# Patient Record
Sex: FEMALE | Race: BLACK OR AFRICAN AMERICAN | NOT HISPANIC OR LATINO | Employment: OTHER | URBAN - METROPOLITAN AREA
[De-identification: names, ages, dates, MRNs, and addresses within clinical notes are randomized per-mention and may not be internally consistent; named-entity substitution may affect disease eponyms.]

---

## 2017-01-30 RX ORDER — ALPRAZOLAM 0.5 MG/1
TABLET ORAL
Qty: 15 TABLET | Refills: 0 | Status: SHIPPED | OUTPATIENT
Start: 2017-01-30 | End: 2017-02-09 | Stop reason: SDUPTHER

## 2017-02-09 ENCOUNTER — OFFICE VISIT (OUTPATIENT)
Dept: FAMILY MEDICINE CLINIC | Facility: CLINIC | Age: 67
End: 2017-02-09

## 2017-02-09 VITALS
DIASTOLIC BLOOD PRESSURE: 70 MMHG | HEIGHT: 64 IN | RESPIRATION RATE: 18 BRPM | BODY MASS INDEX: 26.98 KG/M2 | HEART RATE: 58 BPM | OXYGEN SATURATION: 96 % | SYSTOLIC BLOOD PRESSURE: 120 MMHG | WEIGHT: 158 LBS

## 2017-02-09 DIAGNOSIS — R53.83 FATIGUE, UNSPECIFIED TYPE: ICD-10-CM

## 2017-02-09 DIAGNOSIS — I10 ESSENTIAL HYPERTENSION: ICD-10-CM

## 2017-02-09 DIAGNOSIS — E11.9 TYPE 2 DIABETES MELLITUS WITHOUT COMPLICATION, WITHOUT LONG-TERM CURRENT USE OF INSULIN (HCC): Primary | ICD-10-CM

## 2017-02-09 DIAGNOSIS — E78.5 HYPERLIPIDEMIA, UNSPECIFIED HYPERLIPIDEMIA TYPE: ICD-10-CM

## 2017-02-09 DIAGNOSIS — E55.9 VITAMIN D DEFICIENCY: ICD-10-CM

## 2017-02-09 DIAGNOSIS — F41.1 GENERALIZED ANXIETY DISORDER: ICD-10-CM

## 2017-02-09 DIAGNOSIS — Z79.899 DRUG THERAPY: ICD-10-CM

## 2017-02-09 PROBLEM — F32.A DEPRESSION: Status: ACTIVE | Noted: 2017-02-09

## 2017-02-09 PROBLEM — K21.9 GASTROESOPHAGEAL REFLUX DISEASE: Status: ACTIVE | Noted: 2017-02-09

## 2017-02-09 PROBLEM — N63.0 MASS OF BREAST: Status: ACTIVE | Noted: 2017-02-09

## 2017-02-09 PROBLEM — K62.5 RECTAL HEMORRHAGE: Status: ACTIVE | Noted: 2017-02-09

## 2017-02-09 PROBLEM — M17.9 OSTEOARTHRITIS OF KNEE: Status: ACTIVE | Noted: 2017-02-09

## 2017-02-09 PROBLEM — C50.919 MALIGNANT NEOPLASM OF BREAST: Status: ACTIVE | Noted: 2017-02-09

## 2017-02-09 PROBLEM — L73.9 FOLLICULITIS: Status: ACTIVE | Noted: 2017-02-09

## 2017-02-09 PROBLEM — Z96.651 STATUS POST TOTAL RIGHT KNEE REPLACEMENT: Status: ACTIVE | Noted: 2017-02-09

## 2017-02-09 PROCEDURE — 99214 OFFICE O/P EST MOD 30 MIN: CPT | Performed by: FAMILY MEDICINE

## 2017-02-09 RX ORDER — ESOMEPRAZOLE MAGNESIUM 40 MG/1
CAPSULE, DELAYED RELEASE ORAL DAILY
COMMUNITY
Start: 2015-05-15 | End: 2018-02-09

## 2017-02-09 RX ORDER — QUINAPRIL 40 MG/1
TABLET ORAL DAILY
COMMUNITY
Start: 2013-05-10 | End: 2017-04-25 | Stop reason: SDUPTHER

## 2017-02-09 RX ORDER — GLIPIZIDE 10 MG/1
10 TABLET, FILM COATED, EXTENDED RELEASE ORAL
COMMUNITY
End: 2017-02-11

## 2017-02-09 RX ORDER — PIROXICAM 10 MG/1
10 CAPSULE ORAL
COMMUNITY
End: 2017-02-11

## 2017-02-09 RX ORDER — CETIRIZINE HYDROCHLORIDE 10 MG/1
10 TABLET ORAL
COMMUNITY
Start: 2015-05-25 | End: 2018-02-09

## 2017-02-09 RX ORDER — ALPRAZOLAM 0.5 MG/1
TABLET ORAL
Qty: 30 TABLET | Refills: 2 | Status: SHIPPED | OUTPATIENT
Start: 2017-02-09 | End: 2018-02-09

## 2017-02-09 RX ORDER — MELOXICAM 7.5 MG/1
7.5 TABLET ORAL EVERY 24 HOURS
COMMUNITY
Start: 2016-09-19 | End: 2018-02-09

## 2017-02-09 RX ORDER — FLUTICASONE PROPIONATE 50 MCG
2 SPRAY, SUSPENSION (ML) NASAL
COMMUNITY
Start: 2015-05-25 | End: 2018-02-09

## 2017-02-09 RX ORDER — AMLODIPINE BESYLATE 10 MG/1
TABLET ORAL
COMMUNITY
Start: 2013-06-10 | End: 2018-02-09

## 2017-02-09 RX ORDER — ALPRAZOLAM 0.5 MG/1
0.5 TABLET ORAL NIGHTLY PRN
COMMUNITY
Start: 2013-07-16 | End: 2018-02-09 | Stop reason: SDUPTHER

## 2017-02-09 RX ORDER — GLIPIZIDE 10 MG/1
TABLET, FILM COATED, EXTENDED RELEASE ORAL
COMMUNITY
Start: 2014-03-13 | End: 2017-04-25 | Stop reason: SDUPTHER

## 2017-02-09 NOTE — PROGRESS NOTES
"Subjective   Lucia Ellis is a 66 y.o. female.     History of Present Illness  Here to discuss DM. Sugar low this am and ate eggs.  FBS often 118. At work has low sugar reactions , but not much bc has peanutbutter crackers prn. Maybe only 2 in the last 2 months. There are no recorded A1Cs in chart!!!      Was told to come in for xanax. 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    The following portions of the patient's history were reviewed and updated as appropriate: allergies, current medications, past social history and problem list.    Review of Systems   Constitutional: Negative for activity change, appetite change and unexpected weight change.   HENT: Negative for nosebleeds and trouble swallowing.    Eyes: Negative for pain and visual disturbance.   Respiratory: Negative for chest tightness, shortness of breath and wheezing.    Cardiovascular: Negative for chest pain and palpitations.   Gastrointestinal: Negative for abdominal pain and blood in stool.   Endocrine: Negative.    Genitourinary: Negative for difficulty urinating and hematuria.   Musculoskeletal: Negative for joint swelling.   Skin: Negative for color change and rash.   Allergic/Immunologic: Negative.    Neurological: Negative for syncope and speech difficulty.   Hematological: Negative for adenopathy.   Psychiatric/Behavioral: Negative for agitation and confusion.   All other systems reviewed and are negative.      Objective   Visit Vitals   • /70   • Pulse 58   • Resp 18   • Ht 64\" (162.6 cm)   • Wt 158 lb (71.7 kg)   • SpO2 96%   • BMI 27.12 kg/m2     Physical Exam   Constitutional: She is oriented to person, place, and time. Vital signs are normal. She appears well-developed and well-nourished. No distress.   HENT:   Head: Normocephalic.   Neck: Carotid bruit is not present. No thyromegaly present.   No bruits   Cardiovascular: Normal rate, regular rhythm and normal heart " sounds.    Pulmonary/Chest: Effort normal and breath sounds normal.    Lucia had a diabetic foot exam performed today.   During the foot exam she had a monofilament test performed.    Neurological Sensory Findings -  Unaltered sharp/dull right ankle/foot discrimination and unaltered sharp/dull left ankle/foot discrimination.  Neurological: She is alert and oriented to person, place, and time. Gait normal.   Psychiatric: She has a normal mood and affect. Her behavior is normal. Judgment and thought content normal.   Vitals reviewed.      Assessment/Plan   Problem List Items Addressed This Visit        Cardiovascular and Mediastinum    Hyperlipidemia    Relevant Orders    Lipid Panel With / Chol / HDL Ratio    Hypertension    Relevant Medications    amLODIPine (NORVASC) 10 MG tablet    quinapril (ACCUPRIL) 40 MG tablet       Digestive    Vitamin D deficiency    Relevant Orders    Vitamin D 25 Hydroxy       Endocrine    Diabetes mellitus - Primary    Relevant Medications    glipiZIDE (GLIPIZIDE XL) 10 MG 24 hr tablet    metFORMIN (GLUCOPHAGE) 1000 MG tablet    glipiZIDE (GLUCOTROL) 10 MG 24 hr tablet    Other Relevant Orders    Hemoglobin A1c    Microalbumin / Creatinine Urine Ratio       Other    Generalized anxiety disorder    Relevant Medications    ALPRAZolam (XANAX) 0.5 MG tablet    ALPRAZolam (XANAX) 0.5 MG tablet    Drug therapy    Relevant Orders    CBC & Differential    Comprehensive Metabolic Panel      Other Visit Diagnoses     Fatigue, unspecified type        Relevant Orders    TSH           RTO 6 mo for CPE and A1C  Encouraged to skip xanax occ

## 2017-02-10 LAB
25(OH)D3+25(OH)D2 SERPL-MCNC: 23.7 NG/ML (ref 30–100)
ALBUMIN SERPL-MCNC: 5.1 G/DL (ref 3.5–5.2)
ALBUMIN/CREAT UR: <11.1 MG/G CREAT (ref 0–30)
ALBUMIN/GLOB SERPL: 1.9 G/DL
ALP SERPL-CCNC: 148 U/L (ref 39–117)
ALT SERPL-CCNC: 17 U/L (ref 1–33)
AST SERPL-CCNC: 20 U/L (ref 1–32)
BASOPHILS # BLD AUTO: 0.04 10*3/MM3 (ref 0–0.2)
BASOPHILS NFR BLD AUTO: 0.7 % (ref 0–1.5)
BILIRUB SERPL-MCNC: 0.4 MG/DL (ref 0.1–1.2)
BUN SERPL-MCNC: 11 MG/DL (ref 8–23)
BUN/CREAT SERPL: 19.6 (ref 7–25)
CALCIUM SERPL-MCNC: 10.2 MG/DL (ref 8.6–10.5)
CHLORIDE SERPL-SCNC: 101 MMOL/L (ref 98–107)
CHOLEST SERPL-MCNC: 218 MG/DL (ref 0–200)
CHOLEST/HDLC SERPL: 3.76 {RATIO}
CO2 SERPL-SCNC: 27 MMOL/L (ref 22–29)
CREAT SERPL-MCNC: 0.56 MG/DL (ref 0.57–1)
CREAT UR-MCNC: 27.1 MG/DL
EOSINOPHIL # BLD AUTO: 0.12 10*3/MM3 (ref 0–0.7)
EOSINOPHIL NFR BLD AUTO: 2.2 % (ref 0.3–6.2)
ERYTHROCYTE [DISTWIDTH] IN BLOOD BY AUTOMATED COUNT: 13.5 % (ref 11.7–13)
GLOBULIN SER CALC-MCNC: 2.7 GM/DL
GLUCOSE SERPL-MCNC: 130 MG/DL (ref 65–99)
HBA1C MFR BLD: 7.8 % (ref 4.8–5.6)
HCT VFR BLD AUTO: 42.1 % (ref 35.6–45.5)
HDLC SERPL-MCNC: 58 MG/DL (ref 40–60)
HGB BLD-MCNC: 13.6 G/DL (ref 11.9–15.5)
IMM GRANULOCYTES # BLD: 0.02 10*3/MM3 (ref 0–0.03)
IMM GRANULOCYTES NFR BLD: 0.4 % (ref 0–0.5)
LDLC SERPL CALC-MCNC: 133 MG/DL (ref 0–100)
LYMPHOCYTES # BLD AUTO: 2.48 10*3/MM3 (ref 0.9–4.8)
LYMPHOCYTES NFR BLD AUTO: 45 % (ref 19.6–45.3)
MCH RBC QN AUTO: 28.9 PG (ref 26.9–32)
MCHC RBC AUTO-ENTMCNC: 32.3 G/DL (ref 32.4–36.3)
MCV RBC AUTO: 89.6 FL (ref 80.5–98.2)
MICROALBUMIN UR-MCNC: <3 UG/ML
MONOCYTES # BLD AUTO: 0.41 10*3/MM3 (ref 0.2–1.2)
MONOCYTES NFR BLD AUTO: 7.4 % (ref 5–12)
NEUTROPHILS # BLD AUTO: 2.44 10*3/MM3 (ref 1.9–8.1)
NEUTROPHILS NFR BLD AUTO: 44.3 % (ref 42.7–76)
PLATELET # BLD AUTO: 254 10*3/MM3 (ref 140–500)
POTASSIUM SERPL-SCNC: 3.8 MMOL/L (ref 3.5–5.2)
PROT SERPL-MCNC: 7.8 G/DL (ref 6–8.5)
RBC # BLD AUTO: 4.7 10*6/MM3 (ref 3.9–5.2)
SODIUM SERPL-SCNC: 143 MMOL/L (ref 136–145)
TRIGL SERPL-MCNC: 136 MG/DL (ref 0–150)
TSH SERPL DL<=0.005 MIU/L-ACNC: 0.89 MIU/ML (ref 0.27–4.2)
VLDLC SERPL CALC-MCNC: 27.2 MG/DL (ref 5–40)
WBC # BLD AUTO: 5.51 10*3/MM3 (ref 4.5–10.7)

## 2017-02-11 DIAGNOSIS — E11.65 TYPE 2 DIABETES MELLITUS WITH HYPERGLYCEMIA, WITHOUT LONG-TERM CURRENT USE OF INSULIN (HCC): ICD-10-CM

## 2017-02-11 DIAGNOSIS — E78.5 HYPERLIPIDEMIA, UNSPECIFIED HYPERLIPIDEMIA TYPE: Primary | ICD-10-CM

## 2017-02-11 RX ORDER — ATORVASTATIN CALCIUM 10 MG/1
10 TABLET, FILM COATED ORAL DAILY
Qty: 90 TABLET | Refills: 3 | Status: SHIPPED | OUTPATIENT
Start: 2017-02-11 | End: 2018-02-09

## 2017-03-09 RX ORDER — PIROXICAM 10 MG/1
CAPSULE ORAL
Qty: 60 CAPSULE | Refills: 8 | Status: SHIPPED | OUTPATIENT
Start: 2017-03-09 | End: 2018-02-09

## 2017-03-24 RX ORDER — QUINAPRIL 40 MG/1
TABLET ORAL
Qty: 30 TABLET | Refills: 8 | Status: SHIPPED | OUTPATIENT
Start: 2017-03-24 | End: 2018-02-09 | Stop reason: SDUPTHER

## 2017-04-10 RX ORDER — AMLODIPINE BESYLATE 10 MG/1
TABLET ORAL
Qty: 90 TABLET | Refills: 3 | Status: SHIPPED | OUTPATIENT
Start: 2017-04-10 | End: 2018-02-09 | Stop reason: SDUPTHER

## 2017-04-24 RX ORDER — GLIPIZIDE 10 MG/1
TABLET, FILM COATED, EXTENDED RELEASE ORAL
Qty: 30 TABLET | Refills: 6 | Status: SHIPPED | OUTPATIENT
Start: 2017-04-24 | End: 2018-02-09

## 2017-04-25 RX ORDER — QUINAPRIL 40 MG/1
40 TABLET ORAL DAILY
Qty: 90 TABLET | Refills: 1 | Status: SHIPPED | OUTPATIENT
Start: 2017-04-25 | End: 2017-11-19 | Stop reason: SDUPTHER

## 2017-04-25 RX ORDER — GLIPIZIDE 10 MG/1
10 TABLET, FILM COATED, EXTENDED RELEASE ORAL DAILY
Qty: 90 TABLET | Refills: 3 | Status: SHIPPED | OUTPATIENT
Start: 2017-04-25 | End: 2018-03-12

## 2017-05-22 RX ORDER — ALPRAZOLAM 0.5 MG/1
TABLET ORAL
Qty: 30 TABLET | Refills: 1 | Status: SHIPPED | OUTPATIENT
Start: 2017-05-22 | End: 2018-02-09

## 2017-06-13 ENCOUNTER — TELEPHONE (OUTPATIENT)
Dept: FAMILY MEDICINE CLINIC | Facility: CLINIC | Age: 67
End: 2017-06-13

## 2017-06-13 RX ORDER — MELOXICAM 7.5 MG/1
7.5 TABLET ORAL DAILY
Qty: 30 TABLET | Refills: 6 | Status: SHIPPED | OUTPATIENT
Start: 2017-06-13 | End: 2018-02-09

## 2017-11-20 RX ORDER — QUINAPRIL 40 MG/1
TABLET ORAL
Qty: 90 TABLET | Refills: 1 | Status: SHIPPED | OUTPATIENT
Start: 2017-11-20 | End: 2018-02-09

## 2018-02-09 ENCOUNTER — APPOINTMENT (OUTPATIENT)
Dept: LAB | Facility: HOSPITAL | Age: 68
End: 2018-02-09

## 2018-02-09 ENCOUNTER — OFFICE VISIT (OUTPATIENT)
Dept: INTERNAL MEDICINE | Facility: CLINIC | Age: 68
End: 2018-02-09

## 2018-02-09 VITALS
WEIGHT: 154.4 LBS | BODY MASS INDEX: 26.36 KG/M2 | OXYGEN SATURATION: 97 % | HEIGHT: 64 IN | HEART RATE: 76 BPM | DIASTOLIC BLOOD PRESSURE: 88 MMHG | SYSTOLIC BLOOD PRESSURE: 138 MMHG

## 2018-02-09 DIAGNOSIS — E11.65 TYPE 2 DIABETES MELLITUS WITH HYPERGLYCEMIA, WITHOUT LONG-TERM CURRENT USE OF INSULIN (HCC): ICD-10-CM

## 2018-02-09 DIAGNOSIS — E11.9 TYPE 2 DIABETES MELLITUS WITHOUT COMPLICATION, WITHOUT LONG-TERM CURRENT USE OF INSULIN (HCC): Primary | ICD-10-CM

## 2018-02-09 DIAGNOSIS — F41.1 GENERALIZED ANXIETY DISORDER: ICD-10-CM

## 2018-02-09 DIAGNOSIS — I10 ESSENTIAL HYPERTENSION: ICD-10-CM

## 2018-02-09 LAB
ALBUMIN SERPL-MCNC: 4.8 G/DL (ref 3.5–5.2)
ALBUMIN/GLOB SERPL: 1.7 G/DL
ALP SERPL-CCNC: 132 U/L (ref 39–117)
ALT SERPL W P-5'-P-CCNC: 16 U/L (ref 1–33)
ANION GAP SERPL CALCULATED.3IONS-SCNC: 12 MMOL/L
AST SERPL-CCNC: 16 U/L (ref 1–32)
BILIRUB SERPL-MCNC: 0.2 MG/DL (ref 0.1–1.2)
BUN BLD-MCNC: 13 MG/DL (ref 8–23)
BUN/CREAT SERPL: 22.8 (ref 7–25)
CALCIUM SPEC-SCNC: 9.6 MG/DL (ref 8.6–10.5)
CHLORIDE SERPL-SCNC: 100 MMOL/L (ref 98–107)
CO2 SERPL-SCNC: 29 MMOL/L (ref 22–29)
CREAT BLD-MCNC: 0.57 MG/DL (ref 0.57–1)
GFR SERPL CREATININE-BSD FRML MDRD: 127 ML/MIN/1.73
GLOBULIN UR ELPH-MCNC: 2.9 GM/DL
GLUCOSE BLD-MCNC: 100 MG/DL (ref 65–99)
HBA1C MFR BLD: 8.8 % (ref 4.8–5.6)
POTASSIUM BLD-SCNC: 3.3 MMOL/L (ref 3.5–5.2)
PROT SERPL-MCNC: 7.7 G/DL (ref 6–8.5)
SODIUM BLD-SCNC: 141 MMOL/L (ref 136–145)

## 2018-02-09 PROCEDURE — 99214 OFFICE O/P EST MOD 30 MIN: CPT | Performed by: FAMILY MEDICINE

## 2018-02-09 PROCEDURE — 36415 COLL VENOUS BLD VENIPUNCTURE: CPT | Performed by: FAMILY MEDICINE

## 2018-02-09 PROCEDURE — 83036 HEMOGLOBIN GLYCOSYLATED A1C: CPT | Performed by: FAMILY MEDICINE

## 2018-02-09 PROCEDURE — 80053 COMPREHEN METABOLIC PANEL: CPT | Performed by: FAMILY MEDICINE

## 2018-02-09 RX ORDER — QUINAPRIL 40 MG/1
40 TABLET ORAL DAILY
Qty: 30 TABLET | Refills: 8 | Status: SHIPPED | OUTPATIENT
Start: 2018-02-09 | End: 2019-01-15 | Stop reason: SDUPTHER

## 2018-02-09 RX ORDER — AMLODIPINE BESYLATE 10 MG/1
10 TABLET ORAL DAILY
Qty: 90 TABLET | Refills: 3 | Status: SHIPPED | OUTPATIENT
Start: 2018-02-09 | End: 2019-04-07 | Stop reason: SDUPTHER

## 2018-02-09 RX ORDER — ASPIRIN 81 MG/1
81 TABLET ORAL DAILY
COMMUNITY
End: 2022-12-29

## 2018-02-09 RX ORDER — ALPRAZOLAM 0.5 MG/1
0.5 TABLET ORAL NIGHTLY PRN
Qty: 30 TABLET | Refills: 0 | Status: SHIPPED | OUTPATIENT
Start: 2018-02-09 | End: 2018-03-13 | Stop reason: SDUPTHER

## 2018-02-12 NOTE — PROGRESS NOTES
Subjective   Lucia Ellis is a 67 y.o. female.     Chief Complaint   Patient presents with   • New Patient Visit     transfer Dr. olsen   • Diabetes   • Hypertension         History of Present Illness     Patient presents today with a history of essential hypertension.  Today's blood pressures 138/88.  Patient's currently taken Accupril 40 mg daily and Norvasc 10 mg daily.  At this time patient denies any side effects of the medication.  Patient denies any headaches or blurry vision.    Patient also notes to have generalized anxiety disorder.  Patient notes that she takes Xanax 0.5 mg daily for this.  She notes that without the Xanax she is a hard time with her anxiety.  She notes that she is anxious most of the time.  She notes that she particularly takes medicine at nighttime.  She denies any side effects of the medication.    Patient notes to have type 2 diabetes.  Patient's currently taking glipizide XL 10 mg daily.  She's also taking metformin 1000 mg daily.  Patient does not recall the most recent hemoglobin A1c.  She does note that her fasting blood glucose levels have been elevated when she checks this.    The following portions of the patient's history were reviewed and updated as appropriate: allergies, current medications, past family history, past medical history, past social history, past surgical history and problem list.    Review of Systems   Constitutional: Negative for chills and fever.   HENT: Negative for congestion, rhinorrhea, sinus pain and sore throat.    Eyes: Negative for photophobia and visual disturbance.   Respiratory: Negative for cough, chest tightness and shortness of breath.    Cardiovascular: Negative for chest pain and palpitations.   Gastrointestinal: Negative for diarrhea, nausea and vomiting.   Genitourinary: Negative for dysuria, frequency and urgency.   Skin: Negative for rash and wound.   Neurological: Negative for dizziness and syncope.   Psychiatric/Behavioral:  Negative for behavioral problems and confusion.       Objective   Physical Exam   Constitutional: She is oriented to person, place, and time. She appears well-developed and well-nourished.   HENT:   Head: Normocephalic and atraumatic.   Right Ear: External ear normal.   Left Ear: External ear normal.   Mouth/Throat: Oropharynx is clear and moist.   Eyes: EOM are normal.   Neck: Normal range of motion. Neck supple.   Cardiovascular: Normal rate, regular rhythm and normal heart sounds.    Pulmonary/Chest: Effort normal and breath sounds normal. No respiratory distress.   Musculoskeletal: Normal range of motion.   Lymphadenopathy:     She has no cervical adenopathy.   Neurological: She is alert and oriented to person, place, and time.   Skin: Skin is warm.   Psychiatric: She has a normal mood and affect. Her behavior is normal.   Nursing note and vitals reviewed.      Assessment/Plan   Lucia was seen today for new patient visit, diabetes and hypertension.    Diagnoses and all orders for this visit:    Type 2 diabetes mellitus without complication, without long-term current use of insulin  -     quinapril (ACCUPRIL) 40 MG tablet; Take 1 tablet by mouth Daily.  -     Comprehensive Metabolic Panel  -     Hemoglobin A1c  -     SITagliptin-MetFORMIN HCl ER (JANUMET XR) 100-1000 MG tablet; Take 1 tablet by mouth Daily.    Essential hypertension  -     amLODIPine (NORVASC) 10 MG tablet; Take 1 tablet by mouth Daily.  -     quinapril (ACCUPRIL) 40 MG tablet; Take 1 tablet by mouth Daily.  -     Comprehensive Metabolic Panel    Type 2 diabetes mellitus with hyperglycemia, without long-term current use of insulin  -     Comprehensive Metabolic Panel  -     Hemoglobin A1c  -     SITagliptin-MetFORMIN HCl ER (JANUMET XR) 100-1000 MG tablet; Take 1 tablet by mouth Daily.        -     Patient is to continue her glipizide XL 10 mg daily.  She is to also take metformin 1000 mg at night.  Patient is to start Janumet -1000  milligrams as well.    Generalized anxiety disorder  -     ALPRAZolam (XANAX) 0.5 MG tablet; Take 1 tablet by mouth At Night As Needed for Anxiety.  -     Discussed with patient that going forward we'll attempt to wean patient off Xanax, perhaps try a different patient.    Other orders  -     mupirocin (BACTROBAN) 2 % ointment; Apply  topically Daily.          No Follow-up on file.    Dictated utilizing Dragon Voice Recognition Software

## 2018-02-13 ENCOUNTER — TELEPHONE (OUTPATIENT)
Dept: INTERNAL MEDICINE | Facility: CLINIC | Age: 68
End: 2018-02-13

## 2018-02-13 DIAGNOSIS — E87.6 LOW BLOOD POTASSIUM: Primary | ICD-10-CM

## 2018-02-13 RX ORDER — POTASSIUM CHLORIDE 1500 MG/1
40 TABLET, FILM COATED, EXTENDED RELEASE ORAL ONCE
Qty: 2 TABLET | Refills: 0 | Status: SHIPPED | OUTPATIENT
Start: 2018-02-13 | End: 2018-02-13

## 2018-02-13 NOTE — PROGRESS NOTES
Please inform the patient of the following abnormal results.  hba1c is now 8.8 which is higher than her prior 7.8 a yr ago. She is to take her metformin 1000mg at night. Glipizide xl 10mg in the morning. And take janumet -1000 at night. She will need follow up in 3 mos.   Potassium is low, patient is to take 40 meq of potassium x 1. Check BMP on 2/15.  Alk phos continues to stay elevated, maybe new baseline.

## 2018-02-13 NOTE — TELEPHONE ENCOUNTER
LVM- patient notified. Patient advised to contact office if they have any questions. Prescription for potassium sent into pharmacy. Lab order put into [facility]. Patient advised to contact office to schedule 3 month follow up appointment and lab appointment for 2/15.

## 2018-02-13 NOTE — TELEPHONE ENCOUNTER
----- Message from Everardo Mazariegos MD sent at 2/13/2018  9:27 AM EST -----  Please inform the patient of the following abnormal results.  hba1c is now 8.8 which is higher than her prior 7.8 a yr ago. She is to take her metformin 1000mg at night. Glipizide xl 10mg in the morning. And take janumet -1000 at night. She will need follow up in 3 mos.   Potassium is low, patient is to take 40 meq of potassium x 1. Check BMP on 2/15.  Alk phos continues to stay elevated, maybe new baseline.

## 2018-02-15 ENCOUNTER — RESULTS ENCOUNTER (OUTPATIENT)
Dept: INTERNAL MEDICINE | Facility: CLINIC | Age: 68
End: 2018-02-15

## 2018-02-15 DIAGNOSIS — E87.6 LOW BLOOD POTASSIUM: ICD-10-CM

## 2018-03-12 ENCOUNTER — OFFICE VISIT (OUTPATIENT)
Dept: INTERNAL MEDICINE | Facility: CLINIC | Age: 68
End: 2018-03-12

## 2018-03-12 VITALS
BODY MASS INDEX: 26.5 KG/M2 | DIASTOLIC BLOOD PRESSURE: 62 MMHG | HEIGHT: 64 IN | WEIGHT: 155.2 LBS | OXYGEN SATURATION: 97 % | HEART RATE: 86 BPM | SYSTOLIC BLOOD PRESSURE: 142 MMHG

## 2018-03-12 DIAGNOSIS — Z00.00 WELL WOMAN EXAM (NO GYNECOLOGICAL EXAM): Primary | ICD-10-CM

## 2018-03-12 DIAGNOSIS — Z13.29 SCREENING FOR THYROID DISORDER: ICD-10-CM

## 2018-03-12 DIAGNOSIS — Z11.59 ENCOUNTER FOR HEPATITIS C SCREENING TEST FOR LOW RISK PATIENT: ICD-10-CM

## 2018-03-12 DIAGNOSIS — I10 ESSENTIAL HYPERTENSION: ICD-10-CM

## 2018-03-12 DIAGNOSIS — E11.9 TYPE 2 DIABETES MELLITUS WITHOUT COMPLICATION, WITHOUT LONG-TERM CURRENT USE OF INSULIN (HCC): ICD-10-CM

## 2018-03-12 DIAGNOSIS — E55.9 VITAMIN D DEFICIENCY: ICD-10-CM

## 2018-03-12 DIAGNOSIS — Z00.00 WELL WOMAN EXAM (NO GYNECOLOGICAL EXAM): ICD-10-CM

## 2018-03-12 DIAGNOSIS — Z12.39 SCREENING FOR BREAST CANCER: ICD-10-CM

## 2018-03-12 DIAGNOSIS — Z12.11 ENCOUNTER FOR SCREENING COLONOSCOPY: ICD-10-CM

## 2018-03-12 DIAGNOSIS — E78.5 HYPERLIPIDEMIA, UNSPECIFIED HYPERLIPIDEMIA TYPE: ICD-10-CM

## 2018-03-12 PROCEDURE — 99397 PER PM REEVAL EST PAT 65+ YR: CPT | Performed by: FAMILY MEDICINE

## 2018-03-12 PROCEDURE — 99214 OFFICE O/P EST MOD 30 MIN: CPT | Performed by: FAMILY MEDICINE

## 2018-03-13 DIAGNOSIS — F41.1 GENERALIZED ANXIETY DISORDER: ICD-10-CM

## 2018-03-14 LAB
25(OH)D3+25(OH)D2 SERPL-MCNC: 14.5 NG/ML (ref 30–100)
ALBUMIN SERPL-MCNC: 4.4 G/DL (ref 3.5–5.2)
ALBUMIN/GLOB SERPL: 1.7 G/DL
ALP SERPL-CCNC: 105 U/L (ref 39–117)
ALT SERPL-CCNC: 16 U/L (ref 1–33)
AST SERPL-CCNC: 14 U/L (ref 1–32)
BASOPHILS # BLD AUTO: 0.08 10*3/MM3 (ref 0–0.2)
BASOPHILS NFR BLD AUTO: 1.7 % (ref 0–1.5)
BILIRUB SERPL-MCNC: 0.3 MG/DL (ref 0.1–1.2)
BUN SERPL-MCNC: 16 MG/DL (ref 8–23)
BUN/CREAT SERPL: 22.9 (ref 7–25)
CALCIUM SERPL-MCNC: 9.6 MG/DL (ref 8.6–10.5)
CHLORIDE SERPL-SCNC: 102 MMOL/L (ref 98–107)
CHOLEST SERPL-MCNC: 222 MG/DL (ref 0–200)
CO2 SERPL-SCNC: 27.4 MMOL/L (ref 22–29)
CREAT SERPL-MCNC: 0.7 MG/DL (ref 0.57–1)
EOSINOPHIL # BLD AUTO: 0.08 10*3/MM3 (ref 0–0.7)
EOSINOPHIL NFR BLD AUTO: 1.7 % (ref 0.3–6.2)
ERYTHROCYTE [DISTWIDTH] IN BLOOD BY AUTOMATED COUNT: 13.7 % (ref 11.7–13)
FT4I SERPL CALC-MCNC: 1.3 (ref 1.2–4.9)
GFR SERPLBLD CREATININE-BSD FMLA CKD-EPI: 100 ML/MIN/1.73
GFR SERPLBLD CREATININE-BSD FMLA CKD-EPI: 83 ML/MIN/1.73
GLOBULIN SER CALC-MCNC: 2.6 GM/DL
GLUCOSE SERPL-MCNC: 144 MG/DL (ref 65–99)
HBA1C MFR BLD: 8.1 % (ref 4.8–5.6)
HCT VFR BLD AUTO: 39.7 % (ref 35.6–45.5)
HCV AB S/CO SERPL IA: <0.1 S/CO RATIO (ref 0–0.9)
HDLC SERPL-MCNC: 57 MG/DL (ref 40–60)
HGB BLD-MCNC: 12.6 G/DL (ref 11.9–15.5)
IMM GRANULOCYTES # BLD: 0 10*3/MM3 (ref 0–0.03)
IMM GRANULOCYTES NFR BLD: 0 % (ref 0–0.5)
LDLC SERPL CALC-MCNC: 154 MG/DL (ref 0–100)
LDLC/HDLC SERPL: 2.7 {RATIO}
LYMPHOCYTES # BLD AUTO: 2.03 10*3/MM3 (ref 0.9–4.8)
LYMPHOCYTES NFR BLD AUTO: 42.5 % (ref 19.6–45.3)
MCH RBC QN AUTO: 28.3 PG (ref 26.9–32)
MCHC RBC AUTO-ENTMCNC: 31.7 G/DL (ref 32.4–36.3)
MCV RBC AUTO: 89.2 FL (ref 80.5–98.2)
MONOCYTES # BLD AUTO: 0.35 10*3/MM3 (ref 0.2–1.2)
MONOCYTES NFR BLD AUTO: 7.3 % (ref 5–12)
NEUTROPHILS # BLD AUTO: 2.24 10*3/MM3 (ref 1.9–8.1)
NEUTROPHILS NFR BLD AUTO: 46.8 % (ref 42.7–76)
PLATELET # BLD AUTO: 220 10*3/MM3 (ref 140–500)
POTASSIUM SERPL-SCNC: 4.1 MMOL/L (ref 3.5–5.2)
PROT SERPL-MCNC: 7 G/DL (ref 6–8.5)
RBC # BLD AUTO: 4.45 10*6/MM3 (ref 3.9–5.2)
SODIUM SERPL-SCNC: 142 MMOL/L (ref 136–145)
T3RU NFR SERPL: 28 % (ref 24–39)
T4 SERPL-MCNC: 4.8 UG/DL (ref 4.5–12)
TRIGL SERPL-MCNC: 55 MG/DL (ref 0–150)
TSH SERPL DL<=0.005 MIU/L-ACNC: 1.55 UIU/ML (ref 0.45–4.5)
VLDLC SERPL CALC-MCNC: 11 MG/DL (ref 5–40)
WBC # BLD AUTO: 4.78 10*3/MM3 (ref 4.5–10.7)

## 2018-03-14 RX ORDER — ALPRAZOLAM 0.5 MG/1
0.5 TABLET ORAL NIGHTLY PRN
Qty: 90 TABLET | Refills: 1 | OUTPATIENT
Start: 2018-03-14 | End: 2018-09-06 | Stop reason: SDUPTHER

## 2018-03-14 NOTE — PROGRESS NOTES
Please inform the patient of the following abnormal results.  Thyroid panels within normal limits.  Patient does not have hepatitis C.  CBC is within normal limits.  Hemoglobin A1c has improved but still elevated.  Continue current medication therapy.  Lipid panels elevated with LDL cholesterol and total cholesterol. It  Is stable from 1 year ago.  She will need to be started on 40 mg of atorvastatin daily.  Has vitamin D deficiency.  Patient will need to take 5000 IUs of vitamin D daily.

## 2018-03-14 NOTE — PROGRESS NOTES
Subjective   Lucia Ellis is a 68 y.o. female and is here for a comprehensive physical exam. The patient reports no problems.    Pt is in need for mammogram.  Patient has not had her annual pelvic exam as well.    Do you take any herbs or supplements that were not prescribed by a doctor? no      Social History:   Social History     Social History   • Marital status:      Spouse name: N/A   • Number of children: N/A   • Years of education: N/A     Occupational History   • Not on file.     Social History Main Topics   • Smoking status: Never Smoker   • Smokeless tobacco: Never Used   • Alcohol use Yes      Comment: occ   • Drug use: No   • Sexual activity: No     Other Topics Concern   • Not on file     Social History Narrative   • No narrative on file       Family History:   Family History   Problem Relation Age of Onset   • Heart attack Mother    • Heart disease Mother    • Hypertension Mother    • Hypertension Father    • Diabetes Father    • Hypertension Sister    • Diabetes Sister    • Thyroid cancer Sister    • Breast cancer Sister    • Lung cancer Sister    • Diabetes Brother    • Drug abuse Paternal Grandmother        Past Medical History:   Past Medical History:   Diagnosis Date   • Breast cancer    • Diabetes mellitus    • Hypertension            Review of Systems    Review of Systems   Constitutional: Negative for chills and fever.   HENT: Negative for congestion, rhinorrhea, sinus pain and sore throat.    Eyes: Negative for photophobia and visual disturbance.   Respiratory: Negative for cough, chest tightness and shortness of breath.    Cardiovascular: Negative for chest pain and palpitations.   Gastrointestinal: Negative for diarrhea, nausea and vomiting.   Genitourinary: Negative for dysuria, frequency and urgency.   Skin: Negative for rash and wound.   Neurological: Negative for dizziness and syncope.   Psychiatric/Behavioral: Negative for behavioral problems and confusion.       Objective    Physical Exam   Constitutional: She is oriented to person, place, and time. She appears well-developed and well-nourished.   HENT:   Head: Normocephalic and atraumatic.   Right Ear: External ear normal.   Left Ear: External ear normal.   Mouth/Throat: Oropharynx is clear and moist.   Eyes: EOM are normal.   Neck: Normal range of motion. Neck supple.   Cardiovascular: Normal rate, regular rhythm and normal heart sounds.    Pulmonary/Chest: Effort normal and breath sounds normal. No respiratory distress.   Musculoskeletal: Normal range of motion.   Lymphadenopathy:     She has no cervical adenopathy.   Neurological: She is alert and oriented to person, place, and time.   Skin: Skin is warm.   Psychiatric: She has a normal mood and affect. Her behavior is normal.   Nursing note and vitals reviewed.      Medications:   Current Outpatient Prescriptions:   •  amLODIPine (NORVASC) 10 MG tablet, Take 1 tablet by mouth Daily., Disp: 90 tablet, Rfl: 3  •  aspirin 81 MG EC tablet, Take 81 mg by mouth Daily., Disp: , Rfl:   •  mupirocin (BACTROBAN) 2 % ointment, Apply  topically Daily., Disp: 15 g, Rfl: 3  •  quinapril (ACCUPRIL) 40 MG tablet, Take 1 tablet by mouth Daily., Disp: 30 tablet, Rfl: 8  •  SITagliptin-MetFORMIN HCl ER (JANUMET XR) 100-1000 MG tablet, Take 1 tablet by mouth Daily., Disp: 30 tablet, Rfl: 3  •  ALPRAZolam (XANAX) 0.5 MG tablet, Take 1 tablet by mouth At Night As Needed for Anxiety., Disp: 90 tablet, Rfl: 1       Assessment/Plan   Healthy female exam.      1. Healthcare Maintenance:  2. Patient Counseling:  --Nutrition: Stressed importance of moderation in sodium/caffeine intake, saturated fat and cholesterol, caloric balance, sufficient intake of fresh fruits, vegetables, fiber, calcium and vit D  --Exercise: Recommended patient to do daily walking for 30 minutes.  --Immunizations reviewed.      Diagnoses and all orders for this visit:    Well woman exam (no gynecological exam)  -     Comprehensive  Metabolic Panel; Future  -     CBC & Differential; Future    Screening for thyroid disorder  -     Thyroid Panel With TSH; Future    Screening for breast cancer  -     Mammo screening bilateral w CAD; Future    Encounter for screening colonoscopy  -     Ambulatory Referral For Screening Colonoscopy; Future    Encounter for hepatitis C screening test for low risk patient  -     Hepatitis C Antibody; Future        No Follow-up on file.             Dictated utilizing Dragon Voice Recognition Software

## 2018-03-14 NOTE — PROGRESS NOTES
Subjective   Lucia Ellis is a 68 y.o. female.     Chief Complaint   Patient presents with   • Annual Exam         History of Present Illness     Patient presents today with essential hypertension.  Patient's blood pressure is currently managed with amlodipine 10 mg daily and quinapril 40 mg daily.  Blood pressure today's office visit is 142/62.  Patient denies any side effects of the medication.    Patient also notes to have diabetes type 2.  She currently takes Janumet X are 100-1000 milligrams daily.  She denies any side effects of medication.    She also notes to have hyperlipidemia.  Patient notes that she currently takes no medication for her cholesterol.  She is to manage with her diet and exercise.    She was found to have vitamin D deficiency 1 year ago.  Patient's currently not taking any over-the-counter medication for this.    The following portions of the patient's history were reviewed and updated as appropriate: allergies, current medications, past family history, past medical history, past social history, past surgical history and problem list.    Review of Systems   Constitutional: Negative for chills and fever.   HENT: Negative for congestion, rhinorrhea, sinus pain and sore throat.    Eyes: Negative for photophobia and visual disturbance.   Respiratory: Negative for cough, chest tightness and shortness of breath.    Cardiovascular: Negative for chest pain and palpitations.   Gastrointestinal: Negative for diarrhea, nausea and vomiting.   Genitourinary: Negative for dysuria, frequency and urgency.   Skin: Negative for rash and wound.   Neurological: Negative for dizziness and syncope.   Psychiatric/Behavioral: Negative for behavioral problems and confusion.       Objective   Physical Exam   Constitutional: She is oriented to person, place, and time. She appears well-developed and well-nourished.   HENT:   Head: Normocephalic and atraumatic.   Right Ear: External ear normal.   Left Ear: External  ear normal.   Mouth/Throat: Oropharynx is clear and moist.   Eyes: EOM are normal.   Neck: Normal range of motion. Neck supple.   Cardiovascular: Normal rate, regular rhythm and normal heart sounds.    Pulmonary/Chest: Effort normal and breath sounds normal. No respiratory distress.   Musculoskeletal: Normal range of motion.   Lymphadenopathy:     She has no cervical adenopathy.   Neurological: She is alert and oriented to person, place, and time.   Skin: Skin is warm.   Psychiatric: She has a normal mood and affect. Her behavior is normal.   Nursing note and vitals reviewed.      Assessment/Plan   Lucia was seen today for annual exam.    Diagnoses and all orders for this visit:      Essential hypertension  -     Comprehensive Metabolic Panel; Future  -     Continue current hypertension medication.    Hyperlipidemia, unspecified hyperlipidemia type  -     Lipid Panel With LDL / HDL Ratio; Future  -     If elevated will need to start patient on statin medication.    Vitamin D deficiency  -     Vitamin D 25 Hydroxy; Future  -     Will most likely be low, and patient will need to have replacement therapy.    Type 2 diabetes mellitus without complication, without long-term current use of insulin  -     Hemoglobin A1c; Future  -     Ambulatory referral to Ophthalmology          No Follow-up on file.    Dictated utilizing Dragon Voice Recognition Software

## 2018-03-15 ENCOUNTER — TELEPHONE (OUTPATIENT)
Dept: INTERNAL MEDICINE | Facility: CLINIC | Age: 68
End: 2018-03-15

## 2018-03-15 NOTE — TELEPHONE ENCOUNTER
----- Message from Everardo Mazariegos MD sent at 3/14/2018  5:02 PM EDT -----  Please inform the patient of the following abnormal results.  Thyroid panels within normal limits.  Patient does not have hepatitis C.  CBC is within normal limits.  Hemoglobin A1c has improved but still elevated.  Continue current medication therapy.  Lipid panels elevated with LDL cholesterol and total cholesterol. It  Is stable from 1 year ago.  She will need to be started on 40 mg of atorvastatin daily.  Has vitamin D deficiency.  Patient will need to take 5000 IUs of vitamin D daily.

## 2018-03-15 NOTE — TELEPHONE ENCOUNTER
Patient notified and voiced understanding. Patient advised to contact office if they have any questions. Patient stated that she has previously taken Lipitor and it upset her stomach. Patient was wondering if there was anything else she could take instead. Prescription for Vitamin D called into pharmacy.

## 2018-03-16 RX ORDER — SIMVASTATIN 40 MG
40 TABLET ORAL NIGHTLY
Qty: 90 TABLET | Refills: 1 | Status: SHIPPED | OUTPATIENT
Start: 2018-03-16 | End: 2018-09-21

## 2018-03-16 NOTE — TELEPHONE ENCOUNTER
Per Dr. Yair ozuna to change to Simvastatin 40 mg daily. Patient notified. Prescription sent into pharmacy.

## 2018-09-02 DIAGNOSIS — F41.1 GENERALIZED ANXIETY DISORDER: ICD-10-CM

## 2018-09-04 DIAGNOSIS — F41.1 GENERALIZED ANXIETY DISORDER: ICD-10-CM

## 2018-09-04 RX ORDER — ALPRAZOLAM 0.5 MG/1
TABLET ORAL
Qty: 90 TABLET | Refills: 0 | OUTPATIENT
Start: 2018-09-04

## 2018-09-04 NOTE — TELEPHONE ENCOUNTER
Patient's last office visit was on 3/12/18. Patient's last refill was on 3/14/18. Srinath and CARON are up to date. Please advise.

## 2018-09-05 RX ORDER — ALPRAZOLAM 0.5 MG/1
TABLET ORAL
Qty: 90 TABLET | Refills: 0 | OUTPATIENT
Start: 2018-09-05

## 2018-09-06 DIAGNOSIS — F41.1 GENERALIZED ANXIETY DISORDER: ICD-10-CM

## 2018-09-06 RX ORDER — ALPRAZOLAM 0.5 MG/1
0.5 TABLET ORAL NIGHTLY PRN
Qty: 21 TABLET | Refills: 0 | OUTPATIENT
Start: 2018-09-06 | End: 2018-09-21 | Stop reason: SDUPTHER

## 2018-09-06 NOTE — TELEPHONE ENCOUNTER
Patient notified. Prescription left on voicemail at [facility] ([phone]).

## 2018-09-06 NOTE — TELEPHONE ENCOUNTER
Patient called requesting a refill on her Xanax prescription. Patient's last office visit was on 3/12/18. Patient's last refill was on 3/14/18. Patient stated that she will be out of town next week due to the fact that she had a death in the family and will not be able to come into the office. Patient wanted to know if she could get a refill and she will schedule an appointment upon her return. Please advise.

## 2018-09-21 ENCOUNTER — OFFICE VISIT (OUTPATIENT)
Dept: INTERNAL MEDICINE | Facility: CLINIC | Age: 68
End: 2018-09-21

## 2018-09-21 ENCOUNTER — APPOINTMENT (OUTPATIENT)
Dept: LAB | Facility: HOSPITAL | Age: 68
End: 2018-09-21

## 2018-09-21 VITALS
OXYGEN SATURATION: 98 % | WEIGHT: 147 LBS | SYSTOLIC BLOOD PRESSURE: 140 MMHG | DIASTOLIC BLOOD PRESSURE: 78 MMHG | HEIGHT: 64 IN | BODY MASS INDEX: 25.1 KG/M2 | HEART RATE: 80 BPM

## 2018-09-21 DIAGNOSIS — E11.65 TYPE 2 DIABETES MELLITUS WITH HYPERGLYCEMIA, WITHOUT LONG-TERM CURRENT USE OF INSULIN (HCC): ICD-10-CM

## 2018-09-21 DIAGNOSIS — F41.1 GENERALIZED ANXIETY DISORDER: Primary | ICD-10-CM

## 2018-09-21 DIAGNOSIS — E11.9 TYPE 2 DIABETES MELLITUS WITHOUT COMPLICATION, WITHOUT LONG-TERM CURRENT USE OF INSULIN (HCC): ICD-10-CM

## 2018-09-21 PROBLEM — K81.0 ACUTE CHOLECYSTITIS: Status: ACTIVE | Noted: 2018-05-10

## 2018-09-21 LAB
ALBUMIN SERPL-MCNC: 4.5 G/DL (ref 3.5–5.2)
ALBUMIN/GLOB SERPL: 1.5 G/DL
ALP SERPL-CCNC: 84 U/L (ref 39–117)
ALT SERPL W P-5'-P-CCNC: 11 U/L (ref 1–33)
ANION GAP SERPL CALCULATED.3IONS-SCNC: 10.7 MMOL/L
AST SERPL-CCNC: 12 U/L (ref 1–32)
BILIRUB SERPL-MCNC: 0.3 MG/DL (ref 0.1–1.2)
BUN BLD-MCNC: 12 MG/DL (ref 8–23)
BUN/CREAT SERPL: 16.9 (ref 7–25)
CALCIUM SPEC-SCNC: 9.7 MG/DL (ref 8.6–10.5)
CHLORIDE SERPL-SCNC: 105 MMOL/L (ref 98–107)
CO2 SERPL-SCNC: 27.3 MMOL/L (ref 22–29)
CREAT BLD-MCNC: 0.71 MG/DL (ref 0.57–1)
GFR SERPL CREATININE-BSD FRML MDRD: 99 ML/MIN/1.73
GLOBULIN UR ELPH-MCNC: 3 GM/DL
GLUCOSE BLD-MCNC: 102 MG/DL (ref 65–99)
HBA1C MFR BLD: 6.97 % (ref 4.8–5.6)
POTASSIUM BLD-SCNC: 3.9 MMOL/L (ref 3.5–5.2)
PROT SERPL-MCNC: 7.5 G/DL (ref 6–8.5)
SODIUM BLD-SCNC: 143 MMOL/L (ref 136–145)

## 2018-09-21 PROCEDURE — 36415 COLL VENOUS BLD VENIPUNCTURE: CPT | Performed by: FAMILY MEDICINE

## 2018-09-21 PROCEDURE — 99213 OFFICE O/P EST LOW 20 MIN: CPT | Performed by: FAMILY MEDICINE

## 2018-09-21 PROCEDURE — 83036 HEMOGLOBIN GLYCOSYLATED A1C: CPT | Performed by: FAMILY MEDICINE

## 2018-09-21 PROCEDURE — 80053 COMPREHEN METABOLIC PANEL: CPT | Performed by: FAMILY MEDICINE

## 2018-09-21 RX ORDER — ALPRAZOLAM 0.5 MG/1
0.5 TABLET ORAL NIGHTLY PRN
Qty: 90 TABLET | Refills: 0 | Status: SHIPPED | OUTPATIENT
Start: 2018-09-21 | End: 2018-12-15 | Stop reason: SDUPTHER

## 2018-09-23 NOTE — PROGRESS NOTES
Subjective   Lucia Ellis is a 68 y.o. female.     Chief Complaint   Patient presents with   • Anxiety   • Diabetes         History of Present Illness     Patient notes that she is taking janumet xr 100-1000mg daily. She denies any side effects of the medication. Patient notes that she was not using the copay card for the medication, and noted the medication was expensivie.    Patient notes that she uses xanax occasionally to help for the anxiety she has. She notes that she does well with the mediation. She denies any side effects of the medication     The following portions of the patient's history were reviewed and updated as appropriate: allergies, current medications, past family history, past medical history, past social history, past surgical history and problem list.    Review of Systems   Constitutional: Negative.    HENT: Negative.    Respiratory: Negative.    Cardiovascular: Negative.    Gastrointestinal: Negative.    Musculoskeletal: Negative.    Psychiatric/Behavioral: Negative.        Objective   Physical Exam   Constitutional: She is oriented to person, place, and time. She appears well-developed and well-nourished.   HENT:   Head: Normocephalic and atraumatic.   Eyes: EOM are normal.   Neck: Normal range of motion. Neck supple.   Cardiovascular: Normal rate, regular rhythm and normal heart sounds.    Pulmonary/Chest: Effort normal and breath sounds normal. No respiratory distress.   Neurological: She is alert and oriented to person, place, and time.   Psychiatric: She has a normal mood and affect. Her behavior is normal.   Nursing note and vitals reviewed.      Assessment/Plan   Lucia was seen today for anxiety and diabetes.    Diagnoses and all orders for this visit:    Generalized anxiety disorder  -     ALPRAZolam (XANAX) 0.5 MG tablet; Take 1 tablet by mouth At Night As Needed for Anxiety.  -     Will refill xanax as patient uses it on PRN basis.     Type 2 diabetes mellitus with hyperglycemia,  without long-term current use of insulin (CMS/[facility])  -     SITagliptin-MetFORMIN HCl ER (JANUMET XR) 100-1000 MG tablet; Take 1 tablet by mouth Daily.  -     Comprehensive Metabolic Panel  -     Hemoglobin A1c  -     Continue janumet xr 100-1000mg daily.           No Follow-up on file.    Dictated utilizing Dragon Voice Recognition Software

## 2018-09-23 NOTE — PROGRESS NOTES
Please inform the patient of the following abnormal results.  Continue current medication as hba1c is below 7.

## 2018-09-24 ENCOUNTER — TELEPHONE (OUTPATIENT)
Dept: INTERNAL MEDICINE | Facility: CLINIC | Age: 68
End: 2018-09-24

## 2018-09-24 NOTE — TELEPHONE ENCOUNTER
----- Message from Everardo Mazariegos MD sent at 9/23/2018 10:02 AM EDT -----  Please inform the patient of the following abnormal results.  Continue current medication as hba1c is below 7.

## 2018-10-31 ENCOUNTER — TELEPHONE (OUTPATIENT)
Dept: INTERNAL MEDICINE | Facility: CLINIC | Age: 68
End: 2018-10-31

## 2018-10-31 DIAGNOSIS — N63.0 LUMP OR MASS IN BREAST: Primary | ICD-10-CM

## 2018-11-01 NOTE — TELEPHONE ENCOUNTER
Patient notified and voiced understanding. Patient advised to contact office if they have any questions. Mammo ordered.

## 2018-11-06 ENCOUNTER — HOSPITAL ENCOUNTER (OUTPATIENT)
Dept: ULTRASOUND IMAGING | Facility: HOSPITAL | Age: 68
Discharge: HOME | End: 2018-11-06

## 2018-11-06 ENCOUNTER — HOSPITAL ENCOUNTER (OUTPATIENT)
Dept: MAMMOGRAPHY | Facility: HOSPITAL | Age: 68
Discharge: HOME | End: 2018-11-06
Admitting: FAMILY MEDICINE

## 2018-11-06 DIAGNOSIS — N63.0 LUMP OR MASS IN BREAST: ICD-10-CM

## 2018-11-06 PROCEDURE — 76642 ULTRASOUND BREAST LIMITED: CPT

## 2018-11-06 PROCEDURE — 77066 DX MAMMO INCL CAD BI: CPT

## 2018-11-09 ENCOUNTER — TELEPHONE (OUTPATIENT)
Dept: INTERNAL MEDICINE | Facility: CLINIC | Age: 68
End: 2018-11-09

## 2018-11-09 NOTE — TELEPHONE ENCOUNTER
----- Message from Everardo Mazariegos MD sent at 11/7/2018 11:27 AM EST -----  There are no findings suspicious for malignancy in either breast.

## 2018-11-09 NOTE — TELEPHONE ENCOUNTER
----- Message from Everardo Mazariegos MD sent at 11/7/2018 11:24 AM EST -----  Negative mammogram.  Continue routine follow-up.

## 2018-12-15 DIAGNOSIS — F41.1 GENERALIZED ANXIETY DISORDER: ICD-10-CM

## 2018-12-17 RX ORDER — ALPRAZOLAM 0.5 MG/1
TABLET ORAL
Qty: 90 TABLET | Refills: 0 | Status: SHIPPED | OUTPATIENT
Start: 2018-12-17 | End: 2018-12-26 | Stop reason: SDUPTHER

## 2018-12-17 NOTE — TELEPHONE ENCOUNTER
Patient's last office visit was on 9/21/18. Patient's last refill was on 9/21/18. Srinath and CARON are up to date. Please advise.

## 2018-12-26 DIAGNOSIS — F41.1 GENERALIZED ANXIETY DISORDER: ICD-10-CM

## 2018-12-26 RX ORDER — ALPRAZOLAM 0.5 MG/1
TABLET ORAL
Qty: 90 TABLET | Refills: 0 | Status: SHIPPED | OUTPATIENT
Start: 2018-12-26 | End: 2019-03-09 | Stop reason: SDUPTHER

## 2019-01-15 DIAGNOSIS — I10 ESSENTIAL HYPERTENSION: ICD-10-CM

## 2019-01-15 DIAGNOSIS — E11.9 TYPE 2 DIABETES MELLITUS WITHOUT COMPLICATION, WITHOUT LONG-TERM CURRENT USE OF INSULIN (HCC): ICD-10-CM

## 2019-01-15 RX ORDER — QUINAPRIL 40 MG/1
40 TABLET ORAL DAILY
Qty: 90 TABLET | Refills: 1 | Status: SHIPPED | OUTPATIENT
Start: 2019-01-15 | End: 2019-07-30 | Stop reason: SDUPTHER

## 2019-02-26 ENCOUNTER — OFFICE VISIT (OUTPATIENT)
Dept: INTERNAL MEDICINE | Facility: CLINIC | Age: 69
End: 2019-02-26

## 2019-02-26 VITALS
HEART RATE: 83 BPM | SYSTOLIC BLOOD PRESSURE: 150 MMHG | HEIGHT: 64 IN | BODY MASS INDEX: 25.11 KG/M2 | WEIGHT: 147.1 LBS | OXYGEN SATURATION: 98 % | DIASTOLIC BLOOD PRESSURE: 80 MMHG

## 2019-02-26 DIAGNOSIS — F32.A DEPRESSION, UNSPECIFIED DEPRESSION TYPE: Primary | ICD-10-CM

## 2019-02-26 PROCEDURE — 99213 OFFICE O/P EST LOW 20 MIN: CPT | Performed by: FAMILY MEDICINE

## 2019-02-27 NOTE — PROGRESS NOTES
Subjective   Lucia Ellis is a 69 y.o. female.     Chief Complaint   Patient presents with   • Memory Issues   • Fatigue         History of Present Illness     Patient notes that she has some memory issues.  She states that she is forgetting several things.  Patient states that the last 6 months, she had 2 deaths in the family.  Since then she notes that her mind has been preoccupied.  She believes that the mind is preoccupied causing her to have a memory loss.    The following portions of the patient's history were reviewed and updated as appropriate: allergies, current medications, past family history, past medical history, past social history, past surgical history and problem list.    Review of Systems   Constitutional: Negative.    HENT: Negative.    Respiratory: Negative.    Cardiovascular: Negative.    Musculoskeletal: Negative.    Psychiatric/Behavioral: Positive for decreased concentration and dysphoric mood.       Objective   Physical Exam   Constitutional: She is oriented to person, place, and time. She appears well-developed and well-nourished.   HENT:   Head: Normocephalic and atraumatic.   Right Ear: External ear normal.   Left Ear: External ear normal.   Eyes: EOM are normal.   Neck: Normal range of motion. Neck supple.   Cardiovascular: Normal rate, regular rhythm and normal heart sounds.   Pulmonary/Chest: Effort normal and breath sounds normal. No respiratory distress.   Neurological: She is alert and oriented to person, place, and time.   Psychiatric: She has a normal mood and affect. Her behavior is normal.   Nursing note and vitals reviewed.      Assessment/Plan   Lucia was seen today for memory issues and fatigue.    Diagnoses and all orders for this visit:    Depression, unspecified depression type  -     Vortioxetine HBr (TRINTELLIX) 10 MG tablet; Take 10 mg by mouth Daily.  -     Discussed the patient that her signs and symptoms could be related to depression.  I discussed with her that we  will start her on some medication which may help her.  If patient's insurance does not cover the medication, can consider starting patient on Zoloft.          No Follow-up on file.    Dictated utilizing Dragon Voice Recognition Software

## 2019-02-28 ENCOUNTER — TELEPHONE (OUTPATIENT)
Dept: INTERNAL MEDICINE | Facility: CLINIC | Age: 69
End: 2019-02-28

## 2019-02-28 NOTE — TELEPHONE ENCOUNTER
Keesha faxed our office stating that Trintellix is not covered unless patient has tried at least one generic SSRI. Per Dr. Mazariegos patient has not tried another SSRI. Per Dr. Mazariegos patient can try samples and then switch to Zoloft 25 mg daily afterwards if she feels like Trintellix is not working. LVM to notify patient. Awaiting response.

## 2019-02-28 NOTE — TELEPHONE ENCOUNTER
Patient stated that she would like to stay on samples until her follow up in April. Dr. Mazariegos notified.

## 2019-03-09 DIAGNOSIS — E11.9 TYPE 2 DIABETES MELLITUS WITHOUT COMPLICATION, WITHOUT LONG-TERM CURRENT USE OF INSULIN (HCC): ICD-10-CM

## 2019-03-09 DIAGNOSIS — F41.1 GENERALIZED ANXIETY DISORDER: ICD-10-CM

## 2019-03-09 DIAGNOSIS — E11.65 TYPE 2 DIABETES MELLITUS WITH HYPERGLYCEMIA, WITHOUT LONG-TERM CURRENT USE OF INSULIN (HCC): ICD-10-CM

## 2019-03-11 RX ORDER — SITAGLIPTIN AND METFORMIN HYDROCHLORIDE 1000; 100 MG/1; MG/1
TABLET, FILM COATED, EXTENDED RELEASE ORAL
Qty: 30 TABLET | Refills: 2 | Status: SHIPPED | OUTPATIENT
Start: 2019-03-11 | End: 2019-11-12 | Stop reason: SDUPTHER

## 2019-03-11 NOTE — TELEPHONE ENCOUNTER
Patient's last office visit was on 2/26/19. Patient's next office visit is scheduled for 4/2/19. Patient's last refill was on 12/26/18. Srinath is up to date and updated CSC has been initiated for patient to sign at next office visit. Please advise.

## 2019-03-12 RX ORDER — ALPRAZOLAM 0.5 MG/1
TABLET ORAL
Qty: 90 TABLET | Refills: 0 | OUTPATIENT
Start: 2019-03-12 | End: 2019-08-21 | Stop reason: SDUPTHER

## 2019-04-07 DIAGNOSIS — I10 ESSENTIAL HYPERTENSION: ICD-10-CM

## 2019-04-08 RX ORDER — AMLODIPINE BESYLATE 10 MG/1
TABLET ORAL
Qty: 90 TABLET | Refills: 1 | Status: SHIPPED | OUTPATIENT
Start: 2019-04-08 | End: 2019-10-12 | Stop reason: SDUPTHER

## 2019-05-18 DIAGNOSIS — F41.1 GENERALIZED ANXIETY DISORDER: ICD-10-CM

## 2019-05-20 RX ORDER — ALPRAZOLAM 0.5 MG/1
TABLET ORAL
Qty: 90 TABLET | Refills: 0 | OUTPATIENT
Start: 2019-05-20

## 2019-06-24 ENCOUNTER — OFFICE VISIT (OUTPATIENT)
Dept: INTERNAL MEDICINE | Facility: CLINIC | Age: 69
End: 2019-06-24

## 2019-06-24 VITALS
HEART RATE: 78 BPM | SYSTOLIC BLOOD PRESSURE: 126 MMHG | DIASTOLIC BLOOD PRESSURE: 74 MMHG | WEIGHT: 141.8 LBS | OXYGEN SATURATION: 99 % | BODY MASS INDEX: 24.21 KG/M2 | HEIGHT: 64 IN

## 2019-06-24 DIAGNOSIS — F41.1 GENERALIZED ANXIETY DISORDER: ICD-10-CM

## 2019-06-24 DIAGNOSIS — R41.3 MEMORY DIFFICULTY: ICD-10-CM

## 2019-06-24 DIAGNOSIS — F32.A DEPRESSION, UNSPECIFIED DEPRESSION TYPE: Primary | ICD-10-CM

## 2019-06-24 PROCEDURE — 99214 OFFICE O/P EST MOD 30 MIN: CPT | Performed by: FAMILY MEDICINE

## 2019-06-24 NOTE — PROGRESS NOTES
Subjective   Lucia Ellis is a 69 y.o. female.     Chief Complaint   Patient presents with   • Memory Issues   • Depression     depression screening score=12          History of Present Illness     Patient presents to today's office visit with a past medical history of depression.  Patient notes that she has just now started taking Trintellix 10 mg.  She has been on for 2 weeks.  As prescribed at her last office visit, however patient did not want take the medicine.  Patient states that she knows a slight difference in 2 weeks.  However she would like to continue taking the medicine.  Patient is concerned that she is having some memory difficulty.  Patient states that he has affected her at her workplace and her family life.  Patient believes that she may be having underlying dementia.  However patient notes that she is having trouble concentrating, and notes that she does feel depressed.  At today's office visit she is tearful, she is thinking about the multiple people that have  in her family here recently.    The following portions of the patient's history were reviewed and updated as appropriate: allergies, current medications, past family history, past medical history, past social history, past surgical history and problem list.    Review of Systems   Constitutional: Negative for chills and fever.   HENT: Negative for congestion, rhinorrhea, sinus pain and sore throat.    Eyes: Negative for photophobia and visual disturbance.   Respiratory: Negative for cough, chest tightness and shortness of breath.    Cardiovascular: Negative for chest pain and palpitations.   Gastrointestinal: Negative for diarrhea, nausea and vomiting.   Genitourinary: Negative for dysuria, frequency and urgency.   Skin: Negative for rash and wound.   Neurological: Negative for dizziness and syncope.   Psychiatric/Behavioral: Positive for decreased concentration. Negative for behavioral problems and confusion. The patient is  nervous/anxious.        Objective   Physical Exam   Constitutional: She is oriented to person, place, and time. She appears well-developed and well-nourished.   HENT:   Head: Normocephalic and atraumatic.   Right Ear: External ear normal.   Left Ear: External ear normal.   Eyes: EOM are normal.   Neck: Normal range of motion. Neck supple.   Cardiovascular: Normal rate, regular rhythm and normal heart sounds.   Pulmonary/Chest: Effort normal and breath sounds normal. No respiratory distress.   Musculoskeletal: Normal range of motion.   Lymphadenopathy:     She has no cervical adenopathy.   Neurological: She is alert and oriented to person, place, and time.   Skin: Skin is warm.   Psychiatric: She has a normal mood and affect. Her behavior is normal.   Nursing note and vitals reviewed.      Assessment/Plan   Lucia was seen today for memory issues and depression.    Diagnoses and all orders for this visit:    Depression, unspecified depression type  - continue trintellix.     Generalized anxiety disorder  - Continue trintellix.     Memory difficulty  - At this time most likely due to depression and grief. Will reevaluate in 1 mos.           No Follow-up on file.    Dictated utilizing Dragon Voice Recognition Software

## 2019-07-30 DIAGNOSIS — E11.9 TYPE 2 DIABETES MELLITUS WITHOUT COMPLICATION, WITHOUT LONG-TERM CURRENT USE OF INSULIN (HCC): ICD-10-CM

## 2019-07-30 DIAGNOSIS — I10 ESSENTIAL HYPERTENSION: ICD-10-CM

## 2019-07-30 RX ORDER — QUINAPRIL 40 MG/1
TABLET ORAL
Qty: 90 TABLET | Refills: 0 | Status: SHIPPED | OUTPATIENT
Start: 2019-07-30 | End: 2019-11-04 | Stop reason: SDUPTHER

## 2019-08-21 DIAGNOSIS — F41.1 GENERALIZED ANXIETY DISORDER: ICD-10-CM

## 2019-08-21 RX ORDER — ALPRAZOLAM 0.5 MG/1
TABLET ORAL
Qty: 90 TABLET | Refills: 0 | Status: SHIPPED | OUTPATIENT
Start: 2019-08-21 | End: 2019-11-21 | Stop reason: SDUPTHER

## 2019-10-02 ENCOUNTER — TELEPHONE (OUTPATIENT)
Dept: INTERNAL MEDICINE | Facility: CLINIC | Age: 69
End: 2019-10-02

## 2019-10-02 NOTE — TELEPHONE ENCOUNTER
Patient called stating that she has a part-time job with [employer] and she needs a letter stating that she is unable to have a TB test due to having a severe allergic reaction to this in the past.  She would like to have this faxed to [employer] (fax: [phone]).  Please call patient when done.

## 2019-10-12 DIAGNOSIS — I10 ESSENTIAL HYPERTENSION: ICD-10-CM

## 2019-10-14 RX ORDER — AMLODIPINE BESYLATE 10 MG/1
TABLET ORAL
Qty: 90 TABLET | Refills: 0 | Status: SHIPPED | OUTPATIENT
Start: 2019-10-14 | End: 2019-12-24

## 2019-11-04 DIAGNOSIS — E11.9 TYPE 2 DIABETES MELLITUS WITHOUT COMPLICATION, WITHOUT LONG-TERM CURRENT USE OF INSULIN (HCC): ICD-10-CM

## 2019-11-04 DIAGNOSIS — I10 ESSENTIAL HYPERTENSION: ICD-10-CM

## 2019-11-05 RX ORDER — QUINAPRIL 40 MG/1
TABLET ORAL
Qty: 90 TABLET | Refills: 0 | Status: SHIPPED | OUTPATIENT
Start: 2019-11-05 | End: 2020-02-03

## 2019-11-12 DIAGNOSIS — E11.9 TYPE 2 DIABETES MELLITUS WITHOUT COMPLICATION, WITHOUT LONG-TERM CURRENT USE OF INSULIN (HCC): ICD-10-CM

## 2019-11-12 DIAGNOSIS — E11.65 TYPE 2 DIABETES MELLITUS WITH HYPERGLYCEMIA, WITHOUT LONG-TERM CURRENT USE OF INSULIN (HCC): ICD-10-CM

## 2019-11-12 RX ORDER — SITAGLIPTIN AND METFORMIN HYDROCHLORIDE 1000; 100 MG/1; MG/1
TABLET, FILM COATED, EXTENDED RELEASE ORAL
Qty: 30 TABLET | Refills: 1 | Status: SHIPPED | OUTPATIENT
Start: 2019-11-12 | End: 2020-03-17

## 2019-11-21 DIAGNOSIS — F41.1 GENERALIZED ANXIETY DISORDER: ICD-10-CM

## 2019-11-22 RX ORDER — ALPRAZOLAM 0.5 MG/1
TABLET ORAL
Qty: 90 TABLET | Refills: 0 | Status: SHIPPED | OUTPATIENT
Start: 2019-11-22 | End: 2019-11-27 | Stop reason: SDUPTHER

## 2019-11-27 DIAGNOSIS — F41.1 GENERALIZED ANXIETY DISORDER: ICD-10-CM

## 2019-11-27 RX ORDER — ALPRAZOLAM 0.5 MG/1
TABLET ORAL
Qty: 90 TABLET | Refills: 0 | Status: SHIPPED | OUTPATIENT
Start: 2019-11-27 | End: 2019-12-20 | Stop reason: SDUPTHER

## 2019-12-20 DIAGNOSIS — F41.1 GENERALIZED ANXIETY DISORDER: ICD-10-CM

## 2019-12-20 DIAGNOSIS — I10 ESSENTIAL HYPERTENSION: ICD-10-CM

## 2019-12-21 DIAGNOSIS — F41.1 GENERALIZED ANXIETY DISORDER: ICD-10-CM

## 2019-12-22 RX ORDER — ALPRAZOLAM 0.5 MG/1
0.5 TABLET ORAL NIGHTLY PRN
Qty: 90 TABLET | Refills: 0 | Status: SHIPPED | OUTPATIENT
Start: 2019-12-22 | End: 2020-06-16 | Stop reason: SDUPTHER

## 2019-12-24 RX ORDER — ALPRAZOLAM 0.5 MG/1
TABLET ORAL
Qty: 90 TABLET | Refills: 0 | OUTPATIENT
Start: 2019-12-24

## 2019-12-24 RX ORDER — AMLODIPINE BESYLATE 10 MG/1
TABLET ORAL
Qty: 90 TABLET | Refills: 0 | Status: SHIPPED | OUTPATIENT
Start: 2019-12-24 | End: 2020-04-23 | Stop reason: SDUPTHER

## 2020-02-01 DIAGNOSIS — I10 ESSENTIAL HYPERTENSION: ICD-10-CM

## 2020-02-01 DIAGNOSIS — E11.9 TYPE 2 DIABETES MELLITUS WITHOUT COMPLICATION, WITHOUT LONG-TERM CURRENT USE OF INSULIN (HCC): ICD-10-CM

## 2020-02-03 RX ORDER — QUINAPRIL 40 MG/1
TABLET ORAL
Qty: 90 TABLET | Refills: 0 | Status: SHIPPED | OUTPATIENT
Start: 2020-02-03 | End: 2020-02-05

## 2020-02-05 DIAGNOSIS — I10 ESSENTIAL HYPERTENSION: ICD-10-CM

## 2020-02-05 DIAGNOSIS — E11.9 TYPE 2 DIABETES MELLITUS WITHOUT COMPLICATION, WITHOUT LONG-TERM CURRENT USE OF INSULIN (HCC): ICD-10-CM

## 2020-02-05 RX ORDER — QUINAPRIL 40 MG/1
TABLET ORAL
Qty: 90 TABLET | Refills: 0 | Status: SHIPPED | OUTPATIENT
Start: 2020-02-05 | End: 2020-06-16 | Stop reason: SDUPTHER

## 2020-03-17 DIAGNOSIS — E11.9 TYPE 2 DIABETES MELLITUS WITHOUT COMPLICATION, WITHOUT LONG-TERM CURRENT USE OF INSULIN (HCC): ICD-10-CM

## 2020-03-17 DIAGNOSIS — E11.65 TYPE 2 DIABETES MELLITUS WITH HYPERGLYCEMIA, WITHOUT LONG-TERM CURRENT USE OF INSULIN (HCC): ICD-10-CM

## 2020-03-17 RX ORDER — SITAGLIPTIN AND METFORMIN HYDROCHLORIDE 1000; 100 MG/1; MG/1
TABLET, FILM COATED, EXTENDED RELEASE ORAL
Qty: 30 TABLET | Refills: 0 | Status: SHIPPED | OUTPATIENT
Start: 2020-03-17 | End: 2020-06-03

## 2020-03-23 ENCOUNTER — TELEPHONE (OUTPATIENT)
Dept: INTERNAL MEDICINE | Facility: CLINIC | Age: 70
End: 2020-03-23

## 2020-03-23 NOTE — TELEPHONE ENCOUNTER
Patient called  And LVM requesting samples of Trintellix 10 mg. She is not able to afford this rx and we do not currently have 10mg samples. Is there an alternative? Please advise.

## 2020-03-24 NOTE — TELEPHONE ENCOUNTER
I have provided samples of Trintellix 10 mg daily.  They are currently sitting up at the .  Patient advised to pick them up.

## 2020-04-18 DIAGNOSIS — I10 ESSENTIAL HYPERTENSION: ICD-10-CM

## 2020-04-20 RX ORDER — AMLODIPINE BESYLATE 10 MG/1
TABLET ORAL
Qty: 90 TABLET | Refills: 0 | OUTPATIENT
Start: 2020-04-20

## 2020-04-23 DIAGNOSIS — I10 ESSENTIAL HYPERTENSION: ICD-10-CM

## 2020-04-23 RX ORDER — AMLODIPINE BESYLATE 10 MG/1
10 TABLET ORAL DAILY
Qty: 90 TABLET | Refills: 0 | Status: SHIPPED | OUTPATIENT
Start: 2020-04-23 | End: 2020-06-16 | Stop reason: SDUPTHER

## 2020-05-28 DIAGNOSIS — E11.65 TYPE 2 DIABETES MELLITUS WITH HYPERGLYCEMIA, WITHOUT LONG-TERM CURRENT USE OF INSULIN (HCC): ICD-10-CM

## 2020-05-28 DIAGNOSIS — E11.9 TYPE 2 DIABETES MELLITUS WITHOUT COMPLICATION, WITHOUT LONG-TERM CURRENT USE OF INSULIN (HCC): ICD-10-CM

## 2020-05-28 RX ORDER — SITAGLIPTIN AND METFORMIN HYDROCHLORIDE 1000; 100 MG/1; MG/1
TABLET, FILM COATED, EXTENDED RELEASE ORAL
Qty: 30 TABLET | Refills: 0 | OUTPATIENT
Start: 2020-05-28

## 2020-06-03 DIAGNOSIS — E11.65 TYPE 2 DIABETES MELLITUS WITH HYPERGLYCEMIA, WITHOUT LONG-TERM CURRENT USE OF INSULIN (HCC): ICD-10-CM

## 2020-06-03 DIAGNOSIS — E11.9 TYPE 2 DIABETES MELLITUS WITHOUT COMPLICATION, WITHOUT LONG-TERM CURRENT USE OF INSULIN (HCC): ICD-10-CM

## 2020-06-03 RX ORDER — SITAGLIPTIN AND METFORMIN HYDROCHLORIDE 1000; 100 MG/1; MG/1
TABLET, FILM COATED, EXTENDED RELEASE ORAL
Qty: 30 TABLET | Refills: 0 | Status: SHIPPED | OUTPATIENT
Start: 2020-06-03 | End: 2020-06-16 | Stop reason: SDUPTHER

## 2020-06-16 ENCOUNTER — OFFICE VISIT (OUTPATIENT)
Dept: INTERNAL MEDICINE | Facility: CLINIC | Age: 70
End: 2020-06-16

## 2020-06-16 ENCOUNTER — LAB (OUTPATIENT)
Dept: LAB | Facility: HOSPITAL | Age: 70
End: 2020-06-16

## 2020-06-16 VITALS
BODY MASS INDEX: 23.37 KG/M2 | RESPIRATION RATE: 16 BRPM | HEIGHT: 64 IN | DIASTOLIC BLOOD PRESSURE: 90 MMHG | SYSTOLIC BLOOD PRESSURE: 140 MMHG | WEIGHT: 136.9 LBS | OXYGEN SATURATION: 99 % | TEMPERATURE: 97.1 F | HEART RATE: 76 BPM

## 2020-06-16 DIAGNOSIS — F32.A DEPRESSION, UNSPECIFIED DEPRESSION TYPE: ICD-10-CM

## 2020-06-16 DIAGNOSIS — Z12.39 SCREENING FOR BREAST CANCER: ICD-10-CM

## 2020-06-16 DIAGNOSIS — F41.1 GENERALIZED ANXIETY DISORDER: ICD-10-CM

## 2020-06-16 DIAGNOSIS — E11.65 TYPE 2 DIABETES MELLITUS WITH HYPERGLYCEMIA, WITHOUT LONG-TERM CURRENT USE OF INSULIN (HCC): ICD-10-CM

## 2020-06-16 DIAGNOSIS — Z00.00 WELL WOMAN EXAM (NO GYNECOLOGICAL EXAM): ICD-10-CM

## 2020-06-16 DIAGNOSIS — Z13.220 SCREENING FOR LIPID DISORDERS: ICD-10-CM

## 2020-06-16 DIAGNOSIS — E11.9 TYPE 2 DIABETES MELLITUS WITHOUT COMPLICATION, WITHOUT LONG-TERM CURRENT USE OF INSULIN (HCC): ICD-10-CM

## 2020-06-16 DIAGNOSIS — R79.9 ABNORMAL FINDING OF BLOOD CHEMISTRY, UNSPECIFIED: ICD-10-CM

## 2020-06-16 DIAGNOSIS — Z12.31 ENCOUNTER FOR SCREENING MAMMOGRAM FOR MALIGNANT NEOPLASM OF BREAST: ICD-10-CM

## 2020-06-16 DIAGNOSIS — Z13.1 SCREENING FOR DIABETES MELLITUS: ICD-10-CM

## 2020-06-16 DIAGNOSIS — E55.9 VITAMIN D DEFICIENCY: ICD-10-CM

## 2020-06-16 DIAGNOSIS — Z13.29 SCREENING FOR THYROID DISORDER: ICD-10-CM

## 2020-06-16 DIAGNOSIS — I10 ESSENTIAL HYPERTENSION: ICD-10-CM

## 2020-06-16 DIAGNOSIS — Z12.11 ENCOUNTER FOR SCREENING COLONOSCOPY: ICD-10-CM

## 2020-06-16 DIAGNOSIS — Z00.00 MEDICARE ANNUAL WELLNESS VISIT, INITIAL: Primary | ICD-10-CM

## 2020-06-16 LAB
25(OH)D3 SERPL-MCNC: 24 NG/ML (ref 30–100)
ALBUMIN SERPL-MCNC: 4.8 G/DL (ref 3.5–5.2)
ALBUMIN UR-MCNC: 1.8 MG/DL
ALBUMIN/GLOB SERPL: 1.8 G/DL
ALP SERPL-CCNC: 68 U/L (ref 39–117)
ALT SERPL W P-5'-P-CCNC: 7 U/L (ref 1–33)
ANION GAP SERPL CALCULATED.3IONS-SCNC: 10.7 MMOL/L (ref 5–15)
AST SERPL-CCNC: 8 U/L (ref 1–32)
BASOPHILS # BLD AUTO: 0.07 10*3/MM3 (ref 0–0.2)
BASOPHILS NFR BLD AUTO: 1.4 % (ref 0–1.5)
BILIRUB SERPL-MCNC: 0.4 MG/DL (ref 0.2–1.2)
BUN BLD-MCNC: 15 MG/DL (ref 8–23)
BUN/CREAT SERPL: 20.3 (ref 7–25)
CALCIUM SPEC-SCNC: 9.7 MG/DL (ref 8.6–10.5)
CHLORIDE SERPL-SCNC: 100 MMOL/L (ref 98–107)
CHOLEST SERPL-MCNC: 213 MG/DL (ref 0–200)
CO2 SERPL-SCNC: 27.3 MMOL/L (ref 22–29)
CREAT BLD-MCNC: 0.74 MG/DL (ref 0.57–1)
CREAT UR-MCNC: 83 MG/DL
DEPRECATED RDW RBC AUTO: 41.1 FL (ref 37–54)
EOSINOPHIL # BLD AUTO: 0.05 10*3/MM3 (ref 0–0.4)
EOSINOPHIL NFR BLD AUTO: 1 % (ref 0.3–6.2)
ERYTHROCYTE [DISTWIDTH] IN BLOOD BY AUTOMATED COUNT: 13.1 % (ref 12.3–15.4)
GFR SERPL CREATININE-BSD FRML MDRD: 94 ML/MIN/1.73
GLOBULIN UR ELPH-MCNC: 2.6 GM/DL
GLUCOSE BLD-MCNC: 101 MG/DL (ref 65–99)
HBA1C MFR BLD: 6.7 % (ref 4.8–5.6)
HCT VFR BLD AUTO: 39.1 % (ref 34–46.6)
HDLC SERPL-MCNC: 66 MG/DL (ref 40–60)
HGB BLD-MCNC: 13.3 G/DL (ref 12–15.9)
IMM GRANULOCYTES # BLD AUTO: 0.01 10*3/MM3 (ref 0–0.05)
IMM GRANULOCYTES NFR BLD AUTO: 0.2 % (ref 0–0.5)
LDLC SERPL CALC-MCNC: 133 MG/DL (ref 0–100)
LDLC/HDLC SERPL: 2.01 {RATIO}
LYMPHOCYTES # BLD AUTO: 2.26 10*3/MM3 (ref 0.7–3.1)
LYMPHOCYTES NFR BLD AUTO: 45.2 % (ref 19.6–45.3)
MCH RBC QN AUTO: 29.4 PG (ref 26.6–33)
MCHC RBC AUTO-ENTMCNC: 34 G/DL (ref 31.5–35.7)
MCV RBC AUTO: 86.5 FL (ref 79–97)
MICROALBUMIN/CREAT UR: 21.7 MG/G
MONOCYTES # BLD AUTO: 0.45 10*3/MM3 (ref 0.1–0.9)
MONOCYTES NFR BLD AUTO: 9 % (ref 5–12)
NEUTROPHILS # BLD AUTO: 2.16 10*3/MM3 (ref 1.7–7)
NEUTROPHILS NFR BLD AUTO: 43.2 % (ref 42.7–76)
NRBC BLD AUTO-RTO: 0 /100 WBC (ref 0–0.2)
PLATELET # BLD AUTO: 204 10*3/MM3 (ref 140–450)
PMV BLD AUTO: 11.2 FL (ref 6–12)
POTASSIUM BLD-SCNC: 4 MMOL/L (ref 3.5–5.2)
PROT SERPL-MCNC: 7.4 G/DL (ref 6–8.5)
RBC # BLD AUTO: 4.52 10*6/MM3 (ref 3.77–5.28)
SODIUM BLD-SCNC: 138 MMOL/L (ref 136–145)
T-UPTAKE NFR SERPL: 1.05 TBI (ref 0.8–1.3)
T4 SERPL-MCNC: 4.49 MCG/DL (ref 4.5–11.7)
TRIGL SERPL-MCNC: 72 MG/DL (ref 0–150)
TSH SERPL DL<=0.05 MIU/L-ACNC: 1.28 UIU/ML (ref 0.27–4.2)
VLDLC SERPL-MCNC: 14.4 MG/DL (ref 5–40)
WBC NRBC COR # BLD: 5 10*3/MM3 (ref 3.4–10.8)

## 2020-06-16 PROCEDURE — 84436 ASSAY OF TOTAL THYROXINE: CPT | Performed by: FAMILY MEDICINE

## 2020-06-16 PROCEDURE — 84479 ASSAY OF THYROID (T3 OR T4): CPT | Performed by: FAMILY MEDICINE

## 2020-06-16 PROCEDURE — 84443 ASSAY THYROID STIM HORMONE: CPT | Performed by: FAMILY MEDICINE

## 2020-06-16 PROCEDURE — 85025 COMPLETE CBC W/AUTO DIFF WBC: CPT | Performed by: FAMILY MEDICINE

## 2020-06-16 PROCEDURE — 80061 LIPID PANEL: CPT | Performed by: FAMILY MEDICINE

## 2020-06-16 PROCEDURE — G0438 PPPS, INITIAL VISIT: HCPCS | Performed by: FAMILY MEDICINE

## 2020-06-16 PROCEDURE — 80053 COMPREHEN METABOLIC PANEL: CPT | Performed by: FAMILY MEDICINE

## 2020-06-16 PROCEDURE — 36415 COLL VENOUS BLD VENIPUNCTURE: CPT | Performed by: FAMILY MEDICINE

## 2020-06-16 PROCEDURE — 99214 OFFICE O/P EST MOD 30 MIN: CPT | Performed by: FAMILY MEDICINE

## 2020-06-16 PROCEDURE — 82306 VITAMIN D 25 HYDROXY: CPT | Performed by: FAMILY MEDICINE

## 2020-06-16 PROCEDURE — 82570 ASSAY OF URINE CREATININE: CPT | Performed by: FAMILY MEDICINE

## 2020-06-16 PROCEDURE — 83036 HEMOGLOBIN GLYCOSYLATED A1C: CPT | Performed by: FAMILY MEDICINE

## 2020-06-16 PROCEDURE — 82043 UR ALBUMIN QUANTITATIVE: CPT | Performed by: FAMILY MEDICINE

## 2020-06-16 RX ORDER — ALPRAZOLAM 0.5 MG/1
0.5 TABLET ORAL NIGHTLY PRN
Qty: 90 TABLET | Refills: 0 | Status: SHIPPED | OUTPATIENT
Start: 2020-06-16 | End: 2021-03-26 | Stop reason: SDUPTHER

## 2020-06-16 RX ORDER — QUINAPRIL 40 MG/1
40 TABLET ORAL DAILY
Qty: 90 TABLET | Refills: 3 | Status: SHIPPED | OUTPATIENT
Start: 2020-06-16 | End: 2021-09-02 | Stop reason: SDUPTHER

## 2020-06-16 RX ORDER — AMLODIPINE BESYLATE 10 MG/1
10 TABLET ORAL DAILY
Qty: 90 TABLET | Refills: 3 | Status: SHIPPED | OUTPATIENT
Start: 2020-06-16 | End: 2021-08-26 | Stop reason: SDUPTHER

## 2020-06-16 NOTE — PROGRESS NOTES
The ABCs of the Annual Wellness Visit  Initial Medicare Wellness Visit    Chief Complaint   Patient presents with   • Medicare Wellness-subsequent       Subjective   History of Present Illness:  Lucia Ellis is a 70 y.o. female who presents for an Initial Medicare Wellness Visit.    HEALTH RISK ASSESSMENT    Recent Hospitalizations:  No hospitalization(s) within the last year.    Current Medical Providers:  Patient Care Team:  Everardo Mazariegos MD as PCP - General (Family Medicine)    Smoking Status:  Social History     Tobacco Use   Smoking Status Never Smoker   Smokeless Tobacco Never Used       Alcohol Consumption:  Social History     Substance and Sexual Activity   Alcohol Use Yes    Comment: occ       Depression Screen:   PHQ-2/PHQ-9 Depression Screening 6/16/2020   Little interest or pleasure in doing things 0   Feeling down, depressed, or hopeless 0   Trouble falling or staying asleep, or sleeping too much 0   Feeling tired or having little energy 0   Poor appetite or overeating 0   Feeling bad about yourself - or that you are a failure or have let yourself or your family down 0   Trouble concentrating on things, such as reading the newspaper or watching television 0   Moving or speaking so slowly that other people could have noticed. Or the opposite - being so fidgety or restless that you have been moving around a lot more than usual 0   Thoughts that you would be better off dead, or of hurting yourself in some way 0   Total Score 0   If you checked off any problems, how difficult have these problems made it for you to do your work, take care of things at home, or get along with other people? Not difficult at all       Fall Risk Screen:  STEADI Fall Risk Assessment was completed, and patient is at LOW risk for falls.Assessment completed on:6/16/2020    Health Habits and Functional and Cognitive Screening:  Functional & Cognitive Status 6/16/2020   Do you have difficulty preparing food and eating? No   Do  you have difficulty bathing yourself, getting dressed or grooming yourself? No   Do you have difficulty using the toilet? No   Do you have difficulty moving around from place to place? No   Do you have trouble with steps or getting out of a bed or a chair? No   Current Diet Well Balanced Diet   Dental Exam Up to date   Eye Exam Up to date   Exercise (times per week) 0 times per week   Do you need help using the phone?  No   Are you deaf or do you have serious difficulty hearing?  No   Do you need help with transportation? No   Do you need help shopping? No   Do you need help preparing meals?  No   Do you need help with housework?  No   Do you need help with laundry? No   Do you need help taking your medications? No   Do you need help managing money? No   Do you ever drive or ride in a car without wearing a seat belt? No   Have you felt unusual stress, anger or loneliness in the last month? No   Who do you live with? Child   If you need help, do you have trouble finding someone available to you? No   Have you been bothered in the last four weeks by sexual problems? No   Do you have difficulty concentrating, remembering or making decisions? No         Does the patient have evidence of cognitive impairment? No    Asprin use counseling:Taking ASA appropriately as indicated    Age-appropriate Screening Schedule:  Refer to the list below for future screening recommendations based on patient's age, sex and/or medical conditions. Orders for these recommended tests are listed in the plan section. The patient has been provided with a written plan.    Health Maintenance   Topic Date Due   • ZOSTER VACCINE (1 of 2) 02/23/1998   • DIABETIC EYE EXAM  02/25/2016   • COLONOSCOPY  02/25/2016   • DIABETIC FOOT EXAM  02/09/2018   • URINE MICROALBUMIN  02/09/2018   • LIPID PANEL  03/13/2019   • HEMOGLOBIN A1C  03/21/2019   • INFLUENZA VACCINE  08/01/2020   • MAMMOGRAM  11/06/2020   • TDAP/TD VACCINES (2 - Td) 10/01/2027          The  following portions of the patient's history were reviewed and updated as appropriate: allergies, current medications, past family history, past medical history, past social history, past surgical history and problem list.    Outpatient Medications Prior to Visit   Medication Sig Dispense Refill   • aspirin 81 MG EC tablet Take 81 mg by mouth Daily.     • mupirocin (BACTROBAN) 2 % ointment APPLY TO AFFECTED AREA(S) TOPICALLY DAILY 15 g 2   • ALPRAZolam (XANAX) 0.5 MG tablet Take 1 tablet by mouth At Night As Needed for Sleep. 90 tablet 0   • amLODIPine (NORVASC) 10 MG tablet Take 1 tablet by mouth Daily. 90 tablet 0   • JANUMET -1000 MG tablet TAKE ONE TABLET BY MOUTH DAILY 30 tablet 0   • quinapril (ACCUPRIL) 40 MG tablet TAKE ONE TABLET BY MOUTH DAILY 90 tablet 0   • Vortioxetine HBr (TRINTELLIX) 10 MG tablet Take 10 mg by mouth Daily. 30 tablet 6     No facility-administered medications prior to visit.        Patient Active Problem List   Diagnosis   • Gastroesophageal reflux disease   • Malignant neoplasm of breast (CMS/HCC)   • Depression   • Type 2 diabetes mellitus (CMS/HCC)   • Folliculitis   • Generalized anxiety disorder   • Hyperlipidemia   • Essential hypertension   • Status post total right knee replacement   • Rectal hemorrhage   • Vitamin D deficiency   • Drug therapy   • Acute cholecystitis   • Memory difficulty       Advanced Care Planning:  ACP discussion was held with the patient during this visit. Patient does not have an advance directive, declines further assistance.    Review of Systems    Compared to one year ago, the patient feels her physical health is better.  Compared to one year ago, the patient feels her mental health is better.    Reviewed chart for potential of high risk medication in the elderly: yes  Reviewed chart for potential of harmful drug interactions in the elderly:yes    Objective         Vitals:    06/16/20 1132   BP: 140/90   Pulse: 76   Resp: 16   Temp: 97.1 °F (36.2  "°C)   TempSrc: Infrared   SpO2: 99%   Weight: 62.1 kg (136 lb 14.4 oz)   Height: 162.6 cm (64.02\")       Body mass index is 23.49 kg/m².  Discussed the patient's BMI with her. The BMI is in the acceptable range.    Physical Exam          Assessment/Plan   Medicare Risks and Personalized Health Plan  CMS Preventative Services Quick Reference  Cardiovascular risk    The above risks/problems have been discussed with the patient.  Pertinent information has been shared with the patient in the After Visit Summary.  Follow up plans and orders are seen below in the Assessment/Plan Section.    Diagnoses and all orders for this visit:    1. Medicare annual wellness visit, initial (Primary)  -     CBC & Differential  -     Comprehensive Metabolic Panel    2. Well woman exam (no gynecological exam)  -     CBC & Differential  -     Comprehensive Metabolic Panel    3. Screening for diabetes mellitus  -     Hemoglobin A1c    4. Screening for thyroid disorder  -     Thyroid Panel With TSH    5. Screening for breast cancer  -     Mammo Screening Bilateral With CAD    6. Screening for lipid disorders  -     Lipid Panel With LDL / HDL Ratio    7. Encounter for screening mammogram for malignant neoplasm of breast   -     Mammo Screening Bilateral With CAD    8. Encounter for screening colonoscopy  -     Ambulatory Referral For Screening Colonoscopy    9. Abnormal finding of blood chemistry, unspecified   -     Hemoglobin A1c    10. Type 2 diabetes mellitus without complication, without long-term current use of insulin (CMS/[facility])  -     Hemoglobin A1c  -     Microalbumin / Creatinine Urine Ratio - Urine, Clean Catch  -     SITagliptin-metFORMIN HCl ER (Janumet XR) 100-1000 MG tablet; Take 1 tablet by mouth Daily.  Dispense: 30 tablet; Refill: 11  -     quinapril (ACCUPRIL) 40 MG tablet; Take 1 tablet by mouth Daily.  Dispense: 90 tablet; Refill: 3    11. Generalized anxiety disorder  -     ALPRAZolam (XANAX) 0.5 MG tablet; Take 1 tablet " by mouth At Night As Needed for Sleep.  Dispense: 90 tablet; Refill: 0    12. Vitamin D deficiency  -     Vitamin D 25 Hydroxy    13. Depression, unspecified depression type  -     Vortioxetine HBr (Trintellix) 10 MG tablet; Take 10 mg by mouth Daily.  Dispense: 30 tablet; Refill: 6    14. Essential hypertension  -     CBC & Differential  -     Comprehensive Metabolic Panel  -     quinapril (ACCUPRIL) 40 MG tablet; Take 1 tablet by mouth Daily.  Dispense: 90 tablet; Refill: 3  -     amLODIPine (NORVASC) 10 MG tablet; Take 1 tablet by mouth Daily.  Dispense: 90 tablet; Refill: 3    15. Type 2 diabetes mellitus with hyperglycemia, without long-term current use of insulin (CMS/[facility])  -     SITagliptin-metFORMIN HCl ER (Janumet XR) 100-1000 MG tablet; Take 1 tablet by mouth Daily.  Dispense: 30 tablet; Refill: 11      Follow Up:  No follow-ups on file.     An After Visit Summary and PPPS were given to the patient.

## 2020-06-16 NOTE — PROGRESS NOTES
Subjective   Lucia Ellis is a 70 y.o. female and is here for a comprehensive physical exam. The patient reports no problems.    Pt is needs mammogram.    Patient presents at today's office visit with a past medical history for type 2 diabetes.  She is currently taking Janumet extended release 100-1000 mg daily.  She denies any side effects of the medication.  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  Patient has not checked her sugars lately.  She has noted that she has lost some weight, at today's office visit she weighs 136, and 1 year ago she weighed 141 pounds.  Her family believes that she may have lost more weight than what is documented.  Patient states that she has down couple pant sizes.    Patient also has a past medical history for essential hypertension.  Her blood pressure at today's office visit 140/90.  She currently takes amlodipine 10 mg daily and quinapril 40 mg daily.  She denies any side effects of the medication.    Patient also has a past medical history for having depression as well as anxiety.  For her depression she is currently taking Trintellix 10 mg daily, and notes that the medicine does seem to be helping her.  She denies any side effects of the medication.  As for backup she has Xanax 0.5 mg that she can take as needed.  However patient states that she has not needed to use the medication in quite some time unless there is something serious that that she needed for.  Overall she does state that she is feeling well.    Patient does have vitamin D deficiency, however she does not take it as often as she would like.  She takes it intermittently.      Social History:   Social History     Socioeconomic History   • Marital status:      Spouse name: Not on file   • Number of children: Not on file   • Years of education: Not on file   • Highest education level: Not on file   Tobacco  Use   • Smoking status: Never Smoker   • Smokeless tobacco: Never Used   Substance and Sexual Activity   • Alcohol use: Yes     Comment: occ   • Drug use: No   • Sexual activity: Never       Family History:   Family History   Problem Relation Age of Onset   • Heart attack Mother    • Heart disease Mother    • Hypertension Mother    • Hypertension Father    • Diabetes Father    • Hypertension Sister    • Diabetes Sister    • Thyroid cancer Sister    • Breast cancer Sister    • Lung cancer Sister    • Diabetes Brother    • Drug abuse Paternal Grandmother        Past Medical History:   Past Medical History:   Diagnosis Date   • Breast cancer (CMS/HCC)    • Diabetes mellitus (CMS/HCC)    • Hypertension        The following portions of the patient's history were reviewed and updated as appropriate: allergies, current medications, past family history, past medical history, past social history, past surgical history and problem list.    Review of Systems    Review of Systems   Constitutional: Positive for unexpected weight change. Negative for chills and fever.   HENT: Negative for congestion, rhinorrhea, sinus pain and sore throat.    Eyes: Negative for photophobia and visual disturbance.   Respiratory: Negative for cough, chest tightness and shortness of breath.    Cardiovascular: Negative for chest pain and palpitations.   Gastrointestinal: Negative for diarrhea, nausea and vomiting.   Genitourinary: Negative for dysuria, frequency and urgency.   Skin: Negative for rash and wound.   Neurological: Negative for dizziness and syncope.   Psychiatric/Behavioral: Negative for behavioral problems and confusion.       Objective   Physical Exam   Constitutional: She is oriented to person, place, and time. She appears well-developed and well-nourished.   HENT:   Head: Normocephalic and atraumatic.   Right Ear: External ear normal.   Left Ear: External ear normal.   Eyes: EOM are normal.   Neck: Normal range of motion. Neck  supple.   Cardiovascular: Normal rate, regular rhythm and normal heart sounds.   Pulmonary/Chest: Effort normal and breath sounds normal. No respiratory distress.   Abdominal: Soft. There is no tenderness. There is no guarding.   Musculoskeletal: Normal range of motion.   Lymphadenopathy:     She has no cervical adenopathy.   Neurological: She is alert and oriented to person, place, and time.   Skin: Skin is warm.   Psychiatric: She has a normal mood and affect. Her behavior is normal.   Nursing note and vitals reviewed.      Vitals:    06/16/20 1132   BP: 140/90   Pulse: 76   Resp: 16   Temp: 97.1 °F (36.2 °C)   SpO2: 99%     Body mass index is 23.49 kg/m².      Medications:   Current Outpatient Medications:   •  ALPRAZolam (XANAX) 0.5 MG tablet, Take 1 tablet by mouth At Night As Needed for Sleep., Disp: 90 tablet, Rfl: 0  •  amLODIPine (NORVASC) 10 MG tablet, Take 1 tablet by mouth Daily., Disp: 90 tablet, Rfl: 3  •  aspirin 81 MG EC tablet, Take 81 mg by mouth Daily., Disp: , Rfl:   •  mupirocin (BACTROBAN) 2 % ointment, APPLY TO AFFECTED AREA(S) TOPICALLY DAILY, Disp: 15 g, Rfl: 2  •  quinapril (ACCUPRIL) 40 MG tablet, Take 1 tablet by mouth Daily., Disp: 90 tablet, Rfl: 3  •  SITagliptin-metFORMIN HCl ER (Janumet XR) 100-1000 MG tablet, Take 1 tablet by mouth Daily., Disp: 30 tablet, Rfl: 11  •  Vortioxetine HBr (Trintellix) 10 MG tablet, Take 10 mg by mouth Daily., Disp: 30 tablet, Rfl: 6       Assessment/Plan   Healthy female exam.      1. Healthcare Maintenance:  2. Patient Counseling:  --Nutrition: Stressed importance of moderation in sodium/caffeine intake, saturated fat and cholesterol, caloric balance, sufficient intake of fresh fruits, vegetables, fiber, calcium and vit D  --Exercise: Recommended 30 minutes of exercise daily.  --Immunizations reviewed.  --Discussed benefits of screening colonoscopy.    Diagnoses and all orders for this visit:    Medicare annual wellness visit, initial  -     CBC &  Differential  -     Comprehensive Metabolic Panel  -     CBC Auto Differential    Well woman exam (no gynecological exam)  -     CBC & Differential  -     Comprehensive Metabolic Panel  -     CBC Auto Differential    Screening for diabetes mellitus  -     Hemoglobin A1c    Screening for thyroid disorder  -     Thyroid Panel With TSH    Screening for breast cancer  -     Mammo Screening Bilateral With CAD    Screening for lipid disorders  -     Lipid Panel With LDL / HDL Ratio    Encounter for screening mammogram for malignant neoplasm of breast   -     Mammo Screening Bilateral With CAD    Encounter for screening colonoscopy  -     Ambulatory Referral For Screening Colonoscopy    Abnormal finding of blood chemistry, unspecified   -     Hemoglobin A1c    Type 2 diabetes mellitus without complication, without long-term current use of insulin (CMS/[facility])  -     Hemoglobin A1c  -     Microalbumin / Creatinine Urine Ratio - Urine, Clean Catch  -     SITagliptin-metFORMIN HCl ER (Janumet XR) 100-1000 MG tablet; Take 1 tablet by mouth Daily.  -     quinapril (ACCUPRIL) 40 MG tablet; Take 1 tablet by mouth Daily.    Generalized anxiety disorder  -     ALPRAZolam (XANAX) 0.5 MG tablet; Take 1 tablet by mouth At Night As Needed for Sleep.  -     We will continue Xanax 0.5 mg as needed.    Vitamin D deficiency  -     Vitamin D 25 Hydroxy    Depression, unspecified depression type  -     Vortioxetine HBr (Trintellix) 10 MG tablet; Take 10 mg by mouth Daily.  -     We will continue patient on the Trintellix 10 mg daily.    Essential hypertension  -     CBC & Differential  -     Comprehensive Metabolic Panel  -     quinapril (ACCUPRIL) 40 MG tablet; Take 1 tablet by mouth Daily.  -     amLODIPine (NORVASC) 10 MG tablet; Take 1 tablet by mouth Daily.  -     CBC Auto Differential  -     Continue current Alocril 40 mg daily and amlodipine 10 mg daily.    Type 2 diabetes mellitus with hyperglycemia, without long-term current use of  insulin (CMS/HCC)  -     SITagliptin-metFORMIN HCl ER (Janumet XR) 100-1000 MG tablet; Take 1 tablet by mouth Daily.  -     Continue Janumet extended release 100-1000 mg daily.        No follow-ups on file.             Dictated utilizing Dragon Voice Recognition Software

## 2020-06-18 DIAGNOSIS — E55.9 VITAMIN D DEFICIENCY: ICD-10-CM

## 2020-06-18 DIAGNOSIS — E78.5 DYSLIPIDEMIA: Primary | ICD-10-CM

## 2020-06-18 RX ORDER — PRAVASTATIN SODIUM 10 MG
10 TABLET ORAL NIGHTLY
Qty: 90 TABLET | Refills: 3 | Status: SHIPPED | OUTPATIENT
Start: 2020-06-18 | End: 2021-10-11 | Stop reason: SDUPTHER

## 2020-06-19 NOTE — PROGRESS NOTES
Please inform the patient of the following abnormal results.  Needs to take vitamin d 5000 IU daily for 2 mos, and then go down to 2000 IU daily. Will recheck at next visit.   HBa1c has improved and is lowest in two years of 6.7.  Cholesterol panel has improved, but would benefit from statin. Will start patient on pravastatin 10mg daily.   Reevaluate patient in 3 mos, will need to do a cmp and lipid panel.

## 2020-06-24 ENCOUNTER — TELEPHONE (OUTPATIENT)
Dept: INTERNAL MEDICINE | Facility: CLINIC | Age: 70
End: 2020-06-24

## 2020-06-24 NOTE — TELEPHONE ENCOUNTER
PATIENT CALLED AND STATED THAT THE SAMPLES SHE GOT FROM THE OFFICE WERE THE TRULICITY INJECTIONS BUT SHE SAID THAT THAT'S THE INCORRECT MEDICATION. IT'S NEEDING TO BE THE TRINTELLIX 10 MG SAMPLE, INSTEAD. SHE WOULD LIKE A PHONE CALL ASAP REGARDING BRINGING BACK THE SAMPLE INJECTIONS. PLEASE ADVISE.    [address] AND [address] CONFIRMED    PATIENT CALLBACK # [phone]

## 2020-07-14 ENCOUNTER — TELEPHONE (OUTPATIENT)
Dept: INTERNAL MEDICINE | Facility: CLINIC | Age: 70
End: 2020-07-14

## 2020-07-14 NOTE — TELEPHONE ENCOUNTER
Patient calling and wants to know if there are any samples of Trintellix 10 mg she can .    Best call back [phone]

## 2020-07-14 NOTE — TELEPHONE ENCOUNTER
Called and spoke with pt, informed her we do not have any samples. She wants me to mail her lab results. Mailing.

## 2020-10-09 ENCOUNTER — TELEPHONE (OUTPATIENT)
Dept: INTERNAL MEDICINE | Facility: CLINIC | Age: 70
End: 2020-10-09

## 2020-10-09 NOTE — LETTER
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  She must undergo a chest x-ray for evaluation of possible TB infection.       Sincerely,        Everardo Mazariegos MD

## 2020-10-09 NOTE — TELEPHONE ENCOUNTER
PATIENT IS REQUESTING A LETTER FROM DR. HERNANDEZ STATING THAT PATIENT IS ALLERGIC TO THE CHEMICALS USED IN TB SKIN TEST.      DR. HERNANDEZ HAD GIVEN THE PATIENT A LETTER  A YEAR AGO STATING THAT PATIENT IS ALLERGIC TO THE TB SYRUM.    PLEASE ADVISE    PATIENT CALL BACK: [phone]

## 2020-10-13 ENCOUNTER — APPOINTMENT (OUTPATIENT)
Dept: MAMMOGRAPHY | Facility: HOSPITAL | Age: 70
End: 2020-10-13

## 2020-11-24 ENCOUNTER — TELEPHONE (OUTPATIENT)
Dept: INTERNAL MEDICINE | Facility: CLINIC | Age: 70
End: 2020-11-24

## 2020-11-24 NOTE — TELEPHONE ENCOUNTER
Patient called and wants to know if she can get samples of Janumet and Trintellix Best call back [phone].

## 2020-12-24 ENCOUNTER — TELEPHONE (OUTPATIENT)
Dept: INTERNAL MEDICINE | Facility: CLINIC | Age: 70
End: 2020-12-24

## 2020-12-24 NOTE — TELEPHONE ENCOUNTER
Caller: Lucia Ellis    Relationship: Self    Best call back number:[phone]     Medication needed: Vortioxetine HBr (Trintellix) 10 MG tablet      When do you need the refill by: 12/24/20    What details did the patient provide when requesting the medication: Patient is requesting samples of the above medication.    Does the patient have less than a 3 day supply:  [x] Yes  [] No                 Operative Report      Patient: Jluis Seo Date: 2020   : 1947 Attending: Karel Blanco MD   Admit Date: 2020 Room/Bed: OR/OR   73 year old male      Surgeon: Karel Blanco M.D.     Assistant: JULIANE Rogel    Anesthesia Staff:  CRNA: Shania Upton CRNA; Rayo Curry CRNA  Anesthesiologist: Art Bruce MD     Anesthesia Type: General    Operative Procedure:  C3/4 and C4/5 anterior cervical diskectomies with instrumentation and fusion, use of operating microscope, [employer] hardware.    Pre Op Dx:  Cervical myelopathy    Post Op Dx:  Same    Clinical:  73-year-old male who presented with several month history of bilateral upper extremity paresthesias with difficulty with fine motor tasks. MRI demonstrated severe stenosis at C3/4 and C4/5.  Due to the patient's progressive weakness, operative intervention with decompression and fusion was recommended.      Assistant:  JULIANE Rogel  A surgical assistant was utilized in this case for tasks including, but not limited to, positioning the patient, placing and holding surgical retractors, assisting in dissection including identification of anatomy and use of microdissection instruments for protection of neural elements, assisting with implantation and/or removal of surgical devices, harvesting of tissue grafts, placement of drains and wound closure under my supervision.  The assistant's role was critical in the completion of the surgical procedure in the safest, most efficient manner and these parts of the procedure could not be appropriately provided by the surgical technologist passing instruments.    Risks: The risks and benefits associated with the proposed procedure including but not restricted to bleeding, infection, need for further surgery, death, stroke, loss of speech/vision, seizure, coma, and permanent impairment.  The patient verbalized a complete understanding to all the above and had an opportunity for  all questions to be addressed and they wished to proceed with surgery.       Description of Procedure: After informed consent was obtained, the patient was brought to the operating room and general anesthesia was induced. The images were reviewed and a timeout procedure was performed.  Preoperative antibiotics were given. The patient was positioned in the supine position on the waiting flattop table.  Care was taken to pad all pressure points.  A towel roll was placed behind the patient's shoulders to allow for gentle extension of the cervical spine. The patient's anterior neck was then marked prepped and draped in sterile fashion. A screening fluoroscopic image was then obtained for incision planning with a hemostat overlying the C3/ interspace.      Incision: Skin was injected with 1% lidocaine 100,000 epinephrine. The incision was carried down through the skin and subcutaneous planes. Self-retaining retractors were then set in place. Blunt finger dissection superficial to the platysma muscle was carried out to assist in exposure.  The platysmal fibers were split longitudinally just medial to the sternocleidomastoid muscle. A plane of dissection medial to the carotid sheath and lateral to the trachea and esophagus was carefully performed using hand-held retractors, Metzenbaum scissors, and blunt finger dissection. The supraspinous ligament was then identified and carefully opened using Metzenbaum scissors. The anterior cervical spine was then exposed using Kitners. A needle was placed in the C3/ disc space and a fluoroscopic image was obtained which verified this to be the C3/4 disc space.  Under direct visualization the needle was removed from the disc space and this was marked with Bovie electrocautery. Following this the longus coli muscles bilaterally were carefully reflected away from the C3, C4, and C5 vertebral bodies. Self-retaining retractor system was then set underneath the longus colli muscles  bilaterally. Hanover pins were then placed within the vertebral bodies of C3 and C4.  At this point the operating microscope was draped and introduced to the field.  It was utilized for the remainder of the case.    Discectomy:  After the Hanover pin distractor was placed overlying the C3/4 disc space, an 11 blade was used to open the disc space. Using straight and angled curettes as well as a pituitary rongeur, a discectomy was performed at the C3/4 level. The endplates were shaved down using a high-speed drill and the #2 and 3 Kerrison punches superficially. Using the angled curet the endplates of C3 and C4 were identified deep. Using a #2 Kerrison and a nerve hook the posterior longitudinal ligament was identified and carefully resected. A disc fragment was noted in the right foramen and this was removed with the nerve hook.  Following this a 8 mm rasp and an angled curet and debrided the endplates of any remaining disc fragment. Following this a 8 mm allograft structural bone plug was tapped into place.    The Hanover pin distractor was then placed overlying the C4/5 disc space and the disc space was opened with an 11 blade. Using straight and angled curettes as well as a pituitary rongeur, a thorough discectomy was performed at the C4/5 level. Again the endplates were shaved down using a high-speed drill and #2 and #3 Kerrison punches superficially. Using the angled curet the endplates of C4 and C5 were identified deep within the disc space. A nerve hook and a #2 Kerrison facilitated identification of the posterior longitudinal ligament. This was carefully resected  along with any remaining disc fragments. The 7 mm rasp and an angled curet were then utilized to debride the endplates of any remaining disc fragments.  After this a 7 mm structural allograft bone plug was tapped into place.    Instrumentation: The Hanover pins were then removed and bleeding was controlled with bone wax. A 43 mm anterior cervical plate  was then selected and set into place. This was then secured with 6 individual 17 mm screws.  A final AP and lateral fluoroscopic image was obtained which demonstrated good position of the plate and screws at the C3 through C5 levels.  The retractor system was removed and final tightening of the plate was performed.    Closure: Bleeding was controlled with bipolar electrocautery and bone wax were appropriate.  The wound was closed in multilayer fashion with 3-0 Vicryl sutures for the platysma muscle. 3-0 Vicryl sutures was used in an interrupted subcuticular fashion followed by Dermabond for the skin.  A dressing was then applied.  The patient was then transitioned to the awaiting hospital bed where he was reversed from general anesthesia and extubated without complications. A large cervical collar was placed.  All sponge, instrument and needle counts were correct.  I, Dr. Karel Blanco MD along with my assistant JULIANE Rogel performed all the above portions of this procedure.    Estimated Blood Loss: 10 cc    Complications: None    Specimens Removed: None    Karel Blanco M.D.

## 2020-12-28 RX ORDER — ESCITALOPRAM OXALATE 10 MG/1
10 TABLET ORAL DAILY
Qty: 90 TABLET | Refills: 2 | Status: SHIPPED | OUTPATIENT
Start: 2020-12-28 | End: 2021-03-26 | Stop reason: SDUPTHER

## 2021-02-03 ENCOUNTER — TELEPHONE (OUTPATIENT)
Dept: INTERNAL MEDICINE | Facility: CLINIC | Age: 71
End: 2021-02-03

## 2021-02-03 NOTE — TELEPHONE ENCOUNTER
PATIENT SAID THAT DR. HERNANDEZ RECENTLY STARTED HER ON escitalopram (Lexapro) 10 MG tablet AND SHE IS HAVING A LOT OF ISSUES WITH HER MEMORY. SHE WANTS TO KNOW IF IT COULD BE RELATED TO THE MEDICATION. PLEASE ADVISE.     PATIENT CALL BACK: [phone]

## 2021-02-04 ENCOUNTER — TELEPHONE (OUTPATIENT)
Dept: INTERNAL MEDICINE | Facility: CLINIC | Age: 71
End: 2021-02-04

## 2021-02-04 NOTE — TELEPHONE ENCOUNTER
I dont think it has anything to do with the memory. If she wants to stop the medication for several days and see if that helps? Otherwise she can continue the lexapro.

## 2021-02-04 NOTE — TELEPHONE ENCOUNTER
I talked with dr Edmonds and he said he would see her.  I looked at the msg saying Dr. Mazariegos just started her on lexapro and a few days later is when memory problem started.  She came in and I told her she could stop the medicine and see if that helps.  I informed pt to call back on tues if it was not better. She had already stopped the medication this morning

## 2021-02-19 ENCOUNTER — TELEPHONE (OUTPATIENT)
Dept: INTERNAL MEDICINE | Facility: CLINIC | Age: 71
End: 2021-02-19

## 2021-02-19 NOTE — TELEPHONE ENCOUNTER
Caller: Lucia Ellis    Relationship to patient: Self    Best call back number: [phone]     Patient is needing: Patient called in stating she received a message on her phone from Dr. Mazariegos to call the office back but she is unsure of when the message was left. Also wants to know what the medication quinapril (ACCUPRIL) 40 MG tablet is for. Please call and advise.

## 2021-03-02 ENCOUNTER — OFFICE VISIT (OUTPATIENT)
Dept: INTERNAL MEDICINE | Facility: CLINIC | Age: 71
End: 2021-03-02

## 2021-03-02 VITALS
BODY MASS INDEX: 23.22 KG/M2 | HEIGHT: 64 IN | OXYGEN SATURATION: 99 % | RESPIRATION RATE: 16 BRPM | DIASTOLIC BLOOD PRESSURE: 64 MMHG | HEART RATE: 100 BPM | WEIGHT: 136 LBS | SYSTOLIC BLOOD PRESSURE: 138 MMHG | TEMPERATURE: 97.1 F

## 2021-03-02 DIAGNOSIS — R41.3 MEMORY DIFFICULTY: Primary | ICD-10-CM

## 2021-03-02 DIAGNOSIS — F03.90 DEMENTIA WITHOUT BEHAVIORAL DISTURBANCE, UNSPECIFIED DEMENTIA TYPE: ICD-10-CM

## 2021-03-02 PROCEDURE — 99214 OFFICE O/P EST MOD 30 MIN: CPT | Performed by: FAMILY MEDICINE

## 2021-03-02 RX ORDER — DONEPEZIL HYDROCHLORIDE 5 MG/1
5 TABLET, FILM COATED ORAL NIGHTLY
Qty: 30 TABLET | Refills: 3 | Status: SHIPPED | OUTPATIENT
Start: 2021-03-02 | End: 2021-07-02

## 2021-03-02 NOTE — PROGRESS NOTES
"Chief Complaint  Memory Loss    Subjective          Lucia Ellis presents to [facility]  History of Present Illness    Patient presented today's office visit for concern about memory difficulty as well as dementia.  Patient does not have a history of having dementia.  However today's office visit is clear that she does have some memory difficulty.  We have FaceTime to her son who can provide a better history at today's office visit as patient is not able to give full or good history.  It appears for quite some time her mental/memory issues seems to have been declining.  From numerous things in the house to simple conversations, to simple interactions patient is not able to remember the things that seem to be going on.  Her son has noticed this on several occasions.  And seems to be declining more rapidly over the past several months.         Objective   Vital Signs:   /64   Pulse 100   Temp 97.1 °F (36.2 °C) (Infrared)   Resp 16   Ht 162.6 cm (64.02\")   Wt 61.7 kg (136 lb)   SpO2 99%   BMI 23.33 kg/m²     Physical Exam  Vitals signs and nursing note reviewed.   Constitutional:       Appearance: She is well-developed.   HENT:      Head: Normocephalic and atraumatic.   Neck:      Musculoskeletal: Normal range of motion and neck supple.   Neurological:      Mental Status: She is alert and oriented to person, place, and time.   Psychiatric:         Behavior: Behavior normal.        Result Review :                 Assessment and Plan    Diagnoses and all orders for this visit:    1. Memory difficulty (Primary)  -     donepezil (Aricept) 5 MG tablet; Take 1 tablet by mouth Every Night.  Dispense: 30 tablet; Refill: 3  -     Ambulatory Referral to Neurology    2. Dementia without behavioral disturbance, unspecified dementia type (CMS/HCC)  -     donepezil (Aricept) 5 MG tablet; Take 1 tablet by mouth Every Night.  Dispense: 30 tablet; Refill: 3  -     Ambulatory Referral to " Neurology     I have discussed with patient at today's office visit that I would like to her to see neurology.  Would like to start her on Aricept and see how she is doing.  Patient would benefit from having formal testing done by neurology.  It is evident in today's history, the patient is not being able to give good history as she cannot remember.      I spent 30 minutes caring for Lucia on this date of service. This time includes time spent by me in the following activities:preparing for the visit, obtaining and/or reviewing a separately obtained history, counseling and educating the patient/family/caregiver, ordering medications, tests, or procedures and care coordination  Follow Up   No follow-ups on file.  Patient was given instructions and counseling regarding her condition or for health maintenance advice. Please see specific information pulled into the AVS if appropriate.

## 2021-03-25 DIAGNOSIS — F32.A DEPRESSION, UNSPECIFIED DEPRESSION TYPE: ICD-10-CM

## 2021-03-26 ENCOUNTER — TELEPHONE (OUTPATIENT)
Dept: INTERNAL MEDICINE | Facility: CLINIC | Age: 71
End: 2021-03-26

## 2021-03-26 DIAGNOSIS — F41.1 GENERALIZED ANXIETY DISORDER: ICD-10-CM

## 2021-03-26 RX ORDER — ALPRAZOLAM 0.5 MG/1
0.5 TABLET ORAL NIGHTLY PRN
Qty: 90 TABLET | Refills: 0 | Status: SHIPPED | OUTPATIENT
Start: 2021-03-26 | End: 2021-07-13

## 2021-03-26 RX ORDER — VORTIOXETINE 10 MG/1
TABLET, FILM COATED ORAL
Qty: 30 TABLET | Refills: 5 | OUTPATIENT
Start: 2021-03-26

## 2021-03-26 RX ORDER — ESCITALOPRAM OXALATE 10 MG/1
10 TABLET ORAL DAILY
Qty: 90 TABLET | Refills: 2 | Status: SHIPPED | OUTPATIENT
Start: 2021-03-26 | End: 2021-10-11 | Stop reason: SDUPTHER

## 2021-03-26 NOTE — TELEPHONE ENCOUNTER
Patient want to know if there is something else she can take besides trintellix 10mg she cannot afford that.

## 2021-03-26 NOTE — TELEPHONE ENCOUNTER
This is for documentation only. I spoke to patient while at the pharmacy and she said she didn't know what meds she was on and was confused. She had a old RX for one medication but wasn't taking that one. She is on Lexapro and needed that refill but never knew that she needed a refill of that med. Patient has history of memory loss.

## 2021-04-06 ENCOUNTER — OFFICE VISIT (OUTPATIENT)
Dept: INTERNAL MEDICINE | Facility: CLINIC | Age: 71
End: 2021-04-06

## 2021-04-06 VITALS
RESPIRATION RATE: 16 BRPM | OXYGEN SATURATION: 98 % | TEMPERATURE: 97.5 F | DIASTOLIC BLOOD PRESSURE: 62 MMHG | SYSTOLIC BLOOD PRESSURE: 124 MMHG | BODY MASS INDEX: 23.22 KG/M2 | HEART RATE: 77 BPM | HEIGHT: 64 IN | WEIGHT: 136 LBS

## 2021-04-06 DIAGNOSIS — F32.A DEPRESSION, UNSPECIFIED DEPRESSION TYPE: ICD-10-CM

## 2021-04-06 DIAGNOSIS — R41.3 MEMORY DIFFICULTY: Primary | ICD-10-CM

## 2021-04-06 DIAGNOSIS — Z00.00 HEALTHCARE MAINTENANCE: ICD-10-CM

## 2021-04-06 DIAGNOSIS — M89.9 DISORDER OF BONE, UNSPECIFIED: ICD-10-CM

## 2021-04-06 DIAGNOSIS — F41.1 GENERALIZED ANXIETY DISORDER: ICD-10-CM

## 2021-04-06 PROCEDURE — 99214 OFFICE O/P EST MOD 30 MIN: CPT | Performed by: FAMILY MEDICINE

## 2021-04-06 NOTE — PROGRESS NOTES
"Chief Complaint  memory difficulty    Subjective          Lucia Ellis presents to [facility]  History of Present Illness    Patient presents at today's office visit with a past medical history of having memory difficulty.  Patient states that she has not got a chance to see the neurologist yesterday.  She is currently taking donezepil to help with her memory.  Patient states that she feels as if it is making a difference.  She also notes a family members have said the same thing as well.  She states that she does still continue to forget things, however it has gotten better in the last month.  Versus what it was before.  She also has a history of having depression and anxiety.  She currently takes Lexapro 10 mg daily.  She also takes Xanax 0.5 mg nightly.    Objective   Vital Signs:   /62   Pulse 77   Temp 97.5 °F (36.4 °C) (Infrared)   Resp 16   Ht 162.6 cm (64.02\")   Wt 61.7 kg (136 lb)   SpO2 98%   BMI 23.33 kg/m²     Physical Exam  Vitals and nursing note reviewed.   Constitutional:       Appearance: She is well-developed.   HENT:      Head: Normocephalic and atraumatic.   Musculoskeletal:      Cervical back: Normal range of motion and neck supple.   Neurological:      Mental Status: She is alert and oriented to person, place, and time.   Psychiatric:         Behavior: Behavior normal.        Result Review :                 Assessment and Plan    Diagnoses and all orders for this visit:    1. Memory difficulty (Primary)  -     Ambulatory Referral to Neurology  -     We will continue patient on Aricept 5 mg daily.  Patient should be referred to neurology.    2. Depression, unspecified depression type        -     Continue Lexapro 10 mg daily.    3. Generalized anxiety disorder        -     We will continue patient on Lexapro 10 mg daily along with Xanax.    4. Healthcare maintenance  -     CBC & Differential  -     Comprehensive Metabolic Panel  -     Hemoglobin " A1c  -     Thyroid Panel With TSH  -     Lipid Panel With LDL / HDL Ratio  -     Vitamin D 25 Hydroxy    5. Disorder of bone, unspecified   -     Vitamin D 25 Hydroxy        Follow Up   No follow-ups on file.  Patient was given instructions and counseling regarding her condition or for health maintenance advice. Please see specific information pulled into the AVS if appropriate.

## 2021-04-08 ENCOUNTER — OFFICE VISIT (OUTPATIENT)
Dept: NEUROLOGY | Facility: CLINIC | Age: 71
End: 2021-04-08

## 2021-04-08 VITALS
WEIGHT: 134 LBS | HEART RATE: 81 BPM | HEIGHT: 63 IN | SYSTOLIC BLOOD PRESSURE: 116 MMHG | BODY MASS INDEX: 23.74 KG/M2 | OXYGEN SATURATION: 96 % | DIASTOLIC BLOOD PRESSURE: 58 MMHG

## 2021-04-08 DIAGNOSIS — R41.3 MEMORY DIFFICULTY: Primary | ICD-10-CM

## 2021-04-08 PROCEDURE — 99204 OFFICE O/P NEW MOD 45 MIN: CPT | Performed by: PSYCHIATRY & NEUROLOGY

## 2021-04-08 NOTE — PATIENT INSTRUCTIONS
**Eat a high fiber diet    **Exercise regularly (physically and mentally)    **Floss daily    **Consider eating yogurt regularly    **Consider drinking green tea    **Limit soda and alcohol    **Ensure safety in the home    **Check out th Alzheimer's Association (www.alz.org).    **If you are interested in participating in a clinical trial, check out the following centers:   [facility]:  [email or web address].[email or web address]/      [facility]:  [email or web address]/[email or web address]/[email or web address]     [facility]:  [email or web address]/[email or web address]    **If you live in [facility], consider [facility]. It is a free agency that helps find placement for seniors. They do an assessment and find out the need and know which facilities have openings and would be the best fit. They help figure out the financial aspects as well.  Shivani Reis is the nurse navigator and her number is [phone].

## 2021-04-08 NOTE — ASSESSMENT & PLAN NOTE
SLUMS of 29/30 today is normal however her symptoms and clinical picture she is describing is consistent with mild cognitive impairment vs possible early stage Alzheimer's dementia.  I informed patient with regards to this diagnosis.  I will continue the donepezil at this dose for now.  I will refer her for formal neuropsychological testing as her SLUMS today does not seem to correlate with her history of memory and cognitive impairment.  I have referred patient to community mobility assessment for driving evaluation.  I discussed safety issues in dementia including not driving until this evaluation is performed.  Discussed diagnosis extensively with patient and advised patient to exercise and socialize which are the only two things that have been shown to help potentially slow down progression of disease.  POA and Living will were discussed which patient and she will have this looked into with her sister and son.  I will order a Brain MRI with and without contrast as she has history of breast cancer to be sure this has not metastasized to her brain causing memory issues.   I reviewed her labs today and will also order Vit B12 and thiamine and folate levels as well.  Provided patient education information on dementia websites and possible support groups.  Will follow up in 3 months to reevaluate and sooner if needed.

## 2021-04-08 NOTE — PROGRESS NOTES
Chief Complaint  Memory Loss (began 1mo, recent decline,on aricept 5mg, misplacing items, getting lost to places she goes often,  no imaging)    Subjective          Lucia Ellis presents to [facility] for   HISTORY OF PRESENT ILLNESS:    Lucia Ellis is a 71 year old right handed woman who presents to neurology clinic for initial evaluation and treatment of memory loss.  She reports some trouble with her memory starting about 3 months ago or longer.  She tells me what worries her the most is that she is getting lost when she is driving in familiar places like her neighborhood.  She is also forgetful and misplacing objects.  She is forgetting conversations with her family and tells me she took money out of the bank and did not remember what she did with it and then realized she gave the money to her for new motor.  She has had lumpectomy done for breast cancer with radiation treatment several years ago.  She has not had any brain imaging done.  She has been started on donepezil 2-3 weeks ago.  She denies any family history of dementia but reports family history of cancer.  She has had lab work including normal CBC, CMP and TSH.  Her Vit D levels were low and she reports taking supplements.  She is currently driving and I recommended her not to be driving.  She denies any exposures to toxins.  Her son cooks food for her.  She denies any trouble with ADLs but tells me she takes her time and is slowing down to make sure she does things appropriately.  She sometimes thinks too long about what she needs to be wearing.  She tells me she does not remember going to the doctor with her son and she is forgetting regular conversations.  She is living with her son who does the cooking.  They have smoke detectors in their house.  She has not decided the POA status and living will.  She denies any significant depression.  She denies exercising and socializing much.      Past Medical History:   Diagnosis  Date   • Breast cancer (CMS/[facility])    • Diabetes mellitus (CMS/[facility])    • Hypertension    • Memory loss         Family History   Problem Relation Age of Onset   • Heart attack Mother    • Heart disease Mother    • Hypertension Mother    • Hypertension Father    • Diabetes Father    • Hypertension Sister    • Diabetes Sister    • Thyroid cancer Sister    • Breast cancer Sister    • Lung cancer Sister    • Diabetes Brother    • Drug abuse Paternal Grandmother         Social History     Socioeconomic History   • Marital status:      Spouse name: Not on file   • Number of children: Not on file   • Years of education: Not on file   • Highest education level: Not on file   Tobacco Use   • Smoking status: Never Smoker   • Smokeless tobacco: Never Used   Vaping Use   • Vaping Use: Never used   Substance and Sexual Activity   • Alcohol use: Yes     Comment: occ   • Drug use: No   • Sexual activity: Never        I have personally reviewed the ROS as stated below.     Review of Systems   Constitutional: Negative for activity change, appetite change and fatigue.   HENT: Negative for hearing loss, trouble swallowing and voice change.    Eyes: Negative for blurred vision, double vision and pain.   Respiratory: Positive for cough. Negative for choking, shortness of breath and wheezing.    Cardiovascular: Negative for chest pain, palpitations and leg swelling.   Gastrointestinal: Negative for abdominal pain, nausea and vomiting.   Endocrine: Negative for cold intolerance and heat intolerance.   Genitourinary: Positive for urgency. Negative for decreased urine volume and urinary incontinence.   Musculoskeletal: Negative for back pain, gait problem and neck pain.   Skin: Negative for dry skin, rash and bruise.   Allergic/Immunologic: Positive for environmental allergies. Negative for food allergies.   Neurological: Positive for light-headedness and memory problem. Negative for dizziness, tremors, seizures, syncope, facial  "asymmetry, speech difficulty, weakness, numbness, headache and confusion.   Hematological: Does not bruise/bleed easily.   Psychiatric/Behavioral: Positive for behavioral problems (double checking doors locked etc) and positive for hyperactivity. Negative for agitation, decreased concentration, dysphoric mood, hallucinations, self-injury, sleep disturbance, suicidal ideas, depressed mood and stress. The patient is nervous/anxious.         Objective   Vital Signs:   /58 (BP Location: Left arm, Patient Position: Sitting)   Pulse 81   Ht 160 cm (63\")   Wt 60.8 kg (134 lb)   SpO2 96%   BMI 23.74 kg/m²       PHYSICAL EXAM:    General   Mental Status - Alert. General Appearance - Well developed, Well groomed, Oriented and Cooperative. Orientation - Oriented X3.       Head and Neck  Head - normocephalic, atraumatic with no lesions or palpable masses.  Neck    Global Assessment - supple.       Eye   Sclera/Conjunctiva - Bilateral - Normal.    ENMT  Mouth and Throat   Oral Cavity/Oropharynx: Oropharynx - the soft palate,uvula and tongue are normal in appearance.    Chest and Lung Exam   Chest - lung clear to auscultation bilaterally.    Cardiovascular   Cardiovascular examination reveals  - normal heart sounds, regular rate and rhythm.    Neurologic   Mental Status: Speech - Normal. Cognitive function - SLUMS 29/30, appropriate fund of knowledge. No impairment of attention, Impairment of concentration, impairment of long term memory or impairment of short term memory.  Cranial Nerves:   II Optic: Visual acuity - Left - Normal. Right - Normal. Visual fields - Normal (to confrontation).  III Oculomotor: Pupillary constriction - Left - Normal. Right - Normal.  VII Facial: - Normal Bilaterally.  VIII Acoustic - Bilateral - Hearing normal and (Hearing tested by finger rub).   IX Glossopharyngeal / X Vagus - Normal.  XI Accessory: Trapezius - Bilateral - Normal. Sternocleidomastoid - Bilateral - Normal.  XII Hypoglossal " - Bilateral - Normal.  Eye Movements: - Normal Bilaterally.  Sensory:   Light Touch: Intact - Globally.  Motor:   Bulk and Contour: - Normal.  Tone: - Normal.  Tremor: Not present.  Strength: 5/5 normal muscle strength - All Muscles.   General Assessment of Reflexes: - deep tendon reflexes are normal. Coordination - No Impairment of finger-to-nose or Impairment of rapid alternating movements. Gait - Normal.       Result Review :                 Assessment and Plan    Problem List Items Addressed This Visit        Neuro    Memory difficulty - Primary    Current Assessment & Plan     SLUMS of 29/30 today is normal however her symptoms and clinical picture she is describing is consistent with mild cognitive impairment vs possible early stage Alzheimer's dementia.  I informed patient with regards to this diagnosis.  I will continue the donepezil at this dose for now.  I will refer her for formal neuropsychological testing as her SLUMS today does not seem to correlate with her history of memory and cognitive impairment.  I have referred patient to community mobility assessment for driving evaluation.  I discussed safety issues in dementia including not driving until this evaluation is performed.  Discussed diagnosis extensively with patient and advised patient to exercise and socialize which are the only two things that have been shown to help potentially slow down progression of disease.  POA and Living will were discussed which patient and she will have this looked into with her sister and son.  I will order a Brain MRI with and without contrast as she has history of breast cancer to be sure this has not metastasized to her brain causing memory issues.   I reviewed her labs today and will also order Vit B12 and thiamine and folate levels as well.  Provided patient education information on dementia websites and possible support groups.  Will follow up in 3 months to reevaluate and sooner if needed.           Relevant  Orders    MRI Brain With & Without Contrast    Ambulatory Referral to Neuropsychology    Ambulatory Referral to Occupational Therapy (Completed)    Vitamin B12    Folate    Vitamin B1, Whole Blood          I spent time caring for Lucia on this date of service. This time includes time spent by me in the following activities:preparing for the visit, reviewing tests, obtaining and/or reviewing a separately obtained history, counseling and educating the patient/family/caregiver, ordering medications, tests, or procedures, documenting information in the medical record and care coordination    Follow Up   Return in about 3 months (around 7/8/2021).  Patient was given instructions and counseling regarding her condition or for health maintenance advice. Please see specific information pulled into the AVS if appropriate.

## 2021-04-12 ENCOUNTER — LAB (OUTPATIENT)
Dept: LAB | Facility: HOSPITAL | Age: 71
End: 2021-04-12

## 2021-04-12 DIAGNOSIS — E78.5 DYSLIPIDEMIA: ICD-10-CM

## 2021-04-12 DIAGNOSIS — E55.9 VITAMIN D DEFICIENCY: ICD-10-CM

## 2021-04-12 LAB
25(OH)D3 SERPL-MCNC: 58.7 NG/ML (ref 30–100)
ALBUMIN SERPL-MCNC: 4.6 G/DL (ref 3.5–5.2)
ALBUMIN/GLOB SERPL: 1.8 G/DL
ALP SERPL-CCNC: 75 U/L (ref 39–117)
ALT SERPL W P-5'-P-CCNC: 16 U/L (ref 1–33)
ANION GAP SERPL CALCULATED.3IONS-SCNC: 11 MMOL/L (ref 5–15)
AST SERPL-CCNC: 14 U/L (ref 1–32)
BASOPHILS # BLD AUTO: 0.07 10*3/MM3 (ref 0–0.2)
BASOPHILS NFR BLD AUTO: 1.5 % (ref 0–1.5)
BILIRUB SERPL-MCNC: 0.2 MG/DL (ref 0–1.2)
BUN SERPL-MCNC: 11 MG/DL (ref 8–23)
BUN/CREAT SERPL: 14.7 (ref 7–25)
CALCIUM SPEC-SCNC: 9.4 MG/DL (ref 8.6–10.5)
CHLORIDE SERPL-SCNC: 102 MMOL/L (ref 98–107)
CHOLEST SERPL-MCNC: 187 MG/DL (ref 0–200)
CO2 SERPL-SCNC: 29 MMOL/L (ref 22–29)
CREAT SERPL-MCNC: 0.75 MG/DL (ref 0.57–1)
DEPRECATED RDW RBC AUTO: 39.6 FL (ref 37–54)
EOSINOPHIL # BLD AUTO: 0.05 10*3/MM3 (ref 0–0.4)
EOSINOPHIL NFR BLD AUTO: 1 % (ref 0.3–6.2)
ERYTHROCYTE [DISTWIDTH] IN BLOOD BY AUTOMATED COUNT: 12.6 % (ref 12.3–15.4)
FOLATE SERPL-MCNC: 8.84 NG/ML (ref 4.78–24.2)
GFR SERPL CREATININE-BSD FRML MDRD: 92 ML/MIN/1.73
GLOBULIN UR ELPH-MCNC: 2.6 GM/DL
GLUCOSE SERPL-MCNC: 119 MG/DL (ref 65–99)
HBA1C MFR BLD: 6.7 % (ref 4.8–5.6)
HCT VFR BLD AUTO: 40.9 % (ref 34–46.6)
HDLC SERPL-MCNC: 73 MG/DL (ref 40–60)
HGB BLD-MCNC: 13.1 G/DL (ref 12–15.9)
IMM GRANULOCYTES # BLD AUTO: 0.01 10*3/MM3 (ref 0–0.05)
IMM GRANULOCYTES NFR BLD AUTO: 0.2 % (ref 0–0.5)
LDLC SERPL CALC-MCNC: 101 MG/DL (ref 0–100)
LDLC/HDLC SERPL: 1.36 {RATIO}
LYMPHOCYTES # BLD AUTO: 2.05 10*3/MM3 (ref 0.7–3.1)
LYMPHOCYTES NFR BLD AUTO: 42.5 % (ref 19.6–45.3)
MCH RBC QN AUTO: 27.7 PG (ref 26.6–33)
MCHC RBC AUTO-ENTMCNC: 32 G/DL (ref 31.5–35.7)
MCV RBC AUTO: 86.5 FL (ref 79–97)
MONOCYTES # BLD AUTO: 0.45 10*3/MM3 (ref 0.1–0.9)
MONOCYTES NFR BLD AUTO: 9.3 % (ref 5–12)
NEUTROPHILS NFR BLD AUTO: 2.19 10*3/MM3 (ref 1.7–7)
NEUTROPHILS NFR BLD AUTO: 45.5 % (ref 42.7–76)
NRBC BLD AUTO-RTO: 0 /100 WBC (ref 0–0.2)
PLATELET # BLD AUTO: 221 10*3/MM3 (ref 140–450)
PMV BLD AUTO: 10.6 FL (ref 6–12)
POTASSIUM SERPL-SCNC: 3.7 MMOL/L (ref 3.5–5.2)
PROT SERPL-MCNC: 7.2 G/DL (ref 6–8.5)
RBC # BLD AUTO: 4.73 10*6/MM3 (ref 3.77–5.28)
SODIUM SERPL-SCNC: 142 MMOL/L (ref 136–145)
T-UPTAKE NFR SERPL: 1.05 TBI (ref 0.8–1.3)
T4 SERPL-MCNC: 4.98 MCG/DL (ref 4.5–11.7)
TRIGL SERPL-MCNC: 72 MG/DL (ref 0–150)
TSH SERPL DL<=0.05 MIU/L-ACNC: 1.97 UIU/ML (ref 0.27–4.2)
VIT B12 BLD-MCNC: 1101 PG/ML (ref 211–946)
VLDLC SERPL-MCNC: 13 MG/DL (ref 5–40)
WBC # BLD AUTO: 4.82 10*3/MM3 (ref 3.4–10.8)

## 2021-04-12 PROCEDURE — 82607 VITAMIN B-12: CPT | Performed by: PSYCHIATRY & NEUROLOGY

## 2021-04-12 PROCEDURE — 84479 ASSAY OF THYROID (T3 OR T4): CPT | Performed by: FAMILY MEDICINE

## 2021-04-12 PROCEDURE — 84443 ASSAY THYROID STIM HORMONE: CPT | Performed by: FAMILY MEDICINE

## 2021-04-12 PROCEDURE — 85025 COMPLETE CBC W/AUTO DIFF WBC: CPT | Performed by: FAMILY MEDICINE

## 2021-04-12 PROCEDURE — 80061 LIPID PANEL: CPT

## 2021-04-12 PROCEDURE — 82306 VITAMIN D 25 HYDROXY: CPT

## 2021-04-12 PROCEDURE — 36415 COLL VENOUS BLD VENIPUNCTURE: CPT | Performed by: FAMILY MEDICINE

## 2021-04-12 PROCEDURE — 84436 ASSAY OF TOTAL THYROXINE: CPT | Performed by: FAMILY MEDICINE

## 2021-04-12 PROCEDURE — 83036 HEMOGLOBIN GLYCOSYLATED A1C: CPT | Performed by: FAMILY MEDICINE

## 2021-04-12 PROCEDURE — 84425 ASSAY OF VITAMIN B-1: CPT | Performed by: PSYCHIATRY & NEUROLOGY

## 2021-04-12 PROCEDURE — 80053 COMPREHEN METABOLIC PANEL: CPT

## 2021-04-12 PROCEDURE — 82746 ASSAY OF FOLIC ACID SERUM: CPT | Performed by: PSYCHIATRY & NEUROLOGY

## 2021-04-15 NOTE — PROGRESS NOTES
Please inform the patient of the following abnormal results.   Hba1c is stable. Other labs are good.

## 2021-04-16 LAB — VIT B1 BLD-SCNC: 106.4 NMOL/L (ref 66.5–200)

## 2021-05-07 ENCOUNTER — HOSPITAL ENCOUNTER (OUTPATIENT)
Dept: MRI IMAGING | Facility: HOSPITAL | Age: 71
Discharge: HOME | End: 2021-05-07
Admitting: PSYCHIATRY & NEUROLOGY

## 2021-05-07 DIAGNOSIS — R41.3 MEMORY DIFFICULTY: ICD-10-CM

## 2021-05-07 PROCEDURE — 0 GADOBENATE DIMEGLUMINE 529 MG/ML SOLUTION: Performed by: PSYCHIATRY & NEUROLOGY

## 2021-05-07 PROCEDURE — A9577 INJ MULTIHANCE: HCPCS | Performed by: PSYCHIATRY & NEUROLOGY

## 2021-05-07 PROCEDURE — 70553 MRI BRAIN STEM W/O & W/DYE: CPT

## 2021-05-07 PROCEDURE — 82565 ASSAY OF CREATININE: CPT

## 2021-05-07 RX ADMIN — GADOBENATE DIMEGLUMINE 12 ML: 529 INJECTION, SOLUTION INTRAVENOUS at 17:06

## 2021-05-08 LAB — CREAT BLDA-MCNC: 0.6 MG/DL (ref 0.6–1.3)

## 2021-05-10 ENCOUNTER — TELEPHONE (OUTPATIENT)
Dept: NEUROLOGY | Facility: CLINIC | Age: 71
End: 2021-05-10

## 2021-05-10 NOTE — TELEPHONE ENCOUNTER
Called the patient to give her the results of her Brain MRI from Dr. Montes De Oca, she is aware.     ----- Message from Gia Montes De Oca MD sent at 5/10/2021  8:41 AM EDT -----  Looks good.

## 2021-06-01 ENCOUNTER — TELEPHONE (OUTPATIENT)
Dept: INTERNAL MEDICINE | Facility: CLINIC | Age: 71
End: 2021-06-01

## 2021-06-01 NOTE — TELEPHONE ENCOUNTER
Im not sure what the pt is asking for.  I checked her notes from her recent neurology visit but she states it was a request for demographics and ins cards.  I asked her to have her son bring this in so I can look at it and make sure she has what she needs

## 2021-06-01 NOTE — TELEPHONE ENCOUNTER
Caller: Lucia Ellis    Relationship: Self    Best call back number: [phone]    What form or medical record are you requesting: PATIENT ASKED IF DR HERNANDEZ  RECEIVED PAPERWORK IN REGARDS TO THE PATIENT NOT BEING ABLE TO DRIVE OR USE THE OVEN.    Who is requesting this form or medical record from you:PATIENT    How would you like to receive the form or medical records (pick-up, mail, fax): MAIL BACK TO THE REQUESTING PERSON.  If fax, what is the fax number:  If mail, what is the address:   If pick-up, provide patient with address and location details    Timeframe paperwork needed: PATIENT STATED SHE COULDN'T FIND THE PAPERWORK THAT SHE RECEIVED.    Additional notes: PATIENT ASKED IF SHE NEEDED TO COME IN FOR AN APPOINTMENT.

## 2021-06-21 ENCOUNTER — OFFICE VISIT (OUTPATIENT)
Dept: INTERNAL MEDICINE | Facility: CLINIC | Age: 71
End: 2021-06-21

## 2021-06-21 ENCOUNTER — LAB (OUTPATIENT)
Dept: LAB | Facility: HOSPITAL | Age: 71
End: 2021-06-21

## 2021-06-21 ENCOUNTER — TELEPHONE (OUTPATIENT)
Dept: GASTROENTEROLOGY | Facility: CLINIC | Age: 71
End: 2021-06-21

## 2021-06-21 VITALS
DIASTOLIC BLOOD PRESSURE: 62 MMHG | HEART RATE: 84 BPM | SYSTOLIC BLOOD PRESSURE: 122 MMHG | RESPIRATION RATE: 16 BRPM | WEIGHT: 135.1 LBS | OXYGEN SATURATION: 98 % | BODY MASS INDEX: 23.94 KG/M2

## 2021-06-21 DIAGNOSIS — Z13.1 SCREENING FOR DIABETES MELLITUS: ICD-10-CM

## 2021-06-21 DIAGNOSIS — Z12.31 ENCOUNTER FOR SCREENING MAMMOGRAM FOR MALIGNANT NEOPLASM OF BREAST: ICD-10-CM

## 2021-06-21 DIAGNOSIS — E11.9 TYPE 2 DIABETES MELLITUS WITHOUT COMPLICATION, WITHOUT LONG-TERM CURRENT USE OF INSULIN (HCC): ICD-10-CM

## 2021-06-21 DIAGNOSIS — Z13.29 SCREENING FOR THYROID DISORDER: ICD-10-CM

## 2021-06-21 DIAGNOSIS — R41.3 MEMORY DIFFICULTY: ICD-10-CM

## 2021-06-21 DIAGNOSIS — Z00.00 WELL WOMAN EXAM (NO GYNECOLOGICAL EXAM): Primary | ICD-10-CM

## 2021-06-21 DIAGNOSIS — Z12.11 SCREEN FOR COLON CANCER: ICD-10-CM

## 2021-06-21 DIAGNOSIS — Z00.00 HEALTHCARE MAINTENANCE: ICD-10-CM

## 2021-06-21 DIAGNOSIS — Z13.220 SCREENING FOR LIPID DISORDERS: ICD-10-CM

## 2021-06-21 DIAGNOSIS — F03.90 DEMENTIA WITHOUT BEHAVIORAL DISTURBANCE, UNSPECIFIED DEMENTIA TYPE: ICD-10-CM

## 2021-06-21 LAB
ALBUMIN SERPL-MCNC: 4.3 G/DL (ref 3.5–5.2)
ALBUMIN/GLOB SERPL: 1.9 G/DL
ALP SERPL-CCNC: 75 U/L (ref 39–117)
ALT SERPL W P-5'-P-CCNC: 15 U/L (ref 1–33)
ANION GAP SERPL CALCULATED.3IONS-SCNC: 7.3 MMOL/L (ref 5–15)
AST SERPL-CCNC: 22 U/L (ref 1–32)
BASOPHILS # BLD AUTO: 0.05 10*3/MM3 (ref 0–0.2)
BASOPHILS NFR BLD AUTO: 1.1 % (ref 0–1.5)
BILIRUB SERPL-MCNC: 0.3 MG/DL (ref 0–1.2)
BUN SERPL-MCNC: 12 MG/DL (ref 8–23)
BUN/CREAT SERPL: 12.2 (ref 7–25)
CALCIUM SPEC-SCNC: 9.4 MG/DL (ref 8.6–10.5)
CHLORIDE SERPL-SCNC: 102 MMOL/L (ref 98–107)
CHOLEST SERPL-MCNC: 181 MG/DL (ref 0–200)
CO2 SERPL-SCNC: 29.7 MMOL/L (ref 22–29)
CREAT SERPL-MCNC: 0.98 MG/DL (ref 0.57–1)
DEPRECATED RDW RBC AUTO: 40.1 FL (ref 37–54)
EOSINOPHIL # BLD AUTO: 0.04 10*3/MM3 (ref 0–0.4)
EOSINOPHIL NFR BLD AUTO: 0.9 % (ref 0.3–6.2)
ERYTHROCYTE [DISTWIDTH] IN BLOOD BY AUTOMATED COUNT: 12.8 % (ref 12.3–15.4)
GFR SERPL CREATININE-BSD FRML MDRD: 67 ML/MIN/1.73
GLOBULIN UR ELPH-MCNC: 2.3 GM/DL
GLUCOSE SERPL-MCNC: 103 MG/DL (ref 65–99)
HBA1C MFR BLD: 6.2 % (ref 4.8–5.6)
HCT VFR BLD AUTO: 36.1 % (ref 34–46.6)
HDLC SERPL-MCNC: 63 MG/DL (ref 40–60)
HGB BLD-MCNC: 11.9 G/DL (ref 12–15.9)
IMM GRANULOCYTES # BLD AUTO: 0.01 10*3/MM3 (ref 0–0.05)
IMM GRANULOCYTES NFR BLD AUTO: 0.2 % (ref 0–0.5)
LDLC SERPL CALC-MCNC: 105 MG/DL (ref 0–100)
LDLC/HDLC SERPL: 1.66 {RATIO}
LYMPHOCYTES # BLD AUTO: 1.68 10*3/MM3 (ref 0.7–3.1)
LYMPHOCYTES NFR BLD AUTO: 37.7 % (ref 19.6–45.3)
MCH RBC QN AUTO: 28.5 PG (ref 26.6–33)
MCHC RBC AUTO-ENTMCNC: 33 G/DL (ref 31.5–35.7)
MCV RBC AUTO: 86.4 FL (ref 79–97)
MONOCYTES # BLD AUTO: 0.43 10*3/MM3 (ref 0.1–0.9)
MONOCYTES NFR BLD AUTO: 9.6 % (ref 5–12)
NEUTROPHILS NFR BLD AUTO: 2.25 10*3/MM3 (ref 1.7–7)
NEUTROPHILS NFR BLD AUTO: 50.5 % (ref 42.7–76)
NRBC BLD AUTO-RTO: 0 /100 WBC (ref 0–0.2)
PLATELET # BLD AUTO: 204 10*3/MM3 (ref 140–450)
PMV BLD AUTO: 11.6 FL (ref 6–12)
POTASSIUM SERPL-SCNC: 3.4 MMOL/L (ref 3.5–5.2)
PROT SERPL-MCNC: 6.6 G/DL (ref 6–8.5)
RBC # BLD AUTO: 4.18 10*6/MM3 (ref 3.77–5.28)
SODIUM SERPL-SCNC: 139 MMOL/L (ref 136–145)
T-UPTAKE NFR SERPL: 0.98 TBI (ref 0.8–1.3)
T4 SERPL-MCNC: 4.53 MCG/DL (ref 4.5–11.7)
TRIGL SERPL-MCNC: 68 MG/DL (ref 0–150)
TSH SERPL DL<=0.05 MIU/L-ACNC: 0.93 UIU/ML (ref 0.27–4.2)
VLDLC SERPL-MCNC: 13 MG/DL (ref 5–40)
WBC # BLD AUTO: 4.46 10*3/MM3 (ref 3.4–10.8)

## 2021-06-21 PROCEDURE — 96160 PT-FOCUSED HLTH RISK ASSMT: CPT | Performed by: FAMILY MEDICINE

## 2021-06-21 PROCEDURE — 83036 HEMOGLOBIN GLYCOSYLATED A1C: CPT | Performed by: FAMILY MEDICINE

## 2021-06-21 PROCEDURE — 84436 ASSAY OF TOTAL THYROXINE: CPT | Performed by: FAMILY MEDICINE

## 2021-06-21 PROCEDURE — 84479 ASSAY OF THYROID (T3 OR T4): CPT | Performed by: FAMILY MEDICINE

## 2021-06-21 PROCEDURE — G0439 PPPS, SUBSEQ VISIT: HCPCS | Performed by: FAMILY MEDICINE

## 2021-06-21 PROCEDURE — 84443 ASSAY THYROID STIM HORMONE: CPT | Performed by: FAMILY MEDICINE

## 2021-06-21 PROCEDURE — 80053 COMPREHEN METABOLIC PANEL: CPT | Performed by: FAMILY MEDICINE

## 2021-06-21 PROCEDURE — 36415 COLL VENOUS BLD VENIPUNCTURE: CPT | Performed by: FAMILY MEDICINE

## 2021-06-21 PROCEDURE — 99214 OFFICE O/P EST MOD 30 MIN: CPT | Performed by: FAMILY MEDICINE

## 2021-06-21 PROCEDURE — 1170F FXNL STATUS ASSESSED: CPT | Performed by: FAMILY MEDICINE

## 2021-06-21 PROCEDURE — 99397 PER PM REEVAL EST PAT 65+ YR: CPT | Performed by: FAMILY MEDICINE

## 2021-06-21 PROCEDURE — 85025 COMPLETE CBC W/AUTO DIFF WBC: CPT | Performed by: FAMILY MEDICINE

## 2021-06-21 PROCEDURE — 1160F RVW MEDS BY RX/DR IN RCRD: CPT | Performed by: FAMILY MEDICINE

## 2021-06-21 PROCEDURE — 80061 LIPID PANEL: CPT | Performed by: FAMILY MEDICINE

## 2021-06-21 NOTE — PROGRESS NOTES
The ABCs of the Annual Wellness Visit  Subsequent Medicare Wellness Visit    Chief Complaint   Patient presents with   • Medicare Wellness-subsequent       Subjective   History of Present Illness:  Lucia Ellis is a 71 y.o. female who presents for a Subsequent Medicare Wellness Visit.    HEALTH RISK ASSESSMENT    Recent Hospitalizations:  No hospitalization(s) within the last year.    Current Medical Providers:  Patient Care Team:  Everardo Mazariegos MD as PCP - General (Family Medicine)    Smoking Status:  Social History     Tobacco Use   Smoking Status Never Smoker   Smokeless Tobacco Never Used       Alcohol Consumption:  Social History     Substance and Sexual Activity   Alcohol Use Yes    Comment: occ       Depression Screen:   PHQ-2/PHQ-9 Depression Screening 6/21/2021   Little interest or pleasure in doing things 0   Feeling down, depressed, or hopeless 0   Trouble falling or staying asleep, or sleeping too much -   Feeling tired or having little energy -   Poor appetite or overeating -   Feeling bad about yourself - or that you are a failure or have let yourself or your family down -   Trouble concentrating on things, such as reading the newspaper or watching television -   Moving or speaking so slowly that other people could have noticed. Or the opposite - being so fidgety or restless that you have been moving around a lot more than usual -   Thoughts that you would be better off dead, or of hurting yourself in some way -   Total Score 0   If you checked off any problems, how difficult have these problems made it for you to do your work, take care of things at home, or get along with other people? -       Fall Risk Screen:  MOSES Fall Risk Assessment was completed, and patient is at HIGH risk for falls. Assessment completed on:6/21/2021    Health Habits and Functional and Cognitive Screening:  Functional & Cognitive Status 6/21/2021   Do you have difficulty preparing food and eating? Yes   Do you have  difficulty bathing yourself, getting dressed or grooming yourself? No   Do you have difficulty using the toilet? No   Do you have difficulty moving around from place to place? No   Do you have trouble with steps or getting out of a bed or a chair? No   Current Diet Limited Junk Food   Dental Exam Up to date   Eye Exam Up to date   Exercise (times per week) 3 times per week   Current Exercises Include Walking   Do you need help using the phone?  No   Are you deaf or do you have serious difficulty hearing?  No   Do you need help with transportation? No   Do you need help shopping? No   Do you need help preparing meals?  No   Do you need help with housework?  No   Do you need help with laundry? No   Do you need help taking your medications? No   Do you need help managing money? Yes   Do you ever drive or ride in a car without wearing a seat belt? No   Have you felt unusual stress, anger or loneliness in the last month? No   Who do you live with? Child   If you need help, do you have trouble finding someone available to you? No   Have you been bothered in the last four weeks by sexual problems? No   Do you have difficulty concentrating, remembering or making decisions? Yes         Does the patient have evidence of cognitive impairment? Yes    Asprin use counseling:Taking ASA appropriately as indicated    Age-appropriate Screening Schedule:  Refer to the list below for future screening recommendations based on patient's age, sex and/or medical conditions. Orders for these recommended tests are listed in the plan section. The patient has been provided with a written plan.    Health Maintenance   Topic Date Due   • DXA SCAN  Never done   • ZOSTER VACCINE (1 of 2) Never done   • DIABETIC EYE EXAM  Never done   • DIABETIC FOOT EXAM  02/09/2018   • MAMMOGRAM  11/06/2020   • URINE MICROALBUMIN  06/16/2021   • INFLUENZA VACCINE  08/01/2021   • HEMOGLOBIN A1C  10/12/2021   • LIPID PANEL  04/12/2022   • TDAP/TD VACCINES (2 - Td  or Tdap) 10/01/2027          The following portions of the patient's history were reviewed and updated as appropriate: allergies, current medications, past family history, past medical history, past social history, past surgical history and problem list.    Outpatient Medications Prior to Visit   Medication Sig Dispense Refill   • ALPRAZolam (XANAX) 0.5 MG tablet Take 1 tablet by mouth At Night As Needed for Sleep. 90 tablet 0   • amLODIPine (NORVASC) 10 MG tablet Take 1 tablet by mouth Daily. 90 tablet 3   • aspirin 81 MG EC tablet Take 81 mg by mouth Daily.     • donepezil (Aricept) 5 MG tablet Take 1 tablet by mouth Every Night. 30 tablet 3   • escitalopram (Lexapro) 10 MG tablet Take 1 tablet by mouth Daily. 90 tablet 2   • mupirocin (BACTROBAN) 2 % ointment APPLY TO AFFECTED AREA(S) TOPICALLY DAILY 15 g 2   • pravastatin (PRAVACHOL) 10 MG tablet Take 1 tablet by mouth Every Night. 90 tablet 3   • quinapril (ACCUPRIL) 40 MG tablet Take 1 tablet by mouth Daily. 90 tablet 3   • SITagliptin-metFORMIN HCl ER (Janumet XR) 100-1000 MG tablet Take 1 tablet by mouth Daily. 30 tablet 11   • vitamin D3 125 MCG (5000 UT) capsule capsule TAKE ONE CAPSULE BY MOUTH DAILY 90 capsule 2     No facility-administered medications prior to visit.       Patient Active Problem List   Diagnosis   • Gastroesophageal reflux disease   • Malignant neoplasm of breast (CMS/HCC)   • Depression   • Type 2 diabetes mellitus (CMS/HCC)   • Folliculitis   • Generalized anxiety disorder   • Hyperlipidemia   • Essential hypertension   • Status post total right knee replacement   • Rectal hemorrhage   • Vitamin D deficiency   • Drug therapy   • Acute cholecystitis   • Memory difficulty       Advanced Care Planning:  ACP discussion was held with the patient during this visit. Patient does not have an advance directive, information provided.    Review of Systems    Compared to one year ago, the patient feels her physical health is better.  Compared to  one year ago, the patient feels her mental health is worse.    Reviewed chart for potential of high risk medication in the elderly: yes  Reviewed chart for potential of harmful drug interactions in the elderly:yes    Objective         Vitals:    06/21/21 1052   BP: 122/62   Pulse: 84   Resp: 16   SpO2: 98%   Weight: 61.3 kg (135 lb 1.6 oz)       Body mass index is 23.94 kg/m².  Discussed the patient's BMI with her. The BMI is in the acceptable range.    Physical Exam    Lab Results   Component Value Date    TRIG 72 04/12/2021    HDL 73 (H) 04/12/2021     (H) 04/12/2021    VLDL 13 04/12/2021    HGBA1C 6.70 (H) 04/12/2021        Assessment/Plan   Medicare Risks and Personalized Health Plan  CMS Preventative Services Quick Reference  Advance Directive Discussion  Breast Cancer/Mammogram Screening  Cardiovascular risk  Colon Cancer Screening  Dementia/Memory     The above risks/problems have been discussed with the patient.  Pertinent information has been shared with the patient in the After Visit Summary.  Follow up plans and orders are seen below in the Assessment/Plan Section.    Diagnoses and all orders for this visit:    1. Well woman exam (no gynecological exam) (Primary)    2. Screening for diabetes mellitus    3. Screening for thyroid disorder    4. Encounter for screening mammogram for malignant neoplasm of breast  -     Mammo Screening Bilateral With CAD    5. Screening for lipid disorders    6. Healthcare maintenance  -     CBC & Differential  -     Comprehensive Metabolic Panel  -     Hemoglobin A1c  -     Thyroid Panel With TSH  -     Lipid Panel With LDL / HDL Ratio    7. Screen for colon cancer  -     Ambulatory Referral For Screening Colonoscopy      Follow Up:  No follow-ups on file.     An After Visit Summary and PPPS were given to the patient.

## 2021-06-21 NOTE — PROGRESS NOTES
Chief Complaint  Medicare Wellness-subsequent    Subjective          Lucia Ellis presents to [facility]  History of Present Illness    Patient presents at today's office visit with her sister.  It appears that patient continues to have declining memory.  Patient's sister also notes that she is doing fairly stable, however she still uncertain exactly what the patient is currently taking.  But has noticed that the patient memory is declining.  She was started on Aricept 5 mg daily.  She was referred to neurology.  Neurology also agreed with her to continue on Aricept 5 mg daily, however she was to get neuropsychological testing done, which she has not.  Is not clear whether patient has not been able to follow-up or misunderstood what the next step in testing, so therefore has not got this testing done.  Encouraged the patient at today's office visit that she would benefit from this further evaluation.  She is to also follow-up with neurology.    Patient also has a history of having type 2 diabetes.  Patient is currently taking Janumet extended release 100-1000 mg daily.  She denies any side effects of the medication.    Objective   Vital Signs:   /62   Pulse 84   Resp 16   Wt 61.3 kg (135 lb 1.6 oz)   SpO2 98%   BMI 23.94 kg/m²     Physical Exam  Vitals and nursing note reviewed.   Constitutional:       Appearance: She is well-developed.   HENT:      Head: Normocephalic and atraumatic.   Musculoskeletal:      Cervical back: Normal range of motion and neck supple.   Neurological:      Mental Status: She is alert and oriented to person, place, and time.   Psychiatric:         Behavior: Behavior normal.        Result Review :                 Assessment and Plan    Diagnoses and all orders for this visit:    Memory difficulty    Type 2 diabetes mellitus without complication, without long-term current use of insulin (CMS/[facility])    Dementia without behavioral disturbance,  unspecified dementia type (CMS/[facility])    Healthcare maintenance  -     CBC & Differential  -     Comprehensive Metabolic Panel  -     Hemoglobin A1c  -     Thyroid Panel With TSH  -     Lipid Panel With LDL / HDL Ratio        At today's office visit I discussed with patient that I would like her to follow-up with neurology, and get the neuropsychiatric testing which the patient may need.  Further evaluation of dementia.  It is suspected that she may have early Alzheimer's disease or early forms of dementia.  She should still continue taking the Aricept.  For her type 2 diabetes, continue taking Janumet extended release 100-1000 mg daily.        Follow Up   No follow-ups on file.  Patient was given instructions and counseling regarding her condition or for health maintenance advice. Please see specific information pulled into the AVS if appropriate.

## 2021-06-21 NOTE — PROGRESS NOTES
Subjective   Lucia Ellis is a 71 y.o. female and is here for a comprehensive physical exam. The patient reports no problems.    Pt is due for annual gyn exam and mammo           Social History:   Social History     Socioeconomic History   • Marital status:      Spouse name: Not on file   • Number of children: Not on file   • Years of education: Not on file   • Highest education level: Not on file   Tobacco Use   • Smoking status: Never Smoker   • Smokeless tobacco: Never Used   Vaping Use   • Vaping Use: Never used   Substance and Sexual Activity   • Alcohol use: Yes     Comment: occ   • Drug use: No   • Sexual activity: Never       Family History:   Family History   Problem Relation Age of Onset   • Heart attack Mother    • Heart disease Mother    • Hypertension Mother    • Hypertension Father    • Diabetes Father    • Hypertension Sister    • Diabetes Sister    • Thyroid cancer Sister    • Breast cancer Sister    • Lung cancer Sister    • Diabetes Brother    • Drug abuse Paternal Grandmother        Past Medical History:   Past Medical History:   Diagnosis Date   • Breast cancer (CMS/[facility])    • Diabetes mellitus (CMS/[facility])    • Hypertension    • Memory loss        The following portions of the patient's history were reviewed and updated as appropriate: allergies, current medications, past family history, past medical history, past social history, past surgical history and problem list.    Review of Systems    Review of Systems   Constitutional: Negative for chills and fever.   HENT: Negative for congestion, rhinorrhea, sinus pain and sore throat.    Eyes: Negative for photophobia and visual disturbance.   Respiratory: Negative for cough, chest tightness and shortness of breath.    Cardiovascular: Negative for chest pain and palpitations.   Gastrointestinal: Negative for diarrhea, nausea and vomiting.   Genitourinary: Negative for dysuria, frequency and urgency.   Skin: Negative for rash and wound.    Neurological: Negative for dizziness and syncope.   Psychiatric/Behavioral: Negative for behavioral problems and confusion.       Objective   Physical Exam  Vitals and nursing note reviewed.   Constitutional:       Appearance: She is well-developed.   HENT:      Head: Normocephalic and atraumatic.      Right Ear: External ear normal.      Left Ear: External ear normal.   Cardiovascular:      Rate and Rhythm: Normal rate and regular rhythm.      Heart sounds: Normal heart sounds.   Pulmonary:      Effort: Pulmonary effort is normal. No respiratory distress.      Breath sounds: Normal breath sounds.   Abdominal:      Palpations: Abdomen is soft.      Tenderness: There is no abdominal tenderness. There is no guarding.   Musculoskeletal:         General: Normal range of motion.      Cervical back: Normal range of motion and neck supple.   Lymphadenopathy:      Cervical: No cervical adenopathy.   Skin:     General: Skin is warm.   Neurological:      Mental Status: She is alert and oriented to person, place, and time.   Psychiatric:         Behavior: Behavior normal.         Vitals:    06/21/21 1052   BP: 122/62   Pulse: 84   Resp: 16   SpO2: 98%     Body mass index is 23.94 kg/m².      Medications:   Current Outpatient Medications:   •  ALPRAZolam (XANAX) 0.5 MG tablet, Take 1 tablet by mouth At Night As Needed for Sleep., Disp: 90 tablet, Rfl: 0  •  amLODIPine (NORVASC) 10 MG tablet, Take 1 tablet by mouth Daily., Disp: 90 tablet, Rfl: 3  •  aspirin 81 MG EC tablet, Take 81 mg by mouth Daily., Disp: , Rfl:   •  donepezil (Aricept) 5 MG tablet, Take 1 tablet by mouth Every Night., Disp: 30 tablet, Rfl: 3  •  escitalopram (Lexapro) 10 MG tablet, Take 1 tablet by mouth Daily., Disp: 90 tablet, Rfl: 2  •  mupirocin (BACTROBAN) 2 % ointment, APPLY TO AFFECTED AREA(S) TOPICALLY DAILY, Disp: 15 g, Rfl: 2  •  pravastatin (PRAVACHOL) 10 MG tablet, Take 1 tablet by mouth Every Night., Disp: 90 tablet, Rfl: 3  •  quinapril  (ACCUPRIL) 40 MG tablet, Take 1 tablet by mouth Daily., Disp: 90 tablet, Rfl: 3  •  SITagliptin-metFORMIN HCl ER (Janumet XR) 100-1000 MG tablet, Take 1 tablet by mouth Daily., Disp: 30 tablet, Rfl: 11  •  vitamin D3 125 MCG (5000 UT) capsule capsule, TAKE ONE CAPSULE BY MOUTH DAILY, Disp: 90 capsule, Rfl: 2       Assessment/Plan   Healthy female exam.      1. Healthcare Maintenance:  2. Patient Counseling:  --Nutrition: Stressed importance of moderation in sodium/caffeine intake, saturated fat and cholesterol, caloric balance, sufficient intake of fresh fruits, vegetables, fiber, calcium and vit D  --Exercise: Recommended 30 minutes of exercise daily.  --Immunizations reviewed.  --Discussed benefits of screening colonoscopy.    Diagnoses and all orders for this visit:    Well woman exam (no gynecological exam)    Screening for diabetes mellitus    Screening for thyroid disorder    Encounter for screening mammogram for malignant neoplasm of breast  -     Mammo Screening Bilateral With CAD    Screening for lipid disorders    Healthcare maintenance  -     CBC & Differential  -     Comprehensive Metabolic Panel  -     Hemoglobin A1c  -     Thyroid Panel With TSH  -     Lipid Panel With LDL / HDL Ratio    Screen for colon cancer  -     Ambulatory Referral For Screening Colonoscopy          No follow-ups on file.             Dictated utilizing Dragon Voice Recognition Software

## 2021-06-24 RX ORDER — POTASSIUM CHLORIDE 20 MEQ/1
20 TABLET, EXTENDED RELEASE ORAL DAILY
Qty: 4 TABLET | Refills: 0 | Status: SHIPPED | OUTPATIENT
Start: 2021-06-24 | End: 2021-06-28

## 2021-06-24 NOTE — PROGRESS NOTES
Please inform the patient of the following abnormal results. Potassium slightly low. Will do 20 meq kcl for the next 4 days. Other labs are stable.

## 2021-06-26 ENCOUNTER — TRANSCRIBE ORDERS (OUTPATIENT)
Dept: ADMINISTRATIVE | Facility: HOSPITAL | Age: 71
End: 2021-06-26

## 2021-06-26 DIAGNOSIS — Z12.31 SCREENING MAMMOGRAM, ENCOUNTER FOR: Primary | ICD-10-CM

## 2021-07-01 ENCOUNTER — TELEPHONE (OUTPATIENT)
Dept: INTERNAL MEDICINE | Facility: CLINIC | Age: 71
End: 2021-07-01

## 2021-07-01 DIAGNOSIS — R41.3 MEMORY DIFFICULTY: ICD-10-CM

## 2021-07-01 DIAGNOSIS — F03.90 DEMENTIA WITHOUT BEHAVIORAL DISTURBANCE, UNSPECIFIED DEMENTIA TYPE: ICD-10-CM

## 2021-07-01 NOTE — TELEPHONE ENCOUNTER
Caller: Lucia Ellis    Relationship to patient: Self    Best call back number: [phone]    Patient is needing: PATIENT IS CALLING TO STATE THAT DR. MARYLU ROCA IS REQUESTING THE RESULTS OF THE MRI OF THE BRAIN DONE ON 05/0721.    FAX #[phone]      PLEASE ADVISE.

## 2021-07-02 RX ORDER — DONEPEZIL HYDROCHLORIDE 5 MG/1
TABLET, FILM COATED ORAL
Qty: 30 TABLET | Refills: 2 | Status: SHIPPED | OUTPATIENT
Start: 2021-07-02 | End: 2021-08-12 | Stop reason: SDUPTHER

## 2021-07-09 DIAGNOSIS — F41.1 GENERALIZED ANXIETY DISORDER: ICD-10-CM

## 2021-07-12 DIAGNOSIS — E11.9 TYPE 2 DIABETES MELLITUS WITHOUT COMPLICATION, WITHOUT LONG-TERM CURRENT USE OF INSULIN (HCC): ICD-10-CM

## 2021-07-12 DIAGNOSIS — E11.65 TYPE 2 DIABETES MELLITUS WITH HYPERGLYCEMIA, WITHOUT LONG-TERM CURRENT USE OF INSULIN (HCC): ICD-10-CM

## 2021-07-13 RX ORDER — ALPRAZOLAM 0.5 MG/1
TABLET ORAL
Qty: 90 TABLET | Refills: 0 | Status: SHIPPED | OUTPATIENT
Start: 2021-07-13 | End: 2021-11-03 | Stop reason: SDUPTHER

## 2021-07-13 RX ORDER — SITAGLIPTIN AND METFORMIN HYDROCHLORIDE 1000; 100 MG/1; MG/1
TABLET, FILM COATED, EXTENDED RELEASE ORAL
Qty: 30 TABLET | Refills: 10 | Status: SHIPPED | OUTPATIENT
Start: 2021-07-13 | End: 2021-10-08

## 2021-08-12 ENCOUNTER — OFFICE VISIT (OUTPATIENT)
Dept: NEUROLOGY | Facility: CLINIC | Age: 71
End: 2021-08-12

## 2021-08-12 VITALS
BODY MASS INDEX: 23.04 KG/M2 | HEIGHT: 63 IN | DIASTOLIC BLOOD PRESSURE: 56 MMHG | WEIGHT: 130 LBS | OXYGEN SATURATION: 96 % | SYSTOLIC BLOOD PRESSURE: 132 MMHG | HEART RATE: 70 BPM

## 2021-08-12 DIAGNOSIS — F03.90 DEMENTIA WITHOUT BEHAVIORAL DISTURBANCE, UNSPECIFIED DEMENTIA TYPE: ICD-10-CM

## 2021-08-12 DIAGNOSIS — R41.3 MEMORY DIFFICULTY: ICD-10-CM

## 2021-08-12 PROCEDURE — 99215 OFFICE O/P EST HI 40 MIN: CPT | Performed by: PSYCHIATRY & NEUROLOGY

## 2021-08-12 RX ORDER — DONEPEZIL HYDROCHLORIDE 10 MG/1
5 TABLET, FILM COATED ORAL NIGHTLY
Qty: 30 TABLET | Refills: 5 | Status: SHIPPED | OUTPATIENT
Start: 2021-08-12 | End: 2021-10-11 | Stop reason: SDUPTHER

## 2021-08-12 NOTE — PATIENT INSTRUCTIONS
**Eat a high fiber diet    **Exercise regularly (physically and mentally)    **Floss daily    **Consider eating yogurt regularly    **Consider drinking green tea    **Limit soda and alcohol    **Ensure safety in the home    **Check out th Alzheimer's Association (www.alz.org).    **If you are interested in participating in a clinical trial, check out the following centers:   [facility]:  [email or web address]/      [facility]:  [email or web address]     [facility]:  [email or web address]    **If you live in [facility], consider [facility]. It is a free agency that helps find placement for seniors. They do an assessment and find out the need and know which facilities have openings and would be the best fit. They help figure out the financial aspects as well.  Shivani Reis is the nurse navigator and her number is [phone].

## 2021-08-12 NOTE — PROGRESS NOTES
Chief Complaint  Memory Loss (LV: 4/8--f/u on MRI scan)    Subjective          Lucia Ellis presents to [facility] for   HISTORY OF PRESENT ILLNESS:    Lucia Ellis is a 71 year old right handed woman who returns to neurology clinic for follow up evaluation and treatment of memory loss.  She presents today with her son, Vern, on visit today who also provides information.  He reports some trouble with her memory starting about 4-5 years and more obvious more recently.  She tells me what worries her the most is that she is getting lost when she is driving in familiar places like her neighborhood.  She is also forgetful and misplacing objects.  She is forgetting conversations with her family and tells me she took money out of the bank and did not remember what she did with it and then realized she gave the money to her for new motor.  Her son has noticed that she is paranoid and misplaces and hides things even from herself.  She has had lumpectomy done for breast cancer with radiation treatment several years ago.  She had a brain MRI scan done since her last visit which I reviewed the images with them on her visit today which did not demonstrate any acute intracranial findings.  She has been started on donepezil in the past.  She denies any family history of dementia but reports family history of cancer.  She has had lab work including normal CBC, CMP and TSH.  Her Vit D levels were low and she reports taking supplements.  She also had normal Vit B12 level.  She is currently driving and I recommended her not to be driving.  She denies any exposures to toxins.  Her son cooks food for her.  She denies any trouble with ADLs but tells me she takes her time and is slowing down to make sure she does things appropriately.  She sometimes thinks too long about what she needs to be wearing.  She tells me she does not remember going to the doctor with her son and she is forgetting regular  conversations.  She is living with her son who does the cooking.  They have smoke detectors in their house.  She has not decided the POA status and living will.  She denies any significant depression.  She denies exercising and socializing much.  She is living with her son at this time.      Past Medical History:   Diagnosis Date   • Breast cancer (CMS/HCC)    • Diabetes mellitus (CMS/HCC)    • Hypertension    • Memory loss         Family History   Problem Relation Age of Onset   • Heart attack Mother    • Heart disease Mother    • Hypertension Mother    • Hypertension Father    • Diabetes Father    • Hypertension Sister    • Diabetes Sister    • Thyroid cancer Sister    • Breast cancer Sister    • Lung cancer Sister    • Diabetes Brother    • Drug abuse Paternal Grandmother         Social History     Socioeconomic History   • Marital status:      Spouse name: Not on file   • Number of children: Not on file   • Years of education: Not on file   • Highest education level: Not on file   Tobacco Use   • Smoking status: Never Smoker   • Smokeless tobacco: Never Used   Vaping Use   • Vaping Use: Never used   Substance and Sexual Activity   • Alcohol use: Yes     Comment: occ   • Drug use: No   • Sexual activity: Never        I have personally reviewed the ROS as stated below.     Review of Systems   Musculoskeletal: Positive for gait problem (fell yesterday). Negative for back pain and bursitis.   Allergic/Immunologic: Negative for environmental allergies and food allergies.   Neurological: Positive for weakness, light-headedness and memory problem. Negative for dizziness, tremors, seizures, syncope, facial asymmetry, speech difficulty, numbness, headache and confusion.   Psychiatric/Behavioral: Positive for sleep disturbance. Negative for agitation, behavioral problems, decreased concentration, dysphoric mood, hallucinations, self-injury, suicidal ideas, negative for hyperactivity, depressed mood and stress. The  "patient is nervous/anxious.         Objective   Vital Signs:   /56   Pulse 70   Ht 160 cm (62.99\")   Wt 59 kg (130 lb)   SpO2 96%   BMI 23.03 kg/m²       PHYSICAL EXAM:    General   Mental Status - Alert. General Appearance - Well developed, Well groomed, Oriented and Cooperative. Orientation - Oriented X3.                            Head and Neck  Head - normocephalic, atraumatic with no lesions or palpable masses.  Neck                 Global Assessment - supple.                                         Eye   Sclera/Conjunctiva - Bilateral - Normal.     ENMT  Mouth and Throat   Oral Cavity/Oropharynx: Oropharynx - the soft palate,uvula and tongue are normal in appearance.     Chest and Lung Exam   Chest - lung clear to auscultation bilaterally.     Cardiovascular   Cardiovascular examination reveals  - normal heart sounds, regular rate and rhythm.     Neurologic   Mental Status: Speech - Normal. Cognitive function - SLUMS 29/30 previously, appropriate fund of knowledge. No impairment of attention, Impairment of concentration, impairment of long term memory or impairment of short term memory.  Cranial Nerves:   II Optic: Visual acuity - Left - Normal. Right - Normal. Visual fields - Normal (to confrontation).  III Oculomotor: Pupillary constriction - Left - Normal. Right - Normal.  VII Facial: - Normal Bilaterally.  VIII Acoustic - Bilateral - Hearing normal and (Hearing tested by finger rub).   IX Glossopharyngeal / X Vagus - Normal.  XI Accessory: Trapezius - Bilateral - Normal. Sternocleidomastoid - Bilateral - Normal.  XII Hypoglossal - Bilateral - Normal.  Eye Movements: - Normal Bilaterally.  Sensory:   Light Touch: Intact - Globally.  Motor:   Bulk and Contour: - Normal.  Tone: - Normal.  Tremor: Not present.  Strength: 5/5 normal muscle strength - All Muscles.                                                        General Assessment of Reflexes: - deep tendon reflexes are normal. Coordination - No " Impairment of finger-to-nose or Impairment of rapid alternating movements. Gait - Normal.       Result Review :                 Assessment and Plan    Problem List Items Addressed This Visit        Neuro    Memory difficulty    Current Assessment & Plan     SLUMS of 29/30 is normal however her symptoms and clinical picture she is describing is consistent with mild cognitive impairment vs possible early stage Alzheimer's dementia.  I informed patient with regards to this diagnosis.  I will increase the donepezil dose for now.  I will refer her for formal neuropsychological testing as her SLUMS today does not seem to correlate with her history of memory and cognitive impairment.  I have referred patient to community mobility assessment for driving evaluation.  I discussed safety issues in dementia including not driving until this evaluation is performed.  I spoke with her son as well today about making sure she has both evaluations done which she has not done.  Discussed diagnosis extensively with patient and advised patient to exercise and socialize which are the only two things that have been shown to help potentially slow down progression of disease.  POA and Living will were discussed which patient and she will have this looked into with her sister and son.  We reviewed her brain MRI images independently with patient and son her her visit today.   I reviewed her labs today.  Provided patient education information on dementia websites and possible support groups and spoke with her son at length as well who informed me today to have her appointments call his phone for further assistance with setting up future appointments.           Relevant Medications    donepezil (ARICEPT) 10 MG tablet      Other Visit Diagnoses     Dementia without behavioral disturbance, unspecified dementia type (CMS/HCC)        Relevant Medications    donepezil (ARICEPT) 10 MG tablet          I spent 40 minutes caring for Lucia on this date of  service. This time includes time spent by me in the following activities:preparing for the visit, reviewing tests, obtaining and/or reviewing a separately obtained history, performing a medically appropriate examination and/or evaluation , counseling and educating the patient/family/caregiver, ordering medications, tests, or procedures, documenting information in the medical record, independently interpreting results and communicating that information with the patient/family/caregiver and care coordination    Follow Up   No follow-ups on file.  Patient was given instructions and counseling regarding her condition or for health maintenance advice. Please see specific information pulled into the AVS if appropriate.

## 2021-08-12 NOTE — ASSESSMENT & PLAN NOTE
SLUMS of 29/30 is normal however her symptoms and clinical picture she is describing is consistent with mild cognitive impairment vs possible early stage Alzheimer's dementia.  I informed patient with regards to this diagnosis.  I will increase the donepezil dose for now.  I will refer her for formal neuropsychological testing as her SLUMS today does not seem to correlate with her history of memory and cognitive impairment.  I have referred patient to community mobility assessment for driving evaluation.  I discussed safety issues in dementia including not driving until this evaluation is performed.  I spoke with her son as well today about making sure she has both evaluations done which she has not done.  Discussed diagnosis extensively with patient and advised patient to exercise and socialize which are the only two things that have been shown to help potentially slow down progression of disease.  POA and Living will were discussed which patient and she will have this looked into with her sister and son.  We reviewed her brain MRI images independently with patient and son her her visit today.   I reviewed her labs today.  Provided patient education information on dementia websites and possible support groups and spoke with her son at length as well who informed me today to have her appointments call his phone for further assistance with setting up future appointments.

## 2021-08-26 DIAGNOSIS — I10 ESSENTIAL HYPERTENSION: ICD-10-CM

## 2021-08-26 RX ORDER — AMLODIPINE BESYLATE 10 MG/1
10 TABLET ORAL DAILY
Qty: 90 TABLET | Refills: 1 | Status: SHIPPED | OUTPATIENT
Start: 2021-08-26 | End: 2021-10-11 | Stop reason: SDUPTHER

## 2021-09-02 DIAGNOSIS — I10 ESSENTIAL HYPERTENSION: ICD-10-CM

## 2021-09-02 DIAGNOSIS — E11.9 TYPE 2 DIABETES MELLITUS WITHOUT COMPLICATION, WITHOUT LONG-TERM CURRENT USE OF INSULIN (HCC): ICD-10-CM

## 2021-09-02 RX ORDER — QUINAPRIL 40 MG/1
40 TABLET ORAL DAILY
Qty: 90 TABLET | Refills: 1 | Status: SHIPPED | OUTPATIENT
Start: 2021-09-02 | End: 2021-10-11 | Stop reason: SDUPTHER

## 2021-09-02 NOTE — TELEPHONE ENCOUNTER
Caller: Lucia Ellis BILLY    Relationship: Self    Best call back number: [phone]     Medication needed:   Requested Prescriptions     Pending Prescriptions Disp Refills   • quinapril (ACCUPRIL) 40 MG tablet 90 tablet 3     Sig: Take 1 tablet by mouth Daily.       When do you need the refill by: ASAP    Does the patient have less than a 3 day supply:  [x] Yes  [] No    What is the patient's preferred pharmacy: [address] - [phone] [facility] [phone] FX

## 2021-09-20 ENCOUNTER — TELEPHONE (OUTPATIENT)
Dept: INTERNAL MEDICINE | Facility: CLINIC | Age: 71
End: 2021-09-20

## 2021-09-20 NOTE — TELEPHONE ENCOUNTER
Called pt to make her aware that we had one box for her to sample and that I would initiate a PA to try and get medication approved.

## 2021-09-20 NOTE — TELEPHONE ENCOUNTER
PATIENT CALLED STATING THAT INS. THERESAT COVER HER JANUMET, AND WANTED TO KNOW IF YOU COULD CALL IN SOMETHING ELSE OR GIVE HER SAMPLES       PHARMACY:   [facility] [address] - [phone]  - [phone]  [phone]      SHE IS OUT OF MEDICATION     PLEASE ADVISE   Lucia Ellis (Self) [phone] (M)

## 2021-09-23 ENCOUNTER — TELEPHONE (OUTPATIENT)
Dept: INTERNAL MEDICINE | Facility: CLINIC | Age: 71
End: 2021-09-23

## 2021-09-23 NOTE — TELEPHONE ENCOUNTER
Pharmacy Name: [facility] [address] [place] [address] - [phone]  - [phone]      Pharmacy representative name: A    Pharmacy representative phone number: [phone]    What medication are you calling in regards to: A STATIN MEDICATION    What question does the pharmacy have: WHY IS THE PATIENT NOT TAKING A STATIN MEDICATION?     Additional notes: THE PATIENT IS A DIABETIC AND NELSON WOULD LIKE TO KNOW WHY THE PATIENT IS NOT TAKING  A STATIN. NELSON WOULD LIKE FOR THIS TO BE DISCUSSED AT THE PATIENT'S NEXT APPOINTMENT.

## 2021-09-29 ENCOUNTER — LAB (OUTPATIENT)
Dept: LAB | Facility: HOSPITAL | Age: 71
End: 2021-09-29

## 2021-09-29 ENCOUNTER — OFFICE VISIT (OUTPATIENT)
Dept: INTERNAL MEDICINE | Facility: CLINIC | Age: 71
End: 2021-09-29

## 2021-09-29 VITALS
SYSTOLIC BLOOD PRESSURE: 138 MMHG | BODY MASS INDEX: 23.25 KG/M2 | DIASTOLIC BLOOD PRESSURE: 64 MMHG | WEIGHT: 131.2 LBS | HEIGHT: 63 IN | HEART RATE: 70 BPM | OXYGEN SATURATION: 98 %

## 2021-09-29 DIAGNOSIS — E11.9 TYPE 2 DIABETES MELLITUS WITHOUT COMPLICATION, WITHOUT LONG-TERM CURRENT USE OF INSULIN (HCC): ICD-10-CM

## 2021-09-29 DIAGNOSIS — Z12.31 ENCOUNTER FOR SCREENING MAMMOGRAM FOR MALIGNANT NEOPLASM OF BREAST: ICD-10-CM

## 2021-09-29 DIAGNOSIS — R61 NIGHT SWEATS: Primary | ICD-10-CM

## 2021-09-29 DIAGNOSIS — Z12.11 SCREEN FOR COLON CANCER: ICD-10-CM

## 2021-09-29 PROCEDURE — 83036 HEMOGLOBIN GLYCOSYLATED A1C: CPT | Performed by: FAMILY MEDICINE

## 2021-09-29 PROCEDURE — 85652 RBC SED RATE AUTOMATED: CPT | Performed by: FAMILY MEDICINE

## 2021-09-29 PROCEDURE — 84479 ASSAY OF THYROID (T3 OR T4): CPT | Performed by: FAMILY MEDICINE

## 2021-09-29 PROCEDURE — 86140 C-REACTIVE PROTEIN: CPT | Performed by: FAMILY MEDICINE

## 2021-09-29 PROCEDURE — 36415 COLL VENOUS BLD VENIPUNCTURE: CPT | Performed by: FAMILY MEDICINE

## 2021-09-29 PROCEDURE — 84436 ASSAY OF TOTAL THYROXINE: CPT | Performed by: FAMILY MEDICINE

## 2021-09-29 PROCEDURE — 99214 OFFICE O/P EST MOD 30 MIN: CPT | Performed by: FAMILY MEDICINE

## 2021-09-29 PROCEDURE — 84443 ASSAY THYROID STIM HORMONE: CPT | Performed by: FAMILY MEDICINE

## 2021-09-29 PROCEDURE — 80053 COMPREHEN METABOLIC PANEL: CPT | Performed by: FAMILY MEDICINE

## 2021-09-29 PROCEDURE — 85025 COMPLETE CBC W/AUTO DIFF WBC: CPT | Performed by: FAMILY MEDICINE

## 2021-09-29 NOTE — PROGRESS NOTES
"Chief Complaint  Sleeping Problem (unable to sleep through Night, Sweating throught out night  has to change gown a couple of times each night )    Subjective          Lucia Ellis presents to [facility]  History of Present Illness    Patient notes that she has been having some episodes of night sweats.  It has been going on for several days now.  Patient states that she sweats through several different gowns at night.  She is concerned about this.  She believes this could be related to her DM2.  On further questioning, it appears that there is multiple different screening tests she has not done as she was not wanting to do this, which include a mammogram and even a colonoscopy.  For her DM2 she currently takes Janumet extended release 100-1000 mg daily.    Objective   Vital Signs:   /64   Pulse 70   Ht 160 cm (63\")   Wt 59.5 kg (131 lb 3.2 oz)   SpO2 98%   BMI 23.24 kg/m²     Physical Exam  Vitals and nursing note reviewed.   Constitutional:       Appearance: She is well-developed.   HENT:      Head: Normocephalic and atraumatic.   Musculoskeletal:      Cervical back: Normal range of motion and neck supple.   Neurological:      Mental Status: She is alert and oriented to person, place, and time.   Psychiatric:         Behavior: Behavior normal.        Result Review :                 Assessment and Plan    Diagnoses and all orders for this visit:    1. Night sweats (Primary)  -     Comprehensive Metabolic Panel  -     CBC & Differential  -     Hemoglobin A1c  -     Thyroid Panel With TSH    2. Type 2 diabetes mellitus without complication, without long-term current use of insulin (CMS/[facility])  -     Hemoglobin A1c    3. Encounter for screening mammogram for malignant neoplasm of breast  -     Mammo Screening Bilateral With CAD    4. Screen for colon cancer  -     Ambulatory Referral For Screening Colonoscopy      It is unclear the cause of patient's night sweats.  I do not " believe it is from any other medications.  She has been stable on her DM2 medication of Janumet.  Which I have recommended her to continue taking.  I did discuss with her that I will order several labs at today's visit.  I also stressed the importance of doing some screening tests particularly for cancer such as a mammogram and colonoscopy.  I did discuss that cancer can cause night sweats like symptoms, however it is important to be up-to-date with screening activities.      Follow Up   No follow-ups on file.  Patient was given instructions and counseling regarding her condition or for health maintenance advice. Please see specific information pulled into the AVS if appropriate.

## 2021-09-30 LAB
ALBUMIN SERPL-MCNC: 4.5 G/DL (ref 3.5–5.2)
ALBUMIN/GLOB SERPL: 2.3 G/DL
ALP SERPL-CCNC: 67 U/L (ref 39–117)
ALT SERPL W P-5'-P-CCNC: 14 U/L (ref 1–33)
ANION GAP SERPL CALCULATED.3IONS-SCNC: 11.2 MMOL/L (ref 5–15)
AST SERPL-CCNC: 19 U/L (ref 1–32)
BASOPHILS # BLD AUTO: 0.07 10*3/MM3 (ref 0–0.2)
BASOPHILS NFR BLD AUTO: 1.4 % (ref 0–1.5)
BILIRUB SERPL-MCNC: 0.5 MG/DL (ref 0–1.2)
BUN SERPL-MCNC: 11 MG/DL (ref 8–23)
BUN/CREAT SERPL: 15.9 (ref 7–25)
CALCIUM SPEC-SCNC: 9.9 MG/DL (ref 8.6–10.5)
CHLORIDE SERPL-SCNC: 102 MMOL/L (ref 98–107)
CO2 SERPL-SCNC: 26.8 MMOL/L (ref 22–29)
CREAT SERPL-MCNC: 0.69 MG/DL (ref 0.57–1)
CRP SERPL-MCNC: <0.3 MG/DL (ref 0–0.5)
DEPRECATED RDW RBC AUTO: 43.6 FL (ref 37–54)
EOSINOPHIL # BLD AUTO: 0.03 10*3/MM3 (ref 0–0.4)
EOSINOPHIL NFR BLD AUTO: 0.6 % (ref 0.3–6.2)
ERYTHROCYTE [DISTWIDTH] IN BLOOD BY AUTOMATED COUNT: 13.4 % (ref 12.3–15.4)
ERYTHROCYTE [SEDIMENTATION RATE] IN BLOOD: 4 MM/HR (ref 0–30)
GFR SERPL CREATININE-BSD FRML MDRD: 101 ML/MIN/1.73
GLOBULIN UR ELPH-MCNC: 2 GM/DL
GLUCOSE SERPL-MCNC: 95 MG/DL (ref 65–99)
HBA1C MFR BLD: 6.34 % (ref 4.8–5.6)
HCT VFR BLD AUTO: 39.3 % (ref 34–46.6)
HGB BLD-MCNC: 12.8 G/DL (ref 12–15.9)
IMM GRANULOCYTES # BLD AUTO: 0.01 10*3/MM3 (ref 0–0.05)
IMM GRANULOCYTES NFR BLD AUTO: 0.2 % (ref 0–0.5)
LYMPHOCYTES # BLD AUTO: 2.18 10*3/MM3 (ref 0.7–3.1)
LYMPHOCYTES NFR BLD AUTO: 44.8 % (ref 19.6–45.3)
MCH RBC QN AUTO: 28.7 PG (ref 26.6–33)
MCHC RBC AUTO-ENTMCNC: 32.6 G/DL (ref 31.5–35.7)
MCV RBC AUTO: 88.1 FL (ref 79–97)
MONOCYTES # BLD AUTO: 0.36 10*3/MM3 (ref 0.1–0.9)
MONOCYTES NFR BLD AUTO: 7.4 % (ref 5–12)
NEUTROPHILS NFR BLD AUTO: 2.22 10*3/MM3 (ref 1.7–7)
NEUTROPHILS NFR BLD AUTO: 45.6 % (ref 42.7–76)
NRBC BLD AUTO-RTO: 0 /100 WBC (ref 0–0.2)
PLATELET # BLD AUTO: 227 10*3/MM3 (ref 140–450)
PMV BLD AUTO: 11.7 FL (ref 6–12)
POTASSIUM SERPL-SCNC: 3.7 MMOL/L (ref 3.5–5.2)
PROT SERPL-MCNC: 6.5 G/DL (ref 6–8.5)
RBC # BLD AUTO: 4.46 10*6/MM3 (ref 3.77–5.28)
SODIUM SERPL-SCNC: 140 MMOL/L (ref 136–145)
T-UPTAKE NFR SERPL: 0.99 TBI (ref 0.8–1.3)
T4 SERPL-MCNC: 4.28 MCG/DL (ref 4.5–11.7)
TSH SERPL DL<=0.05 MIU/L-ACNC: 1.19 UIU/ML (ref 0.27–4.2)
WBC # BLD AUTO: 4.87 10*3/MM3 (ref 3.4–10.8)

## 2021-10-04 ENCOUNTER — TELEPHONE (OUTPATIENT)
Dept: INTERNAL MEDICINE | Facility: CLINIC | Age: 71
End: 2021-10-04

## 2021-10-04 NOTE — TELEPHONE ENCOUNTER
Do we have any samples for this?  I believe I signed for some last week, I am not sure if is here yet.

## 2021-10-04 NOTE — TELEPHONE ENCOUNTER
DELETE AFTER REVIEWING: Telephone encounter to be sent to the  pool     Caller: Lucia Ellis    Relationship: Self    Best call back number: [phone]     What is the best time to reach you: BEFORE 5 PM    Who are you requesting to speak with (clinical staff, provider,  specific staff member):CLINICAL    What was the call regarding: THE Janumet -1000 MG tablet IS TOO EXPENSIVE.    * DO YOU HAVE ANY SAMPLES?  * CAN YOU PRESCRIBE SOME OTHER MEDICATION THAT WILL COST LESS?    Do you require a callback: YES

## 2021-10-06 NOTE — TELEPHONE ENCOUNTER
There is some samples of Janumet extended release  mg daily, lets provide her with the samples as it should work almost the same.  She can take 1 a day the way she is currently taking.

## 2021-10-08 RX ORDER — DAPAGLIFLOZIN AND METFORMIN HYDROCHLORIDE 10; 1000 MG/1; MG/1
1 TABLET, FILM COATED, EXTENDED RELEASE ORAL DAILY
Qty: 30 TABLET | Refills: 6 | Status: SHIPPED | OUTPATIENT
Start: 2021-10-08 | End: 2022-01-05 | Stop reason: SDUPTHER

## 2021-10-11 DIAGNOSIS — R41.3 MEMORY DIFFICULTY: ICD-10-CM

## 2021-10-11 DIAGNOSIS — I10 ESSENTIAL HYPERTENSION: ICD-10-CM

## 2021-10-11 DIAGNOSIS — E78.5 DYSLIPIDEMIA: ICD-10-CM

## 2021-10-11 DIAGNOSIS — E11.9 TYPE 2 DIABETES MELLITUS WITHOUT COMPLICATION, WITHOUT LONG-TERM CURRENT USE OF INSULIN (HCC): ICD-10-CM

## 2021-10-11 DIAGNOSIS — F03.90 DEMENTIA WITHOUT BEHAVIORAL DISTURBANCE, UNSPECIFIED DEMENTIA TYPE: ICD-10-CM

## 2021-10-11 RX ORDER — ESCITALOPRAM OXALATE 10 MG/1
10 TABLET ORAL DAILY
Qty: 90 TABLET | Refills: 1 | Status: SHIPPED | OUTPATIENT
Start: 2021-10-11 | End: 2022-05-02 | Stop reason: SDUPTHER

## 2021-10-11 RX ORDER — PRAVASTATIN SODIUM 10 MG
10 TABLET ORAL NIGHTLY
Qty: 90 TABLET | Refills: 1 | Status: SHIPPED | OUTPATIENT
Start: 2021-10-11 | End: 2022-06-09 | Stop reason: SDUPTHER

## 2021-10-11 RX ORDER — AMLODIPINE BESYLATE 10 MG/1
10 TABLET ORAL DAILY
Qty: 90 TABLET | Refills: 1 | Status: SHIPPED | OUTPATIENT
Start: 2021-10-11 | End: 2022-05-19 | Stop reason: SDUPTHER

## 2021-10-11 RX ORDER — QUINAPRIL 40 MG/1
40 TABLET ORAL DAILY
Qty: 90 TABLET | Refills: 1 | Status: SHIPPED | OUTPATIENT
Start: 2021-10-11 | End: 2022-06-09 | Stop reason: SDUPTHER

## 2021-10-11 RX ORDER — DONEPEZIL HYDROCHLORIDE 10 MG/1
5 TABLET, FILM COATED ORAL NIGHTLY
Qty: 30 TABLET | Refills: 5 | Status: SHIPPED | OUTPATIENT
Start: 2021-10-11 | End: 2022-10-27

## 2021-10-18 ENCOUNTER — TELEPHONE (OUTPATIENT)
Dept: INTERNAL MEDICINE | Facility: CLINIC | Age: 71
End: 2021-10-18

## 2021-10-18 NOTE — TELEPHONE ENCOUNTER
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

## 2021-10-19 ENCOUNTER — HOSPITAL ENCOUNTER (OUTPATIENT)
Dept: GENERAL RADIOLOGY | Facility: HOSPITAL | Age: 71
Discharge: HOME | End: 2021-10-19
Admitting: FAMILY MEDICINE

## 2021-10-19 ENCOUNTER — OFFICE VISIT (OUTPATIENT)
Dept: INTERNAL MEDICINE | Facility: CLINIC | Age: 71
End: 2021-10-19

## 2021-10-19 ENCOUNTER — TELEPHONE (OUTPATIENT)
Dept: INTERNAL MEDICINE | Facility: CLINIC | Age: 71
End: 2021-10-19

## 2021-10-19 VITALS
BODY MASS INDEX: 42.08 KG/M2 | SYSTOLIC BLOOD PRESSURE: 130 MMHG | WEIGHT: 237.5 LBS | HEART RATE: 88 BPM | RESPIRATION RATE: 16 BRPM | DIASTOLIC BLOOD PRESSURE: 62 MMHG | OXYGEN SATURATION: 98 % | HEIGHT: 63 IN

## 2021-10-19 DIAGNOSIS — J30.9 ALLERGIC RHINITIS, UNSPECIFIED SEASONALITY, UNSPECIFIED TRIGGER: ICD-10-CM

## 2021-10-19 DIAGNOSIS — J06.9 ACUTE URI: Primary | ICD-10-CM

## 2021-10-19 DIAGNOSIS — Z11.1 SCREENING-PULMONARY TB: Primary | ICD-10-CM

## 2021-10-19 PROCEDURE — 71046 X-RAY EXAM CHEST 2 VIEWS: CPT

## 2021-10-19 PROCEDURE — 99214 OFFICE O/P EST MOD 30 MIN: CPT | Performed by: FAMILY MEDICINE

## 2021-10-19 RX ORDER — BENZONATATE 100 MG/1
100 CAPSULE ORAL 3 TIMES DAILY PRN
Qty: 42 CAPSULE | Refills: 0 | Status: ON HOLD | OUTPATIENT
Start: 2021-10-19 | End: 2022-12-30

## 2021-10-19 RX ORDER — FLUTICASONE PROPIONATE 50 MCG
2 SPRAY, SUSPENSION (ML) NASAL DAILY
Qty: 15.8 ML | Refills: 6 | Status: SHIPPED | OUTPATIENT
Start: 2021-10-19 | End: 2022-12-29

## 2021-10-19 RX ORDER — CETIRIZINE HYDROCHLORIDE 10 MG/1
10 TABLET ORAL DAILY
Qty: 30 TABLET | Refills: 6 | Status: SHIPPED | OUTPATIENT
Start: 2021-10-19 | End: 2022-08-16 | Stop reason: SDUPTHER

## 2021-10-19 RX ORDER — AMOXICILLIN 500 MG/1
500 CAPSULE ORAL 2 TIMES DAILY
Qty: 14 CAPSULE | Refills: 0 | Status: SHIPPED | OUTPATIENT
Start: 2021-10-19 | End: 2021-10-26

## 2021-10-19 NOTE — TELEPHONE ENCOUNTER
Hub can relay: Called patient to notify her that Dr. Mazariegos placed order for chest x-ray. She can have this done as a walk-in at the [facility].     No response. LVM.

## 2021-10-19 NOTE — PROGRESS NOTES
"Chief Complaint  Cough (to the point of tears)    Subjective          Lucia Ellis presents to [facility]  History of Present Illness    Patient does not she has some congestion and a cough as well as some runny eyes been going on for almost 10 days.  She also has some discharge as well.  She cannot take any medication for this.  She denies any fever.  She denies any exposure to Covid.    Objective   Vital Signs:   /62   Pulse 88   Resp 16   Ht 160 cm (63\")   Wt 108 kg (237 lb 8 oz)   SpO2 98%   BMI 42.07 kg/m²     Physical Exam  Vitals and nursing note reviewed.   Constitutional:       Appearance: She is well-developed.   HENT:      Head: Normocephalic and atraumatic.      Mouth/Throat:      Mouth: Mucous membranes are moist.   Musculoskeletal:      Cervical back: Normal range of motion and neck supple.   Lymphadenopathy:      Cervical: Cervical adenopathy present.   Neurological:      Mental Status: She is alert and oriented to person, place, and time.   Psychiatric:         Behavior: Behavior normal.        Result Review :                 Assessment and Plan    Diagnoses and all orders for this visit:    1. Acute URI (Primary)  -     benzonatate (Tessalon Perles) 100 MG capsule; Take 1 capsule by mouth 3 (Three) Times a Day As Needed for Cough.  Dispense: 42 capsule; Refill: 0  -     amoxicillin (AMOXIL) 500 MG capsule; Take 1 capsule by mouth 2 (Two) Times a Day for 7 days.  Dispense: 14 capsule; Refill: 0    2. Allergic rhinitis, unspecified seasonality, unspecified trigger  -     fluticasone (Flonase) 50 MCG/ACT nasal spray; 2 sprays into the nostril(s) as directed by provider Daily.  Dispense: 15.8 mL; Refill: 6  -     cetirizine (zyrTEC) 10 MG tablet; Take 1 tablet by mouth Daily.  Dispense: 30 tablet; Refill: 6    Most likely has allergic rhinitis, take Flonase and Zyrtec.  She may also have an underlying URI as well, but if she does not get better in several days " she can start amoxicillin and take Tessalon Perles for her cough.      Follow Up   No follow-ups on file.  Patient was given instructions and counseling regarding her condition or for health maintenance advice. Please see specific information pulled into the AVS if appropriate.

## 2021-10-20 ENCOUNTER — TELEPHONE (OUTPATIENT)
Dept: INTERNAL MEDICINE | Facility: CLINIC | Age: 71
End: 2021-10-20

## 2021-10-20 NOTE — TELEPHONE ENCOUNTER
Caller: Lucia Ellis    Relationship: Self    Best call back number: [phone] (H) [phone] (C) OK TO LEAVE MESSAGE IF NO ANSWER.    Caller requesting test results: CHEST X-RAY    What test was performed: CHEST X- RAY    When was the test performed: 10/19/21    Where was the test performed:     Additional notes: PATIENT CALLED TO REQUEST A COPY OF HER CHEST X-RAY RESULTS BE FAXED OVER TO HER EMPLOYER FAX #: [phone] [employer]. PATIENT WOULD ALSO LIKE FOR SOMEONE TO CONTACT HER ABOUT THE RESULTS.      THANKS

## 2021-10-20 NOTE — TELEPHONE ENCOUNTER
Advised patient that she can come in to get a copy of the xray report after the Physician reviews it. I cannot fax a copy to her employer, that is a HIPPA violation.

## 2021-11-03 ENCOUNTER — OFFICE VISIT (OUTPATIENT)
Dept: INTERNAL MEDICINE | Facility: CLINIC | Age: 71
End: 2021-11-03

## 2021-11-03 VITALS
HEART RATE: 92 BPM | RESPIRATION RATE: 16 BRPM | HEIGHT: 63 IN | SYSTOLIC BLOOD PRESSURE: 148 MMHG | OXYGEN SATURATION: 97 % | WEIGHT: 130.5 LBS | TEMPERATURE: 97.3 F | BODY MASS INDEX: 23.12 KG/M2 | DIASTOLIC BLOOD PRESSURE: 68 MMHG

## 2021-11-03 DIAGNOSIS — F41.1 GENERALIZED ANXIETY DISORDER: Primary | ICD-10-CM

## 2021-11-03 DIAGNOSIS — E11.65 TYPE 2 DIABETES MELLITUS WITH HYPERGLYCEMIA, WITHOUT LONG-TERM CURRENT USE OF INSULIN (HCC): ICD-10-CM

## 2021-11-03 DIAGNOSIS — E78.5 HYPERLIPIDEMIA, UNSPECIFIED HYPERLIPIDEMIA TYPE: ICD-10-CM

## 2021-11-03 DIAGNOSIS — I10 ESSENTIAL HYPERTENSION: ICD-10-CM

## 2021-11-03 PROCEDURE — 99214 OFFICE O/P EST MOD 30 MIN: CPT | Performed by: FAMILY MEDICINE

## 2021-11-03 RX ORDER — ALPRAZOLAM 0.5 MG/1
0.5 TABLET ORAL NIGHTLY PRN
Qty: 90 TABLET | Refills: 0 | Status: SHIPPED | OUTPATIENT
Start: 2021-11-03 | End: 2022-03-16 | Stop reason: SDUPTHER

## 2021-11-03 RX ORDER — SITAGLIPTIN AND METFORMIN HYDROCHLORIDE 1000; 100 MG/1; MG/1
TABLET, FILM COATED, EXTENDED RELEASE ORAL
COMMUNITY
Start: 2021-10-31 | End: 2021-11-03

## 2021-11-03 NOTE — PROGRESS NOTES
"Chief Complaint  Hypertension    Subjective          Lucia Ellis presents to [facility]  History of Present Illness    Patient has a history having type 2 diabetes.  She is currently taking Xigduo XR  mg daily.  She denies any side effects of the medication.    Patient also has history of generalized anxiety disorder.  She currently Xanax 0.5 mg nightly which also helps her with sleep.  She denies any side effects of the medicine.  She is also on Lexapro 10 mg which she is taking well.    Patient has a history having hyperlipidemia.  Patient is currently on pravastatin 10 mg daily.  Patient denies any side effects of the medicine.    Patient has a history of having essential hypertension, with her taking benazepril 40 mg daily along with amlodipine 10 mg daily.  Blood pressure is currently elevated today's office visit only because she is stressed out making the doctor's appointment at today's visit.  Blood pressure today is 148/68.  But typically patient states that her blood pressure is normal at home.     Objective   Vital Signs:   /68   Pulse 92   Temp 97.3 °F (36.3 °C) (Infrared)   Resp 16   Ht 160 cm (63\")   Wt 59.2 kg (130 lb 8 oz)   SpO2 97%   BMI 23.12 kg/m²     Physical Exam  Vitals and nursing note reviewed.   Constitutional:       Appearance: She is well-developed.   HENT:      Head: Normocephalic and atraumatic.   Musculoskeletal:      Cervical back: Normal range of motion and neck supple.   Neurological:      Mental Status: She is alert and oriented to person, place, and time.   Psychiatric:         Behavior: Behavior normal.        Result Review :                 Assessment and Plan    Diagnoses and all orders for this visit:    1. Generalized anxiety disorder (Primary)  -     ALPRAZolam (XANAX) 0.5 MG tablet; Take 1 tablet by mouth At Night As Needed for Sleep.  Dispense: 90 tablet; Refill: 0  -     Continue xanax and lexapro.     2. Type 2 " diabetes mellitus with hyperglycemia, without long-term current use of insulin (HCC)  -     Hemoglobin A1c  -     Patient doing well on xigduo. Will continue.     3. Essential hypertension  -     CBC & Differential  -     Comprehensive Metabolic Panel  -     Will continue current bp meds.     4. Hyperlipidemia, unspecified hyperlipidemia type  -     Lipid Panel With LDL / HDL Ratio  -     Continue pravastatin.        Follow Up   No follow-ups on file.  Patient was given instructions and counseling regarding her condition or for health maintenance advice. Please see specific information pulled into the AVS if appropriate.

## 2021-12-23 ENCOUNTER — TELEPHONE (OUTPATIENT)
Dept: INTERNAL MEDICINE | Facility: CLINIC | Age: 71
End: 2021-12-23

## 2021-12-23 NOTE — TELEPHONE ENCOUNTER
Caller: Lucia Ellis    Relationship: Self    Best call back number: [phone]    What medication are you requesting: ANTIBIOTIC    What are your current symptoms: CAN'T HOLD HER URINE AND LOWER BACK PAIN     How long have you been experiencing symptoms: 1 WEEK    Have you had these symptoms before:    [x] Yes  [] No    Have you been treated for these symptoms before:   [x] Yes  [] No    If a prescription is needed, what is your preferred pharmacy and phone number: [facility] [address] - [phone]  - [phone] FX

## 2021-12-23 NOTE — TELEPHONE ENCOUNTER
PATIENT CALLED BACK TO REQUEST PAIN MEDICATION BECAUSE SHE IS HAVING LOWER BACK PAIN.    PLEASE ADVISE    [phone]    [facility] [address] AT [facility]. - [phone]  - [phone]   [phone]

## 2021-12-23 NOTE — TELEPHONE ENCOUNTER
Patient was referred to Urgent Care due to Dr. Mazariegos not being in the office today.  She expressed understanding and will go to the Urgent Care as soon as possible.

## 2022-01-05 RX ORDER — DAPAGLIFLOZIN AND METFORMIN HYDROCHLORIDE 10; 1000 MG/1; MG/1
1 TABLET, FILM COATED, EXTENDED RELEASE ORAL DAILY
Qty: 30 TABLET | Refills: 6 | Status: SHIPPED | OUTPATIENT
Start: 2022-01-05 | End: 2022-01-12

## 2022-01-05 NOTE — TELEPHONE ENCOUNTER
Caller: Lucia Ellis    Relationship: Self    Best call back number: [phone]    Requested Prescriptions:   Requested Prescriptions     Pending Prescriptions Disp Refills   • dapagliflozin-metformin HCl ER (Xigduo XR)  MG tablet 30 tablet 6     Sig: Take 1 tablet by mouth Daily.        Pharmacy where request should be sent: [address] - [phone]  - [phone] FX     Additional details provided by patient: TRIAL MEDICATION WENT WELL. PATIENT HAS TWO PILLS LEFT AND WOULD LIKE SENT TO PHARMACY.     Does the patient have less than a 3 day supply:  [x] Yes  [] No    Arlette Wang Rep   01/05/22 09:33 EST

## 2022-01-10 ENCOUNTER — TELEPHONE (OUTPATIENT)
Dept: INTERNAL MEDICINE | Facility: CLINIC | Age: 72
End: 2022-01-10

## 2022-01-10 NOTE — TELEPHONE ENCOUNTER
Caller: Lucia Ellis    Relationship: Self    Best call back number: [phone]     What medication are you requesting: SAMPLES OF   • dapagliflozin-metformin HCl ER (Xigduo XR)  MG tablet 30 tablet 6       Sig: Take 1 tablet by mouth Daily.           Additional notes:PATIENT STATES MEDICATION IS TOO EXPENSIVE AT PHARMACY. ASKING IF DR HERNANDEZ HAS ANY MORE SAMPLES THAT SHE CAN      PLEASE ADVISE

## 2022-01-11 ENCOUNTER — TELEPHONE (OUTPATIENT)
Dept: INTERNAL MEDICINE | Facility: CLINIC | Age: 72
End: 2022-01-11

## 2022-01-11 NOTE — TELEPHONE ENCOUNTER
I informed pt that we do not have samples of Xigduo 10/1000 mg     She wants to what she can take in it's place.  Xigduo is more than $100 and she can not afford it    SJB

## 2022-01-11 NOTE — TELEPHONE ENCOUNTER
PATIENT CALLING IN REGARDS TO REQUEST AN  ALTERNATIVE TO METFORMIN, KENNY.     PATIENT REQUESTING TO BE BACK ON METFORMIN.SHE IS OUT OF MEDICATION.        PLEASE ADVISE THANK YOU!

## 2022-01-12 RX ORDER — METFORMIN HYDROCHLORIDE EXTENDED-RELEASE TABLETS 1000 MG/1
1000 TABLET, FILM COATED, EXTENDED RELEASE ORAL
Qty: 90 TABLET | Refills: 2 | Status: SHIPPED | OUTPATIENT
Start: 2022-01-12 | End: 2022-01-12 | Stop reason: SDUPTHER

## 2022-01-12 RX ORDER — METFORMIN HYDROCHLORIDE EXTENDED-RELEASE TABLETS 1000 MG/1
1000 TABLET, FILM COATED, EXTENDED RELEASE ORAL
Qty: 90 TABLET | Refills: 2 | Status: SHIPPED | OUTPATIENT
Start: 2022-01-12 | End: 2022-12-29

## 2022-01-12 NOTE — TELEPHONE ENCOUNTER
I CALLED PT AND INFORMED HER THAT DR. HERNANDEZ STARTED HER BACK ON METFORMIN 1000MG    I sent prescription to [facility]     B

## 2022-01-12 NOTE — TELEPHONE ENCOUNTER
I have put her back on regular metformin.  Please resend the prescription of metformin 1000 mg daily.

## 2022-03-16 DIAGNOSIS — F41.1 GENERALIZED ANXIETY DISORDER: ICD-10-CM

## 2022-03-24 RX ORDER — ALPRAZOLAM 0.5 MG/1
0.5 TABLET ORAL NIGHTLY PRN
Qty: 90 TABLET | Refills: 0 | Status: SHIPPED | OUTPATIENT
Start: 2022-03-24 | End: 2022-08-15 | Stop reason: SDUPTHER

## 2022-05-02 RX ORDER — ESCITALOPRAM OXALATE 10 MG/1
10 TABLET ORAL DAILY
Qty: 90 TABLET | Refills: 1 | Status: SHIPPED | OUTPATIENT
Start: 2022-05-02 | End: 2022-08-30 | Stop reason: SDUPTHER

## 2022-05-19 DIAGNOSIS — I10 ESSENTIAL HYPERTENSION: ICD-10-CM

## 2022-05-19 RX ORDER — AMLODIPINE BESYLATE 10 MG/1
10 TABLET ORAL DAILY
Qty: 90 TABLET | Refills: 1 | Status: SHIPPED | OUTPATIENT
Start: 2022-05-19 | End: 2022-12-14 | Stop reason: SDUPTHER

## 2022-06-09 DIAGNOSIS — E11.9 TYPE 2 DIABETES MELLITUS WITHOUT COMPLICATION, WITHOUT LONG-TERM CURRENT USE OF INSULIN: ICD-10-CM

## 2022-06-09 DIAGNOSIS — I10 ESSENTIAL HYPERTENSION: ICD-10-CM

## 2022-06-09 DIAGNOSIS — E78.5 DYSLIPIDEMIA: ICD-10-CM

## 2022-06-09 RX ORDER — PRAVASTATIN SODIUM 10 MG
10 TABLET ORAL NIGHTLY
Qty: 90 TABLET | Refills: 0 | Status: SHIPPED | OUTPATIENT
Start: 2022-06-09 | End: 2022-07-12 | Stop reason: SDUPTHER

## 2022-06-09 RX ORDER — QUINAPRIL 40 MG/1
40 TABLET ORAL DAILY
Qty: 90 TABLET | Refills: 1 | Status: SHIPPED | OUTPATIENT
Start: 2022-06-09 | End: 2022-12-14 | Stop reason: SDUPTHER

## 2022-07-11 ENCOUNTER — TELEPHONE (OUTPATIENT)
Dept: INTERNAL MEDICINE | Facility: CLINIC | Age: 72
End: 2022-07-11

## 2022-07-11 DIAGNOSIS — E78.5 DYSLIPIDEMIA: ICD-10-CM

## 2022-07-11 RX ORDER — PRAVASTATIN SODIUM 10 MG
10 TABLET ORAL NIGHTLY
Qty: 90 TABLET | Refills: 0 | Status: CANCELLED | OUTPATIENT
Start: 2022-07-11

## 2022-07-12 DIAGNOSIS — E78.5 DYSLIPIDEMIA: ICD-10-CM

## 2022-07-12 RX ORDER — PRAVASTATIN SODIUM 10 MG
10 TABLET ORAL NIGHTLY
Qty: 90 TABLET | Refills: 1 | Status: SHIPPED | OUTPATIENT
Start: 2022-07-12

## 2022-07-26 RX ORDER — SITAGLIPTIN AND METFORMIN HYDROCHLORIDE 1000; 100 MG/1; MG/1
1 TABLET, FILM COATED, EXTENDED RELEASE ORAL DAILY
Qty: 30 TABLET | Refills: 0 | Status: SHIPPED | OUTPATIENT
Start: 2022-07-26 | End: 2022-09-08 | Stop reason: SDUPTHER

## 2022-07-26 RX ORDER — SITAGLIPTIN AND METFORMIN HYDROCHLORIDE 1000; 100 MG/1; MG/1
1 TABLET, FILM COATED, EXTENDED RELEASE ORAL DAILY
COMMUNITY
Start: 2022-06-16 | End: 2022-07-26 | Stop reason: SDUPTHER

## 2022-08-15 DIAGNOSIS — F41.1 GENERALIZED ANXIETY DISORDER: ICD-10-CM

## 2022-08-16 ENCOUNTER — TELEPHONE (OUTPATIENT)
Dept: INTERNAL MEDICINE | Facility: CLINIC | Age: 72
End: 2022-08-16

## 2022-08-16 DIAGNOSIS — J30.9 ALLERGIC RHINITIS, UNSPECIFIED SEASONALITY, UNSPECIFIED TRIGGER: ICD-10-CM

## 2022-08-16 RX ORDER — CETIRIZINE HYDROCHLORIDE 10 MG/1
10 TABLET ORAL DAILY
Qty: 30 TABLET | Refills: 6 | Status: SHIPPED | OUTPATIENT
Start: 2022-08-16

## 2022-08-16 RX ORDER — ALPRAZOLAM 0.5 MG/1
0.5 TABLET ORAL NIGHTLY PRN
Qty: 90 TABLET | Refills: 0 | Status: SHIPPED | OUTPATIENT
Start: 2022-08-16 | End: 2022-12-29

## 2022-08-16 NOTE — TELEPHONE ENCOUNTER
Caller: Lucia Ellis    Relationship: Self    Best call back number: [phone]     Requested Prescriptions:   Requested Prescriptions     Pending Prescriptions Disp Refills   • cetirizine (zyrTEC) 10 MG tablet 30 tablet 6     Sig: Take 1 tablet by mouth Daily.        Pharmacy where request should be sent: [facility] [place] [address] RD AT [facility]. - [phone]  - [phone] FX     Additional details provided by patient: PATIENT STATES THAT SHE ALSO NEEDS A REFILL ON MELOXICAM 15 MG THAT WAS PRESCRIBED TO HER BY ANTONI MCCANN.   PATIENT IS CONVINCED THAT ANTONI MCCANN FILLED IN FOR DR. HERNANDEZ WHEN HE WAS OUT AND PRESCRIBED THIS MEDICATION TO HER.   PATIENT STATES THAT SHE HAS NEW DIAGNOSIS OF ALZHEIMERS, AND SHE CANNOT REMEMBER THINGS THAT GO ON.        PATIENT IS OUT OF BOTH MEDICATIONS.        Does the patient have less than a 3 day supply:  [x] Yes  [] No    Arlette PATEL Rep   08/16/22 10:19 EDT

## 2022-08-16 NOTE — TELEPHONE ENCOUNTER
Patient:   OZ HERNANDEZ            MRN: [id number]            FIN: [id number]              Age:   80 years     Sex:  MALE     :  37   Associated Diagnoses:   None   Author:   EDER GORE     Subjective     History of Present Illness   NOTES Patient seen and examined.  Feeling better with the back pain today.  Since yesterday has been having issues with urinary retention requiring straight cath x3.  Today he has made some wet diapers.  Constipated x5 days.  Otherwise no new issues..       Review of Systems   Constitutional:  No fever, No chills.    Cardiovascular:  No chest pain.    Respiratory:  No shortness of breath.    Gastrointestinal:  No nausea, No vomiting.    Genitourinary:  Negative.    Musculoskeletal   Integumentary:  Negative.    Neurologic:  Negative.    Psychiatric:  Negative.      Physical Examination   VS/Measurements     Vitals between:   07-AUG-2019 10:55:37   TO   08-AUG-2019 10:55:37                   LAST RESULT MINIMUM MAXIMUM  Temperature 36.4 36 36.5  Heart Rate 80 80 84  Respiratory Rate 16 16 16  NISBP           129 108 129  NIDBP           75 61 75  SpO2                    96 96 96    Intake and Output   I&O 24 hr   I & O between:  07-AUG-2019 10:55 TO 08-AUG-2019 10:55  Med Dosing Weight:  75.1  kg   06-AUG-2019  24 Hour Intake:   1985.64  ( 26.44 mL/kg )  24 Hour Output:   2905.00           24 Hour Urine/Stool Output:   0.0  24 Hour Balance:   -919.36           24 Hour Urine Output:   2905.00  ( 1.61 mL/kg/hr )                   Urine Count:  4.00       General:  Alert and oriented.    Eye:  Normal conjunctiva.    HENT:  Normocephalic.    Neck:  Supple.    Respiratory:  Lungs are clear to auscultation, Respirations are non-labored, Breath sounds are equal.   Cardiovascular:  Normal rate, Regular rhythm, No edema.    Gastrointestinal:  Soft, Non-tender, Non-distended.    Musculoskeletal:  Normal range of motion, Normal strength, No swelling, Patient has  The Zyrtec has been refilled.   persistent coarse upper extremity tremors.   Integumentary:  Warm.    Neurologic:  Alert, Oriented, No focal deficits.    Cognition and Speech:  Oriented.    Psychiatric:  Cooperative.      Review / Management   Laboratory results:     No Qualifying Labs are resulted on this patient in the last 24 hours  , Culture growing gram negative bacilli and staph epi.    Radiology results       Lines and Tubes:    LINES  Peripheral Intravenous Forearm Left   Gauge: 22   Charted: 08/08/19 08:00  Inserted: 08/06/19   Days Since Insertion: 2 days  Indication of Use: Saline Lock  Peripheral Intravenous Forearm Right   Gauge: 18   Charted: 08/08/19 08:00  Inserted: 08/06/19   Days Since Insertion: 2 days  Indication of Use: Saline Lock   .      Impression and Plan   Dx and Plan:  Orders     Medications (36) Active  Scheduled: (25)  *Do NOT give Ketorolac (Toradol)  1 each, N/A, Daily  *Do NOT give Nicotine  1 each, N/A, Daily  *Do NOT give NSAIDs  1 each, N/A, Daily  *Do NOT give NSAIDs  1 each, N/A, Daily  Ascorbic acid 500 mg tab  1,000 mg 2 tab, Oral, Daily  Bumetanide 1 mg tab  1 mg 1 tab, Oral, Daily  cefTAZidime  2,000 mg, IVPB, Q8H  Chlorhexidine gluconate 0.12% ORAL RINSE 15 mL repack  15 mL, Oral Mucosa, Q12H  Cholecalciferol 1,000 unit (25 mcg) tab  2,000 unit 2 tab, Oral, Daily  Cyanocobalamin 500 mcg tab  1,000 mcg 2 tab, Oral, Daily  Enoxaparin 40 mg/0.4 mL syringe  40 mg 0.4 mL, Subcutaneous, Daily  Finasteride 5 mg tab  5 mg 1 tab, Oral, Q Evening  Folic acid 1 mg tab  1 mg 1 tab, Oral, Daily  Gabapentin 100 mg cap  200 mg 2 cap, Oral, Q Bedtime  Lidocaine 4% (40 mg/gm) patch  1 patch, TransDermal, Daily  lidocaine topical patch removal`  Remove Patch, TransDermal, Q Bedtime  Polyethylene glycol 3350 oral recon powder 17 gm packet UD  17 gm 1 packet, Oral, Daily  Potassium CHLORIDE 10 mEq ER tab  10 mEq 1 tab, Oral, Daily  Primidone 50 mg tab  100 mg 2 tab, Oral, BID  Senna-docusate sodium 8.6-50 mg tab  2 tab,  Oral, BID  Simvastatin 5 mg tab  5 mg 1 tab, Oral, Q Bedtime  Tamsulosin 0.4 mg cap  0.4 mg 1 cap, Oral, Q Evening  Thiamine 100 mg tab  100 mg 1 tab, Oral, Q Evening  vancomycin  1,500 mg, IVPB, Q24H  Zinc sulfate 220 mg cap [zinc 50 mg]  220 mg 1 cap, Oral, Daily  Continuous: (0)  PRN: (11)  Benzocaine-menthol lozenge 8's  1 lozenge, Oral Mucosa, Q4H  Bisacodyl 10 mg suppos  10 mg 1 suppository, Rectal, Daily  Calcium carbonate 500 mg chew tab [Ca 200 mg]  1,000 mg 2 tab, Chewed, Q8H  DiazePAM 5 mg tab  5 mg 1 tab, Oral, Q8H  Hydrocodone-acetaminophen 5-325 mg tab  2 tab, Oral, Q6H  Hydrocodone-acetaminophen 5-325 mg tab  1 tab, Oral, Q6H  Milk of magnesia 8% 30 mL oral susp UD  30 mL, Oral, Daily  Milk of magnesia 8% 30 mL oral susp UD  30 mL, Oral, Daily  Morphine PF 2 mg/1 mL inj SDV  2 mg 1 mL, IV Push, Q2H  Ondansetron 4 mg disintegrating tab  4 mg 1 tab, Oral, Q6H  Ondansetron 4 mg/2 mL inj SDV  4 mg 2 mL, Slow IV Push, Q6H   .     .    Dx and Plan:  Diagnosis     80 years old male admitted for wound dehiscence status post L4-L5 laminectomy and epidural hemorrhage decompression in July after a fall.  Postoperative wound dehiscence status post L4-5 laminectomy in July  Status post wound exploration closure and culture  Currently on ceftazidime and Vanco per ID  Await wound cultures- GMN and GPC now  Physical therapy and pain control  Pain better controlled  Final duration of abx per ID  Chronic atrial fibrillation  Because of epidural hemorrhage warfarin has been on hold  NSY cleared to have warfarin . started  and on lovenox 40 qd  CAD/CHF  Appears compensated  Patient is currently resumed on simvastatin Bumex potassium.  He is not on any other blood pressure medications blood pressure stable.  was also on midodrine before  Benign essential tremors  Continue primidone and gabapentin  Dvtt Prophylaxis-Lovenox.  Urinary retention-continue finasteride Flomax and monitoring.  If needed straight cath  Bowel  regimen-on Colace,miralax , milk of mag.  Code status discussed with patient's daughter Marce.  Healthcare power of .  Patient is no CPR no shocks no intubation okay for vasopressors and antiarrhythmics  Patient is greater than 2 midnight stay to about issues to be addressed.  Discharge planning-likely will need to rehabilitation because patient is antibiotics and also therapy.  Patient did not do well in rehab in the recent admission but because of antibiotics he may have to return to rehab.  .     .    Education and Follow-up:  Discharge Planning.

## 2022-08-30 RX ORDER — ESCITALOPRAM OXALATE 10 MG/1
10 TABLET ORAL DAILY
Qty: 90 TABLET | Refills: 1 | Status: SHIPPED | OUTPATIENT
Start: 2022-08-30 | End: 2023-01-30 | Stop reason: SDUPTHER

## 2022-09-08 RX ORDER — SITAGLIPTIN AND METFORMIN HYDROCHLORIDE 1000; 100 MG/1; MG/1
1 TABLET, FILM COATED, EXTENDED RELEASE ORAL DAILY
Qty: 30 TABLET | Refills: 0 | Status: SHIPPED | OUTPATIENT
Start: 2022-09-08 | End: 2022-10-18 | Stop reason: SDUPTHER

## 2022-10-18 RX ORDER — SITAGLIPTIN AND METFORMIN HYDROCHLORIDE 1000; 100 MG/1; MG/1
1 TABLET, FILM COATED, EXTENDED RELEASE ORAL DAILY
Qty: 30 TABLET | Refills: 0 | Status: SHIPPED | OUTPATIENT
Start: 2022-10-18 | End: 2022-11-15 | Stop reason: SDUPTHER

## 2022-10-27 DIAGNOSIS — R41.3 MEMORY DIFFICULTY: ICD-10-CM

## 2022-10-27 DIAGNOSIS — F03.90 DEMENTIA WITHOUT BEHAVIORAL DISTURBANCE: ICD-10-CM

## 2022-10-27 RX ORDER — DONEPEZIL HYDROCHLORIDE 10 MG/1
TABLET, FILM COATED ORAL
Qty: 30 TABLET | Refills: 5 | Status: SHIPPED | OUTPATIENT
Start: 2022-10-27 | End: 2023-03-02 | Stop reason: SDUPTHER

## 2022-11-15 RX ORDER — SITAGLIPTIN AND METFORMIN HYDROCHLORIDE 1000; 100 MG/1; MG/1
1 TABLET, FILM COATED, EXTENDED RELEASE ORAL DAILY
Qty: 30 TABLET | Refills: 0 | Status: SHIPPED | OUTPATIENT
Start: 2022-11-15 | End: 2022-12-29

## 2022-11-28 ENCOUNTER — TELEPHONE (OUTPATIENT)
Dept: INTERNAL MEDICINE | Facility: CLINIC | Age: 72
End: 2022-11-28

## 2022-11-28 NOTE — TELEPHONE ENCOUNTER
Caller: Lucia Ellis    Relationship to patient: Self    Best call back number [phone]    Chief complaint:FORGETFULLNESS    Type of visit: OFFICE VISIT    Requested date: ANY DAY AFTER 3PM    LATEST FOR HUB TO SCHEDULE  PM

## 2022-12-02 ENCOUNTER — OFFICE VISIT (OUTPATIENT)
Dept: INTERNAL MEDICINE | Facility: CLINIC | Age: 72
End: 2022-12-02

## 2022-12-02 VITALS
BODY MASS INDEX: 23.39 KG/M2 | HEIGHT: 63 IN | OXYGEN SATURATION: 97 % | DIASTOLIC BLOOD PRESSURE: 80 MMHG | SYSTOLIC BLOOD PRESSURE: 124 MMHG | WEIGHT: 132 LBS | HEART RATE: 81 BPM

## 2022-12-02 DIAGNOSIS — Z12.11 ENCOUNTER FOR SCREENING COLONOSCOPY: ICD-10-CM

## 2022-12-02 DIAGNOSIS — R41.3 MEMORY DIFFICULTY: Primary | ICD-10-CM

## 2022-12-02 PROCEDURE — 99215 OFFICE O/P EST HI 40 MIN: CPT | Performed by: FAMILY MEDICINE

## 2022-12-02 PROCEDURE — 90662 IIV NO PRSV INCREASED AG IM: CPT | Performed by: FAMILY MEDICINE

## 2022-12-02 PROCEDURE — G0008 ADMIN INFLUENZA VIRUS VAC: HCPCS | Performed by: FAMILY MEDICINE

## 2022-12-02 RX ORDER — MELOXICAM 15 MG/1
15 TABLET ORAL DAILY
Status: ON HOLD | COMMUNITY
Start: 2022-07-22 | End: 2022-12-30

## 2022-12-02 RX ORDER — MEMANTINE HYDROCHLORIDE 5 MG/1
5 TABLET ORAL DAILY
Qty: 90 TABLET | Refills: 2 | Status: SHIPPED | OUTPATIENT
Start: 2022-12-02 | End: 2023-03-02 | Stop reason: SDUPTHER

## 2022-12-02 NOTE — PROGRESS NOTES
"Chief Complaint  Memory Loss    Subjective        Lucia Ellis presents to [facility]  History of Present Illness    Patient presents at today's office visit she is present at today's visit with her sister.  She has come in due to concerns about some memory deficit that she has now accepted that she is starting to have.  She has been doing fine up to now up to this calendar year and should say.  However over the last several months mainly this whole year, she has noted that her memory has started to decline.  She only 72 years of age.  It has been noticeable to many of her family members, however she is come to terms that this is something that is actually taking place because she has now seen this happen more regularly.  The patient understands that this is now starting to become a problem.  She is still managing her finances, however she does not recall whether things are being paid appropriately.  She does not use much of the cooking appliances in her house except the microwave, so she has not left the stove on.  She is also stopped driving here in the last year as well due to concern of getting lost perhaps.  He has had some episodes where she has got lost a couple times, and so she has not driven because of these.    Objective   Vital Signs:  /80 (BP Location: Left arm, Patient Position: Sitting, Cuff Size: Adult)   Pulse 81   Ht 160 cm (63\")   Wt 59.9 kg (132 lb)   SpO2 97%   BMI 23.38 kg/m²   Estimated body mass index is 23.38 kg/m² as calculated from the following:    Height as of this encounter: 160 cm (63\").    Weight as of this encounter: 59.9 kg (132 lb).    BMI is within normal parameters. No other follow-up for BMI required.      Physical Exam  Vitals and nursing note reviewed.   Constitutional:       Appearance: She is well-developed.   HENT:      Head: Normocephalic and atraumatic.   Musculoskeletal:      Cervical back: Normal range of motion and neck " supple.   Neurological:      Mental Status: She is alert and oriented to person, place, and time.   Psychiatric:         Behavior: Behavior normal.        Result Review :                Assessment and Plan   Diagnoses and all orders for this visit:    1. Memory difficulty (Primary)  -     Ambulatory Referral to Neurology  -     CT Head Without Contrast; Future  -     memantine (Namenda) 5 MG tablet; Take 1 tablet by mouth Daily.  Dispense: 90 tablet; Refill: 2  -     CBC & Differential  -     Comprehensive Metabolic Panel  -     Thyroid Panel With TSH  -     Vitamin B12    2. Encounter for screening colonoscopy  -     Ambulatory Referral For Screening Colonoscopy    Other orders  -     Fluzone High-Dose 65+yrs (2422-3374)    I think at today's office visit it would be best that she sees a neurologist, and perhaps get a cognitive evaluation done.  She may benefit from an MRI, however will most likely order a CT scan of the head without contrast first to make sure nothing is acutely going on which I do not think it is.  She may benefit from medicines like Namenda to help her.  They may help decline the progress of this underlying symptoms of dementia which she seems to be having.       I spent 40 minutes caring for Lucia on this date of service. This time includes time spent by me in the following activities:preparing for the visit, obtaining and/or reviewing a separately obtained history, performing a medically appropriate examination and/or evaluation , counseling and educating the patient/family/caregiver, ordering medications, tests, or procedures and documenting information in the medical record  Follow Up {Instructions Charge Capture  Follow-up Communications :23}  No follow-ups on file.  Patient was given instructions and counseling regarding her condition or for health maintenance advice. Please see specific information pulled into the AVS if appropriate.

## 2022-12-03 LAB
ALBUMIN SERPL-MCNC: 4.8 G/DL (ref 3.5–5.2)
ALBUMIN/GLOB SERPL: 2.2 G/DL
ALP SERPL-CCNC: 111 U/L (ref 39–117)
ALT SERPL-CCNC: 10 U/L (ref 1–33)
AST SERPL-CCNC: 14 U/L (ref 1–32)
BASOPHILS # BLD AUTO: 0.08 10*3/MM3 (ref 0–0.2)
BASOPHILS NFR BLD AUTO: 1.4 % (ref 0–1.5)
BILIRUB SERPL-MCNC: 0.6 MG/DL (ref 0–1.2)
BUN SERPL-MCNC: 10 MG/DL (ref 8–23)
BUN/CREAT SERPL: 13.5 (ref 7–25)
CALCIUM SERPL-MCNC: 9.7 MG/DL (ref 8.6–10.5)
CHLORIDE SERPL-SCNC: 97 MMOL/L (ref 98–107)
CO2 SERPL-SCNC: 29.9 MMOL/L (ref 22–29)
CREAT SERPL-MCNC: 0.74 MG/DL (ref 0.57–1)
EGFRCR SERPLBLD CKD-EPI 2021: 85 ML/MIN/1.73
EOSINOPHIL # BLD AUTO: 0.04 10*3/MM3 (ref 0–0.4)
EOSINOPHIL NFR BLD AUTO: 0.7 % (ref 0.3–6.2)
ERYTHROCYTE [DISTWIDTH] IN BLOOD BY AUTOMATED COUNT: 12 % (ref 12.3–15.4)
FT4I SERPL CALC-MCNC: 1.4 (ref 1.2–4.9)
GLOBULIN SER CALC-MCNC: 2.2 GM/DL
GLUCOSE SERPL-MCNC: 315 MG/DL (ref 65–99)
HCT VFR BLD AUTO: 40.4 % (ref 34–46.6)
HGB BLD-MCNC: 13.3 G/DL (ref 12–15.9)
IMM GRANULOCYTES # BLD AUTO: 0.01 10*3/MM3 (ref 0–0.05)
IMM GRANULOCYTES NFR BLD AUTO: 0.2 % (ref 0–0.5)
LYMPHOCYTES # BLD AUTO: 1.44 10*3/MM3 (ref 0.7–3.1)
LYMPHOCYTES NFR BLD AUTO: 25.3 % (ref 19.6–45.3)
MCH RBC QN AUTO: 28.5 PG (ref 26.6–33)
MCHC RBC AUTO-ENTMCNC: 32.9 G/DL (ref 31.5–35.7)
MCV RBC AUTO: 86.5 FL (ref 79–97)
MONOCYTES # BLD AUTO: 0.41 10*3/MM3 (ref 0.1–0.9)
MONOCYTES NFR BLD AUTO: 7.2 % (ref 5–12)
NEUTROPHILS # BLD AUTO: 3.72 10*3/MM3 (ref 1.7–7)
NEUTROPHILS NFR BLD AUTO: 65.2 % (ref 42.7–76)
NRBC BLD AUTO-RTO: 0 /100 WBC (ref 0–0.2)
PLATELET # BLD AUTO: 186 10*3/MM3 (ref 140–450)
POTASSIUM SERPL-SCNC: 3.5 MMOL/L (ref 3.5–5.2)
PROT SERPL-MCNC: 7 G/DL (ref 6–8.5)
RBC # BLD AUTO: 4.67 10*6/MM3 (ref 3.77–5.28)
SODIUM SERPL-SCNC: 137 MMOL/L (ref 136–145)
T3RU NFR SERPL: 29 % (ref 24–39)
T4 SERPL-MCNC: 4.8 UG/DL (ref 4.5–12)
TSH SERPL DL<=0.005 MIU/L-ACNC: 1.11 UIU/ML (ref 0.45–4.5)
VIT B12 SERPL-MCNC: 1145 PG/ML (ref 211–946)
WBC # BLD AUTO: 5.7 10*3/MM3 (ref 3.4–10.8)

## 2022-12-10 DIAGNOSIS — E11.9 TYPE 2 DIABETES MELLITUS WITHOUT COMPLICATION, WITHOUT LONG-TERM CURRENT USE OF INSULIN: Primary | ICD-10-CM

## 2022-12-10 NOTE — PROGRESS NOTES
Please inform the patient of the following abnormal results. Glucose is very high, is she taking her janumet? Also I would like a hba1c checked for patient. Orders are placed. She also needs to start jardiance 10mg. This has been sent in.

## 2022-12-13 ENCOUNTER — TELEPHONE (OUTPATIENT)
Dept: INTERNAL MEDICINE | Facility: CLINIC | Age: 72
End: 2022-12-13

## 2022-12-13 NOTE — TELEPHONE ENCOUNTER
Caller: RALEIGH    Relationship to patient:     Best call back number: [phone]    Patient is needing: RALEIGH WITH [facility] [employer] CALLED TO REQUEST PATIENTS LATEST OFFICE NOTES.    PLEASE CALL.    FAX NUMBER- [phone]

## 2022-12-14 NOTE — TELEPHONE ENCOUNTER
THERON WITH [employer] IS CALLING BACK IN REGARDS TO THIS. DID REMIND HIM THAT WE NEED TO GIVE 48 HRS FOR THIS. CALLBACK IS: [phone]

## 2022-12-28 ENCOUNTER — HOSPITAL ENCOUNTER (OUTPATIENT)
Facility: HOSPITAL | Age: 72
Setting detail: OBSERVATION
Discharge: HOME | End: 2022-12-31
Attending: EMERGENCY MEDICINE | Admitting: HOSPITALIST
Payer: MEDICARE

## 2022-12-28 DIAGNOSIS — I10 ESSENTIAL HYPERTENSION: ICD-10-CM

## 2022-12-28 DIAGNOSIS — E11.649 UNCONTROLLED TYPE 2 DIABETES MELLITUS WITH HYPOGLYCEMIA WITHOUT COMA: Primary | ICD-10-CM

## 2022-12-28 DIAGNOSIS — E87.6 HYPOKALEMIA: ICD-10-CM

## 2022-12-28 DIAGNOSIS — E11.9 TYPE 2 DIABETES MELLITUS WITHOUT COMPLICATION, WITHOUT LONG-TERM CURRENT USE OF INSULIN: ICD-10-CM

## 2022-12-28 DIAGNOSIS — G25.3: ICD-10-CM

## 2022-12-28 DIAGNOSIS — I10 UNCONTROLLED HYPERTENSION: ICD-10-CM

## 2022-12-28 PROCEDURE — 99285 EMERGENCY DEPT VISIT HI MDM: CPT

## 2022-12-29 ENCOUNTER — APPOINTMENT (OUTPATIENT)
Dept: CT IMAGING | Facility: HOSPITAL | Age: 72
End: 2022-12-29
Payer: MEDICARE

## 2022-12-29 ENCOUNTER — APPOINTMENT (OUTPATIENT)
Dept: NEUROLOGY | Facility: HOSPITAL | Age: 72
End: 2022-12-29
Payer: MEDICARE

## 2022-12-29 PROBLEM — R56.9 SEIZURE: Status: ACTIVE | Noted: 2022-12-29

## 2022-12-29 PROBLEM — F02.B0 DEMENTIA IN OTHER DISEASES CLASSIFIED ELSEWHERE, MODERATE, WITHOUT BEHAVIORAL DISTURBANCE, PSYCHOTIC DISTURBANCE, MOOD DISTURBANCE, AND ANXIETY (HCC): Chronic | Status: ACTIVE | Noted: 2022-12-29

## 2022-12-29 PROBLEM — E11.649 UNCONTROLLED TYPE 2 DIABETES MELLITUS WITH HYPOGLYCEMIA WITHOUT COMA: Status: ACTIVE | Noted: 2022-12-29

## 2022-12-29 PROBLEM — E87.6 HYPOKALEMIA: Status: ACTIVE | Noted: 2022-12-29

## 2022-12-29 PROBLEM — E11.65 TYPE 2 DIABETES MELLITUS WITH HYPERGLYCEMIA (HCC): Status: ACTIVE | Noted: 2022-12-29

## 2022-12-29 PROBLEM — G25.3 MYOCLONUS: Status: ACTIVE | Noted: 2022-12-29

## 2022-12-29 LAB
ALBUMIN SERPL-MCNC: 4.7 G/DL (ref 3.5–5.2)
ALBUMIN/GLOB SERPL: 1.6 G/DL
ALP SERPL-CCNC: 131 U/L (ref 39–117)
ALT SERPL W P-5'-P-CCNC: 15 U/L (ref 1–33)
ANION GAP SERPL CALCULATED.3IONS-SCNC: 12.9 MMOL/L (ref 5–15)
ANION GAP SERPL CALCULATED.3IONS-SCNC: 13 MMOL/L (ref 5–15)
AST SERPL-CCNC: 16 U/L (ref 1–32)
BASOPHILS # BLD AUTO: 0.04 10*3/MM3 (ref 0–0.2)
BASOPHILS # BLD AUTO: 0.06 10*3/MM3 (ref 0–0.2)
BASOPHILS NFR BLD AUTO: 0.6 % (ref 0–1.5)
BASOPHILS NFR BLD AUTO: 0.8 % (ref 0–1.5)
BILIRUB SERPL-MCNC: 0.4 MG/DL (ref 0–1.2)
BUN SERPL-MCNC: 10 MG/DL (ref 8–23)
BUN SERPL-MCNC: 11 MG/DL (ref 8–23)
BUN/CREAT SERPL: 13.9 (ref 7–25)
BUN/CREAT SERPL: 15.9 (ref 7–25)
CALCIUM SPEC-SCNC: 10.1 MG/DL (ref 8.6–10.5)
CALCIUM SPEC-SCNC: 9.4 MG/DL (ref 8.6–10.5)
CHLORIDE SERPL-SCNC: 96 MMOL/L (ref 98–107)
CHLORIDE SERPL-SCNC: 99 MMOL/L (ref 98–107)
CO2 SERPL-SCNC: 24 MMOL/L (ref 22–29)
CO2 SERPL-SCNC: 27.1 MMOL/L (ref 22–29)
CREAT SERPL-MCNC: 0.63 MG/DL (ref 0.57–1)
CREAT SERPL-MCNC: 0.79 MG/DL (ref 0.57–1)
DEPRECATED RDW RBC AUTO: 37.2 FL (ref 37–54)
DEPRECATED RDW RBC AUTO: 37.9 FL (ref 37–54)
EGFRCR SERPLBLD CKD-EPI 2021: 78.6 ML/MIN/1.73
EGFRCR SERPLBLD CKD-EPI 2021: 93.2 ML/MIN/1.73
EOSINOPHIL # BLD AUTO: 0 10*3/MM3 (ref 0–0.4)
EOSINOPHIL # BLD AUTO: 0.02 10*3/MM3 (ref 0–0.4)
EOSINOPHIL NFR BLD AUTO: 0 % (ref 0.3–6.2)
EOSINOPHIL NFR BLD AUTO: 0.4 % (ref 0.3–6.2)
ERYTHROCYTE [DISTWIDTH] IN BLOOD BY AUTOMATED COUNT: 11.6 % (ref 12.3–15.4)
ERYTHROCYTE [DISTWIDTH] IN BLOOD BY AUTOMATED COUNT: 12.2 % (ref 12.3–15.4)
GLOBULIN UR ELPH-MCNC: 2.9 GM/DL
GLUCOSE BLDC GLUCOMTR-MCNC: 147 MG/DL (ref 70–130)
GLUCOSE BLDC GLUCOMTR-MCNC: 308 MG/DL (ref 70–130)
GLUCOSE BLDC GLUCOMTR-MCNC: 334 MG/DL (ref 70–130)
GLUCOSE BLDC GLUCOMTR-MCNC: 335 MG/DL (ref 70–130)
GLUCOSE BLDC GLUCOMTR-MCNC: 349 MG/DL (ref 70–130)
GLUCOSE SERPL-MCNC: 383 MG/DL (ref 65–99)
GLUCOSE SERPL-MCNC: 705 MG/DL (ref 65–99)
HBA1C MFR BLD: 12.7 % (ref 4.8–5.6)
HCT VFR BLD AUTO: 36.2 % (ref 34–46.6)
HCT VFR BLD AUTO: 41.8 % (ref 34–46.6)
HGB BLD-MCNC: 12.7 G/DL (ref 12–15.9)
HGB BLD-MCNC: 13.7 G/DL (ref 12–15.9)
IMM GRANULOCYTES # BLD AUTO: 0.02 10*3/MM3 (ref 0–0.05)
IMM GRANULOCYTES # BLD AUTO: 0.06 10*3/MM3 (ref 0–0.05)
IMM GRANULOCYTES NFR BLD AUTO: 0.4 % (ref 0–0.5)
IMM GRANULOCYTES NFR BLD AUTO: 0.6 % (ref 0–0.5)
LYMPHOCYTES # BLD AUTO: 1.25 10*3/MM3 (ref 0.7–3.1)
LYMPHOCYTES # BLD AUTO: 1.49 10*3/MM3 (ref 0.7–3.1)
LYMPHOCYTES NFR BLD AUTO: 11.8 % (ref 19.6–45.3)
LYMPHOCYTES NFR BLD AUTO: 29.5 % (ref 19.6–45.3)
MAGNESIUM SERPL-MCNC: 2.2 MG/DL (ref 1.6–2.4)
MCH RBC QN AUTO: 28.7 PG (ref 26.6–33)
MCH RBC QN AUTO: 29.6 PG (ref 26.6–33)
MCHC RBC AUTO-ENTMCNC: 32.8 G/DL (ref 31.5–35.7)
MCHC RBC AUTO-ENTMCNC: 35.1 G/DL (ref 31.5–35.7)
MCV RBC AUTO: 84.4 FL (ref 79–97)
MCV RBC AUTO: 87.6 FL (ref 79–97)
MONOCYTES # BLD AUTO: 0.36 10*3/MM3 (ref 0.1–0.9)
MONOCYTES # BLD AUTO: 0.45 10*3/MM3 (ref 0.1–0.9)
MONOCYTES NFR BLD AUTO: 4.3 % (ref 5–12)
MONOCYTES NFR BLD AUTO: 7.1 % (ref 5–12)
NEUTROPHILS NFR BLD AUTO: 3.12 10*3/MM3 (ref 1.7–7)
NEUTROPHILS NFR BLD AUTO: 61.8 % (ref 42.7–76)
NEUTROPHILS NFR BLD AUTO: 8.74 10*3/MM3 (ref 1.7–7)
NEUTROPHILS NFR BLD AUTO: 82.7 % (ref 42.7–76)
NRBC BLD AUTO-RTO: 0 /100 WBC (ref 0–0.2)
NRBC BLD AUTO-RTO: 0 /100 WBC (ref 0–0.2)
PLATELET # BLD AUTO: 184 10*3/MM3 (ref 140–450)
PLATELET # BLD AUTO: 196 10*3/MM3 (ref 140–450)
PMV BLD AUTO: 11.5 FL (ref 6–12)
PMV BLD AUTO: 11.6 FL (ref 6–12)
POTASSIUM SERPL-SCNC: 3.3 MMOL/L (ref 3.5–5.2)
POTASSIUM SERPL-SCNC: 3.5 MMOL/L (ref 3.5–5.2)
PROT SERPL-MCNC: 7.6 G/DL (ref 6–8.5)
QT INTERVAL: 358 MS
RBC # BLD AUTO: 4.29 10*6/MM3 (ref 3.77–5.28)
RBC # BLD AUTO: 4.77 10*6/MM3 (ref 3.77–5.28)
SODIUM SERPL-SCNC: 136 MMOL/L (ref 136–145)
SODIUM SERPL-SCNC: 136 MMOL/L (ref 136–145)
TROPONIN T SERPL-MCNC: <0.01 NG/ML (ref 0–0.03)
TSH SERPL DL<=0.05 MIU/L-ACNC: 0.84 UIU/ML (ref 0.27–4.2)
WBC NRBC COR # BLD: 10.56 10*3/MM3 (ref 3.4–10.8)
WBC NRBC COR # BLD: 5.05 10*3/MM3 (ref 3.4–10.8)

## 2022-12-29 PROCEDURE — 25010000002 LEVETRIRACETAM PER 10 MG: Performed by: NURSE PRACTITIONER

## 2022-12-29 PROCEDURE — 83036 HEMOGLOBIN GLYCOSYLATED A1C: CPT | Performed by: NURSE PRACTITIONER

## 2022-12-29 PROCEDURE — 83735 ASSAY OF MAGNESIUM: CPT | Performed by: EMERGENCY MEDICINE

## 2022-12-29 PROCEDURE — 84443 ASSAY THYROID STIM HORMONE: CPT | Performed by: EMERGENCY MEDICINE

## 2022-12-29 PROCEDURE — 96374 THER/PROPH/DIAG INJ IV PUSH: CPT

## 2022-12-29 PROCEDURE — 93005 ELECTROCARDIOGRAM TRACING: CPT | Performed by: EMERGENCY MEDICINE

## 2022-12-29 PROCEDURE — 80053 COMPREHEN METABOLIC PANEL: CPT | Performed by: NURSE PRACTITIONER

## 2022-12-29 PROCEDURE — G0378 HOSPITAL OBSERVATION PER HR: HCPCS

## 2022-12-29 PROCEDURE — 96375 TX/PRO/DX INJ NEW DRUG ADDON: CPT

## 2022-12-29 PROCEDURE — 63710000001 INSULIN LISPRO (HUMAN) PER 5 UNITS: Performed by: NURSE PRACTITIONER

## 2022-12-29 PROCEDURE — 82962 GLUCOSE BLOOD TEST: CPT

## 2022-12-29 PROCEDURE — 95819 EEG AWAKE AND ASLEEP: CPT

## 2022-12-29 PROCEDURE — 93010 ELECTROCARDIOGRAM REPORT: CPT | Performed by: STUDENT IN AN ORGANIZED HEALTH CARE EDUCATION/TRAINING PROGRAM

## 2022-12-29 PROCEDURE — 85025 COMPLETE CBC W/AUTO DIFF WBC: CPT | Performed by: EMERGENCY MEDICINE

## 2022-12-29 PROCEDURE — 63710000001 INSULIN REGULAR HUMAN PER 5 UNITS: Performed by: EMERGENCY MEDICINE

## 2022-12-29 PROCEDURE — 95819 EEG AWAKE AND ASLEEP: CPT | Performed by: PSYCHIATRY & NEUROLOGY

## 2022-12-29 PROCEDURE — 84484 ASSAY OF TROPONIN QUANT: CPT | Performed by: EMERGENCY MEDICINE

## 2022-12-29 PROCEDURE — 85025 COMPLETE CBC W/AUTO DIFF WBC: CPT | Performed by: NURSE PRACTITIONER

## 2022-12-29 PROCEDURE — 70450 CT HEAD/BRAIN W/O DYE: CPT

## 2022-12-29 PROCEDURE — 96376 TX/PRO/DX INJ SAME DRUG ADON: CPT

## 2022-12-29 RX ORDER — SODIUM CHLORIDE 9 MG/ML
40 INJECTION, SOLUTION INTRAVENOUS AS NEEDED
Status: DISCONTINUED | OUTPATIENT
Start: 2022-12-29 | End: 2022-12-31 | Stop reason: HOSPADM

## 2022-12-29 RX ORDER — MELOXICAM 15 MG/1
15 TABLET ORAL DAILY
Status: DISCONTINUED | OUTPATIENT
Start: 2022-12-29 | End: 2022-12-31 | Stop reason: HOSPADM

## 2022-12-29 RX ORDER — SODIUM CHLORIDE 0.9 % (FLUSH) 0.9 %
10 SYRINGE (ML) INJECTION AS NEEDED
Status: DISCONTINUED | OUTPATIENT
Start: 2022-12-29 | End: 2022-12-31 | Stop reason: HOSPADM

## 2022-12-29 RX ORDER — LISINOPRIL 20 MG/1
40 TABLET ORAL
Status: DISCONTINUED | OUTPATIENT
Start: 2022-12-29 | End: 2022-12-31 | Stop reason: HOSPADM

## 2022-12-29 RX ORDER — POTASSIUM CHLORIDE 750 MG/1
40 TABLET, FILM COATED, EXTENDED RELEASE ORAL ONCE
Status: COMPLETED | OUTPATIENT
Start: 2022-12-29 | End: 2022-12-29

## 2022-12-29 RX ORDER — POTASSIUM CHLORIDE 7.45 MG/ML
10 INJECTION INTRAVENOUS
Status: DISCONTINUED | OUTPATIENT
Start: 2022-12-29 | End: 2022-12-31 | Stop reason: HOSPADM

## 2022-12-29 RX ORDER — ESCITALOPRAM OXALATE 10 MG/1
10 TABLET ORAL DAILY
Status: DISCONTINUED | OUTPATIENT
Start: 2022-12-29 | End: 2022-12-31 | Stop reason: HOSPADM

## 2022-12-29 RX ORDER — DEXTROSE MONOHYDRATE 25 G/50ML
25 INJECTION, SOLUTION INTRAVENOUS
Status: DISCONTINUED | OUTPATIENT
Start: 2022-12-29 | End: 2022-12-31 | Stop reason: HOSPADM

## 2022-12-29 RX ORDER — LABETALOL HYDROCHLORIDE 5 MG/ML
20 INJECTION, SOLUTION INTRAVENOUS ONCE
Status: COMPLETED | OUTPATIENT
Start: 2022-12-29 | End: 2022-12-29

## 2022-12-29 RX ORDER — OXYCODONE HYDROCHLORIDE AND ACETAMINOPHEN 5; 325 MG/1; MG/1
1 TABLET ORAL EVERY 4 HOURS PRN
COMMUNITY
End: 2022-12-31 | Stop reason: HOSPADM

## 2022-12-29 RX ORDER — NICOTINE POLACRILEX 4 MG
15 LOZENGE BUCCAL
Status: DISCONTINUED | OUTPATIENT
Start: 2022-12-29 | End: 2022-12-31 | Stop reason: HOSPADM

## 2022-12-29 RX ORDER — ACETAMINOPHEN 500 MG
1000 TABLET ORAL ONCE
Status: COMPLETED | OUTPATIENT
Start: 2022-12-29 | End: 2022-12-29

## 2022-12-29 RX ORDER — ONDANSETRON 2 MG/ML
4 INJECTION INTRAMUSCULAR; INTRAVENOUS EVERY 6 HOURS PRN
Status: DISCONTINUED | OUTPATIENT
Start: 2022-12-29 | End: 2022-12-31 | Stop reason: HOSPADM

## 2022-12-29 RX ORDER — ASPIRIN 81 MG/1
81 TABLET ORAL DAILY
Status: DISCONTINUED | OUTPATIENT
Start: 2022-12-29 | End: 2022-12-31 | Stop reason: HOSPADM

## 2022-12-29 RX ORDER — METFORMIN HYDROCHLORIDE 500 MG/1
1500 TABLET, EXTENDED RELEASE ORAL
Status: DISCONTINUED | OUTPATIENT
Start: 2022-12-29 | End: 2022-12-31 | Stop reason: HOSPADM

## 2022-12-29 RX ORDER — POTASSIUM CHLORIDE 1.5 G/1.77G
40 POWDER, FOR SOLUTION ORAL AS NEEDED
Status: DISCONTINUED | OUTPATIENT
Start: 2022-12-29 | End: 2022-12-31 | Stop reason: HOSPADM

## 2022-12-29 RX ORDER — SODIUM CHLORIDE 0.9 % (FLUSH) 0.9 %
10 SYRINGE (ML) INJECTION EVERY 12 HOURS SCHEDULED
Status: DISCONTINUED | OUTPATIENT
Start: 2022-12-29 | End: 2022-12-31 | Stop reason: HOSPADM

## 2022-12-29 RX ORDER — MEMANTINE HYDROCHLORIDE 5 MG/1
5 TABLET ORAL DAILY
Status: DISCONTINUED | OUTPATIENT
Start: 2022-12-29 | End: 2022-12-31 | Stop reason: HOSPADM

## 2022-12-29 RX ORDER — NITROGLYCERIN 0.4 MG/1
0.4 TABLET SUBLINGUAL
Status: DISCONTINUED | OUTPATIENT
Start: 2022-12-29 | End: 2022-12-31 | Stop reason: HOSPADM

## 2022-12-29 RX ORDER — LEVETIRACETAM 500 MG/5ML
500 INJECTION, SOLUTION, CONCENTRATE INTRAVENOUS EVERY 12 HOURS SCHEDULED
Status: DISCONTINUED | OUTPATIENT
Start: 2022-12-29 | End: 2022-12-30

## 2022-12-29 RX ORDER — LORAZEPAM 2 MG/ML
1 INJECTION INTRAMUSCULAR EVERY 4 HOURS PRN
Status: DISCONTINUED | OUTPATIENT
Start: 2022-12-29 | End: 2022-12-31 | Stop reason: HOSPADM

## 2022-12-29 RX ORDER — INSULIN LISPRO 100 [IU]/ML
0-14 INJECTION, SOLUTION INTRAVENOUS; SUBCUTANEOUS
Status: DISCONTINUED | OUTPATIENT
Start: 2022-12-29 | End: 2022-12-31 | Stop reason: HOSPADM

## 2022-12-29 RX ORDER — AMLODIPINE BESYLATE 5 MG/1
10 TABLET ORAL DAILY
Status: DISCONTINUED | OUTPATIENT
Start: 2022-12-29 | End: 2022-12-31 | Stop reason: HOSPADM

## 2022-12-29 RX ORDER — LABETALOL HYDROCHLORIDE 5 MG/ML
20 INJECTION, SOLUTION INTRAVENOUS EVERY 6 HOURS PRN
Status: DISCONTINUED | OUTPATIENT
Start: 2022-12-29 | End: 2022-12-31 | Stop reason: HOSPADM

## 2022-12-29 RX ORDER — ALPRAZOLAM 0.5 MG/1
0.5 TABLET ORAL NIGHTLY PRN
COMMUNITY
End: 2022-12-31 | Stop reason: HOSPADM

## 2022-12-29 RX ORDER — MELATONIN
5000 DAILY
Status: DISCONTINUED | OUTPATIENT
Start: 2022-12-29 | End: 2022-12-31 | Stop reason: HOSPADM

## 2022-12-29 RX ORDER — DONEPEZIL HYDROCHLORIDE 10 MG/1
10 TABLET, FILM COATED ORAL NIGHTLY
Status: DISCONTINUED | OUTPATIENT
Start: 2022-12-29 | End: 2022-12-31 | Stop reason: HOSPADM

## 2022-12-29 RX ORDER — PRAVASTATIN SODIUM 10 MG
10 TABLET ORAL NIGHTLY
Status: DISCONTINUED | OUTPATIENT
Start: 2022-12-29 | End: 2022-12-31 | Stop reason: HOSPADM

## 2022-12-29 RX ORDER — POTASSIUM CHLORIDE 750 MG/1
40 TABLET, FILM COATED, EXTENDED RELEASE ORAL AS NEEDED
Status: DISCONTINUED | OUTPATIENT
Start: 2022-12-29 | End: 2022-12-31 | Stop reason: HOSPADM

## 2022-12-29 RX ADMIN — Medication 10 ML: at 09:59

## 2022-12-29 RX ADMIN — PRAVASTATIN SODIUM 10 MG: 10 TABLET ORAL at 21:31

## 2022-12-29 RX ADMIN — MEMANTINE HYDROCHLORIDE 5 MG: 5 TABLET, FILM COATED ORAL at 18:31

## 2022-12-29 RX ADMIN — EMPAGLIFLOZIN 10 MG: 10 TABLET, FILM COATED ORAL at 18:28

## 2022-12-29 RX ADMIN — INSULIN LISPRO 10 UNITS: 100 INJECTION, SOLUTION INTRAVENOUS; SUBCUTANEOUS at 09:05

## 2022-12-29 RX ADMIN — ACETAMINOPHEN 1000 MG: 500 TABLET ORAL at 02:44

## 2022-12-29 RX ADMIN — AMLODIPINE BESYLATE 10 MG: 5 TABLET ORAL at 18:30

## 2022-12-29 RX ADMIN — Medication 10 ML: at 21:32

## 2022-12-29 RX ADMIN — LEVETIRACETAM 500 MG: 100 INJECTION, SOLUTION INTRAVENOUS at 18:33

## 2022-12-29 RX ADMIN — METFORMIN HYDROCHLORIDE 1500 MG: 500 TABLET, EXTENDED RELEASE ORAL at 18:32

## 2022-12-29 RX ADMIN — POTASSIUM CHLORIDE 40 MEQ: 750 TABLET, EXTENDED RELEASE ORAL at 02:27

## 2022-12-29 RX ADMIN — ASPIRIN 81 MG: 81 TABLET, COATED ORAL at 18:35

## 2022-12-29 RX ADMIN — ESCITALOPRAM 10 MG: 10 TABLET, FILM COATED ORAL at 18:29

## 2022-12-29 RX ADMIN — Medication 5000 UNITS: at 18:29

## 2022-12-29 RX ADMIN — INSULIN HUMAN 12 UNITS: 100 INJECTION, SOLUTION PARENTERAL at 02:00

## 2022-12-29 RX ADMIN — Medication 10 ML: at 02:00

## 2022-12-29 RX ADMIN — MELOXICAM 15 MG: 15 TABLET ORAL at 18:31

## 2022-12-29 RX ADMIN — LISINOPRIL 40 MG: 20 TABLET ORAL at 18:31

## 2022-12-29 RX ADMIN — INSULIN LISPRO 10 UNITS: 100 INJECTION, SOLUTION INTRAVENOUS; SUBCUTANEOUS at 11:56

## 2022-12-29 RX ADMIN — LEVETIRACETAM 500 MG: 100 INJECTION, SOLUTION INTRAVENOUS at 21:31

## 2022-12-29 RX ADMIN — LABETALOL HYDROCHLORIDE 20 MG: 5 INJECTION, SOLUTION INTRAVENOUS at 02:46

## 2022-12-29 RX ADMIN — DONEPEZIL HYDROCHLORIDE 10 MG: 10 TABLET, FILM COATED ORAL at 21:32

## 2022-12-29 NOTE — CASE MANAGEMENT/SOCIAL WORK
Discharge Planning Assessment  [facility]     Patient Name: Lucia Ellis  MRN: [id number]  Today's Date: 12/29/2022    Admit Date: 12/28/2022    Plan: Return home with [facility] following (referral pending)   Discharge Needs Assessment     Row Name 12/29/22 1419       Living Environment    People in Home child(donovan), adult    Name(s) of People in Home Aureliano Munoz    Current Living Arrangements home    Primary Care Provided by self    Provides Primary Care For no one, unable/limited ability to care for self    Family Caregiver if Needed child(donovan), adult    Family Caregiver Names Aureliano Munoz [phone]    Quality of Family Relationships helpful    Able to Return to Prior Arrangements yes       Resource/Environmental Concerns    Resource/Environmental Concerns none    Transportation Concerns none       Transition Planning    Patient/Family Anticipates Transition to home with family    Patient/Family Anticipated Services at Transition home health care    Transportation Anticipated family or friend will provide       Discharge Needs Assessment    Readmission Within the Last 30 Days no previous admission in last 30 days    Equipment Currently Used at Home glucometer    Concerns to be Addressed basic needs    Anticipated Changes Related to Illness none    Equipment Needed After Discharge none    Provided Post Acute Provider List? Yes    Post Acute Provider List Home Health    Delivered To Patient    Method of Delivery In person               Discharge Plan     Row Name 12/29/22 1420       Plan    Plan Return home with [facility] following (referral pending)    Patient/Family in Agreement with Plan yes    Plan Comments Spoke with patient at bedside.  She lives with aureliano Munoz [phone], uses a glucometer, has never used HH or been to SNF.  PCP is Dr. Everardo Mazariegos and pharmacy is [facility].  Patient does not drive, her son helps and her sister Jessica Recinos [phone] also drives  and helps her some if needed.  She would like  at [place] - given list of providers, she would like [facility] to follow - spoke with Brittaney.  CCP will follow.  Conrad WATTS              Continued Care and Services - Admitted Since 12/28/2022     Home Medical Care     Service Provider Request Status Selected Services Address Phone Fax Patient Preferred     [facility] Pending - Request Sent N/A [address] [address] [phone] --                 Demographic Summary     Row Name 12/29/22 1417       General Information    Admission Type observation    Arrived From home    Referral Source admission list    Reason for Consult discharge planning    Preferred Language English               Functional Status     Row Name 12/29/22 1418       Functional Status    Usual Activity Tolerance moderate    Current Activity Tolerance moderate       Functional Status, IADL    Medications independent    Meal Preparation independent    Housekeeping independent    Laundry independent    Shopping assistive person       Mental Status    General Appearance WDL WDL       Mental Status Summary    Recent Changes in Mental Status/Cognitive Functioning no changes                           Becky S. [place], RN

## 2022-12-29 NOTE — NURSING NOTE
Pt transferred over. Pt resting comfortably in bed. Son at bedside. Pt blood pressure elevated 154/82 pt received IV Labetalol 20 mg @246 am will continue to monitor. Other vitals stable. Pt A & O x 3 forgetful at times can be redirected. Follows command. Pt c/o bilateral lower extremity cramping. Pt K was low-replaced 40 meq oral and received Tylenol 1000 mg oral.  No c/o chest pain, dizziness or shortness of breath. Pt oriented to room. Call light and belongings in reach. Bed alarm intact. Will continue to monitor.

## 2022-12-29 NOTE — DISCHARGE PLACEMENT REQUEST
Randi Swanson (72 y.o. Female)     Date of Birth   1950    Social Security Number       Address   [address] [place] [address]    Home Phone   [phone]    MRN   [id number]       Hinduism   Pentecostalism    Marital Status                               Admission Date   12/28/22    Admission Type   Emergency    Admitting Provider   Guille Barrett MD    Attending Provider   Sreedhar Koo MD    Department, Room/Bed    Emergency Department, 37/37       Discharge Date       Discharge Disposition       Discharge Destination                               Attending Provider: Sreedhar Koo MD    Allergies: Ppd [Tuberculin Purified Protein Derivative]    Isolation: None   Infection: None   Code Status: CPR    Ht: 162.6 cm (64\")   Wt: 58.1 kg (128 lb)    Admission Cmt: None   Principal Problem: Uncontrolled type 2 diabetes mellitus with hypoglycemia without coma (HCC) [E11.649]                 Active Insurance as of 12/28/2022     Primary Coverage     Payor Plan Insurance Group Employer/Plan Group    ANTH MEDICARE REPLACEMENT ANTH MEDICARE ADVANTAGE [id number]     Payor Plan Address Payor Plan Phone Number Payor Plan Fax Number Effective Dates    [address] [phone]  6/1/2022 - None Entered    [facility] [address]       Subscriber Name Subscriber Birth Date Member ID       RANDI SWANSON 1950 [id number]                 Emergency Contacts      (Rel.) Home Phone Work Phone Mobile Phone    JrojeMarinoJessica (Sister) [phone] -- [phone]    Vern ALATORRE (Son) [phone]

## 2022-12-29 NOTE — PLAN OF CARE
Goal Outcome Evaluation:  Plan of Care Reviewed With: patient        Progress: no change  Outcome Evaluation: VSS. No Pain. No SOB. Treating with sliding scale. CTM

## 2022-12-29 NOTE — ED TRIAGE NOTES
To ER via EMS from home.  States approx 2245 rt arm started \"flopping around\" uncontrollable.  Lasted approx 1 min.  Pt also c/o frequent urination and is concerned she may have an UTI.       Pt started having symptoms at triage and appears to be having a focal seizure.     Pt in mask at time of triage.  Triage staff in appropriate PPE.

## 2022-12-29 NOTE — ED PROVIDER NOTES
EMERGENCY DEPARTMENT ENCOUNTER    Room Number:  21/21  Date of encounter:  12/29/2022  PCP: Everardo Mazariegos MD  Historian: Patient, son at bedside    I used full protective equipment while examining this patient.  This includes face mask, gloves and protective eyewear.  I washed my hands before entering the room and immediately upon leaving the room      HPI:  Chief Complaint: Uncontrollable right arm movements  A complete HPI/ROS/PMH/PSH/SH/FH are unobtainable due to: Patient with chronic memory impairment    Context: Lucia Ellis is a 72 y.o. female who presents to the ED c/o uncontrollable right arm movements.  Patient had onset of right arm shaking and twitching which lasted for about 20 or 30 minutes and then resolved.  Patient did not lose consciousness and was alert and awake throughout.  EMS was called and ultimately symptoms resolved.  Currently patient denies any unusual neurologic complaints.  She has not been sick recently and had a fairly unremarkable day.      MEDICAL RECORD REVIEW  I reviewed prior medical records including most recent office visit with primary care provider Dr. Ramirez.  Patient with history of type 2 diabetes, hypertension hyperlipidemia.  Also with memory difficulty, possible dementia.    PAST MEDICAL HISTORY  Active Ambulatory Problems     Diagnosis Date Noted   • Gastroesophageal reflux disease 02/09/2017   • Malignant neoplasm of breast (HCC) 02/09/2017   • Depression 02/09/2017   • Type 2 diabetes mellitus (HCC) 02/09/2017   • Folliculitis 02/09/2017   • Generalized anxiety disorder 02/09/2017   • Hyperlipidemia 02/09/2017   • Essential hypertension 02/09/2017   • Status post total right knee replacement 02/09/2017   • Rectal hemorrhage 02/09/2017   • Vitamin D deficiency 02/09/2017   • Drug therapy 02/09/2017   • Acute cholecystitis 05/10/2018   • Memory difficulty 06/24/2019     Resolved Ambulatory Problems     Diagnosis Date Noted   • No Resolved Ambulatory Problems      Past Medical History:   Diagnosis Date   • Breast cancer (HCC)    • Diabetes mellitus (HCC)    • Hypertension    • Memory loss          PAST SURGICAL HISTORY  Past Surgical History:   Procedure Laterality Date   • CHOLECYSTECTOMY     • HYSTERECTOMY     • MASTECTOMY Right 1995   • TOTAL KNEE ARTHROPLASTY Right          FAMILY HISTORY  Family History   Problem Relation Age of Onset   • Heart attack Mother    • Heart disease Mother    • Hypertension Mother    • Hypertension Father    • Diabetes Father    • Hypertension Sister    • Diabetes Sister    • Thyroid cancer Sister    • Breast cancer Sister    • Lung cancer Sister    • Diabetes Brother    • Drug abuse Paternal Grandmother          SOCIAL HISTORY  Social History     Socioeconomic History   • Marital status:    Tobacco Use   • Smoking status: Never   • Smokeless tobacco: Never   Vaping Use   • Vaping Use: Never used   Substance and Sexual Activity   • Alcohol use: Yes     Alcohol/week: 7.0 standard drinks     Types: 7 Glasses of wine per week     Comment: per patient's son, patient drinks one glass of wine daily   • Drug use: No   • Sexual activity: Never         ALLERGIES  Ppd [tuberculin purified protein derivative]       REVIEW OF SYSTEMS  Review of Systems   Constitutional: Negative.  Negative for fever.   HENT: Negative.  Negative for sore throat.    Eyes: Negative.    Respiratory: Negative.  Negative for cough.    Cardiovascular: Negative.  Negative for chest pain.   Gastrointestinal: Negative.    Genitourinary: Negative.  Negative for dysuria.   Musculoskeletal: Negative.  Negative for back pain.   Skin: Negative.  Negative for rash.   Neurological: Positive for tremors. Negative for headaches.        Unusual twitching and shaking of the right arm as per HPI   All other systems reviewed and are negative.          PHYSICAL EXAM    I have reviewed the triage vital signs and nursing notes.    ED Triage Vitals [12/28/22 2340]   Temp Heart Rate Resp  BP SpO2   97.7 °F (36.5 °C) 100 16 (!) 181/89 98 %      Temp src Heart Rate Source Patient Position BP Location FiO2 (%)   Tympanic -- -- -- --       Physical Exam  GENERAL: Alert and pleasant female in no obvious distress.  Triage vitals reviewed notable for initially elevated blood pressure 181/89.  Afebrile, heart rate 100.  O2 sats benign  HENT: nares patent  EYES: no scleral icterus  CV: regular rhythm, regular rate-no murmur  RESPIRATORY: normal effort, clear to auscultation bilaterally  ABDOMEN: soft, nontender to palpation  MUSCULOSKELETAL: no deformity  NEURO: Strength sensation and coordination are grossly intact.  Speech and mentation are unremarkable.  NIH 0.  SKIN: warm, dry      LAB RESULTS  Recent Results (from the past 24 hour(s))   Comprehensive Metabolic Panel    Collection Time: 12/29/22 12:52 AM    Specimen: Blood   Result Value Ref Range    Glucose 705 (C) 65 - 99 mg/dL    BUN 11 8 - 23 mg/dL    Creatinine 0.79 0.57 - 1.00 mg/dL    Sodium 136 136 - 145 mmol/L    Potassium 3.3 (L) 3.5 - 5.2 mmol/L    Chloride 96 (L) 98 - 107 mmol/L    CO2 27.1 22.0 - 29.0 mmol/L    Calcium 10.1 8.6 - 10.5 mg/dL    Total Protein 7.6 6.0 - 8.5 g/dL    Albumin 4.7 3.5 - 5.2 g/dL    ALT (SGPT) 15 1 - 33 U/L    AST (SGOT) 16 1 - 32 U/L    Alkaline Phosphatase 131 (H) 39 - 117 U/L    Total Bilirubin 0.4 0.0 - 1.2 mg/dL    Globulin 2.9 gm/dL    A/G Ratio 1.6 g/dL    BUN/Creatinine Ratio 13.9 7.0 - 25.0    Anion Gap 12.9 5.0 - 15.0 mmol/L    eGFR 78.6 >60.0 mL/min/1.73   Magnesium    Collection Time: 12/29/22 12:52 AM    Specimen: Blood   Result Value Ref Range    Magnesium 2.2 1.6 - 2.4 mg/dL   TSH    Collection Time: 12/29/22 12:52 AM    Specimen: Blood   Result Value Ref Range    TSH 0.841 0.270 - 4.200 uIU/mL   CBC Auto Differential    Collection Time: 12/29/22 12:52 AM    Specimen: Blood   Result Value Ref Range    WBC 5.05 3.40 - 10.80 10*3/mm3    RBC 4.77 3.77 - 5.28 10*6/mm3    Hemoglobin 13.7 12.0 - 15.9 g/dL     Hematocrit 41.8 34.0 - 46.6 %    MCV 87.6 79.0 - 97.0 fL    MCH 28.7 26.6 - 33.0 pg    MCHC 32.8 31.5 - 35.7 g/dL    RDW 11.6 (L) 12.3 - 15.4 %    RDW-SD 37.9 37.0 - 54.0 fl    MPV 11.6 6.0 - 12.0 fL    Platelets 196 140 - 450 10*3/mm3    Neutrophil % 61.8 42.7 - 76.0 %    Lymphocyte % 29.5 19.6 - 45.3 %    Monocyte % 7.1 5.0 - 12.0 %    Eosinophil % 0.4 0.3 - 6.2 %    Basophil % 0.8 0.0 - 1.5 %    Immature Grans % 0.4 0.0 - 0.5 %    Neutrophils, Absolute 3.12 1.70 - 7.00 10*3/mm3    Lymphocytes, Absolute 1.49 0.70 - 3.10 10*3/mm3    Monocytes, Absolute 0.36 0.10 - 0.90 10*3/mm3    Eosinophils, Absolute 0.02 0.00 - 0.40 10*3/mm3    Basophils, Absolute 0.04 0.00 - 0.20 10*3/mm3    Immature Grans, Absolute 0.02 0.00 - 0.05 10*3/mm3    nRBC 0.0 0.0 - 0.2 /100 WBC   Troponin    Collection Time: 12/29/22 12:52 AM    Specimen: Blood   Result Value Ref Range    Troponin T <0.010 0.000 - 0.030 ng/mL   ECG 12 Lead Electrolyte Imbalance    Collection Time: 12/29/22  2:57 AM   Result Value Ref Range    QT Interval 358 ms       Ordered the above labs and independently reviewed the results.      RADIOLOGY  CT Head Without Contrast    Result Date: 12/29/2022  CT HEAD WITHOUT CONTRAST  HISTORY: Right arm shaking and twitching  COMPARISON: 05/07/2021  TECHNIQUE: Axial CT imaging was obtained through the brain. No IV contrast was administered.  FINDINGS: No acute intracranial hemorrhage is seen. There is diffuse atrophy. There is periventricular and deep white matter microangiopathic change. There is no midline shift or mass effect. The paranasal sinuses and mastoid air cells appear clear.      No acute intracranial findings.  Radiation dose reduction techniques were utilized, including automated exposure control and exposure modulation based on body size.  This report was finalized on 12/29/2022 3:02 AM by Dr. Elvira Plasencia M.D.        I ordered the above noted radiological studies. Reviewed by me and discussed with  radiologist.  See dictation for official radiology interpretation.      PROCEDURES  Procedures      MEDICATIONS GIVEN IN ER    Medications   sodium chloride 0.9 % flush 10 mL (10 mL Intravenous Given 12/29/22 0200)   insulin regular (humuLIN R,novoLIN R) injection 12 Units (12 Units Intravenous Given 12/29/22 0200)   potassium chloride (K-DUR,KLOR-CON) ER tablet 40 mEq (40 mEq Oral Given 12/29/22 0227)   acetaminophen (TYLENOL) tablet 1,000 mg (1,000 mg Oral Given 12/29/22 0244)   labetalol (NORMODYNE,TRANDATE) injection 20 mg (20 mg Intravenous Given 12/29/22 0246)         PROGRESS, DATA ANALYSIS, CONSULTS, AND MEDICAL DECISION MAKING    All labs have been independently reviewed by me.  All radiology studies have been reviewed by me and discussed with radiologist dictating the report.   EKG's independently viewed and interpreted by me.  Discussion below represents my analysis of pertinent findings related to patient's condition, differential diagnosis, treatment plan and final disposition.      ED Course as of 12/29/22 0324   Thu Dec 29, 2022   0042 SWG-24-pleb-old female with history of diabetes, memory loss presents after uncontrollable twitching motions of the right arm lasting roughly 20 minutes prior to arrival.  Currently patient is awake alert and well-appearing.  Neurologic exam is benign other than chronic memory impairment.    Differential diagnosis would include but is not limited to the following:    Partial seizure  Muscle twitching  Electrolyte disturbance  Poorly controlled diabetes [DB]   0136 Labs are back and notable for pretty significantly elevated glucose of 703.  Patient with normal bicarb of 27 would go against DKA.  Patient is somewhat hypokalemic with potassium of 3.3 and will replenish with some oral potassium particularly and that we will be giving her some insulin.  CBC is unremarkable.  TSH and magnesium are within normal limits. [DB]   0137 I reviewed records and note the patient is  taking Jardiance and Janumet. [DB]   0210 I discussed at length with patient and family about whether she is taking her diabetes medication.  Patient has pretty significant memory loss and cannot recall whether she is taking anything for her sugar.  The son also did not know what medications were really for diabetes and when he brought up all her pill bottles could not find any Jardiance or Janumet.  It is unclear whether she is taking any diabetes medications at this time.  Hopefully the son can go back to the home and see if she has been taking these medications but it is my opinion that she probably has not been taking either. [DB]   0229 CT scan of the brain independently reviewed shows no obvious acute abnormality.  Awaiting radiology confirmation but we will go ahead and contact [facility] about admission for uncontrolled hyperglycemia secondary to diabetes mellitus.  Also with concerns for possible partial seizure versus myoclonus related to hyperglycemia. [DB]   0242 Patient's pressure had been slightly elevated on multiple readings but repeat pressure most recently was 218/98.  We will go ahead and give some IV labetalol as I suspect patient has been noncompliant with most of her medications. [DB]   0254 EKG          EKG time: 0257  Rhythm/Rate: Sinus 92  P waves and PA: Inverted P waves suggesting left atrial enlargement, normal PA interval  QRS, axis: Normal axis, normal QRS  ST and T waves: Nonspecific ST and T wave changes    Interpreted Contemporaneously by me, independently viewed  Not significantly changed compared to prior 2015   [DB]   0317 CT scan of the brain discussed with Dr. Plasencia shows no acute intracranial pathology. [DB]   0318 Serum troponin normal which would go against acute myocardial infarction.    Suspect patient's abnormal right arm movements may be related to hyperglycemia or uncontrolled blood pressure.  I suspect both findings related to patient's likely noncompliance with  medications.  Patient has very poor memory and probably needs guidance and supervision as to taking her medications. [DB]   0323 I discussed evaluation this patient with midlevel practitioner on-call for [facility] who will admit on behalf of Dr. Barrett. [DB]      ED Course User Index  [DB] Sebastian Reis MD       AS OF 03:24 EST VITALS:    BP - (!) 187/102  HR - 89  TEMP - 97.7 °F (36.5 °C) (Tympanic)  O2 SATS - 98%      DIAGNOSIS  Final diagnoses:   Uncontrolled type 2 diabetes mellitus with hypoglycemia without coma (HCC)   Hypokalemia   Uncontrolled hypertension   Upper extremity myoclonus         DISPOSITION  Admission         Sebastian Reis MD  12/29/22 0329

## 2022-12-29 NOTE — PROGRESS NOTES
Clinical Pharmacy Services: Medication History    Lucia Ellis is a 72 y.o. female presenting to [facility] for   Chief Complaint   Patient presents with   • Seizures       She  has a past medical history of Breast cancer (HCC), Diabetes mellitus (HCC), Hypertension, and Memory loss.    Allergies as of 12/28/2022 - Reviewed 12/28/2022   Allergen Reaction Noted   • Ppd [tuberculin purified protein derivative] Rash 10/09/2020       Medication information was obtained from: Medication Bottles  Pharmacy and Phone Number:    Prior to Admission Medications     Prescriptions Last Dose Informant Patient Reported? Taking?    ALPRAZolam (XANAX) 0.5 MG tablet  Medication Bottle Yes Yes    Take 0.25 mg by mouth At Night As Needed for Anxiety.    amLODIPine (NORVASC) 10 MG tablet  Medication Bottle No Yes    Take 1 tablet by mouth Daily.    benzonatate (Tessalon Perles) 100 MG capsule  Medication Bottle No Yes    Take 1 capsule by mouth 3 (Three) Times a Day As Needed for Cough.    cetirizine (zyrTEC) 10 MG tablet  Medication Bottle No Yes    Take 1 tablet by mouth Daily.    donepezil (ARICEPT) 10 MG tablet  Medication Bottle No Yes    TAKE 1/2 TABLET BY MOUTH ONCE NIGHTLY    Patient taking differently:  Take 5 mg by mouth Every Night.    escitalopram (Lexapro) 10 MG tablet  Medication Bottle No Yes    Take 1 tablet by mouth Daily.    meloxicam (MOBIC) 15 MG tablet  Medication Bottle Yes Yes    Take 15 mg by mouth Daily.    memantine (Namenda) 5 MG tablet  Medication Bottle No Yes    Take 1 tablet by mouth Daily.    oxyCODONE-acetaminophen (PERCOCET) 5-325 MG per tablet  Medication Bottle Yes Yes    Take 1 tablet by mouth Every 4 (Four) Hours As Needed.    pravastatin (PRAVACHOL) 10 MG tablet  Medication Bottle No Yes    Take 1 tablet by mouth Every Night.    quinapril (ACCUPRIL) 40 MG tablet  Medication Bottle No Yes    Take 1 tablet by mouth Daily.            Medication notes:     This medication list is  complete to the best of my knowledge as of 12/29/2022    Please call if questions.    Aleksandr Delarosa  Medication History Technician  [phone]    12/29/2022 13:21 EST

## 2022-12-29 NOTE — H&P
Patient Name:  Lucia Ellis  YOB: 1950  MRN:  [id number]  Admit Date:  12/28/2022  Patient Care Team:  Everardo Mazariegos MD as PCP - General (Family Medicine)      Subjective   History Present Illness     Chief Complaint   Patient presents with   • Seizures       Ms. Ellis is a 72 y.o. non-smoker with a history of memory loss, HTN, H LD, DM2 that presents to [facility] complaining of twitching of her right arm for approximately 20 minutes.  She states that the twitching of her right arm felt uncontrollable but it was somehow associated with a \"tiny cut\" on right pointer finger \"from her puppy.\"  She denies loss of consciousness, confusion, fever, chills, or injury.  No prior history of seizure disorder.  Patient is alert and oriented x3 and lives with her son.  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           History of Present Illness  Review of Systems   Constitutional: Negative for appetite change, chills, fatigue and unexpected weight change.   HENT: Negative for trouble swallowing.    Eyes: Negative for visual disturbance.   Respiratory: Negative for choking and shortness of breath.    Cardiovascular: Negative for chest pain.   Gastrointestinal: Negative for abdominal distention, abdominal pain, blood in stool, constipation, diarrhea, nausea and vomiting.   Endocrine: Negative for polydipsia, polyphagia and polyuria.   Genitourinary: Negative for difficulty urinating and dysuria.   Musculoskeletal: Negative for back pain.   Skin: Negative for color change.   Neurological: Negative for dizziness.        Arm twitching   Hematological: Does not bruise/bleed easily.   Psychiatric/Behavioral: Positive for confusion and decreased concentration.        Personal History     Past Medical History:   Diagnosis Date   • Breast cancer (HCC)    • Diabetes mellitus (HCC)    • Hypertension    • Memory  loss      Past Surgical History:   Procedure Laterality Date   • CHOLECYSTECTOMY     • HYSTERECTOMY     • MASTECTOMY Right 1995   • TOTAL KNEE ARTHROPLASTY Right      Family History   Problem Relation Age of Onset   • Heart attack Mother    • Heart disease Mother    • Hypertension Mother    • Hypertension Father    • Diabetes Father    • Hypertension Sister    • Diabetes Sister    • Thyroid cancer Sister    • Breast cancer Sister    • Lung cancer Sister    • Diabetes Brother    • Drug abuse Paternal Grandmother      Social History     Tobacco Use   • Smoking status: Never   • Smokeless tobacco: Never   Vaping Use   • Vaping Use: Never used   Substance Use Topics   • Alcohol use: Yes     Alcohol/week: 7.0 standard drinks     Types: 7 Glasses of wine per week     Comment: per patient's son, patient drinks one glass of wine daily   • Drug use: No     No current facility-administered medications on file prior to encounter.     Current Outpatient Medications on File Prior to Encounter   Medication Sig Dispense Refill   • amLODIPine (NORVASC) 10 MG tablet Take 1 tablet by mouth Daily. 90 tablet 1   • cetirizine (zyrTEC) 10 MG tablet Take 1 tablet by mouth Daily. 30 tablet 6   • donepezil (ARICEPT) 10 MG tablet TAKE 1/2 TABLET BY MOUTH ONCE NIGHTLY 30 tablet 5   • empagliflozin (Jardiance) 10 MG tablet tablet Take 1 tablet by mouth Daily. 30 tablet 11   • escitalopram (Lexapro) 10 MG tablet Take 1 tablet by mouth Daily. 90 tablet 1   • Janumet -1000 MG tablet Take 1 tablet by mouth Daily. PT NEEDS APPT FOR FURTHER REFILLS 30 tablet 0   • memantine (Namenda) 5 MG tablet Take 1 tablet by mouth Daily. 90 tablet 2   • pravastatin (PRAVACHOL) 10 MG tablet Take 1 tablet by mouth Every Night. 90 tablet 1   • quinapril (ACCUPRIL) 40 MG tablet Take 1 tablet by mouth Daily. 90 tablet 1   • ALPRAZolam (XANAX) 0.5 MG tablet Take 1 tablet by mouth At Night As Needed for Sleep. 90 tablet 0   • aspirin 81 MG EC tablet Take 81 mg by  mouth Daily.     • benzonatate (Tessalon Perles) 100 MG capsule Take 1 capsule by mouth 3 (Three) Times a Day As Needed for Cough. 42 capsule 0   • fluticasone (Flonase) 50 MCG/ACT nasal spray 2 sprays into the nostril(s) as directed by provider Daily. 15.8 mL 6   • meloxicam (MOBIC) 15 MG tablet Take 15 mg by mouth Daily.     • metFORMIN (FORTAMET) 1000 MG (OSM) 24 hr tablet Take 1 tablet by mouth Daily With Breakfast. 90 tablet 2   • mupirocin (BACTROBAN) 2 % ointment APPLY TO AFFECTED AREA(S) TOPICALLY DAILY 15 g 2   • NON FORMULARY \"Brain Formala\" vitamin d,b,seleniam magniesium     • vitamin D3 125 MCG (5000 UT) capsule capsule TAKE ONE CAPSULE BY MOUTH DAILY 90 capsule 2     Allergies   Allergen Reactions   • Ppd [Tuberculin Purified Protein Derivative] Rash       Objective    Objective     Vital Signs  Temp:  [97.7 °F (36.5 °C)-98.5 °F (36.9 °C)] 98.5 °F (36.9 °C)  Heart Rate:  [] 92  Resp:  [16] 16  BP: (134-218)/() 148/83  SpO2:  [95 %-98 %] 95 %  on   ;   Device (Oxygen Therapy): room air  Body mass index is 21.97 kg/m².    Physical Exam  Vitals and nursing note reviewed.   Constitutional:       Appearance: Normal appearance. She is well-developed.      Comments: Pleasant NAD   HENT:      Head: Normocephalic and atraumatic.   Eyes:      Pupils: Pupils are equal, round, and reactive to light.   Cardiovascular:      Rate and Rhythm: Normal rate and regular rhythm.      Heart sounds: Normal heart sounds.   Pulmonary:      Effort: Pulmonary effort is normal. No respiratory distress.      Breath sounds: Normal breath sounds. No stridor. No wheezing.   Abdominal:      General: Bowel sounds are normal. There is no distension or abdominal bruit.      Palpations: Abdomen is soft. Abdomen is not rigid. There is no shifting dullness, fluid wave, mass or pulsatile mass.      Tenderness: There is no abdominal tenderness. There is no guarding.      Hernia: No hernia is present.   Musculoskeletal:          General: Normal range of motion.   Skin:     General: Skin is warm and dry.      Comments: Superficial small red spot on right point finger at nail bed.    Neurological:      General: No focal deficit present.      Mental Status: She is alert and oriented to person, place, and time. Mental status is at baseline.   Psychiatric:         Attention and Perception: Attention normal.         Mood and Affect: Mood normal.         Speech: Speech normal.         Behavior: Behavior normal.         Thought Content: Thought content normal.         Cognition and Memory: Memory is impaired. She exhibits impaired recent memory.         Results Review:  I reviewed the patient's new clinical results.  I reviewed the patient's new imaging results and agree with the interpretation.  I reviewed the patient's other test results and agree with the interpretation  I personally viewed and interpreted the patient's EKG/Telemetry data  Discussed with ED provider.    Lab Results (last 24 hours)     Procedure Component Value Units Date/Time    CBC & Differential [[id number]]  (Abnormal) Collected: 12/29/22 0052    Specimen: Blood Updated: 12/29/22 0100    Narrative:      The following orders were created for panel order CBC & Differential.  Procedure                               Abnormality         Status                     ---------                               -----------         ------                     CBC Auto Differential[[id number]]        Abnormal            Final result                 Please view results for these tests on the individual orders.    Comprehensive Metabolic Panel [[id number]]  (Abnormal) Collected: 12/29/22 0052    Specimen: Blood Updated: 12/29/22 0131     Glucose 705 mg/dL      BUN 11 mg/dL      Creatinine 0.79 mg/dL      Sodium 136 mmol/L      Potassium 3.3 mmol/L      Comment: Slight hemolysis detected by analyzer. Results may be affected.        Chloride 96 mmol/L      CO2 27.1 mmol/L      Calcium 10.1 mg/dL       Total Protein 7.6 g/dL      Albumin 4.7 g/dL      ALT (SGPT) 15 U/L      AST (SGOT) 16 U/L      Alkaline Phosphatase 131 U/L      Total Bilirubin 0.4 mg/dL      Globulin 2.9 gm/dL      A/G Ratio 1.6 g/dL      BUN/Creatinine Ratio 13.9     Anion Gap 12.9 mmol/L      eGFR 78.6 mL/min/1.73      Comment: National Kidney Foundation and American Society of Nephrology (ASN) Task Force recommended calculation based on the Chronic Kidney Disease Epidemiology Collaboration (CKD-EPI) equation refit without adjustment for race.       Narrative:      GFR Normal >60  Chronic Kidney Disease <60  Kidney Failure <15    The GFR formula is only valid for adults with stable renal function between ages 18 and 70.    Magnesium [[id number]]  (Normal) Collected: 12/29/22 0052    Specimen: Blood Updated: 12/29/22 0120     Magnesium 2.2 mg/dL     TSH [[id number]]  (Normal) Collected: 12/29/22 0052    Specimen: Blood Updated: 12/29/22 0127     TSH 0.841 uIU/mL     CBC Auto Differential [[id number]]  (Abnormal) Collected: 12/29/22 0052    Specimen: Blood Updated: 12/29/22 0100     WBC 5.05 10*3/mm3      RBC 4.77 10*6/mm3      Hemoglobin 13.7 g/dL      Hematocrit 41.8 %      MCV 87.6 fL      MCH 28.7 pg      MCHC 32.8 g/dL      RDW 11.6 %      RDW-SD 37.9 fl      MPV 11.6 fL      Platelets 196 10*3/mm3      Neutrophil % 61.8 %      Lymphocyte % 29.5 %      Monocyte % 7.1 %      Eosinophil % 0.4 %      Basophil % 0.8 %      Immature Grans % 0.4 %      Neutrophils, Absolute 3.12 10*3/mm3      Lymphocytes, Absolute 1.49 10*3/mm3      Monocytes, Absolute 0.36 10*3/mm3      Eosinophils, Absolute 0.02 10*3/mm3      Basophils, Absolute 0.04 10*3/mm3      Immature Grans, Absolute 0.02 10*3/mm3      nRBC 0.0 /100 WBC     Troponin [[id number]]  (Normal) Collected: 12/29/22 0052    Specimen: Blood Updated: 12/29/22 0310     Troponin T <0.010 ng/mL     Narrative:      Troponin T Reference Range:  <= 0.03 ng/mL-   Negative for AMI  >0.03 ng/mL-     Abnormal  for myocardial necrosis.  Clinicians would have to utilize clinical acumen, EKG, Troponin and serial changes to determine if it is an Acute Myocardial Infarction or myocardial injury due to an underlying chronic condition.       Results may be falsely decreased if patient taking Biotin.      POC Glucose Once [[id number]]  (Abnormal) Collected: 12/29/22 0510    Specimen: Blood Updated: 12/29/22 0511     Glucose 335 mg/dL      Comment: Meter: [id number] : jamal Barriga RN       Basic Metabolic Panel [[id number]]  (Abnormal) Collected: 12/29/22 0609    Specimen: Blood Updated: 12/29/22 0638     Glucose 383 mg/dL      BUN 10 mg/dL      Creatinine 0.63 mg/dL      Sodium 136 mmol/L      Potassium 3.5 mmol/L      Chloride 99 mmol/L      CO2 24.0 mmol/L      Calcium 9.4 mg/dL      BUN/Creatinine Ratio 15.9     Anion Gap 13.0 mmol/L      eGFR 93.2 mL/min/1.73      Comment: National Kidney Foundation and American Society of Nephrology (ASN) Task Force recommended calculation based on the Chronic Kidney Disease Epidemiology Collaboration (CKD-EPI) equation refit without adjustment for race.       Narrative:      GFR Normal >60  Chronic Kidney Disease <60  Kidney Failure <15    The GFR formula is only valid for adults with stable renal function between ages 18 and 70.    CBC & Differential [[id number]]  (Abnormal) Collected: 12/29/22 0609    Specimen: Blood Updated: 12/29/22 0620    Narrative:      The following orders were created for panel order CBC & Differential.  Procedure                               Abnormality         Status                     ---------                               -----------         ------                     CBC Auto Differential[[id number]]        Abnormal            Final result                 Please view results for these tests on the individual orders.    CBC Auto Differential [[id number]]  (Abnormal) Collected: 12/29/22 0609    Specimen: Blood Updated: 12/29/22 0620     WBC  10.56 10*3/mm3      RBC 4.29 10*6/mm3      Hemoglobin 12.7 g/dL      Hematocrit 36.2 %      MCV 84.4 fL      MCH 29.6 pg      MCHC 35.1 g/dL      RDW 12.2 %      RDW-SD 37.2 fl      MPV 11.5 fL      Platelets 184 10*3/mm3      Neutrophil % 82.7 %      Lymphocyte % 11.8 %      Monocyte % 4.3 %      Eosinophil % 0.0 %      Basophil % 0.6 %      Immature Grans % 0.6 %      Neutrophils, Absolute 8.74 10*3/mm3      Lymphocytes, Absolute 1.25 10*3/mm3      Monocytes, Absolute 0.45 10*3/mm3      Eosinophils, Absolute 0.00 10*3/mm3      Basophils, Absolute 0.06 10*3/mm3      Immature Grans, Absolute 0.06 10*3/mm3      nRBC 0.0 /100 WBC     Hemoglobin A1c [[id number]]  (Abnormal) Collected: 12/29/22 0609    Specimen: Blood Updated: 12/29/22 0633     Hemoglobin A1C 12.70 %     Narrative:      Hemoglobin A1C Ranges:    Increased Risk for Diabetes  5.7% to 6.4%  Diabetes                     >= 6.5%  Diabetic Goal                < 7.0%    POC Glucose Once [[id number]]  (Abnormal) Collected: 12/29/22 0737    Specimen: Blood Updated: 12/29/22 0739     Glucose 349 mg/dL      Comment: Meter: [id number] : [id number] George BELLAMY       POC Glucose Once [[id number]]  (Abnormal) Collected: 12/29/22 1044    Specimen: Blood Updated: 12/29/22 1045     Glucose 334 mg/dL      Comment: Meter: [id number] : [id number] George BELLAMY             Imaging Results (Last 24 Hours)     Procedure Component Value Units Date/Time    CT Head Without Contrast [[id number]] Collected: 12/29/22 0259     Updated: 12/29/22 0306    Narrative:      CT HEAD WITHOUT CONTRAST     HISTORY: Right arm shaking and twitching     COMPARISON: 05/07/2021     TECHNIQUE: Axial CT imaging was obtained through the brain. No IV  contrast was administered.     FINDINGS:  No acute intracranial hemorrhage is seen. There is diffuse atrophy.  There is periventricular and deep white matter microangiopathic change.  There is no midline shift or mass effect. The paranasal  sinuses and  mastoid air cells appear clear.       Impression:      No acute intracranial findings.     Radiation dose reduction techniques were utilized, including automated  exposure control and exposure modulation based on body size.     This report was finalized on 12/29/2022 3:02 AM by Dr. Elvira Plasencia M.D.                 ECG 12 Lead Electrolyte Imbalance   Preliminary Result   HEART RATE= 92  bpm   RR Interval= 652  ms   NM Interval= 174  ms   P Horizontal Axis= 1  deg   P Front Axis= 13  deg   QRSD Interval= 91  ms   QT Interval= 358  ms   QRS Axis= 49  deg   T Wave Axis= 19  deg   - ABNORMAL ECG -   Sinus rhythm   Probable left atrial enlargement   Probable left ventricular hypertrophy   Electronically Signed By:    Date and Time of Study: 2022-12-29 02:57:45           Assessment/Plan     Active Hospital Problems    Diagnosis  POA   • **Uncontrolled type 2 diabetes mellitus with hypoglycemia without coma (HCC) [E11.649]  Yes   • Myoclonus [G25.3]  Yes   • Type 2 diabetes mellitus with hyperglycemia (HCC) [E11.65]  Yes   • Hypokalemia [E87.6]  Unknown   • Memory difficulty [R41.3]  Yes   • Essential hypertension [I10]  Yes   • Gastroesophageal reflux disease [K21.9]  Yes      Resolved Hospital Problems   No resolved problems to display.       Ms. Ellis is a 72 y.o. admitted with uncontrollable movements of her right arm suspected to be myoclonus no history of seizure disorder.  Glucose elevated on admission without DKA.  She has memory loss likely dementia.     Myoclonus vs partial seizure   Suspect myoclonus related to hyperglycemia and uncontrolled HTN. No loss of consciousness and she remembers episode, no history of seizure. CT head without acute change  Check EEG and consider neurology consult. Ativan PRN    DM2 with hyperglycemia  Glucose >7000, A1c 12.6.  She has not been taking her medications likely due to dementia.  Start correctional insulin.  Diabetes educator.  Will need assistance from  son for medication compliance.  Per med rec she is supposed to be on Jardiance, Janumet, metformin.  Continue Jardiance and metformin to determine glucose control but continue SSI    Uncontrolled HTN  Blood pressure 181//98 in the ER.  Responded well to labetalol IV.  No focal neurologic deficits.  Myoclonus episode as above has resolved.  Continue ACE inhibitor, Norvasc    dementia without behavior distrubance   She admits that she has trouble remembering things and is becoming worse.  She is not sure in doubt she is taking her medications correctly but is not purposeful.  Prescribed Aricept and Namenda.  Continue Lexapro PT OT CCP eval  Recommend neurocognitive evaluation in the outpatient setting    Hypokalemia  Normal magnesium.  Replaced in the ER.  Continue to monitor.        I discussed the patient's findings and my recommendations with patient, ED provider and Dr Koo.    VTE Prophylaxis -  SCD  Code Status - Full code.       HERNANDO Brady  [facility]  12/29/22  10:57 EST     Addendum 1450: Contacted by neurologist reading EEG yes \"patient's EEG is abnormal and shows left fronto-temporal epileptiform discharges\" consult neurology and start IV Keppra.

## 2022-12-30 ENCOUNTER — TELEPHONE (OUTPATIENT)
Dept: INTERNAL MEDICINE | Facility: CLINIC | Age: 72
End: 2022-12-30

## 2022-12-30 ENCOUNTER — APPOINTMENT (OUTPATIENT)
Dept: MRI IMAGING | Facility: HOSPITAL | Age: 72
End: 2022-12-30
Payer: MEDICARE

## 2022-12-30 PROBLEM — G40.109 FOCAL MOTOR SEIZURE (HCC): Status: ACTIVE | Noted: 2022-12-30

## 2022-12-30 LAB
ANION GAP SERPL CALCULATED.3IONS-SCNC: 11.6 MMOL/L (ref 5–15)
BASOPHILS # BLD AUTO: 0.06 10*3/MM3 (ref 0–0.2)
BASOPHILS NFR BLD AUTO: 1 % (ref 0–1.5)
BUN SERPL-MCNC: 15 MG/DL (ref 8–23)
BUN/CREAT SERPL: 19.5 (ref 7–25)
CALCIUM SPEC-SCNC: 9.6 MG/DL (ref 8.6–10.5)
CHLORIDE SERPL-SCNC: 101 MMOL/L (ref 98–107)
CO2 SERPL-SCNC: 24.4 MMOL/L (ref 22–29)
CREAT SERPL-MCNC: 0.77 MG/DL (ref 0.57–1)
DEPRECATED RDW RBC AUTO: 37.5 FL (ref 37–54)
EGFRCR SERPLBLD CKD-EPI 2021: 81.1 ML/MIN/1.73
EOSINOPHIL # BLD AUTO: 0.05 10*3/MM3 (ref 0–0.4)
EOSINOPHIL NFR BLD AUTO: 0.8 % (ref 0.3–6.2)
ERYTHROCYTE [DISTWIDTH] IN BLOOD BY AUTOMATED COUNT: 12.3 % (ref 12.3–15.4)
GLUCOSE BLDC GLUCOMTR-MCNC: 151 MG/DL (ref 70–130)
GLUCOSE BLDC GLUCOMTR-MCNC: 176 MG/DL (ref 70–130)
GLUCOSE BLDC GLUCOMTR-MCNC: 181 MG/DL (ref 70–130)
GLUCOSE BLDC GLUCOMTR-MCNC: 232 MG/DL (ref 70–130)
GLUCOSE SERPL-MCNC: 248 MG/DL (ref 65–99)
HCT VFR BLD AUTO: 37.9 % (ref 34–46.6)
HGB BLD-MCNC: 13 G/DL (ref 12–15.9)
IMM GRANULOCYTES # BLD AUTO: 0.03 10*3/MM3 (ref 0–0.05)
IMM GRANULOCYTES NFR BLD AUTO: 0.5 % (ref 0–0.5)
LYMPHOCYTES # BLD AUTO: 2.62 10*3/MM3 (ref 0.7–3.1)
LYMPHOCYTES NFR BLD AUTO: 42 % (ref 19.6–45.3)
MAGNESIUM SERPL-MCNC: 2.1 MG/DL (ref 1.6–2.4)
MCH RBC QN AUTO: 28.5 PG (ref 26.6–33)
MCHC RBC AUTO-ENTMCNC: 34.3 G/DL (ref 31.5–35.7)
MCV RBC AUTO: 83.1 FL (ref 79–97)
MONOCYTES # BLD AUTO: 0.47 10*3/MM3 (ref 0.1–0.9)
MONOCYTES NFR BLD AUTO: 7.5 % (ref 5–12)
NEUTROPHILS NFR BLD AUTO: 3.01 10*3/MM3 (ref 1.7–7)
NEUTROPHILS NFR BLD AUTO: 48.2 % (ref 42.7–76)
NRBC BLD AUTO-RTO: 0 /100 WBC (ref 0–0.2)
PLATELET # BLD AUTO: 184 10*3/MM3 (ref 140–450)
PMV BLD AUTO: 11.2 FL (ref 6–12)
POTASSIUM SERPL-SCNC: 3.6 MMOL/L (ref 3.5–5.2)
RBC # BLD AUTO: 4.56 10*6/MM3 (ref 3.77–5.28)
SODIUM SERPL-SCNC: 137 MMOL/L (ref 136–145)
WBC NRBC COR # BLD: 6.24 10*3/MM3 (ref 3.4–10.8)

## 2022-12-30 PROCEDURE — G0378 HOSPITAL OBSERVATION PER HR: HCPCS

## 2022-12-30 PROCEDURE — 63710000001 INSULIN LISPRO (HUMAN) PER 5 UNITS: Performed by: NURSE PRACTITIONER

## 2022-12-30 PROCEDURE — 85025 COMPLETE CBC W/AUTO DIFF WBC: CPT | Performed by: NURSE PRACTITIONER

## 2022-12-30 PROCEDURE — 97530 THERAPEUTIC ACTIVITIES: CPT

## 2022-12-30 PROCEDURE — 0 GADOBENATE DIMEGLUMINE 529 MG/ML SOLUTION: Performed by: HOSPITALIST

## 2022-12-30 PROCEDURE — 83735 ASSAY OF MAGNESIUM: CPT | Performed by: HOSPITALIST

## 2022-12-30 PROCEDURE — 80048 BASIC METABOLIC PNL TOTAL CA: CPT | Performed by: NURSE PRACTITIONER

## 2022-12-30 PROCEDURE — 96376 TX/PRO/DX INJ SAME DRUG ADON: CPT

## 2022-12-30 PROCEDURE — 99214 OFFICE O/P EST MOD 30 MIN: CPT | Performed by: PSYCHIATRY & NEUROLOGY

## 2022-12-30 PROCEDURE — 97535 SELF CARE MNGMENT TRAINING: CPT

## 2022-12-30 PROCEDURE — 36415 COLL VENOUS BLD VENIPUNCTURE: CPT | Performed by: NURSE PRACTITIONER

## 2022-12-30 PROCEDURE — 82962 GLUCOSE BLOOD TEST: CPT

## 2022-12-30 PROCEDURE — A9577 INJ MULTIHANCE: HCPCS | Performed by: HOSPITALIST

## 2022-12-30 PROCEDURE — 25010000002 LEVETRIRACETAM PER 10 MG: Performed by: NURSE PRACTITIONER

## 2022-12-30 PROCEDURE — 97161 PT EVAL LOW COMPLEX 20 MIN: CPT

## 2022-12-30 PROCEDURE — 70553 MRI BRAIN STEM W/O & W/DYE: CPT

## 2022-12-30 PROCEDURE — 97165 OT EVAL LOW COMPLEX 30 MIN: CPT

## 2022-12-30 RX ORDER — LEVETIRACETAM 500 MG/1
1000 TABLET ORAL 2 TIMES DAILY
Status: DISCONTINUED | OUTPATIENT
Start: 2022-12-30 | End: 2022-12-31 | Stop reason: HOSPADM

## 2022-12-30 RX ORDER — SITAGLIPTIN AND METFORMIN HYDROCHLORIDE 1000; 50 MG/1; MG/1
1 TABLET, FILM COATED, EXTENDED RELEASE ORAL DAILY
Status: ON HOLD | COMMUNITY
End: 2022-12-31 | Stop reason: SDUPTHER

## 2022-12-30 RX ADMIN — METFORMIN HYDROCHLORIDE 1500 MG: 500 TABLET, EXTENDED RELEASE ORAL at 09:16

## 2022-12-30 RX ADMIN — MELOXICAM 15 MG: 15 TABLET ORAL at 09:16

## 2022-12-30 RX ADMIN — Medication 10 ML: at 21:40

## 2022-12-30 RX ADMIN — Medication 10 ML: at 09:17

## 2022-12-30 RX ADMIN — ESCITALOPRAM 10 MG: 10 TABLET, FILM COATED ORAL at 09:16

## 2022-12-30 RX ADMIN — ASPIRIN 81 MG: 81 TABLET, COATED ORAL at 09:16

## 2022-12-30 RX ADMIN — PRAVASTATIN SODIUM 10 MG: 10 TABLET ORAL at 21:40

## 2022-12-30 RX ADMIN — INSULIN LISPRO 3 UNITS: 100 INJECTION, SOLUTION INTRAVENOUS; SUBCUTANEOUS at 12:28

## 2022-12-30 RX ADMIN — Medication 5000 UNITS: at 09:16

## 2022-12-30 RX ADMIN — EMPAGLIFLOZIN 10 MG: 10 TABLET, FILM COATED ORAL at 12:29

## 2022-12-30 RX ADMIN — GADOBENATE DIMEGLUMINE 11 ML: 529 INJECTION, SOLUTION INTRAVENOUS at 18:52

## 2022-12-30 RX ADMIN — INSULIN LISPRO 3 UNITS: 100 INJECTION, SOLUTION INTRAVENOUS; SUBCUTANEOUS at 18:08

## 2022-12-30 RX ADMIN — LINAGLIPTIN 5 MG: 5 TABLET, FILM COATED ORAL at 18:09

## 2022-12-30 RX ADMIN — LEVETIRACETAM 500 MG: 100 INJECTION, SOLUTION INTRAVENOUS at 09:16

## 2022-12-30 RX ADMIN — MEMANTINE HYDROCHLORIDE 5 MG: 5 TABLET, FILM COATED ORAL at 09:16

## 2022-12-30 RX ADMIN — LEVETIRACETAM 1000 MG: 500 TABLET, FILM COATED ORAL at 18:09

## 2022-12-30 RX ADMIN — INSULIN LISPRO 5 UNITS: 100 INJECTION, SOLUTION INTRAVENOUS; SUBCUTANEOUS at 09:16

## 2022-12-30 RX ADMIN — DONEPEZIL HYDROCHLORIDE 10 MG: 10 TABLET, FILM COATED ORAL at 21:40

## 2022-12-30 NOTE — PLAN OF CARE
Goal Outcome Evaluation:               Vitals stable. Pt slept most of the night.Disoriented  to time & situation.Seizure precaution maintained. Neuro consulted,to see pt today.WCTM.

## 2022-12-30 NOTE — CASE MANAGEMENT/SOCIAL WORK
Continued Stay Note  Psychiatric     Patient Name: Lucia Ellis  MRN: [id number]  Today's Date: 12/30/2022    Admit Date: 12/28/2022    Plan: Home with son. Referral to [facility]  pending. PT/OT evals done and have signed off. Family to transport.   Discharge Plan     Row Name 12/30/22 1357       Plan    Plan Home with son. Referral to [facility]  pending. PT/OT evals done and have signed off. Family to transport.    Patient/Family in Agreement with Plan yes    Plan Comments EEG shows presence of epileptiform discharges arising from the left fronto-temporal head regions which place patient at increased risk for focal and secondarily generalized seizures. Neurology consulted. On IV Keppra. Ben Gurrola RN-BC               Discharge Codes    No documentation.               Expected Discharge Date and Time     Expected Discharge Date Expected Discharge Time                 Ben Gurrola RN

## 2022-12-30 NOTE — PLAN OF CARE
Goal Outcome Evaluation:  Plan of Care Reviewed With: patient           Outcome Evaluation: Pt seen for OT diaz this AM. Admitted with c/o abnormal movement of RUE that has resolved. PMHx includes HTN, DM2, HLD, dementia and prior breast cancer. Pt reports living with her son and being indep with ADLs prior and with mobility using no AD. Pt completed bed mobility with supervision and ambulated to toilet and sink with SBA. Performed geronimo care seated and standing with supervision. Min A for brief management. Pt performed functional mobility around nsg station to simulate household distances with SBA. Pt reports she feels she is at her baseline with ADLs and functional mobility. She reports she plans home with son assistance. OT to sign off at this time.    Therapist wore proper PPE and performed hand hygiene before and after session.

## 2022-12-30 NOTE — CONSULTS
Diabetes Education    Patient Name:  Lucia Ellis  YOB: 1950  MRN: [id number]  Admit Date:  12/28/2022    Attempted to speak to patient's son via phone to discuss pt's diabetes home care.  Call went to  and unable to leave  due to mail box full.  RN notified.      Electronically signed by:  Darcy James RN, [facility]  12/30/22 13:50 EST

## 2022-12-30 NOTE — THERAPY EVALUATION
Patient Name: Lucia Ellis  : 1950    MRN: [id number]                              Today's Date: 2022       Admit Date: 2022    Visit Dx:     ICD-10-CM ICD-9-CM   1. Uncontrolled type 2 diabetes mellitus with hypoglycemia without coma (HCC)  E11.649 250.82     251.2   2. Hypokalemia  E87.6 276.8   3. Uncontrolled hypertension  I10 401.9   4. Upper extremity myoclonus  G25.3 333.2     Patient Active Problem List   Diagnosis   • Gastroesophageal reflux disease   • Malignant neoplasm of breast (HCC)   • Depression   • Folliculitis   • Generalized anxiety disorder   • Hyperlipidemia   • Essential hypertension   • Status post total right knee replacement   • Rectal hemorrhage   • Vitamin D deficiency   • Drug therapy   • Acute cholecystitis   • Uncontrolled type 2 diabetes mellitus with hypoglycemia without coma (HCC)   • Myoclonus   • Type 2 diabetes mellitus with hyperglycemia (HCC)   • Hypokalemia   • Seizure (HCC)   • Dementia in other diseases classified elsewhere, moderate, without behavioral disturbance, psychotic disturbance, mood disturbance, and anxiety     Past Medical History:   Diagnosis Date   • Breast cancer (HCC)    • Dementia (HCC)    • Diabetes mellitus (HCC)    • Hypertension    • Memory loss      Past Surgical History:   Procedure Laterality Date   • CHOLECYSTECTOMY     • HYSTERECTOMY     • MASTECTOMY Right    • TOTAL KNEE ARTHROPLASTY Right       General Information     Row Name 22 1223          OT Time and Intention    Document Type evaluation  -KA     Mode of Treatment co-treatment;physical therapy;occupational therapy  -KA     Row Name 22 1223          General Information    Patient Profile Reviewed yes  -KA     Prior Level of Function independent:;ADL's;all household mobility  no AD  -KA     Existing Precautions/Restrictions no known precautions/restrictions  -KA     Row Name 22 1222          Living Environment    People in Home child(donovan), adult  Son   -     Row Name 12/30/22 1223          Home Main Entrance    Number of Stairs, Main Entrance two  -KA     Stair Railings, Main Entrance none  -     Row Name 12/30/22 1223          Stairs Within Home, Primary    Number of Stairs, Within Home, Primary none  -     Row Name 12/30/22 1223          Cognition    Orientation Status (Cognition) oriented x 3  -     Row Name 12/30/22 1223          Safety Issues, Functional Mobility    Impairments Affecting Function (Mobility) endurance/activity tolerance  -     Comment, Safety Issues/Impairments (Mobility) nonskid socks and gait belt donned  -           User Key  (r) = Recorded By, (t) = Taken By, (c) = Cosigned By    Initials Name Provider Type    Amy Angeles OT Occupational Therapist                 Mobility/ADL's     Row Name 12/30/22 1229          Bed Mobility    Bed Mobility supine-sit;sit-supine  -     Supine-Sit Forrest (Bed Mobility) supervision  -     Sit-Supine Forrest (Bed Mobility) not tested  -     Comment, (Bed Mobility) UI at the end of session  -     Row Name 12/30/22 1229          Sit-Stand Transfer    Sit-Stand Forrest (Transfers) stand assist  -     Assistive Device (Sit-Stand Transfers) --  No AD  -     Row Name 12/30/22 1229          Functional Mobility    Functional Mobility- Ind. Level standby assist  -     Functional Mobility- Device --  no AD  -     Row Name 12/30/22 1229          Activities of Daily Living    BADL Assessment/Intervention grooming;toileting  -     Row Name 12/30/22 1229          Grooming Assessment/Training    Forrest Level (Grooming) grooming skills;oral care regimen;wash face, hands;set up  -     Position (Grooming) sink side;unsupported standing  -     Row Name 12/30/22 1229          Toileting Assessment/Training    Forrest Level (Toileting) toileting skills;supervision  -     Position (Toileting) unsupported sitting;unsupported standing  -     Comment,  (Toileting) Min A with brief management. Supervision with greonimo care while seated and standing  -TERESA           User Key  (r) = Recorded By, (t) = Taken By, (c) = Cosigned By    Initials Name Provider Type    Amy Angeles OT Occupational Therapist               Obj/Interventions     Row Name 12/30/22 1231          Sensory Assessment (Somatosensory)    Sensory Assessment reports no issues with BUE sensation  -KA     Row Name 12/30/22 1231          Range of Motion Comprehensive    General Range of Motion bilateral upper extremity ROM WFL  -KA     Row Name 12/30/22 1231          Strength Comprehensive (MMT)    Comment, General Manual Muscle Testing (MMT) Assessment BUE 4+/5  -KA     Row Name 12/30/22 1231          Balance    Static Sitting Balance standby assist  -TERESA     Dynamic Sitting Balance standby assist  -TERESA     Static Standing Balance standby assist  -TERESA     Dynamic Standing Balance standby assist;contact guard  -TERESA     Balance Interventions sitting;standing;occupation based/functional task  -TERESA           User Key  (r) = Recorded By, (t) = Taken By, (c) = Cosigned By    Initials Name Provider Type    Amy Angeles OT Occupational Therapist               Goals/Plan     Row Name 12/30/22 1235          Strength Goal 1 (OT)    Strength Goal 1 (OT) Pt to demonstrate understanding of HEP in order to improve overall endurance and strength required for ADLs.  -KA     Time Frame (Strength Goal 1, OT) 2 weeks  -KA     Progress/Outcome (Strength Goal 1, OT) goal ongoing  -TERESA           User Key  (r) = Recorded By, (t) = Taken By, (c) = Cosigned By    Initials Name Provider Type    Amy Angeles OT Occupational Therapist               Clinical Impression     Row Name 12/30/22 1232          Pain Assessment    Pretreatment Pain Rating 0/10 - no pain  -TERESA     Posttreatment Pain Rating 0/10 - no pain  -TERESA     Row Name 12/30/22 1232          Plan of Care Review    Plan of Care Reviewed With patient  -TERESA      Outcome Evaluation Pt seen for OT eval this AM. Admitted with c/o abnormal movement of RUE that has resolved. PMHx includes HTN, DM2, HLD, dementia and prior breast cancer. Pt reports living with her son and being indep with ADLs prior and with mobility using no AD. Pt completed bed mobility with supervision and ambulated to toilet and sink with SBA. Performed geronimo care seated and standing with supervision. Min A for brief management. Pt performed functional mobility around [facility] station to simulate household distances with SBA. Pt reports she feels she is at her baseline with ADLs and functional mobility. She reports she plans home with son assistance. OT to sign off at this time.  -     Row Name 12/30/22 1232          Therapy Assessment/Plan (OT)    Therapy Frequency (OT) evaluation only  -     Row Name 12/30/22 1232          Vital Signs    Pre Patient Position Supine  -KA     Intra Patient Position Standing  -KA     Post Patient Position Sitting  -     Row Name 12/30/22 1232          Positioning and Restraints    Pre-Treatment Position in bed  -KA     Post Treatment Position chair  -KA     In Chair reclined;notified nsg;call light within reach;encouraged to call for assist;exit alarm on  -           User Key  (r) = Recorded By, (t) = Taken By, (c) = Cosigned By    Initials Name Provider Type    Amy Angeles, OT Occupational Therapist               Outcome Measures     Row Name 12/30/22 1236          How much help from another is currently needed...    Putting on and taking off regular lower body clothing? 4  -KA     Bathing (including washing, rinsing, and drying) 4  -KA     Toileting (which includes using toilet bed pan or urinal) 3  -KA     Putting on and taking off regular upper body clothing 4  -KA     Taking care of personal grooming (such as brushing teeth) 4  -KA     Eating meals 4  -KA     AM-PAC 6 Clicks Score (OT) 23  -KA     Row Name 12/30/22 1021          How much help from another person  do you currently need...    Turning from your back to your side while in flat bed without using bedrails? 4  -CS     Moving from lying on back to sitting on the side of a flat bed without bedrails? 4  -CS     Moving to and from a bed to a chair (including a wheelchair)? 4  -CS     Standing up from a chair using your arms (e.g., wheelchair, bedside chair)? 4  -CS     Climbing 3-5 steps with a railing? 3  -CS     To walk in hospital room? 4  -CS     AM-PAC 6 Clicks Score (PT) 23  -CS     Highest level of mobility 7 --> Walked 25 feet or more  -CS     Row Name 12/30/22 1236 12/30/22 1021       Functional Assessment    Outcome Measure Options AM-PAC 6 Clicks Daily Activity (OT)  - AM-PAC 6 Clicks Basic Mobility (PT)  -          User Key  (r) = Recorded By, (t) = Taken By, (c) = Cosigned By    Initials Name Provider Type    Amy Angeles OT Occupational Therapist    Ketty Sam PT Physical Therapist                Occupational Therapy Education     Title: PT OT SLP Therapies (Done)     Topic: Occupational Therapy (Done)     Point: ADL training (Done)     Description:   Instruct learner(s) on proper safety adaptation and remediation techniques during self care or transfers.   Instruct in proper use of assistive devices.              Learning Progress Summary           Patient Acceptance, E, JARETH,DU by TERESA at 12/30/2022 1237                   Point: Home exercise program (Done)     Description:   Instruct learner(s) on appropriate technique for monitoring, assisting and/or progressing therapeutic exercises/activities.              Learning Progress Summary           Patient Acceptance, E, VU,DU by TERESA at 12/30/2022 1237                               User Key     Initials Effective Dates Name Provider Type [facility] 09/22/22 -  Amy Ruiz OT Occupational Therapist OT              OT Recommendation and Plan  Therapy Frequency (OT): evaluation only  Plan of Care Review  Plan of Care Reviewed With:  patient  Outcome Evaluation: Pt seen for OT eval this AM. Admitted with c/o abnormal movement of RUE that has resolved. PMHx includes HTN, DM2, HLD, dementia and prior breast cancer. Pt reports living with her son and being indep with ADLs prior and with mobility using no AD. Pt completed bed mobility with supervision and ambulated to toilet and sink with SBA. Performed geronimo care seated and standing with supervision. Min A for brief management. Pt performed functional mobility around [facility] station to simulate household distances with SBA. Pt reports she feels she is at her baseline with ADLs and functional mobility. She reports she plans home with son assistance. OT to sign off at this time.     Time Calculation:    Time Calculation- OT     Row Name 12/30/22 1237             Time Calculation- OT    OT Start Time 0934  -KA      OT Stop Time 0955  -KA      OT Time Calculation (min) 21 min  -KA      OT Received On 12/30/22  -KA         Timed Charges    24261 - OT Self Care/Mgmt Minutes 11  -KA         Untimed Charges    OT Eval/Re-eval Minutes 10  -KA         Total Minutes    Timed Charges Total Minutes 11  -KA      Untimed Charges Total Minutes 10  -KA       Total Minutes 21  -KA            User Key  (r) = Recorded By, (t) = Taken By, (c) = Cosigned By    Initials Name Provider Type    Amy Angeles OT Occupational Therapist              Therapy Charges for Today     Code Description Service Date Service Provider Modifiers Qty    30813299940 HC OT SELF CARE/MGMT/TRAIN EA 15 MIN 12/30/2022 Amy Ruiz OT GO 1    16211119991 HC OT EVAL LOW COMPLEXITY 2 12/30/2022 Amy Ruiz OT GO 1               Amy Ruiz OT  12/30/2022

## 2022-12-30 NOTE — PROGRESS NOTES
[facility] Clinical Pharmacy Services: Medication Reconciliation     Lucia Ellis is a 72 y.o. female presenting to [facility] for Hypokalemia [E87.6]  Uncontrolled hypertension [I10]  Upper extremity myoclonus [G25.3]  Uncontrolled type 2 diabetes mellitus with hypoglycemia without coma (HCC) [E11.649]       She  has a past medical history of Breast cancer (HCC), Dementia (HCC), Diabetes mellitus (HCC), Hypertension, and Memory loss.       Allergies as of 12/28/2022 - Reviewed 12/28/2022   Allergen Reaction Noted    Ppd [tuberculin purified protein derivative] Rash 10/09/2020          Medication information was obtained from: [facility]     Prior to Admission Medications       Prescriptions Last Dose Informant Patient Reported? Taking?    ALPRAZolam (XANAX) 0.5 MG tablet Past Week Medication Bottle Yes Yes    Take 0.5 mg by mouth At Night As Needed for Anxiety.    amLODIPine (NORVASC) 10 MG tablet Past Week Medication Bottle No Yes    Take 1 tablet by mouth Daily.    cetirizine (zyrTEC) 10 MG tablet Past Week Medication Bottle No Yes    Take 1 tablet by mouth Daily.    donepezil (ARICEPT) 10 MG tablet Past Week Medication Bottle No Yes    TAKE 1/2 TABLET BY MOUTH ONCE NIGHTLY    Patient taking differently:  Take 5 mg by mouth Every Night.    empagliflozin (Jardiance) 10 MG tablet tablet   Yes Yes    Take 10 mg by mouth Daily.    escitalopram (Lexapro) 10 MG tablet Past Week Medication Bottle No Yes    Take 1 tablet by mouth Daily.    memantine (Namenda) 5 MG tablet Past Week Medication Bottle No Yes    Take 1 tablet by mouth Daily.    oxyCODONE-acetaminophen (PERCOCET) 5-325 MG per tablet Past Week Medication Bottle Yes Yes    Take 1 tablet by mouth Every 4 (Four) Hours As Needed.    pravastatin (PRAVACHOL) 10 MG tablet Past Week Medication Bottle No Yes    Take 1 tablet by mouth Every Night.    quinapril (ACCUPRIL) 40 MG tablet Past Week Medication Bottle No Yes    Take 1 tablet by mouth  Daily.    SITagliptin-metFORMIN HCl ER (Janumet XR)  MG tablet   Yes No    Take 1 tablet by mouth Daily.           Medication notes:        This medication list is complete to the best of my knowledge as of 12/30/2022       Please call if questions.     Nilda Hale, PharmD  Pharmacy Resident

## 2022-12-30 NOTE — CONSULTS
Patient Identification:  NAME:  Lucia Ellis  Age:  72 y.o.   Sex:  female   :  1950   MRN:  [id number]       Chief complaint: She does not have one, reason for consult new onset seizure    History of present illness: Patient is 72-year-old right-handed white female with a history of dementia, diabetes, hypertension and a remote history of breast cancer.  She comes to the hospital since yesterday.  Apparently in duration.  She has been having right upper extremity jerking movements and where it flails around without her control.  Duration more than a minute or so.  Context a patient who denies ever having a stroke in her life.  She cannot tell me if there is any associated change in her speech at that time.  Modifying factors Keppra has been started and she has not had any further events.  EEG does show seizure focus in the left hemisphere.  Note this patient is afebrile.  She denies any headache paresthesias or paralysis.  She has had some associated symptoms of elevated blood pressure 2 days ago pressure of 218/98.  CT of the head by my independent eyeball review shows chronic changes but no acute stroke.  Location as noted quality as noted modifying factors as noted.      Past medical history:  Past Medical History:   Diagnosis Date   • Breast cancer (HCC)    • Dementia (HCC)    • Diabetes mellitus (HCC)    • Hypertension    • Memory loss        Past surgical history:  Past Surgical History:   Procedure Laterality Date   • CHOLECYSTECTOMY     • HYSTERECTOMY     • MASTECTOMY Right    • TOTAL KNEE ARTHROPLASTY Right        Allergies:  Ppd [tuberculin purified protein derivative]    Home medications:  Medications Prior to Admission   Medication Sig Dispense Refill Last Dose   • ALPRAZolam (XANAX) 0.5 MG tablet Take 0.5 mg by mouth At Night As Needed for Anxiety.   Past Week   • amLODIPine (NORVASC) 10 MG tablet Take 1 tablet by mouth Daily. 90 tablet 1 Past Week   • cetirizine (zyrTEC) 10 MG tablet  Take 1 tablet by mouth Daily. 30 tablet 6 Past Week   • donepezil (ARICEPT) 10 MG tablet TAKE 1/2 TABLET BY MOUTH ONCE NIGHTLY (Patient taking differently: Take 5 mg by mouth Every Night.) 30 tablet 5 Past Week   • empagliflozin (Jardiance) 10 MG tablet tablet Take 10 mg by mouth Daily.      • escitalopram (Lexapro) 10 MG tablet Take 1 tablet by mouth Daily. 90 tablet 1 Past Week   • memantine (Namenda) 5 MG tablet Take 1 tablet by mouth Daily. 90 tablet 2 Past Week   • oxyCODONE-acetaminophen (PERCOCET) 5-325 MG per tablet Take 1 tablet by mouth Every 4 (Four) Hours As Needed.   Past Week   • pravastatin (PRAVACHOL) 10 MG tablet Take 1 tablet by mouth Every Night. 90 tablet 1 Past Week   • quinapril (ACCUPRIL) 40 MG tablet Take 1 tablet by mouth Daily. 90 tablet 1 Past Week   • SITagliptin-metFORMIN HCl ER (Janumet XR)  MG tablet Take 1 tablet by mouth Daily.           Hospital medications:  amLODIPine, 10 mg, Oral, Daily  aspirin, 81 mg, Oral, Daily  cholecalciferol, 5,000 Units, Oral, Daily  donepezil, 10 mg, Oral, Nightly  empagliflozin, 10 mg, Oral, Daily  escitalopram, 10 mg, Oral, Daily  insulin lispro, 0-14 Units, Subcutaneous, TID AC  levETIRAcetam, 1,000 mg, Oral, BID  linagliptin, 5 mg, Oral, Daily  lisinopril, 40 mg, Oral, Q24H  meloxicam, 15 mg, Oral, Daily  memantine, 5 mg, Oral, Daily  metFORMIN ER, 1,500 mg, Oral, Daily With Breakfast  pravastatin, 10 mg, Oral, Nightly  sodium chloride, 10 mL, Intravenous, Q12H         •  dextrose  •  dextrose  •  glucagon (human recombinant)  •  labetalol  •  LORazepam  •  nitroglycerin  •  ondansetron  •  potassium chloride **OR** potassium chloride **OR** potassium chloride  •  [COMPLETED] Insert Peripheral IV **AND** sodium chloride  •  sodium chloride  •  sodium chloride    Family history:  Family History   Problem Relation Age of Onset   • Heart attack Mother    • Heart disease Mother    • Hypertension Mother    • Hypertension Father    • Diabetes Father     • Hypertension Sister    • Diabetes Sister    • Thyroid cancer Sister    • Breast cancer Sister    • Lung cancer Sister    • Diabetes Brother    • Drug abuse Paternal Grandmother        Social history:  Social History     Tobacco Use   • Smoking status: Never   • Smokeless tobacco: Never   Vaping Use   • Vaping Use: Never used   Substance Use Topics   • Alcohol use: Yes     Alcohol/week: 7.0 standard drinks     Types: 7 Glasses of wine per week     Comment: per patient's son, patient drinks one glass of wine daily   • Drug use: No       Review of systems:    She denies ever having a stroke.  She has had breast cancer in the past has diabetes high blood pressure denies headache change in vision focal paresthesias or paralysis.  No eyes ears nose throat skin bone joint  lymphatic hematologic or oncologic complaints no neck pain chest pain abdominal pain bowel bladder incontinence fever chills or rash    Objective:  Vitals Ranges:   Temp:  [97.4 °F (36.3 °C)-98 °F (36.7 °C)] 97.8 °F (36.6 °C)  Heart Rate:  [75-90] 90  Resp:  [16] 16  BP: (100-140)/(62-74) 108/62      Physical Exam:  She is awake alert oriented x3 in no distress fund of knowledge fair attention span concentration good recent remote memory fair language function normal elderly appearing in no distress.  She is not aphasic.  Pupils 3 and half constricting to 2-1/2 extraocular movements full without nystagmus nasolabial folds palate tongue symmetrical normal hearing facial sensation head turning shoulder shrug motor 5 out of 5 x 4 extremities no pronator drift.  Slight decreased right  compared to the left normal leg strength.  No pronator drift.  No finger-to-nose dysmetria.  No fasciculations or atrophy.  Reflexes trace throughout symmetrical toes downgoing bilaterally sensation normal light touch face both arms both legs coordination normal in the upper extremities [place] and gait was not tested for fear.  I would let her fall or she could  have another seizure.  Heart is regular without murmur neck supple without bruits extremities no clubbing cyanosis edema visual acuity normal at 3 feet    Results review:   I reviewed the patient's new clinical results.    Data review:  Lab Results (last 24 hours)     Procedure Component Value Units Date/Time    POC Glucose Once [[id number]]  (Abnormal) Collected: 12/30/22 1149    Specimen: Blood Updated: 12/30/22 1155     Glucose 181 mg/dL      Comment: Meter: [id number] : [id number] Travis BELLAMY       POC Glucose Once [[id number]]  (Abnormal) Collected: 12/30/22 0620    Specimen: Blood Updated: 12/30/22 0621     Glucose 232 mg/dL      Comment: Meter: [id number] : [id number] Maciel BELLAMY       Basic Metabolic Panel [[id number]]  (Abnormal) Collected: 12/30/22 0441    Specimen: Blood Updated: 12/30/22 0540     Glucose 248 mg/dL      BUN 15 mg/dL      Creatinine 0.77 mg/dL      Sodium 137 mmol/L      Potassium 3.6 mmol/L      Chloride 101 mmol/L      CO2 24.4 mmol/L      Calcium 9.6 mg/dL      BUN/Creatinine Ratio 19.5     Anion Gap 11.6 mmol/L      eGFR 81.1 mL/min/1.73      Comment: National Kidney Foundation and American Society of Nephrology (ASN) Task Force recommended calculation based on the Chronic Kidney Disease Epidemiology Collaboration (CKD-EPI) equation refit without adjustment for race.       Narrative:      GFR Normal >60  Chronic Kidney Disease <60  Kidney Failure <15    The GFR formula is only valid for adults with stable renal function between ages 18 and 70.    Magnesium [[id number]]  (Normal) Collected: 12/30/22 0441    Specimen: Blood Updated: 12/30/22 0538     Magnesium 2.1 mg/dL     CBC & Differential [[id number]]  (Normal) Collected: 12/30/22 0441    Specimen: Blood Updated: 12/30/22 0519    Narrative:      The following orders were created for panel order CBC & Differential.  Procedure                               Abnormality         Status                     ---------                                -----------         ------                     CBC Auto Differential[[id number]]        Normal              Final result                 Please view results for these tests on the individual orders.    CBC Auto Differential [[id number]]  (Normal) Collected: 12/30/22 0441    Specimen: Blood Updated: 12/30/22 0519     WBC 6.24 10*3/mm3      RBC 4.56 10*6/mm3      Hemoglobin 13.0 g/dL      Hematocrit 37.9 %      MCV 83.1 fL      MCH 28.5 pg      MCHC 34.3 g/dL      RDW 12.3 %      RDW-SD 37.5 fl      MPV 11.2 fL      Platelets 184 10*3/mm3      Neutrophil % 48.2 %      Lymphocyte % 42.0 %      Monocyte % 7.5 %      Eosinophil % 0.8 %      Basophil % 1.0 %      Immature Grans % 0.5 %      Neutrophils, Absolute 3.01 10*3/mm3      Lymphocytes, Absolute 2.62 10*3/mm3      Monocytes, Absolute 0.47 10*3/mm3      Eosinophils, Absolute 0.05 10*3/mm3      Basophils, Absolute 0.06 10*3/mm3      Immature Grans, Absolute 0.03 10*3/mm3      nRBC 0.0 /100 WBC     POC Glucose Once [[id number]]  (Abnormal) Collected: 12/29/22 2039    Specimen: Blood Updated: 12/29/22 2042     Glucose 308 mg/dL      Comment: Meter: [id number] : [id number] Maciel BELLAMY       POC Glucose Once [[id number]]  (Abnormal) Collected: 12/29/22 1623    Specimen: Blood Updated: 12/29/22 1626     Glucose 147 mg/dL      Comment: Meter: [id number] : [id number] Travis BELLAMY              Imaging:  Imaging Results (Last 24 Hours)     ** No results found for the last 24 hours. **         PPE worn at all times washed before washed up afterwards disposed of everything properly is not within 6 feet of them for more than few minutes during my exam no aerosols used at any point    Assessment and Plan:     New onset right upper extremity focal motor seizures.  New onset focal motor seizures.  The EEG shows a left hemisphere seizure focus as well.  At this point I am concerned that she has a focal brain abnormality in the left hemisphere and we are  going to check an MRI of the brain with and without contrast.  She has a remote history of breast cancer and she denies ever having a stroke.  Note she does not have any headache any fever or chills and I believe a diagnosis of herpes encephalitis is highly unlikely.  She does appear to have mild dementia that is almost certainly vascular dementia without behavioral changes  Note she has had some severe elevated blood pressure that could be consistent with posterior reversible encephalopathic syndrome that would cause her to have seizures but that would usually be a generalized type seizure rather than a focal recurrent motor seizure.  So, I will increase the Keppra to 1000 p.o. twice daily and we will see what the MRI of the brain with and without contrast shows.  Thanks      Sreedhar Leyva MD  12/30/22  15:45 EST

## 2022-12-30 NOTE — THERAPY EVALUATION
Patient Name: Lucia Ellis  : 1950    MRN: [id number]                              Today's Date: 2022       Admit Date: 2022    Visit Dx:     ICD-10-CM ICD-9-CM   1. Uncontrolled type 2 diabetes mellitus with hypoglycemia without coma (HCC)  E11.649 250.82     251.2   2. Hypokalemia  E87.6 276.8   3. Uncontrolled hypertension  I10 401.9   4. Upper extremity myoclonus  G25.3 333.2     Patient Active Problem List   Diagnosis   • Gastroesophageal reflux disease   • Malignant neoplasm of breast (HCC)   • Depression   • Folliculitis   • Generalized anxiety disorder   • Hyperlipidemia   • Essential hypertension   • Status post total right knee replacement   • Rectal hemorrhage   • Vitamin D deficiency   • Drug therapy   • Acute cholecystitis   • Uncontrolled type 2 diabetes mellitus with hypoglycemia without coma (HCC)   • Myoclonus   • Type 2 diabetes mellitus with hyperglycemia (HCC)   • Hypokalemia   • Seizure (HCC)   • Dementia in other diseases classified elsewhere, moderate, without behavioral disturbance, psychotic disturbance, mood disturbance, and anxiety     Past Medical History:   Diagnosis Date   • Breast cancer (HCC)    • Dementia (HCC)    • Diabetes mellitus (HCC)    • Hypertension    • Memory loss      Past Surgical History:   Procedure Laterality Date   • CHOLECYSTECTOMY     • HYSTERECTOMY     • MASTECTOMY Right    • TOTAL KNEE ARTHROPLASTY Right       General Information     Row Name 22 1015          Physical Therapy Time and Intention    Document Type evaluation  -CS     Mode of Treatment co-treatment;physical therapy;occupational therapy  co-tx 2:2 first time OOB since admission  -CS     Row Name 22 1015          General Information    Patient Profile Reviewed yes  -CS     Prior Level of Function independent:;all household mobility;gait;transfer;bed mobility  NO AD  -CS     Existing Precautions/Restrictions no known precautions/restrictions  -CS     Barriers to Rehab  none identified  -CS     Row Name 12/30/22 1015          Living Environment    People in Home child(donovan), adult  son  -CS     Row Name 12/30/22 1015          Home Main Entrance    Number of Stairs, Main Entrance two  -CS     Stair Railings, Main Entrance none  -CS     Row Name 12/30/22 1015          Stairs Within Home, Primary    Number of Stairs, Within Home, Primary none  -CS     Row Name 12/30/22 1015          Cognition    Orientation Status (Cognition) oriented x 3  -CS     Row Name 12/30/22 1015          Safety Issues, Functional Mobility    Impairments Affecting Function (Mobility) endurance/activity tolerance  -CS           User Key  (r) = Recorded By, (t) = Taken By, (c) = Cosigned By    Initials Name Provider Type    CS RamosKetty russell, PT Physical Therapist               Mobility     Row Name 12/30/22 1015          Bed Mobility    Bed Mobility supine-sit;sit-supine  -CS     Supine-Sit [place] (Bed Mobility) supervision  -CS     Sit-Supine [place] (Bed Mobility) not tested  -CS     Comment, (Bed Mobility) UIC at end of session  -CS     Row Name 12/30/22 1015          Sit-Stand Transfer    Sit-Stand [place] (Transfers) standby assist  -CS     Assistive Device (Sit-Stand Transfers) other (see comments)  NO AD  -CS     Row Name 12/30/22 1015          Gait/Stairs (Locomotion)    [place] Level (Gait) standby assist;contact guard  -CS     Assistive Device (Gait) other (see comments)  NO AD  -CS     Distance in Feet (Gait) 200'  -CS     Deviations/Abnormal Patterns (Gait) stride length decreased  -CS     Bilateral Gait Deviations decreased arm swing  -CS     Comment, (Gait/Stairs) no unsteadiness noted  -CS           User Key  (r) = Recorded By, (t) = Taken By, (c) = Cosigned By    Initials Name Provider Type    CS Ramos, Ketty, PT Physical Therapist               Obj/Interventions     Row Name 12/30/22 1016          Range of Motion Comprehensive    General Range of Motion bilateral lower  extremity ROM WFL  -CS     Row Name 12/30/22 1016          Strength Comprehensive (MMT)    General Manual Muscle Testing (MMT) Assessment no strength deficits identified  -CS     Comment, General Manual Muscle Testing (MMT) Assessment B LE 4+/5  -CS     Row Name 12/30/22 1016          Balance    Balance Assessment sitting static balance;sitting dynamic balance;standing static balance;standing dynamic balance  -CS     Static Sitting Balance standby assist  -CS     Dynamic Sitting Balance standby assist  -CS     Position, Sitting Balance unsupported;sitting edge of bed  -CS     Static Standing Balance standby assist  -CS     Dynamic Standing Balance standby assist;contact guard  -CS     Position/Device Used, Standing Balance unsupported  -CS           User Key  (r) = Recorded By, (t) = Taken By, (c) = Cosigned By    Initials Name Provider Type    Ketty Sam, PT Physical Therapist               Goals/Plan    No documentation.                Clinical Impression     Row Name 12/30/22 1016          Pain    Pretreatment Pain Rating 0/10 - no pain  -CS     Posttreatment Pain Rating 0/10 - no pain  -CS     Row Name 12/30/22 1016          Plan of Care Review    Plan of Care Reviewed With patient  -     Outcome Evaluation Pt is a 73 y/o F admitted to [facility] with c/o abnormal movement of R UE and has since resolved since admission. Pt has a past med hx of dementia, HTN, HLD, DM2, GERD, NISHA, and prior breast CA. Pt received in bed upon arrival and agreeable to PT/OT eval. Pt reports she lives with her son with 2 TERRELL and was (I) with mobility prior to admission. Pt completed bed mobility this visit with SPV. Pt then stood, ambulated to bathroom to complete toileting and hygiene, and then ambulated 200' c NO AD requiring SBA/CGA. Pt reports she is at her functional baseline. Pt plans to return home with assist from son. Pt does not require acute PT needs and encouraged to ambulate with nsg throughout the day. No safety  concerns at this time and PT will sign off.  -CS     Row Name 12/30/22 1016          Therapy Assessment/Plan (PT)    Patient/Family Therapy Goals Statement (PT) to return home  -CS     Criteria for Skilled Interventions Met (PT) no problems identified which require skilled intervention  -CS     Therapy Frequency (PT) evaluation only  -CS     Row Name 12/30/22 1016          Positioning and Restraints    Pre-Treatment Position in bed  -CS     Post Treatment Position chair  -CS     In Chair notified nsg;reclined;call light within reach;exit alarm on;heels elevated;encouraged to call for assist  -CS           User Key  (r) = Recorded By, (t) = Taken By, (c) = Cosigned By    Initials Name Provider Type    CS Ketty Ramos PT Physical Therapist               Outcome Measures     Row Name 12/30/22 1021          How much help from another person do you currently need...    Turning from your back to your side while in flat bed without using bedrails? 4  -CS     Moving from lying on back to sitting on the side of a flat bed without bedrails? 4  -CS     Moving to and from a bed to a chair (including a wheelchair)? 4  -CS     Standing up from a chair using your arms (e.g., wheelchair, bedside chair)? 4  -CS     Climbing 3-5 steps with a railing? 3  -CS     To walk in hospital room? 4  -CS     AM-PAC 6 Clicks Score (PT) 23  -CS     Highest level of mobility 7 --> Walked 25 feet or more  -CS     Row Name 12/30/22 1021          Functional Assessment    Outcome Measure Options AM-PAC 6 Clicks Basic Mobility (PT)  -CS           User Key  (r) = Recorded By, (t) = Taken By, (c) = Cosigned By    Initials Name Provider Type    CS Ketty Ramos, PT Physical Therapist                             Physical Therapy Education     Title: PT OT SLP Therapies (Done)     Topic: Physical Therapy (Done)     Point: Mobility training (Done)     Learning Progress Summary           Patient Acceptance, E,TB, VU,DU by  at 12/30/2022 1021                    Point: Body mechanics (Done)     Learning Progress Summary           Patient Acceptance, E,TB, VU,DU by  at 12/30/2022 1021                               User Key     Initials Effective Dates Name Provider Type Discipline     09/22/22 -  Ketty Ramos PT Physical Therapist PT              PT Recommendation and Plan     Plan of Care Reviewed With: patient  Outcome Evaluation: Pt is a 73 y/o F admitted to [facility] with c/o abnormal movement of R UE and has since resolved since admission. Pt has a past med hx of dementia, HTN, HLD, DM2, GERD, NISHA, and prior breast CA. Pt received in bed upon arrival and agreeable to PT/OT eval. Pt reports she lives with her son with 2 TERRELL and was (I) with mobility prior to admission. Pt completed bed mobility this visit with SPV. Pt then stood, ambulated to bathroom to complete toileting and hygiene, and then ambulated 200' c NO AD requiring SBA/CGA. Pt reports she is at her functional baseline. Pt plans to return home with assist from son. Pt does not require acute PT needs and encouraged to ambulate with nsg throughout the day. No safety concerns at this time and PT will sign off.     Time Calculation:    PT Charges     Row Name 12/30/22 1022             Time Calculation    Start Time 0934  -CS      Stop Time 0955  -      Time Calculation (min) 21 min  -CS      PT Received On 12/30/22  -CS         Time Calculation- PT    Total Timed Code Minutes- PT 20 minute(s)  -CS         Timed Charges    40282 - PT Therapeutic Activity Minutes 20  -CS         Total Minutes    Timed Charges Total Minutes 20  -CS       Total Minutes 20  -CS            User Key  (r) = Recorded By, (t) = Taken By, (c) = Cosigned By    Initials Name Provider Type    CS Ketty Ramos PT Physical Therapist              Therapy Charges for Today     Code Description Service Date Service Provider Modifiers Qty    [id number]  PT THERAPEUTIC ACT EA 15 MIN 12/30/2022 Ketty Ramos PT GP 1     [id number]  PT EVAL LOW COMPLEXITY 3 12/30/2022 Ketty Ramos, PT GP 1          PT G-Codes  Outcome Measure Options: AM-PAC 6 Clicks Basic Mobility (PT)  AM-PAC 6 Clicks Score (PT): 23  PT Discharge Summary  Anticipated Discharge Disposition (PT): home with assist    Ketty Ramos PT  12/30/2022

## 2022-12-30 NOTE — TELEPHONE ENCOUNTER
Caller: [facility]    Relationship: Home Health    Best call back number: [phone]    What orders are you requesting (i.e. lab or imaging): NURSING       Where will you receive your lab/imaging services: IN HOME     Additional notes: [facility] IS REQUESTING VERBAL ORDERS FOR NURSING EVALUATION FOLLOWING THE PATIENTS DISCHARGE FROM [facility].

## 2022-12-30 NOTE — PROGRESS NOTES
Name: Lucia Ellis ADMIT: 2022   : 1950  PCP: Everardo Mazariegos MD    MRN: [id number] LOS: 0 days   AGE/SEX: 72 y.o. female  ROOM: [facility]     Subjective   Subjective   Feeling just fine today. No further seizure activity. No HA or vis changes. Tolerating diet. Voiding well.    Review of Systems   No N/V/D/F/C/NS/SOA/CP/palp/LUTS    Objective   Objective   Vital Signs  Temp:  [97.4 °F (36.3 °C)-98 °F (36.7 °C)] 97.8 °F (36.6 °C)  Heart Rate:  [75-90] 90  Resp:  [16] 16  BP: (100-140)/(62-74) 108/62  SpO2:  [94 %-97 %] 97 %  on   ;   Device (Oxygen Therapy): room air  Body mass index is 21.97 kg/m².  Physical Exam  Vitals and nursing note reviewed.   Constitutional:       General: She is not in acute distress.     Appearance: She is not ill-appearing, toxic-appearing or diaphoretic.   HENT:      Head: Normocephalic.      Nose: Nose normal.      Mouth/Throat:      Mouth: Mucous membranes are moist.      Pharynx: Oropharynx is clear.   Eyes:      General: No scleral icterus.        Right eye: No discharge.         Left eye: No discharge.      Extraocular Movements: Extraocular movements intact.      Conjunctiva/sclera: Conjunctivae normal.   Cardiovascular:      Rate and Rhythm: Normal rate and regular rhythm.      Pulses: Normal pulses.   Pulmonary:      Effort: Pulmonary effort is normal. No respiratory distress.      Breath sounds: Normal breath sounds. No wheezing or rales.   Abdominal:      General: Bowel sounds are normal. There is no distension.      Palpations: Abdomen is soft.      Tenderness: There is no abdominal tenderness.   Musculoskeletal:         General: No swelling or deformity. Normal range of motion.      Cervical back: Neck supple. No rigidity.   Lymphadenopathy:      Cervical: No cervical adenopathy.   Skin:     General: Skin is warm and dry.      Capillary Refill: Capillary refill takes less than 2 seconds.      Coloration: Skin is not jaundiced.      Findings: No rash.    Neurological:      General: No focal deficit present.      Mental Status: She is alert and oriented to person, place, and time. Mental status is at baseline.      Cranial Nerves: No cranial nerve deficit.      Coordination: Coordination normal.   Psychiatric:         Mood and Affect: Mood normal.         Behavior: Behavior normal.       Results Review     I reviewed the patient's new clinical results.  Results from last 7 days   Lab Units 12/30/22  0441 12/29/22  0609 12/29/22  0052   WBC 10*3/mm3 6.24 10.56 5.05   HEMOGLOBIN g/dL 13.0 12.7 13.7   PLATELETS 10*3/mm3 184 184 196     Results from last 7 days   Lab Units 12/30/22  0441 12/29/22  0609 12/29/22  0052   SODIUM mmol/L 137 136 136   POTASSIUM mmol/L 3.6 3.5 3.3*   CHLORIDE mmol/L 101 99 96*   CO2 mmol/L 24.4 24.0 27.1   BUN mg/dL 15 10 11   CREATININE mg/dL 0.77 0.63 0.79   GLUCOSE mg/dL 248* 383* 705*   EGFR mL/min/1.73 81.1 93.2 78.6     Results from last 7 days   Lab Units 12/29/22  0052   ALBUMIN g/dL 4.7   BILIRUBIN mg/dL 0.4   ALK PHOS U/L 131*   AST (SGOT) U/L 16   ALT (SGPT) U/L 15     Results from last 7 days   Lab Units 12/30/22  0441 12/29/22  0609 12/29/22  0052   CALCIUM mg/dL 9.6 9.4 10.1   ALBUMIN g/dL  --   --  4.7   MAGNESIUM mg/dL 2.1  --  2.2       Hemoglobin A1C   Date/Time Value Ref Range Status   12/29/2022 0609 12.70 (H) 4.80 - 5.60 % Final     Glucose   Date/Time Value Ref Range Status   12/30/2022 1149 181 (H) 70 - 130 mg/dL Final     Comment:     Meter: [id number] : [id number] Travis Mercado NA   12/30/2022 0620 232 (H) 70 - 130 mg/dL Final     Comment:     Meter: [id number] : [id number] Maciel Kruse NA   12/29/2022 2039 308 (H) 70 - 130 mg/dL Final     Comment:     Meter: [id number] : [id number] Maciel Kruse NA   12/29/2022 1623 147 (H) 70 - 130 mg/dL Final     Comment:     Meter: [id number] : [id number] Travis Rogelio    12/29/2022 1044 334 (H) 70 - 130 mg/dL Final     Comment:     Meter: [id number] :  [id number] George Gomez NA   12/29/2022 0737 349 (H) 70 - 130 mg/dL Final     Comment:     Meter: [id number] : [id number] George Gomez NA   12/29/2022 0510 335 (H) 70 - 130 mg/dL Final     Comment:     Meter: [id number] : jamal Barriga RN       CT Head Without Contrast    Result Date: 12/29/2022  No acute intracranial findings.  Radiation dose reduction techniques were utilized, including automated exposure control and exposure modulation based on body size.  This report was finalized on 12/29/2022 3:02 AM by Dr. Elvira Plasencia M.D.      Scheduled Medications  amLODIPine, 10 mg, Oral, Daily  aspirin, 81 mg, Oral, Daily  cholecalciferol, 5,000 Units, Oral, Daily  donepezil, 10 mg, Oral, Nightly  empagliflozin, 10 mg, Oral, Daily  escitalopram, 10 mg, Oral, Daily  insulin lispro, 0-14 Units, Subcutaneous, TID AC  levETIRAcetam, 500 mg, Intravenous, Q12H  lisinopril, 40 mg, Oral, Q24H  meloxicam, 15 mg, Oral, Daily  memantine, 5 mg, Oral, Daily  metFORMIN ER, 1,500 mg, Oral, Daily With Breakfast  pravastatin, 10 mg, Oral, Nightly  sodium chloride, 10 mL, Intravenous, Q12H    Infusions   Diet  Diet: Cardiac Diets, Diabetic Diets; Healthy Heart (2-3 Na+); Consistent Carbohydrate; Texture: Regular Texture (IDDSI 7); Fluid Consistency: Thin (IDDSI 0)       Assessment/Plan     Active Hospital Problems    Diagnosis  POA   • **Seizure (HCC) [R56.9]  Yes   • Uncontrolled type 2 diabetes mellitus with hypoglycemia without coma (HCC) [E11.649]  Yes   • Myoclonus [G25.3]  Yes   • Type 2 diabetes mellitus with hyperglycemia (HCC) [E11.65]  Yes   • Hypokalemia [E87.6]  Yes   • Dementia in other diseases classified elsewhere, moderate, without behavioral disturbance, psychotic disturbance, mood disturbance, and anxiety [F02.B0]  Yes   • Essential hypertension [I10]  Yes   • Gastroesophageal reflux disease [K21.9]  Yes      Resolved Hospital Problems   No resolved problems to display.         71yo woman with h/o  dementia, HTN, HLD, DM2, GERD, NISHA, and prior breast CA, who presented to ER via EMS with report of 20-30 minutes of abnl mvmts of RUE. EEG showed epileptiform discharges in left frontotemporal region.      New-onset simple focal seizure  Neuro consulted  IV Keppra  Checking MRI brain     Hypertension  BPs much improved, continue home regimen     DM2  Sugars improving on Metformin, Jardiance, and SSI  There is serious doubt as to whether she is compliant with home regimen as she suffers from dementia and her son could not say what DM meds she took if any  Diabetic Educator consulted  Will restart 'gliptin today    Hypokalemia   Replacing K+ as needed with protocol  Mg++ level fine     Moderate dementia w/o behavioral disturbance  Continue home meds for her dementia and monitor for behavioral disturbance here  She is alert and oriented to person, place, year, but is easily confused when discussing medications, who has been in her room today, and purpose of visit      · SCDs for DVT prophylaxis.  · Full code.  · Discussed with patient and nursing staff. D/w Dr. Leyva.  · Anticipate discharge home with family, timing yet to be determined. May be ready tomorrow. PT has seen and signed off as pt is at baseline function.      Sreedhar Koo MD  [facility]  12/30/22  15:14 EST

## 2022-12-30 NOTE — PLAN OF CARE
Goal Outcome Evaluation:  Plan of Care Reviewed With: patient           Outcome Evaluation: Pt is a 71 [facility] F [facility]/o abnormal movement of R UE and has since resolved since admission. Pt has a past med hx of dementia, HTN, HLD, DM2, GERD, NISHA, and prior breast CA. Pt received in bed upon arrival and agreeable to PT/OT eval. Pt reports she lives with her son with 2 TERRELL and was (I) with mobility prior to admission. Pt completed bed mobility this visit with SPV. Pt then stood, ambulated to bathroom to complete toileting and hygiene, and then ambulated 200' c NO AD requiring SBA/CGA. Pt reports she is at her functional baseline. Pt plans to return home with assist from son. Pt does not require acute PT needs and encouraged to ambulate with nsg throughout the day. No safety concerns at this time and PT will sign off.

## 2022-12-31 ENCOUNTER — TRANSCRIBE ORDERS (OUTPATIENT)
Dept: HOME HEALTH SERVICES | Facility: HOME HEALTHCARE | Age: 72
End: 2022-12-31

## 2022-12-31 ENCOUNTER — HOME HEALTH ADMISSION (OUTPATIENT)
Dept: HOME HEALTH SERVICES | Facility: HOME HEALTHCARE | Age: 72
End: 2022-12-31
Payer: COMMERCIAL

## 2022-12-31 ENCOUNTER — READMISSION MANAGEMENT (OUTPATIENT)
Dept: CALL CENTER | Facility: HOSPITAL | Age: 72
End: 2022-12-31

## 2022-12-31 VITALS
SYSTOLIC BLOOD PRESSURE: 123 MMHG | BODY MASS INDEX: 21.85 KG/M2 | HEART RATE: 92 BPM | OXYGEN SATURATION: 95 % | WEIGHT: 128 LBS | TEMPERATURE: 98 F | HEIGHT: 64 IN | DIASTOLIC BLOOD PRESSURE: 94 MMHG | RESPIRATION RATE: 16 BRPM

## 2022-12-31 DIAGNOSIS — E11.69 TYPE 2 DIABETES MELLITUS WITH OTHER SPECIFIED COMPLICATION, UNSPECIFIED WHETHER LONG TERM INSULIN USE: ICD-10-CM

## 2022-12-31 DIAGNOSIS — I10 PRIMARY HYPERTENSION: ICD-10-CM

## 2022-12-31 DIAGNOSIS — R56.9 FOCAL SEIZURE: Primary | ICD-10-CM

## 2022-12-31 PROBLEM — F01.B0: Chronic | Status: ACTIVE | Noted: 2022-12-31

## 2022-12-31 LAB
ANION GAP SERPL CALCULATED.3IONS-SCNC: 13 MMOL/L (ref 5–15)
BASOPHILS # BLD AUTO: 0.06 10*3/MM3 (ref 0–0.2)
BASOPHILS NFR BLD AUTO: 1.1 % (ref 0–1.5)
BUN SERPL-MCNC: 22 MG/DL (ref 8–23)
BUN/CREAT SERPL: 29.3 (ref 7–25)
CALCIUM SPEC-SCNC: 9 MG/DL (ref 8.6–10.5)
CHLORIDE SERPL-SCNC: 101 MMOL/L (ref 98–107)
CO2 SERPL-SCNC: 22 MMOL/L (ref 22–29)
CREAT SERPL-MCNC: 0.75 MG/DL (ref 0.57–1)
DEPRECATED RDW RBC AUTO: 42.7 FL (ref 37–54)
EGFRCR SERPLBLD CKD-EPI 2021: 83.7 ML/MIN/1.73
EOSINOPHIL # BLD AUTO: 0.08 10*3/MM3 (ref 0–0.4)
EOSINOPHIL NFR BLD AUTO: 1.4 % (ref 0.3–6.2)
ERYTHROCYTE [DISTWIDTH] IN BLOOD BY AUTOMATED COUNT: 13.2 % (ref 12.3–15.4)
GLUCOSE BLDC GLUCOMTR-MCNC: 167 MG/DL (ref 70–130)
GLUCOSE BLDC GLUCOMTR-MCNC: 191 MG/DL (ref 70–130)
GLUCOSE SERPL-MCNC: 171 MG/DL (ref 65–99)
HCT VFR BLD AUTO: 38.3 % (ref 34–46.6)
HGB BLD-MCNC: 12.5 G/DL (ref 12–15.9)
IMM GRANULOCYTES # BLD AUTO: 0.02 10*3/MM3 (ref 0–0.05)
IMM GRANULOCYTES NFR BLD AUTO: 0.4 % (ref 0–0.5)
LYMPHOCYTES # BLD AUTO: 2.5 10*3/MM3 (ref 0.7–3.1)
LYMPHOCYTES NFR BLD AUTO: 44.7 % (ref 19.6–45.3)
MAGNESIUM SERPL-MCNC: 2.4 MG/DL (ref 1.6–2.4)
MCH RBC QN AUTO: 28.9 PG (ref 26.6–33)
MCHC RBC AUTO-ENTMCNC: 32.6 G/DL (ref 31.5–35.7)
MCV RBC AUTO: 88.5 FL (ref 79–97)
MONOCYTES # BLD AUTO: 0.45 10*3/MM3 (ref 0.1–0.9)
MONOCYTES NFR BLD AUTO: 8.1 % (ref 5–12)
NEUTROPHILS NFR BLD AUTO: 2.48 10*3/MM3 (ref 1.7–7)
NEUTROPHILS NFR BLD AUTO: 44.3 % (ref 42.7–76)
NRBC BLD AUTO-RTO: 0 /100 WBC (ref 0–0.2)
PLATELET # BLD AUTO: 168 10*3/MM3 (ref 140–450)
PMV BLD AUTO: 11.6 FL (ref 6–12)
POTASSIUM SERPL-SCNC: 3.8 MMOL/L (ref 3.5–5.2)
RBC # BLD AUTO: 4.33 10*6/MM3 (ref 3.77–5.28)
SODIUM SERPL-SCNC: 136 MMOL/L (ref 136–145)
WBC NRBC COR # BLD: 5.59 10*3/MM3 (ref 3.4–10.8)

## 2022-12-31 PROCEDURE — 85025 COMPLETE CBC W/AUTO DIFF WBC: CPT | Performed by: NURSE PRACTITIONER

## 2022-12-31 PROCEDURE — 82962 GLUCOSE BLOOD TEST: CPT

## 2022-12-31 PROCEDURE — G0378 HOSPITAL OBSERVATION PER HR: HCPCS

## 2022-12-31 PROCEDURE — 83735 ASSAY OF MAGNESIUM: CPT | Performed by: HOSPITALIST

## 2022-12-31 PROCEDURE — 80048 BASIC METABOLIC PNL TOTAL CA: CPT | Performed by: NURSE PRACTITIONER

## 2022-12-31 PROCEDURE — 63710000001 INSULIN LISPRO (HUMAN) PER 5 UNITS: Performed by: NURSE PRACTITIONER

## 2022-12-31 PROCEDURE — 36415 COLL VENOUS BLD VENIPUNCTURE: CPT | Performed by: NURSE PRACTITIONER

## 2022-12-31 RX ORDER — SITAGLIPTIN AND METFORMIN HYDROCHLORIDE 1000; 50 MG/1; MG/1
1 TABLET, FILM COATED, EXTENDED RELEASE ORAL DAILY
Qty: 30 TABLET | Refills: 0 | Status: SHIPPED | OUTPATIENT
Start: 2022-12-31 | End: 2023-01-11 | Stop reason: SDUPTHER

## 2022-12-31 RX ORDER — MELOXICAM 15 MG/1
15 TABLET ORAL DAILY
Qty: 30 TABLET | Refills: 0 | Status: SHIPPED | OUTPATIENT
Start: 2022-12-31

## 2022-12-31 RX ORDER — ASPIRIN 81 MG/1
81 TABLET ORAL DAILY
Qty: 30 TABLET | Refills: 0 | Status: SHIPPED | OUTPATIENT
Start: 2022-12-31

## 2022-12-31 RX ORDER — QUINAPRIL 40 MG/1
40 TABLET ORAL DAILY
Qty: 30 TABLET | Refills: 0 | Status: SHIPPED | OUTPATIENT
Start: 2022-12-31

## 2022-12-31 RX ORDER — AMLODIPINE BESYLATE 10 MG/1
10 TABLET ORAL DAILY
Qty: 30 TABLET | Refills: 0 | Status: SHIPPED | OUTPATIENT
Start: 2022-12-31

## 2022-12-31 RX ORDER — LEVETIRACETAM 1000 MG/1
1000 TABLET ORAL 2 TIMES DAILY
Qty: 60 TABLET | Refills: 0 | Status: SHIPPED | OUTPATIENT
Start: 2022-12-31

## 2022-12-31 RX ADMIN — ESCITALOPRAM 10 MG: 10 TABLET, FILM COATED ORAL at 09:06

## 2022-12-31 RX ADMIN — INSULIN LISPRO 3 UNITS: 100 INJECTION, SOLUTION INTRAVENOUS; SUBCUTANEOUS at 09:06

## 2022-12-31 RX ADMIN — ASPIRIN 81 MG: 81 TABLET, COATED ORAL at 09:05

## 2022-12-31 RX ADMIN — LISINOPRIL 40 MG: 20 TABLET ORAL at 09:06

## 2022-12-31 RX ADMIN — MEMANTINE HYDROCHLORIDE 5 MG: 5 TABLET, FILM COATED ORAL at 09:06

## 2022-12-31 RX ADMIN — LEVETIRACETAM 1000 MG: 500 TABLET, FILM COATED ORAL at 09:06

## 2022-12-31 RX ADMIN — INSULIN LISPRO 3 UNITS: 100 INJECTION, SOLUTION INTRAVENOUS; SUBCUTANEOUS at 13:21

## 2022-12-31 RX ADMIN — Medication 5000 UNITS: at 09:05

## 2022-12-31 RX ADMIN — EMPAGLIFLOZIN 10 MG: 10 TABLET, FILM COATED ORAL at 09:05

## 2022-12-31 RX ADMIN — MELOXICAM 15 MG: 15 TABLET ORAL at 09:06

## 2022-12-31 RX ADMIN — AMLODIPINE BESYLATE 10 MG: 5 TABLET ORAL at 09:06

## 2022-12-31 RX ADMIN — Medication 10 ML: at 09:09

## 2022-12-31 RX ADMIN — LINAGLIPTIN 5 MG: 5 TABLET, FILM COATED ORAL at 09:06

## 2022-12-31 RX ADMIN — METFORMIN HYDROCHLORIDE 1500 MG: 500 TABLET, EXTENDED RELEASE ORAL at 09:06

## 2022-12-31 NOTE — PLAN OF CARE
Goal Outcome Evaluation:  Plan of Care Reviewed With: patient        Progress: improving  Outcome Evaluation: VSS. IV keppra changed to PO. MRI of brain ordered. Diabetes educator tried to reach out to family. Will continue to monitor.

## 2022-12-31 NOTE — OUTREACH NOTE
Prep Survey    Flowsheet Row Responses   [facility] facility patient discharged from? [place]   Is LACE score < 7 ? No   Eligibility [facility]   Date of Admission 12/28/22   Date of Discharge 12/31/22   Discharge Disposition Home or Self Care   Discharge diagnosis New onset seizures   Does the patient have one of the following disease processes/diagnoses(primary or secondary)? Other   Does the patient have Home health ordered? Yes   What is the Home health agency?  [facility]   Is there a DME ordered? No   Prep survey completed? Yes          CARMEN A - Registered Nurse

## 2022-12-31 NOTE — DISCHARGE SUMMARY
Patient Name: Lucia Ellis  : 1950  MRN: [id number]    Date of Admission: 2022  Date of Discharge:  2022  Primary Care Physician: Everardo Mazariegos MD      Chief Complaint:   Seizures      Discharge Diagnoses     Active Hospital Problems    Diagnosis  POA   • **New onset seizure (HCC) [R56.9]  Yes   • Vascular dementia, moderate, without behavioral disturbance, psychotic disturbance, mood disturbance, and anxiety [F01.B0]  Yes   • Focal motor seizure (HCC) [G40.109]  Yes   • Uncontrolled type 2 diabetes mellitus with hypoglycemia without coma (HCC) [E11.649]  Yes   • Myoclonus [G25.3]  Yes   • Type 2 diabetes mellitus with hyperglycemia (HCC) [E11.65]  Yes   • Hypokalemia [E87.6]  Yes   • Essential hypertension [I10]  Yes   • Gastroesophageal reflux disease [K21.9]  Yes      Resolved Hospital Problems   No resolved problems to display.        Hospital Course     Very pleasant 73yo woman with h/o dementia, HTN, HLD, DM2, GERD, NISHA, and prior breast CA, who presented to ER via EMS with report of 20-30 minutes of abnl mvmts of RUE. EEG showed epileptiform discharges in left frontotemporal region.       New-onset simple focal seizure  Neuro consulted  IV Keppra changed to PO now  No further seizure activity  MRI brain negative  No evidence of PRES either  Okay for dc home today per Neuro  F/u with [facility] in 1 month  Return to ER immediately for any HA, vis changes, fever, or recurrent seizure--carefully explained this to pt and her friend at bedside     Hypertension  BPs much improved, continue home regimen  Have provided rx's at dc from our pharmacy     DM2  Sugars look fine on her home regimen  There is serious doubt as to whether she is compliant with home regimen as she suffers from dementia and her son could not say what DM meds she took if any  Have provided rx's at dc from our pharmacy  Will need close f/u with PCP in a week to monitor sugars and compliance with med  regimen     Hypokalemia   Replaced K+ as needed with protocol  Mg++ level fine     Moderate vascular dementia w/o behavioral disturbance  Continued home meds for her dementia  She is alert and oriented to person, place, year, but is easily confused when discussing medications, who has been in her room today, and purpose of visit        SCDs for DVT prophylaxis while here.  Full code confirmed.  Discussed with patient, friend, and nursing staff. D/w Dr. Leyva.  Discharge home with family today. PT has seen and signed off as pt is at baseline function.  Compliance with current medication regimen will be of utmost importance. Discussed with pt and friend. PCP will have to be driving force behind this as outpt.        Day of Discharge     Subjective:  Feeling just fine today. No further seizure activity. No HA or vis changes. Tolerating diet. Voiding well.     Review of Systems  No N/V/D/F/C/NS/SOA/CP/palp/LUTS    Physical Exam:  Temp:  [97.5 °F (36.4 °C)-98 °F (36.7 °C)] 98 °F (36.7 °C)  Heart Rate:  [81-95] 92  Resp:  [16] 16  BP: (108-131)/(59-94) 123/94  Body mass index is 21.97 kg/m².  Physical Exam  Vitals and nursing note reviewed. Friend at bedside.  Constitutional:       General: She is not in acute distress.     Appearance: She is not ill-appearing, toxic-appearing or diaphoretic.   Cardiovascular:      Rate and Rhythm: Normal rate and regular rhythm.      Pulses: Normal pulses.   Pulmonary:      Effort: Pulmonary effort is normal. No respiratory distress.      Breath sounds: Normal breath sounds. No wheezing or rales.   Abdominal:      General: Bowel sounds are normal. There is no distension.      Palpations: Abdomen is soft.      Tenderness: There is no abdominal tenderness.   Musculoskeletal:         General: No swelling or deformity. Normal range of motion.      Cervical back: Neck supple. No rigidity.   Skin:     General: Skin is warm and dry.      Capillary Refill: Capillary refill takes less than 2  seconds.   Neurological:      General: No focal deficit present.      Mental Status: She is alert and oriented to person, place, and time. Mental status is at baseline.   Psychiatric:         Mood and Affect: Mood normal.         Behavior: Behavior normal.      Consultants     Consult Orders (all) (From admission, onward)     Start     Ordered    12/29/22 1450  Inpatient Neurology Consult General  Once        Specialty:  Neurology  Provider:  Sreedhar Leyva MD    12/29/22 1449    12/29/22 1101  Inpatient Case Management  Consult  Once        Provider:  (Not yet assigned)    12/29/22 1100    12/29/22 0601  Inpatient Diabetes Educator Consult  Once        Provider:  (Not yet assigned)    12/29/22 0600    12/29/22 0229  [facility] (on-call MD unless specified) Details  Once        Specialty:  Hospitalist  Provider:  (Not yet assigned)    12/29/22 0228              Procedures     * Surgery not found *      Imaging Results (All)     Procedure Component Value Units Date/Time    MRI Brain With & Without Contrast [[id number]] Collected: 12/30/22 2251     Updated: 12/31/22 0037    Narrative:      MRI BRAIN WITH AND WITHOUT CONTRAST     HISTORY:    Seizure     COMPARISON: Brain MR 05/07/2021     TECHNIQUE: Multiplanar, multisequence MR imaging of the brain was  performed with and without intravenous contrast.     FINDINGS: Evaluation is mild to moderately suboptimal due to motion  artifact.     There is no restricted diffusion. T2 hyperintensity within the  periventricular and subcortical white matter likely represents moderate  chronic ischemic small vessel degenerative changes. There is no mass  effect, midline shift, hydrocephalus, parenchymal hemorrhage, or  abnormal extra-axial fluid collection. The major T2 intracranial  arterial flow voids appear grossly normal. Midline structures are  unremarkable.     There is no abnormal intracranial enhancement.       Impression:         No findings of acute intracranial  pathology. Moderate chronic ischemic  white matter changes.     This report was finalized on 12/31/2022 12:34 AM by Dr. Chente Avendaño M.D.       CT Head Without Contrast [[id number]] Collected: 12/29/22 0259     Updated: 12/29/22 0306    Narrative:      CT HEAD WITHOUT CONTRAST     HISTORY: Right arm shaking and twitching     COMPARISON: 05/07/2021     TECHNIQUE: Axial CT imaging was obtained through the brain. No IV  contrast was administered.     FINDINGS:  No acute intracranial hemorrhage is seen. There is diffuse atrophy.  There is periventricular and deep white matter microangiopathic change.  There is no midline shift or mass effect. The paranasal sinuses and  mastoid air cells appear clear.       Impression:      No acute intracranial findings.     Radiation dose reduction techniques were utilized, including automated  exposure control and exposure modulation based on body size.     This report was finalized on 12/29/2022 3:02 AM by Dr. Elvira Plasencia M.D.               Pertinent Labs     Results from last 7 days   Lab Units 12/31/22  0505 12/30/22 0441 12/29/22  0609 12/29/22  0052   WBC 10*3/mm3 5.59 6.24 10.56 5.05   HEMOGLOBIN g/dL 12.5 13.0 12.7 13.7   PLATELETS 10*3/mm3 168 184 184 196     Results from last 7 days   Lab Units 12/31/22  0505 12/30/22  0441 12/29/22  0609 12/29/22  0052   SODIUM mmol/L 136 137 136 136   POTASSIUM mmol/L 3.8 3.6 3.5 3.3*   CHLORIDE mmol/L 101 101 99 96*   CO2 mmol/L 22.0 24.4 24.0 27.1   BUN mg/dL 22 15 10 11   CREATININE mg/dL 0.75 0.77 0.63 0.79   GLUCOSE mg/dL 171* 248* 383* 705*   EGFR mL/min/1.73 83.7 81.1 93.2 78.6     Results from last 7 days   Lab Units 12/29/22  0052   ALBUMIN g/dL 4.7   BILIRUBIN mg/dL 0.4   ALK PHOS U/L 131*   AST (SGOT) U/L 16   ALT (SGPT) U/L 15     Results from last 7 days   Lab Units 12/31/22  0505 12/30/22  0441 12/29/22  0609 12/29/22  0052   CALCIUM mg/dL 9.0 9.6 9.4 10.1   ALBUMIN g/dL  --   --   --  4.7   MAGNESIUM mg/dL 2.4 2.1   --  2.2       Results from last 7 days   Lab Units 12/29/22  0052   TROPONIN T ng/mL <0.010           Invalid input(s): LDLCALC          Test Results Pending at Discharge       Discharge Details        Discharge Medications      New Medications      Instructions Start Date   levETIRAcetam 1000 MG tablet  Commonly known as: KEPPRA   1,000 mg, Oral, 2 Times Daily         Changes to Medications      Instructions Start Date   donepezil 10 MG tablet  Commonly known as: ARICEPT  What changed: See the new instructions.   TAKE 1/2 TABLET BY MOUTH ONCE NIGHTLY         Continue These Medications      Instructions Start Date   amLODIPine 10 MG tablet  Commonly known as: NORVASC   10 mg, Oral, Daily      aspirin 81 MG EC tablet   81 mg, Oral, Daily      cetirizine 10 MG tablet  Commonly known as: zyrTEC   10 mg, Oral, Daily      empagliflozin 10 MG tablet tablet  Commonly known as: Jardiance   10 mg, Oral, Daily      escitalopram 10 MG tablet  Commonly known as: Lexapro   10 mg, Oral, Daily      Janumet XR  MG tablet  Generic drug: SITagliptin-metFORMIN HCl ER   1 tablet, Oral, Daily      meloxicam 15 MG tablet  Commonly known as: MOBIC   15 mg, Oral, Daily      memantine 5 MG tablet  Commonly known as: Namenda   5 mg, Oral, Daily      pravastatin 10 MG tablet  Commonly known as: PRAVACHOL   10 mg, Oral, Nightly      quinapril 40 MG tablet  Commonly known as: ACCUPRIL   40 mg, Oral, Daily         Stop These Medications    ALPRAZolam 0.5 MG tablet  Commonly known as: XANAX     oxyCODONE-acetaminophen 5-325 MG per tablet  Commonly known as: PERCOCET            Allergies   Allergen Reactions   • Ppd [Tuberculin Purified Protein Derivative] Rash       Discharge Disposition:  Home or Self Care      Discharge Diet:  Diet Order   Procedures   • Diet: Cardiac Diets, Diabetic Diets; Healthy Heart (2-3 Na+); Consistent Carbohydrate; Texture: Regular Texture (IDDSI 7); Fluid Consistency: Thin (IDDSI 0)       Discharge Activity:    as tolerated  No driving  No tub baths  No swimming  No ladders    CODE STATUS:    Code Status and Medical Interventions:   Ordered at: 12/29/22 0920     Code Status (Patient has no pulse and is not breathing):    CPR (Attempt to Resuscitate)     Medical Interventions (Patient has pulse or is breathing):    Full Support     Release to patient:    Routine Release       Future Appointments   Date Time Provider Department Center   2/15/2023 10:30 AM [facility]     Additional Instructions for the Follow-ups that You Need to Schedule     Discharge Follow-up with PCP   As directed       Currently Documented PCP:    Everardo Mazariegos MD    PCP Phone Number:    [phone]     Follow Up Details: Dr. Mazariegos (PCP) in 1 week         Discharge Follow-up with Specified Provider: [facility]; 1 Month   As directed      To: [facility]    Follow Up: 1 Month            Follow-up Information     Everardo Mazariegos MD .    Specialties: Family Medicine, Urgent Care  Why: Dr. Mazariegos (PCP) in 1 week  Contact information:  [address] [address]  [phone]                         Additional Instructions for the Follow-ups that You Need to Schedule     Discharge Follow-up with PCP   As directed       Currently Documented PCP:    Everardo Mazariegos MD    PCP Phone Number:    [phone]     Follow Up Details: Dr. Mazariegos (PCP) in 1 week         Discharge Follow-up with Specified Provider: [facility]; 1 Month   As directed      To: [facility]    Follow Up: 1 Month           Time Spent on Discharge:  Greater than 30 minutes      Sreedhar Koo MD  [facility]  12/31/22  11:58 EST

## 2022-12-31 NOTE — PROGRESS NOTES
[facility] to provide Home Care services. Patient agreeable and denies other HH services. PCP and contact information confirmed. Please call chidi Porras to schedule HH visits. [phone].

## 2023-01-01 ENCOUNTER — HOME CARE VISIT (OUTPATIENT)
Dept: HOME HEALTH SERVICES | Facility: HOME HEALTHCARE | Age: 73
End: 2023-01-01
Payer: COMMERCIAL

## 2023-01-01 PROCEDURE — G0299 HHS/HOSPICE OF RN EA 15 MIN: HCPCS

## 2023-01-02 ENCOUNTER — TRANSITIONAL CARE MANAGEMENT TELEPHONE ENCOUNTER (OUTPATIENT)
Dept: CALL CENTER | Facility: HOSPITAL | Age: 73
End: 2023-01-02
Payer: MEDICARE

## 2023-01-02 NOTE — PROGRESS NOTES
Discharge Planning Assessment  [facility]     Patient Name: Lucia Ellis  MRN: [id number]  Today's Date: 1/2/2023    Admit Date: 12/28/2022    Plan: Home with [facility]   Discharge Needs Assessment    No documentation.                Discharge Plan     Row Name 01/02/23 1444       Plan    Plan Home with [facility]    Final Discharge Disposition Code 06 - home with home health care    Final Note Home with [facility]              Continued Care and Services - Discharged on 12/31/2022 Admission date: 12/28/2022 - Discharge disposition: Home or Self Care    Home Medical Care     Service Provider Request Status Selected Services Address Phone Fax Patient Preferred    [facility]  Selected Home Health Services [address] [address] [phone] --              Expected Discharge Date and Time     Expected Discharge Date Expected Discharge Time    Dec 31, 2022          Demographic Summary    No documentation.                Functional Status    No documentation.                Psychosocial    No documentation.                Abuse/Neglect    No documentation.                Legal    No documentation.                Substance Abuse    No documentation.                Patient Forms    No documentation.                   Dianne Barrera, RN

## 2023-01-02 NOTE — OUTREACH NOTE
Call Center TCM Note    Flowsheet Row Responses   [facility] patient discharged from? [place]   Does the patient have one of the following disease processes/diagnoses(primary or secondary)? Other   TCM attempt successful? Yes   Call start time 1132   Call end time 1141   Discharge diagnosis New onset seizures   Person spoke with today (if not patient) and relationship patient   Meds reviewed with patient/caregiver? Yes   Is the patient having any side effects they believe may be caused by any medication additions or changes? No   Does the patient have all medications ordered at discharge? Yes   Is the patient taking all medications as directed (includes completed medication regime)? Yes   Comments Pt advised to make fu appt with Dr. Leyva, neurologist   Does the patient have an appointment with their PCP within 7 days of discharge? Yes  [1/10/23 at 2:30 PM]   What is the Home health agency?  [facility]    Has home health visited the patient within 72 hours of discharge? Call prior to 72 hours   Psychosocial issues? No   Did the patient receive a copy of their discharge instructions? Yes   Nursing interventions Reviewed instructions with patient   What is the patient's perception of their health status since discharge? Improving   Is the patient/caregiver able to teach back signs and symptoms related to disease process for when to call PCP? Yes   Is the patient/caregiver able to teach back signs and symptoms related to disease process for when to call 911? Yes   Is the patient/caregiver able to teach back the hierarchy of who to call/visit for symptoms/problems? PCP, Specialist, Home health nurse, Urgent Care, ED, 911 Yes   If the patient is a current smoker, are they able to teach back resources for cessation? Not a smoker   TCM call completed? Yes   Wrap up additional comments Pt states she is doing alright. Reviewed AVS/medications with pt. Pt verified PCP hospital fu appt on 1/10/23, and advised to make  neurologist xin borja.   Call end time 1141   Would this patient benefit from a Referral to [facility]? No   Is the patient interested in additional calls from an ambulatory ?  NOTE:  applies to high risk patients requiring additional follow-up. No          Apoorva Andrade RN    1/2/2023, 11:43 EST

## 2023-01-03 ENCOUNTER — HOME CARE VISIT (OUTPATIENT)
Dept: HOME HEALTH SERVICES | Facility: HOME HEALTHCARE | Age: 73
End: 2023-01-03
Payer: COMMERCIAL

## 2023-01-03 VITALS
DIASTOLIC BLOOD PRESSURE: 64 MMHG | OXYGEN SATURATION: 92 % | SYSTOLIC BLOOD PRESSURE: 122 MMHG | TEMPERATURE: 95.5 F | RESPIRATION RATE: 16 BRPM | HEART RATE: 102 BPM

## 2023-01-03 NOTE — HOME HEALTH
72F with h/o dementia, HTN, HLD, DM2, GERD, NISHA, and prior breast CA.  Recent hospitalization due to new epilepsy dx.  Son, Gerard, initially present during assessment.  SN attempted to reconciled medications with patient, but she was very confused, and did not know where medication bottles were located.  Gerard was able to locate some other bottles, but still could not locate new medications prescribed on hospital d/c.  D/C instructions indicate new medications were filled at hospital, and delivered to patients bedside prior to hospital d/c.  SN made patient/family a medication list and reviewed it with Gerard.  Patient ambulates without assist.  Sister arrived when SN was preparing to leave.  SN went back inside with sister, who was able to locate new medications, except for Jardiance.  Reconciled medications with sister, prefilled mediplanner for one week, and instructed her to take pill bottles home with her after filling med planner weekly.  Sister verbalized understanding.  SN to T/I epilepsy and medication regime.

## 2023-01-06 ENCOUNTER — HOME CARE VISIT (OUTPATIENT)
Dept: HOME HEALTH SERVICES | Facility: HOME HEALTHCARE | Age: 73
End: 2023-01-06
Payer: COMMERCIAL

## 2023-01-10 ENCOUNTER — HOME CARE VISIT (OUTPATIENT)
Dept: HOME HEALTH SERVICES | Facility: HOME HEALTHCARE | Age: 73
End: 2023-01-10
Payer: COMMERCIAL

## 2023-01-10 ENCOUNTER — READMISSION MANAGEMENT (OUTPATIENT)
Dept: CALL CENTER | Facility: HOSPITAL | Age: 73
End: 2023-01-10
Payer: MEDICARE

## 2023-01-10 PROCEDURE — G0495 RN CARE TRAIN/EDU IN HH: HCPCS

## 2023-01-10 NOTE — OUTREACH NOTE
Medical Week 2 Survey    Flowsheet Row Responses   [facility] patient discharged from? [place]   Does the patient have one of the following disease processes/diagnoses(primary or secondary)? Other   Week 2 attempt successful? No   Unsuccessful attempts Attempt 1          KRIS AYERS - Registered Nurse

## 2023-01-11 ENCOUNTER — OFFICE VISIT (OUTPATIENT)
Dept: INTERNAL MEDICINE | Facility: CLINIC | Age: 73
End: 2023-01-11
Payer: MEDICARE

## 2023-01-11 VITALS
BODY MASS INDEX: 22.71 KG/M2 | DIASTOLIC BLOOD PRESSURE: 68 MMHG | WEIGHT: 133 LBS | HEIGHT: 64 IN | OXYGEN SATURATION: 98 % | TEMPERATURE: 98.3 F | RESPIRATION RATE: 18 BRPM | SYSTOLIC BLOOD PRESSURE: 122 MMHG | HEART RATE: 83 BPM

## 2023-01-11 DIAGNOSIS — E87.6 HYPOKALEMIA: ICD-10-CM

## 2023-01-11 DIAGNOSIS — Z74.09 DECREASED AMBULATION STATUS: ICD-10-CM

## 2023-01-11 DIAGNOSIS — I10 ESSENTIAL HYPERTENSION: ICD-10-CM

## 2023-01-11 DIAGNOSIS — E11.9 TYPE 2 DIABETES MELLITUS WITHOUT COMPLICATION, WITHOUT LONG-TERM CURRENT USE OF INSULIN: Primary | ICD-10-CM

## 2023-01-11 DIAGNOSIS — R56.9 SEIZURE: ICD-10-CM

## 2023-01-11 DIAGNOSIS — R41.3 MEMORY DIFFICULTY: ICD-10-CM

## 2023-01-11 PROCEDURE — 99495 TRANSJ CARE MGMT MOD F2F 14D: CPT | Performed by: FAMILY MEDICINE

## 2023-01-11 PROCEDURE — 3046F HEMOGLOBIN A1C LEVEL >9.0%: CPT | Performed by: FAMILY MEDICINE

## 2023-01-11 PROCEDURE — 1111F DSCHRG MED/CURRENT MED MERGE: CPT | Performed by: FAMILY MEDICINE

## 2023-01-11 RX ORDER — SITAGLIPTIN AND METFORMIN HYDROCHLORIDE 1000; 50 MG/1; MG/1
1 TABLET, FILM COATED, EXTENDED RELEASE ORAL DAILY
Qty: 30 TABLET | Refills: 11 | Status: SHIPPED | OUTPATIENT
Start: 2023-01-11 | End: 2023-03-01 | Stop reason: SDUPTHER

## 2023-01-11 NOTE — PROGRESS NOTES
"Transitional Care Follow Up Visit  Subjective     Lucia Ellis is a 72 y.o. female who presents for a transitional care management visit.    Within 48 business hours after discharge our office contacted her via telephone to coordinate her care and needs.      I reviewed and discussed the details of that call along with the discharge summary, hospital problems, inpatient lab results, inpatient diagnostic studies, and consultation reports with Lucia.     Current outpatient and discharge medications have been reconciled for the patient.    Date of TCM Phone Call 12/31/2022   [facility]   Date of Admission 12/28/2022   Date of Discharge 12/31/2022   Discharge Disposition Home or Self Care     Risk for Readmission (LACE) No data recorded      History of Present Illness   Course During Hospital Stay:      \"Very pleasant 71yo woman with h/o dementia, HTN, HLD, DM2, GERD, NISHA, and prior breast CA, who presented to ER via EMS with report of 20-30 minutes of abnl mvmts of RUE. EEG showed epileptiform discharges in left frontotemporal region.       New-onset simple focal seizure  Neuro consulted  IV Keppra changed to PO now  No further seizure activity  MRI brain negative  No evidence of PRES either  Okay for dc home today per Neuro  F/u with [facility] in 1 month  Return to ER immediately for any HA, vis changes, fever, or recurrent seizure--carefully explained this to pt and her friend at bedside     Hypertension  BPs much improved, continue home regimen  Have provided rx's at dc from our pharmacy     DM2  Sugars look fine on her home regimen  There is serious doubt as to whether she is compliant with home regimen as she suffers from dementia and her son could not say what DM meds she took if any  Have provided rx's at dc from our pharmacy  Will need close f/u with PCP in a week to monitor sugars and compliance with med regimen     Hypokalemia   Replaced K+ as needed with protocol  Mg++ level fine     Moderate " "vascular dementia w/o behavioral disturbance  Continued home meds for her dementia  She is alert and oriented to person, place, year, but is easily confused when discussing medications, who has been in her room today, and purpose of visit        • SCDs for DVT prophylaxis while here.  • Full code confirmed.  • Discussed with patient, friend, and nursing staff. D/w Dr. Leyva.  • Discharge home with family today. PT has seen and signed off as pt is at baseline function.  • Compliance with current medication regimen will be of utmost importance. Discussed with pt and friend. PCP will have to be driving force behind this as outpt.\"    At today's office visit, patient states that she is feeling a lot better.  She is present with her son.  She has not been checking her blood glucose levels as she currently does not have a meter.  I did provide her with a sample of the freestyle chris, as this would help give more of a continuous read for her.  She was restarted back on the Jardiance as well as the Janumet, and they will go pick that up from the pharmacy, I did provide him with plenty of samples as well today.  She has not had a seizure since being home.  She is still taking the Keppra 1000 mg twice a day.  She does not have any side effects of this medicine and will continue that.  She does have some hypokalemia, I did discuss with her that we will recheck that at today's visit as well.  Her blood pressure seems to be under good control currently.  And for her moderate vascular dementia, I did discuss with her that she will still continue taking Aricept as well as Namenda for the time being.       The following portions of the patient's history were reviewed and updated as appropriate: allergies, current medications, past family history, past medical history, past social history, past surgical history and problem list.    Current outpatient and discharge medications have been reconciled for the patient.  Reviewed by: " Everardo Mazariegos MD      Review of Systems    Objective   Physical Exam  Vitals and nursing note reviewed.   Constitutional:       Appearance: She is well-developed.   HENT:      Head: Normocephalic and atraumatic.   Musculoskeletal:      Cervical back: Normal range of motion and neck supple.   Neurological:      Mental Status: She is alert and oriented to person, place, and time.   Psychiatric:         Behavior: Behavior normal.         Assessment & Plan   Diagnoses and all orders for this visit:    1. Type 2 diabetes mellitus without complication, without long-term current use of insulin (HCC) (Primary)  -     empagliflozin (Jardiance) 10 MG tablet tablet; Take 1 tablet by mouth Daily. Indications: Type 2 Diabetes  Dispense: 30 tablet; Refill: 11  -     SITagliptin-metFORMIN HCl ER (Janumet XR)  MG tablet; Take 1 tablet by mouth Daily. Indications: Type 2 Diabetes  Dispense: 30 tablet; Refill: 11  -     Continuous Blood Gluc Sensor (FreeStyle Aleyda 2 Sensor) misc; 1 each 1 (One) Time Per Week.  Dispense: 2 each; Refill: 11  -     Continuous Blood Gluc  (FreeStyle Aleyda 2 [place]) device; 1 each Continuous.  Dispense: 1 each; Refill: 0  -     Comprehensive Metabolic Panel  -     Hemoglobin A1c  -     Microalbumin / Creatinine Urine Ratio - Urine, Clean Catch  -     Urinalysis With Microscopic - Urine, Clean Catch    2. Essential hypertension  -     CBC & Differential  -     Comprehensive Metabolic Panel    3. Memory difficulty  -     Ambulatory Referral to Neurology    4. Hypokalemia  -     Comprehensive Metabolic Panel    5. Seizure (HCC)  -     CBC & Differential  -     Comprehensive Metabolic Panel  -     Ambulatory Referral to Neurology    6. Decreased ambulation status  -     Ambulatory Referral to Home Health    Other orders  -     Microscopic Examination -    Patient does have history of type 2 diabetes.  I did discuss with her that her sugars may have been under control if she was taking her  medication.  However it is really hard to really gauge it is due to the patient being off her medicines for some time.  I would like to start her on home meds again which include Jardiance 10 mg daily along with Janumet extended release  mg daily.  Also were started on a freestyle chris 2 which will help give more accurate levels of her sugars.  For the essential hypertension, is currently stable, will continue back on her same medications.  For decreased ambulation, for her seen home health would be more beneficial.  For her seizures, that could be related to hypoglycemic events that she currently experienced.  However I would like her to continue taking Keppra 1000 mg twice daily.  She would benefit from seeing a neurologist.  The neurologist will also help her with her memory decline.           Dictated utilizing Dragon Voice Recognition Software

## 2023-01-12 LAB
ALBUMIN SERPL-MCNC: 4.7 G/DL (ref 3.7–4.7)
ALBUMIN/CREAT UR: 10 MG/G CREAT (ref 0–29)
ALBUMIN/GLOB SERPL: 2.5 {RATIO} (ref 1.2–2.2)
ALP SERPL-CCNC: 85 IU/L (ref 44–121)
ALT SERPL-CCNC: 15 IU/L (ref 0–32)
APPEARANCE UR: CLEAR
AST SERPL-CCNC: 21 IU/L (ref 0–40)
BACTERIA #/AREA URNS HPF: ABNORMAL /[HPF]
BASOPHILS # BLD AUTO: 0.1 X10E3/UL (ref 0–0.2)
BASOPHILS NFR BLD AUTO: 2 %
BILIRUB SERPL-MCNC: 0.3 MG/DL (ref 0–1.2)
BILIRUB UR QL STRIP: NEGATIVE
BUN SERPL-MCNC: 13 MG/DL (ref 8–27)
BUN/CREAT SERPL: 19 (ref 12–28)
CALCIUM SERPL-MCNC: 9.8 MG/DL (ref 8.7–10.3)
CASTS URNS QL MICRO: ABNORMAL /LPF
CHLORIDE SERPL-SCNC: 104 MMOL/L (ref 96–106)
CO2 SERPL-SCNC: 24 MMOL/L (ref 20–29)
COLOR UR: YELLOW
CREAT SERPL-MCNC: 0.67 MG/DL (ref 0.57–1)
CREAT UR-MCNC: 155.5 MG/DL
CRYSTALS URNS MICRO: ABNORMAL
EGFRCR SERPLBLD CKD-EPI 2021: 92 ML/MIN/1.73
EOSINOPHIL # BLD AUTO: 0 X10E3/UL (ref 0–0.4)
EOSINOPHIL NFR BLD AUTO: 1 %
EPI CELLS #/AREA URNS HPF: ABNORMAL /HPF (ref 0–10)
ERYTHROCYTE [DISTWIDTH] IN BLOOD BY AUTOMATED COUNT: 12.7 % (ref 11.7–15.4)
GLOBULIN SER CALC-MCNC: 1.9 G/DL (ref 1.5–4.5)
GLUCOSE SERPL-MCNC: 88 MG/DL (ref 70–99)
GLUCOSE UR QL STRIP: NEGATIVE
HBA1C MFR BLD: 12.1 % (ref 4.8–5.6)
HCT VFR BLD AUTO: 36.7 % (ref 34–46.6)
HGB BLD-MCNC: 12.5 G/DL (ref 11.1–15.9)
HGB UR QL STRIP: NEGATIVE
IMM GRANULOCYTES # BLD AUTO: 0 X10E3/UL (ref 0–0.1)
IMM GRANULOCYTES NFR BLD AUTO: 0 %
KETONES UR QL STRIP: NEGATIVE
LEUKOCYTE ESTERASE UR QL STRIP: NEGATIVE
LYMPHOCYTES # BLD AUTO: 2 X10E3/UL (ref 0.7–3.1)
LYMPHOCYTES NFR BLD AUTO: 51 %
MCH RBC QN AUTO: 28.9 PG (ref 26.6–33)
MCHC RBC AUTO-ENTMCNC: 34.1 G/DL (ref 31.5–35.7)
MCV RBC AUTO: 85 FL (ref 79–97)
MICRO URNS: NORMAL
MICRO URNS: NORMAL
MICROALBUMIN UR-MCNC: 15.8 UG/ML
MONOCYTES # BLD AUTO: 0.4 X10E3/UL (ref 0.1–0.9)
MONOCYTES NFR BLD AUTO: 9 %
NEUTROPHILS # BLD AUTO: 1.5 X10E3/UL (ref 1.4–7)
NEUTROPHILS NFR BLD AUTO: 37 %
NITRITE UR QL STRIP: NEGATIVE
PH UR STRIP: 5 [PH] (ref 5–7.5)
PLATELET # BLD AUTO: 213 X10E3/UL (ref 150–450)
POTASSIUM SERPL-SCNC: 4 MMOL/L (ref 3.5–5.2)
PROT SERPL-MCNC: 6.6 G/DL (ref 6–8.5)
PROT UR QL STRIP: NORMAL
RBC # BLD AUTO: 4.33 X10E6/UL (ref 3.77–5.28)
RBC #/AREA URNS HPF: ABNORMAL /HPF (ref 0–2)
SODIUM SERPL-SCNC: 144 MMOL/L (ref 134–144)
SP GR UR STRIP: 1.02 (ref 1–1.03)
UNIDENT CRYS URNS QL MICRO: PRESENT
UROBILINOGEN UR STRIP-MCNC: 0.2 MG/DL (ref 0.2–1)
WBC # BLD AUTO: 3.9 X10E3/UL (ref 3.4–10.8)
WBC #/AREA URNS HPF: ABNORMAL /HPF (ref 0–5)

## 2023-01-13 ENCOUNTER — HOME CARE VISIT (OUTPATIENT)
Dept: HOME HEALTH SERVICES | Facility: HOME HEALTHCARE | Age: 73
End: 2023-01-13
Payer: COMMERCIAL

## 2023-01-13 ENCOUNTER — READMISSION MANAGEMENT (OUTPATIENT)
Dept: CALL CENTER | Facility: HOSPITAL | Age: 73
End: 2023-01-13
Payer: MEDICARE

## 2023-01-13 VITALS
TEMPERATURE: 97.1 F | RESPIRATION RATE: 16 BRPM | DIASTOLIC BLOOD PRESSURE: 56 MMHG | HEART RATE: 78 BPM | SYSTOLIC BLOOD PRESSURE: 128 MMHG | OXYGEN SATURATION: 97 %

## 2023-01-13 VITALS
RESPIRATION RATE: 18 BRPM | DIASTOLIC BLOOD PRESSURE: 72 MMHG | TEMPERATURE: 98.2 F | OXYGEN SATURATION: 96 % | HEART RATE: 88 BPM | SYSTOLIC BLOOD PRESSURE: 118 MMHG

## 2023-01-13 PROCEDURE — G0151 HHCP-SERV OF PT,EA 15 MIN: HCPCS

## 2023-01-13 PROCEDURE — G0300 HHS/HOSPICE OF LPN EA 15 MIN: HCPCS

## 2023-01-13 PROCEDURE — G0180 MD CERTIFICATION HHA PATIENT: HCPCS | Performed by: FAMILY MEDICINE

## 2023-01-13 NOTE — PROGRESS NOTES
Please inform the patient of the following abnormal results. Have her come back in the office within the next two weeks. She will need to start on insulin for a short period of time.

## 2023-01-13 NOTE — OUTREACH NOTE
Medical Week 2 Survey    Flowsheet Row Responses   [facility] patient discharged from? [place]   Does the patient have one of the following disease processes/diagnoses(primary or secondary)? Other   Week 2 attempt successful? No   Unsuccessful attempts Attempt 2          RANDI ISAACS - Registered Nurse

## 2023-01-13 NOTE — HOME HEALTH
_________________________________________________  PHYSICAL THERAPY EVALUATION    REASON FOR REFERRAL:  72 yr old female with history of DEMENTIA, DM-TYPE 2 & HTN who was recently hospitalized at [facility] 12/28/22 - 12/31/22 for new onset of FOCAL SEIZURE.  Pt had abnormal movements of (R) UE with EPILEPTIC DISCHARGES (L) FRONTO-TEMPORAL REGION noted on EEG.  Pt started on Keppra & has had no reoccurrence of seizure activity.  Pt had f/u appt with PCP on 1/11/23 who referred her for home health SN & PT services to address deficits in strength, balance & mobility.    Patient reports she just returned from taking her dog for a walk when PT arrived.  She states she does not know why MD wanted her to have PT.  She denies sustaining any falls, however reports that sometimes she feels slightly dizzy when she sits up in bed.  (Pt declined vestibular assessment, stating she hasnt experienced any dizziness for a while)    PERTINENT PAST MEDICAL HISTORY:    Breast cancer 1995   Dementia   Diabetes mellitus Type 2   Hypertension   HLD   GERD  Memory loss     PERTINENT PAST SURGICAL HISTORY:    CHOLECYSTECTOMY      HYSTERECTOMY      MASTECTOMY- (R)  RIGHT TOTAL KNEE ARTHROPLASTY     PRIOR LEVEL OF FUNCTION:  Independent ambulation without assistive device. Independent wtih ADL's & I-ADL's, however son does majority of the cooking.  Pt was driving, however was instructed by MD not to drive.    CAREGIVER:  Son- Vern Munoz    HOME ENVIRONMENT:  Lives with son (who typically works during the day) & small dog in a single story home with basement. Pt resides on first floor & son on lower level.    MENTAL STATUS:  Alert & Oriented x 3    EDEMA:  None    WOUND / SKIN CONDITION:  Intact    POSTURE:  No gross deformities    MUSCLE TONE/COORDINATION:      SENSATION/PROPRIOCEPTION:  Intact    DOMINANT SIDE:  Right    OCCULOMOTOR TESTING  SMOOTH PURSUITS:  WNL  SACCADES:  WNL  VERGENCE:  WNL  PERIPHERAL VISION:   WNL  VESTIBULO-OCCULOMOTOR FUNCTION:  WNL    STRENGTH GRADES  HIP:  4/5 (B)   KNEE:  4+/5 (B)  ANKLE DF: 4+ (B)  (B) UE's: WNL    TINETTI SCORE:   26/28- indicates  FALL RISK  (TINETTI FALL RISK SCORING: Under 19= High  19-24= Moderate  25 & up= Low)    DYNAMIC GAIT INDEX (3=NORMAL;  2=MILD IMPAIRMENT;  1=MODERATE IMPAIRMENT;  0=SEVERE IMPAIRMENT)     LEVEL SURFACES:  3     CHANGE IN GAIT SPEED: 3      HORIZONTAL HEAD TURNS:  3     VERTICAL HEAD TURNS:  3     TOTAL:  12/12    **INCREASED FALL RISK NOTED WITH SCORES LESS THAN 10**    UNIPEDAL STANCE:  4 seconds (R) LE,  6 seconds (L) LE    POST EVALUATION ASSESSMENT: Patient is functioning safely & independently without an assistive device over all surface levels & stairs with good balance & coordination.  She is able to stand on 1 leg &  dog & other items from floor without difficulty.  Pt does not require skilled PT services at this time.  She was instructed in sitting & standing exercises with written/pictoral HEP provided.  No further visits planned.

## 2023-01-13 NOTE — Clinical Note
----- Message -----  From: Mayra Reyes PT  Sent: 1/13/2023   9:06 PM EST  To: Everardo Mazariegos MD, Ariella Buchanan, OT, *      Nava,    PT performed an evaluation on Lucia Ellis on 1/13/23.  The following is a brief summary of my visit:    CAREGIVER:  Son- Vern Munoz    HOME ENVIRONMENT:  Lives with son (who typically works during the day) & small dog in a single story home with basement.  Pt resides on first floor & son on lower level.    MENTAL STATUS:  Alert & Oriented x 3    ROM:  WNL with exception of (R) Knee which is limited to 65* flexion     STRENGTH GRADES  HIP:  4/5 (B)   KNEE:  4+/5 (B)  ANKLE DF: 4+ (B)  (B) UE's: WNL    TRANSFERS:  Independent in/out of bed & chair    GAIT:  Independent over all surface levels & stairs without an assistive device with no deviations noted.    ASSESSMENT:  Patient is functioning safely & independently without an assistive device over all surface levels & stairs with good balance & coordination.  She is able to stand on 1 leg &  dog & other items from floor without difficulty.  Pt does not require skilled PT services at this time.  She was instructed in sitting & standing exercises with written/pictoral HEP provided.  No further visits planned.    Please feel free to call me if you have any questions.  Thank you,         Mayra Reyes, PT        [phone]

## 2023-01-13 NOTE — Clinical Note
Nava    PT performed an evaluation on Lucia Ellis on 1/13/23.  The following is a brief summary of my visit:    CAREGIVER:  Son- Vern Munoz    HOME ENVIRONMENT:  Lives with son (who typically works during the day) & small dog in a single story home with basement.  Pt resides on first floor & son on lower level.    MENTAL STATUS:  Alert & Oriented x 3    ROM:  WNL with exception of (R) Knee which is limited to 65* flexion     STRENGTH GRADES  HIP:  4/5 (B)   KNEE:  4+/5 (B)  ANKLE DF: 4+ (B)  (B) UE's: WNL    TRANSFERS:  Independent in/out of bed & chair    GAIT:  Independent over all surface levels & stairs without an assistive device with no deviations noted.    ASSESSMENT:  Patient is functioning safely & independently without an assistive device over all surface levels & stairs with good balance & coordination.  She is able to stand on 1 leg &  dog & other items from floor without difficulty.  Pt does not require skilled PT services at this time.  She was instructed in sitting & standing exercises with written/pictoral HEP provided.  No further visits planned.    Please feel free to call me if you have any questions.  Thank you,         Mayra Reyes, PT        [phone]

## 2023-01-16 ENCOUNTER — HOME CARE VISIT (OUTPATIENT)
Dept: HOME HEALTH SERVICES | Facility: HOME HEALTHCARE | Age: 73
End: 2023-01-16
Payer: COMMERCIAL

## 2023-01-16 VITALS
DIASTOLIC BLOOD PRESSURE: 80 MMHG | HEART RATE: 87 BPM | RESPIRATION RATE: 18 BRPM | OXYGEN SATURATION: 98 % | SYSTOLIC BLOOD PRESSURE: 118 MMHG | TEMPERATURE: 98.5 F

## 2023-01-16 NOTE — HOME HEALTH
Patient states that her son assists with all needs and medications. Tolerating well. Patients son was no there at time of visit so was not able to go over all medications because patient states that he manages for her and brings to her. No new symptoms at time of visit

## 2023-01-17 ENCOUNTER — HOME CARE VISIT (OUTPATIENT)
Dept: HOME HEALTH SERVICES | Facility: HOME HEALTHCARE | Age: 73
End: 2023-01-17
Payer: MEDICARE

## 2023-01-19 ENCOUNTER — HOME CARE VISIT (OUTPATIENT)
Dept: HOME HEALTH SERVICES | Facility: HOME HEALTHCARE | Age: 73
End: 2023-01-19
Payer: COMMERCIAL

## 2023-01-19 PROCEDURE — G0152 HHCP-SERV OF OT,EA 15 MIN: HCPCS

## 2023-01-19 NOTE — CASE COMMUNICATION
Dear Dr. Everardo Mazariegos    Re:Lucia Ellis  :1950    The LPN home visit  on 2023 for the above patient was missed due to no answer by phone or residence , therefore, the prescribed frequency of visits was not met.    If you have questions or would like further information about this patient, please contact us at [phone].    Regards,  Rukhsana Ruib LPN

## 2023-01-19 NOTE — HOME HEALTH
"Subjective: Patient reports that she is \"doing OK\".  \"I haven't been doing as good since the other girl was here\".  Pt. referring to PT - she reports that she hasn't been doing as she was during PT evaluation last week.  Her chief complaint is decreased endurance, and impaired balance. She reports having some discomfort at right shoulder - r/t arthritis per patient.  OT evaluation visit was attempted earlier in the week - patient unavailable - therefeore, visit scheduled for this date.     No falls or medication changes. Pt's. son manages her medications.     Reason for referral:  OT home health referral given d/t patient recently hospitalized at [facility] 12/28/22 - 12/31/22 for new onset of FOCAL SEIZURE with history of  DEMENTIA, DM-TYPE 2 & HTN.  Pt had f/u appt with PCP on 1/11/23 who referred her for home health OT services d/t impaired strength, balance, ADL, and mobility.     PMH:  Breast cancer 1995   Dementia   Diabetes mellitus Type 2   Hypertension   HLD   GERD   Memory loss   OA  R TKA    PLOF:  Independent ambulation without assistive device. Independent wtih ADL's & simple IADL tasks.  Pt. participated in laundry tasks, meal prep., pet care. Patient's son does most of the meal prep., and transportation.    Home environment:  Pt. lives in ranch style home with her son and small dog.  She reports having supportive family nearby as well.      Patient is demonstrating indep. with functional transfers, mobility with no AD.  She has concern for impaired balance - she did not demonstrate any loss of balance this date with all activity.  She states that she notices her \"balance off\" more in the morning.  She does have impaired BUE strength, and activity tolerance and would benefit from BUE ther. exercise with establishd HEP.  Pt. able to complete ADL tasks with indep., however, OT recommending supervision with bathing in shower.  She was able to complete tub transfer with supervision, and denies the need for shower " seat.  Pt. does have a lot of clutter in her bedroom and bathroom - OT provided education with keeping pathways free of clutter to prevent falls.  Pt. has memory deficits that impact her participation with ADL and IADL tasks -  would benefiti from strategies to assist with tasks.  OT discussed and recommended utilizing a calendar and a daily schedule - OT to further address next visit.    Plan for next visit:  OT services are recommended to continue to address BUE strengthening, activity and standing tolerance to assist with ADL and IADL tasks,  OT to also address strategies to assist with memory deficits r/t ADL and provide caregiver education. Patient in agreement with established POC.    MEDICAL NECESSITY FOR ONGOING SKILLED THERAPY: Patient requires skilled occupational therapy for remediation of deficits to improve activities of daily living, home safety, falls prevention, monitor vital signs, including pulse oximetry, hand/ upper extremity function/range of motion/strength, therapeutic exercise, safety in home, and improve endurance and fatigue management with self care, and functional mobility with durable medical equipment as necessary.

## 2023-01-19 NOTE — Clinical Note
OT evaluation completed on 1/19/23. OT services are recommended for 3 additional visits with focus of care on ADL, simple IADL tasks, home safety, cognitive strategies r/t ADL, BUE ther. exercises, and caregiver ed/training.

## 2023-01-20 ENCOUNTER — HOME CARE VISIT (OUTPATIENT)
Dept: HOME HEALTH SERVICES | Facility: HOME HEALTHCARE | Age: 73
End: 2023-01-20
Payer: COMMERCIAL

## 2023-01-20 VITALS
SYSTOLIC BLOOD PRESSURE: 100 MMHG | OXYGEN SATURATION: 96 % | RESPIRATION RATE: 18 BRPM | TEMPERATURE: 96.9 F | DIASTOLIC BLOOD PRESSURE: 68 MMHG | HEART RATE: 72 BPM

## 2023-01-20 PROCEDURE — G0300 HHS/HOSPICE OF LPN EA 15 MIN: HCPCS

## 2023-01-22 VITALS
DIASTOLIC BLOOD PRESSURE: 78 MMHG | OXYGEN SATURATION: 99 % | SYSTOLIC BLOOD PRESSURE: 116 MMHG | TEMPERATURE: 98.5 F | HEART RATE: 78 BPM | RESPIRATION RATE: 18 BRPM

## 2023-01-23 NOTE — HOME HEALTH
Patient experienced alot of forgetfullness today. Patient forgot where she put her keys and what she was doing. Appointment scheduled with physician next week.Patients son assists with all needs and lives with patient but works at night and sleeps through the day per patient.

## 2023-01-24 ENCOUNTER — HOME CARE VISIT (OUTPATIENT)
Dept: HOME HEALTH SERVICES | Facility: HOME HEALTHCARE | Age: 73
End: 2023-01-24
Payer: MEDICARE

## 2023-01-24 ENCOUNTER — HOME CARE VISIT (OUTPATIENT)
Dept: HOME HEALTH SERVICES | Facility: HOME HEALTHCARE | Age: 73
End: 2023-01-24
Payer: COMMERCIAL

## 2023-01-24 PROCEDURE — G0300 HHS/HOSPICE OF LPN EA 15 MIN: HCPCS

## 2023-01-26 ENCOUNTER — HOME CARE VISIT (OUTPATIENT)
Dept: HOME HEALTH SERVICES | Facility: HOME HEALTHCARE | Age: 73
End: 2023-01-26
Payer: COMMERCIAL

## 2023-01-26 VITALS
HEART RATE: 76 BPM | RESPIRATION RATE: 18 BRPM | DIASTOLIC BLOOD PRESSURE: 78 MMHG | TEMPERATURE: 98.1 F | SYSTOLIC BLOOD PRESSURE: 116 MMHG | OXYGEN SATURATION: 99 %

## 2023-01-26 NOTE — Clinical Note
As per home health protocol, MD must be notified of missed/cancelled visits.     The occupational therapy visit on 1/26/23 was incomplete d/t no answer at door when OT arrived for scheduled visit - and no answer via phone.  Therefore, the prescribed frequency of visits was not met.       If you have questions about this patient please contact us at [phone].     Regards,   Loulou Amador, Occupational Therapist

## 2023-01-27 NOTE — HOME HEALTH
Patient tolerated visit well. However still struggling with memory. Patientn son assists with medications and care per patient but works nights and sleeps during the day, patient also has family that lives close by to assist.

## 2023-01-27 NOTE — HOME HEALTH
OT arrived for scheduld visit - no answer at door or via phone.  Therefore, visit was not completed this date.

## 2023-01-30 RX ORDER — ESCITALOPRAM OXALATE 10 MG/1
10 TABLET ORAL DAILY
Qty: 90 TABLET | Refills: 1 | Status: SHIPPED | OUTPATIENT
Start: 2023-01-30

## 2023-01-30 NOTE — TELEPHONE ENCOUNTER
Caller: Lucia Ellis    Relationship: Self    Best call back number:    [phone]          Requested Prescriptions:   Requested Prescriptions     Pending Prescriptions Disp Refills   • escitalopram (Lexapro) 10 MG tablet 90 tablet 1     Sig: Take 1 tablet by mouth Daily. Indications: Major Depressive Disorder        Pharmacy where request should be sent: [facility] [id number] [address] AT [facility].  [phone]  - [phone] FX     Additional details provided by patient:   Does the patient have less than a 3 day supply:  [x] Yes  [] No    Would you like a call back once the refill request has been completed: [] Yes [] No    If the office needs to give you a call back, can they leave a voicemail: [] Yes [] No    Arlette Ramsey Rep   01/30/23 10:49 EST

## 2023-01-31 ENCOUNTER — HOME CARE VISIT (OUTPATIENT)
Dept: HOME HEALTH SERVICES | Facility: HOME HEALTHCARE | Age: 73
End: 2023-01-31
Payer: COMMERCIAL

## 2023-01-31 VITALS
HEART RATE: 70 BPM | RESPIRATION RATE: 18 BRPM | OXYGEN SATURATION: 97 % | DIASTOLIC BLOOD PRESSURE: 86 MMHG | TEMPERATURE: 96.9 F | SYSTOLIC BLOOD PRESSURE: 160 MMHG

## 2023-01-31 DIAGNOSIS — J06.9 ACUTE URI: ICD-10-CM

## 2023-01-31 PROCEDURE — G0152 HHCP-SERV OF OT,EA 15 MIN: HCPCS

## 2023-01-31 RX ORDER — BENZONATATE 100 MG/1
CAPSULE ORAL
Qty: 42 CAPSULE | Refills: 0 | Status: SHIPPED | OUTPATIENT
Start: 2023-01-31 | End: 2023-03-01

## 2023-02-01 ENCOUNTER — TELEPHONE (OUTPATIENT)
Dept: INTERNAL MEDICINE | Facility: CLINIC | Age: 73
End: 2023-02-01

## 2023-02-01 NOTE — TELEPHONE ENCOUNTER
Caller: Lucia Ellis    Relationship to patient: Self    Best call back number: [phone]     Patient is needing: PATIENT IS CALLING TO STATE THE FOLLOWING  MEDICATION IS GOING TO COST HER $47.00.  SHE STATES SHE WANTS TO GO BACK ON THE MEDICATION SHE WAS TAKING PRIOR TO DR HERNANDEZ CHANGING TO THE JARDIANCE.     empagliflozin (Jardiance) 10 MG tablet tablet     [facility] [id number] [facility] [address] [place] [address] RD AT [facility]. - [phone] [facility] [phone]   [phone]    PLEASE ADVISE.

## 2023-02-01 NOTE — HOME HEALTH
"Subjective:  Pt. reports that she has been doing \"pretty good\".  \"I've been feeling better\".  Patient had no new concerns or complaints.    No report of falls or medication changes.      Patient is completing all ADL tasks with indep., demonstrating good safety awareness with tasks.  Pt's. home is somewhat cluttered, but all pathways were clear.  Patient wearing proper footwear.  Pt. able to complete simple kitchen tasks and complete pet care with good safety awareness and demonstrated no loss of balance.  Pt's. son assists with home management including laundry and preparing meals.  Pt's. sister and nephew assists with care as well - with groceries, transportation, picking up medications. OT provided pt. education with energy conservation, and cognitive strategies - recommending daily schedule to check off when she takes meds., meals.  Pt. has calendar to utilize, but d/t her memory deficits patient is unclear of the day - she needs daily reminders.  Patient's son was not present and unavailable by phone.  OT was able to reach patient's nephew - OT provided discharge instructions and recommendations.  OT discussed concerns and recommended patient to continue to have reminders with medications - pt. has medi planner, but still needs reminders to take medications.  OT noted that pt. had not taken medications for 2 days.  Pt. also needs reminders to eat 3 meals a day and stay hydrated throughout the day to prevent dehydration and malnutrition - nephew verbalized good understanding and will discuss with pt's. son who lives in home with patient.   OT services discharged this date d/t goals met - OT also completed home health discharge [facility] this date as further nursing visits were not approved by patient's insurance."

## 2023-02-02 NOTE — CASE COMMUNICATION
Dear Dr. Everardo Mazariegos    Re:Lucia Ellis  :1950    The LPN home visit  on 2023 for the above patient was missed due to patient not being approved for more visits , therefore, the prescribed frequency of visits was not met.    If you have questions or would like further information about this patient, please contact us at [phone].    Regards,  Rukhsana Rubi LPN

## 2023-02-06 NOTE — TELEPHONE ENCOUNTER
Her hba1c is too high, she needs to be on numerous medications.    Please have patient come see me in the office.

## 2023-02-09 ENCOUNTER — TELEPHONE (OUTPATIENT)
Dept: NEUROLOGY | Facility: CLINIC | Age: 73
End: 2023-02-09

## 2023-03-01 ENCOUNTER — OFFICE VISIT (OUTPATIENT)
Dept: INTERNAL MEDICINE | Facility: CLINIC | Age: 73
End: 2023-03-01
Payer: MEDICARE

## 2023-03-01 ENCOUNTER — TELEPHONE (OUTPATIENT)
Dept: NEUROLOGY | Facility: CLINIC | Age: 73
End: 2023-03-01
Payer: MEDICARE

## 2023-03-01 VITALS
OXYGEN SATURATION: 96 % | DIASTOLIC BLOOD PRESSURE: 70 MMHG | TEMPERATURE: 96.7 F | SYSTOLIC BLOOD PRESSURE: 150 MMHG | HEART RATE: 94 BPM | RESPIRATION RATE: 16 BRPM | HEIGHT: 64 IN | BODY MASS INDEX: 21.78 KG/M2 | WEIGHT: 127.6 LBS

## 2023-03-01 DIAGNOSIS — E11.9 TYPE 2 DIABETES MELLITUS WITHOUT COMPLICATION, WITHOUT LONG-TERM CURRENT USE OF INSULIN: Primary | ICD-10-CM

## 2023-03-01 PROCEDURE — 3046F HEMOGLOBIN A1C LEVEL >9.0%: CPT | Performed by: FAMILY MEDICINE

## 2023-03-01 PROCEDURE — 99214 OFFICE O/P EST MOD 30 MIN: CPT | Performed by: FAMILY MEDICINE

## 2023-03-01 RX ORDER — SITAGLIPTIN AND METFORMIN HYDROCHLORIDE 1000; 50 MG/1; MG/1
1 TABLET, FILM COATED, EXTENDED RELEASE ORAL DAILY
Qty: 30 TABLET | Refills: 11 | Status: SHIPPED | OUTPATIENT
Start: 2023-03-01

## 2023-03-01 NOTE — PROGRESS NOTES
"Chief Complaint  Seizures (Follow-up)    Subjective        Lucia Ellis presents to [facility]IMARY [facility]  History of Present Illness    Patient presents at today's office visit with a history having type 2 diabetes.  She is currently on Janumet extended release  mg daily.  She is also taking Jardiance 10 mg daily.  She does not the Jardiance expensive, however she was able to get it.  She has not the best historian.  She is not sure how high her sugars are running at home.    Objective   Vital Signs:  /70   Pulse 94   Temp 96.7 °F (35.9 °C)   Resp 16   Ht 162.6 cm (64\")   Wt 57.9 kg (127 lb 9.6 oz)   SpO2 96%   BMI 21.90 kg/m²   Estimated body mass index is 21.9 kg/m² as calculated from the following:    Height as of this encounter: 162.6 cm (64\").    Weight as of this encounter: 57.9 kg (127 lb 9.6 oz).       BMI is within normal parameters. No other follow-up for BMI required.      Physical Exam  Vitals and nursing note reviewed.   Constitutional:       Appearance: She is well-developed.   HENT:      Head: Normocephalic and atraumatic.   Musculoskeletal:      Cervical back: Normal range of motion and neck supple.   Neurological:      Mental Status: She is alert and oriented to person, place, and time.   Psychiatric:         Behavior: Behavior normal.        Result Review :                   Assessment and Plan   Diagnoses and all orders for this visit:    1. Type 2 diabetes mellitus without complication, without long-term current use of insulin (HCC) (Primary)  -     SITagliptin-metFORMIN HCl ER (Janumet XR)  MG tablet; Take 1 tablet by mouth Daily. Indications: Type 2 Diabetes  Dispense: 30 tablet; Refill: 11  -     empagliflozin (Jardiance) 10 MG tablet tablet; Take 1 tablet by mouth Daily. Indications: Type 2 Diabetes  Dispense: 30 tablet; Refill: 11  -     Comprehensive Metabolic Panel  -     Hemoglobin A1c  -     Microalbumin / Creatinine Urine Ratio - Urine, " Clean Catch    I would like to have her continue taking Jardiance 10 mg daily along with Janumet extended release  mg daily.  I will check a hemoglobin A1c at today's office visit.  If it continues to stay elevated, she may need further medication.  I do think she has underlying dementia, it is unclear exactly how well her sugars have been       I spent 30 minutes caring for Lucia on this date of service. This time includes time spent by me in the following activities:performing a medically appropriate examination and/or evaluation , counseling and educating the patient/family/caregiver and ordering medications, tests, or procedures  Follow Up   No follow-ups on file.  Patient was given instructions and counseling regarding her condition or for health maintenance advice. Please see specific information pulled into the AVS if appropriate.

## 2023-03-02 ENCOUNTER — OFFICE VISIT (OUTPATIENT)
Dept: NEUROLOGY | Facility: CLINIC | Age: 73
End: 2023-03-02
Payer: MEDICARE

## 2023-03-02 VITALS
DIASTOLIC BLOOD PRESSURE: 70 MMHG | WEIGHT: 128 LBS | SYSTOLIC BLOOD PRESSURE: 132 MMHG | HEART RATE: 72 BPM | BODY MASS INDEX: 21.85 KG/M2 | OXYGEN SATURATION: 96 % | HEIGHT: 64 IN

## 2023-03-02 DIAGNOSIS — F03.90 DEMENTIA WITHOUT BEHAVIORAL DISTURBANCE: ICD-10-CM

## 2023-03-02 DIAGNOSIS — R41.3 MEMORY DIFFICULTY: ICD-10-CM

## 2023-03-02 DIAGNOSIS — F01.B0 VASCULAR DEMENTIA, MODERATE, WITHOUT BEHAVIORAL DISTURBANCE, PSYCHOTIC DISTURBANCE, MOOD DISTURBANCE, AND ANXIETY: Primary | Chronic | ICD-10-CM

## 2023-03-02 LAB
ALBUMIN SERPL-MCNC: 4.7 G/DL (ref 3.7–4.7)
ALBUMIN/CREAT UR: 14 MG/G CREAT (ref 0–29)
ALBUMIN/GLOB SERPL: 2 {RATIO} (ref 1.2–2.2)
ALP SERPL-CCNC: 82 IU/L (ref 44–121)
ALT SERPL-CCNC: 11 IU/L (ref 0–32)
AST SERPL-CCNC: 14 IU/L (ref 0–40)
BILIRUB SERPL-MCNC: 0.4 MG/DL (ref 0–1.2)
BUN SERPL-MCNC: 11 MG/DL (ref 8–27)
BUN/CREAT SERPL: 18 (ref 12–28)
CALCIUM SERPL-MCNC: 9.5 MG/DL (ref 8.7–10.3)
CHLORIDE SERPL-SCNC: 104 MMOL/L (ref 96–106)
CO2 SERPL-SCNC: 28 MMOL/L (ref 20–29)
CREAT SERPL-MCNC: 0.62 MG/DL (ref 0.57–1)
CREAT UR-MCNC: 76.7 MG/DL
EGFRCR SERPLBLD CKD-EPI 2021: 93 ML/MIN/1.73
GLOBULIN SER CALC-MCNC: 2.3 G/DL (ref 1.5–4.5)
GLUCOSE SERPL-MCNC: 125 MG/DL (ref 70–99)
HBA1C MFR BLD: 7.9 % (ref 4.8–5.6)
MICROALBUMIN UR-MCNC: 10.4 UG/ML
POTASSIUM SERPL-SCNC: 3.7 MMOL/L (ref 3.5–5.2)
PROT SERPL-MCNC: 7 G/DL (ref 6–8.5)
SODIUM SERPL-SCNC: 147 MMOL/L (ref 134–144)

## 2023-03-02 PROCEDURE — 99215 OFFICE O/P EST HI 40 MIN: CPT | Performed by: PSYCHIATRY & NEUROLOGY

## 2023-03-02 RX ORDER — DONEPEZIL HYDROCHLORIDE 10 MG/1
10 TABLET, FILM COATED ORAL DAILY
Qty: 90 TABLET | Refills: 1 | Status: SHIPPED | OUTPATIENT
Start: 2023-03-02

## 2023-03-02 RX ORDER — MEMANTINE HYDROCHLORIDE 5 MG/1
5 TABLET ORAL 2 TIMES DAILY
Qty: 180 TABLET | Refills: 1 | Status: SHIPPED | OUTPATIENT
Start: 2023-03-02

## 2023-03-02 NOTE — PATIENT INSTRUCTIONS
**Eat a high fiber diet    **Exercise regularly (physically and mentally)    **Floss daily    **Consider eating yogurt regularly    **Consider drinking green tea    **Limit soda and alcohol    **Ensure safety in the home    **Check out th Alzheimer's Association (www.alz.org).    **If you are interested in participating in a clinical trial, check out the following centers:   [facility]:  [email or web address].[email or web address]/      [facility]:  [email or web address]/[email or web address]     [facility]:  [email or web address]/[email or web address]    **If you live in [facility], consider [facility]. It is a free agency that helps find placement for seniors. They do an assessment and find out the need and know which facilities have openings and would be the best fit. They help figure out the financial aspects as well.  Shivani Reis is the nurse navigator and her number is [phone].

## 2023-03-02 NOTE — PROGRESS NOTES
Chief Complaint  Memory Loss    Subjective          Lucia Ellis presents to [facility] NEUR[facility] for   HISTORY OF PRESENT ILLNESS:    Lucia Ellis is a 73 year old right handed woman who returns to neurology clinic for follow up evaluation and treatment of memory loss and concerns for mild cognitive impairment.  She presents today with her sister, Jessica, on visit today who also provides information.  He reports some trouble with her memory starting about 5+ years and more obvious more recently.  She tells me what worries her the most is that she is getting lost when she is driving in familiar places like her neighborhood.  She is also forgetful and misplacing objects.  She is forgetting conversations with her family and tells me she took money out of the bank and did not remember what she did with it and then realized she gave the money to her for new motor.  Her son has noticed that she is paranoid and misplaces and hides things even from herself.  She has had lumpectomy done for breast cancer with radiation treatment several years ago.  She had a brain MRI scan done which did not demonstrate any acute intracranial findings.  She denies any family history of dementia but reports family history of cancer.  She has had lab work including normal CBC, CMP and TSH.  Her Vit D levels were low and she reports taking supplements.  She also had normal Vit B12 level.  She is no longer driving.  She denies any exposures to toxins.  Her son cooks food for her.  She denies any trouble with ADLs but tells me she takes her time and is slowing down to make sure she does things appropriately.  She sometimes thinks too long about what she needs to be wearing.  She tells me she does not remember going to the doctor with her son and she is forgetting regular conversations.  She is living with her son who does the cooking.  They have smoke detectors in their house.  She has not decided the POA status and living  will.  She denies any significant depression.  She denies exercising and socializing much.  She is living with her son at this time.  She is currently on combination of donepezil 10 mg daily along with memantine 5 mg daily.      Past Medical History:   Diagnosis Date   • Breast cancer (HCC)    • Dementia (HCC)    • Diabetes mellitus (HCC)    • Hypertension    • Memory loss         Family History   Problem Relation Age of Onset   • Heart attack Mother    • Heart disease Mother    • Hypertension Mother    • Hypertension Father    • Diabetes Father    • Hypertension Sister    • Diabetes Sister    • Thyroid cancer Sister    • Breast cancer Sister    • Lung cancer Sister    • Diabetes Brother    • Drug abuse Paternal Grandmother         Social History     Socioeconomic History   • Marital status:    Tobacco Use   • Smoking status: Never   • Smokeless tobacco: Never   Vaping Use   • Vaping Use: Never used   Substance and Sexual Activity   • Alcohol use: Yes     Alcohol/week: 7.0 standard drinks     Types: 7 Glasses of wine per week     Comment: per patient's son, patient drinks one glass of wine daily   • Drug use: No   • Sexual activity: Never        I have reviewed and confirmed the accuracy of the ROS as documented by the MA/LPN/RN Gia Montes De Oca MD    Review of Systems   Genitourinary: Negative for decreased urine volume, frequency and urgency.   Musculoskeletal: Negative for back pain, myalgias and neck stiffness.   Skin: Negative for color change, pallor and rash.   Allergic/Immunologic: Negative for environmental allergies, food allergies and immunocompromised state.   Neurological: Positive for memory problem and confusion. Negative for dizziness, tremors, seizures, syncope, facial asymmetry, speech difficulty, weakness, light-headedness, numbness and headache.   Hematological: Negative for adenopathy. Bruises/bleeds easily.   Psychiatric/Behavioral: Positive for decreased concentration. Negative for  "agitation, behavioral problems, sleep disturbance and stress. The patient is not nervous/anxious.         Objective   Vital Signs:   /70 (BP Location: Left arm, Patient Position: Sitting, Cuff Size: Adult)   Pulse 72   Ht 162.6 cm (64\")   Wt 58.1 kg (128 lb)   SpO2 96%   BMI 21.97 kg/m²       PHYSICAL EXAM:    General   Mental Status - Alert. General Appearance - Well developed, Well groomed, Oriented and Cooperative.        Head and Neck  Head - normocephalic, atraumatic with no lesions or palpable masses.  Neck    Global Assessment - supple.       Eye   Sclera/Conjunctiva - Bilateral - Normal.    ENMT  Mouth and Throat   Oral Cavity/Oropharynx: Oropharynx - the soft palate,uvula and tongue are normal in appearance.    Chest and Lung Exam   Chest - lung clear to auscultation bilaterally.    Cardiovascular   Cardiovascular examination reveals  - normal heart sounds, regular rate and rhythm.     Neurologic   Mental Status: Speech - Normal. Cognitive function - SLUMS 23/30 with 0/5 object recall today compared to 29/30 previously, appropriate fund of knowledge. No impairment of attention, Impairment of concentration, impairment of long term memory or impairment of short term memory.  Cranial Nerves:   II Optic: Visual acuity - Left - Normal. Right - Normal. Visual fields - Normal (to confrontation).  III Oculomotor: Pupillary constriction - Left - Normal. Right - Normal.  VII Facial: - Normal Bilaterally.  VIII Acoustic - Bilateral - Hearing normal and (Hearing tested by finger rub).   IX Glossopharyngeal / X Vagus - Normal.  XI Accessory: Trapezius - Bilateral - Normal. Sternocleidomastoid - Bilateral - Normal.  XII Hypoglossal - Bilateral - Normal.  Eye Movements: - Normal Bilaterally.  Sensory:   Light Touch: Intact - Globally.  Motor:   Bulk and Contour: - Normal.  Tone: - Normal.  Tremor: Not present.  Strength: 5/5 normal muscle strength - All " Muscles.                                                        General Assessment of Reflexes: - deep tendon reflexes are normal. Coordination - No Impairment of finger-to-nose or Impairment of rapid alternating movements. Gait - Normal.       Result Review :                 Assessment and Plan    Problem List Items Addressed This Visit        Other    Vascular dementia, moderate, without behavioral disturbance, psychotic disturbance, mood disturbance, and anxiety - Primary (Chronic)    Current Assessment & Plan     73 year old right handed woman with memory loss and concerns for mild cognitive impairment vs vascular dementia.  She presents today with her sister, Jessica, on visit today who also provides information.  He reports some trouble with her memory starting about 5+ years and more obvious more recently.  She tells me what worries her the most is that she is getting lost when she is driving in familiar places like her neighborhood.  She is also forgetful and misplacing objects.  She is forgetting conversations with her family and tells me she took money out of the bank and did not remember what she did with it and then realized she gave the money to her for new motor.  Her son has noticed that she is paranoid and misplaces and hides things even from herself.  She has had lumpectomy done for breast cancer with radiation treatment several years ago.  She had a brain MRI scan done which did not demonstrate any acute intracranial findings.  She denies any family history of dementia but reports family history of cancer.  She has had lab work including normal CBC, CMP and TSH.  Her Vit D levels were low and she reports taking supplements.  She also had normal Vit B12 level.  She is no longer driving.  She denies any exposures to toxins.  Her son cooks food for her.  She denies any trouble with ADLs but tells me she takes her time and is slowing down to make sure she does things appropriately.  She sometimes thinks  too long about what she needs to be wearing.  She tells me she does not remember going to the doctor with her son and she is forgetting regular conversations.  She is living with her son who does the cooking.  They have smoke detectors in their house.  She has not decided the POA status and living will.  She denies any significant depression.  She denies exercising and socializing much.  She is living with her son at this time.  She is currently on combination of donepezil 10 mg daily along with memantine 5 mg daily.  Her SLUMS has dropped from 29/30 to 23/30 today with 0/5 object recall.  I will increase her dose of memantine to 5 mg BID.  I also advised her to exercise and socialize for further assistance with her memory.  Also advised her to look into obtaining POA status and Living Will.  Will follow up in 6 months and sooner if needed.          Relevant Medications    memantine (Namenda) 5 MG tablet    donepezil (Aricept) 10 MG tablet   Other Visit Diagnoses     Memory difficulty        Dementia without behavioral disturbance (HCC)        Relevant Medications    memantine (Namenda) 5 MG tablet    donepezil (Aricept) 10 MG tablet          I spent 45 minutes caring for Lucia on this date of service. This time includes time spent by me in the following activities:preparing for the visit, reviewing tests, obtaining and/or reviewing a separately obtained history, performing a medically appropriate examination and/or evaluation , counseling and educating the patient/family/caregiver, ordering medications, tests, or procedures, documenting information in the medical record and care coordination    Follow Up   Return in about 6 months (around 9/2/2023).  Patient was given instructions and counseling regarding her condition or for health maintenance advice. Please see specific information pulled into the AVS if appropriate.

## 2023-03-02 NOTE — ASSESSMENT & PLAN NOTE
73 year old right handed woman with memory loss and concerns for mild cognitive impairment vs vascular dementia.  She presents today with her sister, Jessica, on visit today who also provides information.  He reports some trouble with her memory starting about 5+ years and more obvious more recently.  She tells me what worries her the most is that she is getting lost when she is driving in familiar places like her neighborhood.  She is also forgetful and misplacing objects.  She is forgetting conversations with her family and tells me she took money out of the bank and did not remember what she did with it and then realized she gave the money to her for new motor.  Her son has noticed that she is paranoid and misplaces and hides things even from herself.  She has had lumpectomy done for breast cancer with radiation treatment several years ago.  She had a brain MRI scan done which did not demonstrate any acute intracranial findings.  She denies any family history of dementia but reports family history of cancer.  She has had lab work including normal CBC, CMP and TSH.  Her Vit D levels were low and she reports taking supplements.  She also had normal Vit B12 level.  She is no longer driving.  She denies any exposures to toxins.  Her son cooks food for her.  She denies any trouble with ADLs but tells me she takes her time and is slowing down to make sure she does things appropriately.  She sometimes thinks too long about what she needs to be wearing.  She tells me she does not remember going to the doctor with her son and she is forgetting regular conversations.  She is living with her son who does the cooking.  They have smoke detectors in their house.  She has not decided the POA status and living will.  She denies any significant depression.  She denies exercising and socializing much.  She is living with her son at this time.  She is currently on combination of donepezil 10 mg daily along with memantine 5 mg daily.   Her SLUMS has dropped from 29/30 to 23/30 today with 0/5 object recall.  I will increase her dose of memantine to 5 mg BID.  I also advised her to exercise and socialize for further assistance with her memory.  Also advised her to look into obtaining POA status and Living Will.  Will follow up in 6 months and sooner if needed.

## 2023-03-06 NOTE — TELEPHONE ENCOUNTER
Supposed to be on Lexapro.  Sounds like she is confused on the medications.  She is confused let her know that she should be taking Lexapro and Xanax those have been prescribed for her.  She is concerned about dosing the 90 to discuss this further with her, we can discuss it at her office visit next week.   Detail Level: Detailed

## 2023-03-21 ENCOUNTER — TELEPHONE (OUTPATIENT)
Dept: INTERNAL MEDICINE | Facility: CLINIC | Age: 73
End: 2023-03-21

## 2023-03-21 NOTE — TELEPHONE ENCOUNTER
Provider: DAVID  Caller: CODY  Relationship to Patient: [employer]    Phone Number: [phone]    REF NUMBER  [id number]      Reason for Call: CODY FROM [employer] IS CALLING TO GET VERBAL CONFIRMATION THAT PATIENT HAS DIABETES IN ORDER FOR HER TO STAY ON HER INSURANCE PLAN.

## 2023-05-23 ENCOUNTER — OFFICE VISIT (OUTPATIENT)
Dept: INTERNAL MEDICINE | Facility: CLINIC | Age: 73
End: 2023-05-23

## 2023-05-23 VITALS
OXYGEN SATURATION: 98 % | WEIGHT: 126 LBS | HEIGHT: 64 IN | BODY MASS INDEX: 21.51 KG/M2 | SYSTOLIC BLOOD PRESSURE: 120 MMHG | DIASTOLIC BLOOD PRESSURE: 70 MMHG | RESPIRATION RATE: 17 BRPM | HEART RATE: 82 BPM

## 2023-05-23 DIAGNOSIS — F03.B0 MODERATE DEMENTIA, UNSPECIFIED DEMENTIA TYPE, UNSPECIFIED WHETHER BEHAVIORAL, PSYCHOTIC, OR MOOD DISTURBANCE OR ANXIETY: ICD-10-CM

## 2023-05-23 DIAGNOSIS — Z12.31 ENCOUNTER FOR SCREENING MAMMOGRAM FOR MALIGNANT NEOPLASM OF BREAST: ICD-10-CM

## 2023-05-23 DIAGNOSIS — Z12.11 SCREEN FOR COLON CANCER: ICD-10-CM

## 2023-05-23 DIAGNOSIS — Z78.0 POSTMENOPAUSAL: ICD-10-CM

## 2023-05-23 DIAGNOSIS — R41.3 MEMORY DIFFICULTY: Primary | ICD-10-CM

## 2023-05-23 DIAGNOSIS — E11.65 TYPE 2 DIABETES MELLITUS WITH HYPERGLYCEMIA, WITHOUT LONG-TERM CURRENT USE OF INSULIN: ICD-10-CM

## 2023-05-23 PROCEDURE — 3051F HG A1C>EQUAL 7.0%<8.0%: CPT | Performed by: FAMILY MEDICINE

## 2023-05-23 PROCEDURE — 3078F DIAST BP <80 MM HG: CPT | Performed by: FAMILY MEDICINE

## 2023-05-23 PROCEDURE — 1159F MED LIST DOCD IN RCRD: CPT | Performed by: FAMILY MEDICINE

## 2023-05-23 PROCEDURE — 3074F SYST BP LT 130 MM HG: CPT | Performed by: FAMILY MEDICINE

## 2023-05-23 PROCEDURE — 99214 OFFICE O/P EST MOD 30 MIN: CPT | Performed by: FAMILY MEDICINE

## 2023-05-23 PROCEDURE — 1160F RVW MEDS BY RX/DR IN RCRD: CPT | Performed by: FAMILY MEDICINE

## 2023-05-23 RX ORDER — SITAGLIPTIN AND METFORMIN HYDROCHLORIDE 1000; 50 MG/1; MG/1
1 TABLET, FILM COATED, EXTENDED RELEASE ORAL DAILY
Qty: 30 TABLET | Refills: 11 | Status: SHIPPED | OUTPATIENT
Start: 2023-05-23

## 2023-05-24 LAB
ALBUMIN SERPL-MCNC: 4.7 G/DL (ref 3.7–4.7)
ALBUMIN/CREAT UR: <9 MG/G CREAT (ref 0–29)
ALBUMIN/GLOB SERPL: 1.8 {RATIO} (ref 1.2–2.2)
ALP SERPL-CCNC: 90 IU/L (ref 44–121)
ALT SERPL-CCNC: 15 IU/L (ref 0–32)
AST SERPL-CCNC: 21 IU/L (ref 0–40)
BILIRUB SERPL-MCNC: 0.4 MG/DL (ref 0–1.2)
BUN SERPL-MCNC: 11 MG/DL (ref 8–27)
BUN/CREAT SERPL: 14 (ref 12–28)
CALCIUM SERPL-MCNC: 9.8 MG/DL (ref 8.7–10.3)
CHLORIDE SERPL-SCNC: 99 MMOL/L (ref 96–106)
CO2 SERPL-SCNC: 27 MMOL/L (ref 20–29)
CREAT SERPL-MCNC: 0.77 MG/DL (ref 0.57–1)
CREAT UR-MCNC: 34.7 MG/DL
EGFRCR SERPLBLD CKD-EPI 2021: 80 ML/MIN/1.73
GLOBULIN SER CALC-MCNC: 2.6 G/DL (ref 1.5–4.5)
GLUCOSE SERPL-MCNC: 165 MG/DL (ref 70–99)
HBA1C MFR BLD: 7.4 % (ref 4.8–5.6)
MICROALBUMIN UR-MCNC: <3 UG/ML
POTASSIUM SERPL-SCNC: 3.4 MMOL/L (ref 3.5–5.2)
PROT SERPL-MCNC: 7.3 G/DL (ref 6–8.5)
SODIUM SERPL-SCNC: 144 MMOL/L (ref 134–144)

## 2023-05-25 RX ORDER — POTASSIUM CHLORIDE 20 MEQ/1
20 TABLET, EXTENDED RELEASE ORAL DAILY
Qty: 3 TABLET | Refills: 0 | Status: SHIPPED | OUTPATIENT
Start: 2023-05-25 | End: 2023-05-28

## 2023-05-25 NOTE — PROGRESS NOTES
Please inform the patient of the following abnormal results. Low potassium, do potassium chloride 20 meq x 3 days.

## 2023-05-31 ENCOUNTER — TRANSCRIBE ORDERS (OUTPATIENT)
Dept: ADMINISTRATIVE | Facility: HOSPITAL | Age: 73
End: 2023-05-31

## 2023-05-31 DIAGNOSIS — Z12.31 SCREENING MAMMOGRAM FOR BREAST CANCER: Primary | ICD-10-CM

## 2023-06-01 DIAGNOSIS — E87.6 HYPOKALEMIA: Primary | ICD-10-CM

## 2023-06-01 RX ORDER — POTASSIUM CHLORIDE 1500 MG/1
20 TABLET, EXTENDED RELEASE ORAL DAILY
Qty: 3 TABLET | Refills: 0 | Status: SHIPPED | OUTPATIENT
Start: 2023-06-01 | End: 2023-06-04

## 2023-06-04 NOTE — PROGRESS NOTES
"Chief Complaint  Memory Loss (Forgetful - getting worse )    Subjective        Lucia Ellis presents to [facility]ARE  Memory Loss      Patient continues to have some memory deficits as well as some moderate dementia.  She is currently seeing a neurologist.  Her family members in the room at today's visit and states that she does declining.  She is currently on Aricept 10 mg daily along with Namenda 5 mg twice a day.    Patient also has type 2 diabetes.  She is currently taking Janumet extended release  mg daily.  She is also on the Jardiance 10 mg daily.  She denies any side effects of the medication.    Objective   Vital Signs:  /70   Pulse 82   Resp 17   Ht 162.6 cm (64.02\")   Wt 57.2 kg (126 lb)   SpO2 98%   BMI 21.62 kg/m²   Estimated body mass index is 21.62 kg/m² as calculated from the following:    Height as of this encounter: 162.6 cm (64.02\").    Weight as of this encounter: 57.2 kg (126 lb).       BMI is within normal parameters. No other follow-up for BMI required.      Physical Exam  Vitals and nursing note reviewed.   Constitutional:       Appearance: She is well-developed.   HENT:      Head: Normocephalic and atraumatic.   Musculoskeletal:      Cervical back: Normal range of motion and neck supple.   Neurological:      Mental Status: She is alert and oriented to person, place, and time.   Psychiatric:         Behavior: Behavior normal.      Result Review :                   Assessment and Plan   Diagnoses and all orders for this visit:    1. Memory difficulty (Primary)    2. Moderate dementia, unspecified dementia type, unspecified whether behavioral, psychotic, or mood disturbance or anxiety    3. Type 2 diabetes mellitus with hyperglycemia, without long-term current use of insulin  -     empagliflozin (Jardiance) 10 MG tablet tablet; Take 1 tablet by mouth Daily. Indications: Type 2 Diabetes  Dispense: 30 tablet; Refill: 11  -     SITagliptin-metFORMIN " HCl ER (Janumet XR)  MG tablet; Take 1 tablet by mouth Daily. Indications: Type 2 Diabetes  Dispense: 30 tablet; Refill: 11  -     Comprehensive Metabolic Panel  -     Hemoglobin A1c  -     Microalbumin / Creatinine Urine Ratio - Urine, Clean Catch    4. Screen for colon cancer  -     Ambulatory Referral For Screening Colonoscopy    5. Postmenopausal  -     DEXA Bone Density Axial    6. Encounter for screening mammogram for malignant neoplasm of breast  -     Mammo Screening Bilateral With CAD    For patient's memory disorder as well as dementia, she is continue to follow-up with neurology.  She may be a currently undergoing a steady decline that she currently has.  She should continue taking his medicines that she is currently been prescribed.  For type II but diabetes, continue Janumet extended release  mg daily along with Jardiance 10 mg daily.         Follow Up   Return in about 4 weeks (around 6/20/2023) for Medicare Wellness.  Patient was given instructions and counseling regarding her condition or for health maintenance advice. Please see specific information pulled into the AVS if appropriate.

## 2023-11-14 ENCOUNTER — TELEPHONE (OUTPATIENT)
Dept: INTERNAL MEDICINE | Facility: CLINIC | Age: 73
End: 2023-11-14
Payer: MEDICARE

## 2023-11-14 DIAGNOSIS — E78.5 DYSLIPIDEMIA: ICD-10-CM

## 2023-11-14 NOTE — TELEPHONE ENCOUNTER
Caller: Lucia Ellis    Relationship: Self    Best call back number:     [phone] (Home)       Requested Prescriptions:   pravastatin (PRAVACHOL) 10 MG tablet        Pharmacy where request should be sent:  [facility] [id number] [facility] [address] [place] [address] RD AT [facility]. - [phone]  - [phone] FX     Last office visit with prescribing clinician: 5/23/2023   Last telemedicine visit with prescribing clinician: Visit date not found   Next office visit with prescribing clinician: Visit date not found     Additional details provided by patient: PATIENT CALLED TO REQUEST A MEDICATION REFILL ON MEDICATION. PATIENT HAS A 1 DAY SUPPLY LEFT. PATIENT IS LEAVING TO GO OUT OF TOWN IN THE MORNING AND WILL NEED THIS MEDICATION.      Does the patient have less than a 3 day supply:  [x] Yes  [] No    Would you like a call back once the refill request has been completed: [] Yes [] No    If the office needs to give you a call back, can they leave a voicemail: [] Yes [] No    Arlette Lopez Rep   11/14/23 15:48 EST         THANKS

## 2023-11-15 RX ORDER — PRAVASTATIN SODIUM 10 MG
10 TABLET ORAL NIGHTLY
Qty: 90 TABLET | Refills: 1 | Status: SHIPPED | OUTPATIENT
Start: 2023-11-15

## 2023-12-08 ENCOUNTER — APPOINTMENT (OUTPATIENT)
Dept: CT IMAGING | Facility: HOSPITAL | Age: 73
End: 2023-12-08
Payer: MEDICARE

## 2023-12-08 ENCOUNTER — APPOINTMENT (OUTPATIENT)
Dept: MRI IMAGING | Facility: HOSPITAL | Age: 73
End: 2023-12-08
Payer: MEDICARE

## 2023-12-08 ENCOUNTER — HOSPITAL ENCOUNTER (INPATIENT)
Facility: HOSPITAL | Age: 73
LOS: 6 days | Discharge: NURSING, REHAB OR LONG-TERM CARE | End: 2023-12-14
Attending: STUDENT IN AN ORGANIZED HEALTH CARE EDUCATION/TRAINING PROGRAM | Admitting: INTERNAL MEDICINE
Payer: COMMERCIAL

## 2023-12-08 DIAGNOSIS — R53.1 LEFT-SIDED WEAKNESS: ICD-10-CM

## 2023-12-08 DIAGNOSIS — R27.0 ATAXIA: Primary | ICD-10-CM

## 2023-12-08 DIAGNOSIS — R29.90 STROKE-LIKE SYMPTOMS: ICD-10-CM

## 2023-12-08 DIAGNOSIS — R91.8 LUNG NODULES: ICD-10-CM

## 2023-12-08 LAB
ALBUMIN SERPL-MCNC: 3.9 G/DL (ref 3.5–5.2)
ALBUMIN/GLOB SERPL: 1.7 G/DL
ALP SERPL-CCNC: 86 U/L (ref 39–117)
ALT SERPL W P-5'-P-CCNC: 15 U/L (ref 1–33)
ANION GAP SERPL CALCULATED.3IONS-SCNC: 11.2 MMOL/L (ref 5–15)
APTT PPP: 24.9 SECONDS (ref 22.7–35.4)
AST SERPL-CCNC: 32 U/L (ref 1–32)
BASOPHILS # BLD AUTO: 0.06 10*3/MM3 (ref 0–0.2)
BASOPHILS NFR BLD AUTO: 0.9 % (ref 0–1.5)
BILIRUB SERPL-MCNC: 0.5 MG/DL (ref 0–1.2)
BILIRUB UR QL STRIP: NEGATIVE
BUN SERPL-MCNC: 17 MG/DL (ref 8–23)
BUN/CREAT SERPL: 29.3 (ref 7–25)
CALCIUM SPEC-SCNC: 9.1 MG/DL (ref 8.6–10.5)
CHLORIDE SERPL-SCNC: 105 MMOL/L (ref 98–107)
CLARITY UR: CLEAR
CO2 SERPL-SCNC: 23.8 MMOL/L (ref 22–29)
COLOR UR: YELLOW
CREAT SERPL-MCNC: 0.58 MG/DL (ref 0.57–1)
DEPRECATED RDW RBC AUTO: 37.1 FL (ref 37–54)
EGFRCR SERPLBLD CKD-EPI 2021: 94.5 ML/MIN/1.73
EOSINOPHIL # BLD AUTO: 0.03 10*3/MM3 (ref 0–0.4)
EOSINOPHIL NFR BLD AUTO: 0.4 % (ref 0.3–6.2)
ERYTHROCYTE [DISTWIDTH] IN BLOOD BY AUTOMATED COUNT: 12.3 % (ref 12.3–15.4)
GLOBULIN UR ELPH-MCNC: 2.3 GM/DL
GLUCOSE BLDC GLUCOMTR-MCNC: 132 MG/DL (ref 70–130)
GLUCOSE SERPL-MCNC: 127 MG/DL (ref 65–99)
GLUCOSE UR STRIP-MCNC: ABNORMAL MG/DL
HCT VFR BLD AUTO: 41.1 % (ref 34–46.6)
HGB BLD-MCNC: 13.4 G/DL (ref 12–15.9)
HGB UR QL STRIP.AUTO: NEGATIVE
IMM GRANULOCYTES # BLD AUTO: 0.01 10*3/MM3 (ref 0–0.05)
IMM GRANULOCYTES NFR BLD AUTO: 0.1 % (ref 0–0.5)
INR PPP: 0.96 (ref 0.9–1.1)
KETONES UR QL STRIP: ABNORMAL
LEUKOCYTE ESTERASE UR QL STRIP.AUTO: NEGATIVE
LYMPHOCYTES # BLD AUTO: 1.69 10*3/MM3 (ref 0.7–3.1)
LYMPHOCYTES NFR BLD AUTO: 25.2 % (ref 19.6–45.3)
MCH RBC QN AUTO: 27.6 PG (ref 26.6–33)
MCHC RBC AUTO-ENTMCNC: 32.6 G/DL (ref 31.5–35.7)
MCV RBC AUTO: 84.6 FL (ref 79–97)
MONOCYTES # BLD AUTO: 0.48 10*3/MM3 (ref 0.1–0.9)
MONOCYTES NFR BLD AUTO: 7.2 % (ref 5–12)
NEUTROPHILS NFR BLD AUTO: 4.44 10*3/MM3 (ref 1.7–7)
NEUTROPHILS NFR BLD AUTO: 66.2 % (ref 42.7–76)
NITRITE UR QL STRIP: NEGATIVE
NRBC BLD AUTO-RTO: 0 /100 WBC (ref 0–0.2)
PH UR STRIP.AUTO: 5.5 [PH] (ref 5–8)
PLATELET # BLD AUTO: 216 10*3/MM3 (ref 140–450)
PMV BLD AUTO: 10.8 FL (ref 6–12)
POTASSIUM SERPL-SCNC: 3.2 MMOL/L (ref 3.5–5.2)
PROT SERPL-MCNC: 6.2 G/DL (ref 6–8.5)
PROT UR QL STRIP: NEGATIVE
PROTHROMBIN TIME: 12.9 SECONDS (ref 11.7–14.2)
QT INTERVAL: 362 MS
QTC INTERVAL: 382 MS
RBC # BLD AUTO: 4.86 10*6/MM3 (ref 3.77–5.28)
SODIUM SERPL-SCNC: 140 MMOL/L (ref 136–145)
SP GR UR STRIP: >=1.03 (ref 1–1.03)
UROBILINOGEN UR QL STRIP: ABNORMAL
WBC NRBC COR # BLD AUTO: 6.71 10*3/MM3 (ref 3.4–10.8)

## 2023-12-08 PROCEDURE — 82948 REAGENT STRIP/BLOOD GLUCOSE: CPT

## 2023-12-08 PROCEDURE — 36415 COLL VENOUS BLD VENIPUNCTURE: CPT

## 2023-12-08 PROCEDURE — 25010000002 LEVETRIRACETAM PER 10 MG: Performed by: INTERNAL MEDICINE

## 2023-12-08 PROCEDURE — 85730 THROMBOPLASTIN TIME PARTIAL: CPT | Performed by: STUDENT IN AN ORGANIZED HEALTH CARE EDUCATION/TRAINING PROGRAM

## 2023-12-08 PROCEDURE — 25810000003 SODIUM CHLORIDE 0.9 % SOLUTION: Performed by: STUDENT IN AN ORGANIZED HEALTH CARE EDUCATION/TRAINING PROGRAM

## 2023-12-08 PROCEDURE — 81003 URINALYSIS AUTO W/O SCOPE: CPT | Performed by: STUDENT IN AN ORGANIZED HEALTH CARE EDUCATION/TRAINING PROGRAM

## 2023-12-08 PROCEDURE — 93010 ELECTROCARDIOGRAM REPORT: CPT | Performed by: INTERNAL MEDICINE

## 2023-12-08 PROCEDURE — 93005 ELECTROCARDIOGRAM TRACING: CPT | Performed by: STUDENT IN AN ORGANIZED HEALTH CARE EDUCATION/TRAINING PROGRAM

## 2023-12-08 PROCEDURE — 25810000003 SODIUM CHLORIDE 0.9 % SOLUTION: Performed by: INTERNAL MEDICINE

## 2023-12-08 PROCEDURE — 70498 CT ANGIOGRAPHY NECK: CPT

## 2023-12-08 PROCEDURE — 25010000002 SODIUM CHLORIDE 0.9 % WITH KCL 20 MEQ 20-0.9 MEQ/L-% SOLUTION: Performed by: INTERNAL MEDICINE

## 2023-12-08 PROCEDURE — 85610 PROTHROMBIN TIME: CPT | Performed by: STUDENT IN AN ORGANIZED HEALTH CARE EDUCATION/TRAINING PROGRAM

## 2023-12-08 PROCEDURE — 0042T HC CT CEREBRAL PERFUSION W/WO CONTRAST: CPT

## 2023-12-08 PROCEDURE — 25510000001 IOPAMIDOL PER 1 ML: Performed by: STUDENT IN AN ORGANIZED HEALTH CARE EDUCATION/TRAINING PROGRAM

## 2023-12-08 PROCEDURE — 80053 COMPREHEN METABOLIC PANEL: CPT | Performed by: STUDENT IN AN ORGANIZED HEALTH CARE EDUCATION/TRAINING PROGRAM

## 2023-12-08 PROCEDURE — 70496 CT ANGIOGRAPHY HEAD: CPT

## 2023-12-08 PROCEDURE — 70450 CT HEAD/BRAIN W/O DYE: CPT

## 2023-12-08 PROCEDURE — 85025 COMPLETE CBC W/AUTO DIFF WBC: CPT | Performed by: STUDENT IN AN ORGANIZED HEALTH CARE EDUCATION/TRAINING PROGRAM

## 2023-12-08 PROCEDURE — 99285 EMERGENCY DEPT VISIT HI MDM: CPT

## 2023-12-08 RX ORDER — ACETAMINOPHEN 325 MG/1
650 TABLET ORAL EVERY 4 HOURS PRN
Status: DISCONTINUED | OUTPATIENT
Start: 2023-12-08 | End: 2023-12-15 | Stop reason: HOSPADM

## 2023-12-08 RX ORDER — ONDANSETRON 2 MG/ML
4 INJECTION INTRAMUSCULAR; INTRAVENOUS EVERY 6 HOURS PRN
Status: DISCONTINUED | OUTPATIENT
Start: 2023-12-08 | End: 2023-12-08 | Stop reason: SDUPTHER

## 2023-12-08 RX ORDER — DONEPEZIL HYDROCHLORIDE 10 MG/1
10 TABLET, FILM COATED ORAL DAILY
Status: DISCONTINUED | OUTPATIENT
Start: 2023-12-08 | End: 2023-12-15 | Stop reason: HOSPADM

## 2023-12-08 RX ORDER — ATORVASTATIN CALCIUM 80 MG/1
80 TABLET, FILM COATED ORAL NIGHTLY
Status: DISCONTINUED | OUTPATIENT
Start: 2023-12-08 | End: 2023-12-15 | Stop reason: HOSPADM

## 2023-12-08 RX ORDER — NICOTINE POLACRILEX 4 MG
15 LOZENGE BUCCAL
Status: DISCONTINUED | OUTPATIENT
Start: 2023-12-08 | End: 2023-12-15 | Stop reason: HOSPADM

## 2023-12-08 RX ORDER — POLYETHYLENE GLYCOL 3350 17 G/17G
17 POWDER, FOR SOLUTION ORAL DAILY PRN
Status: DISCONTINUED | OUTPATIENT
Start: 2023-12-08 | End: 2023-12-15 | Stop reason: HOSPADM

## 2023-12-08 RX ORDER — ASPIRIN 81 MG/1
324 TABLET, CHEWABLE ORAL ONCE
Status: DISCONTINUED | OUTPATIENT
Start: 2023-12-08 | End: 2023-12-09

## 2023-12-08 RX ORDER — SODIUM CHLORIDE AND POTASSIUM CHLORIDE 150; 900 MG/100ML; MG/100ML
100 INJECTION, SOLUTION INTRAVENOUS CONTINUOUS
Status: DISCONTINUED | OUTPATIENT
Start: 2023-12-08 | End: 2023-12-09

## 2023-12-08 RX ORDER — ESCITALOPRAM OXALATE 10 MG/1
10 TABLET ORAL DAILY
Status: DISCONTINUED | OUTPATIENT
Start: 2023-12-08 | End: 2023-12-15 | Stop reason: HOSPADM

## 2023-12-08 RX ORDER — MEMANTINE HYDROCHLORIDE 5 MG/1
5 TABLET ORAL 2 TIMES DAILY
Status: DISCONTINUED | OUTPATIENT
Start: 2023-12-08 | End: 2023-12-15 | Stop reason: HOSPADM

## 2023-12-08 RX ORDER — UREA 10 %
3 LOTION (ML) TOPICAL NIGHTLY PRN
Status: DISCONTINUED | OUTPATIENT
Start: 2023-12-08 | End: 2023-12-11

## 2023-12-08 RX ORDER — AMLODIPINE BESYLATE 10 MG/1
10 TABLET ORAL DAILY
Status: DISCONTINUED | OUTPATIENT
Start: 2023-12-08 | End: 2023-12-15 | Stop reason: HOSPADM

## 2023-12-08 RX ORDER — DEXTROSE MONOHYDRATE 25 G/50ML
25 INJECTION, SOLUTION INTRAVENOUS
Status: DISCONTINUED | OUTPATIENT
Start: 2023-12-08 | End: 2023-12-15 | Stop reason: HOSPADM

## 2023-12-08 RX ORDER — ASPIRIN 325 MG
325 TABLET ORAL DAILY
Status: DISCONTINUED | OUTPATIENT
Start: 2023-12-08 | End: 2023-12-09

## 2023-12-08 RX ORDER — LEVETIRACETAM 500 MG/5ML
1000 INJECTION, SOLUTION, CONCENTRATE INTRAVENOUS EVERY 12 HOURS SCHEDULED
Status: DISCONTINUED | OUTPATIENT
Start: 2023-12-08 | End: 2023-12-10

## 2023-12-08 RX ORDER — ASPIRIN 300 MG/1
300 SUPPOSITORY RECTAL DAILY
Status: DISCONTINUED | OUTPATIENT
Start: 2023-12-08 | End: 2023-12-15 | Stop reason: HOSPADM

## 2023-12-08 RX ORDER — BISACODYL 5 MG/1
5 TABLET, DELAYED RELEASE ORAL DAILY PRN
Status: DISCONTINUED | OUTPATIENT
Start: 2023-12-08 | End: 2023-12-15 | Stop reason: HOSPADM

## 2023-12-08 RX ORDER — IBUPROFEN 600 MG/1
1 TABLET ORAL
Status: DISCONTINUED | OUTPATIENT
Start: 2023-12-08 | End: 2023-12-15 | Stop reason: HOSPADM

## 2023-12-08 RX ORDER — AMOXICILLIN 250 MG
2 CAPSULE ORAL 2 TIMES DAILY
Status: DISCONTINUED | OUTPATIENT
Start: 2023-12-08 | End: 2023-12-15 | Stop reason: HOSPADM

## 2023-12-08 RX ORDER — ACETAMINOPHEN 650 MG/1
650 SUPPOSITORY RECTAL EVERY 4 HOURS PRN
Status: DISCONTINUED | OUTPATIENT
Start: 2023-12-08 | End: 2023-12-15 | Stop reason: HOSPADM

## 2023-12-08 RX ORDER — ASPIRIN 81 MG/1
81 TABLET ORAL DAILY
Status: DISCONTINUED | OUTPATIENT
Start: 2023-12-08 | End: 2023-12-08

## 2023-12-08 RX ORDER — CETIRIZINE HYDROCHLORIDE 10 MG/1
10 TABLET ORAL DAILY
Status: DISCONTINUED | OUTPATIENT
Start: 2023-12-08 | End: 2023-12-15 | Stop reason: HOSPADM

## 2023-12-08 RX ORDER — ONDANSETRON 2 MG/ML
4 INJECTION INTRAMUSCULAR; INTRAVENOUS EVERY 6 HOURS PRN
Status: DISCONTINUED | OUTPATIENT
Start: 2023-12-08 | End: 2023-12-15 | Stop reason: HOSPADM

## 2023-12-08 RX ORDER — BISACODYL 10 MG
10 SUPPOSITORY, RECTAL RECTAL DAILY PRN
Status: DISCONTINUED | OUTPATIENT
Start: 2023-12-08 | End: 2023-12-15 | Stop reason: HOSPADM

## 2023-12-08 RX ORDER — ONDANSETRON 4 MG/1
4 TABLET, FILM COATED ORAL EVERY 6 HOURS PRN
Status: DISCONTINUED | OUTPATIENT
Start: 2023-12-08 | End: 2023-12-15 | Stop reason: HOSPADM

## 2023-12-08 RX ORDER — LISINOPRIL 40 MG/1
40 TABLET ORAL
Status: DISCONTINUED | OUTPATIENT
Start: 2023-12-08 | End: 2023-12-15 | Stop reason: HOSPADM

## 2023-12-08 RX ORDER — SODIUM CHLORIDE 9 MG/ML
75 INJECTION, SOLUTION INTRAVENOUS CONTINUOUS
Status: DISCONTINUED | OUTPATIENT
Start: 2023-12-08 | End: 2023-12-08

## 2023-12-08 RX ORDER — ACETAMINOPHEN 325 MG/1
650 TABLET ORAL EVERY 4 HOURS PRN
Status: DISCONTINUED | OUTPATIENT
Start: 2023-12-08 | End: 2023-12-08 | Stop reason: SDUPTHER

## 2023-12-08 RX ADMIN — IOPAMIDOL 150 ML: 755 INJECTION, SOLUTION INTRAVENOUS at 15:18

## 2023-12-08 RX ADMIN — ASPIRIN 300 MG: 300 SUPPOSITORY RECTAL at 18:55

## 2023-12-08 RX ADMIN — LEVETIRACETAM 1000 MG: 500 INJECTION, SOLUTION INTRAVENOUS at 22:40

## 2023-12-08 RX ADMIN — SODIUM CHLORIDE 500 ML: 9 INJECTION, SOLUTION INTRAVENOUS at 15:46

## 2023-12-08 RX ADMIN — POTASSIUM CHLORIDE AND SODIUM CHLORIDE 100 ML/HR: 900; 150 INJECTION, SOLUTION INTRAVENOUS at 22:40

## 2023-12-08 RX ADMIN — SODIUM CHLORIDE 75 ML/HR: 9 INJECTION, SOLUTION INTRAVENOUS at 18:38

## 2023-12-08 NOTE — ED NOTES
Nursing report ED to floor  Lucia Ellis  73 y.o.  female    HPI :   Chief Complaint   Patient presents with    Weakness - Generalized     Bilat legs        Admitting doctor:   Cammie Mi MD    Admitting diagnosis:   The primary encounter diagnosis was Ataxia. Diagnoses of Left-sided weakness and Stroke-like symptoms were also pertinent to this visit.    Code status:   Current Code Status       Date Active Code Status Order ID Comments User Context       Prior            Allergies:   Ppd [tuberculin purified protein derivative]    Isolation:   No active isolations    Intake and Output    Intake/Output Summary (Last 24 hours) at 12/8/2023 1625  Last data filed at 12/8/2023 1604  Gross per 24 hour   Intake 750 ml   Output --   Net 750 ml       Weight:       12/08/23  1122   Weight: 57.2 kg (126 lb)       Most recent vitals:   Vitals:    12/08/23 1358 12/08/23 1401 12/08/23 1501 12/08/23 1550   BP:  178/86 (!) 204/113 (!) 188/99   Pulse: 73 73 87 80   Resp:  18 18 18   Temp:       TempSrc:       SpO2: 100% 100% 100% 97%   Weight:       Height:           Active LDAs/IV Access:   Lines, Drains & Airways       Active LDAs       Name Placement date Placement time Site Days    Peripheral IV 12/08/23 1116 Left Antecubital 12/08/23  1116  Antecubital  less than 1                    Labs (abnormal labs have a star):   Labs Reviewed   COMPREHENSIVE METABOLIC PANEL - Abnormal; Notable for the following components:       Result Value    Glucose 127 (*)     Potassium 3.2 (*)     BUN/Creatinine Ratio 29.3 (*)     All other components within normal limits    Narrative:     GFR Normal >60  Chronic Kidney Disease <60  Kidney Failure <15    The GFR formula is only valid for adults with stable renal function between ages 18 and 70.   URINALYSIS W/ MICROSCOPIC IF INDICATED (NO CULTURE) - Abnormal; Notable for the following components:    Glucose, UA >=1000 mg/dL (3+) (*)     Ketones, UA 15 mg/dL (1+) (*)     All other  components within normal limits    Narrative:     Urine microscopic not indicated.   PROTIME-INR - Normal   APTT - Normal   CBC WITH AUTO DIFFERENTIAL - Normal   CBC AND DIFFERENTIAL    Narrative:     The following orders were created for panel order CBC & Differential.  Procedure                               Abnormality         Status                     ---------                               -----------         ------                     CBC Auto Differential[[id number]]        Normal              Final result                 Please view results for these tests on the individual orders.       EKG:   ECG 12 Lead Stroke Evaluation   Preliminary Result   HEART RATE= 67  bpm   RR Interval= 896  ms   ID Interval= 155  ms   P Horizontal Axis= -11  deg   P Front Axis= 80  deg   QRSD Interval= 79  ms   QT Interval= 362  ms   QTcB= 382  ms   QRS Axis= 78  deg   T Wave Axis= 35  deg   - ABNORMAL ECG -   Sinus rhythm   Consider left ventricular hypertrophy   Electronically Signed By:    Date and Time of Study: 2023-12-08 12:03:32          Meds given in ED:   Medications   aspirin chewable tablet 324 mg (has no administration in time range)   iopamidol (ISOVUE-370) 76 % injection 150 mL (150 mL Intravenous Given 12/8/23 1518)   sodium chloride 0.9 % bolus 500 mL (0 mL Intravenous Stopped 12/8/23 1604)       Imaging results:  CT Angiogram Neck    Result Date: 12/8/2023  1.  There is no evidence of acute infarction, intracranial hemorrhage, hydrocephalus or of abnormal enhancement. Small vessel ischemic disease, vascular calcification and a lacunar infarct involving the anterior limb of the internal capsule on the right is noted. 2.  There is a mild to moderate stenosis involving the right P1-P2 junction. There is no evidence of a proximal intracranial occlusion or focal severe stenosis. There is 0% stenosis involving the internal carotid arteries using NASCET criteria. Mild stenosis involving the origin of the right vertebral  artery is appreciated. The left vertebral artery origin is partially obscured by beam hardening artifact from adjacent dense venous contrast. 3.  There is a 12 mm nodule involving the right upper lobe medially suspicious for metastatic disease. A nonspecific but suspicious pleural-based soft tissue mass is appreciated involving the right upper lobe anterolaterally measuring approximately 12 x 6 x 15 mm in size. A dedicated CT examination of the chest is recommended.  The noncontrasted CT examination of the brain was made available for interpretation at 1513 hours and a preliminary report called at 1514 hours. The CT angiogram was made available for interpretation at 1522 hours and a preliminary report called at 1526 hours.  NOTE: This is a preliminary report. The 3-dimensional reconstructions are not yet available for review.     AI analysis of LVO was utilized.  Radiation dose reduction techniques were utilized, including automated exposure control and exposure modulation based on body size.       CT CEREBRAL PERFUSION WITH & WITHOUT CONTRAST    Result Date: 12/8/2023  1.  There is no evidence of acute infarction, intracranial hemorrhage, hydrocephalus or of abnormal enhancement. Small vessel ischemic disease, vascular calcification and a lacunar infarct involving the anterior limb of the internal capsule on the right is noted. 2.  There is a mild to moderate stenosis involving the right P1-P2 junction. There is no evidence of a proximal intracranial occlusion or focal severe stenosis. There is 0% stenosis involving the internal carotid arteries using NASCET criteria. Mild stenosis involving the origin of the right vertebral artery is appreciated. The left vertebral artery origin is partially obscured by beam hardening artifact from adjacent dense venous contrast. 3.  There is a 12 mm nodule involving the right upper lobe medially suspicious for metastatic disease. A nonspecific but suspicious pleural-based soft  tissue mass is appreciated involving the right upper lobe anterolaterally measuring approximately 12 x 6 x 15 mm in size. A dedicated CT examination of the chest is recommended.  The noncontrasted CT examination of the brain was made available for interpretation at 1513 hours and a preliminary report called at 1514 hours. The CT angiogram was made available for interpretation at 1522 hours and a preliminary report called at 1526 hours.  NOTE: This is a preliminary report. The 3-dimensional reconstructions are not yet available for review.     AI analysis of LVO was utilized.  Radiation dose reduction techniques were utilized, including automated exposure control and exposure modulation based on body size.       CT Angiogram Head w AI Analysis of LVO    Result Date: 12/8/2023  1.  There is no evidence of acute infarction, intracranial hemorrhage, hydrocephalus or of abnormal enhancement. Small vessel ischemic disease, vascular calcification and a lacunar infarct involving the anterior limb of the internal capsule on the right is noted. 2.  There is a mild to moderate stenosis involving the right P1-P2 junction. There is no evidence of a proximal intracranial occlusion or focal severe stenosis. There is 0% stenosis involving the internal carotid arteries using NASCET criteria. Mild stenosis involving the origin of the right vertebral artery is appreciated. The left vertebral artery origin is partially obscured by beam hardening artifact from adjacent dense venous contrast. 3.  There is a 12 mm nodule involving the right upper lobe medially suspicious for metastatic disease. A nonspecific but suspicious pleural-based soft tissue mass is appreciated involving the right upper lobe anterolaterally measuring approximately 12 x 6 x 15 mm in size. A dedicated CT examination of the chest is recommended.  The noncontrasted CT examination of the brain was made available for interpretation at 1513 hours and a preliminary report  called at 1514 hours. The CT angiogram was made available for interpretation at 1522 hours and a preliminary report called at 1526 hours.  NOTE: This is a preliminary report. The 3-dimensional reconstructions are not yet available for review.     AI analysis of LVO was utilized.  Radiation dose reduction techniques were utilized, including automated exposure control and exposure modulation based on body size.       CT Head Without Contrast    Result Date: 12/8/2023  1. No acute intracranial abnormality is identified. 2. There is moderate small vessel disease in the cerebral white matter and mild cerebral atrophy. There is an old lacunar infarct extending from the anterior limb of the right internal capsule into the anterior right putamen measuring 12 x 6 mm, unchanged. The remainder of the head CT is within normal limits. The etiology of the patient's bilateral leg weakness and abnormal gait is not established on this exam. If there remains any clinical suspicion of acute stroke I recommend an MRI of the brain for more complete assessment.  Radiation dose reduction techniques were utilized, including automated exposure control and exposure modulation based on body size.   This report was finalized on 12/8/2023 2:58 PM by Dr. Scottie Juarez M.D on Workstation: [id number]       Ambulatory status:   - assist x 1    Social issues:   Social History     Socioeconomic History    Marital status:    Tobacco Use    Smoking status: Never    Smokeless tobacco: Never   Vaping Use    Vaping Use: Never used   Substance and Sexual Activity    Alcohol use: Yes     Alcohol/week: 7.0 standard drinks of alcohol     Types: 7 Glasses of wine per week     Comment: per patient's son, patient drinks one glass of wine daily    Drug use: No    Sexual activity: Never       NIH Stroke Scale:  Interval: 2 hrs posttreatment    Clementina Bianchi RN  12/08/23 16:25 EST

## 2023-12-08 NOTE — ED PROVIDER NOTES
" EMERGENCY DEPARTMENT ENCOUNTER    Room Number:  16/16  PCP: Everardo Mazariegos MD  History obtained from: Patient, family      HPI:  Chief Complaint: Difficulty walking  A complete HPI/ROS/PMH/PSH/SH/FH are unobtainable due to: Not applicable  Context: Lucia Ellis is a 73 y.o. female who presents to the ED c/o difficulty walking.  Ongoing for the last 2 days.  Patient also reports intermittent weakness on her left side, currently resolved.  No other recent illness, fever, chills.  No chest pain or shortness of breath.  No difficulty speaking or difficulty swallowing.  Describes gait as \"drunk.\"            PAST MEDICAL HISTORY  Active Ambulatory Problems     Diagnosis Date Noted   • Gastroesophageal reflux disease 02/09/2017   • Malignant neoplasm of breast 02/09/2017   • Depression 02/09/2017   • Folliculitis 02/09/2017   • Generalized anxiety disorder 02/09/2017   • Hyperlipidemia 02/09/2017   • Essential hypertension 02/09/2017   • Status post total right knee replacement 02/09/2017   • Rectal hemorrhage 02/09/2017   • Vitamin D deficiency 02/09/2017   • Drug therapy 02/09/2017   • Acute cholecystitis 05/10/2018   • Uncontrolled type 2 diabetes mellitus with hypoglycemia without coma 12/29/2022   • Myoclonus 12/29/2022   • Type 2 diabetes mellitus with hyperglycemia 12/29/2022   • Hypokalemia 12/29/2022   • New onset seizure 12/29/2022   • Focal motor seizure 12/30/2022   • Vascular dementia, moderate, without behavioral disturbance, psychotic disturbance, mood disturbance, and anxiety 12/31/2022     Resolved Ambulatory Problems     Diagnosis Date Noted   • No Resolved Ambulatory Problems     Past Medical History:   Diagnosis Date   • Breast cancer    • Dementia    • Diabetes mellitus    • Hypertension    • Memory loss          PAST SURGICAL HISTORY  Past Surgical History:   Procedure Laterality Date   • CHOLECYSTECTOMY     • HYSTERECTOMY     • MASTECTOMY Right 1995   • TOTAL KNEE ARTHROPLASTY Right  "         FAMILY HISTORY  Family History   Problem Relation Age of Onset   • Heart attack Mother    • Heart disease Mother    • Hypertension Mother    • Hypertension Father    • Diabetes Father    • Hypertension Sister    • Diabetes Sister    • Thyroid cancer Sister    • Breast cancer Sister    • Lung cancer Sister    • Diabetes Brother    • Drug abuse Paternal Grandmother          SOCIAL HISTORY  Social History     Socioeconomic History   • Marital status:    Tobacco Use   • Smoking status: Never   • Smokeless tobacco: Never   Vaping Use   • Vaping Use: Never used   Substance and Sexual Activity   • Alcohol use: Yes     Alcohol/week: 7.0 standard drinks of alcohol     Types: 7 Glasses of wine per week     Comment: per patient's son, patient drinks one glass of wine daily   • Drug use: No   • Sexual activity: Never         ALLERGIES  Ppd [tuberculin purified protein derivative]        REVIEW OF SYSTEMS    As per HPI      PHYSICAL EXAM  ED Triage Vitals [12/08/23 1114]   Temp Heart Rate Resp BP SpO2   97.9 °F (36.6 °C) 88 16 (!) 182/81 99 %      Temp src Heart Rate Source Patient Position BP Location FiO2 (%)   Oral -- -- -- --       Physical Exam  Constitutional:       General: She is not in acute distress.  HENT:      Head: Normocephalic and atraumatic.   Cardiovascular:      Rate and Rhythm: Normal rate and regular rhythm.   Pulmonary:      Effort: Pulmonary effort is normal. No respiratory distress.   Abdominal:      General: There is no distension.      Palpations: Abdomen is soft.      Tenderness: There is no abdominal tenderness.   Musculoskeletal:         General: No swelling or deformity.   Skin:     General: Skin is warm and dry.   Neurological:      Mental Status: She is alert. Mental status is at baseline.      Comments: Gait not assessed           Vital signs and nursing notes reviewed.          LAB RESULTS  Recent Results (from the past 24 hour(s))   Comprehensive Metabolic Panel    Collection  Time: 12/08/23 11:51 AM    Specimen: Blood   Result Value Ref Range    Glucose 127 (H) 65 - 99 mg/dL    BUN 17 8 - 23 mg/dL    Creatinine 0.58 0.57 - 1.00 mg/dL    Sodium 140 136 - 145 mmol/L    Potassium 3.2 (L) 3.5 - 5.2 mmol/L    Chloride 105 98 - 107 mmol/L    CO2 23.8 22.0 - 29.0 mmol/L    Calcium 9.1 8.6 - 10.5 mg/dL    Total Protein 6.2 6.0 - 8.5 g/dL    Albumin 3.9 3.5 - 5.2 g/dL    ALT (SGPT) 15 1 - 33 U/L    AST (SGOT) 32 1 - 32 U/L    Alkaline Phosphatase 86 39 - 117 U/L    Total Bilirubin 0.5 0.0 - 1.2 mg/dL    Globulin 2.3 gm/dL    A/G Ratio 1.7 g/dL    BUN/Creatinine Ratio 29.3 (H) 7.0 - 25.0    Anion Gap 11.2 5.0 - 15.0 mmol/L    eGFR 94.5 >60.0 mL/min/1.73   Protime-INR    Collection Time: 12/08/23 11:51 AM    Specimen: Blood   Result Value Ref Range    Protime 12.9 11.7 - 14.2 Seconds    INR 0.96 0.90 - 1.10   aPTT    Collection Time: 12/08/23 11:51 AM    Specimen: Blood   Result Value Ref Range    PTT 24.9 22.7 - 35.4 seconds   CBC Auto Differential    Collection Time: 12/08/23 11:51 AM    Specimen: Blood   Result Value Ref Range    WBC 6.71 3.40 - 10.80 10*3/mm3    RBC 4.86 3.77 - 5.28 10*6/mm3    Hemoglobin 13.4 12.0 - 15.9 g/dL    Hematocrit 41.1 34.0 - 46.6 %    MCV 84.6 79.0 - 97.0 fL    MCH 27.6 26.6 - 33.0 pg    MCHC 32.6 31.5 - 35.7 g/dL    RDW 12.3 12.3 - 15.4 %    RDW-SD 37.1 37.0 - 54.0 fl    MPV 10.8 6.0 - 12.0 fL    Platelets 216 140 - 450 10*3/mm3    Neutrophil % 66.2 42.7 - 76.0 %    Lymphocyte % 25.2 19.6 - 45.3 %    Monocyte % 7.2 5.0 - 12.0 %    Eosinophil % 0.4 0.3 - 6.2 %    Basophil % 0.9 0.0 - 1.5 %    Immature Grans % 0.1 0.0 - 0.5 %    Neutrophils, Absolute 4.44 1.70 - 7.00 10*3/mm3    Lymphocytes, Absolute 1.69 0.70 - 3.10 10*3/mm3    Monocytes, Absolute 0.48 0.10 - 0.90 10*3/mm3    Eosinophils, Absolute 0.03 0.00 - 0.40 10*3/mm3    Basophils, Absolute 0.06 0.00 - 0.20 10*3/mm3    Immature Grans, Absolute 0.01 0.00 - 0.05 10*3/mm3    nRBC 0.0 0.0 - 0.2 /100 WBC   ECG 12  Lead Stroke Evaluation    Collection Time: 12/08/23 12:03 PM   Result Value Ref Range    QT Interval 362 ms    QTC Interval 382 ms   Urinalysis With Microscopic If Indicated (No Culture) - Urine, Clean Catch    Collection Time: 12/08/23  1:24 PM    Specimen: Urine, Clean Catch   Result Value Ref Range    Color, UA Yellow Yellow, Straw    Appearance, UA Clear Clear    pH, UA 5.5 5.0 - 8.0    Specific Gravity, UA >=1.030 1.005 - 1.030    Glucose, UA >=1000 mg/dL (3+) (A) Negative    Ketones, UA 15 mg/dL (1+) (A) Negative    Bilirubin, UA Negative Negative    Blood, UA Negative Negative    Protein, UA Negative Negative    Leuk Esterase, UA Negative Negative    Nitrite, UA Negative Negative    Urobilinogen, UA 0.2 E.U./dL 0.2 - 1.0 E.U./dL       Ordered the above labs and reviewed the results.        RADIOLOGY  CT Head Without Contrast    Result Date: 12/8/2023  EMERGENCY CT SCAN OF THE HEAD WITHOUT CONTRAST ON 12/08/2023  CLINICAL HISTORY: Stroke-like symptoms. The patient has weakness, with bilateral leg weakness and abnormal gait.  TECHNIQUE: Spiral CT images were obtained from the base of the skull to the vertex without intravenous contrast. The images were reformatted and are submitted in 3 mm thick axial, sagittal and coronal CT sections with brain algorithm and 2 mm thick axial CT sections with high-resolution bone algorithm.  This is correlated to a prior MRI of the brain from [facility] on 05/07/2021 and prior head CT on 12/29/2022.  FINDINGS: There are patchy areas of low-density in the periventricular and subcortical white matter of the cerebral hemispheres consistent with moderate small vessel disease. The remainder of the brain parenchyma is normal in attenuation. The ventricles are normal in size. I see no focal mass effect. There is no midline shift. No extra-axial fluid collections are identified. There is no evidence of acute intracranial hemorrhage. The paranasal sinuses and the mastoid  air cells and the middle ear cavities are clear. Calcified plaques are present in the intracranial segments of the distal vertebral arteries and cavernous segments of the internal carotid arteries bilaterally.      1. No acute intracranial abnormality is identified. 2. There is moderate small vessel disease in the cerebral white matter and mild cerebral atrophy. There is an old lacunar infarct extending from the anterior limb of the right internal capsule into the anterior right putamen measuring 12 x 6 mm, unchanged. The remainder of the head CT is within normal limits. The etiology of the patient's bilateral leg weakness and abnormal gait is not established on this exam. If there remains any clinical suspicion of acute stroke I recommend an MRI of the brain for more complete assessment.  Radiation dose reduction techniques were utilized, including automated exposure control and exposure modulation based on body size.   This report was finalized on 12/8/2023 2:58 PM by Dr. Scottie Juarez M.D on Workstation: [id number]       Ordered the above noted radiological studies. Reviewed by me in PACS.                  MEDICATIONS GIVEN IN ER  Medications   iopamidol (ISOVUE-370) 76 % injection 150 mL (150 mL Intravenous Given 12/8/23 1518)               MEDICAL DECISION MAKING, PROGRESS, and CONSULTS    MDM: Patient presented the emergency department with strokelike symptoms, ongoing for greater than 24 hours.  Otherwise well-appearing, vitals otherwise stable.  Hypertensive.  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  Plan to transfer for admission however around time of transfer patient decompensated with sudden onset of left-sided facial droop, left arm weakness and severe ataxia, left leg weakness and ataxia.  Taken emergently to CT for CT angiograms, evaluation for new obstruction.  No evidence of new vascular target on CT scans.  Will plan to admit here for  additional management evaluation of her symptoms.      Patient is too critically ill to transfer at this time.    All labs have been independently reviewed by me.  All radiology studies have been reviewed by me and I have also reviewed the radiology report.   EKG's independently viewed and interpreted by me.  Discussion below represents my analysis of pertinent findings related to patient's condition, differential diagnosis, treatment plan and final disposition.      Additional sources:  - Discussed/ obtained information from independent historians: Obtained additional history from family who are at bedside.    - External (non-ED) record review:     - Chronic or social conditions impacting care: Diabetes mellitus    - Shared decision making: Discussed plan for admission, patient and family agree.      Orders placed during this visit:  Orders Placed This Encounter   Procedures   • CT Head Without Contrast   • MRI Brain Without Contrast   • CT Angiogram Neck   • CT CEREBRAL PERFUSION WITH & WITHOUT CONTRAST   • CT Angiogram Head w AI Analysis of LVO   • Comprehensive Metabolic Panel   • Protime-INR   • aPTT   • Urinalysis With Microscopic If Indicated (No Culture) - Urine, Clean Catch   • CBC Auto Differential   • NPO Diet NPO Type: Strict NPO   • [facility] (on-call MD unless specified) Details   • ECG 12 Lead Stroke Evaluation   • CBC & Differential         Additional orders considered but not ordered:  Considered MRI however will defer to inpatient service.        Differential diagnosis includes but is not limited to:    Stroke, TIA, vascular obstruction, seizure      Independent interpretation of labs, radiology studies, and discussions with consultants:  ED Course as of 12/09/23 2342   Fri Dec 08, 2023   1325 EKG interpreted myself:  1203, sinus rhythm rate of 67, LVH, no acute ST segment changes or T wave inversions [FS]   1535 Discussed CNS imaging with stroke neurology, Dr. Tian, no indication for acute  intervention at this time.  Recommends 500 cc bolus of fluid and aspirin. [FS]   1615 Discussed with Dr. Mi, [facility], discussed patient's clinical course and findings today, treatment modalities, and need for hospitalization. [DC]      ED Course User Index  [DC] Mathieu Rollins MD  [FS] Pierre Romero MD           DIAGNOSIS  Final diagnoses:   Ataxia   Left-sided weakness   Stroke-like symptoms         DISPOSITION  Plan to admit to [facility] telemetry.        Latest Documented Vital Signs:  As of 15:36 EST  BP- 178/86 HR- 73 Temp- 97.9 °F (36.6 °C) (Oral) O2 sat- 100%              --    Please note that portions of this were completed with a voice recognition program.       Note Disclaimer: At [facility], we believe that sharing information builds trust and better relationships. You are receiving this note because you are receiving care at [facility] or recently visited. It is possible you will see health information before a provider has talked with you about it. This kind of information can be easy to misunderstand. To help you fully understand what it means for your health, we urge you to discuss this note with your provider.             Pierre Romero MD  12/08/23 6315       Pierre Romero MD  12/09/23 2962

## 2023-12-08 NOTE — CASE MANAGEMENT/SOCIAL WORK
Discharge Planning Assessment  [facility]     Patient Name: Lucia Ellis  MRN: [id number]  Today's Date: 12/8/2023    Admit Date: 12/8/2023        Discharge Needs Assessment    No documentation.                  Discharge Plan       Row Name 12/08/23 1725       Plan    Plan Comments Call placed to son Vern Munoz- conference call with patient in room- discussed notice of non coverage- son and patient verbalized understanding. Patient transported to room.      Row Name 12/08/23 1706       Plan    Plan Comments Late entry- 1500- notified patient with acute neurological changes- Team D initiated- patient to [place]- call placed to [place] to cancel bed- call placed to nephblaise Recinos to update regarding patient condition- notified that patient is not stable for transfer at this time per ERMD and will be admitted to [facility]- Explained notice of non coverage to him and patient and he preferred we speak to son about form- Updated nephew about room number assignment.      Row Name 12/08/23 8533       Plan    Plan Comments Entered room, introduced self and explained role to patient- explained that she has non par insurance and ERMD feels she requires admission to hospital- discussed preference of facility- pt requested I speak w/family- she contacted nephew Jj Recinos on the phone- discussed with  via conference call- he recommended [facility]- patient is in agreement- Updated ERMD and  who is calling [facility]                  Continued Care and Services - Admitted Since 12/8/2023    Coordination has not been started for this encounter.          Demographic Summary    No documentation.                  Functional Status    No documentation.                  Psychosocial    No documentation.                  Abuse/Neglect    No documentation.                  Legal    No documentation.                  Substance Abuse    No documentation.                  Patient Forms    No  documentation.                     Lucia Lovelace RN

## 2023-12-08 NOTE — ED NOTES
Pt exhibiting left sided arm & leg weakness with mild slurred speech. Pt alerted staff that it began suddenly at 1454. Dr. Romero notified and Team D initiated.

## 2023-12-08 NOTE — ED NOTES
Pt from home, called for abnormal gait, fall at home yesterday, weakness to bilat legs. EMS reports was unable to stand at home. Denies urinary c/o. No head injury. Son unable to get her OOB. Denies HA, vision changes, numbness.

## 2023-12-08 NOTE — CASE MANAGEMENT/SOCIAL WORK
Discharge Planning Assessment  [facility]     Patient Name: Lucia Ellis  MRN: [id number]  Today's Date: 12/8/2023    Admit Date: 12/8/2023        Discharge Needs Assessment    No documentation.                  Discharge Plan       Row Name 12/08/23 1706       Plan    Plan Comments Late entry- 1500- notified patient with acute neurological changes- Team D initiated- patient to CT- call placed to [place] to cancel bed- call placed to nephblaise Jj Recinos to update regarding patient condition- notified that patient is not stable for transfer at this time per ERMD and will be admitted to [facility]- Explained notice of non coverage to him and patient and he preferred we speak to son about form- Updated nephew about room number assignment.      Row Name 12/08/23 5831       Plan    Plan Comments Entered room, introduced self and explained role to patient- explained that she has non par insurance and ERMD feels she requires admission to hospital- discussed preference of facility- pt requested I speak w/family- she contacted nephblaise Recinos on the phone- discussed with  via conference call- he recommended [facility]- patient is in agreement- Updated ERMD and  who is calling [facility]                  Continued Care and Services - Admitted Since 12/8/2023    Coordination has not been started for this encounter.          Demographic Summary    No documentation.                  Functional Status    No documentation.                  Psychosocial    No documentation.                  Abuse/Neglect    No documentation.                  Legal    No documentation.                  Substance Abuse    No documentation.                  Patient Forms    No documentation.                     Lucia Lovelace RN

## 2023-12-08 NOTE — CASE MANAGEMENT/SOCIAL WORK
Discharge Planning Assessment  [facility]     Patient Name: Lucia Ellis  MRN: [id number]  Today's Date: 12/8/2023    Admit Date: 12/8/2023        Discharge Needs Assessment    No documentation.                  Discharge Plan       Row Name 12/08/23 1437       Plan    Plan Comments Entered room, introduced self and explained role to patient- explained that she has non par insurance and ERMD feels she requires admission to hospital- discussed preference of facility- pt requested I speak w/family- she contacted nephew Jj Recinos on the phone- discussed with  via conference call- he recommended [facility]- patient is in agreement- Updated ERMD and  who is calling [facility]                  Continued Care and Services - Admitted Since 12/8/2023    Coordination has not been started for this encounter.          Demographic Summary    No documentation.                  Functional Status    No documentation.                  Psychosocial    No documentation.                  Abuse/Neglect    No documentation.                  Legal    No documentation.                  Substance Abuse    No documentation.                  Patient Forms    No documentation.                     Lucia Lovelace RN

## 2023-12-09 ENCOUNTER — APPOINTMENT (OUTPATIENT)
Dept: CARDIOLOGY | Facility: HOSPITAL | Age: 73
End: 2023-12-09
Payer: MEDICARE

## 2023-12-09 ENCOUNTER — APPOINTMENT (OUTPATIENT)
Dept: MRI IMAGING | Facility: HOSPITAL | Age: 73
End: 2023-12-09
Payer: MEDICARE

## 2023-12-09 PROBLEM — R91.8 LUNG NODULES: Status: ACTIVE | Noted: 2023-12-09

## 2023-12-09 PROBLEM — E87.6 HYPOKALEMIA: Status: ACTIVE | Noted: 2023-12-09

## 2023-12-09 PROBLEM — I63.9 CVA (CEREBRAL VASCULAR ACCIDENT): Status: ACTIVE | Noted: 2023-12-09

## 2023-12-09 LAB
ANION GAP SERPL CALCULATED.3IONS-SCNC: 13 MMOL/L (ref 5–15)
AORTIC DIMENSIONLESS INDEX: 0.9 (DI)
BH CV ECHO MEAS - AO MAX PG: 5.1 MMHG
BH CV ECHO MEAS - AO MEAN PG: 3 MMHG
BH CV ECHO MEAS - AO V2 MAX: 112.4 CM/SEC
BH CV ECHO MEAS - AO V2 VTI: 25.6 CM
BH CV ECHO MEAS - AVA(I,D): 2.48 CM2
BH CV ECHO MEAS - EDV(CUBED): 72.3 ML
BH CV ECHO MEAS - EDV(MOD-SP2): 60 ML
BH CV ECHO MEAS - EDV(MOD-SP4): 49 ML
BH CV ECHO MEAS - EF(MOD-BP): 70.8 %
BH CV ECHO MEAS - EF(MOD-SP2): 71.7 %
BH CV ECHO MEAS - EF(MOD-SP4): 67.3 %
BH CV ECHO MEAS - ESV(CUBED): 19.3 ML
BH CV ECHO MEAS - ESV(MOD-SP2): 17 ML
BH CV ECHO MEAS - ESV(MOD-SP4): 16 ML
BH CV ECHO MEAS - FS: 35.6 %
BH CV ECHO MEAS - IVS/LVPW: 0.95 CM
BH CV ECHO MEAS - IVSD: 0.78 CM
BH CV ECHO MEAS - LAT PEAK E' VEL: 7.2 CM/SEC
BH CV ECHO MEAS - LV MASS(C)D: 99.7 GRAMS
BH CV ECHO MEAS - LV MAX PG: 3.8 MMHG
BH CV ECHO MEAS - LV MEAN PG: 1.91 MMHG
BH CV ECHO MEAS - LV V1 MAX: 97 CM/SEC
BH CV ECHO MEAS - LV V1 VTI: 23.6 CM
BH CV ECHO MEAS - LVIDD: 4.2 CM
BH CV ECHO MEAS - LVIDS: 2.7 CM
BH CV ECHO MEAS - LVOT AREA: 2.7 CM2
BH CV ECHO MEAS - LVOT DIAM: 1.85 CM
BH CV ECHO MEAS - LVPWD: 0.82 CM
BH CV ECHO MEAS - MED PEAK E' VEL: 5.6 CM/SEC
BH CV ECHO MEAS - MV A DUR: 0.17 SEC
BH CV ECHO MEAS - MV A MAX VEL: 129.8 CM/SEC
BH CV ECHO MEAS - MV DEC SLOPE: 357.7 CM/SEC2
BH CV ECHO MEAS - MV DEC TIME: 0.21 SEC
BH CV ECHO MEAS - MV E MAX VEL: 104.2 CM/SEC
BH CV ECHO MEAS - MV E/A: 0.8
BH CV ECHO MEAS - MV MAX PG: 6.9 MMHG
BH CV ECHO MEAS - MV MEAN PG: 2.27 MMHG
BH CV ECHO MEAS - MV P1/2T: 67.7 MSEC
BH CV ECHO MEAS - MV V2 VTI: 26.8 CM
BH CV ECHO MEAS - MVA(P1/2T): 3.3 CM2
BH CV ECHO MEAS - MVA(VTI): 2.37 CM2
BH CV ECHO MEAS - PA ACC TIME: 0.21 SEC
BH CV ECHO MEAS - PA V2 MAX: 97.3 CM/SEC
BH CV ECHO MEAS - PULM A REVS DUR: 0.14 SEC
BH CV ECHO MEAS - PULM A REVS VEL: 50.2 CM/SEC
BH CV ECHO MEAS - PULM DIAS VEL: 36.5 CM/SEC
BH CV ECHO MEAS - PULM S/D: 2.1
BH CV ECHO MEAS - PULM SYS VEL: 76.5 CM/SEC
BH CV ECHO MEAS - QP/QS: 0.82
BH CV ECHO MEAS - RV MAX PG: 1.98 MMHG
BH CV ECHO MEAS - RV V1 MAX: 70.3 CM/SEC
BH CV ECHO MEAS - RV V1 VTI: 16.6 CM
BH CV ECHO MEAS - RVOT DIAM: 1.99 CM
BH CV ECHO MEAS - SV(LVOT): 63.5 ML
BH CV ECHO MEAS - SV(MOD-SP2): 43 ML
BH CV ECHO MEAS - SV(MOD-SP4): 33 ML
BH CV ECHO MEAS - SV(RVOT): 51.8 ML
BH CV ECHO MEAS - TAPSE (>1.6): 2 CM
BH CV ECHO MEASUREMENTS AVERAGE E/E' RATIO: 16.28
BH CV ECHO SHUNT ASSESSMENT PERFORMED (HIDDEN SCRIPTING): 1
BH CV XLRA - RV BASE: 2.47 CM
BH CV XLRA - RV LENGTH: 6.4 CM
BH CV XLRA - RV MID: 1.72 CM
BH CV XLRA - TDI S': 16.2 CM/SEC
BUN SERPL-MCNC: 19 MG/DL (ref 8–23)
BUN/CREAT SERPL: 31.7 (ref 7–25)
CALCIUM SPEC-SCNC: 9.1 MG/DL (ref 8.6–10.5)
CHLORIDE SERPL-SCNC: 110 MMOL/L (ref 98–107)
CHOLEST SERPL-MCNC: 187 MG/DL (ref 0–200)
CO2 SERPL-SCNC: 24 MMOL/L (ref 22–29)
CREAT SERPL-MCNC: 0.6 MG/DL (ref 0.57–1)
DEPRECATED RDW RBC AUTO: 38.6 FL (ref 37–54)
EGFRCR SERPLBLD CKD-EPI 2021: 93.7 ML/MIN/1.73
ERYTHROCYTE [DISTWIDTH] IN BLOOD BY AUTOMATED COUNT: 12.7 % (ref 12.3–15.4)
GLUCOSE BLDC GLUCOMTR-MCNC: 130 MG/DL (ref 70–130)
GLUCOSE BLDC GLUCOMTR-MCNC: 132 MG/DL (ref 70–130)
GLUCOSE BLDC GLUCOMTR-MCNC: 199 MG/DL (ref 70–130)
GLUCOSE BLDC GLUCOMTR-MCNC: 77 MG/DL (ref 70–130)
GLUCOSE BLDC GLUCOMTR-MCNC: 92 MG/DL (ref 70–130)
GLUCOSE SERPL-MCNC: 116 MG/DL (ref 65–99)
HBA1C MFR BLD: 8.4 % (ref 4.8–5.6)
HCT VFR BLD AUTO: 39.3 % (ref 34–46.6)
HDLC SERPL-MCNC: 66 MG/DL (ref 40–60)
HGB BLD-MCNC: 13.2 G/DL (ref 12–15.9)
LDLC SERPL CALC-MCNC: 105 MG/DL (ref 0–100)
LDLC/HDLC SERPL: 1.57 {RATIO}
LEFT ATRIUM VOLUME INDEX: 23.3 ML/M2
LEFT ATRIUM VOLUME: 38 ML
MCH RBC QN AUTO: 28.9 PG (ref 26.6–33)
MCHC RBC AUTO-ENTMCNC: 33.6 G/DL (ref 31.5–35.7)
MCV RBC AUTO: 86 FL (ref 79–97)
PLATELET # BLD AUTO: 204 10*3/MM3 (ref 140–450)
PMV BLD AUTO: 11.1 FL (ref 6–12)
POTASSIUM SERPL-SCNC: 3.1 MMOL/L (ref 3.5–5.2)
RBC # BLD AUTO: 4.57 10*6/MM3 (ref 3.77–5.28)
SINUS: 2.9 CM
SODIUM SERPL-SCNC: 147 MMOL/L (ref 136–145)
TRIGL SERPL-MCNC: 88 MG/DL (ref 0–150)
TSH SERPL DL<=0.05 MIU/L-ACNC: 0.71 UIU/ML (ref 0.27–4.2)
VLDLC SERPL-MCNC: 16 MG/DL (ref 5–40)
WBC NRBC COR # BLD AUTO: 5.74 10*3/MM3 (ref 3.4–10.8)

## 2023-12-09 PROCEDURE — 99223 1ST HOSP IP/OBS HIGH 75: CPT | Performed by: STUDENT IN AN ORGANIZED HEALTH CARE EDUCATION/TRAINING PROGRAM

## 2023-12-09 PROCEDURE — 83036 HEMOGLOBIN GLYCOSYLATED A1C: CPT | Performed by: INTERNAL MEDICINE

## 2023-12-09 PROCEDURE — 80048 BASIC METABOLIC PNL TOTAL CA: CPT | Performed by: INTERNAL MEDICINE

## 2023-12-09 PROCEDURE — 97167 OT EVAL HIGH COMPLEX 60 MIN: CPT

## 2023-12-09 PROCEDURE — 85027 COMPLETE CBC AUTOMATED: CPT | Performed by: INTERNAL MEDICINE

## 2023-12-09 PROCEDURE — 82948 REAGENT STRIP/BLOOD GLUCOSE: CPT

## 2023-12-09 PROCEDURE — 97162 PT EVAL MOD COMPLEX 30 MIN: CPT

## 2023-12-09 PROCEDURE — 84443 ASSAY THYROID STIM HORMONE: CPT | Performed by: INTERNAL MEDICINE

## 2023-12-09 PROCEDURE — 25010000002 SODIUM CHLORIDE 0.9 % WITH KCL 20 MEQ 20-0.9 MEQ/L-% SOLUTION: Performed by: INTERNAL MEDICINE

## 2023-12-09 PROCEDURE — 25010000002 LEVETRIRACETAM PER 10 MG: Performed by: INTERNAL MEDICINE

## 2023-12-09 PROCEDURE — 93306 TTE W/DOPPLER COMPLETE: CPT

## 2023-12-09 PROCEDURE — 97530 THERAPEUTIC ACTIVITIES: CPT

## 2023-12-09 PROCEDURE — 92610 EVALUATE SWALLOWING FUNCTION: CPT

## 2023-12-09 PROCEDURE — 80061 LIPID PANEL: CPT | Performed by: INTERNAL MEDICINE

## 2023-12-09 PROCEDURE — 25010000002 LORAZEPAM PER 2 MG: Performed by: NURSE PRACTITIONER

## 2023-12-09 PROCEDURE — 70551 MRI BRAIN STEM W/O DYE: CPT

## 2023-12-09 PROCEDURE — 93306 TTE W/DOPPLER COMPLETE: CPT | Performed by: INTERNAL MEDICINE

## 2023-12-09 RX ORDER — PANTOPRAZOLE SODIUM 40 MG/1
40 TABLET, DELAYED RELEASE ORAL
Status: DISCONTINUED | OUTPATIENT
Start: 2023-12-09 | End: 2023-12-15 | Stop reason: HOSPADM

## 2023-12-09 RX ORDER — CLOPIDOGREL BISULFATE 75 MG/1
300 TABLET ORAL ONCE
Status: COMPLETED | OUTPATIENT
Start: 2023-12-09 | End: 2023-12-09

## 2023-12-09 RX ORDER — ASPIRIN 81 MG/1
81 TABLET ORAL DAILY
Status: DISCONTINUED | OUTPATIENT
Start: 2023-12-09 | End: 2023-12-15 | Stop reason: HOSPADM

## 2023-12-09 RX ORDER — CLOPIDOGREL BISULFATE 75 MG/1
75 TABLET ORAL DAILY
Status: DISCONTINUED | OUTPATIENT
Start: 2023-12-10 | End: 2023-12-15 | Stop reason: HOSPADM

## 2023-12-09 RX ORDER — POTASSIUM CHLORIDE 750 MG/1
40 TABLET, FILM COATED, EXTENDED RELEASE ORAL EVERY 4 HOURS
Status: COMPLETED | OUTPATIENT
Start: 2023-12-09 | End: 2023-12-10

## 2023-12-09 RX ORDER — LORAZEPAM 2 MG/ML
1 INJECTION INTRAMUSCULAR ONCE
Status: COMPLETED | OUTPATIENT
Start: 2023-12-09 | End: 2023-12-09

## 2023-12-09 RX ADMIN — AMLODIPINE BESYLATE 10 MG: 10 TABLET ORAL at 12:07

## 2023-12-09 RX ADMIN — POTASSIUM CHLORIDE AND SODIUM CHLORIDE 100 ML/HR: 900; 150 INJECTION, SOLUTION INTRAVENOUS at 12:02

## 2023-12-09 RX ADMIN — POTASSIUM CHLORIDE 40 MEQ: 750 TABLET, EXTENDED RELEASE ORAL at 15:53

## 2023-12-09 RX ADMIN — ASPIRIN 81 MG: 81 TABLET, COATED ORAL at 12:06

## 2023-12-09 RX ADMIN — LEVETIRACETAM 1000 MG: 500 INJECTION, SOLUTION INTRAVENOUS at 20:54

## 2023-12-09 RX ADMIN — MEMANTINE HYDROCHLORIDE 5 MG: 5 TABLET, FILM COATED ORAL at 20:54

## 2023-12-09 RX ADMIN — LEVETIRACETAM 1000 MG: 500 INJECTION, SOLUTION INTRAVENOUS at 08:48

## 2023-12-09 RX ADMIN — LISINOPRIL 40 MG: 40 TABLET ORAL at 12:06

## 2023-12-09 RX ADMIN — LORAZEPAM 1 MG: 2 INJECTION, SOLUTION INTRAMUSCULAR; INTRAVENOUS at 04:55

## 2023-12-09 RX ADMIN — ATORVASTATIN CALCIUM 80 MG: 80 TABLET, FILM COATED ORAL at 20:54

## 2023-12-09 RX ADMIN — DONEPEZIL HYDROCHLORIDE 10 MG: 10 TABLET, FILM COATED ORAL at 12:07

## 2023-12-09 RX ADMIN — ESCITALOPRAM OXALATE 10 MG: 10 TABLET, FILM COATED ORAL at 12:06

## 2023-12-09 RX ADMIN — POTASSIUM CHLORIDE 40 MEQ: 750 TABLET, EXTENDED RELEASE ORAL at 20:53

## 2023-12-09 RX ADMIN — PANTOPRAZOLE SODIUM 40 MG: 40 TABLET, DELAYED RELEASE ORAL at 12:11

## 2023-12-09 RX ADMIN — CETIRIZINE HYDROCHLORIDE 10 MG: 10 TABLET ORAL at 12:06

## 2023-12-09 RX ADMIN — CLOPIDOGREL BISULFATE 300 MG: 75 TABLET, FILM COATED ORAL at 12:11

## 2023-12-09 RX ADMIN — MEMANTINE HYDROCHLORIDE 5 MG: 5 TABLET, FILM COATED ORAL at 12:07

## 2023-12-09 NOTE — THERAPY EVALUATION
Patient Name: Lucia Ellis  : 1950    MRN: [id number]                              Today's Date: 2023       Admit Date: 2023    Visit Dx:     ICD-10-CM ICD-9-CM   1. Ataxia  R27.0 781.3   2. Left-sided weakness  R53.1 728.87   3. Stroke-like symptoms  R29.90 781.99     Patient Active Problem List   Diagnosis    Gastroesophageal reflux disease    Malignant neoplasm of breast    Depression    Folliculitis    Generalized anxiety disorder    Hyperlipidemia    Essential hypertension    Status post total right knee replacement    Rectal hemorrhage    Vitamin D deficiency    Drug therapy    Acute cholecystitis    Uncontrolled type 2 diabetes mellitus with hypoglycemia without coma    Myoclonus    Type 2 diabetes mellitus with hyperglycemia    Hypokalemia    New onset seizure    Focal motor seizure    Vascular dementia, moderate, without behavioral disturbance, psychotic disturbance, mood disturbance, and anxiety    Stroke-like symptoms    CVA (cerebral vascular accident)    Lung nodules    Hypokalemia     Past Medical History:   Diagnosis Date    Breast cancer     Dementia     Diabetes mellitus     Hypertension     Memory loss      Past Surgical History:   Procedure Laterality Date    CHOLECYSTECTOMY      HYSTERECTOMY      MASTECTOMY Right 1995    TOTAL KNEE ARTHROPLASTY Right       General Information       Row Name 23 1250          OT Time and Intention    Document Type evaluation  -SM     Mode of Treatment occupational therapy;co-treatment  co treat with PT 2/2 acuity of illness and pt lethargic state today.  -SM       Row Name 23 1250          General Information    Patient Profile Reviewed yes  -SM     Prior Level of Function independent:;ADL's;all household mobility  -SM       Row Name 23 1250          Occupational Profile    Environmental Supports and Barriers (Occupational Profile) no home DME/AD  -SM       Row Name 23 1250          Living Environment    People in Home  child(donovan), adult  son  -       Row Name 12/09/23 1250          Stairs Within Home, Primary    Stairs, Within Home, Primary multilevel home with bedrooms on 1st floor  -       Row Name 12/09/23 1250          Cognition    Orientation Status (Cognition) oriented to;place;time;verbal cues/prompts needed for orientation  -       Row Name 12/09/23 1250          Safety Issues, Functional Mobility    Safety Issues Affecting Function (Mobility) insight into deficits/self-awareness  -     Impairments Affecting Function (Mobility) balance;cognition;coordination;endurance/activity tolerance;grasp;motor control;strength;postural/trunk control  -     Cognitive Impairments, Mobility Safety/Performance attention  memory  -               User Key  (r) = Recorded By, (t) = Taken By, (c) = Cosigned By      Initials Name Provider Type     Nilda Waite OT Occupational Therapist                     Mobility/ADL's       Row Name 12/09/23 1252          Bed Mobility    Bed Mobility supine-sit;sit-supine  -     Supine-Sit Bartholomew (Bed Mobility) maximum assist (25% patient effort);2 person assist;verbal cues  -     Sit-Supine Bartholomew (Bed Mobility) maximum assist (25% patient effort);2 person assist;verbal cues  -     Assistive Device (Bed Mobility) head of bed elevated;bed rails  -       Row Name 12/09/23 1252          Transfers    Comment, (Transfers) not safe to complete today due to poor postural control sitting EOB  -       Row Name 12/09/23 1252          Activities of Daily Living    BADL Assessment/Intervention lower body dressing;toileting;feeding  -[facility] Name 12/09/23 1252          Lower Body Dressing Assessment/Training    Bartholomew Level (Lower Body Dressing) don;socks;dependent (less than 25% patient effort)  -     Position (Lower Body Dressing) edge of bed sitting  -       Row Name 12/09/23 1252          Toileting Assessment/Training    Bartholomew Level (Toileting)  dependent (less than 25% patient effort)  -     Position (Toileting) supine  -     Comment, (Toileting) brief/purewick  -       Row Name 12/09/23 1252          Self-Feeding Assessment/Training    Comment, (Feeding) Pt continues to request something to eat/drink during session but OT continues to educate pt she needs to been evaluated by SLP first. Anticipate pt would need some assist with set up tray and or feeding skills due to LUE weakness  -               User Key  (r) = Recorded By, (t) = Taken By, (c) = Cosigned By      Initials Name Provider Type     Nilda Waite, OT Occupational Therapist                   Obj/Interventions       Row Name 12/09/23 1255          Sensory Assessment (Somatosensory)    Sensory Assessment (Somatosensory) left UE  -SM     Left UE Sensory Assessment light touch awareness;light touch localization;intact  -     Sensory Assessment but some questionable neglect to LUE. Pt sitting on LUE when positioned on EOB and needs OT to cue to her attention to this  -       Row Name 12/09/23 1255          Vision Assessment/Intervention    Vision Assessment Comment visually tracks all planes, reads correct time on clock  -       Row Name 12/09/23 1255          Range of Motion Comprehensive    Comment, General Range of Motion RUE WFL AROM, L digit flex/extension 3/4 AROM, L wrist flex/ext 1/2 AROM, L forearm supination/pronation 1/2 AROM, L elbow flex/ext 1/2 AROM, L shoulder flex 1/4 AROM, L scapular elevation WFL. tested supine.  -       Row Name 12/09/23 1255          Strength Comprehensive (MMT)    Comment, General Manual Muscle Testing (MMT) Assessment RUE appears generally weak but at least 3/5, LUE 2-/5 to 2+/5  -       Row Name 12/09/23 1255          Motor Skills    Motor Skills coordination  -     Coordination bilateral;upper extremity;moderate impairment  -       Row Name 12/09/23 1255          Balance    Balance Assessment sitting static balance;sitting dynamic  balance  -SM     Static Sitting Balance moderate assist  -SM     Dynamic Sitting Balance maximum assist  -SM     Position, Sitting Balance sitting edge of bed  -SM     Comment, Balance pt leaning all directions, cues for sitting balance, has difficulty holding head up against gravity  -SM               User Key  (r) = Recorded By, (t) = Taken By, (c) = Cosigned By      Initials Name Provider Type    SM Nilda Waite, OT Occupational Therapist                   Goals/Plan       Row Name 12/09/23 1304          Transfer Goal 1 (OT)    Activity/Assistive Device (Transfer Goal 1, OT) toilet;commode, bedside without drop arms  -SM     [place] Level/Cues Needed (Transfer Goal 1, OT) moderate assist (50-74% patient effort)  -SM     Time Frame (Transfer Goal 1, OT) short term goal (STG);2 weeks  -SM     Progress/Outcome (Transfer Goal 1, OT) goal ongoing  -       Row Name 12/09/23 1304          Bathing Goal 1 (OT)    Activity/Device (Bathing Goal 1, OT) bathing skills, all  -SM     [place] Level/Cues Needed (Bathing Goal 1, OT) moderate assist (50-74% patient effort)  -SM     Time Frame (Bathing Goal 1, OT) short term goal (STG);2 weeks  -SM     Progress/Outcomes (Bathing Goal 1, OT) goal ongoing  -       Row Name 12/09/23 1304          Dressing Goal 1 (OT)    Activity/Device (Dressing Goal 1, OT) dressing skills, all  -SM     [place]/Cues Needed (Dressing Goal 1, OT) moderate assist (50-74% patient effort)  -SM     Time Frame (Dressing Goal 1, OT) short term goal (STG);2 weeks  -SM     Progress/Outcome (Dressing Goal 1, OT) goal ongoing  -       Row Name 12/09/23 1307          Toileting Goal 1 (OT)    Activity/Device (Toileting Goal 1, OT) toileting skills, all  -SM     [place] Level/Cues Needed (Toileting Goal 1, OT) moderate assist (50-74% patient effort)  -SM     Time Frame (Toileting Goal 1, OT) short term goal (STG);2 weeks  -SM     Progress/Outcome (Toileting Goal 1, OT) goal ongoing  -        Row Name 12/09/23 1304          Grooming Goal 1 (OT)    Activity/Device (Grooming Goal 1, OT) grooming skills, all  -SM     [place] (Grooming Goal 1, OT) standby assist  -SM     Time Frame (Grooming Goal 1, OT) short term goal (STG);2 weeks  -SM     Progress/Outcome (Grooming Goal 1, OT) goal ongoing  -       Row Name 12/09/23 1304          Self-Feeding Goal 1 (OT)    Activity/Device (Self-Feeding Goal 1, OT) self-feeding skills, all  -SM     [place] Level/Cues Needed (Self-Feeding Goal 1, OT) set-up required  -SM     Time Frame (Self-Feeding Goal 1, OT) short term goal (STG);2 weeks  -SM     Progress/Outcomes (Self-Feeding Goal 1, OT) goal ongoing  -       Row Name 12/09/23 1304          Strength Goal 1 (OT)    Strength Goal 1 (OT) Pt to increase LUE strength to 3/5 grossly to improve ability to use during ADLs and reaching.  -SM     Time Frame (Strength Goal 1, OT) short term goal (STG);2 weeks  -SM     Progress/Outcome (Strength Goal 1, OT) goal ongoing  -       Row Name 12/09/23 1304          Problem Specific Goal 1 (OT)    Problem Specific Goal 1 (OT) Pt to maintain static sitting balance SBA for 10 minutes during EOB or unsupported sitting ADL.  -SM     Time Frame (Problem Specific Goal 1, OT) short term goal (STG);2 weeks  -SM     Progress/Outcome (Problem Specific Goal 1, OT) goal ongoing  -       Row Name 12/09/23 1305          Therapy Assessment/Plan (OT)    Planned Therapy Interventions (OT) activity tolerance training;adaptive equipment training;functional balance retraining;occupation/activity based interventions;patient/caregiver education/training;transfer/mobility retraining;strengthening exercise;ROM/therapeutic exercise;neuromuscular control/coordination retraining;cognitive/visual perception retraining  -SM               User Key  (r) = Recorded By, (t) = Taken By, (c) = Cosigned By      Initials Name Provider Type    Nilda Escobar, OT Occupational Therapist                    Clinical Impression       Row Name 12/09/23 1259          Pain Assessment    Pretreatment Pain Rating 0/10 - no pain  -     Posttreatment Pain Rating 0/10 - no pain  -       Row Name 12/09/23 1259          Plan of Care Review    Plan of Care Reviewed With patient;sibling  -     Outcome Evaluation Pt is a 73 y.o female admitted from home with difficulty with her balance, mobility, fall, L sided weakness over the past few days. Pt is found to have a nodule on the right lung concerning for metastasis, MRI brain confirmed a stroke on the right lateral thalamus. Pt is typically independent prior to last Thursday. Today pt presents with poor postural control, LUE weakness, decreased mobility, decreased cogntion (she does have hx of dementia), decreased alertness. Pt requires max AX2 to sit EOB today and she is lethargic but follows commands. RN reports she did give pt medication for her MRI which may be contributing. She would benefit from continued OT to increase independence with ADLs, transfers, balance, LUE functioning. Anticipate she will need ongoing rehab at [facility] as she is not safe to dc home at present.  -       Row Name 12/09/23 1259          Therapy Assessment/Plan (OT)    Rehab Potential (OT) good, to achieve stated therapy goals  -     Criteria for Skilled Therapeutic Interventions Met (OT) yes;skilled treatment is necessary  -     Therapy Frequency (OT) 5 times/wk  -       Row Name 12/09/23 1259          Therapy Plan Review/Discharge Plan (OT)    Anticipated Discharge Disposition (OT) inpatient rehabilitation facility;skilled nursing facility  -       Row Name 12/09/23 1259          Positioning and Restraints    Pre-Treatment Position in bed  -     Post Treatment Position bed  -     In Bed fowlers;call light within reach;encouraged to call for assist;exit alarm on;notified nsg;with family/caregiver  -               User Key  (r) = Recorded By, (t) = Taken By, (c) = Cosigned By       Initials Name Provider Type    Nilda Escobar OT Occupational Therapist                   Outcome Measures       Row Name 12/09/23 1306          How much help from another is currently needed...    Putting on and taking off regular lower body clothing? 1  -SM     Bathing (including washing, rinsing, and drying) 2  -SM     Toileting (which includes using toilet bed pan or urinal) 1  -SM     Putting on and taking off regular upper body clothing 2  -SM     Taking care of personal grooming (such as brushing teeth) 2  -SM     Eating meals 2  -SM     AM-PAC 6 Clicks Score (OT) 10  -SM       Row Name 12/09/23 0822          How much help from another person do you currently need...    Turning from your back to your side while in flat bed without using bedrails? 4  -LC     Moving from lying on back to sitting on the side of a flat bed without bedrails? 3  -LC     Moving to and from a bed to a chair (including a wheelchair)? 3  -LC     Standing up from a chair using your arms (e.g., wheelchair, bedside chair)? 3  -LC     Climbing 3-5 steps with a railing? 2  -LC     To walk in hospital room? 2  -LC     AM-PAC 6 Clicks Score (PT) 17  -LC     Highest Level of Mobility Goal 5 --> Static standing  -LC       Row Name 12/09/23 1306          Modified [place] Scale    Modified [place] Scale 4 - Moderately severe disability.  Unable to walk without assistance, and unable to attend to own bodily needs without assistance.  -       Row Name 12/09/23 1306          Functional Assessment    Outcome Measure Options AM-PAC 6 Clicks Daily Activity (OT);Modified [place]  -               User Key  (r) = Recorded By, (t) = Taken By, (c) = Cosigned By      Initials Name Provider Type    Nilda Escobar OT Occupational Therapist    Nohelia Keller, RN Registered Nurse                    Occupational Therapy Education       Title: PT OT SLP Therapies (In Progress)       Topic: Occupational Therapy (In Progress)       Point: ADL  training (Done)       Description:   Instruct learner(s) on proper safety adaptation and remediation techniques during self care or transfers.   Instruct in proper use of assistive devices.                  Learning Progress Summary             Patient Acceptance, E, VU by  at 12/9/2023 1307    Comment: OT goals, POC, dc recommendations, safe positioning LUE post CVA to reduce risk of subluxation   Family Acceptance, E, VU by  at 12/9/2023 1307    Comment: OT goals, POC, dc recommendations, safe positioning LUE post CVA to reduce risk of subluxation                         Point: Home exercise program (Not Started)       Description:   Instruct learner(s) on appropriate technique for monitoring, assisting and/or progressing therapeutic exercises/activities.                  Learner Progress:  Not documented in this visit.              Point: Precautions (Not Started)       Description:   Instruct learner(s) on prescribed precautions during self-care and functional transfers.                  Learner Progress:  Not documented in this visit.              Point: Body mechanics (Not Started)       Description:   Instruct learner(s) on proper positioning and spine alignment during self-care, functional mobility activities and/or exercises.                  Learner Progress:  Not documented in this visit.                              User Key       Initials Effective Dates Name Provider Type Discipline     04/02/20 -  Nilda Waite OT Occupational Therapist OT                  OT Recommendation and Plan  Planned Therapy Interventions (OT): activity tolerance training, adaptive equipment training, functional balance retraining, occupation/activity based interventions, patient/caregiver education/training, transfer/mobility retraining, strengthening exercise, ROM/therapeutic exercise, neuromuscular control/coordination retraining, cognitive/visual perception retraining  Therapy Frequency (OT): 5 times/wk  Plan of  Care Review  Plan of Care Reviewed With: patient, sibling  Outcome Evaluation: Pt is a 73 y.o female admitted from home with difficulty with her balance, mobility, fall, L sided weakness over the past few days. Pt is found to have a nodule on the right lung concerning for metastasis, MRI brain confirmed a stroke on the right lateral thalamus. Pt is typically independent prior to last Thursday. Today pt presents with poor postural control, LUE weakness, decreased mobility, decreased cogntion (she does have hx of dementia), decreased alertness. Pt requires max AX2 to sit EOB today and she is lethargic but follows commands. RN reports she did give pt medication for her MRI which may be contributing. She would benefit from continued OT to increase independence with ADLs, transfers, balance, LUE functioning. Anticipate she will need [facility] rehab at [facility] as she is not safe to dc home at present.     Time Calculation:   Evaluation Complexity (OT)  Review Occupational Profile/Medical/Therapy History Complexity: extensive/high complexity  Assessment, Occupational Performance/Identification of Deficit Complexity: 5 or more performance deficits  Clinical Decision Making Complexity (OT): comprehensive assessment/high complexity  Overall Complexity of Evaluation (OT): high complexity     Time Calculation- OT       Row Name 12/09/23 1308             Time Calculation- OT    OT Start Time 1045  -SM      OT Stop Time 1105  -SM      OT Time Calculation (min) 20 min  -SM      Total Timed Code Minutes- OT 12 minute(s)  -SM      OT Received On 12/09/23  -SM      OT - Next Appointment 12/10/23  -SM      OT Goal Re-Cert Due Date 12/23/23  -SM         Timed Charges    56901 - OT Therapeutic Activity Minutes 12  -SM         Untimed Charges    OT Eval/Re-eval Minutes 8  -SM         Total Minutes    Timed Charges Total Minutes 12  -SM      Untimed Charges Total Minutes 8  -SM       Total Minutes 20  -SM                User Key  (r) = Recorded  By, (t) = Taken By, (c) = Cosigned By      Initials Name Provider Type    Nilda Escobar OT Occupational Therapist                  Therapy Charges for Today       Code Description Service Date Service Provider Modifiers Qty    [id number]  OT THERAPEUTIC ACT EA 15 MIN 12/9/2023 Nilda Waite OT GO 1    [id number]  OT EVAL HIGH COMPLEXITY 3 12/9/2023 Nilda Waite OT GO 1                 Nilda Waite OT  12/9/2023

## 2023-12-09 NOTE — THERAPY EVALUATION
Acute Care - Speech Language Pathology   Swallow Initial Evaluation [facility]     Patient Name: Lucia Ellis  : 1950  MRN: [id number]  Today's Date: 2023               Admit Date: 2023    Visit Dx:     ICD-10-CM ICD-9-CM   1. Ataxia  R27.0 781.3   2. Left-sided weakness  R53.1 728.87   3. Stroke-like symptoms  R29.90 781.99     Patient Active Problem List   Diagnosis    Gastroesophageal reflux disease    Malignant neoplasm of breast    Depression    Folliculitis    Generalized anxiety disorder    Hyperlipidemia    Essential hypertension    Status post total right knee replacement    Rectal hemorrhage    Vitamin D deficiency    Drug therapy    Acute cholecystitis    Uncontrolled type 2 diabetes mellitus with hypoglycemia without coma    Myoclonus    Type 2 diabetes mellitus with hyperglycemia    Hypokalemia    New onset seizure    Focal motor seizure    Vascular dementia, moderate, without behavioral disturbance, psychotic disturbance, mood disturbance, and anxiety    Stroke-like symptoms    CVA (cerebral vascular accident)    Lung nodules    Hypokalemia     Past Medical History:   Diagnosis Date    Breast cancer     Dementia     Diabetes mellitus     Hypertension     Memory loss      Past Surgical History:   Procedure Laterality Date    CHOLECYSTECTOMY      HYSTERECTOMY      MASTECTOMY Right 1995    TOTAL KNEE ARTHROPLASTY Right        SLP Recommendation and Plan  SLP Swallowing Diagnosis: mild, oral dysphagia, R/O pharyngeal dysphagia (23 1130)  SLP Diet Recommendation: soft to chew textures, ground, thin liquids (23 1130)  Recommended Precautions and Strategies: upright posture during/after eating, small bites of food and sips of liquid, general aspiration precautions (23 1130)  SLP Rec. for Method of Medication Administration: meds whole, with thin liquids, with puree, as tolerated (23 1130)     Monitor for Signs of Aspiration: yes, notify SLP if any concerns  (12/09/23 1130)  Recommended Diagnostics: reassess via clinical swallow evaluation, SLE/Cog/Motor Speech Evaluation (12/09/23 1130)  Swallow Criteria for Skilled Therapeutic Interventions Met: demonstrates skilled criteria (12/09/23 1130)  Anticipated Discharge Disposition (SLP): unknown (12/09/23 1130)  Rehab Potential/Prognosis, Swallowing: good, to achieve stated therapy goals (12/09/23 1130)  Therapy Frequency (Swallow): PRN (12/09/23 1130)  Predicted Duration Therapy Intervention (Days): until discharge (12/09/23 1130)  Oral Care Recommendations: Oral Care BID/PRN (12/09/23 1130)                                      Oral Care Recommendations: Oral Care BID/PRN (12/09/23 1130)    Outcome Evaluation: Clinical swallow evaluation completed. Patient lethargic, but aroused with verbal cues and light external rub. Required frequent verbal cues throughout the assessment to stay awake. Patient tolerated ice chips, thin via spoon/cup/straw, puree, and soft solids with no overt s/sx of aspiration or penetration. Mastication prolonged/disorganized with soft solids. Exhibited incomplete mastication with second trial of soft peaches. Regular solids deferred this date. SLP recommends soft, ground solids and thin liquids. Medication whole with water or applesauce. Precautions: upright for meals, slow rate, small bites/sips. Only allow p.o. when alert. ST to follow for re-evaluation.      SWALLOW EVALUATION (last 72 hours)       SLP Adult Swallow Evaluation       Row Name 12/09/23 1130       Rehab Evaluation    Document Type evaluation  -SR    Subjective Information no complaints  -SR    Patient Observations cooperative;lethargic  -SR    Patient/Family/Caregiver Comments/Observations Pt supine in bed upon arrival. Repositioned for therapy. Sister at bedside.  -SR    Patient Effort adequate  -SR    Symptoms Noted During/After Treatment none  -SR       General Information    Patient Profile Reviewed yes  -SR    Pertinent History  Of Current Problem 73 y.o. female; Admitted with R basal ganglia CVA involving lateral R thalamus and pulmonary nodule concerning for malignancy.  -SR    Current Method of Nutrition NPO  -SR    Precautions/Limitations, Vision WFL;for purposes of eval  -SR    Precautions/Limitations, Hearing WFL;for purposes of eval  -SR    Prior Level of Function-Communication unknown  -SR    Prior Level of Function-Swallowing no diet consistency restrictions  -SR    Plans/Goals Discussed with patient;family;agreed upon  -SR    Barriers to Rehab medically complex  -SR       Oral Motor Structure and Function    Dentition Assessment natural, present and adequate  -SR    Secretion Management WNL/WFL  -SR    Mucosal Quality dry  -SR    Volitional Swallow unable to elicit  -SR    Volitional Cough unable to elicit  -SR       Oral Musculature and Cranial Nerve Assessment    Oral Motor General Assessment generalized oral motor weakness  -SR       General Eating/Swallowing Observations    Respiratory Support Currently in Use room air  -SR    Eating/Swallowing Skills fed by SLP  -SR    Positioning During Eating upright 90 degree;upright in bed  -SR    Utensils Used spoon;cup;straw  -SR    Consistencies Trialed ice chips;thin liquids;pureed;mechanical ground textures;mixed consistency  -SR       Clinical Swallow Eval    Clinical Swallow Evaluation Summary Clinical swallow evaluation completed. Patient lethargic, but aroused with verbal cues and light external rub. Required frequent verbal cues throughout the assessment to stay awake. Patient tolerated ice chips, thin via spoon/cup/straw, puree, and soft solids with no overt s/sx of aspiration or penetration. Mastication prolonged/disorganized with soft solids. Exhibited incomplete mastication with second trial of soft peaches. Regular solids deferred this date. SLP recommends soft, ground solids and thin liquids. Medication whole with water or applesauce. Precautions: upright for meals, slow  rate, small bites/sips. Only allow p.o. when alert. ST to follow for re-evaluation.  -SR       SLP Evaluation Clinical Impression    SLP Swallowing Diagnosis mild;oral dysphagia;R/O pharyngeal dysphagia  -SR    Functional Impact risk of aspiration/pneumonia  -SR    Rehab Potential/Prognosis, Swallowing good, to achieve stated therapy goals  -SR    Swallow Criteria for Skilled Therapeutic Interventions Met demonstrates skilled criteria  -SR       Recommendations    Therapy Frequency (Swallow) PRN  -SR    Predicted Duration Therapy Intervention (Days) until discharge  -SR    SLP Diet Recommendation soft to chew textures;ground;thin liquids  -SR    Recommended Diagnostics reassess via clinical swallow evaluation;SLE/Cog/Motor Speech Evaluation  -SR    Recommended Precautions and Strategies upright posture during/after eating;small bites of food and sips of liquid;general aspiration precautions  -SR    Oral Care Recommendations Oral Care BID/PRN  -SR    SLP Rec. for Method of Medication Administration meds whole;with thin liquids;with puree;as tolerated  -SR    Monitor for Signs of Aspiration yes;notify SLP if any concerns  -SR    Anticipated Discharge Disposition (SLP) unknown  -SR       Swallow Goals (SLP)    Swallow LTGs Patient will demonstrate progress toward functional swallow for  -SR       (LTG) Patient will demonstrate progress toward functional swallow for    Diet Texture (Demonstrate progress toward functional swallow) regular textures  -SR    Liquid viscosity (Demonstrate progress toward functional swallow) thin liquids  -SR    [place] (Demonstrate progress towards functional swallow) independently (over 90% accuracy)  -SR    Time Frame (Demonstrate progress toward functional swallow) by discharge  -SR    Progress/Outcomes (Demonstrate progress toward functional swallow) new goal  -SR              User Key  (r) = Recorded By, (t) = Taken By, (c) = Cosigned By      Initials Name Effective Dates    SR  Riedford, Nilda, CCC-SLP 11/10/22 -                     EDUCATION  The patient has been educated in the following areas:   Dysphagia (Swallowing Impairment) Oral Care/Hydration.        SLP GOALS       Row Name 12/09/23 1130             (LTG) Patient will demonstrate progress toward functional swallow for    Diet Texture (Demonstrate progress toward functional swallow) regular textures  -SR      Liquid viscosity (Demonstrate progress toward functional swallow) thin liquids  -SR      [place] (Demonstrate progress towards functional swallow) independently (over 90% accuracy)  -SR      Time Frame (Demonstrate progress toward functional swallow) by discharge  -SR      Progress/Outcomes (Demonstrate progress toward functional swallow) new goal  -SR                User Key  (r) = Recorded By, (t) = Taken By, (c) = Cosigned By      Initials Name Provider Type    SR Nilda Bright CCC-SLP Speech and Language Pathologist                       Time Calculation:    Time Calculation- SLP       Row Name 12/09/23 1245             Time Calculation- SLP    SLP Start Time 1130  -SR      SLP Received On 12/09/23  -SR         Untimed Charges    SLP Eval/Re-eval  ST Eval Oral Pharyng Swallow - 91483  -SR      [id number]-[id number] Eval Oral Pharyng Swallow Minutes 60  -SR         Total Minutes    Untimed Charges Total Minutes 60  -SR       Total Minutes 60  -SR                User Key  (r) = Recorded By, (t) = Taken By, (c) = Cosigned By      Initials Name Provider Type    SR Nilda Bright CCC-SLP Speech and Language Pathologist                    Therapy Charges for Today       Code Description Service Date Service Provider Modifiers Qty    [id number] HC ST EVAL ORAL PHARYNG SWALLOW 4 12/9/2023 Nilda Bright CCC-SLP GN 1                 CASSANDRA Hernandez  12/9/2023

## 2023-12-09 NOTE — PLAN OF CARE
Problem: Adult Inpatient Plan of Care  Goal: Plan of Care Review  Outcome: Ongoing, Progressing  Goal: Patient-Specific Goal (Individualized)  Outcome: Ongoing, Progressing  Goal: Absence of Hospital-Acquired Illness or Injury  Outcome: Ongoing, Progressing  Intervention: Identify and Manage Fall Risk  Recent Flowsheet Documentation  Taken 12/9/2023 0426 by Ravi Rainey RN  Safety Promotion/Fall Prevention: safety round/check completed  Taken 12/9/2023 0213 by Ravi Rainey RN  Safety Promotion/Fall Prevention: safety round/check completed  Taken 12/9/2023 0019 by Ravi Rainey RN  Safety Promotion/Fall Prevention: safety round/check completed  Taken 12/8/2023 2221 by Ravi Rainey RN  Safety Promotion/Fall Prevention: safety round/check completed  Taken 12/8/2023 2040 by Ravi Rainey RN  Safety Promotion/Fall Prevention:   activity supervised   assistive device/personal items within reach   clutter free environment maintained   fall prevention program maintained   safety round/check completed  Intervention: Prevent Skin Injury  Recent Flowsheet Documentation  Taken 12/9/2023 0426 by Ravi Rainey RN  Body Position: supine, legs elevated  Taken 12/9/2023 0213 by Ravi Rainey RN  Body Position:   supine, legs elevated   position changed independently  Taken 12/9/2023 0019 by Ravi Rainey RN  Body Position:   tilted   left   position changed independently  Taken 12/8/2023 2221 by Ravi Rainey RN  Body Position: position changed independently  Taken 12/8/2023 2040 by Ravi Rainey RN  Body Position: position changed independently  Skin Protection: adhesive use limited  Intervention: Prevent and Manage VTE (Venous Thromboembolism) Risk  Recent Flowsheet Documentation  Taken 12/9/2023 0213 by Ravi Rainey RN  Activity Management: bedrest  Taken 12/8/2023 2040 by Ravi Rainey RN  Activity Management: activity encouraged  VTE Prevention/Management:   bilateral   sequential compression  devices on  Intervention: Prevent Infection  Recent Flowsheet Documentation  Taken 12/9/2023 0019 by Ravi Rainey RN  Infection Prevention: equipment surfaces disinfected  Taken 12/8/2023 2040 by Ravi Rainey RN  Infection Prevention: equipment surfaces disinfected  Goal: Optimal Comfort and Wellbeing  Outcome: Ongoing, Progressing  Intervention: Monitor Pain and Promote Comfort  Recent Flowsheet Documentation  Taken 12/8/2023 2040 by Ravi Rainey RN  Pain Management Interventions:   care clustered   pillow support provided   position adjusted  Intervention: Provide Person-Centered Care  Recent Flowsheet Documentation  Taken 12/8/2023 2040 by Ravi Rainey RN  Trust Relationship/Rapport:   choices provided   care explained   questions answered   questions encouraged   thoughts/feelings acknowledged  Goal: Readiness for Transition of Care  Outcome: Ongoing, Progressing     Problem: Diabetes Comorbidity  Goal: Blood Glucose Level Within Targeted Range  Outcome: Ongoing, Progressing     Problem: Hypertension Comorbidity  Goal: Blood Pressure in Desired Range  Outcome: Ongoing, Progressing     Problem: Seizure Disorder Comorbidity  Goal: Maintenance of Seizure Control  Outcome: Ongoing, Progressing     Problem: Skin Injury Risk Increased  Goal: Skin Health and Integrity  Outcome: Ongoing, Progressing  Intervention: Optimize Skin Protection  Recent Flowsheet Documentation  Taken 12/9/2023 0426 by Ravi Rainey RN  Head of Bed (HOB) Positioning: HOB at 20 degrees  Taken 12/9/2023 0213 by Ravi Rainey RN  Head of Bed (HOB) Positioning: HOB at 20 degrees  Taken 12/9/2023 0019 by Ravi Rainey RN  Head of Bed (HOB) Positioning: HOB at 20-30 degrees  Taken 12/8/2023 2221 by Ravi Rainey RN  Head of Bed (HOB) Positioning: HOB at 20-30 degrees  Taken 12/8/2023 2040 by Ravi Rainey RN  Pressure Reduction Techniques: frequent weight shift encouraged  Head of Bed (HOB) Positioning: HOB at 20-30  degrees  Pressure Reduction Devices: alternating pressure pump (ADD)  Skin Protection: adhesive use limited     Problem: Fall Injury Risk  Goal: Absence of Fall and Fall-Related Injury  Outcome: Ongoing, Progressing  Intervention: Promote Injury-Free Environment  Recent Flowsheet Documentation  Taken 12/9/2023 0426 by Ravi Rainey RN  Safety Promotion/Fall Prevention: safety round/check completed  Taken 12/9/2023 0213 by Ravi Rainey RN  Safety Promotion/Fall Prevention: safety round/check completed  Taken 12/9/2023 0019 by Ravi Rainey RN  Safety Promotion/Fall Prevention: safety round/check completed  Taken 12/8/2023 2221 by Ravi Rainey RN  Safety Promotion/Fall Prevention: safety round/check completed  Taken 12/8/2023 2040 by Ravi Rainey RN  Safety Promotion/Fall Prevention:   activity supervised   assistive device/personal items within reach   clutter free environment maintained   fall prevention program maintained   safety round/check completed     Problem: Hypertension Acute  Goal: Blood Pressure Within Desired Range  Outcome: Ongoing, Progressing   Goal Outcome Evaluation:

## 2023-12-09 NOTE — H&P
HISTORY AND PHYSICAL   [facility]        Date of Admission: 2023  Patient Identification:  Name: Lucia Ellis  Age: 73 y.o.  Sex: female  :  1950  MRN: [id number]                     Primary Care Physician: Everardo Mazariegos MD    Chief Complaint:  73 year old female who presented to the emergency room with difficulty walking for the last two days; she has had left sided weakness and some difficulty keeping her balance; no change is speech or vision; she is somewhat forgetful but has a history of dementia; no family is present at this time but a son was with her earlier    History of Present Illness:   As above    Past Medical History:  Past Medical History:   Diagnosis Date    Breast cancer     Dementia     Diabetes mellitus     Hypertension     Memory loss      Past Surgical History:  Past Surgical History:   Procedure Laterality Date    CHOLECYSTECTOMY      HYSTERECTOMY      MASTECTOMY Right 1995    TOTAL KNEE ARTHROPLASTY Right       Home Meds:  Medications Prior to Admission   Medication Sig Dispense Refill Last Dose    amLODIPine (NORVASC) 10 MG tablet Take 1 tablet by mouth Daily. 30 tablet 0 2023    aspirin 81 MG EC tablet Take 1 tablet by mouth Daily. 30 tablet 0 2023    donepezil (Aricept) 10 MG tablet Take 1 tablet by mouth Daily. 90 tablet 1 Past Week    empagliflozin (Jardiance) 10 MG tablet tablet Take 1 tablet by mouth Daily. Indications: Type 2 Diabetes 30 tablet 11 2023    escitalopram (Lexapro) 10 MG tablet Take 1 tablet by mouth Daily. Indications: Major Depressive Disorder 90 tablet 1 2023    memantine (Namenda) 5 MG tablet Take 1 tablet by mouth 2 (Two) Times a Day. Indications: Lewy Body Dementia 180 tablet 1 2023    pravastatin (PRAVACHOL) 10 MG tablet Take 1 tablet by mouth Every Night. Indications: High Amount of Fats in the Blood 90 tablet 1 2023    quinapril (ACCUPRIL) 40 MG tablet Take 1 tablet by mouth Daily. 30 tablet 0  12/7/2023    SITagliptin-metFORMIN HCl ER (Janumet XR)  MG tablet Take 1 tablet by mouth Daily. Indications: Type 2 Diabetes 30 tablet 11 12/7/2023    cetirizine (zyrTEC) 10 MG tablet Take 1 tablet by mouth Daily. 30 tablet 6 More than a month    Continuous Blood Gluc  (FreeStyle Aleyda 2 [place]) device 1 each Continuous. 1 each 0     Continuous Blood Gluc Sensor (FreeStyle Aleyda 2 Sensor) misc 1 each 1 (One) Time Per Week. 2 each 11     levETIRAcetam (KEPPRA) 1000 MG tablet Take 1 tablet by mouth 2 (Two) Times a Day. 60 tablet 0 Unknown    meloxicam (MOBIC) 15 MG tablet Take 1 tablet by mouth Daily. 30 tablet 0 Unknown       Allergies:  Allergies   Allergen Reactions    Ppd [Tuberculin Purified Protein Derivative] Rash     Immunizations:  Immunization History   Administered Date(s) Administered    COVID-19 (MODERNA) 1st,2nd,3rd Dose Monovalent 02/11/2021, 03/10/2021    COVID-19 (PFIZER) BIVALENT 12+YRS 10/11/2022    Covid-19 (Pfizer) Gray Cap Monovalent 05/01/2022    Fluzone High Dose =>65 Years ([employer] ONLY) 10/19/2017, 09/29/2020    Fluzone High-Dose 65+yrs 09/29/2020, 12/02/2022    Pneumococcal Conjugate 13-Valent (PCV13) 10/01/2017    Tdap 10/01/2017     Social History:   Social History     Social History Narrative    Not on file     Social History     Socioeconomic History    Marital status:    Tobacco Use    Smoking status: Never    Smokeless tobacco: Never   Vaping Use    Vaping Use: Never used   Substance and Sexual Activity    Alcohol use: Yes     Alcohol/week: 7.0 standard drinks of alcohol     Types: 7 Glasses of wine per week     Comment: per patient's son, patient drinks one glass of wine daily    Drug use: No    Sexual activity: Never       Family History:  Family History   Problem Relation Age of Onset    Heart attack Mother     Heart disease Mother     Hypertension Mother     Hypertension Father     Diabetes Father     Hypertension Sister     Diabetes Sister     Thyroid cancer  "Sister     Breast cancer Sister     Lung cancer Sister     Diabetes Brother     Drug abuse Paternal Grandmother         Review of Systems  Not obtainable from the patient    Objective:  T Max 24 hrs: Temp (24hrs), Av.9 °F (36.6 °C), Min:97.9 °F (36.6 °C), Max:97.9 °F (36.6 °C)    Vitals Ranges:   Temp:  [97.9 °F (36.6 °C)] 97.9 °F (36.6 °C)  Heart Rate:  [70-88] 77  Resp:  [16-18] 18  BP: (153-204)/() 160/83      Exam:  /83 (BP Location: Right arm, Patient Position: Lying)   Pulse 77   Temp 97.9 °F (36.6 °C) (Oral)   Resp 18   Ht 162.6 cm (64.02\")   Wt 57.2 kg (126 lb)   SpO2 100%   BMI 21.61 kg/m²     General Appearance:    Alert, cooperative, no distress, appears stated age; thin, frail   Head:    Normocephalic, without obvious abnormality, atraumatic; bitemporal wasting   Eyes:    PERRL, conjunctivae/corneas clear, EOM's intact, both eyes   Ears:    Normal external ear canals, both ears   Nose:   Nares normal, septum midline, mucosa normal, no drainage    or sinus tenderness   Throat:   Lips, mucosa, and tongue normal   Neck:   Supple, symmetrical, trachea midline, no adenopathy;     thyroid:  no enlargement/tenderness/nodules; no carotid    bruit or JVD   Back:     Symmetric, no curvature, ROM normal, no CVA tenderness   Lungs:     Clear to auscultation bilaterally, respirations unlabored   Chest Wall:    No tenderness or deformity    Heart:    Regular rate and rhythm, S1 and S2 normal, no murmur, rub   or gallop   Abdomen:     Soft, nontender, bowel sounds active all four quadrants,     no masses, no hepatomegaly, no splenomegaly   Extremities:   Extremities normal, atraumatic, no cyanosis or edema                       .    Data Review:  Labs in chart were reviewed.  WBC   Date Value Ref Range Status   2023 6.71 3.40 - 10.80 10*3/mm3 Final     Hemoglobin   Date Value Ref Range Status   2023 13.4 12.0 - 15.9 g/dL Final     Hematocrit   Date Value Ref Range Status   2023 " 41.1 34.0 - 46.6 % Final     Platelets   Date Value Ref Range Status   12/08/2023 216 140 - 450 10*3/mm3 Final     Sodium   Date Value Ref Range Status   12/08/2023 140 136 - 145 mmol/L Final     Potassium   Date Value Ref Range Status   12/08/2023 3.2 (L) 3.5 - 5.2 mmol/L Final     Chloride   Date Value Ref Range Status   12/08/2023 105 98 - 107 mmol/L Final     CO2   Date Value Ref Range Status   12/08/2023 23.8 22.0 - 29.0 mmol/L Final     BUN   Date Value Ref Range Status   12/08/2023 17 8 - 23 mg/dL Final     Creatinine   Date Value Ref Range Status   12/08/2023 0.58 0.57 - 1.00 mg/dL Final     Glucose   Date Value Ref Range Status   12/08/2023 127 (H) 65 - 99 mg/dL Final     Calcium   Date Value Ref Range Status   12/08/2023 9.1 8.6 - 10.5 mg/dL Final     AST (SGOT)   Date Value Ref Range Status   12/08/2023 32 1 - 32 U/L Final     ALT (SGPT)   Date Value Ref Range Status   12/08/2023 15 1 - 33 U/L Final     Alkaline Phosphatase   Date Value Ref Range Status   12/08/2023 86 39 - 117 U/L Final                Imaging Results (All)       Procedure Component Value Units Date/Time    CT Angiogram Neck [[id number]] Collected: 12/08/23 1612     Updated: 12/08/23 1630    Narrative:      CT ANGIOGRAM NECK AND HEAD WITH CONTRAST AND CT PERFUSION WITH CONTRAST     HISTORY: Left-sided weakness.     COMPARISON: CT head 12/08/2023 performed at 1336 hours, MRI brain  12/30/2022 and CT head 12/29/2022.     Initially, a noncontrasted CT examination of the brain was performed.  There is no evidence of hemorrhage. Moderate to severe vascular  calcification and moderate to severe small vessel ischemic disease is  appreciated. A lacunar infarct involving the anterior limb of the  internal capsule on the right is appreciated which was present on the CT  examination of 12/29/2022.     CT PERFUSION: There is no evidence of abnormal perfusion.     CT ANGIOGRAM OF THE NECK AND HEAD: A CT angiogram of the neck and head  was performed.  Multiplanar as well as three-dimensional reconstructions  were generated.     There is a 12 mm mildly ovoid nodule appreciated involving the right  upper lobe superiorly and medially. There is pleural base soft tissue  thickening also appreciated involving the right upper lobe anterior  laterally measuring approximately 12 x 6 x 15 mm in size.     The great vessels are arranged in a bovine configuration. There is 0%  stenosis of the internal carotid arteries using NASCET criteria. Mild  irregularity involving the cavernous and supraclinoid ICA is appreciated  bilaterally but without high-grade stenosis. The right A1 segment is  hypoplastic. The proximal aspects of the anterior and middle cerebral  arteries appear unremarkable.     Both vertebral arteries were opacified. Mild stenosis is appreciated  involving the origin of the right vertebral artery. The origin of the  left vertebral artery is obscured by adjacent high attenuation venous  contrast and is associated streak artifact. A mild stenosis cannot be  excluded. The cervical segments of the vertebral arteries are of  relatively uniform caliber. The basilar artery appears unremarkable.  There is a mild to moderate stenosis involving the right P1-P2 junction.  The left posterior cerebral artery appears unremarkable.     A standard postcontrast CT examination of the brain shows no evidence of  abnormal enhancement.       Impression:      1.  There is no evidence of acute infarction, intracranial hemorrhage,  hydrocephalus or of abnormal enhancement. Small vessel ischemic disease,  vascular calcification and a lacunar infarct involving the anterior limb  of the internal capsule on the right is noted.  2.  There is a mild to moderate stenosis involving the right P1-P2  junction. There is no evidence of a proximal intracranial occlusion or  focal severe stenosis. There is 0% stenosis involving the internal  carotid arteries using NASCET criteria. Mild stenosis  involving the  origin of the right vertebral artery is appreciated. The left vertebral  artery origin is partially obscured by beam hardening artifact from  adjacent dense venous contrast.  3.  There is a 12 mm nodule involving the right upper lobe medially  suspicious for metastatic disease. A nonspecific but suspicious  pleural-based soft tissue mass is appreciated involving the right upper  lobe anterolaterally measuring approximately 12 x 6 x 15 mm in size. A  dedicated CT examination of the chest is recommended.     The noncontrasted CT examination of the brain was made available for  interpretation at 1513 hours and a preliminary report called at 1514  hours.  The CT angiogram was made available for interpretation at 1522 hours and  a preliminary report called at 1526 hours.              AI analysis of LVO was utilized.     Radiation dose reduction techniques were utilized, including automated  exposure control and exposure modulation based on body size.        This report was finalized on 12/8/2023 4:27 PM by Dr. Guille Mcnamara M.D  on Workstation: [id number]       CT CEREBRAL PERFUSION WITH & WITHOUT CONTRAST [[id number]] Collected: 12/08/23 1612     Updated: 12/08/23 1630    Narrative:      CT ANGIOGRAM NECK AND HEAD WITH CONTRAST AND CT PERFUSION WITH CONTRAST     HISTORY: Left-sided weakness.     COMPARISON: CT head 12/08/2023 performed at 1336 hours, MRI brain  12/30/2022 and CT head 12/29/2022.     Initially, a noncontrasted CT examination of the brain was performed.  There is no evidence of hemorrhage. Moderate to severe vascular  calcification and moderate to severe small vessel ischemic disease is  appreciated. A lacunar infarct involving the anterior limb of the  internal capsule on the right is appreciated which was present on the CT  examination of 12/29/2022.     CT PERFUSION: There is no evidence of abnormal perfusion.     CT ANGIOGRAM OF THE NECK AND HEAD: A CT angiogram of the neck and head  was  performed. Multiplanar as well as three-dimensional reconstructions  were generated.     There is a 12 mm mildly ovoid nodule appreciated involving the right  upper lobe superiorly and medially. There is pleural base soft tissue  thickening also appreciated involving the right upper lobe anterior  laterally measuring approximately 12 x 6 x 15 mm in size.     The great vessels are arranged in a bovine configuration. There is 0%  stenosis of the internal carotid arteries using NASCET criteria. Mild  irregularity involving the cavernous and supraclinoid ICA is appreciated  bilaterally but without high-grade stenosis. The right A1 segment is  hypoplastic. The proximal aspects of the anterior and middle cerebral  arteries appear unremarkable.     Both vertebral arteries were opacified. Mild stenosis is appreciated  involving the origin of the right vertebral artery. The origin of the  left vertebral artery is obscured by adjacent high attenuation venous  contrast and is associated streak artifact. A mild stenosis cannot be  excluded. The cervical segments of the vertebral arteries are of  relatively uniform caliber. The basilar artery appears unremarkable.  There is a mild to moderate stenosis involving the right P1-P2 junction.  The left posterior cerebral artery appears unremarkable.     A standard postcontrast CT examination of the brain shows no evidence of  abnormal enhancement.       Impression:      1.  There is no evidence of acute infarction, intracranial hemorrhage,  hydrocephalus or of abnormal enhancement. Small vessel ischemic disease,  vascular calcification and a lacunar infarct involving the anterior limb  of the internal capsule on the right is noted.  2.  There is a mild to moderate stenosis involving the right P1-P2  junction. There is no evidence of a proximal intracranial occlusion or  focal severe stenosis. There is 0% stenosis involving the internal  carotid arteries using NASCET criteria. Mild  stenosis involving the  origin of the right vertebral artery is appreciated. The left vertebral  artery origin is partially obscured by beam hardening artifact from  adjacent dense venous contrast.  3.  There is a 12 mm nodule involving the right upper lobe medially  suspicious for metastatic disease. A nonspecific but suspicious  pleural-based soft tissue mass is appreciated involving the right upper  lobe anterolaterally measuring approximately 12 x 6 x 15 mm in size. A  dedicated CT examination of the chest is recommended.     The noncontrasted CT examination of the brain was made available for  interpretation at 1513 hours and a preliminary report called at 1514  hours.  The CT angiogram was made available for interpretation at 1522 hours and  a preliminary report called at 1526 hours.              AI analysis of LVO was utilized.     Radiation dose reduction techniques were utilized, including automated  exposure control and exposure modulation based on body size.        This report was finalized on 12/8/2023 4:27 PM by Dr. Guille Mcnamara M.D  on Workstation: [id number]       CT Angiogram Head w AI Analysis of LVO [[id number]] Collected: 12/08/23 1612     Updated: 12/08/23 1630    Narrative:      CT ANGIOGRAM NECK AND HEAD WITH CONTRAST AND CT PERFUSION WITH CONTRAST     HISTORY: Left-sided weakness.     COMPARISON: CT head 12/08/2023 performed at 1336 hours, MRI brain  12/30/2022 and CT head 12/29/2022.     Initially, a noncontrasted CT examination of the brain was performed.  There is no evidence of hemorrhage. Moderate to severe vascular  calcification and moderate to severe small vessel ischemic disease is  appreciated. A lacunar infarct involving the anterior limb of the  internal capsule on the right is appreciated which was present on the CT  examination of 12/29/2022.     CT PERFUSION: There is no evidence of abnormal perfusion.     CT ANGIOGRAM OF THE NECK AND HEAD: A CT angiogram of the neck and  head  was performed. Multiplanar as well as three-dimensional reconstructions  were generated.     There is a 12 mm mildly ovoid nodule appreciated involving the right  upper lobe superiorly and medially. There is pleural base soft tissue  thickening also appreciated involving the right upper lobe anterior  laterally measuring approximately 12 x 6 x 15 mm in size.     The great vessels are arranged in a bovine configuration. There is 0%  stenosis of the internal carotid arteries using NASCET criteria. Mild  irregularity involving the cavernous and supraclinoid ICA is appreciated  bilaterally but without high-grade stenosis. The right A1 segment is  hypoplastic. The proximal aspects of the anterior and middle cerebral  arteries appear unremarkable.     Both vertebral arteries were opacified. Mild stenosis is appreciated  involving the origin of the right vertebral artery. The origin of the  left vertebral artery is obscured by adjacent high attenuation venous  contrast and is associated streak artifact. A mild stenosis cannot be  excluded. The cervical segments of the vertebral arteries are of  relatively uniform caliber. The basilar artery appears unremarkable.  There is a mild to moderate stenosis involving the right P1-P2 junction.  The left posterior cerebral artery appears unremarkable.     A standard postcontrast CT examination of the brain shows no evidence of  abnormal enhancement.       Impression:      1.  There is no evidence of acute infarction, intracranial hemorrhage,  hydrocephalus or of abnormal enhancement. Small vessel ischemic disease,  vascular calcification and a lacunar infarct involving the anterior limb  of the internal capsule on the right is noted.  2.  There is a mild to moderate stenosis involving the right P1-P2  junction. There is no evidence of a proximal intracranial occlusion or  focal severe stenosis. There is 0% stenosis involving the internal  carotid arteries using NASCET  criteria. Mild stenosis involving the  origin of the right vertebral artery is appreciated. The left vertebral  artery origin is partially obscured by beam hardening artifact from  adjacent dense venous contrast.  3.  There is a 12 mm nodule involving the right upper lobe medially  suspicious for metastatic disease. A nonspecific but suspicious  pleural-based soft tissue mass is appreciated involving the right upper  lobe anterolaterally measuring approximately 12 x 6 x 15 mm in size. A  dedicated CT examination of the chest is recommended.     The noncontrasted CT examination of the brain was made available for  interpretation at 1513 hours and a preliminary report called at 1514  hours.  The CT angiogram was made available for interpretation at 1522 hours and  a preliminary report called at 1526 hours.              AI analysis of LVO was utilized.     Radiation dose reduction techniques were utilized, including automated  exposure control and exposure modulation based on body size.        This report was finalized on 12/8/2023 4:27 PM by Dr. Guille Mcnamara M.D  on Workstation: [id number]       CT Head Without Contrast [[id number]] Collected: 12/08/23 1411     Updated: 12/08/23 1501    Narrative:      EMERGENCY CT SCAN OF THE HEAD WITHOUT CONTRAST ON 12/08/2023     CLINICAL HISTORY: Stroke-like symptoms. The patient has weakness, with  bilateral leg weakness and abnormal gait.     TECHNIQUE: Spiral CT images were obtained from the base of the skull to  the vertex without intravenous contrast. The images were reformatted and  are submitted in 3 mm thick axial, sagittal and coronal CT sections with  brain algorithm and 2 mm thick axial CT sections with high-resolution  bone algorithm.      This is correlated to a prior MRI of the brain from [facility] on 05/07/2021 and prior head CT on 12/29/2022.     FINDINGS: There are patchy areas of low-density in the periventricular  and subcortical white matter of  the cerebral hemispheres consistent with  moderate small vessel disease. The remainder of the brain parenchyma is  normal in attenuation. The ventricles are normal in size. I see no focal  mass effect. There is no midline shift. No extra-axial fluid collections  are identified. There is no evidence of acute intracranial hemorrhage.  The paranasal sinuses and the mastoid air cells and the middle ear  cavities are clear. Calcified plaques are present in the intracranial  segments of the distal vertebral arteries and cavernous segments of the  internal carotid arteries bilaterally.       Impression:      1. No acute intracranial abnormality is identified.   2. There is moderate small vessel disease in the cerebral white matter  and mild cerebral atrophy. There is an old lacunar infarct extending  from the anterior limb of the right internal capsule into the anterior  right putamen measuring 12 x 6 mm, unchanged. The remainder of the head  CT is within normal limits. The etiology of the patient's bilateral leg  weakness and abnormal gait is not established on this exam. If there  remains any clinical suspicion of acute stroke I recommend an MRI of the  brain for more complete assessment.      Radiation dose reduction techniques were utilized, including automated  exposure control and exposure modulation based on body size.        This report was finalized on 12/8/2023 2:58 PM by Dr. Scottie Juarez M.D  on Workstation: [id number]                 Assessment:  Active Hospital Problems    Diagnosis  POA    **Stroke-like symptoms [R29.90]  Yes      Resolved Hospital Problems   No resolved problems to display.   Diabetes  Hypertension  Hypokalemia  Hyperlipidemia  Dementia  underweight    Plan:  Will replace potassium  Change keppra to iv  Npo for now due to left sided weakness, droop  Stroke workup  Accu checks, insulin sliding scale  Dw patient and ed provider    Cammie Mi MD  12/8/2023  19:38 EST

## 2023-12-09 NOTE — PLAN OF CARE
Goal Outcome Evaluation:  Plan of Care Reviewed With: patient, family        Progress: no change     No new neuro deficits noted or reported

## 2023-12-09 NOTE — PAYOR COMM NOTE
"Randi Swanson (73 y.o. Female)     ATTN: NURSE REVIEWER  RE: INITIAL INPT AUTH CLINICALS   REF# [id number]  PLS REPLY TO [place] [facility]EN [phone] OR FAX# [phone]      Date of Birth   1950    Social Security Number       Address   [address] [place] [address]    Home Phone   [phone]    MRN   [id number]       Zoroastrianism   Alevism    Marital Status                               Admission Date   12/8/23    Admission Type   Emergency    Admitting Provider   Cammie Mi MD    Attending Provider   Scottie Noe MD    Department, Room/Bed   [facility] [address], P596/1       Discharge Date       Discharge Disposition       Discharge Destination                                 Attending Provider: Scottie Noe MD    Allergies: Ppd [Tuberculin Purified Protein Derivative]    Isolation: None   Infection: None   Code Status: CPR    Ht: 162.6 cm (64.02\")   Wt: 57.2 kg (126 lb)    Admission Cmt: None   Principal Problem: CVA (cerebral vascular accident) [I63.9]                   Active Insurance as of 12/8/2023       Primary Coverage       Payor Plan Insurance Group Employer/Plan Group    HUMANA MEDICARE REPLACEMENT HUMANA [facility] HMO SNP MEDICARE REPLACEMENT NON PAR [id number]       Payor Plan Address Payor Plan Phone Number Payor Plan Fax Number Effective Dates       3/1/2023 - None Entered      Subscriber Name Subscriber Birth Date Member ID       RANDI SWANSON 1950 [id number]                     Emergency Contacts        (Rel.) Home Phone Work Phone Mobile Phone    Jessica Recinos (Sister) [phone] -- --    Vern Munoz (Son) [phone]    Jj Recinos (nephew) (Relative) [phone] -- --                 History & Physical        Cammie Mi MD at 12/08/23 1938          HISTORY AND PHYSICAL   [facility]        Date of Admission: 12/8/2023  Patient " Identification:  Name: Lucia Ellis  Age: 73 y.o.  Sex: female  :  1950  MRN: [id number]                     Primary Care Physician: Everardo Mazariegos MD    Chief Complaint:  73 year old female who presented to the emergency room with difficulty walking for the last two days; she has had left sided weakness and some difficulty keeping her balance; no change is speech or vision; she is somewhat forgetful but has a history of dementia; no family is present at this time but a son was with her earlier    History of Present Illness:   As above    Past Medical History:  Past Medical History:   Diagnosis Date    Breast cancer     Dementia     Diabetes mellitus     Hypertension     Memory loss      Past Surgical History:  Past Surgical History:   Procedure Laterality Date    CHOLECYSTECTOMY      HYSTERECTOMY      MASTECTOMY Right 1995    TOTAL KNEE ARTHROPLASTY Right       Home Meds:  Medications Prior to Admission   Medication Sig Dispense Refill Last Dose    amLODIPine (NORVASC) 10 MG tablet Take 1 tablet by mouth Daily. 30 tablet 0 2023    aspirin 81 MG EC tablet Take 1 tablet by mouth Daily. 30 tablet 0 2023    donepezil (Aricept) 10 MG tablet Take 1 tablet by mouth Daily. 90 tablet 1 Past Week    empagliflozin (Jardiance) 10 MG tablet tablet Take 1 tablet by mouth Daily. Indications: Type 2 Diabetes 30 tablet 11 2023    escitalopram (Lexapro) 10 MG tablet Take 1 tablet by mouth Daily. Indications: Major Depressive Disorder 90 tablet 1 2023    memantine (Namenda) 5 MG tablet Take 1 tablet by mouth 2 (Two) Times a Day. Indications: Lewy Body Dementia 180 tablet 1 2023    pravastatin (PRAVACHOL) 10 MG tablet Take 1 tablet by mouth Every Night. Indications: High Amount of Fats in the Blood 90 tablet 1 2023    quinapril (ACCUPRIL) 40 MG tablet Take 1 tablet by mouth Daily. 30 tablet 0 2023    SITagliptin-metFORMIN HCl ER (Janumet XR)  MG tablet Take 1 tablet by mouth Daily.  Indications: Type 2 Diabetes 30 tablet 11 12/7/2023    cetirizine (zyrTEC) 10 MG tablet Take 1 tablet by mouth Daily. 30 tablet 6 More than a month    Continuous Blood Gluc  (FreeStyle Aleyda 2 Hope) device 1 each Continuous. 1 each 0     Continuous Blood Gluc Sensor (FreeStyle Aleyda 2 Sensor) misc 1 each 1 (One) Time Per Week. 2 each 11     levETIRAcetam (KEPPRA) 1000 MG tablet Take 1 tablet by mouth 2 (Two) Times a Day. 60 tablet 0 Unknown    meloxicam (MOBIC) 15 MG tablet Take 1 tablet by mouth Daily. 30 tablet 0 Unknown       Allergies:  Allergies   Allergen Reactions    Ppd [Tuberculin Purified Protein Derivative] Rash     Immunizations:  Immunization History   Administered Date(s) Administered    COVID-19 (MODERNA) 1st,2nd,3rd Dose Monovalent 02/11/2021, 03/10/2021    COVID-19 (PFIZER) BIVALENT 12+YRS 10/11/2022    Covid-19 (Pfizer) Gray Cap Monovalent 05/01/2022    Fluzone High Dose =>65 Years ([employer] ONLY) 10/19/2017, 09/29/2020    Fluzone High-Dose 65+yrs 09/29/2020, 12/02/2022    Pneumococcal Conjugate 13-Valent (PCV13) 10/01/2017    Tdap 10/01/2017     Social History:   Social History     Social History Narrative    Not on file     Social History     Socioeconomic History    Marital status:    Tobacco Use    Smoking status: Never    Smokeless tobacco: Never   Vaping Use    Vaping Use: Never used   Substance and Sexual Activity    Alcohol use: Yes     Alcohol/week: 7.0 standard drinks of alcohol     Types: 7 Glasses of wine per week     Comment: per patient's son, patient drinks one glass of wine daily    Drug use: No    Sexual activity: Never       Family History:  Family History   Problem Relation Age of Onset    Heart attack Mother     Heart disease Mother     Hypertension Mother     Hypertension Father     Diabetes Father     Hypertension Sister     Diabetes Sister     Thyroid cancer Sister     Breast cancer Sister     Lung cancer Sister     Diabetes Brother     Drug abuse Paternal  "Grandmother         Review of Systems  Not obtainable from the patient    Objective:  T Max 24 hrs: Temp (24hrs), Av.9 °F (36.6 °C), Min:97.9 °F (36.6 °C), Max:97.9 °F (36.6 °C)    Vitals Ranges:   Temp:  [97.9 °F (36.6 °C)] 97.9 °F (36.6 °C)  Heart Rate:  [70-88] 77  Resp:  [16-18] 18  BP: (153-204)/() 160/83      Exam:  /83 (BP Location: Right arm, Patient Position: Lying)   Pulse 77   Temp 97.9 °F (36.6 °C) (Oral)   Resp 18   Ht 162.6 cm (64.02\")   Wt 57.2 kg (126 lb)   SpO2 100%   BMI 21.61 kg/m²     General Appearance:    Alert, cooperative, no distress, appears stated age; thin, frail   Head:    Normocephalic, without obvious abnormality, atraumatic; bitemporal wasting   Eyes:    PERRL, conjunctivae/corneas clear, EOM's intact, both eyes   Ears:    Normal external ear canals, both ears   Nose:   Nares normal, septum midline, mucosa normal, no drainage    or sinus tenderness   Throat:   Lips, mucosa, and tongue normal   Neck:   Supple, symmetrical, trachea midline, no adenopathy;     thyroid:  no enlargement/tenderness/nodules; no carotid    bruit or JVD   Back:     Symmetric, no curvature, ROM normal, no CVA tenderness   Lungs:     Clear to auscultation bilaterally, respirations unlabored   Chest Wall:    No tenderness or deformity    Heart:    Regular rate and rhythm, S1 and S2 normal, no murmur, rub   or gallop   Abdomen:     Soft, nontender, bowel sounds active all four quadrants,     no masses, no hepatomegaly, no splenomegaly   Extremities:   Extremities normal, atraumatic, no cyanosis or edema                       .    Data Review:  Labs in chart were reviewed.  WBC   Date Value Ref Range Status   2023 6.71 3.40 - 10.80 10*3/mm3 Final     Hemoglobin   Date Value Ref Range Status   2023 13.4 12.0 - 15.9 g/dL Final     Hematocrit   Date Value Ref Range Status   2023 41.1 34.0 - 46.6 % Final     Platelets   Date Value Ref Range Status   2023 216 140 - 450 " 10*3/mm3 Final     Sodium   Date Value Ref Range Status   12/08/2023 140 136 - 145 mmol/L Final     Potassium   Date Value Ref Range Status   12/08/2023 3.2 (L) 3.5 - 5.2 mmol/L Final     Chloride   Date Value Ref Range Status   12/08/2023 105 98 - 107 mmol/L Final     CO2   Date Value Ref Range Status   12/08/2023 23.8 22.0 - 29.0 mmol/L Final     BUN   Date Value Ref Range Status   12/08/2023 17 8 - 23 mg/dL Final     Creatinine   Date Value Ref Range Status   12/08/2023 0.58 0.57 - 1.00 mg/dL Final     Glucose   Date Value Ref Range Status   12/08/2023 127 (H) 65 - 99 mg/dL Final     Calcium   Date Value Ref Range Status   12/08/2023 9.1 8.6 - 10.5 mg/dL Final     AST (SGOT)   Date Value Ref Range Status   12/08/2023 32 1 - 32 U/L Final     ALT (SGPT)   Date Value Ref Range Status   12/08/2023 15 1 - 33 U/L Final     Alkaline Phosphatase   Date Value Ref Range Status   12/08/2023 86 39 - 117 U/L Final                Imaging Results (All)       Procedure Component Value Units Date/Time    CT Angiogram Neck [[id number]] Collected: 12/08/23 1612     Updated: 12/08/23 1630    Narrative:      CT ANGIOGRAM NECK AND HEAD WITH CONTRAST AND CT PERFUSION WITH CONTRAST     HISTORY: Left-sided weakness.     COMPARISON: CT head 12/08/2023 performed at 1336 hours, MRI brain  12/30/2022 and CT head 12/29/2022.     Initially, a noncontrasted CT examination of the brain was performed.  There is no evidence of hemorrhage. Moderate to severe vascular  calcification and moderate to severe small vessel ischemic disease is  appreciated. A lacunar infarct involving the anterior limb of the  internal capsule on the right is appreciated which was present on the CT  examination of 12/29/2022.     CT PERFUSION: There is no evidence of abnormal perfusion.     CT ANGIOGRAM OF THE NECK AND HEAD: A CT angiogram of the neck and head  was performed. Multiplanar as well as three-dimensional reconstructions  were generated.     There is a 12 mm  mildly ovoid nodule appreciated involving the right  upper lobe superiorly and medially. There is pleural base soft tissue  thickening also appreciated involving the right upper lobe anterior  laterally measuring approximately 12 x 6 x 15 mm in size.     The great vessels are arranged in a bovine configuration. There is 0%  stenosis of the internal carotid arteries using NASCET criteria. Mild  irregularity involving the cavernous and supraclinoid ICA is appreciated  bilaterally but without high-grade stenosis. The right A1 segment is  hypoplastic. The proximal aspects of the anterior and middle cerebral  arteries appear unremarkable.     Both vertebral arteries were opacified. Mild stenosis is appreciated  involving the origin of the right vertebral artery. The origin of the  left vertebral artery is obscured by adjacent high attenuation venous  contrast and is associated streak artifact. A mild stenosis cannot be  excluded. The cervical segments of the vertebral arteries are of  relatively uniform caliber. The basilar artery appears unremarkable.  There is a mild to moderate stenosis involving the right P1-P2 junction.  The left posterior cerebral artery appears unremarkable.     A standard postcontrast CT examination of the brain shows no evidence of  abnormal enhancement.       Impression:      1.  There is no evidence of acute infarction, intracranial hemorrhage,  hydrocephalus or of abnormal enhancement. Small vessel ischemic disease,  vascular calcification and a lacunar infarct involving the anterior limb  of the internal capsule on the right is noted.  2.  There is a mild to moderate stenosis involving the right P1-P2  junction. There is no evidence of a proximal intracranial occlusion or  focal severe stenosis. There is 0% stenosis involving the internal  carotid arteries using NASCET criteria. Mild stenosis involving the  origin of the right vertebral artery is appreciated. The left vertebral  artery  origin is partially obscured by beam hardening artifact from  adjacent dense venous contrast.  3.  There is a 12 mm nodule involving the right upper lobe medially  suspicious for metastatic disease. A nonspecific but suspicious  pleural-based soft tissue mass is appreciated involving the right upper  lobe anterolaterally measuring approximately 12 x 6 x 15 mm in size. A  dedicated CT examination of the chest is recommended.     The noncontrasted CT examination of the brain was made available for  interpretation at 1513 hours and a preliminary report called at 1514  hours.  The CT angiogram was made available for interpretation at 1522 hours and  a preliminary report called at 1526 hours.              AI analysis of LVO was utilized.     Radiation dose reduction techniques were utilized, including automated  exposure control and exposure modulation based on body size.        This report was finalized on 12/8/2023 4:27 PM by Dr. Guille Mcnamara M.D  on Workstation: [id number]       CT CEREBRAL PERFUSION WITH & WITHOUT CONTRAST [[id number]] Collected: 12/08/23 1612     Updated: 12/08/23 1630    Narrative:      CT ANGIOGRAM NECK AND HEAD WITH CONTRAST AND CT PERFUSION WITH CONTRAST     HISTORY: Left-sided weakness.     COMPARISON: CT head 12/08/2023 performed at 1336 hours, MRI brain  12/30/2022 and CT head 12/29/2022.     Initially, a noncontrasted CT examination of the brain was performed.  There is no evidence of hemorrhage. Moderate to severe vascular  calcification and moderate to severe small vessel ischemic disease is  appreciated. A lacunar infarct involving the anterior limb of the  internal capsule on the right is appreciated which was present on the CT  examination of 12/29/2022.     CT PERFUSION: There is no evidence of abnormal perfusion.     CT ANGIOGRAM OF THE NECK AND HEAD: A CT angiogram of the neck and head  was performed. Multiplanar as well as three-dimensional reconstructions  were generated.     There  is a 12 mm mildly ovoid nodule appreciated involving the right  upper lobe superiorly and medially. There is pleural base soft tissue  thickening also appreciated involving the right upper lobe anterior  laterally measuring approximately 12 x 6 x 15 mm in size.     The great vessels are arranged in a bovine configuration. There is 0%  stenosis of the internal carotid arteries using NASCET criteria. Mild  irregularity involving the cavernous and supraclinoid ICA is appreciated  bilaterally but without high-grade stenosis. The right A1 segment is  hypoplastic. The proximal aspects of the anterior and middle cerebral  arteries appear unremarkable.     Both vertebral arteries were opacified. Mild stenosis is appreciated  involving the origin of the right vertebral artery. The origin of the  left vertebral artery is obscured by adjacent high attenuation venous  contrast and is associated streak artifact. A mild stenosis cannot be  excluded. The cervical segments of the vertebral arteries are of  relatively uniform caliber. The basilar artery appears unremarkable.  There is a mild to moderate stenosis involving the right P1-P2 junction.  The left posterior cerebral artery appears unremarkable.     A standard postcontrast CT examination of the brain shows no evidence of  abnormal enhancement.       Impression:      1.  There is no evidence of acute infarction, intracranial hemorrhage,  hydrocephalus or of abnormal enhancement. Small vessel ischemic disease,  vascular calcification and a lacunar infarct involving the anterior limb  of the internal capsule on the right is noted.  2.  There is a mild to moderate stenosis involving the right P1-P2  junction. There is no evidence of a proximal intracranial occlusion or  focal severe stenosis. There is 0% stenosis involving the internal  carotid arteries using NASCET criteria. Mild stenosis involving the  origin of the right vertebral artery is appreciated. The left  vertebral  artery origin is partially obscured by beam hardening artifact from  adjacent dense venous contrast.  3.  There is a 12 mm nodule involving the right upper lobe medially  suspicious for metastatic disease. A nonspecific but suspicious  pleural-based soft tissue mass is appreciated involving the right upper  lobe anterolaterally measuring approximately 12 x 6 x 15 mm in size. A  dedicated CT examination of the chest is recommended.     The noncontrasted CT examination of the brain was made available for  interpretation at 1513 hours and a preliminary report called at 1514  hours.  The CT angiogram was made available for interpretation at 1522 hours and  a preliminary report called at 1526 hours.              AI analysis of LVO was utilized.     Radiation dose reduction techniques were utilized, including automated  exposure control and exposure modulation based on body size.        This report was finalized on 12/8/2023 4:27 PM by Dr. Guille Mcnamara M.D  on Workstation: [id number]       CT Angiogram Head w AI Analysis of LVO [[id number]] Collected: 12/08/23 1612     Updated: 12/08/23 1630    Narrative:      CT ANGIOGRAM NECK AND HEAD WITH CONTRAST AND CT PERFUSION WITH CONTRAST     HISTORY: Left-sided weakness.     COMPARISON: CT head 12/08/2023 performed at 1336 hours, MRI brain  12/30/2022 and CT head 12/29/2022.     Initially, a noncontrasted CT examination of the brain was performed.  There is no evidence of hemorrhage. Moderate to severe vascular  calcification and moderate to severe small vessel ischemic disease is  appreciated. A lacunar infarct involving the anterior limb of the  internal capsule on the right is appreciated which was present on the CT  examination of 12/29/2022.     CT PERFUSION: There is no evidence of abnormal perfusion.     CT ANGIOGRAM OF THE NECK AND HEAD: A CT angiogram of the neck and head  was performed. Multiplanar as well as three-dimensional reconstructions  were  generated.     There is a 12 mm mildly ovoid nodule appreciated involving the right  upper lobe superiorly and medially. There is pleural base soft tissue  thickening also appreciated involving the right upper lobe anterior  laterally measuring approximately 12 x 6 x 15 mm in size.     The great vessels are arranged in a bovine configuration. There is 0%  stenosis of the internal carotid arteries using NASCET criteria. Mild  irregularity involving the cavernous and supraclinoid ICA is appreciated  bilaterally but without high-grade stenosis. The right A1 segment is  hypoplastic. The proximal aspects of the anterior and middle cerebral  arteries appear unremarkable.     Both vertebral arteries were opacified. Mild stenosis is appreciated  involving the origin of the right vertebral artery. The origin of the  left vertebral artery is obscured by adjacent high attenuation venous  contrast and is associated streak artifact. A mild stenosis cannot be  excluded. The cervical segments of the vertebral arteries are of  relatively uniform caliber. The basilar artery appears unremarkable.  There is a mild to moderate stenosis involving the right P1-P2 junction.  The left posterior cerebral artery appears unremarkable.     A standard postcontrast CT examination of the brain shows no evidence of  abnormal enhancement.       Impression:      1.  There is no evidence of acute infarction, intracranial hemorrhage,  hydrocephalus or of abnormal enhancement. Small vessel ischemic disease,  vascular calcification and a lacunar infarct involving the anterior limb  of the internal capsule on the right is noted.  2.  There is a mild to moderate stenosis involving the right P1-P2  junction. There is no evidence of a proximal intracranial occlusion or  focal severe stenosis. There is 0% stenosis involving the internal  carotid arteries using NASCET criteria. Mild stenosis involving the  origin of the right vertebral artery is appreciated.  The left vertebral  artery origin is partially obscured by beam hardening artifact from  adjacent dense venous contrast.  3.  There is a 12 mm nodule involving the right upper lobe medially  suspicious for metastatic disease. A nonspecific but suspicious  pleural-based soft tissue mass is appreciated involving the right upper  lobe anterolaterally measuring approximately 12 x 6 x 15 mm in size. A  dedicated CT examination of the chest is recommended.     The noncontrasted CT examination of the brain was made available for  interpretation at 1513 hours and a preliminary report called at 1514  hours.  The CT angiogram was made available for interpretation at 1522 hours and  a preliminary report called at 1526 hours.              AI analysis of LVO was utilized.     Radiation dose reduction techniques were utilized, including automated  exposure control and exposure modulation based on body size.        This report was finalized on 12/8/2023 4:27 PM by Dr. Guille Mcnamara M.D  on Workstation: [id number]       CT Head Without Contrast [[id number]] Collected: 12/08/23 1411     Updated: 12/08/23 1501    Narrative:      EMERGENCY CT SCAN OF THE HEAD WITHOUT CONTRAST ON 12/08/2023     CLINICAL HISTORY: Stroke-like symptoms. The patient has weakness, with  bilateral leg weakness and abnormal gait.     TECHNIQUE: Spiral CT images were obtained from the base of the skull to  the vertex without intravenous contrast. The images were reformatted and  are submitted in 3 mm thick axial, sagittal and coronal CT sections with  brain algorithm and 2 mm thick axial CT sections with high-resolution  bone algorithm.      This is correlated to a prior MRI of the brain from [facility] on 05/07/2021 and prior head CT on 12/29/2022.     FINDINGS: There are patchy areas of low-density in the periventricular  and subcortical white matter of the cerebral hemispheres consistent with  moderate small vessel disease. The remainder of  the brain parenchyma is  normal in attenuation. The ventricles are normal in size. I see no focal  mass effect. There is no midline shift. No extra-axial fluid collections  are identified. There is no evidence of acute intracranial hemorrhage.  The paranasal sinuses and the mastoid air cells and the middle ear  cavities are clear. Calcified plaques are present in the intracranial  segments of the distal vertebral arteries and cavernous segments of the  internal carotid arteries bilaterally.       Impression:      1. No acute intracranial abnormality is identified.   2. There is moderate small vessel disease in the cerebral white matter  and mild cerebral atrophy. There is an old lacunar infarct extending  from the anterior limb of the right internal capsule into the anterior  right putamen measuring 12 x 6 mm, unchanged. The remainder of the head  CT is within normal limits. The etiology of the patient's bilateral leg  weakness and abnormal gait is not established on this exam. If there  remains any clinical suspicion of acute stroke I recommend an MRI of the  brain for more complete assessment.      Radiation dose reduction techniques were utilized, including automated  exposure control and exposure modulation based on body size.        This report was finalized on 12/8/2023 2:58 PM by Dr. Scottie Juarez M.D  on Workstation: [id number]                 Assessment:  Active Hospital Problems    Diagnosis  POA    **Stroke-like symptoms [R29.90]  Yes      Resolved Hospital Problems   No resolved problems to display.   Diabetes  Hypertension  Hypokalemia  Hyperlipidemia  Dementia  underweight    Plan:  Will replace potassium  Change keppra to iv  Npo for now due to left sided weakness, droop  Stroke workup  Accu checks, insulin sliding scale  Dw patient and ed provider    Cammie Mi MD  12/8/2023  19:38 EST      Electronically signed by Cammie Mi MD at 12/08/23 1942       Facility-Administered  Medications as of 12/9/2023   Medication Dose Route Frequency Provider Last Rate Last Admin    acetaminophen (TYLENOL) tablet 650 mg  650 mg Oral Q4H PRN Cammie Mi MD        Or    acetaminophen (TYLENOL) suppository 650 mg  650 mg Rectal Q4H PRN Cammie Mi MD        amLODIPine (NORVASC) tablet 10 mg  10 mg Oral Daily Cammie Mi MD   10 mg at 12/09/23 1207    aspirin EC tablet 81 mg  81 mg Oral Daily Florian Estevez MD   81 mg at 12/09/23 1206    aspirin suppository 300 mg  300 mg Rectal Daily Cammie Mi MD   300 mg at 12/08/23 1855    atorvastatin (LIPITOR) tablet 80 mg  80 mg Oral Nightly Cammie Mi MD        sennosides-docusate (PERICOLACE) 8.6-50 MG per tablet 2 tablet  2 tablet Oral BID Cammie Mi MD        And    polyethylene glycol (MIRALAX) packet 17 g  17 g Oral Daily PRN Cammie Mi MD        And    bisacodyl (DULCOLAX) EC tablet 5 mg  5 mg Oral Daily PRN Cammie Mi MD        And    bisacodyl (DULCOLAX) suppository 10 mg  10 mg Rectal Daily PRN Cammie Mi MD        bisacodyl (DULCOLAX) suppository 10 mg  10 mg Rectal Daily PRN Cammie Mi MD        Calcium Replacement - Follow Nurse / BPA Driven Protocol   Does not apply PRN Cammie Mi MD        cetirizine (zyrTEC) tablet 10 mg  10 mg Oral Daily Cammie Mi MD   10 mg at 12/09/23 1206    [COMPLETED] clopidogrel (PLAVIX) tablet 300 mg  300 mg Oral Once Florian Estevez MD   300 mg at 12/09/23 1211    [START ON 12/10/2023] clopidogrel (PLAVIX) tablet 75 mg  75 mg Oral Daily Florian Estevez MD        dextrose (D50W) (25 g/50 mL) IV injection 25 g  25 g Intravenous Q15 Min PRN Cammie Mi MD        dextrose (GLUTOSE) oral gel 15 g  15 g Oral Q15 Min PRN Cammie Mi MD        donepezil (ARICEPT) tablet 10 mg  10 mg Oral Daily Cammie Mi MD   10 mg at 12/09/23 5704     escitalopram (LEXAPRO) tablet 10 mg  10 mg Oral Daily Cammie Mi MD   10 mg at 12/09/23 1206    glucagon (GLUCAGEN) injection 1 mg  1 mg Intramuscular Q15 Min PRN Cammie Mi MD        insulin regular (humuLIN R,novoLIN R) injection 2-7 Units  2-7 Units Subcutaneous Q6H Cammie Mi MD        [COMPLETED] iopamidol (ISOVUE-370) 76 % injection 150 mL  150 mL Intravenous Once in imaging Pierre Romero MD   150 mL at 12/08/23 1518    levETIRAcetam (KEPPRA) injection 1,000 mg  1,000 mg Intravenous Q12H Cammie Mi MD   1,000 mg at 12/09/23 0848    lisinopril (PRINIVIL,ZESTRIL) tablet 40 mg  40 mg Oral Q24H Cammie Mi MD   40 mg at 12/09/23 1206    [COMPLETED] LORazepam (ATIVAN) injection 1 mg  1 mg Intravenous Once Lorin Bates APRN   1 mg at 12/09/23 0455    Magnesium Standard Dose Replacement - Follow Nurse / BPA Driven Protocol   Does not apply PRN Cammie Mi MD        melatonin tablet 3 mg  3 mg Oral Nightly PRN Cammie Mi MD        memantine (NAMENDA) tablet 5 mg  5 mg Oral BID Cammie Mi MD   5 mg at 12/09/23 1207    ondansetron (ZOFRAN) tablet 4 mg  4 mg Oral Q6H PRN Cammie Mi MD        Or    ondansetron (ZOFRAN) injection 4 mg  4 mg Intravenous Q6H PRN Cammie Mi MD        pantoprazole (PROTONIX) EC tablet 40 mg  40 mg Oral Q AM Florian Estevez MD   40 mg at 12/09/23 1211    Phosphorus Replacement - Follow Nurse / BPA Driven Protocol   Does not apply PRN Cammie Mi MD        Potassium Replacement - Follow Nurse / BPA Driven Protocol   Does not apply PRN Cammie Mi MD        [COMPLETED] sodium chloride 0.9 % bolus 500 mL  500 mL Intravenous Once Pierre Romero MD   Stopped at 12/08/23 1604    sodium chloride 0.9 % with KCl 20 mEq/L infusion  100 mL/hr Intravenous Continuous Stingl, Cammie Lee  mL/hr at 12/09/23 1202 100 mL/hr at 12/09/23 1202     Lab  Results (last 24 hours)       Procedure Component Value Units Date/Time    POC Glucose Once [[id number]]  (Normal) Collected: 12/09/23 1118    Specimen: Blood Updated: 12/09/23 1119     Glucose 77 mg/dL     Basic Metabolic Panel [[id number]]  (Abnormal) Collected: 12/09/23 0706    Specimen: Blood Updated: 12/09/23 0948     Glucose 116 mg/dL      BUN 19 mg/dL      Creatinine 0.60 mg/dL      Sodium 147 mmol/L      Potassium 3.1 mmol/L      Chloride 110 mmol/L      CO2 24.0 mmol/L      Calcium 9.1 mg/dL      BUN/Creatinine Ratio 31.7     Anion Gap 13.0 mmol/L      eGFR 93.7 mL/min/1.73     Narrative:      GFR Normal >60  Chronic Kidney Disease <60  Kidney Failure <15    The GFR formula is only valid for adults with stable renal function between ages 18 and 70.    POC Glucose Once [[id number]]  (Normal) Collected: 12/09/23 0808    Specimen: Blood Updated: 12/09/23 0809     Glucose 92 mg/dL     TSH [[id number]]  (Normal) Collected: 12/09/23 0706    Specimen: Blood Updated: 12/09/23 0753     TSH 0.711 uIU/mL     Lipid Panel [[id number]]  (Abnormal) Collected: 12/09/23 0706    Specimen: Blood Updated: 12/09/23 0747     Total Cholesterol 187 mg/dL      Triglycerides 88 mg/dL      HDL Cholesterol 66 mg/dL      LDL Cholesterol  105 mg/dL      VLDL Cholesterol 16 mg/dL      LDL/HDL Ratio 1.57    Narrative:      Cholesterol Reference Ranges  (U.[place]. Department of Health and Human Services ATP III Classifications)    Desirable          <200 mg/dL  Borderline High    200-239 mg/dL  High Risk          >240 mg/dL      Triglyceride Reference Ranges  (U.S. Department of Health and Human Services ATP III Classifications)    Normal           <150 mg/dL  Borderline High  150-199 mg/dL  High             200-499 mg/dL  Very High        >500 mg/dL    HDL Reference Ranges  (U.S. Department of Health and Human Services ATP III Classifications)    Low     <40 mg/dl (major risk factor for CHD)  High    >60 mg/dl ('negative' risk factor for  CHD)        LDL Reference Ranges  (U.S. Department of Health and Human Services ATP III Classifications)    Optimal          <100 mg/dL  Near Optimal     100-129 mg/dL  Borderline High  130-159 mg/dL  High             160-189 mg/dL  Very High        >189 mg/dL    Hemoglobin A1c [[id number]]  (Abnormal) Collected: 12/09/23 0706    Specimen: Blood Updated: 12/09/23 0740     Hemoglobin A1C 8.40 %     Narrative:      Hemoglobin A1C Ranges:    Increased Risk for Diabetes  5.7% to 6.4%  Diabetes                     >= 6.5%  Diabetic Goal                < 7.0%    CBC (No Diff) [[id number]]  (Normal) Collected: 12/09/23 0706    Specimen: Blood Updated: 12/09/23 0729     WBC 5.74 10*3/mm3      RBC 4.57 10*6/mm3      Hemoglobin 13.2 g/dL      Hematocrit 39.3 %      MCV 86.0 fL      MCH 28.9 pg      MCHC 33.6 g/dL      RDW 12.7 %      RDW-SD 38.6 fl      MPV 11.1 fL      Platelets 204 10*3/mm3     POC Glucose Once [[id number]]  (Normal) Collected: 12/09/23 0608    Specimen: Blood Updated: 12/09/23 0656     Glucose 130 mg/dL     POC Glucose Once [[id number]]  (Abnormal) Collected: 12/09/23 0045    Specimen: Blood Updated: 12/09/23 0656     Glucose 132 mg/dL     POC Glucose Once [[id number]]  (Abnormal) Collected: 12/08/23 1845    Specimen: Blood Updated: 12/08/23 1846     Glucose 132 mg/dL     Urinalysis With Microscopic If Indicated (No Culture) - Urine, Clean Catch [[id number]]  (Abnormal) Collected: 12/08/23 1324    Specimen: Urine, Clean Catch Updated: 12/08/23 1344     Color, UA Yellow     Appearance, UA Clear     pH, UA 5.5     Specific Gravity, UA >=1.030     Glucose, UA >=1000 mg/dL (3+)     Ketones, UA 15 mg/dL (1+)     Bilirubin, UA Negative     Blood, UA Negative     Protein, UA Negative     Leuk Esterase, UA Negative     Nitrite, UA Negative     Urobilinogen, UA 0.2 E.U./dL    Narrative:      Urine microscopic not indicated.          Imaging Results (Last 24 Hours)       Procedure Component Value Units Date/Time    MRI  Brain Without Contrast [[id number]] Collected: 12/09/23 0636     Updated: 12/09/23 0643    Narrative:      MR SCAN OF THE BRAIN WITHOUT CONTRAST     HISTORY: Breast cancer. Hypertension. Difficulty walking and left-sided  weakness and poor balance.     The MR scan was performed with sagittal and axial images without  contrast. There is mild diffuse atrophy and mild to moderate chronic  small vessel ischemic change. The diffusion sequence demonstrates an  acute infarct involving the posterior limb of the right internal capsule  and measuring 5 x 10 mm. There is no evidence of hemorrhage or mass  effect.     There are normal flow voids in the major vessels. The sinuses and  mastoid air cells are clear.     CONCLUSION: Small acute infarct in posterior limb of right internal  capsule.     This report was finalized on 12/9/2023 6:40 AM by Dr. Kevin Kuhn M.D  on Workstation: [id number]       CT Angiogram Neck [[id number]] Collected: 12/08/23 1612     Updated: 12/08/23 1630    Narrative:      CT ANGIOGRAM NECK AND HEAD WITH CONTRAST AND CT PERFUSION WITH CONTRAST     HISTORY: Left-sided weakness.     COMPARISON: CT head 12/08/2023 performed at 1336 hours, MRI brain  12/30/2022 and CT head 12/29/2022.     Initially, a noncontrasted CT examination of the brain was performed.  There is no evidence of hemorrhage. Moderate to severe vascular  calcification and moderate to severe small vessel ischemic disease is  appreciated. A lacunar infarct involving the anterior limb of the  internal capsule on the right is appreciated which was present on the CT  examination of 12/29/2022.     CT PERFUSION: There is no evidence of abnormal perfusion.     CT ANGIOGRAM OF THE NECK AND HEAD: A CT angiogram of the neck and head  was performed. Multiplanar as well as three-dimensional reconstructions  were generated.     There is a 12 mm mildly ovoid nodule appreciated involving the right  upper lobe superiorly and medially. There is pleural  base soft tissue  thickening also appreciated involving the right upper lobe anterior  laterally measuring approximately 12 x 6 x 15 mm in size.     The great vessels are arranged in a bovine configuration. There is 0%  stenosis of the internal carotid arteries using NASCET criteria. Mild  irregularity involving the cavernous and supraclinoid ICA is appreciated  bilaterally but without high-grade stenosis. The right A1 segment is  hypoplastic. The proximal aspects of the anterior and middle cerebral  arteries appear unremarkable.     Both vertebral arteries were opacified. Mild stenosis is appreciated  involving the origin of the right vertebral artery. The origin of the  left vertebral artery is obscured by adjacent high attenuation venous  contrast and is associated streak artifact. A mild stenosis cannot be  excluded. The cervical segments of the vertebral arteries are of  relatively uniform caliber. The basilar artery appears unremarkable.  There is a mild to moderate stenosis involving the right P1-P2 junction.  The left posterior cerebral artery appears unremarkable.     A standard postcontrast CT examination of the brain shows no evidence of  abnormal enhancement.       Impression:      1.  There is no evidence of acute infarction, intracranial hemorrhage,  hydrocephalus or of abnormal enhancement. Small vessel ischemic disease,  vascular calcification and a lacunar infarct involving the anterior limb  of the internal capsule on the right is noted.  2.  There is a mild to moderate stenosis involving the right P1-P2  junction. There is no evidence of a proximal intracranial occlusion or  focal severe stenosis. There is 0% stenosis involving the internal  carotid arteries using NASCET criteria. Mild stenosis involving the  origin of the right vertebral artery is appreciated. The left vertebral  artery origin is partially obscured by beam hardening artifact from  adjacent dense venous contrast.  3.  There is a  12 mm nodule involving the right upper lobe medially  suspicious for metastatic disease. A nonspecific but suspicious  pleural-based soft tissue mass is appreciated involving the right upper  lobe anterolaterally measuring approximately 12 x 6 x 15 mm in size. A  dedicated CT examination of the chest is recommended.     The noncontrasted CT examination of the brain was made available for  interpretation at 1513 hours and a preliminary report called at 1514  hours.  The CT angiogram was made available for interpretation at 1522 hours and  a preliminary report called at 1526 hours.              AI analysis of LVO was utilized.     Radiation dose reduction techniques were utilized, including automated  exposure control and exposure modulation based on body size.        This report was finalized on 12/8/2023 4:27 PM by Dr. Guille Mcnamara M.D  on Workstation: [id number]       CT CEREBRAL PERFUSION WITH & WITHOUT CONTRAST [[id number]] Collected: 12/08/23 1612     Updated: 12/08/23 1630    Narrative:      CT ANGIOGRAM NECK AND HEAD WITH CONTRAST AND CT PERFUSION WITH CONTRAST     HISTORY: Left-sided weakness.     COMPARISON: CT head 12/08/2023 performed at 1336 hours, MRI brain  12/30/2022 and CT head 12/29/2022.     Initially, a noncontrasted CT examination of the brain was performed.  There is no evidence of hemorrhage. Moderate to severe vascular  calcification and moderate to severe small vessel ischemic disease is  appreciated. A lacunar infarct involving the anterior limb of the  internal capsule on the right is appreciated which was present on the CT  examination of 12/29/2022.     CT PERFUSION: There is no evidence of abnormal perfusion.     CT ANGIOGRAM OF THE NECK AND HEAD: A CT angiogram of the neck and head  was performed. Multiplanar as well as three-dimensional reconstructions  were generated.     There is a 12 mm mildly ovoid nodule appreciated involving the right  upper lobe superiorly and medially. There is  pleural base soft tissue  thickening also appreciated involving the right upper lobe anterior  laterally measuring approximately 12 x 6 x 15 mm in size.     The great vessels are arranged in a bovine configuration. There is 0%  stenosis of the internal carotid arteries using NASCET criteria. Mild  irregularity involving the cavernous and supraclinoid ICA is appreciated  bilaterally but without high-grade stenosis. The right A1 segment is  hypoplastic. The proximal aspects of the anterior and middle cerebral  arteries appear unremarkable.     Both vertebral arteries were opacified. Mild stenosis is appreciated  involving the origin of the right vertebral artery. The origin of the  left vertebral artery is obscured by adjacent high attenuation venous  contrast and is associated streak artifact. A mild stenosis cannot be  excluded. The cervical segments of the vertebral arteries are of  relatively uniform caliber. The basilar artery appears unremarkable.  There is a mild to moderate stenosis involving the right P1-P2 junction.  The left posterior cerebral artery appears unremarkable.     A standard postcontrast CT examination of the brain shows no evidence of  abnormal enhancement.       Impression:      1.  There is no evidence of acute infarction, intracranial hemorrhage,  hydrocephalus or of abnormal enhancement. Small vessel ischemic disease,  vascular calcification and a lacunar infarct involving the anterior limb  of the internal capsule on the right is noted.  2.  There is a mild to moderate stenosis involving the right P1-P2  junction. There is no evidence of a proximal intracranial occlusion or  focal severe stenosis. There is 0% stenosis involving the internal  carotid arteries using NASCET criteria. Mild stenosis involving the  origin of the right vertebral artery is appreciated. The left vertebral  artery origin is partially obscured by beam hardening artifact from  adjacent dense venous contrast.  3.   There is a 12 mm nodule involving the right upper lobe medially  suspicious for metastatic disease. A nonspecific but suspicious  pleural-based soft tissue mass is appreciated involving the right upper  lobe anterolaterally measuring approximately 12 x 6 x 15 mm in size. A  dedicated CT examination of the chest is recommended.     The noncontrasted CT examination of the brain was made available for  interpretation at 1513 hours and a preliminary report called at 1514  hours.  The CT angiogram was made available for interpretation at 1522 hours and  a preliminary report called at 1526 hours.              AI analysis of LVO was utilized.     Radiation dose reduction techniques were utilized, including automated  exposure control and exposure modulation based on body size.        This report was finalized on 12/8/2023 4:27 PM by Dr. Guille Mcnamara M.D  on Workstation: [id number]       CT Angiogram Head w AI Analysis of LVO [[id number]] Collected: 12/08/23 1612     Updated: 12/08/23 1630    Narrative:      CT ANGIOGRAM NECK AND HEAD WITH CONTRAST AND CT PERFUSION WITH CONTRAST     HISTORY: Left-sided weakness.     COMPARISON: CT head 12/08/2023 performed at 1336 hours, MRI brain  12/30/2022 and CT head 12/29/2022.     Initially, a noncontrasted CT examination of the brain was performed.  There is no evidence of hemorrhage. Moderate to severe vascular  calcification and moderate to severe small vessel ischemic disease is  appreciated. A lacunar infarct involving the anterior limb of the  internal capsule on the right is appreciated which was present on the CT  examination of 12/29/2022.     CT PERFUSION: There is no evidence of abnormal perfusion.     CT ANGIOGRAM OF THE NECK AND HEAD: A CT angiogram of the neck and head  was performed. Multiplanar as well as three-dimensional reconstructions  were generated.     There is a 12 mm mildly ovoid nodule appreciated involving the right  upper lobe superiorly and medially. There  is pleural base soft tissue  thickening also appreciated involving the right upper lobe anterior  laterally measuring approximately 12 x 6 x 15 mm in size.     The great vessels are arranged in a bovine configuration. There is 0%  stenosis of the internal carotid arteries using NASCET criteria. Mild  irregularity involving the cavernous and supraclinoid ICA is appreciated  bilaterally but without high-grade stenosis. The right A1 segment is  hypoplastic. The proximal aspects of the anterior and middle cerebral  arteries appear unremarkable.     Both vertebral arteries were opacified. Mild stenosis is appreciated  involving the origin of the right vertebral artery. The origin of the  left vertebral artery is obscured by adjacent high attenuation venous  contrast and is associated streak artifact. A mild stenosis cannot be  excluded. The cervical segments of the vertebral arteries are of  relatively uniform caliber. The basilar artery appears unremarkable.  There is a mild to moderate stenosis involving the right P1-P2 junction.  The left posterior cerebral artery appears unremarkable.     A standard postcontrast CT examination of the brain shows no evidence of  abnormal enhancement.       Impression:      1.  There is no evidence of acute infarction, intracranial hemorrhage,  hydrocephalus or of abnormal enhancement. Small vessel ischemic disease,  vascular calcification and a lacunar infarct involving the anterior limb  of the internal capsule on the right is noted.  2.  There is a mild to moderate stenosis involving the right P1-P2  junction. There is no evidence of a proximal intracranial occlusion or  focal severe stenosis. There is 0% stenosis involving the internal  carotid arteries using NASCET criteria. Mild stenosis involving the  origin of the right vertebral artery is appreciated. The left vertebral  artery origin is partially obscured by beam hardening artifact from  adjacent dense venous contrast.  3.   There is a 12 mm nodule involving the right upper lobe medially  suspicious for metastatic disease. A nonspecific but suspicious  pleural-based soft tissue mass is appreciated involving the right upper  lobe anterolaterally measuring approximately 12 x 6 x 15 mm in size. A  dedicated CT examination of the chest is recommended.     The noncontrasted CT examination of the brain was made available for  interpretation at 1513 hours and a preliminary report called at 1514  hours.  The CT angiogram was made available for interpretation at 1522 hours and  a preliminary report called at 1526 hours.              AI analysis of LVO was utilized.     Radiation dose reduction techniques were utilized, including automated  exposure control and exposure modulation based on body size.        This report was finalized on 12/8/2023 4:27 PM by Dr. Guille Mcnamara M.D  on Workstation: [id number]       CT Head Without Contrast [[id number]] Collected: 12/08/23 1411     Updated: 12/08/23 1501    Narrative:      EMERGENCY CT SCAN OF THE HEAD WITHOUT CONTRAST ON 12/08/2023     CLINICAL HISTORY: Stroke-like symptoms. The patient has weakness, with  bilateral leg weakness and abnormal gait.     TECHNIQUE: Spiral CT images were obtained from the base of the skull to  the vertex without intravenous contrast. The images were reformatted and  are submitted in 3 mm thick axial, sagittal and coronal CT sections with  brain algorithm and 2 mm thick axial CT sections with high-resolution  bone algorithm.      This is correlated to a prior MRI of the brain from [facility] on 05/07/2021 and prior head CT on 12/29/2022.     FINDINGS: There are patchy areas of low-density in the periventricular  and subcortical white matter of the cerebral hemispheres consistent with  moderate small vessel disease. The remainder of the brain parenchyma is  normal in attenuation. The ventricles are normal in size. I see no focal  mass effect. There is no  midline shift. No extra-axial fluid collections  are identified. There is no evidence of acute intracranial hemorrhage.  The paranasal sinuses and the mastoid air cells and the middle ear  cavities are clear. Calcified plaques are present in the intracranial  segments of the distal vertebral arteries and cavernous segments of the  internal carotid arteries bilaterally.       Impression:      1. No acute intracranial abnormality is identified.   2. There is moderate small vessel disease in the cerebral white matter  and mild cerebral atrophy. There is an old lacunar infarct extending  from the anterior limb of the right internal capsule into the anterior  right putamen measuring 12 x 6 mm, unchanged. The remainder of the head  CT is within normal limits. The etiology of the patient's bilateral leg  weakness and abnormal gait is not established on this exam. If there  remains any clinical suspicion of acute stroke I recommend an MRI of the  brain for more complete assessment.      Radiation dose reduction techniques were utilized, including automated  exposure control and exposure modulation based on body size.        This report was finalized on 12/8/2023 2:58 PM by Dr. Scottie Juarez M.D  on Workstation: [id number]             ECG/EMG Results (last 24 hours)       Procedure Component Value Units Date/Time    SCANNED - TELEMETRY   [[id number]] Resulted: 12/08/23     Updated: 12/08/23 1938    ECG 12 Lead Stroke Evaluation [[id number]] Collected: 12/08/23 1203     Updated: 12/08/23 2238     QT Interval 362 ms      QTC Interval 382 ms     Narrative:      HEART RATE= 67  bpm  RR Interval= 896  ms  NC Interval= 155  ms  P Horizontal Axis= -11  deg  P Front Axis= 80  deg  QRSD Interval= 79  ms  QT Interval= 362  ms  QTcB= 382  ms  QRS Axis= 78  deg  T Wave Axis= 35  deg  - ABNORMAL ECG -  Sinus rhythm  Consider left ventricular hypertrophy  When compared with ECG of 29-Dec-2022 2:57:45,  Significant rate decrease otherwise no  change  Electronically Signed By: Fermin Darby) 08-Dec-2023 22:38:20  Date and Time of Study: 2023-12-08 12:03:32    SCANNED - TELEMETRY   [[id number]] Resulted: 12/08/23     Updated: 12/08/23 2313    SCANNED - TELEMETRY   [[id number]] Resulted: 12/08/23     Updated: 12/09/23 0825          Orders (last 24 hrs)        Start     Ordered    12/10/23 0900  clopidogrel (PLAVIX) tablet 75 mg  Daily         12/09/23 0940    12/10/23 0600  CBC (No Diff)  Morning Draw         12/09/23 0855    12/10/23 0600  Basic Metabolic Panel  Morning Draw         12/09/23 0855    12/10/23 0600  P2Y12 Platelet Inhibition  Once         12/09/23 1013    12/09/23 1245  Diet: Cardiac Diets; Healthy Heart (2-3 Na+); Texture: Soft to Chew (NDD 3); Soft to Chew: Ground Meat; Fluid Consistency: Thin (IDDSI 0)  Diet Effective Now         12/09/23 1245    12/09/23 1200  aspirin EC tablet 81 mg  Daily         12/09/23 0939    12/09/23 1200  pantoprazole (PROTONIX) EC tablet 40 mg  Every Early Morning         12/09/23 1013    12/09/23 1120  POC Glucose Once  PROCEDURE ONCE        Comments: Complete no more than 45 minutes prior to patient eating      12/09/23 1118    12/09/23 1030  clopidogrel (PLAVIX) tablet 300 mg  Once         12/09/23 0940    12/09/23 0851  Basic Metabolic Panel  Once         12/09/23 0850    12/09/23 0849  CT Chest With Contrast Diagnostic  1 Time Imaging         12/09/23 0849    12/09/23 0810  POC Glucose Once  PROCEDURE ONCE        Comments: Complete no more than 45 minutes prior to patient eating      12/09/23 0808    12/09/23 0657  POC Glucose Once  PROCEDURE ONCE        Comments: Complete no more than 45 minutes prior to patient eating      12/09/23 0045    12/09/23 0657  POC Glucose Once  PROCEDURE ONCE        Comments: Complete no more than 45 minutes prior to patient eating      12/09/23 0608    12/09/23 0600  CBC (No Diff)  Morning Draw         12/08/23 1736    12/09/23 0600  Hemoglobin A1c  Morning Draw          12/08/23 1736 12/09/23 0600  Lipid Panel  Morning Draw         12/08/23 1736 12/09/23 0600  TSH  Morning Draw         12/08/23 1736 12/09/23 0315  LORazepam (ATIVAN) injection 1 mg  Once         12/09/23 0220 12/08/23 2200  POC Glucose 4x Daily Before Meals & at Bedtime  4 Times Daily Before Meals & at Bedtime      Comments: Complete no more than 45 minutes prior to patient eating      12/08/23 1942 12/08/23 2100  atorvastatin (LIPITOR) tablet 80 mg  Nightly         12/08/23 1736 12/08/23 2100  sennosides-docusate (PERICOLACE) 8.6-50 MG per tablet 2 tablet  2 Times Daily        See Hyperspace for full Linked Orders Report.    12/08/23 1736 12/08/23 2100  levETIRAcetam (KEPPRA) injection 1,000 mg  Every 12 Hours Scheduled         12/08/23 1943 12/08/23 2100  memantine (NAMENDA) tablet 5 mg  2 Times Daily         12/08/23 1943 12/08/23 2030  insulin regular (humuLIN R,novoLIN R) injection 2-7 Units  Every 6 Hours Scheduled         12/08/23 1942 12/08/23 2030  sodium chloride 0.9 % with KCl 20 mEq/L infusion  Continuous         12/08/23 1943 12/08/23 2030  amLODIPine (NORVASC) tablet 10 mg  Daily         12/08/23 1943 12/08/23 2030  aspirin EC tablet 81 mg  Daily,   Status:  Discontinued         12/08/23 1943 12/08/23 2030  cetirizine (zyrTEC) tablet 10 mg  Daily         12/08/23 1943 12/08/23 2030  donepezil (ARICEPT) tablet 10 mg  Daily         12/08/23 1943 12/08/23 2030  empagliflozin (JARDIANCE) tablet 10 mg  Daily,   Status:  Discontinued         12/08/23 1943 12/08/23 2030  escitalopram (LEXAPRO) tablet 10 mg  Daily         12/08/23 1943 12/08/23 2030  lisinopril (PRINIVIL,ZESTRIL) tablet 40 mg  Every 24 Hours Scheduled         12/08/23 1943 12/08/23 2000  Intake and Output  Every 4 Hours       12/08/23 1736 12/08/23 2000  Vital Signs  Every 4 Hours      Comments: Per per hospital policy    12/08/23 1736    12/08/23 1943  Potassium Replacement - Follow  Nurse / BPA Driven Protocol  As Needed         12/08/23 1943    12/08/23 1943  Magnesium Standard Dose Replacement - Follow Nurse / BPA Driven Protocol  As Needed         12/08/23 1943    12/08/23 1943  Phosphorus Replacement - Follow Nurse / BPA Driven Protocol  As Needed         12/08/23 1943 12/08/23 1943  Calcium Replacement - Follow Nurse / BPA Driven Protocol  As Needed         12/08/23 1943 12/08/23 1942  dextrose (GLUTOSE) oral gel 15 g  Every 15 Minutes PRN         12/08/23 1942 12/08/23 1942  dextrose (D50W) (25 g/50 mL) IV injection 25 g  Every 15 Minutes PRN         12/08/23 1942 12/08/23 1942  glucagon (GLUCAGEN) injection 1 mg  Every 15 Minutes PRN         12/08/23 1942 12/08/23 1847  POC Glucose Once  PROCEDURE ONCE        Comments: Complete no more than 45 minutes prior to patient eating      12/08/23 1845    12/08/23 1830  sodium chloride 0.9 % infusion  Continuous,   Status:  Discontinued         12/08/23 1736 12/08/23 1830  aspirin tablet 325 mg  Daily,   Status:  Discontinued        See Hyperspace for full Linked Orders Report.    12/08/23 1736 12/08/23 1830  aspirin suppository 300 mg  Daily        See Hyperspace for full Linked Orders Report.    12/08/23 1736 12/08/23 1820  NPO Diet NPO Type: Strict NPO  Diet Effective Now,   Status:  Canceled        Comments: Should Remain in Effect Until a Complete SLP Evaluation Occurs & a Superceding Order is Received    12/08/23 1819 12/08/23 1820  SLP Consult: Eval & Treat RN Dysphagia Screen Failed  Once         12/08/23 1819 12/08/23 1800  POC Glucose Q6H  Every 6 Hours,   Status:  Canceled      Comments: May Discontinue After 2 Consecutive Readings Less Than 140Notify Provider if 2 Readings Greater Than 140      12/08/23 1736 12/08/23 1800  Oral Care  2 Times Daily       12/08/23 1736 12/08/23 1737  Code Status and Medical Interventions:  Continuous         12/08/23 1736    12/08/23 1737  Diet: Cardiac Diets; Healthy  Heart (2-3 Na+); Texture: Regular Texture (IDDSI 7); Fluid Consistency: Thin (IDDSI 0)  Diet Effective Now,   Status:  Canceled         12/08/23 1736    12/08/23 1737  Daily Weights  Daily       12/08/23 1736    12/08/23 1737  Strict Intake & Output  Every Shift       12/08/23 1736    12/08/23 1737  Place Sequential Compression Device  Once         12/08/23 1736    12/08/23 1737  Maintain Sequential Compression Device  Continuous         12/08/23 1736    12/08/23 1736  acetaminophen (TYLENOL) tablet 650 mg  Every 4 Hours PRN,   Status:  Discontinued         12/08/23 1736    12/08/23 1736  polyethylene glycol (MIRALAX) packet 17 g  Daily PRN        See Hyperspace for full Linked Orders Report.    12/08/23 1736    12/08/23 1736  bisacodyl (DULCOLAX) EC tablet 5 mg  Daily PRN        See Hyperspace for full Linked Orders Report.    12/08/23 1736    12/08/23 1736  bisacodyl (DULCOLAX) suppository 10 mg  Daily PRN        See Hyperspace for full Linked Orders Report.    12/08/23 1736    12/08/23 1736  ondansetron (ZOFRAN) tablet 4 mg  Every 6 Hours PRN        See Hyperspace for full Linked Orders Report.    12/08/23 1736    12/08/23 1736  ondansetron (ZOFRAN) injection 4 mg  Every 6 Hours PRN        See Hyperspace for full Linked Orders Report.    12/08/23 1736    12/08/23 1736  melatonin tablet 3 mg  Nightly PRN         12/08/23 1736    12/08/23 1736  Vital Signs  Per Order Details        Comments: For ICU Admission: Vital Signs Every 2 Hours  For Telemetry Unit Admission: Vital Signs Every 4 Hours    12/08/23 1736    12/08/23 1736  Pulse Oximetry, Continuous  Continuous        Comments: Goal:  Keep O2 Sat greater than 94%    12/08/23 1736    12/08/23 1736  Continuous Cardiac Monitoring  Continuous        Comments: Follow Standing Orders As Outlined in Process Instructions (Open Order Report to View Full Instructions)    12/08/23 1736    12/08/23 1736  Telemetry - Maintain IV Access  Continuous         12/08/23 1736     12/08/23 1736  Telemetry - Place Orders & Notify Provider of Results When Patient Experiences Acute Chest Pain, Dysrhythmia or Respiratory Distress  Until Discontinued         12/08/23 1736    12/08/23 1736  Notify physician of changes in level of consciousness, worsening of stroke symptoms, acute headache or severe nausea and vomiting or any of the following vital sign parameters:  Until Discontinued         12/08/23 1736    12/08/23 1736  Activity As Tolerated  Until Discontinued         12/08/23 1736    12/08/23 1736  NPO Diet NPO Type: Strict NPO  Diet Effective Now,   Status:  Canceled        Comments: Strict NPO Until Nursing Dysphagia Screen Complete    12/08/23 1736    12/08/23 1736  Nursing Dysphagia Screening  Once         12/08/23 1736    12/08/23 1736  RN to Place Order SLP Consult - Eval & Treat Choosing Reason of RN Dysphagia Screen Failed  Per Order Details        Comments: RN to Place Order SLP Consult - Eval & Treat Choosing Reason of RN Dysphagia Screen Failed    12/08/23 1736    12/08/23 1736  Nurse to Call MD or Nutrition Services for Diet if Patient Passes Dysphagia Screen  Once         12/08/23 1736    12/08/23 1736  Neuro Checks  Per Order Details        Comments: For ICU Admission: Neuro Checks to Include All Stroke Deficits Every Hour x10 Hours, Then Every 2 Hours,  For Telemetry Unit Admission: Neuro Checks to Include All Stroke Deficits Every 4 Hours    12/08/23 1736    12/08/23 1736  NIHSS Assessment  Every Shift      Comments: Turn off all sedation medications prior to performing assessment. Assessment to be performed upon admission, transfer to another unit, discharge, and with neurological decline. If NIHSS change is greater than or equal to 4 and/or neurological decline is noted notify physician.    12/08/23 1736    12/08/23 1736  Provide Stroke Education Material  Prior to Discharge        Comments: Educate patient PRN and daily during hospitalization.    12/08/23 1736    12/08/23  1736  Fall Precautions  Continuous         12/08/23 1736    12/08/23 1736  Tobacco Cessation Education  Once        Comments: If current smoker    12/08/23 1736    12/08/23 1736  Oxygen Therapy-  Continuous        Comments: Start O2 at 2LPM per NC and Titrate to Maintain O2 Sat greater than 94%    12/08/23 1736    12/08/23 1736  Notify Stroke Coordinator  Once        Provider:  (Not yet assigned)    12/08/23 1736    12/08/23 1736  Inpatient Rehab Admission Consult  Once        Provider:  (Not yet assigned)    12/08/23 1736    12/08/23 1736  OT Consult: Eval & Treat  Once         12/08/23 1736    12/08/23 1736  PT Consult: Eval & Treat as tolerated  Once         12/08/23 1736    12/08/23 1736  SLP Consult: Eval & Treat Communication Disorder  Once        Comments: Per stroke protocol.    12/08/23 1736    12/08/23 1736  Consult to Case Management   Once        Provider:  (Not yet assigned)    12/08/23 1736    12/08/23 1736  Consult to Diabetes Educator  Once        Provider:  (Not yet assigned)    12/08/23 1736    12/08/23 1736  Inpatient Neurology Consult Stroke  Once        Specialty:  Neurology  Provider:  Florian Estevez MD    12/08/23 1736    12/08/23 1736  Assessed for Rehabilitation Services  Once         12/08/23 1736    12/08/23 1736  Reason for not initiating IV thrombolytic  Once         12/08/23 1736    12/08/23 1736  Adult transthoracic echo complete  Once        Comments: With bubble study.    12/08/23 1736    12/08/23 1736  Place Sequential Compression Device  Once         12/08/23 1736    12/08/23 1736  Maintain Sequential Compression Device  Continuous         12/08/23 1736    12/08/23 1735  ondansetron (ZOFRAN) injection 4 mg  Every 6 Hours PRN,   Status:  Discontinued         12/08/23 1736    12/08/23 1735  bisacodyl (DULCOLAX) suppository 10 mg  Daily PRN         12/08/23 1736    12/08/23 1735  acetaminophen (TYLENOL) tablet 650 mg  Every 4 Hours PRN        See Hyperspace for  full Linked Orders Report.    12/08/23 1736    12/08/23 1735  acetaminophen (TYLENOL) suppository 650 mg  Every 4 Hours PRN        See Hyperspace for full Linked Orders Report.    12/08/23 1736    12/08/23 1617  Cardiac Monitoring  Continuous,   Status:  Canceled        Comments: Follow Standing Orders As Outlined in Process Instructions (Open Order Report to View Full Instructions)    12/08/23 1616    12/08/23 1616  Inpatient Admission  Once         12/08/23 1616    12/08/23 1553  aspirin chewable tablet 324 mg  Once,   Status:  Discontinued         12/08/23 1537    12/08/23 1552  sodium chloride 0.9 % bolus 500 mL  Once         12/08/23 1536    12/08/23 1537  [facility] (on-call MD unless specified) Details  Once        Specialty:  Hospitalist  Provider:  Cammie Mi MD    12/08/23 1536    12/08/23 1527  iopamidol (ISOVUE-370) 76 % injection 150 mL  Once in Imaging         12/08/23 1511    12/08/23 1503  CT Angiogram Neck  1 Time Imaging         12/08/23 1509    12/08/23 1503  CT Head Without Contrast Stroke Protocol  1 Time Imaging,   Status:  Canceled         12/08/23 1509    12/08/23 1503  CT CEREBRAL PERFUSION WITH & WITHOUT CONTRAST  1 Time Imaging         12/08/23 1509    12/08/23 1503  CT Angiogram Head w AI Analysis of LVO  1 Time Imaging         12/08/23 1509    12/08/23 1503  NPO Diet NPO Type: Strict NPO  Diet Effective Now,   Status:  Canceled        Comments: Until Dysphagia Screen Complete    12/08/23 1509    12/08/23 1447  MRI Brain Without Contrast  1 Time Imaging         12/08/23 1446    Unscheduled  Order CT Head Without Contrast for Neurological Decline  As Needed       12/08/23 1736    Unscheduled  ECG 12 Lead Stroke Evaluation  As Needed       12/08/23 1736    Unscheduled  Up with assistance  As Needed       12/08/23 1736    Unscheduled  Follow Hypoglycemia Standing Orders For Blood Glucose <70 & Notify Provider of Treatment  As Needed      Comments: Follow Hypoglycemia Orders As Outlined  "in Process Instructions (Open Order Report to View Full Instructions)  Notify Provider Any Time Hypoglycemia Treatment is Administered    23 194    --  SCANNED - TELEMETRY           23 0000    --  SCANNED - TELEMETRY           23 0000    --  SCANNED - TELEMETRY           23 0000                  Operative/Procedure Notes (last 24 hours)  Notes from 23 1246 through 23 1246   No notes of this type exist for this encounter.          Physician Progress Notes (last 24 hours)        Scottie Noe MD at 23 0849           LOS: 1 day     Name: Lucia Ellis  Age: 73 y.o.  Sex: female  :  1950  MRN: [id number]         Primary Care Physician: Everardo Mazariegos MD    Subjective   Subjective  Patient is somewhat drowsy at present.  Not a very good historian this morning.  Does wake up and answer some basic questions.  Still very weak on the left side.    Objective   Vital Signs  Temp:  [97.5 °F (36.4 °C)-97.9 °F (36.6 °C)] 97.5 °F (36.4 °C)  Heart Rate:  [70-88] 82  Resp:  [16-18] 18  BP: (153-204)/() 189/87  Body mass index is 21.61 kg/m².    Objective:  General Appearance:  Comfortable, in no acute distress and ill-appearing.    Vital signs: (most recent): Blood pressure (!) 189/87, pulse 82, temperature 97.5 °F (36.4 °C), temperature source Oral, resp. rate 18, height 162.6 cm (64.02\"), weight 57.2 kg (126 lb), SpO2 99%, not currently breastfeeding.    Lungs:  Normal effort and normal respiratory rate.  She is not in respiratory distress.  There are decreased breath sounds.    Heart: Normal rate.  Regular rhythm.    Abdomen: Abdomen is soft.  Bowel sounds are normal.   There is no abdominal tenderness.     Extremities: There is no dependent edema or local swelling.    Neurological: (She does awaken but looks drowsy.  Left-sided weakness).    Skin:  Warm and dry.                Results Review:       I reviewed the patient's new clinical results.    Results " from last 7 days   Lab Units 12/09/23  0706 12/08/23  1151   WBC 10*3/mm3 5.74 6.71   HEMOGLOBIN g/dL 13.2 13.4   PLATELETS 10*3/mm3 204 216     Results from last 7 days   Lab Units 12/08/23  1151   SODIUM mmol/L 140   POTASSIUM mmol/L 3.2*   CHLORIDE mmol/L 105   CO2 mmol/L 23.8   BUN mg/dL 17   CREATININE mg/dL 0.58   CALCIUM mg/dL 9.1   GLUCOSE mg/dL 127*     Results from last 7 days   Lab Units 12/08/23  1151   INR  0.96             Scheduled Meds:   amLODIPine, 10 mg, Oral, Daily  aspirin, 324 mg, Oral, Once  aspirin, 325 mg, Oral, Daily   Or  aspirin, 300 mg, Rectal, Daily  atorvastatin, 80 mg, Oral, Nightly  cetirizine, 10 mg, Oral, Daily  donepezil, 10 mg, Oral, Daily  empagliflozin, 10 mg, Oral, Daily  escitalopram, 10 mg, Oral, Daily  insulin regular, 2-7 Units, Subcutaneous, Q6H  levETIRAcetam, 1,000 mg, Intravenous, Q12H  lisinopril, 40 mg, Oral, Q24H  memantine, 5 mg, Oral, BID  senna-docusate sodium, 2 tablet, Oral, BID      PRN Meds:     acetaminophen **OR** acetaminophen    senna-docusate sodium **AND** polyethylene glycol **AND** bisacodyl **AND** bisacodyl    bisacodyl    Calcium Replacement - Follow Nurse / BPA Driven Protocol    dextrose    dextrose    glucagon (human recombinant)    Magnesium Standard Dose Replacement - Follow Nurse / BPA Driven Protocol    melatonin    ondansetron **OR** ondansetron    Phosphorus Replacement - Follow Nurse / BPA Driven Protocol    Potassium Replacement - Follow Nurse / BPA Driven Protocol  Continuous Infusions:  sodium chloride 0.9 % with KCl 20 mEq, 100 mL/hr, Last Rate: 100 mL/hr (12/08/23 2240)        Assessment & Plan   Active Hospital Problems    Diagnosis  POA    **CVA (cerebral vascular accident) [I63.9]  Unknown    Lung nodules [R91.8]  Unknown    Hypokalemia [E87.6]  Unknown    Stroke-like symptoms [R29.90]  Yes    Vascular dementia, moderate, without behavioral disturbance, psychotic disturbance, mood disturbance, and anxiety [F01.B0]  Yes    Type 2  diabetes mellitus with hyperglycemia [E11.65]  Yes    Essential hypertension [I10]  Yes    Malignant neoplasm of breast [C50.919]  Yes      Resolved Hospital Problems   No resolved problems to display.       Assessment & Plan    -Brain MRI earlier this morning has revealed a small acute infarct.  Neurology has been consulted.  Echocardiogram has been ordered.  Continue aspirin.  Start statin when she is cleared for a diet.  Therapy services have been consulted.  Supportive care with IV fluids.  -To right upper lobe lung nodules identified on CT angiogram of the neck.  She has a history of breast cancer.  Will further evaluate with chest CT and then will need further discussions with patient and family.  -Repeat BMP today and replace electrolytes as needed  -Begin to gradually normalize blood pressure when okay with neurology  -Stop Jardiance and continue sliding scale for glucose correction  -Continue Keppra in IV form while n.p.o.    Dispo  TBD    Expected discharge date/ time has not been documented.     Scottie Noe MD  [facility]  12/09/23  08:49 EST      Electronically signed by Scottie Noe MD at 12/09/23 0855          Consult Notes (last 24 hours)        Florian Estevez MD at 12/09/23 0949        Consult Orders    1. Inpatient Neurology Consult Stroke [[id number]] ordered by Cammie Mi MD at 12/08/23 1736                 Neurology Consult Note    Consult Date: 12/9/2023    Referring MD: Cammie Mi, *    Reason for Consult I have been asked to see the patient in neurological consultation to render advice and opinion regarding left side weakness and gait abnormality.      Patient is a poor historian from her known diagnosis of dementia, history as per chart review.    Lucia Ellis is a 73 y.o. -American female with known diagnosis of uncontrolled diabetes, uncontrolled hypertension, dementia, remote history of breast cancer,  "prior right basal ganglia stroke with no residual deficits presented to the hospital from home for unsteady gait and left-sided weakness that has been gone for 2 days, patient on aspirin 81 mg and atorvastatin 10 mg at home, unknown compliance medications.  CT head showed old stroke on the right caudate and anterior limb of internal capsule, small hypodensity was appreciated on posterior limb of the internal capsule vs lateral thalamus, CTA head and neck showed mild to moderate stenosis on the right P1 P2 junction, CTs also showed a nodule on the right lung concerning for metastasis this was communicated to the ED physician yesterday, CT perfusion negative, MRI brain confirmed a stroke on the right lateral thalamus.  On presentation blood pressure was running high 180s systolic, her stroke labs showed A1c of 8.4, , TSH 0.71.    Past Medical History:   Diagnosis Date    Breast cancer     Dementia     Diabetes mellitus     Hypertension     Memory loss        Exam  BP (!) 189/87 (BP Location: Right arm, Patient Position: Lying)   Pulse 82   Temp 97.5 °F (36.4 °C) (Oral)   Resp 18   Ht 162.6 cm (64.02\")   Wt 57.2 kg (126 lb)   SpO2 99%   BMI 21.61 kg/m²   Gen: Drowsy but easily arousable to voice, vitals reviewed  MS: oriented x2, recent/remote memory impaired, poor attention/concentration, language grossly intact, no neglect.  CN: visual acuity grossly normal, PERRL, EOMI, no facial droop, no dysarthria  Motor: 4 -/5 on the left upper and lower extremity, 5/5 on the right, normal tone  Sensory exam: Normal to light touch throughout  Coordination: Dysmetria on the left upper extremity likely related to the weakness    NIH Stroke Scale :    (1_) 1a. Level of consciousness (LOC): 0=alert;1=arousable by minor stimulation;2=obtunded or needs strong stimulation to attend;3=unresponsive or reflex responses only  (0_) 1b. LOC Questions: 0=answers both;1=answers one;2=answers neither  (0_) 1c. LOC Commands: " 0=performs both tasks;1=performs one task;2=performs neither task  (0_) 2. Best Gaze: 0=normal;1=partial gaze palsy;2=forced deviation or total gaze paresis  (0_) 3. Visual: 0=normal;1=partial hemianopia;2=complete hemianopia;3=blind  (0_) 4. Facial palsy: 0=normal;1=minor paresis;2=partial paralysis;3=complete paralysis  FOR 5 AND 6 BELOW: 0=normal;1=drifts but maintains in air;2=unable to maintain in air;3=moves but unable to lift against gravity;4=no movement  (0_)0 (x)1 (_)2 (_)3 (_)4 (_)NA 5a. Motor arm-left  (0_)0 (_)1 (_)2 (_)3 (_)4 (_)NA 5b. Motor arm-right  (0_)0 (x)1 (_)2 (_)3 (_)4 (_)NA 6a. Motor leg-left  (0_)0 (_)1 (_)2 (_)3 (_)4 (_)NA 6ba. Motor leg-right  (0_) 7. Limb ataxia:0=absent;1=unilateral;2=bilateral; NA=unable to test  (0_) 8. Sensory: 0=normal;1=mild-moderate loss;2-severe or total loss  (0_) 9. Best language: 0=normal;1=mild-moderate aphasia, some deficits apparent but able to communicate;2=severe aphasia, fragmentary expression only, unable to communicate well;3=global aphasia, mute and no comprehension  (0_)10. Dysarthria: 0=normal;1=mild-moderate, slurs some words;2=severe, speech mostly unintelligible; NA=unable to test (e.g.,intubation)  (0_)11. Extinction/Inattention: 0=normal;1=visual,tactile,auditory or other extinction to bilateral simultaneous stimulation, but no severe neglect;2=answers neither      NIH Score: Complete  3      DATA:    Lab Results   Component Value Date    GLUCOSE 116 (H) 12/09/2023    CALCIUM 9.1 12/09/2023     (H) 12/09/2023    K 3.1 (L) 12/09/2023    CO2 24.0 12/09/2023     (H) 12/09/2023    BUN 19 12/09/2023    CREATININE 0.60 12/09/2023    EGFRIFAFRI 101 09/29/2021    EGFRIFNONA 83 03/13/2018    BCR 31.7 (H) 12/09/2023    ANIONGAP 13.0 12/09/2023     Lab Results   Component Value Date    WBC 5.74 12/09/2023    HGB 13.2 12/09/2023    HCT 39.3 12/09/2023    MCV 86.0 12/09/2023     12/09/2023       Lab review: A1c of 8.4, , TSH  0.71.    Imaging review: I personally reviewed the CT scan, CT angio head and neck and MRI brain.CT head showed old stroke on the right caudate and anterior limb of internal capsule, chronic small hypodensity was appreciated on posterior limb of the internal capsule, CTA head and neck showed mild to moderate stenosis on the right P1 P2 junction, CTs also showed a nodule on the right lung concerning for metastasis, CT perfusion negative, MRI brain confirmed a stroke on the right posterior limb of internal capsule and thalamus.    Diagnoses:  -Right basal ganglia acute ischemic stroke involving the lateral right thalamus  capsule etiology small vessel disease vs symptomatic right PCA stenosis  -Uncontrolled type 2 diabetes  -Right P1/P2 stenosis  -Essential hypertension  -Mixed hyperlipidemia  -Dementia  -Pulmonary nodule concerning for malignancy      Pre-stroke MRS: 3  NIHSS: 3      PLAN:   1.  Loaded with Plavix 300 mg then continue 75 mg daily for 90 days.  Check P2 Y12 tomorrow morning.  2.  Continue aspirin 81 mg daily  3.  Pantoprazole 40 mg daily for GI protection while on DAPT  4.  Okay to normalize blood pressures since the symptoms has been going on for more than 48 hours goal blood pressure for the long-term less than 130/80  5.  Continue Lipitor 80 mg daily goal LDL less than 70 currently 105  6.  Goal A1c less than 7 currently at 8.4 patient needs better glycemic control  7.  Continue dementia medications  8.  Patient would need supervision to take her medications at home due to dementia  9.  PT/OT evaluation  10.  Workup for the pulmonary nodule as per the admitting physician  11.  2D echo pending  12.  Follow-up with stroke the next 6 to 8 weeks after discharge     Discussed with patient, admitting attending Dr. Noe.          Electronically signed by Florian Estevez MD at 12/09/23 1011

## 2023-12-09 NOTE — PROGRESS NOTES
" LOS: 1 day     Name: Lucia Ellis  Age: 73 y.o.  Sex: female  :  1950  MRN: [id number]         Primary Care Physician: Everardo Mazariegos MD    Subjective   Subjective  Patient is somewhat drowsy at present.  Not a very good historian this morning.  Does wake up and answer some basic questions.  Still very weak on the left side.    Objective   Vital Signs  Temp:  [97.5 °F (36.4 °C)-97.9 °F (36.6 °C)] 97.5 °F (36.4 °C)  Heart Rate:  [70-88] 82  Resp:  [16-18] 18  BP: (153-204)/() 189/87  Body mass index is 21.61 kg/m².    Objective:  General Appearance:  Comfortable, in no acute distress and ill-appearing.    Vital signs: (most recent): Blood pressure (!) 189/87, pulse 82, temperature 97.5 °F (36.4 °C), temperature source Oral, resp. rate 18, height 162.6 cm (64.02\"), weight 57.2 kg (126 lb), SpO2 99%, not currently breastfeeding.    Lungs:  Normal effort and normal respiratory rate.  She is not in respiratory distress.  There are decreased breath sounds.    Heart: Normal rate.  Regular rhythm.    Abdomen: Abdomen is soft.  Bowel sounds are normal.   There is no abdominal tenderness.     Extremities: There is no dependent edema or local swelling.    Neurological: (She does awaken but looks drowsy.  Left-sided weakness).    Skin:  Warm and dry.                Results Review:       I reviewed the patient's new clinical results.    Results from last 7 days   Lab Units 23  0706 23  1151   WBC 10*3/mm3 5.74 6.71   HEMOGLOBIN g/dL 13.2 13.4   PLATELETS 10*3/mm3 204 216     Results from last 7 days   Lab Units 23  1151   SODIUM mmol/L 140   POTASSIUM mmol/L 3.2*   CHLORIDE mmol/L 105   CO2 mmol/L 23.8   BUN mg/dL 17   CREATININE mg/dL 0.58   CALCIUM mg/dL 9.1   GLUCOSE mg/dL 127*     Results from last 7 days   Lab Units 23  1151   INR  0.96             Scheduled Meds:   amLODIPine, 10 mg, Oral, Daily  aspirin, 324 mg, Oral, Once  aspirin, 325 mg, Oral, Daily   Or  aspirin, 300 mg, " Rectal, Daily  atorvastatin, 80 mg, Oral, Nightly  cetirizine, 10 mg, Oral, Daily  donepezil, 10 mg, Oral, Daily  empagliflozin, 10 mg, Oral, Daily  escitalopram, 10 mg, Oral, Daily  insulin regular, 2-7 Units, Subcutaneous, Q6H  levETIRAcetam, 1,000 mg, Intravenous, Q12H  lisinopril, 40 mg, Oral, Q24H  memantine, 5 mg, Oral, BID  senna-docusate sodium, 2 tablet, Oral, BID      PRN Meds:     acetaminophen **OR** acetaminophen    senna-docusate sodium **AND** polyethylene glycol **AND** bisacodyl **AND** bisacodyl    bisacodyl    Calcium Replacement - Follow Nurse / BPA Driven Protocol    dextrose    dextrose    glucagon (human recombinant)    Magnesium Standard Dose Replacement - Follow Nurse / BPA Driven Protocol    melatonin    ondansetron **OR** ondansetron    Phosphorus Replacement - Follow Nurse / BPA Driven Protocol    Potassium Replacement - Follow Nurse / BPA Driven Protocol  Continuous Infusions:  sodium chloride 0.9 % with KCl 20 mEq, 100 mL/hr, Last Rate: 100 mL/hr (12/08/23 2240)        Assessment & Plan   Active Hospital Problems    Diagnosis  POA    **CVA (cerebral vascular accident) [I63.9]  Unknown    Lung nodules [R91.8]  Unknown    Hypokalemia [E87.6]  Unknown    Stroke-like symptoms [R29.90]  Yes    Vascular dementia, moderate, without behavioral disturbance, psychotic disturbance, mood disturbance, and anxiety [F01.B0]  Yes    Type 2 diabetes mellitus with hyperglycemia [E11.65]  Yes    Essential hypertension [I10]  Yes    Malignant neoplasm of breast [C50.919]  Yes      Resolved Hospital Problems   No resolved problems to display.       Assessment & Plan    -Brain MRI earlier this morning has revealed a small acute infarct.  Neurology has been consulted.  Echocardiogram has been ordered.  Continue aspirin.  Start statin when she is cleared for a diet.  Therapy services have been consulted.  Supportive care with IV fluids.  -To right upper lobe lung nodules identified on CT angiogram of the neck.   She has a history of breast cancer.  Will further evaluate with chest CT and then will need further discussions with patient and family.  -Repeat BMP today and replace electrolytes as needed  -Begin to gradually normalize blood pressure when okay with neurology  -Stop Jardiance and continue sliding scale for glucose correction  -Continue Keppra in IV form while n.p.o.    Dispo  TBD    Expected discharge date/ time has not been documented.     Scottie Noe MD  [facility]  12/09/23  08:49 EST

## 2023-12-09 NOTE — PLAN OF CARE
Goal Outcome Evaluation:              Outcome Evaluation: Pt is a 73 y.o. female admitted to [facility] secondary to poor balance/mobility and fall, along with progressing L sided weakness. Workup reveals nodule on R lung concerning for metastasis, MRI brain confirmed a stroke on the right lateral thalamus. Pt and her family in room report I ADL prior to last Thursday. Presents today with lethargy, poor command following, poor postural control, left sided weakness, decreased cognition, and decreased functional mobility. Requires Max A x2 for bed mobiltiy and to sit EOB. Anticipate d/c to IP rehab vs. SNF pending progress.      Anticipated Discharge Disposition (PT): skilled nursing facility, inpatient rehabilitation facility

## 2023-12-09 NOTE — PROGRESS NOTES
[facility] Acute Inpt Rehab Note    Referral received via stroke order set. Please note this is a screening only, rehab admissions will not actively be evaluating this patient. If felt patient is appropriate for our services once therapies begin, please call our office at [phone], to initiate a full referral.    Thank you,  Gloria Martinez, RN  Rehab Admission Nurse

## 2023-12-09 NOTE — PLAN OF CARE
Goal Outcome Evaluation:              Outcome Evaluation: Clinical swallow evaluation completed. Patient lethargic, but aroused with verbal cues and light external rub. Required frequent verbal cues throughout the assessment to stay awake. Patient tolerated ice chips, thin via spoon/cup/straw, puree, and soft solids with no overt s/sx of aspiration or penetration. Mastication prolonged/disorganized with soft solids. Exhibited incomplete mastication with second trial of soft peaches. Regular solids deferred this date. SLP recommends soft, ground solids and thin liquids. Medication whole with water or applesauce. Precautions: upright for meals, slow rate, small bites/sips. Only allow p.o. when alert. ST to follow for re-evaluation.      Anticipated Discharge Disposition (SLP): unknown

## 2023-12-09 NOTE — THERAPY EVALUATION
Patient Name: Lucia Ellis  : 1950    MRN: [id number]                              Today's Date: 2023       Admit Date: 2023    Visit Dx:     ICD-10-CM ICD-9-CM   1. Ataxia  R27.0 781.3   2. Left-sided weakness  R53.1 728.87   3. Stroke-like symptoms  R29.90 781.99     Patient Active Problem List   Diagnosis    Gastroesophageal reflux disease    Malignant neoplasm of breast    Depression    Folliculitis    Generalized anxiety disorder    Hyperlipidemia    Essential hypertension    Status post total right knee replacement    Rectal hemorrhage    Vitamin D deficiency    Drug therapy    Acute cholecystitis    Uncontrolled type 2 diabetes mellitus with hypoglycemia without coma    Myoclonus    Type 2 diabetes mellitus with hyperglycemia    Hypokalemia    New onset seizure    Focal motor seizure    Vascular dementia, moderate, without behavioral disturbance, psychotic disturbance, mood disturbance, and anxiety    Stroke-like symptoms    CVA (cerebral vascular accident)    Lung nodules    Hypokalemia     Past Medical History:   Diagnosis Date    Breast cancer     Dementia     Diabetes mellitus     Hypertension     Memory loss      Past Surgical History:   Procedure Laterality Date    CHOLECYSTECTOMY      HYSTERECTOMY      MASTECTOMY Right 1995    TOTAL KNEE ARTHROPLASTY Right       General Information       Row Name 23 1455          Physical Therapy Time and Intention    Document Type evaluation  -CC     Mode of Treatment occupational therapy;co-treatment;physical therapy  -CC       Row Name 23 1455          General Information    Patient Profile Reviewed yes  -CC     Prior Level of Function independent:  -CC     Existing Precautions/Restrictions fall  -CC     Barriers to Rehab none identified  -CC       Row Name 23 1455          Living Environment    People in Home child(donovan), adult  -CC       Row Name 23 1455          Home Main Entrance    Number of Stairs, Main Entrance one   -       Row Name 12/09/23 1455          Stairs Within Home, Primary    Stairs, Within Home, Primary multilevel home with bedroom on 1st floor  -       Row Name 12/09/23 1455          Cognition    Orientation Status (Cognition) oriented to;place;time;verbal cues/prompts needed for orientation  -       Row Name 12/09/23 1455          Safety Issues, Functional Mobility    Impairments Affecting Function (Mobility) balance;cognition;coordination;endurance/activity tolerance;grasp;motor control;strength;postural/trunk control  -               User Key  (r) = Recorded By, (t) = Taken By, (c) = Cosigned By      Initials Name Provider Type    Nancy Parr PT Physical Therapist                   Mobility       Row Name 12/09/23 1458          Bed Mobility    Bed Mobility supine-sit;sit-supine  -     Supine-Sit [place] (Bed Mobility) maximum assist (25% patient effort);2 person assist;verbal cues  -     Sit-Supine [place] (Bed Mobility) maximum assist (25% patient effort);2 person assist;verbal cues  -     Assistive Device (Bed Mobility) head of bed elevated;bed rails  -       Row Name 12/09/23 1458          Transfers    Comment, (Transfers) unable to attempt due to poor postural control sitting EOB  -               User Key  (r) = Recorded By, (t) = Taken By, (c) = Cosigned By      Initials Name Provider Type    Nancy Parr PT Physical Therapist                   Obj/Interventions       Row Name 12/09/23 1459          Strength Comprehensive (MMT)    General Manual Muscle Testing (MMT) Assessment lower extremity strength deficits identified  -     Comment, General Manual Muscle Testing (MMT) Assessment B LE appear generally weakn with L>R. L LE approx 3/5, but poor command followign due to lethargy  -       Row Name 12/09/23 1459          Motor Skills    Motor Skills coordination  -       Row Name 12/09/23 1459          Balance    Static Sitting Balance moderate assist   -CC     Dynamic Sitting Balance maximum assist  -CC               User Key  (r) = Recorded By, (t) = Taken By, (c) = Cosigned By      Initials Name Provider Type    Nancy Parr PT Physical Therapist                   Goals/Plan       Row Name 12/09/23 1504          Bed Mobility Goal 1 (PT)    Activity/Assistive Device (Bed Mobility Goal 1, PT) bed mobility activities, all  -CC     [place] Level/Cues Needed (Bed Mobility Goal 1, PT) contact guard required  -CC     Time Frame (Bed Mobility Goal 1, PT) by discharge  -       Row Name 12/09/23 1504          Transfer Goal 1 (PT)    Activity/Assistive Device (Transfer Goal 1, PT) transfers, all  -CC     [place] Level/Cues Needed (Transfer Goal 1, PT) contact guard required  -CC     Time Frame (Transfer Goal 1, PT) by discharge  -       Row Name 12/09/23 1504          Gait Training Goal 1 (PT)    Activity/Assistive Device (Gait Training Goal 1, PT) assistive device use;gait (walking locomotion)  -CC     [place] Level (Gait Training Goal 1, PT) contact guard required  -CC     Distance (Gait Training Goal 1, PT) 50  -CC     Time Frame (Gait Training Goal 1, PT) by discharge  -CC               User Key  (r) = Recorded By, (t) = Taken By, (c) = Cosigned By      Initials Name Provider Type    Nancy Parr PT Physical Therapist                   Clinical Impression       Row Name 12/09/23 1500          Plan of Care Review    Outcome Evaluation Pt is a 73 y.o. female admitted to [facility] secondary to poor balance/mobility and fall, along with progressing L sided weakness. Workup reveals nodule on R lung concerning for metastasis, MRI brain confirmed a stroke on the right lateral thalamus. Pt and her family in room report I ADL prior to last Thursday. Presents today with lethargy, poor command following, poor postural control, left sided weakness, decreased cognition, and decreased functional mobility. Requires Max A x2 for bed mobiltiy and to  sit EOB. Anticipate d/c to IP rehab vs. SNF pending progress.  -       Row Name 12/09/23 1500          Therapy Assessment/Plan (PT)    Rehab Potential (PT) fair, will monitor progress closely  -     Criteria for Skilled Interventions Met (PT) yes;skilled treatment is necessary  -     Therapy Frequency (PT) 6 times/wk  -       Row Name 12/09/23 1500          Positioning and Restraints    Pre-Treatment Position in bed  -CC     Post Treatment Position bed  -CC     In Bed supine;call light within reach;encouraged to call for assist;exit alarm on;with family/caregiver  -               User Key  (r) = Recorded By, (t) = Taken By, (c) = Cosigned By      Initials Name Provider Type    CC Nancy Garcia, PT Physical Therapist                   Outcome Measures       Row Name 12/09/23 1504 12/09/23 0822       How much help from another person do you currently need...    Turning from your back to your side while in flat bed without using bedrails? 3  -CC 4  -LC    Moving from lying on back to sitting on the side of a flat bed without bedrails? 2  -CC 3  -LC    Moving to and from a bed to a chair (including a wheelchair)? 1  -CC 3  -LC    Standing up from a chair using your arms (e.g., wheelchair, bedside chair)? 1  -CC 3  -LC    Climbing 3-5 steps with a railing? 1  -CC 2  -LC    To walk in hospital room? 1  -CC 2  -LC    AM-PAC 6 Clicks Score (PT) 9  -CC 17  -LC    Highest Level of Mobility Goal 3 --> Sit at edge of bed  - 5 --> Static standing  -      Row Name 12/09/23 1306          Modified [place] Scale    Modified [place] Scale 4 - Moderately severe disability.  Unable to walk without assistance, and unable to attend to own bodily needs without assistance.  -       Row Name 12/09/23 1306          Functional Assessment    Outcome Measure Options AM-PAC 6 Clicks Daily Activity (OT);Modified [place]  -               User Key  (r) = Recorded By, (t) = Taken By, (c) = Cosigned By      Initials Name  Provider Type    CC Nancy Garcia, PT Physical Therapist    Nilda Escobar, OT Occupational Therapist    Nohelia Keller, RN Registered Nurse                                   PT Recommendation and Plan     Outcome Evaluation: Pt is a 73 y.o. female admitted to [facility] secondary to poor balance/mobility and fall, along with progressing L sided weakness. Workup reveals nodule on R lung concerning for metastasis, MRI brain confirmed a stroke on the right lateral thalamus. Pt and her family in room report I ADL prior to last Thursday. Presents today with lethargy, poor command following, poor postural control, left sided weakness, decreased cognition, and decreased functional mobility. Requires Max A x2 for bed mobiltiy and to sit EOB. Anticipate d/c to IP rehab vs. SNF pending progress.     Time Calculation:         PT Charges       Row Name 12/09/23 1505             Time Calculation    Start Time 1044  -CC      Stop Time 1105  -CC      Time Calculation (min) 21 min  -CC         Time Calculation- PT    Total Timed Code Minutes- PT 15 minute(s)  -CC         Timed Charges    25720 - PT Therapeutic Activity Minutes 15  -CC         Untimed Charges    PT Eval/Re-eval Minutes 6  -CC         Total Minutes    Timed Charges Total Minutes 15  -CC      Untimed Charges Total Minutes 6  -CC       Total Minutes 21  -CC                User Key  (r) = Recorded By, (t) = Taken By, (c) = Cosigned By      Initials Name Provider Type    CC aNncy Garcia, GILDARDO Physical Therapist                  Therapy Charges for Today       Code Description Service Date Service Provider Modifiers Qty    03564685706 HC PT THERAPEUTIC ACT EA 15 MIN 12/9/2023 Nancy Garcia, PT GP 1    88428570831 HC PT EVAL MOD COMPLEXITY 2 12/9/2023 Nancy Garcia, PT GP 1            PT G-Codes  Outcome Measure Options: AM-PAC 6 Clicks Daily Activity (OT), Modified [place]  AM-PAC 6 Clicks Score (PT): 9  AM-PAC 6 Clicks Score (OT): 10  Modified  [place] Scale: 4 - Moderately severe disability.  Unable to walk without assistance, and unable to attend to own bodily needs without assistance.  PT Discharge Summary  Anticipated Discharge Disposition (PT): skilled nursing facility, inpatient rehabilitation facility    Nancy Garcia, PT  12/9/2023

## 2023-12-09 NOTE — PLAN OF CARE
Goal Outcome Evaluation:  Plan of Care Reviewed With: patient, sibling           Outcome Evaluation: Pt is a 73 y.o female admitted from home with difficulty with her balance, mobility, fall, L sided weakness over the past few days. Pt is found to have a nodule on the right lung concerning for metastasis, MRI brain confirmed a stroke on the right lateral thalamus. Pt is typically independent prior to last Thursday. Today pt presents with poor postural control, LUE weakness, decreased mobility, decreased cogntion (she does have hx of dementia), decreased alertness. Pt requires max AX2 to sit EOB today and she is lethargic but follows commands. RN reports she did give pt medication for her MRI which may be contributing. She would benefit from continued OT to increase independence with ADLs, transfers, balance, LUE functioning. Anticipate she will need ongoing rehab at dc as she is not safe to dc home at present.      Anticipated Discharge Disposition (OT): inpatient rehabilitation facility, skilled nursing facility

## 2023-12-09 NOTE — CONSULTS
"Neurology Consult Note    Consult Date: 12/9/2023    Referring MD: Cammie Mi, *    Reason for Consult I have been asked to see the patient in neurological consultation to render advice and opinion regarding left side weakness and gait abnormality.      Patient is a poor historian from her known diagnosis of dementia, history as per chart review.    Lucia Ellis is a 73 y.o. -American female with known diagnosis of uncontrolled diabetes, uncontrolled hypertension, dementia, remote history of breast cancer, prior right basal ganglia stroke with no residual deficits presented to the hospital from home for unsteady gait and left-sided weakness that has been gone for 2 days, patient on aspirin 81 mg and atorvastatin 10 mg at home, unknown compliance medications.  CT head showed old stroke on the right caudate and anterior limb of internal capsule, small hypodensity was appreciated on posterior limb of the internal capsule vs lateral thalamus, CTA head and neck showed mild to moderate stenosis on the right P1 P2 junction, CTs also showed a nodule on the right lung concerning for metastasis this was communicated to the ED physician yesterday, CT perfusion negative, MRI brain confirmed a stroke on the right lateral thalamus.  On presentation blood pressure was running high 180s systolic, her stroke labs showed A1c of 8.4, , TSH 0.71.    Past Medical History:   Diagnosis Date    Breast cancer     Dementia     Diabetes mellitus     Hypertension     Memory loss        Exam  BP (!) 189/87 (BP Location: Right arm, Patient Position: Lying)   Pulse 82   Temp 97.5 °F (36.4 °C) (Oral)   Resp 18   Ht 162.6 cm (64.02\")   Wt 57.2 kg (126 lb)   SpO2 99%   BMI 21.61 kg/m²   Gen: Drowsy but easily arousable to voice, vitals reviewed  MS: oriented x2, recent/remote memory impaired, poor attention/concentration, language grossly intact, no neglect.  CN: visual acuity grossly normal, PERRL, EOMI, no facial " droop, no dysarthria  Motor: 4 -/5 on the left upper and lower extremity, 5/5 on the right, normal tone  Sensory exam: Normal to light touch throughout  Coordination: Dysmetria on the left upper extremity likely related to the weakness    NIH Stroke Scale :    (1_) 1a. Level of consciousness (LOC): 0=alert;1=arousable by minor stimulation;2=obtunded or needs strong stimulation to attend;3=unresponsive or reflex responses only  (0_) 1b. LOC Questions: 0=answers both;1=answers one;2=answers neither  (0_) 1c. LOC Commands: 0=performs both tasks;1=performs one task;2=performs neither task  (0_) 2. Best Gaze: 0=normal;1=partial gaze palsy;2=forced deviation or total gaze paresis  (0_) 3. Visual: 0=normal;1=partial hemianopia;2=complete hemianopia;3=blind  (0_) 4. Facial palsy: 0=normal;1=minor paresis;2=partial paralysis;3=complete paralysis  FOR 5 AND 6 BELOW: 0=normal;1=drifts but maintains in air;2=unable to maintain in air;3=moves but unable to lift against gravity;4=no movement  (0_)0 (x)1 (_)2 (_)3 (_)4 (_)NA 5a. Motor arm-left  (0_)0 (_)1 (_)2 (_)3 (_)4 (_)NA 5b. Motor arm-right  (0_)0 (x)1 (_)2 (_)3 (_)4 (_)NA 6a. Motor leg-left  (0_)0 (_)1 (_)2 (_)3 (_)4 (_)NA 6ba. Motor leg-right  (0_) 7. Limb ataxia:0=absent;1=unilateral;2=bilateral; NA=unable to test  (0_) 8. Sensory: 0=normal;1=mild-moderate loss;2-severe or total loss  (0_) 9. Best language: 0=normal;1=mild-moderate aphasia, some deficits apparent but able to communicate;2=severe aphasia, fragmentary expression only, unable to communicate well;3=global aphasia, mute and no comprehension  (0_)10. Dysarthria: 0=normal;1=mild-moderate, slurs some words;2=severe, speech mostly unintelligible; NA=unable to test (e.g.,intubation)  (0_)11. Extinction/Inattention: 0=normal;1=visual,tactile,auditory or other extinction to bilateral simultaneous stimulation, but no severe neglect;2=answers neither      NIH Score: Complete  3      DATA:    Lab Results   Component  Value Date    GLUCOSE 116 (H) 12/09/2023    CALCIUM 9.1 12/09/2023     (H) 12/09/2023    K 3.1 (L) 12/09/2023    CO2 24.0 12/09/2023     (H) 12/09/2023    BUN 19 12/09/2023    CREATININE 0.60 12/09/2023    EGFRIFAFRI 101 09/29/2021    EGFRIFNONA 83 03/13/2018    BCR 31.7 (H) 12/09/2023    ANIONGAP 13.0 12/09/2023     Lab Results   Component Value Date    WBC 5.74 12/09/2023    HGB 13.2 12/09/2023    HCT 39.3 12/09/2023    MCV 86.0 12/09/2023     12/09/2023       Lab review: A1c of 8.4, , TSH 0.71.    Imaging review: I personally reviewed the CT scan, CT angio head and neck and MRI brain.CT head showed old stroke on the right caudate and anterior limb of internal capsule, chronic small hypodensity was appreciated on posterior limb of the internal capsule, CTA head and neck showed mild to moderate stenosis on the right P1 P2 junction, CTs also showed a nodule on the right lung concerning for metastasis, CT perfusion negative, MRI brain confirmed a stroke on the right posterior limb of internal capsule and thalamus.    Diagnoses:  -Right basal ganglia acute ischemic stroke involving the lateral right thalamus  capsule etiology small vessel disease vs symptomatic right PCA stenosis  -Uncontrolled type 2 diabetes  -Right P1/P2 stenosis  -Essential hypertension  -Mixed hyperlipidemia  -Dementia  -Pulmonary nodule concerning for malignancy      Pre-stroke MRS: 3  NIHSS: 3      PLAN:   1.  Loaded with Plavix 300 mg then continue 75 mg daily for 90 days.  Check P2 Y12 tomorrow morning.  2.  Continue aspirin 81 mg daily  3.  Pantoprazole 40 mg daily for GI protection while on DAPT  4.  Okay to normalize blood pressures since the symptoms has been going on for more than 48 hours goal blood pressure for the long-term less than 130/80  5.  Continue Lipitor 80 mg daily goal LDL less than 70 currently 105  6.  Goal A1c less than 7 currently at 8.4 patient needs better glycemic control  7.  Continue  dementia medications  8.  Patient would need supervision to take her medications at home due to dementia  9.  PT/OT evaluation  10.  Workup for the pulmonary nodule as per the admitting physician  11.  2D echo pending  12.  Follow-up with stroke the next 6 to 8 weeks after discharge     Discussed with patient, admitting attending Dr. Noe.

## 2023-12-10 ENCOUNTER — APPOINTMENT (OUTPATIENT)
Dept: CT IMAGING | Facility: HOSPITAL | Age: 73
End: 2023-12-10
Payer: MEDICARE

## 2023-12-10 LAB
ANION GAP SERPL CALCULATED.3IONS-SCNC: 7 MMOL/L (ref 5–15)
BUN SERPL-MCNC: 21 MG/DL (ref 8–23)
BUN/CREAT SERPL: 33.9 (ref 7–25)
CALCIUM SPEC-SCNC: 8.8 MG/DL (ref 8.6–10.5)
CHLORIDE SERPL-SCNC: 110 MMOL/L (ref 98–107)
CO2 SERPL-SCNC: 24 MMOL/L (ref 22–29)
CREAT SERPL-MCNC: 0.62 MG/DL (ref 0.57–1)
DEPRECATED RDW RBC AUTO: 40.2 FL (ref 37–54)
EGFRCR SERPLBLD CKD-EPI 2021: 93 ML/MIN/1.73
ERYTHROCYTE [DISTWIDTH] IN BLOOD BY AUTOMATED COUNT: 12.7 % (ref 12.3–15.4)
GLUCOSE BLDC GLUCOMTR-MCNC: 114 MG/DL (ref 70–130)
GLUCOSE BLDC GLUCOMTR-MCNC: 118 MG/DL (ref 70–130)
GLUCOSE BLDC GLUCOMTR-MCNC: 131 MG/DL (ref 70–130)
GLUCOSE BLDC GLUCOMTR-MCNC: 138 MG/DL (ref 70–130)
GLUCOSE BLDC GLUCOMTR-MCNC: 232 MG/DL (ref 70–130)
GLUCOSE BLDC GLUCOMTR-MCNC: 93 MG/DL (ref 70–130)
GLUCOSE SERPL-MCNC: 109 MG/DL (ref 65–99)
HCT VFR BLD AUTO: 38.3 % (ref 34–46.6)
HGB BLD-MCNC: 12.8 G/DL (ref 12–15.9)
MCH RBC QN AUTO: 28.9 PG (ref 26.6–33)
MCHC RBC AUTO-ENTMCNC: 33.4 G/DL (ref 31.5–35.7)
MCV RBC AUTO: 86.5 FL (ref 79–97)
PA ADP PRP-ACNC: 165 PRU (ref 194–418)
PLATELET # BLD AUTO: 189 10*3/MM3 (ref 140–450)
PMV BLD AUTO: 10.8 FL (ref 6–12)
POTASSIUM SERPL-SCNC: 4.7 MMOL/L (ref 3.5–5.2)
RBC # BLD AUTO: 4.43 10*6/MM3 (ref 3.77–5.28)
SODIUM SERPL-SCNC: 141 MMOL/L (ref 136–145)
WBC NRBC COR # BLD AUTO: 4.36 10*3/MM3 (ref 3.4–10.8)

## 2023-12-10 PROCEDURE — 80048 BASIC METABOLIC PNL TOTAL CA: CPT | Performed by: INTERNAL MEDICINE

## 2023-12-10 PROCEDURE — 71260 CT THORAX DX C+: CPT

## 2023-12-10 PROCEDURE — 99233 SBSQ HOSP IP/OBS HIGH 50: CPT | Performed by: NURSE PRACTITIONER

## 2023-12-10 PROCEDURE — 25010000002 LEVETRIRACETAM PER 10 MG: Performed by: INTERNAL MEDICINE

## 2023-12-10 PROCEDURE — 82948 REAGENT STRIP/BLOOD GLUCOSE: CPT

## 2023-12-10 PROCEDURE — 63710000001 INSULIN REGULAR HUMAN PER 5 UNITS: Performed by: INTERNAL MEDICINE

## 2023-12-10 PROCEDURE — 85027 COMPLETE CBC AUTOMATED: CPT | Performed by: INTERNAL MEDICINE

## 2023-12-10 PROCEDURE — 85576 BLOOD PLATELET AGGREGATION: CPT | Performed by: STUDENT IN AN ORGANIZED HEALTH CARE EDUCATION/TRAINING PROGRAM

## 2023-12-10 PROCEDURE — 25510000001 IOPAMIDOL 61 % SOLUTION: Performed by: INTERNAL MEDICINE

## 2023-12-10 RX ORDER — LEVETIRACETAM 500 MG/1
1000 TABLET ORAL EVERY 12 HOURS SCHEDULED
Status: DISCONTINUED | OUTPATIENT
Start: 2023-12-10 | End: 2023-12-15 | Stop reason: HOSPADM

## 2023-12-10 RX ADMIN — ESCITALOPRAM OXALATE 10 MG: 10 TABLET, FILM COATED ORAL at 09:26

## 2023-12-10 RX ADMIN — ACETAMINOPHEN 650 MG: 325 TABLET, FILM COATED ORAL at 02:56

## 2023-12-10 RX ADMIN — PANTOPRAZOLE SODIUM 40 MG: 40 TABLET, DELAYED RELEASE ORAL at 05:41

## 2023-12-10 RX ADMIN — POTASSIUM CHLORIDE 40 MEQ: 750 TABLET, EXTENDED RELEASE ORAL at 00:52

## 2023-12-10 RX ADMIN — LISINOPRIL 40 MG: 40 TABLET ORAL at 09:26

## 2023-12-10 RX ADMIN — INSULIN HUMAN 3 UNITS: 100 INJECTION, SOLUTION PARENTERAL at 00:52

## 2023-12-10 RX ADMIN — DOCUSATE SODIUM 50MG AND SENNOSIDES 8.6MG 2 TABLET: 8.6; 5 TABLET, FILM COATED ORAL at 09:26

## 2023-12-10 RX ADMIN — CETIRIZINE HYDROCHLORIDE 10 MG: 10 TABLET ORAL at 09:26

## 2023-12-10 RX ADMIN — CLOPIDOGREL BISULFATE 75 MG: 75 TABLET, FILM COATED ORAL at 09:26

## 2023-12-10 RX ADMIN — LEVETIRACETAM 1000 MG: 500 INJECTION, SOLUTION INTRAVENOUS at 09:27

## 2023-12-10 RX ADMIN — AMLODIPINE BESYLATE 10 MG: 10 TABLET ORAL at 09:25

## 2023-12-10 RX ADMIN — IOPAMIDOL 85 ML: 612 INJECTION, SOLUTION INTRAVENOUS at 10:51

## 2023-12-10 RX ADMIN — LEVETIRACETAM 1000 MG: 500 TABLET, FILM COATED ORAL at 20:16

## 2023-12-10 RX ADMIN — DOCUSATE SODIUM 50MG AND SENNOSIDES 8.6MG 2 TABLET: 8.6; 5 TABLET, FILM COATED ORAL at 20:19

## 2023-12-10 RX ADMIN — MEMANTINE HYDROCHLORIDE 5 MG: 5 TABLET, FILM COATED ORAL at 09:25

## 2023-12-10 RX ADMIN — MEMANTINE HYDROCHLORIDE 5 MG: 5 TABLET, FILM COATED ORAL at 20:16

## 2023-12-10 RX ADMIN — ATORVASTATIN CALCIUM 80 MG: 80 TABLET, FILM COATED ORAL at 20:16

## 2023-12-10 RX ADMIN — ASPIRIN 81 MG: 81 TABLET, COATED ORAL at 09:25

## 2023-12-10 RX ADMIN — DONEPEZIL HYDROCHLORIDE 10 MG: 10 TABLET, FILM COATED ORAL at 09:25

## 2023-12-10 NOTE — PLAN OF CARE
Goal Outcome Evaluation:  Plan of Care Reviewed With: patient, sibling        Progress: no change     No new neuro deficits noted or reported during this shift. Patient remained confused, brief episode of agitation after pt back from CT, pt was upset about having to wait in the hallway for transport and not having a mask, threatening to call 911. Pt's sister Jessica was called, pt pleasantly confused after Jessica's visit.

## 2023-12-10 NOTE — PROGRESS NOTES
DOS: 12/10/2023  NAME: Lucia Ellis   : 1950  PCP: Everardo Mazariegos MD    Chief Complaint   Patient presents with    Weakness - Generalized     Bilat legs         Stroke    Subjective: Pt seen in follow up, however the problem is new to me.  Patient sitting up in bed.  Asking for coffee.  Denies any new weakness, numbness, speech or visual disturbances, or headaches.  Says her left arm still feels weak.  States she quit taking aspirin months ago.  No family at bedside.    Objective:  Vital signs:      Vitals:    12/10/23 0020 12/10/23 0440 12/10/23 0835 12/10/23 1229   BP: 157/70 149/85 150/68 165/87   BP Location: Right arm Right arm Right arm Right arm   Patient Position: Lying Lying Lying Lying   Pulse: 86 88 83 82   Resp: 18 18 18 18   Temp: 97.5 °F (36.4 °C) 98.1 °F (36.7 °C) 97.7 °F (36.5 °C) 97.9 °F (36.6 °C)   TempSrc: Oral Oral Oral Oral   SpO2: 98% 98% 100% 100%   Weight:       Height:           Current Facility-Administered Medications:     acetaminophen (TYLENOL) tablet 650 mg, 650 mg, Oral, Q4H PRN, 650 mg at 12/10/23 0256 **OR** acetaminophen (TYLENOL) suppository 650 mg, 650 mg, Rectal, Q4H PRN, Cammie Mi MD    amLODIPine (NORVASC) tablet 10 mg, 10 mg, Oral, Daily, Cammie Mi MD, 10 mg at 12/10/23 0925    aspirin EC tablet 81 mg, 81 mg, Oral, Daily, Florian Estevez MD, 81 mg at 12/10/23 09    [DISCONTINUED] aspirin tablet 325 mg, 325 mg, Oral, Daily **OR** aspirin suppository 300 mg, 300 mg, Rectal, Daily, Cammie Mi MD, 300 mg at 23 185    atorvastatin (LIPITOR) tablet 80 mg, 80 mg, Oral, Nightly, Cammie Mi MD, 80 mg at 23    sennosides-docusate (PERICOLACE) 8.6-50 MG per tablet 2 tablet, 2 tablet, Oral, BID, 2 tablet at 12/10/23 0977 **AND** polyethylene glycol (MIRALAX) packet 17 g, 17 g, Oral, Daily PRN **AND** bisacodyl (DULCOLAX) EC tablet 5 mg, 5 mg, Oral, Daily PRN **AND** bisacodyl (DULCOLAX) suppository 10  mg, 10 mg, Rectal, Daily PRN, Cammie Mi MD    bisacodyl (DULCOLAX) suppository 10 mg, 10 mg, Rectal, Daily PRN, Cammie Mi MD    Calcium Replacement - Follow Nurse / BPA Driven Protocol, , Does not apply, PRNHiwot Renate Andrea, MD    cetirizine (zyrTEC) tablet 10 mg, 10 mg, Oral, Daily, Cammie Mi MD, 10 mg at 12/10/23 0926    clopidogrel (PLAVIX) tablet 75 mg, 75 mg, Oral, Daily, Florian Estevez MD, 75 mg at 12/10/23 0926    dextrose (D50W) (25 g/50 mL) IV injection 25 g, 25 g, Intravenous, Q15 Min PRN, Cammie Mi MD    dextrose (GLUTOSE) oral gel 15 g, 15 g, Oral, Q15 Min PRN, Cammie Mi MD    donepezil (ARICEPT) tablet 10 mg, 10 mg, Oral, Daily, Cammie Mi MD, 10 mg at 12/10/23 0925    escitalopram (LEXAPRO) tablet 10 mg, 10 mg, Oral, Daily, Cammie Mi MD, 10 mg at 12/10/23 0926    glucagon (GLUCAGEN) injection 1 mg, 1 mg, Intramuscular, Q15 Min PRN, Cammie Mi MD    insulin regular (humuLIN R,novoLIN R) injection 2-7 Units, 2-7 Units, Subcutaneous, Q6H, Cammie Mi MD, 3 Units at 12/10/23 0052    levETIRAcetam (KEPPRA) tablet 1,000 mg, 1,000 mg, Oral, Q12H, Scottie Noe MD    lisinopril (PRINIVIL,ZESTRIL) tablet 40 mg, 40 mg, Oral, Q24H, Cammie Mi MD, 40 mg at 12/10/23 0926    Magnesium Standard Dose Replacement - Follow Nurse / BPA Driven Protocol, , Does not apply, PRN, Cammie Mi MD    melatonin tablet 3 mg, 3 mg, Oral, Nightly PRN, Cammie Mi MD    memantine (NAMENDA) tablet 5 mg, 5 mg, Oral, BID, Cammie Mi MD, 5 mg at 12/10/23 0925    ondansetron (ZOFRAN) tablet 4 mg, 4 mg, Oral, Q6H PRN **OR** ondansetron (ZOFRAN) injection 4 mg, 4 mg, Intravenous, Q6H PRN, Cammie Mi MD    pantoprazole (PROTONIX) EC tablet 40 mg, 40 mg, Oral, Q AM, Florian Estevez MD, 40 mg at 12/10/23 0541    Phosphorus Replacement - Follow  Nurse / BPA Driven Protocol, , Does not apply, Hiwot SUTHERLAND Renate Andrea, MD    Potassium Replacement - Follow Nurse / BPA Driven Protocol, , Does not apply, Hiwot SUTHERLAND Renate Andrea, MD    PRN meds    acetaminophen **OR** acetaminophen    senna-docusate sodium **AND** polyethylene glycol **AND** bisacodyl **AND** bisacodyl    bisacodyl    Calcium Replacement - Follow Nurse / BPA Driven Protocol    dextrose    dextrose    glucagon (human recombinant)    Magnesium Standard Dose Replacement - Follow Nurse / BPA Driven Protocol    melatonin    ondansetron **OR** ondansetron    Phosphorus Replacement - Follow Nurse / BPA Driven Protocol    Potassium Replacement - Follow Nurse / BPA Driven Protocol    No current facility-administered medications on file prior to encounter.     Current Outpatient Medications on File Prior to Encounter   Medication Sig    amLODIPine (NORVASC) 10 MG tablet Take 1 tablet by mouth Daily.    aspirin 81 MG EC tablet Take 1 tablet by mouth Daily.    donepezil (Aricept) 10 MG tablet Take 1 tablet by mouth Daily.    empagliflozin (Jardiance) 10 MG tablet tablet Take 1 tablet by mouth Daily. Indications: Type 2 Diabetes    escitalopram (Lexapro) 10 MG tablet Take 1 tablet by mouth Daily. Indications: Major Depressive Disorder    memantine (Namenda) 5 MG tablet Take 1 tablet by mouth 2 (Two) Times a Day. Indications: Lewy Body Dementia    pravastatin (PRAVACHOL) 10 MG tablet Take 1 tablet by mouth Every Night. Indications: High Amount of Fats in the Blood    quinapril (ACCUPRIL) 40 MG tablet Take 1 tablet by mouth Daily.    SITagliptin-metFORMIN HCl ER (Janumet XR)  MG tablet Take 1 tablet by mouth Daily. Indications: Type 2 Diabetes    cetirizine (zyrTEC) 10 MG tablet Take 1 tablet by mouth Daily.    Continuous Blood Gluc  (FreeStyle Aleyda 2 Bradley) device 1 each Continuous.    Continuous Blood Gluc Sensor (FreeStyle Aleyda 2 Sensor) misc 1 each 1 (One) Time Per Week.     levETIRAcetam (KEPPRA) 1000 MG tablet Take 1 tablet by mouth 2 (Two) Times a Day.    meloxicam (MOBIC) 15 MG tablet Take 1 tablet by mouth Daily.       General appearance: Elderly female, NAD, alert and cooperative, well groomed  HEENT: Normocephalic, atraumatic  Resp: Even and unlabored  Skin: warm, dry    Neurological:   MS: oriented to self, place, year, correctly identified wall clock time, recent/remote memory impaired-repeated same questions, normal attention/concentration, language intact, no neglect  CN: visual fields full, EOMI, facial sensation equal, no facial droop, tongue midline  Motor: 5/5 RUE/RLE, 4/5 LUE/LLE  Sensory: light touch sensation intact in all 4 ext.  Coordination: Dysmetria with finger-to-nose testing on the left  Gait and station: Deferred  Rapid alternating movements: Decreased finger to thumb tapping on the left    Laboratory results:  Lab Results   Component Value Date    TSH 0.711 12/09/2023     Lab Results   Component Value Date    HGBA1C 8.40 (H) 12/09/2023     Lab Results   Component Value Date    SHLMZOPF39 1,145 (H) 12/02/2022     Lab Results   Component Value Date    CHOL 187 12/09/2023    CHOL 181 06/21/2021    CHOL 187 04/12/2021    CHLPL 222 (H) 03/13/2018    CHLPL 218 (H) 02/09/2017     Lab Results   Component Value Date    TRIG 88 12/09/2023    TRIG 68 06/21/2021    TRIG 72 04/12/2021     Lab Results   Component Value Date    HDL 66 (H) 12/09/2023    HDL 63 (H) 06/21/2021    HDL 73 (H) 04/12/2021     Lab Results   Component Value Date     (H) 12/09/2023     (H) 06/21/2021     (H) 04/12/2021     Lab Results   Component Value Date    WBC 4.36 12/10/2023    HGB 12.8 12/10/2023    HCT 38.3 12/10/2023    MCV 86.5 12/10/2023     12/10/2023     Lab Results   Component Value Date    GLUCOSE 109 (H) 12/10/2023    BUN 21 12/10/2023    CREATININE 0.62 12/10/2023    EGFRIFNONA 83 03/13/2018    EGFRIFAFRI 101 09/29/2021    BCR 33.9 (H) 12/10/2023    K 4.7  12/10/2023    CO2 24.0 12/10/2023    CALCIUM 8.8 12/10/2023    PROTENTOTREF 7.3 05/23/2023    ALBUMIN 3.9 12/08/2023    LABIL2 1.8 05/23/2023    AST 32 12/08/2023    ALT 15 12/08/2023     Lab Results   Component Value Date    PTT 24.9 12/08/2023     Lab Results   Component Value Date    INR 0.96 12/08/2023    INR 0.9 12/17/2015    PROTIME 12.9 12/08/2023    PROTIME 12.2 12/17/2015     Brief Urine Lab Results  (Last result in the past 365 days)        Color   Clarity   Blood   Leuk Est   Nitrite   Protein   CREAT   Urine HCG        12/08/23 1324 Yellow   Clear   Negative   Negative   Negative   Negative                   Review and interpretation of imaging:  CT Angiogram Neck    Result Date: 12/8/2023  1.  There is no evidence of acute infarction, intracranial hemorrhage, hydrocephalus or of abnormal enhancement. Small vessel ischemic disease, vascular calcification and a lacunar infarct involving the anterior limb of the internal capsule on the right is noted. 2.  There is a mild to moderate stenosis involving the right P1-P2 junction. There is no evidence of a proximal intracranial occlusion or focal severe stenosis. There is 0% stenosis involving the internal carotid arteries using NASCET criteria. Mild stenosis involving the origin of the right vertebral artery is appreciated. The left vertebral artery origin is partially obscured by beam hardening artifact from adjacent dense venous contrast. 3.  There is a 12 mm nodule involving the right upper lobe medially suspicious for metastatic disease. A nonspecific but suspicious pleural-based soft tissue mass is appreciated involving the right upper lobe anterolaterally measuring approximately 12 x 6 x 15 mm in size. A dedicated CT examination of the chest is recommended.  The noncontrasted CT examination of the brain was made available for interpretation at 1513 hours and a preliminary report called at 1514 hours. The CT angiogram was made available for  interpretation at 1522 hours and a preliminary report called at 1526 hours.     AI analysis of LVO was utilized.  Radiation dose reduction techniques were utilized, including automated exposure control and exposure modulation based on body size.   This report was finalized on 12/8/2023 4:27 PM by Dr. Guille Mcnamara M.D on Workstation: [id number]      CT CEREBRAL PERFUSION WITH & WITHOUT CONTRAST    Result Date: 12/8/2023  1.  There is no evidence of acute infarction, intracranial hemorrhage, hydrocephalus or of abnormal enhancement. Small vessel ischemic disease, vascular calcification and a lacunar infarct involving the anterior limb of the internal capsule on the right is noted. 2.  There is a mild to moderate stenosis involving the right P1-P2 junction. There is no evidence of a proximal intracranial occlusion or focal severe stenosis. There is 0% stenosis involving the internal carotid arteries using NASCET criteria. Mild stenosis involving the origin of the right vertebral artery is appreciated. The left vertebral artery origin is partially obscured by beam hardening artifact from adjacent dense venous contrast. 3.  There is a 12 mm nodule involving the right upper lobe medially suspicious for metastatic disease. A nonspecific but suspicious pleural-based soft tissue mass is appreciated involving the right upper lobe anterolaterally measuring approximately 12 x 6 x 15 mm in size. A dedicated CT examination of the chest is recommended.  The noncontrasted CT examination of the brain was made available for interpretation at 1513 hours and a preliminary report called at 1514 hours. The CT angiogram was made available for interpretation at 1522 hours and a preliminary report called at 1526 hours.     AI analysis of LVO was utilized.  Radiation dose reduction techniques were utilized, including automated exposure control and exposure modulation based on body size.   This report was finalized on 12/8/2023 4:27 PM by   Guille Mcnamara M.D on Workstation: [id number]      CT Angiogram Head w AI Analysis of LVO    Result Date: 12/8/2023  1.  There is no evidence of acute infarction, intracranial hemorrhage, hydrocephalus or of abnormal enhancement. Small vessel ischemic disease, vascular calcification and a lacunar infarct involving the anterior limb of the internal capsule on the right is noted. 2.  There is a mild to moderate stenosis involving the right P1-P2 junction. There is no evidence of a proximal intracranial occlusion or focal severe stenosis. There is 0% stenosis involving the internal carotid arteries using NASCET criteria. Mild stenosis involving the origin of the right vertebral artery is appreciated. The left vertebral artery origin is partially obscured by beam hardening artifact from adjacent dense venous contrast. 3.  There is a 12 mm nodule involving the right upper lobe medially suspicious for metastatic disease. A nonspecific but suspicious pleural-based soft tissue mass is appreciated involving the right upper lobe anterolaterally measuring approximately 12 x 6 x 15 mm in size. A dedicated CT examination of the chest is recommended.  The noncontrasted CT examination of the brain was made available for interpretation at 1513 hours and a preliminary report called at 1514 hours. The CT angiogram was made available for interpretation at 1522 hours and a preliminary report called at 1526 hours.     AI analysis of LVO was utilized.  Radiation dose reduction techniques were utilized, including automated exposure control and exposure modulation based on body size.   This report was finalized on 12/8/2023 4:27 PM by Dr. Guille Mcnamara M.D on Workstation: [id number]      CT Head Without Contrast    Result Date: 12/8/2023  1. No acute intracranial abnormality is identified. 2. There is moderate small vessel disease in the cerebral white matter and mild cerebral atrophy. There is an old lacunar infarct extending from the  anterior limb of the right internal capsule into the anterior right putamen measuring 12 x 6 mm, unchanged. The remainder of the head CT is within normal limits. The etiology of the patient's bilateral leg weakness and abnormal gait is not established on this exam. If there remains any clinical suspicion of acute stroke I recommend an MRI of the brain for more complete assessment.  Radiation dose reduction techniques were utilized, including automated exposure control and exposure modulation based on body size.   This report was finalized on 12/8/2023 2:58 PM by Dr. Scottie Juarez M.D on Workstation: [id number]     Results for orders placed during the hospital encounter of 12/08/23    Adult transthoracic echo complete    Interpretation Summary    Left ventricular systolic function is hyperdynamic (EF > 70%). Calculated left ventricular EF = 70.8%    Left ventricular diastolic function was normal.    Normal echo    Saline test results are negative.        Impression/Assessment:  This is a 73-year-old female with past medical history of dementia followed by our outpatient neurology service on Namenda and Aricept, type 2 diabetes, hypertension, breast cancer, prior RBG stroke who presented to the hospital on 12/8/2023 with complaints of abnormal gait, bilateral leg weakness, and suffering a fall at home the day before.  ER staff noticed that the patient had left-sided weakness with dysarthria.  She underwent CT/CTA/CTP that revealed small hypodensity of the posterior limb of the internal capsule versus lateral thalamus, mild to moderate stenosis of the right P1/P2 junction, and a nodule in the right lung concerning for metastasis.  She was not a TNK candidate due to time criteria.    BP on arrival 182/81, HR 88.  EKG with NSR.  Temp 97.9.  .    Diagnosis:  Acute right basal ganglia infarct involving the right thalamus suspect small vessel disease versus symptomatic right PCA stenosis  Essential  hypertension  Hyperlipidemia  Type 2 diabetes  Right upper lobe nodule  Dementia  Seizure disorder    Additional work up to date:  MRI Brain WO: Acute right thalamic infarct.  Mild to moderate small vessel disease.  2D Echo: Normal LA size and volume, saline test negative, EF 70.8%, all LV wall segments contract normally, no AV stenosis, no MVR.  Labs: A1c 8.40,     Plan:  -Remains with left-sided hemiparesis, dysarthria has resolved.  -Echo unremarkable.  -Will continue with plans of DAPT ASA 81 mg and Plavix 75 mg for 90 days and then transition to aspirin monotherapy as the patient states that she stopped taking her aspirin several months ago and was not on this at the time of admission. Her P2Y12 showed she is a Plavix responder (165).  -Continue Lipitor 80mg,   -Diabetic educator to see, goal A1c 7 or less.  -Primary managing incidental lung nodule noted on her CTA.  Neurochecks per stroke protocol  Normalize BP, /80 or <  Stroke Education  YOMI/SCDs  PT/OT/ST  - Continue Keppra 1g BID, prior EEG in 2022 revealed left frontal temporal head region epileptiform discharges.  - Okay to continue Aricept and Namenda at home dosages.  -Case management to see for home situation, she needs very close monitoring and administration of her meds to make sure that she is taking them appropriately given her dementia.  Therapies as written. CCP for discharge planning. Call RRT for any acute neurological changes.   - Previously followed by Dr. Montes De Oca in the outpatient setting for dementia. I will arrange outpatient f/u for stroke.  We will sign off, will see again per request.    Case discussed with patient and Dr. Estevez, and he agrees with plan above.     HERNANDO Landeros

## 2023-12-10 NOTE — PLAN OF CARE
Problem: Adult Inpatient Plan of Care  Goal: Plan of Care Review  Outcome: Ongoing, Progressing   Goal Outcome Evaluation:   No neuro changes throughout the shift.

## 2023-12-10 NOTE — SIGNIFICANT NOTE
Pt not appropriate for PT at present, pt yelling , agitated, threatening to call 911; MAC Pozo aware will check on 12/11

## 2023-12-11 LAB
GLUCOSE BLDC GLUCOMTR-MCNC: 138 MG/DL (ref 70–130)
GLUCOSE BLDC GLUCOMTR-MCNC: 140 MG/DL (ref 70–130)
GLUCOSE BLDC GLUCOMTR-MCNC: 145 MG/DL (ref 70–130)
GLUCOSE BLDC GLUCOMTR-MCNC: 174 MG/DL (ref 70–130)
GLUCOSE BLDC GLUCOMTR-MCNC: 198 MG/DL (ref 70–130)

## 2023-12-11 PROCEDURE — 63710000001 INSULIN REGULAR HUMAN PER 5 UNITS: Performed by: INTERNAL MEDICINE

## 2023-12-11 PROCEDURE — 25010000002 ONDANSETRON PER 1 MG: Performed by: INTERNAL MEDICINE

## 2023-12-11 PROCEDURE — 82948 REAGENT STRIP/BLOOD GLUCOSE: CPT

## 2023-12-11 RX ORDER — OLANZAPINE 5 MG/1
5 TABLET ORAL NIGHTLY
Status: DISCONTINUED | OUTPATIENT
Start: 2023-12-11 | End: 2023-12-15 | Stop reason: HOSPADM

## 2023-12-11 RX ORDER — OLANZAPINE 10 MG/2ML
10 INJECTION, POWDER, FOR SOLUTION INTRAMUSCULAR EVERY 8 HOURS PRN
Status: DISCONTINUED | OUTPATIENT
Start: 2023-12-11 | End: 2023-12-15 | Stop reason: HOSPADM

## 2023-12-11 RX ORDER — UREA 10 %
3 LOTION (ML) TOPICAL NIGHTLY
Status: DISCONTINUED | OUTPATIENT
Start: 2023-12-11 | End: 2023-12-15 | Stop reason: HOSPADM

## 2023-12-11 RX ADMIN — CLOPIDOGREL BISULFATE 75 MG: 75 TABLET, FILM COATED ORAL at 09:14

## 2023-12-11 RX ADMIN — MEMANTINE HYDROCHLORIDE 5 MG: 5 TABLET, FILM COATED ORAL at 20:30

## 2023-12-11 RX ADMIN — LEVETIRACETAM 1000 MG: 500 TABLET, FILM COATED ORAL at 09:13

## 2023-12-11 RX ADMIN — LISINOPRIL 40 MG: 40 TABLET ORAL at 09:14

## 2023-12-11 RX ADMIN — Medication 3 MG: at 20:30

## 2023-12-11 RX ADMIN — CETIRIZINE HYDROCHLORIDE 10 MG: 10 TABLET ORAL at 09:14

## 2023-12-11 RX ADMIN — ATORVASTATIN CALCIUM 80 MG: 80 TABLET, FILM COATED ORAL at 20:30

## 2023-12-11 RX ADMIN — OLANZAPINE 5 MG: 5 TABLET, FILM COATED ORAL at 20:30

## 2023-12-11 RX ADMIN — DOCUSATE SODIUM 50MG AND SENNOSIDES 8.6MG 2 TABLET: 8.6; 5 TABLET, FILM COATED ORAL at 20:32

## 2023-12-11 RX ADMIN — PANTOPRAZOLE SODIUM 40 MG: 40 TABLET, DELAYED RELEASE ORAL at 06:40

## 2023-12-11 RX ADMIN — INSULIN HUMAN 2 UNITS: 100 INJECTION, SOLUTION PARENTERAL at 18:47

## 2023-12-11 RX ADMIN — ASPIRIN 81 MG: 81 TABLET, COATED ORAL at 09:13

## 2023-12-11 RX ADMIN — AMLODIPINE BESYLATE 10 MG: 10 TABLET ORAL at 09:14

## 2023-12-11 RX ADMIN — ONDANSETRON 4 MG: 2 INJECTION INTRAMUSCULAR; INTRAVENOUS at 14:25

## 2023-12-11 RX ADMIN — MEMANTINE HYDROCHLORIDE 5 MG: 5 TABLET, FILM COATED ORAL at 09:13

## 2023-12-11 RX ADMIN — DONEPEZIL HYDROCHLORIDE 10 MG: 10 TABLET, FILM COATED ORAL at 09:13

## 2023-12-11 RX ADMIN — DOCUSATE SODIUM 50MG AND SENNOSIDES 8.6MG 2 TABLET: 8.6; 5 TABLET, FILM COATED ORAL at 09:13

## 2023-12-11 RX ADMIN — ESCITALOPRAM OXALATE 10 MG: 10 TABLET, FILM COATED ORAL at 09:14

## 2023-12-11 RX ADMIN — LEVETIRACETAM 1000 MG: 500 TABLET, FILM COATED ORAL at 20:30

## 2023-12-11 NOTE — NURSING NOTE
Upon assessment, pt verbally and physically aggressive. Throwing objects, cussing, and attempting to hit staff. Refused to take all  morning meds. Physician aware.

## 2023-12-11 NOTE — PLAN OF CARE
Goal Outcome Evaluation:  Plan of Care Reviewed With: patient        Progress: improving  Outcome Evaluation: Pt was verbally and physically aggressive this am but shortly after was redirected and following commands. Left side weakness; NIH 3. Pt is currently pleasant and alert and oriented. Family at bedside. Refused therapy today. Safety precautions remain in place.

## 2023-12-11 NOTE — NURSING NOTE
Pt confused and calling 911. Charge nurse spoke to nurse supervisor and we will take pt phone from pt and handed over to pt son. Son notified and his on way here.

## 2023-12-11 NOTE — PROGRESS NOTES
" LOS: 3 days     Name: Lucia Ellis  Age: 73 y.o.  Sex: female  :  1950  MRN: [id number]         Primary Care Physician: Everardo Mazariegos MD    Subjective   Subjective  Yesterday afternoon the patient has become more delirious with confusion and agitation.  Does not remember me from 1 day to the next.  Questions why she is in the hospital despite having educated her on this each day.    Objective   Vital Signs  Temp:  [97.5 °F (36.4 °C)-98.1 °F (36.7 °C)] 98.1 °F (36.7 °C)  Heart Rate:  [82-94] 94  Resp:  [16-18] 16  BP: (126-165)/(67-87) 156/78  Body mass index is 21.61 kg/m².    Objective:  General Appearance:  Comfortable and in no acute distress.    Vital signs: (most recent): Blood pressure 156/78, pulse 94, temperature 98.1 °F (36.7 °C), temperature source Oral, resp. rate 16, height 162.6 cm (64.02\"), weight 57.2 kg (126 lb), SpO2 100%, not currently breastfeeding.    Lungs:  Normal effort and normal respiratory rate.    Heart: Normal rate.  Regular rhythm.    Abdomen: Abdomen is soft.  Bowel sounds are normal.   There is no abdominal tenderness.     Extremities: There is no dependent edema or local swelling.    Neurological: (Oriented to person only.  Left hemiparesis).    Skin:  Warm and dry.                Results Review:       I reviewed the patient's new clinical results.    Results from last 7 days   Lab Units 12/10/23  0608 23  0706 23  1151   WBC 10*3/mm3 4.36 5.74 6.71   HEMOGLOBIN g/dL 12.8 13.2 13.4   PLATELETS 10*3/mm3 189 204 216     Results from last 7 days   Lab Units 12/10/23  0608 23  0706 23  1151   SODIUM mmol/L 141 147* 140   POTASSIUM mmol/L 4.7 3.1* 3.2*   CHLORIDE mmol/L 110* 110* 105   CO2 mmol/L 24.0 24.0 23.8   BUN mg/dL 21 19 17   CREATININE mg/dL 0.62 0.60 0.58   CALCIUM mg/dL 8.8 9.1 9.1   GLUCOSE mg/dL 109* 116* 127*     Results from last 7 days   Lab Units 23  1151   INR  0.96             Scheduled Meds:   amLODIPine, 10 mg, Oral, " Daily  aspirin, 81 mg, Oral, Daily  aspirin, 300 mg, Rectal, Daily  atorvastatin, 80 mg, Oral, Nightly  cetirizine, 10 mg, Oral, Daily  clopidogrel, 75 mg, Oral, Daily  donepezil, 10 mg, Oral, Daily  escitalopram, 10 mg, Oral, Daily  insulin regular, 2-7 Units, Subcutaneous, Q6H  levETIRAcetam, 1,000 mg, Oral, Q12H  lisinopril, 40 mg, Oral, Q24H  melatonin, 3 mg, Oral, Nightly  memantine, 5 mg, Oral, BID  OLANZapine, 5 mg, Oral, Nightly  pantoprazole, 40 mg, Oral, Q AM  senna-docusate sodium, 2 tablet, Oral, BID      PRN Meds:     acetaminophen **OR** acetaminophen    senna-docusate sodium **AND** polyethylene glycol **AND** bisacodyl **AND** bisacodyl    bisacodyl    Calcium Replacement - Follow Nurse / BPA Driven Protocol    dextrose    dextrose    glucagon (human recombinant)    Magnesium Standard Dose Replacement - Follow Nurse / BPA Driven Protocol    OLANZapine    ondansetron **OR** ondansetron    Phosphorus Replacement - Follow Nurse / BPA Driven Protocol    Potassium Replacement - Follow Nurse / BPA Driven Protocol  Continuous Infusions:       Assessment & Plan   Active Hospital Problems    Diagnosis  POA    **CVA (cerebral vascular accident) [I63.9]  Unknown    Lung nodules [R91.8]  Unknown    Hypokalemia [E87.6]  Unknown    Stroke-like symptoms [R29.90]  Yes    Vascular dementia, moderate, without behavioral disturbance, psychotic disturbance, mood disturbance, and anxiety [F01.B0]  Yes    Type 2 diabetes mellitus with hyperglycemia [E11.65]  Yes    Essential hypertension [I10]  Yes    Malignant neoplasm of breast [C50.919]  Yes      Resolved Hospital Problems   No resolved problems to display.       Assessment & Plan    -Brain MRI has revealed a right basal ganglia infarct.  Neurology following and have loaded her with Plavix.  Continue along with aspirin and high-dose statin.  Echocardiogram unremarkable.  Continue therapy services.  Neurology does not recommend any additional workup and has signed  off.  -At this point I have been able to speak with the patient's son as well as her sister regarding the concerning appearing right upper lung nodules.  They are going to talk amongst themselves and decide if they would like to pursue this further.  -I did speak with care coordination regarding patient's potential noncoverage by insurance and have relayed this onto the patient's son as a pertains to any invasive workup while she is here for the stroke.  -I have added as needed IM Zyprexa and scheduled an oral dose of 5 mg starting this evening for her delirium on top of dementia.  -Continue sliding scale for glucose correction.  Jardiance on hold  -Continue Keppra  -Blood pressure appears stable on present regimen  -Therapy services to see.  Speech therapy cleared her for diet.     Dispo  TBD.  She will need rehab      Expected discharge date/ time has not been documented.     Scottie Noe MD  [facility]  12/11/23  09:53 EST

## 2023-12-11 NOTE — CONSULTS
Diabetes Education    Patient Name:  Lucia Ellis  YOB: 1950  MRN: [id number]  Admit Date:  12/8/2023    Discussed w/ MD pt's hx of dementia and current mental status.  Not appropriate for education at this time.  Will sign off.  Please re-consult if needed.        Electronically signed by:  Darcy James RN, [facility]  12/11/23 11:39 EST

## 2023-12-11 NOTE — PLAN OF CARE
Problem: Adult Inpatient Plan of Care  Goal: Plan of Care Review  Outcome: Ongoing, Progressing  Goal: Patient-Specific Goal (Individualized)  Outcome: Ongoing, Progressing  Goal: Absence of Hospital-Acquired Illness or Injury  Outcome: Ongoing, Progressing  Intervention: Identify and Manage Fall Risk  Recent Flowsheet Documentation  Taken 12/11/2023 0217 by Ravi Rainey RN  Safety Promotion/Fall Prevention: safety round/check completed  Taken 12/11/2023 0019 by Ravi Rainey RN  Safety Promotion/Fall Prevention: safety round/check completed  Taken 12/10/2023 2219 by Ravi Rainey RN  Safety Promotion/Fall Prevention: safety round/check completed  Taken 12/10/2023 2012 by Ravi Rainey RN  Safety Promotion/Fall Prevention:   safety round/check completed   clutter free environment maintained   fall prevention program maintained   lighting adjusted  Intervention: Prevent Skin Injury  Recent Flowsheet Documentation  Taken 12/11/2023 0217 by Ravi Rainey RN  Body Position: weight shifting  Taken 12/11/2023 0019 by Ravi Rainey RN  Body Position:   position changed independently   weight shifting  Taken 12/10/2023 2219 by Ravi Rainey RN  Body Position:   weight shifting   tilted   left  Taken 12/10/2023 2012 by Ravi Rainey RN  Body Position: position changed independently  Skin Protection: adhesive use limited  Intervention: Prevent and Manage VTE (Venous Thromboembolism) Risk  Recent Flowsheet Documentation  Taken 12/11/2023 0019 by Ravi Rainey RN  Activity Management: activity encouraged  Taken 12/10/2023 2012 by Ravi Rainey RN  VTE Prevention/Management:   bilateral   sequential compression devices on  Intervention: Prevent Infection  Recent Flowsheet Documentation  Taken 12/11/2023 0019 by Ravi Rainey RN  Infection Prevention: environmental surveillance performed  Taken 12/10/2023 2012 by Ravi Rainey RN  Infection Prevention: environmental surveillance performed  Goal: Optimal  Comfort and Wellbeing  Outcome: Ongoing, Progressing  Intervention: Monitor Pain and Promote Comfort  Recent Flowsheet Documentation  Taken 12/10/2023 2012 by Ravi Rainey RN  Pain Management Interventions:   care clustered   pillow support provided   position adjusted   quiet environment facilitated  Intervention: Provide Person-Centered Care  Recent Flowsheet Documentation  Taken 12/10/2023 2012 by Ravi Rainey RN  Trust Relationship/Rapport:   care explained   choices provided   questions answered   questions encouraged   thoughts/feelings acknowledged  Goal: Readiness for Transition of Care  Outcome: Ongoing, Progressing     Problem: Diabetes Comorbidity  Goal: Blood Glucose Level Within Targeted Range  Outcome: Ongoing, Progressing     Problem: Hypertension Comorbidity  Goal: Blood Pressure in Desired Range  Outcome: Ongoing, Progressing  Intervention: Maintain Blood Pressure Management  Recent Flowsheet Documentation  Taken 12/10/2023 2012 by Ravi Rainey RN  Medication Review/Management: medications reviewed     Problem: Seizure Disorder Comorbidity  Goal: Maintenance of Seizure Control  Outcome: Ongoing, Progressing     Problem: Skin Injury Risk Increased  Goal: Skin Health and Integrity  Outcome: Ongoing, Progressing  Intervention: Optimize Skin Protection  Recent Flowsheet Documentation  Taken 12/11/2023 0217 by Ravi Rainey RN  Head of Bed (HOB) Positioning: HOB at 20 degrees  Taken 12/11/2023 0019 by Ravi Rainey RN  Head of Bed (HOB) Positioning: HOB at 20 degrees  Taken 12/10/2023 2219 by Ravi Rainey RN  Head of Bed (HOB) Positioning: HOB at 20 degrees  Taken 12/10/2023 2012 by Ravi Rainey RN  Pressure Reduction Techniques: frequent weight shift encouraged  Head of Bed (HOB) Positioning: HOB at 20-30 degrees  Pressure Reduction Devices: alternating pressure pump (ADD)  Skin Protection: adhesive use limited     Problem: Fall Injury Risk  Goal: Absence of Fall and Fall-Related  Injury  Outcome: Ongoing, Progressing  Intervention: Identify and Manage Contributors  Recent Flowsheet Documentation  Taken 12/10/2023 2012 by Ravi Rainey RN  Medication Review/Management: medications reviewed  Intervention: Promote Injury-Free Environment  Recent Flowsheet Documentation  Taken 12/11/2023 0217 by Ravi Rainey RN  Safety Promotion/Fall Prevention: safety round/check completed  Taken 12/11/2023 0019 by Ravi Rainey RN  Safety Promotion/Fall Prevention: safety round/check completed  Taken 12/10/2023 2219 by Ravi Rainey RN  Safety Promotion/Fall Prevention: safety round/check completed  Taken 12/10/2023 2012 by Ravi Rainey RN  Safety Promotion/Fall Prevention:   safety round/check completed   clutter free environment maintained   fall prevention program maintained   lighting adjusted     Problem: Hypertension Acute  Goal: Blood Pressure Within Desired Range  Outcome: Ongoing, Progressing  Intervention: Normalize Blood Pressure  Recent Flowsheet Documentation  Taken 12/10/2023 2012 by Ravi Rainey RN  Medication Review/Management: medications reviewed   Goal Outcome Evaluation:

## 2023-12-12 LAB
GLUCOSE BLDC GLUCOMTR-MCNC: 164 MG/DL (ref 70–130)
GLUCOSE BLDC GLUCOMTR-MCNC: 168 MG/DL (ref 70–130)
GLUCOSE BLDC GLUCOMTR-MCNC: 200 MG/DL (ref 70–130)
GLUCOSE BLDC GLUCOMTR-MCNC: 215 MG/DL (ref 70–130)
GLUCOSE BLDC GLUCOMTR-MCNC: 223 MG/DL (ref 70–130)

## 2023-12-12 PROCEDURE — 63710000001 INSULIN REGULAR HUMAN PER 5 UNITS: Performed by: INTERNAL MEDICINE

## 2023-12-12 PROCEDURE — 92523 SPEECH SOUND LANG COMPREHEN: CPT

## 2023-12-12 PROCEDURE — 97110 THERAPEUTIC EXERCISES: CPT

## 2023-12-12 PROCEDURE — 92526 ORAL FUNCTION THERAPY: CPT

## 2023-12-12 PROCEDURE — 97530 THERAPEUTIC ACTIVITIES: CPT

## 2023-12-12 PROCEDURE — 82948 REAGENT STRIP/BLOOD GLUCOSE: CPT

## 2023-12-12 RX ADMIN — INSULIN HUMAN 3 UNITS: 100 INJECTION, SOLUTION PARENTERAL at 12:37

## 2023-12-12 RX ADMIN — ATORVASTATIN CALCIUM 80 MG: 80 TABLET, FILM COATED ORAL at 20:27

## 2023-12-12 RX ADMIN — MEMANTINE HYDROCHLORIDE 5 MG: 5 TABLET, FILM COATED ORAL at 20:30

## 2023-12-12 RX ADMIN — LEVETIRACETAM 1000 MG: 500 TABLET, FILM COATED ORAL at 08:36

## 2023-12-12 RX ADMIN — INSULIN HUMAN 2 UNITS: 100 INJECTION, SOLUTION PARENTERAL at 06:56

## 2023-12-12 RX ADMIN — LEVETIRACETAM 1000 MG: 500 TABLET, FILM COATED ORAL at 20:30

## 2023-12-12 RX ADMIN — OLANZAPINE 5 MG: 5 TABLET, FILM COATED ORAL at 20:27

## 2023-12-12 RX ADMIN — CLOPIDOGREL BISULFATE 75 MG: 75 TABLET, FILM COATED ORAL at 08:36

## 2023-12-12 RX ADMIN — ASPIRIN 81 MG: 81 TABLET, COATED ORAL at 08:36

## 2023-12-12 RX ADMIN — MEMANTINE HYDROCHLORIDE 5 MG: 5 TABLET, FILM COATED ORAL at 08:36

## 2023-12-12 RX ADMIN — ESCITALOPRAM OXALATE 10 MG: 10 TABLET, FILM COATED ORAL at 08:36

## 2023-12-12 RX ADMIN — Medication 3 MG: at 20:27

## 2023-12-12 RX ADMIN — INSULIN HUMAN 3 UNITS: 100 INJECTION, SOLUTION PARENTERAL at 17:22

## 2023-12-12 RX ADMIN — DOCUSATE SODIUM 50MG AND SENNOSIDES 8.6MG 2 TABLET: 8.6; 5 TABLET, FILM COATED ORAL at 08:36

## 2023-12-12 RX ADMIN — CETIRIZINE HYDROCHLORIDE 10 MG: 10 TABLET ORAL at 08:36

## 2023-12-12 RX ADMIN — PANTOPRAZOLE SODIUM 40 MG: 40 TABLET, DELAYED RELEASE ORAL at 06:01

## 2023-12-12 RX ADMIN — DONEPEZIL HYDROCHLORIDE 10 MG: 10 TABLET, FILM COATED ORAL at 08:36

## 2023-12-12 RX ADMIN — LISINOPRIL 40 MG: 40 TABLET ORAL at 08:36

## 2023-12-12 RX ADMIN — DOCUSATE SODIUM 50MG AND SENNOSIDES 8.6MG 2 TABLET: 8.6; 5 TABLET, FILM COATED ORAL at 20:27

## 2023-12-12 RX ADMIN — AMLODIPINE BESYLATE 10 MG: 10 TABLET ORAL at 08:36

## 2023-12-12 NOTE — PROGRESS NOTES
Discharge Planning Assessment  [facility]     Patient Name: Lucia Ellis  MRN: [id number]  Today's Date: 12/12/2023    Admit Date: 12/8/2023    Plan: Pending   Discharge Needs Assessment       Row Name 12/12/23 1047       Living Environment    People in Home child(donovan), adult    Name(s) of People in Home son Vern Munoz [phone]    Current Living Arrangements home    Provides Primary Care For no one    Family Caregiver if Needed child(donovan), adult    Quality of Family Relationships helpful;involved;supportive       Transition Planning    Patient/Family Anticipates Transition to inpatient rehabilitation facility    Patient/Family Anticipated Services at Transition skilled nursing;rehabilitation services    Transportation Anticipated agency;family or friend will provide       Discharge Needs Assessment    Equipment Currently Used at Home glucometer    Concerns to be Addressed discharge planning    Outpatient/Agency/Support Group Needs skilled nursing facility;inpatient rehabilitation facility    Discharge Facility/Level of Care Needs nursing facility, skilled;acute rehab    Discharge Coordination/Progress Pending                   Discharge Plan       Row Name 12/12/23 1052       Plan    Plan Pending    Patient/Family in Agreement with Plan yes    Plan Comments CCP following to assist with d/c planning. Pt resides with her son, uses no DME aside from a glucometer at baseline, and has no SNF history. Pt has used [facility] in the past. PT recommends acute vs sub-acute rehab, awaiting preferences from pt's son to place referrals. Nette Forte LCSW                  Continued Care and Services - Admitted Since 12/8/2023    Coordination has not been started for this encounter.       Expected Discharge Date and Time       Expected Discharge Date Expected Discharge Time    Dec 13, 2023            Demographic Summary       Row Name 12/12/23 1046       General Information    Admission Type inpatient    Arrived From  home    Required Notices Provided Important Message from Medicare    Referral Source admission list    Reason for Consult discharge planning    Preferred Language English                   Functional Status       Row Name 12/12/23 1047       Functional Status    Usual Activity Tolerance good    Current Activity Tolerance moderate       Functional Status, IADL    Medications independent    Meal Preparation independent    Housekeeping independent    Laundry independent    Shopping independent       Mental Status Summary    Recent Changes in Mental Status/Cognitive Functioning unable to assess                   Psychosocial    No documentation.                  Abuse/Neglect    No documentation.                  Legal    No documentation.                  Substance Abuse    No documentation.                  Patient Forms    No documentation.                     Marianela Forte LCSW

## 2023-12-12 NOTE — PLAN OF CARE
Goal Outcome Evaluation:  Plan of Care Reviewed With: patient           Outcome Evaluation: Cog eval completed. Pt scored a 17/30 indicating moderate cognitive deficits. Deficits were in the areas of orientation, memory, and executive functioning. Recommend ST at next level of care. If pt was discharged home, she would need 24 hour supervision. Pt also seen for dysphagia therapy. Recommend advance diet to soft, chopped w/ thin; meds as tolerated; upright for meals and 30 min after; slow rate; small bites/sips. ST to follow.      Anticipated Discharge Disposition (SLP): unknown, anticipate therapy at next level of care

## 2023-12-12 NOTE — THERAPY TREATMENT NOTE
Acute Care - Speech Language Pathology   Swallow Treatment Note [facility]     Patient Name: Lucia Ellis  : 1950  MRN: [id number]  Today's Date: 2023               Admit Date: 2023    Visit Dx:     ICD-10-CM ICD-9-CM   1. Ataxia  R27.0 781.3   2. Left-sided weakness  R53.1 728.87   3. Stroke-like symptoms  R29.90 781.99     Patient Active Problem List   Diagnosis    Gastroesophageal reflux disease    Malignant neoplasm of breast    Depression    Folliculitis    Generalized anxiety disorder    Hyperlipidemia    Essential hypertension    Status post total right knee replacement    Rectal hemorrhage    Vitamin D deficiency    Drug therapy    Acute cholecystitis    Uncontrolled type 2 diabetes mellitus with hypoglycemia without coma    Myoclonus    Type 2 diabetes mellitus with hyperglycemia    Hypokalemia    New onset seizure    Focal motor seizure    Vascular dementia, moderate, without behavioral disturbance, psychotic disturbance, mood disturbance, and anxiety    Stroke-like symptoms    CVA (cerebral vascular accident)    Lung nodules    Hypokalemia     Past Medical History:   Diagnosis Date    Breast cancer     Dementia     Diabetes mellitus     Hypertension     Memory loss      Past Surgical History:   Procedure Laterality Date    CHOLECYSTECTOMY      HYSTERECTOMY      MASTECTOMY Right 1995    TOTAL KNEE ARTHROPLASTY Right        SLP Recommendation and Plan                          Anticipated Discharge Disposition (SLP): unknown, anticipate therapy at next level of care (23 1545)        Predicted Duration Therapy Intervention (Days): until discharge (23 154)                                              Plan of Care Reviewed With: patient  Outcome Evaluation: Cog eval completed. Pt scored a 17/30 indicating moderate cognitive deficits. Deficits were in the areas of orientation, memory, and executive functioning. Recommend ST at next level of care. If pt was discharged home, she  would need 24 hour supervision. Pt also seen for dysphagia therapy. Recommend advance diet to soft, chopped w/ thin; meds as tolerated; upright for meals and 30 min after; slow rate; small bites/sips. ST to follow.      SWALLOW EVALUATION (last 72 hours)       SLP Adult Swallow Evaluation       Row Name 12/12/23 1500                   Rehab Evaluation    Document Type therapy note (daily note)  -AW        Subjective Information no complaints  -AW        Patient Observations alert;cooperative;agree to therapy  -AW        Patient Effort good  -AW        Symptoms Noted During/After Treatment none  -AW           (LTG) Patient will demonstrate progress toward functional swallow for    Progress/Outcomes (Demonstrate progress toward functional swallow) good progress toward goal  -AW        Comment (Demonstrate progress toward functional swallow) Pt seen for diet tolerance. Pt showed no s/s with  thin (cup/straw), pureed, soft, mixed, and regular. Mastication and bolus management was functional for soft and mixed. With dry regular solid, slight oral residue was noted, cleared with liquid wash. Recommend advance diet to soft, chopped w/ thin; meds as tolerated; upright for meals and 30 min after; slow rate; small bites/sips.  -AW                  User Key  (r) = Recorded By, (t) = Taken By, (c) = Cosigned By      Initials Name Effective Dates    Ingrid Link, SLP 08/28/23 -                     EDUCATION  The patient has been educated in the following areas:   Dysphagia (Swallowing Impairment) Oral Care/Hydration.        SLP GOALS       Row Name 12/12/23 1545 12/12/23 1500          (LTG) Patient will demonstrate progress toward functional swallow for    Progress/Outcomes (Demonstrate progress toward functional swallow) -- good progress toward goal  -AW     Comment (Demonstrate progress toward functional swallow) -- Pt seen for diet tolerance. Pt showed no s/s with  thin (cup/straw), pureed, soft, mixed, and regular.  Mastication and bolus management was functional for soft and mixed. With dry regular solid, slight oral residue was noted, cleared with liquid wash. Recommend advance diet to soft, chopped w/ thin; meds as tolerated; upright for meals and 30 min after; slow rate; small bites/sips.  -AW        Orientation Goal 1 (SLP)    Improve Orientation Through Goal 1 (SLP) demonstrating orientation to day;demonstrating orientation to month;demonstrating orientation to year;demonstrating orientation to place;demonstrating orientation to disease/impairment;use environmental aids to assist with orientation;80%;with minimal cues (75-90%)  -AW --     Time Frame (Orientation Goal 1, SLP) by discharge  -AW --        Memory Skills Goal 1 (SLP)    Improve Memory Skills Through Goal 1 (SLP) listen to a paragraph and answer questions;visual memory task;recall details from a word list;recalling unrelated word lists with an imposed delay;80%;with minimal cues (75-90%)  -AW --     Time Frame (Memory Skills Goal 1, SLP) by discharge  -AW --        Functional Problem Solving Skills Goal 1 (SLP)    Improve Problem Solving Through Goal 1 (SLP) sequence steps in a task;determine multiple solutions to problems;80%;with minimal cues (75-90%)  -AW --     Time Frame (Problem Solving Goal 1, SLP) by discharge  -AW --        Executive Functional Skills Goal 1 (SLP)    Improve Executive Function Skills Goal 1 (SLP) home management activity;exhibit cognitive flexibility;time management activity;organization/planning activity;80%;with minimal cues (75-90%)  -AW --     Time Frame (Executive Function Skills Goal 1, SLP) by discharge  -AW --               User Key  (r) = Recorded By, (t) = Taken By, (c) = Cosigned By      Initials Name Provider Type    Ingrid Link, SLP Speech and Language Pathologist                       Time Calculation:    Time Calculation- SLP       Row Name 12/12/23 8256             Time Calculation- SLP    SLP Start Time 1500  -HEMALATHA       SLP Received On 12/12/23  -HEMALATHA                User Key  (r) = Recorded By, (t) = Taken By, (c) = Cosigned By      Initials Name Provider Type    Ingrid Link SLP Speech and Language Pathologist                    Therapy Charges for Today       Code Description Service Date Service Provider Modifiers Qty    [id number]  ST TREATMENT SWALLOW 2 12/12/2023 Ingrid Grayson SLP GN 1    [id number] [facility] EVAL SPEECH AND PROD W LANG  3 12/12/2023 Ingrid Grayson SLP GN 1                 LOREE Tamez  12/12/2023

## 2023-12-12 NOTE — THERAPY TREATMENT NOTE
Patient Name: Lucia Ellis  : 1950    MRN: [id number]                              Today's Date: 2023       Admit Date: 2023    Visit Dx:     ICD-10-CM ICD-9-CM   1. Ataxia  R27.0 781.3   2. Left-sided weakness  R53.1 728.87   3. Stroke-like symptoms  R29.90 781.99     Patient Active Problem List   Diagnosis    Gastroesophageal reflux disease    Malignant neoplasm of breast    Depression    Folliculitis    Generalized anxiety disorder    Hyperlipidemia    Essential hypertension    Status post total right knee replacement    Rectal hemorrhage    Vitamin D deficiency    Drug therapy    Acute cholecystitis    Uncontrolled type 2 diabetes mellitus with hypoglycemia without coma    Myoclonus    Type 2 diabetes mellitus with hyperglycemia    Hypokalemia    New onset seizure    Focal motor seizure    Vascular dementia, moderate, without behavioral disturbance, psychotic disturbance, mood disturbance, and anxiety    Stroke-like symptoms    CVA (cerebral vascular accident)    Lung nodules    Hypokalemia     Past Medical History:   Diagnosis Date    Breast cancer     Dementia     Diabetes mellitus     Hypertension     Memory loss      Past Surgical History:   Procedure Laterality Date    CHOLECYSTECTOMY      HYSTERECTOMY      MASTECTOMY Right 1995    TOTAL KNEE ARTHROPLASTY Right       General Information       Row Name 23 1553          OT Time and Intention    Document Type therapy note (daily note)  -RB     Mode of Treatment individual therapy;occupational therapy  -RB       Row Name 23 5024          General Information    Patient Profile Reviewed yes  -RB       Row Name 23 8014          Cognition    Orientation Status (Cognition) oriented x 3  -RB               User Key  (r) = Recorded By, (t) = Taken By, (c) = Cosigned By      Initials Name Provider Type    RB Jessica Tavarez OT Occupational Therapist                     Mobility/ADL's       Row Name 23 8471          Bed  Mobility    Bed Mobility supine-sit;sit-supine  -RB     Supine-Sit Fitzgerald (Bed Mobility) moderate assist (50% patient effort);2 person assist;verbal cues;nonverbal cues (demo/gesture)  -RB     Sit-Supine Fitzgerald (Bed Mobility) minimum assist (75% patient effort);2 person assist;nonverbal cues (demo/gesture);verbal cues  -RB     Assistive Device (Bed Mobility) bed rails  -RB       Row Name 12/12/23 1555          Bed-Chair Transfer    Bed-Chair Fitzgerald (Transfers) not tested;unable to assess  -RB       Row Name 12/12/23 1555          Stand-Sit Transfer    Stand-Sit Fitzgerald (Transfers) not tested;unable to assess  -RB       Row Name 12/12/23 1555          Functional Mobility    Functional Mobility- Ind. Level not appropriate to assess  -RB       Row Name 12/12/23 1555          Activities of Daily Living    BADL Assessment/Intervention toileting  -RB       Row Name 12/12/23 1555          Toileting Assessment/Training    Fitzgerald Level (Toileting) toileting skills;dependent (less than 25% patient effort)  -RB     Position (Toileting) supine  -RB               User Key  (r) = Recorded By, (t) = Taken By, (c) = Cosigned By      Initials Name Provider Type    RB Jessica Tavarez OT Occupational Therapist                   Obj/Interventions       Row Name 12/12/23 1555          Shoulder (Therapeutic Exercise)    Shoulder (Therapeutic Exercise) AAROM (active assistive range of motion)  -RB     Shoulder AAROM (Therapeutic Exercise) bilateral;flexion;extension;aBduction;aDduction;external rotation;internal rotation;sitting;10 repetitions  -RB       Row Name 12/12/23 1555          Elbow/Forearm (Therapeutic Exercise)    Elbow/Forearm (Therapeutic Exercise) AAROM (active assistive range of motion)  -RB     Elbow/Forearm AAROM (Therapeutic Exercise) bilateral;flexion;extension;10 repetitions;sitting  -RB       Row Name 12/12/23 1555          Motor Skills    Therapeutic Exercise shoulder;elbow/forearm   -RB       Row Name 12/12/23 1555          Balance    Comment, Balance Min-Max A for sitting balance at the EOB. poor head control  -RB               User Key  (r) = Recorded By, (t) = Taken By, (c) = Cosigned By      Initials Name Provider Type    Jessica Shearer OT Occupational Therapist                   Goals/Plan    No documentation.                  Clinical Impression       Row Name 12/12/23 1556          Pain Assessment    Pretreatment Pain Rating 0/10 - no pain  -RB     Posttreatment Pain Rating 0/10 - no pain  -RB       Row Name 12/12/23 1556          Plan of Care Review    Plan of Care Reviewed With patient  -RB     Progress improving  -RB       Row Name 12/12/23 1556          Therapy Assessment/Plan (OT)    Rehab Potential (OT) good, to achieve stated therapy goals  -RB     Criteria for Skilled Therapeutic Interventions Met (OT) yes;skilled treatment is necessary  -RB     Therapy Frequency (OT) 5 times/wk  -RB       Row Name 12/12/23 1556          Therapy Plan Review/Discharge Plan (OT)    Anticipated Discharge Disposition (OT) inpatient rehabilitation facility;skilled nursing facility  -RB       Row Name 12/12/23 1556          Vital Signs    Pre Patient Position Supine  -RB     Intra Patient Position Sitting  -RB     Post Patient Position Supine  -RB       Row Name 12/12/23 1556          Positioning and Restraints    Pre-Treatment Position in bed  -RB     Post Treatment Position bed  -RB     In Bed notified nsg;supine;call light within reach;encouraged to call for assist;patient within staff view;fowlers  -RB               User Key  (r) = Recorded By, (t) = Taken By, (c) = Cosigned By      Initials Name Provider Type    Jessica Shearer OT Occupational Therapist                   Outcome Measures       Row Name 12/12/23 1130 12/12/23 0834       How much help from another person do you currently need...    Turning from your back to your side while in flat bed without using bedrails? 3  -JM 3   -MR    Moving from lying on back to sitting on the side of a flat bed without bedrails? 2  -JM 2  -MR    Moving to and from a bed to a chair (including a wheelchair)? 2  -JM 2  -MR    Standing up from a chair using your arms (e.g., wheelchair, bedside chair)? 2  -JM 2  -MR    Climbing 3-5 steps with a railing? 1  -JM 1  -MR    To walk in hospital room? 1  -JM 2  -MR    AM-PAC 6 Clicks Score (PT) 11  -JM 12  -MR    Highest Level of Mobility Goal 4 --> Transfer to chair/commode  -JM 4 --> Transfer to chair/commode  -MR              User Key  (r) = Recorded By, (t) = Taken By, (c) = Cosigned By      Initials Name Provider Type    Jesica Lange, MARIZA Physical Therapist Assistant    Caryn Florian, RN Registered Nurse                    Occupational Therapy Education       Title: PT OT SLP Therapies (In Progress)       Topic: Occupational Therapy (In Progress)       Point: ADL training (Done)       Description:   Instruct learner(s) on proper safety adaptation and remediation techniques during self care or transfers.   Instruct in proper use of assistive devices.                  Learning Progress Summary             Patient Acceptance, E, VU by  at 12/9/2023 1307    Comment: OT goals, POC, dc recommendations, safe positioning LUE post CVA to reduce risk of subluxation   Family Acceptance, E, VU by  at 12/9/2023 1307    Comment: OT goals, POC, dc recommendations, safe positioning LUE post CVA to reduce risk of subluxation                         Point: Home exercise program (Not Started)       Description:   Instruct learner(s) on appropriate technique for monitoring, assisting and/or progressing therapeutic exercises/activities.                  Learner Progress:  Not documented in this visit.              Point: Precautions (Not Started)       Description:   Instruct learner(s) on prescribed precautions during self-care and functional transfers.                  Learner Progress:  Not documented in this  visit.              Point: Body mechanics (Not Started)       Description:   Instruct learner(s) on proper positioning and spine alignment during self-care, functional mobility activities and/or exercises.                  Learner Progress:  Not documented in this visit.                              User Key       Initials Effective Dates Name Provider Type Discipline     04/02/20 -  Nilda Waite OT Occupational Therapist OT                  OT Recommendation and Plan  Therapy Frequency (OT): 5 times/wk  Plan of Care Review  Plan of Care Reviewed With: patient  Progress: improving     Time Calculation:         Time Calculation- OT       Row Name 12/12/23 1554             Time Calculation- OT    OT Start Time 1500  -RB      OT Stop Time 1525  -RB      OT Time Calculation (min) 25 min  -RB      Total Timed Code Minutes- OT 25 minute(s)  -RB      OT Received On 12/12/23  -RB      OT - Next Appointment 12/13/23  -RB         Timed Charges    96540 - OT Therapeutic Exercise Minutes 10  -RB      49821 - OT Therapeutic Activity Minutes 15  -RB         Total Minutes    Timed Charges Total Minutes 25  -RB       Total Minutes 25  -RB                User Key  (r) = Recorded By, (t) = Taken By, (c) = Cosigned By      Initials Name Provider Type    RB Jessica Tavarez OT Occupational Therapist                  Therapy Charges for Today       Code Description Service Date Service Provider Modifiers Qty    56563732376 HC OT THER PROC EA 15 MIN 12/12/2023 Jessica Tavarez OT GO 1    34139207086 HC OT THERAPEUTIC ACT EA 15 MIN 12/12/2023 Jessica Tavarez OT GO 1                 Jessica Tavarez OT  12/12/2023

## 2023-12-12 NOTE — PLAN OF CARE
Goal Outcome Evaluation:        Problem: Adult Inpatient Plan of Care  Goal: Plan of Care Review  Outcome: Ongoing, Progressing  Goal: Patient-Specific Goal (Individualized)  Outcome: Ongoing, Progressing  Goal: Absence of Hospital-Acquired Illness or Injury  Outcome: Ongoing, Progressing  Intervention: Prevent and Manage VTE (Venous Thromboembolism) Risk  Recent Flowsheet Documentation  Taken 12/12/2023 0834 by Caryn Beverly RN  VTE Prevention/Management:   bilateral   sequential compression devices on  Goal: Optimal Comfort and Wellbeing  Outcome: Ongoing, Progressing  Intervention: Provide Person-Centered Care  Recent Flowsheet Documentation  Taken 12/12/2023 0834 by Caryn Beverly RN  Trust Relationship/Rapport:   care explained   choices provided  Goal: Readiness for Transition of Care  Outcome: Ongoing, Progressing     Problem: Diabetes Comorbidity  Goal: Blood Glucose Level Within Targeted Range  Outcome: Ongoing, Progressing     Problem: Hypertension Comorbidity  Goal: Blood Pressure in Desired Range  Outcome: Ongoing, Progressing     Problem: Seizure Disorder Comorbidity  Goal: Maintenance of Seizure Control  Outcome: Ongoing, Progressing     Problem: Skin Injury Risk Increased  Goal: Skin Health and Integrity  Outcome: Ongoing, Progressing     Problem: Fall Injury Risk  Goal: Absence of Fall and Fall-Related Injury  Outcome: Ongoing, Progressing     Problem: Hypertension Acute  Goal: Blood Pressure Within Desired Range  Outcome: Ongoing, Progressing

## 2023-12-12 NOTE — NURSING NOTE
Pt pending permission from son to place broad referrals for rehab. Pt remains with no episodes of agitation today. Pt remains needing SSI coverage. Pt Pt remains confused but is easily reoriented and pleasant. Pt sister and nephews here to visit this shift and is updated at bedside.

## 2023-12-12 NOTE — PROGRESS NOTES
" LOS: 4 days     Name: Lucia Ellis  Age: 73 y.o.  Sex: female  :  1950  MRN: [id number]         Primary Care Physician: Everardo Mazariegos MD    Subjective   Subjective  Had a much more restful night last night.  Nursing staff does not report any behavioral issues.  Much calmer today.    Objective   Vital Signs  Temp:  [97.3 °F (36.3 °C)-98.1 °F (36.7 °C)] 97.5 °F (36.4 °C)  Heart Rate:  [71-98] 71  Resp:  [16-18] 16  BP: (137-156)/(65-82) 137/71  Body mass index is 22.28 kg/m².    Objective:  General Appearance:  Comfortable and in no acute distress.    Vital signs: (most recent): Blood pressure 137/71, pulse 71, temperature 97.5 °F (36.4 °C), temperature source Oral, resp. rate 16, height 162.6 cm (64.02\"), weight 58.9 kg (129 lb 13.6 oz), SpO2 98%, not currently breastfeeding.    Lungs:  Normal effort and normal respiratory rate.    Heart: Normal rate.  Regular rhythm.    Abdomen: Abdomen is soft.  Bowel sounds are normal.   There is no abdominal tenderness.     Extremities: There is no dependent edema or local swelling.    Neurological: (Awake, calm and cooperative).    Skin:  Warm and dry.                Results Review:       I reviewed the patient's new clinical results.    Results from last 7 days   Lab Units 12/10/23  0608 23  0706 23  1151   WBC 10*3/mm3 4.36 5.74 6.71   HEMOGLOBIN g/dL 12.8 13.2 13.4   PLATELETS 10*3/mm3 189 204 216     Results from last 7 days   Lab Units 12/10/23  0608 23  0706 23  1151   SODIUM mmol/L 141 147* 140   POTASSIUM mmol/L 4.7 3.1* 3.2*   CHLORIDE mmol/L 110* 110* 105   CO2 mmol/L 24.0 24.0 23.8   BUN mg/dL 21 19 17   CREATININE mg/dL 0.62 0.60 0.58   CALCIUM mg/dL 8.8 9.1 9.1   GLUCOSE mg/dL 109* 116* 127*     Results from last 7 days   Lab Units 23  1151   INR  0.96             Scheduled Meds:   amLODIPine, 10 mg, Oral, Daily  aspirin, 81 mg, Oral, Daily  aspirin, 300 mg, Rectal, Daily  atorvastatin, 80 mg, Oral, Nightly  cetirizine, 10 " mg, Oral, Daily  clopidogrel, 75 mg, Oral, Daily  donepezil, 10 mg, Oral, Daily  escitalopram, 10 mg, Oral, Daily  insulin regular, 2-7 Units, Subcutaneous, Q6H  levETIRAcetam, 1,000 mg, Oral, Q12H  lisinopril, 40 mg, Oral, Q24H  melatonin, 3 mg, Oral, Nightly  memantine, 5 mg, Oral, BID  OLANZapine, 5 mg, Oral, Nightly  pantoprazole, 40 mg, Oral, Q AM  senna-docusate sodium, 2 tablet, Oral, BID      PRN Meds:     acetaminophen **OR** acetaminophen    senna-docusate sodium **AND** polyethylene glycol **AND** bisacodyl **AND** bisacodyl    bisacodyl    Calcium Replacement - Follow Nurse / BPA Driven Protocol    dextrose    dextrose    glucagon (human recombinant)    Magnesium Standard Dose Replacement - Follow Nurse / BPA Driven Protocol    OLANZapine    ondansetron **OR** ondansetron    Phosphorus Replacement - Follow Nurse / BPA Driven Protocol    Potassium Replacement - Follow Nurse / BPA Driven Protocol  Continuous Infusions:       Assessment & Plan   Active Hospital Problems    Diagnosis  POA    **CVA (cerebral vascular accident) [I63.9]  Unknown    Lung nodules [R91.8]  Unknown    Hypokalemia [E87.6]  Unknown    Stroke-like symptoms [R29.90]  Yes    Vascular dementia, moderate, without behavioral disturbance, psychotic disturbance, mood disturbance, and anxiety [F01.B0]  Yes    Type 2 diabetes mellitus with hyperglycemia [E11.65]  Yes    Essential hypertension [I10]  Yes    Malignant neoplasm of breast [C50.919]  Yes      Resolved Hospital Problems   No resolved problems to display.       Assessment & Plan    -Brain MRI has revealed a right basal ganglia infarct.  Neurology following and have loaded her with Plavix.  Continue along with aspirin and high-dose statin.  Echocardiogram unremarkable.  Continue therapy services.  Neurology does not recommend any additional workup and has signed off.  -At this point I have been able to speak with the patient's son as well as her sister regarding the concerning  appearing right upper lung nodules.  Informed them of findings and outlined possibilities regarding aggressive versus conservative management of this.  I attempted to call her son again today to continue our discussions but he did not answer.  I left a message to call me back and hopefully he does.  -I did speak with care coordination regarding patient's potential noncoverage by insurance and have relayed this onto the patient's son as a pertains to any invasive workup while she is here for the stroke.  -Continue evening dose of oral Zyprexa.  Appears to have helped last night.  -Continue sliding scale for glucose correction.  Jardiance on hold  -Continue Keppra  -Blood pressure appears stable on present regimen  -Therapy services to see.  Speech therapy cleared her for diet.     Dispo  She will need rehab      Expected Discharge Date: 12/15/2023; Expected Discharge Time:      Scottie Noe MD  [facility]  12/12/23  11:00 EST

## 2023-12-12 NOTE — PLAN OF CARE
The pt participated in OT this afternoon. Mod A for bed mobility. Min-Max A for sitting balance, forward head lean. Consistent cues to keep a upright posture. She participated in BUE/BLE exercises as tolerated. Min A to assist back to supine. IRF/SNF recommended.

## 2023-12-12 NOTE — THERAPY EVALUATION
Acute Care - Speech Language Pathology Initial Evaluation  [facility]     Patient Name: Lucia Ellis  : 1950  MRN: [id number]  Today's Date: 2023               Admit Date: 2023     Visit Dx:    ICD-10-CM ICD-9-CM   1. Ataxia  R27.0 781.3   2. Left-sided weakness  R53.1 728.87   3. Stroke-like symptoms  R29.90 781.99     Patient Active Problem List   Diagnosis    Gastroesophageal reflux disease    Malignant neoplasm of breast    Depression    Folliculitis    Generalized anxiety disorder    Hyperlipidemia    Essential hypertension    Status post total right knee replacement    Rectal hemorrhage    Vitamin D deficiency    Drug therapy    Acute cholecystitis    Uncontrolled type 2 diabetes mellitus with hypoglycemia without coma    Myoclonus    Type 2 diabetes mellitus with hyperglycemia    Hypokalemia    New onset seizure    Focal motor seizure    Vascular dementia, moderate, without behavioral disturbance, psychotic disturbance, mood disturbance, and anxiety    Stroke-like symptoms    CVA (cerebral vascular accident)    Lung nodules    Hypokalemia     Past Medical History:   Diagnosis Date    Breast cancer     Dementia     Diabetes mellitus     Hypertension     Memory loss      Past Surgical History:   Procedure Laterality Date    CHOLECYSTECTOMY      HYSTERECTOMY      MASTECTOMY Right 1995    TOTAL KNEE ARTHROPLASTY Right        SLP Recommendation and Plan  SLP Diagnosis: moderate, cognitive-linguistic disorder (23 154)           SLC Criteria for Skilled Therapy Interventions Met: yes (23 154)  Anticipated Discharge Disposition (SLP): unknown, anticipate therapy at next level of care (23)        Therapy Frequency (SLP SLC): PRN (23 154)  Predicted Duration Therapy Intervention (Days): until discharge (23 154)                             Plan of Care Reviewed With: patient (23 1640)  Outcome Evaluation: Cog eval completed. Pt scored a 17/30 indicating  moderate cognitive deficits. Deficits were in the areas of orientation, memory, and executive functioning. Recommend ST at next level of care. If pt was discharged home, she would need 24 hour supervision. Pt also seen for dysphagia therapy. Recommend advance diet to soft, chopped w/ thin; meds as tolerated; upright for meals and 30 min after; slow rate; small bites/sips. ST to follow. (12/12/23 1640)      SLP EVALUATION (last 72 hours)       SLP SLC Evaluation       Row Name 12/12/23 8329                   Communication Assessment/Intervention    Document Type evaluation  -AW        Subjective Information no complaints  -AW        Patient Observations alert;cooperative;agree to therapy  -AW        Patient Effort good  -AW        Symptoms Noted During/After Treatment none  -AW           General Information    Patient Profile Reviewed yes  -AW        Pertinent History Of Current Problem Pt admitted with R basal ganglia CVA involving lateral R thalamus and pulmonary nodule concerning for malignancy.  -AW        Precautions/Limitations, Vision WFL;for purposes of eval  -AW        Precautions/Limitations, Hearing WFL;for purposes of eval  -AW        Patient Level of Education some college  -AW        Prior Level of Function-Communication unknown  -AW        Plans/Goals Discussed with patient;agreed upon  -AW        Barriers to Rehab none identified  -AW        Patient's Goals for Discharge return to home  -AW        Standardized Assessment Used SLUMS  -AW           Pain    Additional Documentation Pain Scale: Numbers Pre/Post-Treatment (Group)  -AW           Pain Scale: Numbers Pre/Post-Treatment    Pretreatment Pain Rating 0/10 - no pain  -AW        Posttreatment Pain Rating 0/10 - no pain  -AW           SLUMS: [facility] Mental Status Examination    SLUMS Score 17  -AW        SLUMS Range 1-20: Dementia (High school education or higher)  -AW        SLUMS Comments Cog eval completed. Pt scored a 17/30  indicating moderate cognitive deficits. Deficits were in the areas of orientation, memory, and executive functioning. Recommend ST at next level of care. If pt was discharged home, she would need 24 hour supervision.  -AW           SLP Evaluation Clinical Impressions    SLP Diagnosis moderate;cognitive-linguistic disorder  -AW        Rehab Potential/Prognosis good  -AW        SLC Criteria for Skilled Therapy Interventions Met yes  -AW        Functional Impact functional impact in ADLs;needs 24 hour supervision;difficulty communicating in an emergency;difficulty completing home management task  -AW           Recommendations    Therapy Frequency (SLP SLC) PRN  -AW        Predicted Duration Therapy Intervention (Days) until discharge  -AW        Anticipated Discharge Disposition (SLP) unknown;anticipate therapy at next level of care  -AW           Orientation Goal 1 (SLP)    Improve Orientation Through Goal 1 (SLP) demonstrating orientation to day;demonstrating orientation to month;demonstrating orientation to year;demonstrating orientation to place;demonstrating orientation to disease/impairment;use environmental aids to assist with orientation;80%;with minimal cues (75-90%)  -AW        Time Frame (Orientation Goal 1, SLP) by discharge  -AW           Memory Skills Goal 1 (SLP)    Improve Memory Skills Through Goal 1 (SLP) listen to a paragraph and answer questions;visual memory task;recall details from a word list;recalling unrelated word lists with an imposed delay;80%;with minimal cues (75-90%)  -AW        Time Frame (Memory Skills Goal 1, SLP) by discharge  -AW           Functional Problem Solving Skills Goal 1 (SLP)    Improve Problem Solving Through Goal 1 (SLP) sequence steps in a task;determine multiple solutions to problems;80%;with minimal cues (75-90%)  -AW        Time Frame (Problem Solving Goal 1, SLP) by discharge  -AW           Executive Functional Skills Goal 1 (SLP)    Improve Executive Function Skills  Goal 1 (SLP) home management activity;exhibit cognitive flexibility;time management activity;organization/planning activity;80%;with minimal cues (75-90%)  -AW        Time Frame (Executive Function Skills Goal 1, SLP) by discharge  -AW                  User Key  (r) = Recorded By, (t) = Taken By, (c) = Cosigned By      Initials Name Effective Dates    HEMALATHA Ingrid Grayson, SLP 08/28/23 -                        EDUCATION  The patient has been educated in the following areas:     Cognitive Impairment Communication Impairment.           SLP GOALS       Row Name 12/12/23 1545 12/12/23 1500          (LTG) Patient will demonstrate progress toward functional swallow for    Progress/Outcomes (Demonstrate progress toward functional swallow) -- good progress toward goal  -AW     Comment (Demonstrate progress toward functional swallow) -- Pt seen for diet tolerance. Pt showed no s/s with  thin (cup/straw), pureed, soft, mixed, and regular. Mastication and bolus management was functional for soft and mixed. With dry regular solid, slight oral residue was noted, cleared with liquid wash. Recommend advance diet to soft, chopped w/ thin; meds as tolerated; upright for meals and 30 min after; slow rate; small bites/sips.  -AW        Orientation Goal 1 (SLP)    Improve Orientation Through Goal 1 (SLP) demonstrating orientation to day;demonstrating orientation to month;demonstrating orientation to year;demonstrating orientation to place;demonstrating orientation to disease/impairment;use environmental aids to assist with orientation;80%;with minimal cues (75-90%)  -AW --     Time Frame (Orientation Goal 1, SLP) by discharge  -AW --        Memory Skills Goal 1 (SLP)    Improve Memory Skills Through Goal 1 (SLP) listen to a paragraph and answer questions;visual memory task;recall details from a word list;recalling unrelated word lists with an imposed delay;80%;with minimal cues (75-90%)  -AW --     Time Frame (Memory Skills Goal 1, SLP) by  discharge  -AW --        Functional Problem Solving Skills Goal 1 (SLP)    Improve Problem Solving Through Goal 1 (SLP) sequence steps in a task;determine multiple solutions to problems;80%;with minimal cues (75-90%)  -AW --     Time Frame (Problem Solving Goal 1, SLP) by discharge  -AW --        Executive Functional Skills Goal 1 (SLP)    Improve Executive Function Skills Goal 1 (SLP) home management activity;exhibit cognitive flexibility;time management activity;organization/planning activity;80%;with minimal cues (75-90%)  -AW --     Time Frame (Executive Function Skills Goal 1, SLP) by discharge  -AW --               User Key  (r) = Recorded By, (t) = Taken By, (c) = Cosigned By      Initials Name Provider Type    Ingrid Link SLP Speech and Language Pathologist                            Time Calculation:      Time Calculation- SLP       Row Name 12/12/23 1642             Time Calculation- SLP    SLP Start Time 1500  -AW      SLP Received On 12/12/23  -AW                User Key  (r) = Recorded By, (t) = Taken By, (c) = Cosigned By      Initials Name Provider Type    Ingrid Link SLP Speech and Language Pathologist                    Therapy Charges for Today       Code Description Service Date Service Provider Modifiers Qty    06307415989  ST TREATMENT SWALLOW 2 12/12/2023 Ingrid Grayson SLP GN 1    [id number]  ST EVAL SPEECH AND PROD W LANG  3 12/12/2023 Ingrid Grayson SLP GN 1                       LOREE Tamez  12/12/2023

## 2023-12-12 NOTE — PROGRESS NOTES
"Nutrition Services    Patient Name:  Lucia Ellis  YOB: 1950  MRN: [id number]  Admit Date:  12/8/2023    Assessment Date:  12/12/23    NUTRITION SCREENING      Reason for Encounter MST score 2+, adv age   Diagnosis/Problem CVA       PO Diet Diet: Cardiac Diets; Healthy Heart (2-3 Na+); Texture: Soft to Chew (NDD 3); Soft to Chew: Ground Meat; Fluid Consistency: Thin (IDDSI 0)   Supplements    PO Intake % 50-75%       Medications MAR reviewed by RD   Labs  Listed below, reviewed   Physical Findings Alert, disoriented, RA   GI Function BM 12/11   Skin Status Intact, bruising       Height  Weight  BMI  Weight Trend     Height: 162.6 cm (64.02\")  Weight: 58.9 kg (129 lb 13.6 oz) (12/12/23 0834)  Body mass index is 22.28 kg/m².  Stable       Nutrition Problem (PES) No nutrition diagnosis at this time.       Intervention/Plan Nutrition screen, weight is stable, tolerating po 50-75% of meals so far. Please consult if intake declines. DC planning in progress.  Labs, meds, skni reviewed.    RD to follow up per protocol.     Results from last 7 days   Lab Units 12/10/23  0608 12/09/23  0706 12/08/23  1151   SODIUM mmol/L 141 147* 140   POTASSIUM mmol/L 4.7 3.1* 3.2*   CHLORIDE mmol/L 110* 110* 105   CO2 mmol/L 24.0 24.0 23.8   BUN mg/dL 21 19 17   CREATININE mg/dL 0.62 0.60 0.58   CALCIUM mg/dL 8.8 9.1 9.1   BILIRUBIN mg/dL  --   --  0.5   ALK PHOS U/L  --   --  86   ALT (SGPT) U/L  --   --  15   AST (SGOT) U/L  --   --  32   GLUCOSE mg/dL 109* 116* 127*     Results from last 7 days   Lab Units 12/10/23  0608 12/09/23  0706   HEMOGLOBIN g/dL 12.8 13.2   HEMATOCRIT % 38.3 39.3   TRIGLYCERIDES mg/dL  --  88     Lab Results   Component Value Date    HGBA1C 8.40 (H) 12/09/2023       Electronically signed by:  Britta Edmonds RD  12/12/23 14:31 EST   "

## 2023-12-12 NOTE — PLAN OF CARE
Goal Outcome Evaluation:  Plan of Care Reviewed With: patient        Progress: improving  Outcome Evaluation: Pt agreed to PT session, very pleasant and motivated this session, pt tolerated sup-sit min 2, tfer to and from bs w/min-mod 2, 3 shuffle step each direction, pt fatigued after sitting on bsc , did perf EOB LAQs x10 reps, was perf UE ROM ad jessica upon entry, pt should progress w/amb next session, trial AD as able, will need RHB/SNU at [place]      Anticipated Discharge Disposition (PT): inpatient rehabilitation facility, skilled nursing facility

## 2023-12-12 NOTE — THERAPY TREATMENT NOTE
Patient Name: Lucia Ellis  : 1950    MRN: [id number]                              Today's Date: 2023       Admit Date: 2023    Visit Dx:     ICD-10-CM ICD-9-CM   1. Ataxia  R27.0 781.3   2. Left-sided weakness  R53.1 728.87   3. Stroke-like symptoms  R29.90 781.99     Patient Active Problem List   Diagnosis    Gastroesophageal reflux disease    Malignant neoplasm of breast    Depression    Folliculitis    Generalized anxiety disorder    Hyperlipidemia    Essential hypertension    Status post total right knee replacement    Rectal hemorrhage    Vitamin D deficiency    Drug therapy    Acute cholecystitis    Uncontrolled type 2 diabetes mellitus with hypoglycemia without coma    Myoclonus    Type 2 diabetes mellitus with hyperglycemia    Hypokalemia    New onset seizure    Focal motor seizure    Vascular dementia, moderate, without behavioral disturbance, psychotic disturbance, mood disturbance, and anxiety    Stroke-like symptoms    CVA (cerebral vascular accident)    Lung nodules    Hypokalemia     Past Medical History:   Diagnosis Date    Breast cancer     Dementia     Diabetes mellitus     Hypertension     Memory loss      Past Surgical History:   Procedure Laterality Date    CHOLECYSTECTOMY      HYSTERECTOMY      MASTECTOMY Right 1995    TOTAL KNEE ARTHROPLASTY Right       General Information       Row Name 23 1120          Physical Therapy Time and Intention    Document Type therapy note (daily note)  -     Mode of Treatment individual therapy;physical therapy  -       Row Name 23 1120          General Information    Patient Profile Reviewed yes  -     Existing Precautions/Restrictions fall  L side wkness  -       Row Name 23 1120          Cognition    Orientation Status (Cognition) oriented x 3  -       Row Name 23 1120          Safety Issues, Functional Mobility    Impairments Affecting Function (Mobility) balance;coordination;endurance/activity  tolerance;motor control;strength  -               User Key  (r) = Recorded By, (t) = Taken By, (c) = Cosigned By      Initials Name Provider Type    Jesica Lange PTA Physical Therapist Assistant                   Mobility       Row Name 12/12/23 1122          Bed Mobility    Supine-Sit [place] (Bed Mobility) 2 person assist;minimum assist (75% patient effort);verbal cues;nonverbal cues (demo/gesture)  -     Sit-Supine [place] (Bed Mobility) 2 person assist;minimum assist (75% patient effort);verbal cues;nonverbal cues (demo/gesture)  -     Assistive Device (Bed Mobility) bed rails  -       Row Name 12/12/23 1122          Bed-Chair Transfer    Bed-Chair [place] (Transfers) 2 person assist;minimum assist (75% patient effort);moderate assist (50% patient effort);verbal cues;nonverbal cues (demo/gesture)  -     Comment, (Bed-Chair Transfer) HHA-2 to bsc and back  -       Row Name 12/12/23 1122          Sit-Stand Transfer    Sit-Stand [place] (Transfers) 2 person assist;minimum assist (75% patient effort)  -     Comment, (Sit-Stand Transfer) HHA-2  -       Row Name 12/12/23 1122          Gait/Stairs (Locomotion)    Distance in Feet (Gait) 3 shuffle steps both directions bed <>BSC  -               User Key  (r) = Recorded By, (t) = Taken By, (c) = Cosigned By      Initials Name Provider Type    Jesica Lange PTA Physical Therapist Assistant                   Obj/Interventions       Row Name 12/12/23 1125          Motor Skills    Therapeutic Exercise --  LAQs x10, pt reports L side weak  -               User Key  (r) = Recorded By, (t) = Taken By, (c) = Cosigned By      Initials Name Provider Type    Jesica Lange PTA Physical Therapist Assistant                   Goals/Plan    No documentation.                  Clinical Impression       Row Name 12/12/23 1127          Pain    Pretreatment Pain Rating 0/10 - no pain  -     Posttreatment Pain Rating 0/10 - no  pain  -       Row Name 12/12/23 1127          Plan of Care Review    Plan of Care Reviewed With patient  -     Progress improving  -     Outcome Evaluation Pt agreed to PT session, very pleasant and motivated this session, pt tolerated sup-sit min 2, tfer to and from bsc w/min-mod 2, 3 shuffle step each direction, pt fatigued after sitting on bsc , did perf EOB LAQs x10 reps, was perf UE ROM ad jessica upon entry, pt should progress w/amb next session, trial AD as able, will need RHB/SNU at [place]  -       Row Name 12/12/23 1127          Therapy Assessment/Plan (PT)    Rehab Potential (PT) good, to achieve stated therapy goals  -     Criteria for Skilled Interventions Met (PT) yes  -       Row Name 12/12/23 1127          Vital Signs    O2 Delivery Pre Treatment room air  -[facility] Name 12/12/23 1127          Positioning and Restraints    Pre-Treatment Position in bed  -     Post Treatment Position bed  -     In Bed fowlers;call light within reach;encouraged to call for assist;exit alarm on;LUE elevated;notified [facility]               User Key  (r) = Recorded By, (t) = Taken By, (c) = Cosigned By      Initials Name Provider Type    Jesica Lange PTA Physical Therapist Assistant                   Outcome Measures       Row Name 12/12/23 1130 12/12/23 0834       How much help from another person do you currently need...    Turning from your back to your side while in flat bed without using bedrails? 3  - 3  -MR    Moving from lying on back to sitting on the side of a flat bed without bedrails? 2  - 2  -MR    Moving to and from a bed to a chair (including a wheelchair)? 2  - 2  -MR    Standing up from a chair using your arms (e.g., wheelchair, bedside chair)? 2  - 2  -MR    Climbing 3-5 steps with a railing? 1  - 1  -MR    To walk in hospital room? 1  - 2  -MR    AM-PAC 6 Clicks Score (PT) 11  - 12  -MR    Highest Level of Mobility Goal 4 --> Transfer to chair/commode  - 4 --> Transfer  to chair/commode  -MR              User Key  (r) = Recorded By, (t) = Taken By, (c) = Cosigned By      Initials Name Provider Type    Jesica Lange PTA Physical Therapist Assistant     Caryn Beverly RN Registered Nurse                                 Physical Therapy Education       Title: PT OT SLP Therapies (In Progress)       Topic: Physical Therapy (Done)       Point: Mobility training (Done)       Learning Progress Summary             Patient Eager, E,TB,D, VU,NR by  at 12/12/2023 1131                         Point: Home exercise program (Done)       Learning Progress Summary             Patient Eager, E,TB,D, VU,NR by  at 12/12/2023 1131                         Point: Body mechanics (Done)       Learning Progress Summary             Patient Eager, E,TB,D, VU,NR by  at 12/12/2023 1131                         Point: Precautions (Done)       Learning Progress Summary             Patient Eager, E,TB,D, VU,NR by  at 12/12/2023 1131                                         User Key       Initials Effective Dates Name Provider Type [facility] 03/07/18 -  Jesica Ellis PTA Physical Therapist Assistant PT                  PT Recommendation and Plan     Plan of Care Reviewed With: patient  Progress: improving  Outcome Evaluation: Pt agreed to PT session, very pleasant and motivated this session, pt tolerated sup-sit min 2, tfer to and from bsc w/min-mod 2, 3 shuffle step each direction, pt fatigued after sitting on bsc , did perf EOB LAQs x10 reps, was perf UE ROM ad jessica upon entry, pt should progress w/amb next session, trial AD as able, will need RHB/SNU at [place]     Time Calculation:         PT Charges       Row Name 12/12/23 1131             Time Calculation    Start Time 0940  -      Stop Time 1005  -      Time Calculation (min) 25 min  -      PT Received On 12/12/23  -      PT - Next Appointment 12/13/23  -                User Key  (r) = Recorded By, (t) = Taken By, (c) =  Cosigned By      Initials Name Provider Type    Jesica Lange PTA Physical Therapist Assistant                  Therapy Charges for Today       Code Description Service Date Service Provider Modifiers Qty    98732162865 HC PT THER PROC EA 15 MIN 12/12/2023 Jesica Ellis PTA GP 2    37737162799 HC PT THER SUPP EA 15 MIN 12/12/2023 Jesica Ellis PTA GP 2            PT G-Codes  Outcome Measure Options: AM-PAC 6 Clicks Daily Activity (OT), Modified [place]  AM-PAC 6 Clicks Score (PT): 11  AM-PAC 6 Clicks Score (OT): 10  Modified [place] Scale: 4 - Moderately severe disability.  Unable to walk without assistance, and unable to attend to own bodily needs without assistance.  PT Discharge Summary  Anticipated Discharge Disposition (PT): inpatient rehabilitation facility, skilled nursing facility    Jesica Ellis PTA  12/12/2023

## 2023-12-13 LAB
GLUCOSE BLDC GLUCOMTR-MCNC: 132 MG/DL (ref 70–130)
GLUCOSE BLDC GLUCOMTR-MCNC: 156 MG/DL (ref 70–130)
GLUCOSE BLDC GLUCOMTR-MCNC: 204 MG/DL (ref 70–130)

## 2023-12-13 PROCEDURE — 97530 THERAPEUTIC ACTIVITIES: CPT

## 2023-12-13 PROCEDURE — 63710000001 INSULIN REGULAR HUMAN PER 5 UNITS: Performed by: INTERNAL MEDICINE

## 2023-12-13 PROCEDURE — 25010000002 OLANZAPINE 10 MG RECONSTITUTED SOLUTION: Performed by: INTERNAL MEDICINE

## 2023-12-13 PROCEDURE — 82948 REAGENT STRIP/BLOOD GLUCOSE: CPT

## 2023-12-13 RX ORDER — OLANZAPINE 5 MG/1
5 TABLET ORAL NIGHTLY
Start: 2023-12-13

## 2023-12-13 RX ORDER — CLOPIDOGREL BISULFATE 75 MG/1
75 TABLET ORAL DAILY
Start: 2023-12-14

## 2023-12-13 RX ORDER — PANTOPRAZOLE SODIUM 40 MG/1
40 TABLET, DELAYED RELEASE ORAL
Start: 2023-12-14

## 2023-12-13 RX ORDER — ATORVASTATIN CALCIUM 80 MG/1
80 TABLET, FILM COATED ORAL NIGHTLY
Start: 2023-12-13

## 2023-12-13 RX ADMIN — LEVETIRACETAM 1000 MG: 500 TABLET, FILM COATED ORAL at 20:03

## 2023-12-13 RX ADMIN — Medication 3 MG: at 20:03

## 2023-12-13 RX ADMIN — OLANZAPINE 5 MG: 5 TABLET, FILM COATED ORAL at 20:03

## 2023-12-13 RX ADMIN — OLANZAPINE 10 MG: 10 INJECTION, POWDER, FOR SOLUTION INTRAMUSCULAR at 21:52

## 2023-12-13 RX ADMIN — MEMANTINE HYDROCHLORIDE 5 MG: 5 TABLET, FILM COATED ORAL at 20:03

## 2023-12-13 RX ADMIN — AMLODIPINE BESYLATE 10 MG: 10 TABLET ORAL at 08:18

## 2023-12-13 RX ADMIN — INSULIN HUMAN 3 UNITS: 100 INJECTION, SOLUTION PARENTERAL at 17:39

## 2023-12-13 RX ADMIN — DOCUSATE SODIUM 50MG AND SENNOSIDES 8.6MG 2 TABLET: 8.6; 5 TABLET, FILM COATED ORAL at 08:17

## 2023-12-13 RX ADMIN — ESCITALOPRAM OXALATE 10 MG: 10 TABLET, FILM COATED ORAL at 08:17

## 2023-12-13 RX ADMIN — ATORVASTATIN CALCIUM 80 MG: 80 TABLET, FILM COATED ORAL at 20:03

## 2023-12-13 RX ADMIN — INSULIN HUMAN 3 UNITS: 100 INJECTION, SOLUTION PARENTERAL at 00:56

## 2023-12-13 RX ADMIN — ASPIRIN 81 MG: 81 TABLET, COATED ORAL at 08:17

## 2023-12-13 RX ADMIN — MEMANTINE HYDROCHLORIDE 5 MG: 5 TABLET, FILM COATED ORAL at 08:17

## 2023-12-13 RX ADMIN — DOCUSATE SODIUM 50MG AND SENNOSIDES 8.6MG 2 TABLET: 8.6; 5 TABLET, FILM COATED ORAL at 20:04

## 2023-12-13 RX ADMIN — CLOPIDOGREL BISULFATE 75 MG: 75 TABLET, FILM COATED ORAL at 08:17

## 2023-12-13 RX ADMIN — CETIRIZINE HYDROCHLORIDE 10 MG: 10 TABLET ORAL at 08:17

## 2023-12-13 RX ADMIN — DONEPEZIL HYDROCHLORIDE 10 MG: 10 TABLET, FILM COATED ORAL at 08:17

## 2023-12-13 RX ADMIN — LISINOPRIL 40 MG: 40 TABLET ORAL at 08:17

## 2023-12-13 RX ADMIN — PANTOPRAZOLE SODIUM 40 MG: 40 TABLET, DELAYED RELEASE ORAL at 06:30

## 2023-12-13 RX ADMIN — LEVETIRACETAM 1000 MG: 500 TABLET, FILM COATED ORAL at 08:17

## 2023-12-13 RX ADMIN — INSULIN HUMAN 2 UNITS: 100 INJECTION, SOLUTION PARENTERAL at 12:10

## 2023-12-13 NOTE — PLAN OF CARE
Problem: Adult Inpatient Plan of Care  Goal: Plan of Care Review  Outcome: Ongoing, Progressing  Flowsheets (Taken 12/13/2023 0547)  Plan of Care Reviewed With: patient  Goal: Patient-Specific Goal (Individualized)  Outcome: Ongoing, Progressing  Goal: Absence of Hospital-Acquired Illness or Injury  Outcome: Ongoing, Progressing  Intervention: Identify and Manage Fall Risk  Recent Flowsheet Documentation  Taken 12/13/2023 0405 by Thelma Reynaga RN  Safety Promotion/Fall Prevention: safety round/check completed  Taken 12/13/2023 0230 by Thelma Reynaga RN  Safety Promotion/Fall Prevention: safety round/check completed  Taken 12/13/2023 0000 by Thelma Reynaga RN  Safety Promotion/Fall Prevention: safety round/check completed  Taken 12/12/2023 2042 by Thelma Reynaga RN  Safety Promotion/Fall Prevention: safety round/check completed  Intervention: Prevent and Manage VTE (Venous Thromboembolism) Risk  Recent Flowsheet Documentation  Taken 12/13/2023 0100 by Thelma Reynaga RN  VTE Prevention/Management:   bilateral   sequential compression devices on  Intervention: Prevent Infection  Recent Flowsheet Documentation  Taken 12/13/2023 0405 by Thelma Reynaga RN  Infection Prevention: hand hygiene promoted  Taken 12/13/2023 0230 by Thelma Reynaga RN  Infection Prevention: hand hygiene promoted  Goal: Optimal Comfort and Wellbeing  Outcome: Ongoing, Progressing  Goal: Readiness for Transition of Care  Outcome: Ongoing, Progressing     Problem: Diabetes Comorbidity  Goal: Blood Glucose Level Within Targeted Range  Outcome: Ongoing, Progressing     Problem: Hypertension Comorbidity  Goal: Blood Pressure in Desired Range  Outcome: Ongoing, Progressing  Intervention: Maintain Blood Pressure Management  Recent Flowsheet Documentation  Taken 12/13/2023 0405 by Thelma Reynaga RN  Medication Review/Management: medications reviewed  Taken 12/13/2023 0230 by Thelma Reynaga, RN  Medication Review/Management:  medications reviewed  Taken 12/13/2023 0000 by Thelma Reynaga, RN  Medication Review/Management: medications reviewed  Taken 12/12/2023 2042 by Thelma Reynaga RN  Medication Review/Management: medications reviewed     Problem: Seizure Disorder Comorbidity  Goal: Maintenance of Seizure Control  Outcome: Ongoing, Progressing     Problem: Skin Injury Risk Increased  Goal: Skin Health and Integrity  Outcome: Ongoing, Progressing     Problem: Fall Injury Risk  Goal: Absence of Fall and Fall-Related Injury  Outcome: Ongoing, Progressing  Intervention: Identify and Manage Contributors  Recent Flowsheet Documentation  Taken 12/13/2023 0405 by Thelma Reynaga RN  Medication Review/Management: medications reviewed  Taken 12/13/2023 0230 by Thelma Reynaga RN  Medication Review/Management: medications reviewed  Taken 12/13/2023 0000 by Thelma Reynaga RN  Medication Review/Management: medications reviewed  Taken 12/12/2023 2042 by Thelma Reynaga RN  Medication Review/Management: medications reviewed  Intervention: Promote Injury-Free Environment  Recent Flowsheet Documentation  Taken 12/13/2023 0405 by Thelma Reynaga RN  Safety Promotion/Fall Prevention: safety round/check completed  Taken 12/13/2023 0230 by Thelma Reynaga RN  Safety Promotion/Fall Prevention: safety round/check completed  Taken 12/13/2023 0000 by Thelma Reynaga RN  Safety Promotion/Fall Prevention: safety round/check completed  Taken 12/12/2023 2042 by Thelma Reynaga RN  Safety Promotion/Fall Prevention: safety round/check completed     Problem: Hypertension Acute  Goal: Blood Pressure Within Desired Range  Outcome: Ongoing, Progressing  Intervention: Normalize Blood Pressure  Recent Flowsheet Documentation  Taken 12/13/2023 0405 by Thelma Reynaga, RN  Medication Review/Management: medications reviewed  Taken 12/13/2023 0230 by Thelma Reynaga, RN  Medication Review/Management: medications reviewed  Taken 12/13/2023 0000 by  Thelma Reynaga, RN  Medication Review/Management: medications reviewed  Taken 12/12/2023 2042 by Thelma Reynaga, RN  Medication Review/Management: medications reviewed   Goal Outcome Evaluation:  Plan of Care Reviewed With: patient

## 2023-12-13 NOTE — PLAN OF CARE
Goal Outcome Evaluation:  Plan of Care Reviewed With: patient, family        Progress: improving  Outcome Evaluation: Pt in bed and agreeable to therapy this afternoon. The patient came to EOB with cga-Jerzy + increased time to complete and was able to sit EOB predominatly sba-cga, but did have occasional LOB req Jerzy to correct. Pt performed 2x STS from EOB to rwx with min-modA x1 and required verbal and tactile cues for upright posture as patient has tendency to flex at the hip and knees. Unable to formally assess gait d/t safety as the pt demos sporadic knee buckling with ataxia, but she was able to take small steps to HOB with cues for wt shifting and modA w/ rwx. Pt assisted back to bed cga-Jerzy for L LE management. PT will continue to monitor and progress as tolerated.

## 2023-12-13 NOTE — DISCHARGE SUMMARY
Patient Name: Lucia Ellis  : 1950  MRN: [id number]    Date of Admission: 2023  Date of Discharge:  2023  Primary Care Physician: Everardo Mazariegos MD      Chief Complaint:   Weakness - Generalized (Bilat legs )      Discharge Diagnoses     Active Hospital Problems    Diagnosis  POA   • **CVA (cerebral vascular accident) [I63.9]  Unknown   • Lung nodules [R91.8]  Unknown   • Hypokalemia [E87.6]  Unknown   • Stroke-like symptoms [R29.90]  Yes   • Vascular dementia, moderate, without behavioral disturbance, psychotic disturbance, mood disturbance, and anxiety [F01.B0]  Yes   • Type 2 diabetes mellitus with hyperglycemia [E11.65]  Yes   • Essential hypertension [I10]  Yes   • Malignant neoplasm of breast [C50.919]  Yes      Resolved Hospital Problems   No resolved problems to display.        Hospital Course     Ms. Ellis is a 73 y.o. female with a history of diabetes, hypertension, dementia, breast cancer, previous stroke without deficits who presented to [facility] initially complaining of unsteady gait and left-sided weakness.  Please see the admitting history and physical for further details.  She was found to have acute right basal ganglia stroke involving the lateral right thalamus and was admitted to the hospital for further evaluation and treatment.  Neurology consulted on admission for manage recommendations.  She was loaded with Plavix and is to continue dual antiplatelet therapy with Plavix 75 mg daily and aspirin 81 mg daily for 90 days followed by aspirin monotherapy.  She has been transition to high intensity statin as her LDL is currently above goal at 105.  She has been evaluated by physical therapy and they recommend discharge to SNF for additional strengthening prior to returning home.  She will need to follow-up as an outpatient with stroke/neuro 6 to 8 weeks after discharge.  Patient had an incidental finding of right upper lung nodule with triangulated  pleural-based nodular density along the pleura.  Recommend outpatient thoracic surgery consult for further evaluation and consideration of CT-guided biopsy/PET/CT.  Thoracic surgery consult will be placed at discharge.  The patient was also noted to have a subcentimeter focus in the liver, likely benign but recommend outpatient follow-up imaging.  The patient to follow-up with her PCP in a week for posthospital follow-up visit.  She needs improved glycemic control as her A1c is greater than 8%.  Day of Discharge     Subjective:  Patient sitting up in bed, eating breakfast.  She has no complaints at the moment.  Rehab plans pending.    Physical Exam:  Temp:  [97.3 °F (36.3 °C)-98.1 °F (36.7 °C)] 98.1 °F (36.7 °C)  Heart Rate:  [73-79] 73  Resp:  [16-18] 18  BP: (114-140)/(54-87) 140/72  Body mass index is 22.16 kg/m².  Physical Exam  Constitutional:       General: She is not in acute distress.     Appearance: She is ill-appearing.      Comments: Elderly, frail   Cardiovascular:      Rate and Rhythm: Normal rate and regular rhythm.   Pulmonary:      Effort: Pulmonary effort is normal. No respiratory distress.      Breath sounds: No wheezing.   Abdominal:      Palpations: Abdomen is soft.      Tenderness: There is no abdominal tenderness.   Musculoskeletal:         General: No swelling or tenderness.      Right lower leg: No edema.      Left lower leg: No edema.   Skin:     General: Skin is warm and dry.   Neurological:      Mental Status: She is alert and oriented to person, place, and time. Mental status is at baseline.      Comments: Left sided weakness         Consultants     Consult Orders (all) (From admission, onward)       Start     Ordered    12/08/23 1736  Notify Stroke Coordinator  Once        Provider:  (Not yet assigned)    12/08/23 1736 12/08/23 1736  Inpatient Rehab Admission Consult  Once        Provider:  (Not yet assigned)    12/08/23 1736 12/08/23 1736  Consult to Case Management Social  Services  Once        Provider:  (Not yet assigned)    12/08/23 1736    12/08/23 1736  Consult to Diabetes Educator  Once        Provider:  (Not yet assigned)    12/08/23 1736    12/08/23 1736  Inpatient Neurology Consult Stroke  Once        Specialty:  Neurology  Provider:  Florian Estevez MD    12/08/23 1736 12/08/23 1537  [facility] (on-call MD unless specified) Details  Once        Specialty:  Hospitalist  Provider:  Cammie Mi MD    12/08/23 1536                  Procedures     Imaging Results (All)       Procedure Component Value Units Date/Time    CT Chest With Contrast Diagnostic [[id number]] Collected: 12/10/23 1125     Updated: 12/10/23 1725    Narrative:      CT CHEST WITH IV CONTRAST     HISTORY: 73-year-old female with a pulmonary nodule. Right mastectomy in  the past for breast cancer.     TECHNIQUE: Radiation dose reduction techniques were utilized, including  automated exposure control and exposure modulation based on body size.   3 mm images were obtained through the chest after the administration of  IV contrast. Compared with CTA neck 12/08/2023.     FINDINGS:  1. There is an indeterminate sharply circumscribed 1.2 cm right upper  lobe pulmonary nodule. There is a triangulated pleural-based nodular  density along the pleura at the anterolateral aspect of the right upper  lobe. There is also some pleural nodularity inferiorly. There are no  other pulmonary nodules and there are no pleural or pericardial  effusions. There is no lymphadenopathy within the chest or at the  axilla. Thoracic surgery consultation is recommended for additional  evaluation. A PET/CT and CT-guided biopsy may be needed.     2. The lower lobes are underexpanded with crowding of lung markings and  dependent atelectatic change. There are no pleural or pericardial  effusions. At the images of the upper abdomen, there are 2  low-attenuation foci which likely represent cysts, the largest 1.3 cm.  There is also a  subcentimeter low-attenuation focus at the lateral  hepatic segment adjacent to the falciform ligament which may represent  focal fatty infiltration. A benign etiology is suspected, but  reevaluation is recommended on subsequent follow-up imaging.     3. There is an approximately 1.7 cm linear distribution of  calcifications along the periphery of the distal right subclavian artery  which may represent old calcified thrombus or calcification of the  fibrin sheath of a removed central line catheter, images 17-22. This is  of doubtful clinical significance.     This report was finalized on 12/10/2023 5:22 PM by Dr. Norma Fletcher M.D  on Workstation: [id number]       MRI Brain Without Contrast [[id number]] Collected: 12/09/23 0636     Updated: 12/09/23 0643    Narrative:      MR SCAN OF THE BRAIN WITHOUT CONTRAST     HISTORY: Breast cancer. Hypertension. Difficulty walking and left-sided  weakness and poor balance.     The MR scan was performed with sagittal and axial images without  contrast. There is mild diffuse atrophy and mild to moderate chronic  small vessel ischemic change. The diffusion sequence demonstrates an  acute infarct involving the posterior limb of the right internal capsule  and measuring 5 x 10 mm. There is no evidence of hemorrhage or mass  effect.     There are normal flow voids in the major vessels. The sinuses and  mastoid air cells are clear.     CONCLUSION: Small acute infarct in posterior limb of right internal  capsule.     This report was finalized on 12/9/2023 6:40 AM by Dr. Kevin Kuhn M.D  on Workstation: [id number]       CT Angiogram Neck [[id number]] Collected: 12/08/23 1612     Updated: 12/08/23 1630    Narrative:      CT ANGIOGRAM NECK AND HEAD WITH CONTRAST AND CT PERFUSION WITH CONTRAST     HISTORY: Left-sided weakness.     COMPARISON: CT head 12/08/2023 performed at 1336 hours, MRI brain  12/30/2022 and CT head 12/29/2022.     Initially, a noncontrasted CT examination of the brain  was performed.  There is no evidence of hemorrhage. Moderate to severe vascular  calcification and moderate to severe small vessel ischemic disease is  appreciated. A lacunar infarct involving the anterior limb of the  internal capsule on the right is appreciated which was present on the CT  examination of 12/29/2022.     CT PERFUSION: There is no evidence of abnormal perfusion.     CT ANGIOGRAM OF THE NECK AND HEAD: A CT angiogram of the neck and head  was performed. Multiplanar as well as three-dimensional reconstructions  were generated.     There is a 12 mm mildly ovoid nodule appreciated involving the right  upper lobe superiorly and medially. There is pleural base soft tissue  thickening also appreciated involving the right upper lobe anterior  laterally measuring approximately 12 x 6 x 15 mm in size.     The great vessels are arranged in a bovine configuration. There is 0%  stenosis of the internal carotid arteries using NASCET criteria. Mild  irregularity involving the cavernous and supraclinoid ICA is appreciated  bilaterally but without high-grade stenosis. The right A1 segment is  hypoplastic. The proximal aspects of the anterior and middle cerebral  arteries appear unremarkable.     Both vertebral arteries were opacified. Mild stenosis is appreciated  involving the origin of the right vertebral artery. The origin of the  left vertebral artery is obscured by adjacent high attenuation venous  contrast and is associated streak artifact. A mild stenosis cannot be  excluded. The cervical segments of the vertebral arteries are of  relatively uniform caliber. The basilar artery appears unremarkable.  There is a mild to moderate stenosis involving the right P1-P2 junction.  The left posterior cerebral artery appears unremarkable.     A standard postcontrast CT examination of the brain shows no evidence of  abnormal enhancement.       Impression:      1.  There is no evidence of acute infarction, intracranial  hemorrhage,  hydrocephalus or of abnormal enhancement. Small vessel ischemic disease,  vascular calcification and a lacunar infarct involving the anterior limb  of the internal capsule on the right is noted.  2.  There is a mild to moderate stenosis involving the right P1-P2  junction. There is no evidence of a proximal intracranial occlusion or  focal severe stenosis. There is 0% stenosis involving the internal  carotid arteries using NASCET criteria. Mild stenosis involving the  origin of the right vertebral artery is appreciated. The left vertebral  artery origin is partially obscured by beam hardening artifact from  adjacent dense venous contrast.  3.  There is a 12 mm nodule involving the right upper lobe medially  suspicious for metastatic disease. A nonspecific but suspicious  pleural-based soft tissue mass is appreciated involving the right upper  lobe anterolaterally measuring approximately 12 x 6 x 15 mm in size. A  dedicated CT examination of the chest is recommended.     The noncontrasted CT examination of the brain was made available for  interpretation at 1513 hours and a preliminary report called at 1514  hours.  The CT angiogram was made available for interpretation at 1522 hours and  a preliminary report called at 1526 hours.              AI analysis of LVO was utilized.     Radiation dose reduction techniques were utilized, including automated  exposure control and exposure modulation based on body size.        This report was finalized on 12/8/2023 4:27 PM by Dr. Guille Mcnamara M.D  on Workstation: [id number]       CT CEREBRAL PERFUSION WITH & WITHOUT CONTRAST [[id number]] Collected: 12/08/23 1612     Updated: 12/08/23 1630    Narrative:      CT ANGIOGRAM NECK AND HEAD WITH CONTRAST AND CT PERFUSION WITH CONTRAST     HISTORY: Left-sided weakness.     COMPARISON: CT head 12/08/2023 performed at 1336 hours, MRI brain  12/30/2022 and CT head 12/29/2022.     Initially, a noncontrasted CT examination of  the brain was performed.  There is no evidence of hemorrhage. Moderate to severe vascular  calcification and moderate to severe small vessel ischemic disease is  appreciated. A lacunar infarct involving the anterior limb of the  internal capsule on the right is appreciated which was present on the CT  examination of 12/29/2022.     CT PERFUSION: There is no evidence of abnormal perfusion.     CT ANGIOGRAM OF THE NECK AND HEAD: A CT angiogram of the neck and head  was performed. Multiplanar as well as three-dimensional reconstructions  were generated.     There is a 12 mm mildly ovoid nodule appreciated involving the right  upper lobe superiorly and medially. There is pleural base soft tissue  thickening also appreciated involving the right upper lobe anterior  laterally measuring approximately 12 x 6 x 15 mm in size.     The great vessels are arranged in a bovine configuration. There is 0%  stenosis of the internal carotid arteries using NASCET criteria. Mild  irregularity involving the cavernous and supraclinoid ICA is appreciated  bilaterally but without high-grade stenosis. The right A1 segment is  hypoplastic. The proximal aspects of the anterior and middle cerebral  arteries appear unremarkable.     Both vertebral arteries were opacified. Mild stenosis is appreciated  involving the origin of the right vertebral artery. The origin of the  left vertebral artery is obscured by adjacent high attenuation venous  contrast and is associated streak artifact. A mild stenosis cannot be  excluded. The cervical segments of the vertebral arteries are of  relatively uniform caliber. The basilar artery appears unremarkable.  There is a mild to moderate stenosis involving the right P1-P2 junction.  The left posterior cerebral artery appears unremarkable.     A standard postcontrast CT examination of the brain shows no evidence of  abnormal enhancement.       Impression:      1.  There is no evidence of acute infarction,  intracranial hemorrhage,  hydrocephalus or of abnormal enhancement. Small vessel ischemic disease,  vascular calcification and a lacunar infarct involving the anterior limb  of the internal capsule on the right is noted.  2.  There is a mild to moderate stenosis involving the right P1-P2  junction. There is no evidence of a proximal intracranial occlusion or  focal severe stenosis. There is 0% stenosis involving the internal  carotid arteries using NASCET criteria. Mild stenosis involving the  origin of the right vertebral artery is appreciated. The left vertebral  artery origin is partially obscured by beam hardening artifact from  adjacent dense venous contrast.  3.  There is a 12 mm nodule involving the right upper lobe medially  suspicious for metastatic disease. A nonspecific but suspicious  pleural-based soft tissue mass is appreciated involving the right upper  lobe anterolaterally measuring approximately 12 x 6 x 15 mm in size. A  dedicated CT examination of the chest is recommended.     The noncontrasted CT examination of the brain was made available for  interpretation at 1513 hours and a preliminary report called at 1514  hours.  The CT angiogram was made available for interpretation at 1522 hours and  a preliminary report called at 1526 hours.              AI analysis of LVO was utilized.     Radiation dose reduction techniques were utilized, including automated  exposure control and exposure modulation based on body size.        This report was finalized on 12/8/2023 4:27 PM by Dr. Guille Mcnamara M.D  on Workstation: [id number]       CT Angiogram Head w AI Analysis of LVO [[id number]] Collected: 12/08/23 1612     Updated: 12/08/23 1630    Narrative:      CT ANGIOGRAM NECK AND HEAD WITH CONTRAST AND CT PERFUSION WITH CONTRAST     HISTORY: Left-sided weakness.     COMPARISON: CT head 12/08/2023 performed at 1336 hours, MRI brain  12/30/2022 and CT head 12/29/2022.     Initially, a noncontrasted CT  examination of the brain was performed.  There is no evidence of hemorrhage. Moderate to severe vascular  calcification and moderate to severe small vessel ischemic disease is  appreciated. A lacunar infarct involving the anterior limb of the  internal capsule on the right is appreciated which was present on the CT  examination of 12/29/2022.     CT PERFUSION: There is no evidence of abnormal perfusion.     CT ANGIOGRAM OF THE NECK AND HEAD: A CT angiogram of the neck and head  was performed. Multiplanar as well as three-dimensional reconstructions  were generated.     There is a 12 mm mildly ovoid nodule appreciated involving the right  upper lobe superiorly and medially. There is pleural base soft tissue  thickening also appreciated involving the right upper lobe anterior  laterally measuring approximately 12 x 6 x 15 mm in size.     The great vessels are arranged in a bovine configuration. There is 0%  stenosis of the internal carotid arteries using NASCET criteria. Mild  irregularity involving the cavernous and supraclinoid ICA is appreciated  bilaterally but without high-grade stenosis. The right A1 segment is  hypoplastic. The proximal aspects of the anterior and middle cerebral  arteries appear unremarkable.     Both vertebral arteries were opacified. Mild stenosis is appreciated  involving the origin of the right vertebral artery. The origin of the  left vertebral artery is obscured by adjacent high attenuation venous  contrast and is associated streak artifact. A mild stenosis cannot be  excluded. The cervical segments of the vertebral arteries are of  relatively uniform caliber. The basilar artery appears unremarkable.  There is a mild to moderate stenosis involving the right P1-P2 junction.  The left posterior cerebral artery appears unremarkable.     A standard postcontrast CT examination of the brain shows no evidence of  abnormal enhancement.       Impression:      1.  There is no evidence of acute  infarction, intracranial hemorrhage,  hydrocephalus or of abnormal enhancement. Small vessel ischemic disease,  vascular calcification and a lacunar infarct involving the anterior limb  of the internal capsule on the right is noted.  2.  There is a mild to moderate stenosis involving the right P1-P2  junction. There is no evidence of a proximal intracranial occlusion or  focal severe stenosis. There is 0% stenosis involving the internal  carotid arteries using NASCET criteria. Mild stenosis involving the  origin of the right vertebral artery is appreciated. The left vertebral  artery origin is partially obscured by beam hardening artifact from  adjacent dense venous contrast.  3.  There is a 12 mm nodule involving the right upper lobe medially  suspicious for metastatic disease. A nonspecific but suspicious  pleural-based soft tissue mass is appreciated involving the right upper  lobe anterolaterally measuring approximately 12 x 6 x 15 mm in size. A  dedicated CT examination of the chest is recommended.     The noncontrasted CT examination of the brain was made available for  interpretation at 1513 hours and a preliminary report called at 1514  hours.  The CT angiogram was made available for interpretation at 1522 hours and  a preliminary report called at 1526 hours.              AI analysis of LVO was utilized.     Radiation dose reduction techniques were utilized, including automated  exposure control and exposure modulation based on body size.        This report was finalized on 12/8/2023 4:27 PM by Dr. Guille Mcnamara M.D  on Workstation: [id number]       CT Head Without Contrast [[id number]] Collected: 12/08/23 1411     Updated: 12/08/23 1501    Narrative:      EMERGENCY CT SCAN OF THE HEAD WITHOUT CONTRAST ON 12/08/2023     CLINICAL HISTORY: Stroke-like symptoms. The patient has weakness, with  bilateral leg weakness and abnormal gait.     TECHNIQUE: Spiral CT images were obtained from the base of the skull  to  the vertex without intravenous contrast. The images were reformatted and  are submitted in 3 mm thick axial, sagittal and coronal CT sections with  brain algorithm and 2 mm thick axial CT sections with high-resolution  bone algorithm.      This is correlated to a prior MRI of the brain from [facility] on 05/07/2021 and prior head CT on 12/29/2022.     FINDINGS: There are patchy areas of low-density in the periventricular  and subcortical white matter of the cerebral hemispheres consistent with  moderate small vessel disease. The remainder of the brain parenchyma is  normal in attenuation. The ventricles are normal in size. I see no focal  mass effect. There is no midline shift. No extra-axial fluid collections  are identified. There is no evidence of acute intracranial hemorrhage.  The paranasal sinuses and the mastoid air cells and the middle ear  cavities are clear. Calcified plaques are present in the intracranial  segments of the distal vertebral arteries and cavernous segments of the  internal carotid arteries bilaterally.       Impression:      1. No acute intracranial abnormality is identified.   2. There is moderate small vessel disease in the cerebral white matter  and mild cerebral atrophy. There is an old lacunar infarct extending  from the anterior limb of the right internal capsule into the anterior  right putamen measuring 12 x 6 mm, unchanged. The remainder of the head  CT is within normal limits. The etiology of the patient's bilateral leg  weakness and abnormal gait is not established on this exam. If there  remains any clinical suspicion of acute stroke I recommend an MRI of the  brain for more complete assessment.      Radiation dose reduction techniques were utilized, including automated  exposure control and exposure modulation based on body size.        This report was finalized on 12/8/2023 2:58 PM by Dr. Scottie Juarez M.D  on Workstation: [id number]               Pertinent  Labs     Results from last 7 days   Lab Units 12/10/23  0608 12/09/23  0706 12/08/23  1151   WBC 10*3/mm3 4.36 5.74 6.71   HEMOGLOBIN g/dL 12.8 13.2 13.4   PLATELETS 10*3/mm3 189 204 216     Results from last 7 days   Lab Units 12/10/23  0608 12/09/23  0706 12/08/23  1151   SODIUM mmol/L 141 147* 140   POTASSIUM mmol/L 4.7 3.1* 3.2*   CHLORIDE mmol/L 110* 110* 105   CO2 mmol/L 24.0 24.0 23.8   BUN mg/dL 21 19 17   CREATININE mg/dL 0.62 0.60 0.58   GLUCOSE mg/dL 109* 116* 127*   Estimated Creatinine Clearance: 72.5 mL/min (by C-G formula based on SCr of 0.62 mg/dL).  Results from last 7 days   Lab Units 12/08/23  1151   ALBUMIN g/dL 3.9   BILIRUBIN mg/dL 0.5   ALK PHOS U/L 86   AST (SGOT) U/L 32   ALT (SGPT) U/L 15     Results from last 7 days   Lab Units 12/10/23  0608 12/09/23  0706 12/08/23  1151   CALCIUM mg/dL 8.8 9.1 9.1   ALBUMIN g/dL  --   --  3.9           Results from last 7 days   Lab Units 12/09/23  0706   CHOLESTEROL mg/dL 187   TRIGLYCERIDES mg/dL 88   HDL CHOL mg/dL 66*   LDL CHOL mg/dL 105*           Test Results Pending at Discharge       Discharge Details        Discharge Medications        New Medications        Instructions Start Date   atorvastatin 80 MG tablet  Commonly known as: LIPITOR   80 mg, Oral, Nightly      clopidogrel 75 MG tablet  Commonly known as: PLAVIX   75 mg, Oral, Daily   Start Date: December 14, 2023     OLANZapine 5 MG tablet  Commonly known as: zyPREXA   5 mg, Oral, Nightly      pantoprazole 40 MG EC tablet  Commonly known as: PROTONIX   40 mg, Oral, Every Early Morning   Start Date: December 14, 2023            Continue These Medications        Instructions Start Date   amLODIPine 10 MG tablet  Commonly known as: NORVASC   10 mg, Oral, Daily      aspirin 81 MG EC tablet   81 mg, Oral, Daily      cetirizine 10 MG tablet  Commonly known as: zyrTEC   10 mg, Oral, Daily      donepezil 10 MG tablet  Commonly known as: Aricept   10 mg, Oral, Daily      empagliflozin 10 MG tablet  tablet  Commonly known as: Jardiance   10 mg, Oral, Daily      escitalopram 10 MG tablet  Commonly known as: Lexapro   10 mg, Oral, Daily      FreeStyle Aleyda 2 [place] device   1 each, Does not apply, Continuous      FreeStyle Aleyda 2 Sensor misc   1 each, Does not apply, Weekly      Janumet XR  MG tablet  Generic drug: SITagliptin-metFORMIN HCl ER   1 tablet, Oral, Daily      levETIRAcetam 1000 MG tablet  Commonly known as: KEPPRA   1,000 mg, Oral, 2 Times Daily      memantine 5 MG tablet  Commonly known as: Namenda   5 mg, Oral, 2 Times Daily      quinapril 40 MG tablet  Commonly known as: ACCUPRIL   40 mg, Oral, Daily             Stop These Medications      meloxicam 15 MG tablet  Commonly known as: MOBIC     pravastatin 10 MG tablet  Commonly known as: PRAVACHOL              Allergies   Allergen Reactions   • Ppd [Tuberculin Purified Protein Derivative] Rash         Discharge Disposition:  Skilled Nursing Facility (DC - External)    Discharge Diet:  Diet Order   Procedures   • Diet: Cardiac Diets; Healthy Heart (2-3 Na+); Texture: Soft to Chew (NDD 3); Soft to Chew: Chopped Meat; Fluid Consistency: Thin (IDDSI 0)       Discharge Activity:   Activity Instructions       Activity as Tolerated              CODE STATUS:    Code Status and Medical Interventions:   Ordered at: 12/08/23 6098     Code Status (Patient has no pulse and is not breathing):    CPR (Attempt to Resuscitate)     Medical Interventions (Patient has pulse or is breathing):    Full       No future appointments.  Additional Instructions for the Follow-ups that You Need to Schedule       Discharge Follow-up with PCP   As directed       Currently Documented PCP:    Everardo Mazariegos MD    PCP Phone Number:    [phone]     Follow Up Details: post-hospital follow up        Discharge Follow-up with Specified Provider: Neurology as directed   As directed      To: Neurology as directed               Follow-up Information       Everardo Mazariegos,  MD .    Specialties: Family Medicine, Urgent Care, Emergency Medicine  Why: post-hospital follow up  Contact information:  [address]  [place] [address]  [phone]                             Additional Instructions for the Follow-ups that You Need to Schedule       Discharge Follow-up with PCP   As directed       Currently Documented PCP:    Everardo Mazariegos MD    PCP Phone Number:    [phone]     Follow Up Details: post-hospital follow up        Discharge Follow-up with Specified Provider: Neurology as directed   As directed      To: Neurology as directed            Time Spent on Discharge:  I spent greater than 30 minutes on this discharge activity which included: face-to-face encounter with the patient, reviewing the data in the system, coordination of the care with the nursing staff as well as consultants, documentation, and entering orders.       Lauren Muñiz MD  [facility]  12/13/23  10:58 EST

## 2023-12-13 NOTE — SIGNIFICANT NOTE
12/13/23 1546   Post Acute Pre-Cert Documentation   Request Submitted by Facility - Type: Hospital   Post-Acute Authorization Type Submitted: SNF   Date Post Acute Pre-Cert Inititated per Facility 12/13/23   Accepting Facility [facility] Discharge Date Requested 12/14/23   All Clinicals Submitted? Yes   Had Accepting Facility at Time of Submission Yes   Response Communicated to:    Authorization Number: [id number]   Post Acute Pre-Cert Initiated Comment LYNDA MELENDEZ

## 2023-12-13 NOTE — THERAPY TREATMENT NOTE
Patient Name: Lucia Ellis  : 1950    MRN: [id number]                              Today's Date: 2023       Admit Date: 2023    Visit Dx:     ICD-10-CM ICD-9-CM   1. Ataxia  R27.0 781.3   2. Left-sided weakness  R53.1 728.87   3. Stroke-like symptoms  R29.90 781.99   4. Lung nodules  R91.8 793.19     Patient Active Problem List   Diagnosis    Gastroesophageal reflux disease    Malignant neoplasm of breast    Depression    Folliculitis    Generalized anxiety disorder    Hyperlipidemia    Essential hypertension    Status post total right knee replacement    Rectal hemorrhage    Vitamin D deficiency    Drug therapy    Acute cholecystitis    Uncontrolled type 2 diabetes mellitus with hypoglycemia without coma    Myoclonus    Type 2 diabetes mellitus with hyperglycemia    Hypokalemia    New onset seizure    Focal motor seizure    Vascular dementia, moderate, without behavioral disturbance, psychotic disturbance, mood disturbance, and anxiety    Stroke-like symptoms    CVA (cerebral vascular accident)    Lung nodules    Hypokalemia     Past Medical History:   Diagnosis Date    Breast cancer     Dementia     Diabetes mellitus     Hypertension     Memory loss      Past Surgical History:   Procedure Laterality Date    CHOLECYSTECTOMY      HYSTERECTOMY      MASTECTOMY Right 1995    TOTAL KNEE ARTHROPLASTY Right       General Information       Row Name 23 1457          Physical Therapy Time and Intention    Document Type therapy note (daily note)  -ZB     Mode of Treatment individual therapy;physical therapy  -ZB       Row Name 23 1457          General Information    Patient Profile Reviewed yes  -ZB     Existing Precautions/Restrictions fall  L sided weakness  -ZB       Row Name 23 145          Cognition    Orientation Status (Cognition) oriented x 3  -ZB       Row Name 23 1457          Safety Issues, Functional Mobility    Impairments Affecting Function (Mobility)  balance;coordination;endurance/activity tolerance;motor control;strength  -ZB     Comment, Safety Issues/Impairments (Mobility) gait belt and non skid socks donned  -ZB               User Key  (r) = Recorded By, (t) = Taken By, (c) = Cosigned By      Initials Name Provider Type    Luis Eduardo Cabrera PT Physical Therapist                   Mobility       Row Name 12/13/23 1457          Bed Mobility    Bed Mobility supine-sit;sit-supine  -ZB     Supine-Sit [place] (Bed Mobility) contact guard;minimum assist (75% patient effort);verbal cues;nonverbal cues (demo/gesture)  -ZB     Sit-Supine [place] (Bed Mobility) contact guard;minimum assist (75% patient effort);verbal cues;nonverbal cues (demo/gesture)  -ZB     Assistive Device (Bed Mobility) head of bed elevated;bed rails  -ZB     Comment, (Bed Mobility) increased time to complete but is able to perform most without assist; required L LE assist during sit>supine  -ZB       Row Name 12/13/23 1457          Bed-Chair Transfer    Bed-Chair [place] (Transfers) not tested  -ZB       Row Name 12/13/23 1457          Sit-Stand Transfer    Sit-Stand [place] (Transfers) minimum assist (75% patient effort);moderate assist (50% patient effort);1 person assist;verbal cues;nonverbal cues (demo/gesture)  -ZB     Assistive Device (Sit-Stand Transfers) walker, front-wheeled  -ZB     Comment, (Sit-Stand Transfer) x2 total from EOB  -ZB       Row Name 12/13/23 1457          Gait/Stairs (Locomotion)    [place] Level (Gait) unable to assess  -ZB     Comment, (Gait/Stairs) unable to assess gait d/t safety concerns this date but able to take small steps to HOB modA  -ZB               User Key  (r) = Recorded By, (t) = Taken By, (c) = Cosigned By      Initials Name Provider Type    Luis Eduardo Cabrera PT Physical Therapist                   Obj/Interventions       Row Name 12/13/23 1459          Motor Skills    Therapeutic Exercise other (see comments)  seated march/LAQ  (x10)  -ZB       Row Name 12/13/23 1459          Balance    Balance Assessment sitting static balance;sitting dynamic balance;standing static balance;standing dynamic balance  -ZB     Static Sitting Balance standby assist  -ZB     Dynamic Sitting Balance contact guard;minimal assist  -ZB     Position, Sitting Balance sitting edge of bed  -ZB     Static Standing Balance minimal assist;moderate assist  -ZB     Dynamic Standing Balance moderate assist  -ZB     Position/Device Used, Standing Balance supported;walker, front-wheeled  -ZB     Balance Interventions sitting;standing;sit to stand;supported;static;dynamic  -ZB     Comment, Balance min-modA for standing balance d/t posterior knee and sporadic knee buckling  -ZB               User Key  (r) = Recorded By, (t) = Taken By, (c) = Cosigned By      Initials Name Provider Type    Luis Eduardo Cabrera PT Physical Therapist                   Goals/Plan    No documentation.                  Clinical Impression       Row Name 12/13/23 1500          Pain    Pretreatment Pain Rating 0/10 - no pain  -ZB     Posttreatment Pain Rating 0/10 - no pain  -ZB     Pain Intervention(s) Ambulation/increased activity;Repositioned;Rest  -ZB       Row Name 12/13/23 1500          Plan of Care Review    Plan of Care Reviewed With patient;family  -ZB     Progress improving  -ZB     Outcome Evaluation Pt in bed and agreeable to therapy this afternoon. The patient came to EOB with cga-Jerzy + increased time to complete and was able to sit EOB predominatly sba-cga, but did have occasional LOB req Jerzy to correct. Pt performed 2x STS from EOB to rwx with min-modA x1 and required verbal and tactile cues for upright posture as patient has tendency to flex at the hip and knees. Unable to formally assess gait d/t safety as the pt demos sporadic knee buckling with ataxia, but she was able to take small steps to HOB with cues for wt shifting and modA w/ rwx. Pt assisted back to bed cga-Jerzy for L LE  management. PT will continue to monitor and progress as tolerated.  -ZB       Row Name 12/13/23 1500          Positioning and Restraints    Pre-Treatment Position in bed  -ZB     Post Treatment Position bed  -ZB     In Bed notified nsg;fowlers;call light within reach;encouraged to call for assist;exit alarm on;with family/caregiver  -ZB               User Key  (r) = Recorded By, (t) = Taken By, (c) = Cosigned By      Initials Name Provider Type    Luis Eduardo Cabrera PT Physical Therapist                   Outcome Measures       Row Name 12/13/23 1504 12/13/23 0802       How much help from another person do you currently need...    Turning from your back to your side while in flat bed without using bedrails? 3  -ZB 3  -MR    Moving from lying on back to sitting on the side of a flat bed without bedrails? 3  -ZB 3  -MR    Moving to and from a bed to a chair (including a wheelchair)? 3  -ZB 3  -MR    Standing up from a chair using your arms (e.g., wheelchair, bedside chair)? 3  -ZB 2  -MR    Climbing 3-5 steps with a railing? 1  -ZB 2  -MR    To walk in hospital room? 2  -ZB 2  -MR    AM-PAC 6 Clicks Score (PT) 15  -ZB 15  -MR    Highest Level of Mobility Goal 4 --> Transfer to chair/commode  -ZB 4 --> Transfer to chair/commode  -MR      Row Name 12/13/23 0548          How much help from another person do you currently need...    Turning from your back to your side while in flat bed without using bedrails? 3  -JW     Moving from lying on back to sitting on the side of a flat bed without bedrails? 3  -JW     Moving to and from a bed to a chair (including a wheelchair)? 3  -JW     Standing up from a chair using your arms (e.g., wheelchair, bedside chair)? 2  -JW     Climbing 3-5 steps with a railing? 2  -JW     To walk in hospital room? 2  -JW     AM-PAC 6 Clicks Score (PT) 15  -JW     Highest Level of Mobility Goal 4 --> Transfer to chair/commode  -JW       Row Name 12/13/23 1504          Functional Assessment    Outcome  Measure Options AM-PAC 6 Clicks Basic Mobility (PT)  -ZB               User Key  (r) = Recorded By, (t) = Taken By, (c) = Cosigned By      Initials Name Provider Type    Teena Florianssa, RN Registered Nurse    Thelma Murcia RN Registered Nurse    Luis Eduardo Cabrera, PT Physical Therapist                                 Physical Therapy Education       Title: PT OT SLP Therapies (In Progress)       Topic: Physical Therapy (Done)       Point: Mobility training (Done)       Learning Progress Summary             Patient Eager, E,TB,D, VU,NR by  at 12/12/2023 1131                         Point: Home exercise program (Done)       Learning Progress Summary             Patient Eager, E,TB,D, VU,NR by  at 12/12/2023 1131                         Point: Body mechanics (Done)       Learning Progress Summary             Patient Eager, E,TB,D, VU,NR by  at 12/12/2023 1131                         Point: Precautions (Done)       Learning Progress Summary             Patient Eager, E,TB,D, VU,NR by  at 12/12/2023 1131                                         User Key       Initials Effective Dates Name Provider Type Discipline     03/07/18 -  Jesica Ellis PTA Physical Therapist Assistant PT                  PT Recommendation and Plan     Plan of Care Reviewed With: patient, family  Progress: improving  Outcome Evaluation: Pt in bed and agreeable to therapy this afternoon. The patient came to EOB with cga-Jerzy + increased time to complete and was able to sit EOB predominatly sba-cga, but did have occasional LOB req Jerzy to correct. Pt performed 2x STS from EOB to rwx with min-modA x1 and required verbal and tactile cues for upright posture as patient has tendency to flex at the hip and knees. Unable to formally assess gait d/t safety as the pt demos sporadic knee buckling with ataxia, but she was able to take small steps to HOB with cues for wt shifting and modA w/ rwx. Pt assisted back to bed cga-Jerzy for L LE  management. PT will continue to monitor and progress as tolerated.     Time Calculation:         PT Charges       Row Name 12/13/23 1505             Time Calculation    Start Time 1436  -ZB      Stop Time 1453  -ZB      Time Calculation (min) 17 min  -ZB      PT Received On 12/13/23  -ZB      PT - Next Appointment 12/14/23  -ZB         Time Calculation- PT    Total Timed Code Minutes- PT 17 minute(s)  -ZB         Timed Charges    05775 - PT Therapeutic Activity Minutes 17  -ZB         Total Minutes    Timed Charges Total Minutes 17  -ZB       Total Minutes 17  -ZB                User Key  (r) = Recorded By, (t) = Taken By, (c) = Cosigned By      Initials Name Provider Type    ZB Luis Eduardo Cruz, GILDARDO Physical Therapist                  Therapy Charges for Today       Code Description Service Date Service Provider Modifiers Qty    96427086530 HC PT THERAPEUTIC ACT EA 15 MIN 12/13/2023 Luis Eduardo Cruz, PT GP 1            PT G-Codes  Outcome Measure Options: AM-PAC 6 Clicks Basic Mobility (PT)  AM-PAC 6 Clicks Score (PT): 15  AM-PAC 6 Clicks Score (OT): 10  Modified McCausland Scale: 4 - Moderately severe disability.  Unable to walk without assistance, and unable to attend to own bodily needs without assistance.       Paola Cruz PT  12/13/2023

## 2023-12-13 NOTE — DISCHARGE PLACEMENT REQUEST
"Randi Swanson (73 y.o. Female)       Date of Birth   1950    Social Security Number       Address   [address] [place] [address]    Home Phone   [phone]    MRN   [id number]       Sabianist   Mu-ism    Marital Status                               Admission Date   12/8/23    Admission Type   Emergency    Admitting Provider   Cammie Mi MD    Attending Provider   Lauren Muñiz MD    Department, Room/Bed   [address], P596/1       Discharge Date       Discharge Disposition   Skilled Nursing Facility (DC - External)    Discharge Destination                                 Attending Provider: Lauren Muñiz MD    Allergies: Ppd [Tuberculin Purified Protein Derivative]    Isolation: None   Infection: None   Code Status: CPR    Ht: 162.6 cm (64.02\")   Wt: 58.6 kg (129 lb 3 oz)    Admission Cmt: None   Principal Problem: CVA (cerebral vascular accident) [I63.9]                   Active Insurance as of 12/8/2023       Primary Coverage       Payor Plan Insurance Group Employer/Plan Group    HUMANA MEDICARE REPLACEMENT [employer] [facility] [employer] MEDICARE REPLACEMENT NON PAR [id number]       Payor Plan Address Payor Plan Phone Number Payor Plan Fax Number Effective Dates       3/1/2023 - None Entered      Subscriber Name Subscriber Birth Date Member ID       RANDI SWANSON 1950 [id number]                     Emergency Contacts        (Rel.) Home Phone Work Phone Mobile Phone    Jessica Recinos (Sister) [phone] -- --    TammyPeterwilbert (Son) [phone]    Jj Recinos (nephew) (Relative) [phone] -- --                "

## 2023-12-13 NOTE — PLAN OF CARE
Goal Outcome Evaluation:        Problem: Adult Inpatient Plan of Care  Goal: Plan of Care Review  Outcome: Ongoing, Progressing  Goal: Patient-Specific Goal (Individualized)  Outcome: Ongoing, Progressing  Goal: Absence of Hospital-Acquired Illness or Injury  Outcome: Ongoing, Progressing  Intervention: Identify and Manage Fall Risk  Recent Flowsheet Documentation  Taken 12/13/2023 0802 by Caryn Beverly RN  Safety Promotion/Fall Prevention:   activity supervised   safety round/check completed  Intervention: Prevent and Manage VTE (Venous Thromboembolism) Risk  Recent Flowsheet Documentation  Taken 12/13/2023 0802 by Caryn Beverly RN  VTE Prevention/Management: patient refused intervention  Goal: Optimal Comfort and Wellbeing  Outcome: Ongoing, Progressing  Intervention: Provide Person-Centered Care  Recent Flowsheet Documentation  Taken 12/13/2023 0802 by Caryn Beverly RN  Trust Relationship/Rapport:   care explained   choices provided   emotional support provided  Goal: Readiness for Transition of Care  Outcome: Ongoing, Progressing     Problem: Diabetes Comorbidity  Goal: Blood Glucose Level Within Targeted Range  Outcome: Ongoing, Progressing  Intervention: Monitor and Manage Glycemia  Recent Flowsheet Documentation  Taken 12/13/2023 0802 by Caryn Beverly RN  Glycemic Management: blood glucose monitored     Problem: Hypertension Comorbidity  Goal: Blood Pressure in Desired Range  Outcome: Ongoing, Progressing  Intervention: Maintain Blood Pressure Management  Recent Flowsheet Documentation  Taken 12/13/2023 0802 by Caryn Beverly RN  Medication Review/Management: medications reviewed     Problem: Seizure Disorder Comorbidity  Goal: Maintenance of Seizure Control  Outcome: Ongoing, Progressing     Problem: Skin Injury Risk Increased  Goal: Skin Health and Integrity  Outcome: Ongoing, Progressing  Intervention: Optimize Skin Protection  Recent Flowsheet Documentation  Taken 12/13/2023 0802 by  Caryn Beverly, RN  Pressure Reduction Devices: alternating pressure pump (ADD)     Problem: Fall Injury Risk  Goal: Absence of Fall and Fall-Related Injury  Outcome: Ongoing, Progressing  Intervention: Identify and Manage Contributors  Recent Flowsheet Documentation  Taken 12/13/2023 0802 by Caryn Beverly, RN  Medication Review/Management: medications reviewed  Intervention: Promote Injury-Free Environment  Recent Flowsheet Documentation  Taken 12/13/2023 0802 by Caryn Beverly, RN  Safety Promotion/Fall Prevention:   activity supervised   safety round/check completed     Problem: Hypertension Acute  Goal: Blood Pressure Within Desired Range  Outcome: Ongoing, Progressing  Intervention: Normalize Blood Pressure  Recent Flowsheet Documentation  Taken 12/13/2023 0802 by Caryn Beverly, RN  Medication Review/Management: medications reviewed

## 2023-12-13 NOTE — PROGRESS NOTES
Continued Stay Note  [facility]     Patient Name: Lucia Ellis  MRN: [id number]  Today's Date: 12/13/2023    Admit Date: 12/8/2023    Plan: SNF referrals pending   Discharge Plan       Row Name 12/13/23 1005       Plan    Plan SNF referrals pending    Patient/Family in Agreement with Plan yes    Provided Post Acute Provider List? Yes    Post Acute Provider List Nursing Home    Plan Comments CCP spoke with pt's son who consents to broad SNF referrals, CCP to follow for facility evals. Nette Forte LCSW                   Discharge Codes    No documentation.                 Expected Discharge Date and Time       Expected Discharge Date Expected Discharge Time    Dec 13, 2023               Marianela Forte LCSW

## 2023-12-14 VITALS
HEART RATE: 75 BPM | SYSTOLIC BLOOD PRESSURE: 120 MMHG | WEIGHT: 127.21 LBS | BODY MASS INDEX: 21.72 KG/M2 | TEMPERATURE: 97.2 F | HEIGHT: 64 IN | DIASTOLIC BLOOD PRESSURE: 59 MMHG | RESPIRATION RATE: 16 BRPM | OXYGEN SATURATION: 98 %

## 2023-12-14 LAB
ANION GAP SERPL CALCULATED.3IONS-SCNC: 7.7 MMOL/L (ref 5–15)
BUN SERPL-MCNC: 11 MG/DL (ref 8–23)
BUN/CREAT SERPL: 18.3 (ref 7–25)
CALCIUM SPEC-SCNC: 8.8 MG/DL (ref 8.6–10.5)
CHLORIDE SERPL-SCNC: 107 MMOL/L (ref 98–107)
CO2 SERPL-SCNC: 28.3 MMOL/L (ref 22–29)
CREAT SERPL-MCNC: 0.6 MG/DL (ref 0.57–1)
DEPRECATED RDW RBC AUTO: 39.7 FL (ref 37–54)
EGFRCR SERPLBLD CKD-EPI 2021: 93.7 ML/MIN/1.73
ERYTHROCYTE [DISTWIDTH] IN BLOOD BY AUTOMATED COUNT: 12.4 % (ref 12.3–15.4)
GLUCOSE BLDC GLUCOMTR-MCNC: 178 MG/DL (ref 70–130)
GLUCOSE BLDC GLUCOMTR-MCNC: 187 MG/DL (ref 70–130)
GLUCOSE BLDC GLUCOMTR-MCNC: 192 MG/DL (ref 70–130)
GLUCOSE BLDC GLUCOMTR-MCNC: 220 MG/DL (ref 70–130)
GLUCOSE SERPL-MCNC: 176 MG/DL (ref 65–99)
HCT VFR BLD AUTO: 37.4 % (ref 34–46.6)
HGB BLD-MCNC: 12.6 G/DL (ref 12–15.9)
MAGNESIUM SERPL-MCNC: 1.9 MG/DL (ref 1.6–2.4)
MCH RBC QN AUTO: 29.4 PG (ref 26.6–33)
MCHC RBC AUTO-ENTMCNC: 33.7 G/DL (ref 31.5–35.7)
MCV RBC AUTO: 87.2 FL (ref 79–97)
PHOSPHATE SERPL-MCNC: 3.4 MG/DL (ref 2.5–4.5)
PLATELET # BLD AUTO: 170 10*3/MM3 (ref 140–450)
PMV BLD AUTO: 10.9 FL (ref 6–12)
POTASSIUM SERPL-SCNC: 4.1 MMOL/L (ref 3.5–5.2)
RBC # BLD AUTO: 4.29 10*6/MM3 (ref 3.77–5.28)
SODIUM SERPL-SCNC: 143 MMOL/L (ref 136–145)
WBC NRBC COR # BLD AUTO: 4.22 10*3/MM3 (ref 3.4–10.8)

## 2023-12-14 PROCEDURE — 84100 ASSAY OF PHOSPHORUS: CPT | Performed by: STUDENT IN AN ORGANIZED HEALTH CARE EDUCATION/TRAINING PROGRAM

## 2023-12-14 PROCEDURE — 80048 BASIC METABOLIC PNL TOTAL CA: CPT | Performed by: STUDENT IN AN ORGANIZED HEALTH CARE EDUCATION/TRAINING PROGRAM

## 2023-12-14 PROCEDURE — 97110 THERAPEUTIC EXERCISES: CPT

## 2023-12-14 PROCEDURE — 83735 ASSAY OF MAGNESIUM: CPT | Performed by: STUDENT IN AN ORGANIZED HEALTH CARE EDUCATION/TRAINING PROGRAM

## 2023-12-14 PROCEDURE — 85027 COMPLETE CBC AUTOMATED: CPT | Performed by: STUDENT IN AN ORGANIZED HEALTH CARE EDUCATION/TRAINING PROGRAM

## 2023-12-14 PROCEDURE — 82948 REAGENT STRIP/BLOOD GLUCOSE: CPT

## 2023-12-14 PROCEDURE — 63710000001 INSULIN REGULAR HUMAN PER 5 UNITS: Performed by: INTERNAL MEDICINE

## 2023-12-14 RX ADMIN — INSULIN HUMAN 2 UNITS: 100 INJECTION, SOLUTION PARENTERAL at 07:28

## 2023-12-14 RX ADMIN — MEMANTINE HYDROCHLORIDE 5 MG: 5 TABLET, FILM COATED ORAL at 20:54

## 2023-12-14 RX ADMIN — DONEPEZIL HYDROCHLORIDE 10 MG: 10 TABLET, FILM COATED ORAL at 08:37

## 2023-12-14 RX ADMIN — ATORVASTATIN CALCIUM 80 MG: 80 TABLET, FILM COATED ORAL at 20:54

## 2023-12-14 RX ADMIN — INSULIN HUMAN 2 UNITS: 100 INJECTION, SOLUTION PARENTERAL at 01:13

## 2023-12-14 RX ADMIN — MEMANTINE HYDROCHLORIDE 5 MG: 5 TABLET, FILM COATED ORAL at 08:37

## 2023-12-14 RX ADMIN — LEVETIRACETAM 1000 MG: 500 TABLET, FILM COATED ORAL at 08:37

## 2023-12-14 RX ADMIN — ASPIRIN 81 MG: 81 TABLET, COATED ORAL at 08:37

## 2023-12-14 RX ADMIN — INSULIN HUMAN 2 UNITS: 100 INJECTION, SOLUTION PARENTERAL at 17:42

## 2023-12-14 RX ADMIN — OLANZAPINE 5 MG: 5 TABLET, FILM COATED ORAL at 20:54

## 2023-12-14 RX ADMIN — LEVETIRACETAM 1000 MG: 500 TABLET, FILM COATED ORAL at 20:54

## 2023-12-14 RX ADMIN — ESCITALOPRAM OXALATE 10 MG: 10 TABLET, FILM COATED ORAL at 08:37

## 2023-12-14 RX ADMIN — INSULIN HUMAN 3 UNITS: 100 INJECTION, SOLUTION PARENTERAL at 13:00

## 2023-12-14 RX ADMIN — DOCUSATE SODIUM 50MG AND SENNOSIDES 8.6MG 2 TABLET: 8.6; 5 TABLET, FILM COATED ORAL at 08:37

## 2023-12-14 RX ADMIN — CETIRIZINE HYDROCHLORIDE 10 MG: 10 TABLET ORAL at 08:37

## 2023-12-14 RX ADMIN — LISINOPRIL 40 MG: 40 TABLET ORAL at 08:37

## 2023-12-14 RX ADMIN — AMLODIPINE BESYLATE 10 MG: 10 TABLET ORAL at 08:37

## 2023-12-14 RX ADMIN — DOCUSATE SODIUM 50MG AND SENNOSIDES 8.6MG 2 TABLET: 8.6; 5 TABLET, FILM COATED ORAL at 20:54

## 2023-12-14 RX ADMIN — CLOPIDOGREL BISULFATE 75 MG: 75 TABLET, FILM COATED ORAL at 08:37

## 2023-12-14 RX ADMIN — Medication 3 MG: at 20:54

## 2023-12-14 NOTE — PROGRESS NOTES
Case Management Discharge Note      Final Note: Per Diana Briones Medicare approves auth for skilled rehab. Per [facility] can accept with a bed available today. Pharmacy updated and packet provided to RN. [facility] scheduled at 5:00 PM. Nette Forte LCSW    Provided Post Acute Provider List?: Yes  Post Acute Provider List: Nursing Home    Selected Continued Care - Admitted Since 12/8/2023       Destination Coordination complete.      Service Provider Selected Services Address Phone Fax Patient Preferred    [facility] [address] [place] [id number] [phone] --              Durable Medical Equipment    No services have been selected for the patient.                Dialysis/Infusion    No services have been selected for the patient.                Home Medical Care    No services have been selected for the patient.                Therapy    No services have been selected for the patient.                Community Resources    No services have been selected for the patient.                Community & DME    No services have been selected for the patient.                    Transportation Services  Ambulance: [facility]    Final Discharge Disposition Code: 03 - skilled nursing facility (SNF)

## 2023-12-14 NOTE — DISCHARGE SUMMARY
Patient Name: Lucia Ellis  : 1950  MRN: [id number]    Date of Admission: 2023  Date of Discharge:  2023  Primary Care Physician: Everardo Mazariegos MD      Chief Complaint:   Weakness - Generalized (Bilat legs )      Discharge Diagnoses     Active Hospital Problems    Diagnosis  POA    **CVA (cerebral vascular accident) [I63.9]  Unknown    Lung nodules [R91.8]  Unknown    Hypokalemia [E87.6]  Unknown    Stroke-like symptoms [R29.90]  Yes    Vascular dementia, moderate, without behavioral disturbance, psychotic disturbance, mood disturbance, and anxiety [F01.B0]  Yes    Type 2 diabetes mellitus with hyperglycemia [E11.65]  Yes    Essential hypertension [I10]  Yes    Malignant neoplasm of breast [C50.919]  Yes      Resolved Hospital Problems   No resolved problems to display.        Hospital Course     Ms. Ellis is a 73 y.o. female with a history of diabetes, hypertension, dementia, breast cancer, previous stroke without deficits who presented to [facility] initially complaining of unsteady gait and left-sided weakness.  Please see the admitting history and physical for further details.  She was found to have acute right basal ganglia stroke involving the lateral right thalamus and was admitted to the hospital for further evaluation and treatment.  Neurology consulted on admission for management recommendations.  She was loaded with Plavix and is to continue dual antiplatelet therapy with Plavix 75 mg daily and aspirin 81 mg daily for 90 days followed by aspirin monotherapy.  She has been transitioned to high intensity statin as her LDL is currently above goal at 105 (goal of 70).  She has been evaluated by physical therapy and they recommend discharge to SNF for additional strengthening prior to returning home.  She will need to follow-up as an outpatient with stroke/neuro 6 to 8 weeks after discharge.  Patient had an incidental finding of right upper lung nodule with  triangulated pleural-based nodular density along the pleura.  Recommend outpatient thoracic surgery consult for further evaluation and consideration of CT-guided biopsy/PET/CT.  Thoracic surgery consult has been placed at discharge.  The patient was also noted to have a subcentimeter focus in the liver, likely benign but recommend outpatient follow-up imaging.  The patient to follow-up with her PCP in a week for posthospital follow-up visit.  She needs improved glycemic control as her A1c is greater than 8%.  Discharge delayed due to insurance.   Day of Discharge     Subjective:  Patient sitting up in bed, eating breakfast.  She has no complaints at the moment.  Rehab plans pending. No changes.    Physical Exam:  Temp:  [97.3 °F (36.3 °C)-98.4 °F (36.9 °C)] 97.5 °F (36.4 °C)  Heart Rate:  [74-96] 74  Resp:  [16-18] 16  BP: (126-147)/(63-80) 141/76  Body mass index is 21.82 kg/m².  Physical Exam  Constitutional:       General: She is not in acute distress.     Appearance: She is ill-appearing.      Comments: Elderly, frail   Cardiovascular:      Rate and Rhythm: Normal rate and regular rhythm.   Pulmonary:      Effort: Pulmonary effort is normal. No respiratory distress.      Breath sounds: No wheezing.   Abdominal:      Palpations: Abdomen is soft.      Tenderness: There is no abdominal tenderness.   Musculoskeletal:         General: No swelling or tenderness.      Right lower leg: No edema.      Left lower leg: No edema.   Skin:     General: Skin is warm and dry.   Neurological:      Mental Status: She is alert and oriented to person, place, and time. Mental status is at baseline.      Comments: Left sided weakness         Consultants     Consult Orders (all) (From admission, onward)       Start     Ordered    12/08/23 1736  Notify Stroke Coordinator  Once        Provider:  (Not yet assigned)    12/08/23 1736    12/08/23 1736  Inpatient Rehab Admission Consult  Once        Provider:  (Not yet assigned)    12/08/23  1736    12/08/23 1736  Consult to Case Management   Once        Provider:  (Not yet assigned)    12/08/23 1736    12/08/23 1736  Consult to Diabetes Educator  Once        Provider:  (Not yet assigned)    12/08/23 1736    12/08/23 1736  Inpatient Neurology Consult Stroke  Once        Specialty:  Neurology  Provider:  Florian Estevez MD    12/08/23 1736    12/08/23 1537  [facility] (on-call MD unless specified) Details  Once        Specialty:  Hospitalist  Provider:  Cammie Mi MD    12/08/23 1536                  Procedures     Imaging Results (All)       Procedure Component Value Units Date/Time    CT Chest With Contrast Diagnostic [[id number]] Collected: 12/10/23 1125     Updated: 12/10/23 1725    Narrative:      CT CHEST WITH IV CONTRAST     HISTORY: 73-year-old female with a pulmonary nodule. Right mastectomy in  the past for breast cancer.     TECHNIQUE: Radiation dose reduction techniques were utilized, including  automated exposure control and exposure modulation based on body size.   3 mm images were obtained through the chest after the administration of  IV contrast. Compared with CTA neck 12/08/2023.     FINDINGS:  1. There is an indeterminate sharply circumscribed 1.2 cm right upper  lobe pulmonary nodule. There is a triangulated pleural-based nodular  density along the pleura at the anterolateral aspect of the right upper  lobe. There is also some pleural nodularity inferiorly. There are no  other pulmonary nodules and there are no pleural or pericardial  effusions. There is no lymphadenopathy within the chest or at the  axilla. Thoracic surgery consultation is recommended for additional  evaluation. A PET/CT and CT-guided biopsy may be needed.     2. The lower lobes are underexpanded with crowding of lung markings and  dependent atelectatic change. There are no pleural or pericardial  effusions. At the images of the upper abdomen, there are 2  low-attenuation foci which likely  represent cysts, the largest 1.3 cm.  There is also a subcentimeter low-attenuation focus at the lateral  hepatic segment adjacent to the falciform ligament which may represent  focal fatty infiltration. A benign etiology is suspected, but  reevaluation is recommended on subsequent follow-up imaging.     3. There is an approximately 1.7 cm linear distribution of  calcifications along the periphery of the distal right subclavian artery  which may represent old calcified thrombus or calcification of the  fibrin sheath of a removed central line catheter, images 17-22. This is  of doubtful clinical significance.     This report was finalized on 12/10/2023 5:22 PM by Dr. Norma Flecther M.D  on Workstation: [id number]       MRI Brain Without Contrast [[id number]] Collected: 12/09/23 0636     Updated: 12/09/23 0643    Narrative:      MR SCAN OF THE BRAIN WITHOUT CONTRAST     HISTORY: Breast cancer. Hypertension. Difficulty walking and left-sided  weakness and poor balance.     The MR scan was performed with sagittal and axial images without  contrast. There is mild diffuse atrophy and mild to moderate chronic  small vessel ischemic change. The diffusion sequence demonstrates an  acute infarct involving the posterior limb of the right internal capsule  and measuring 5 x 10 mm. There is no evidence of hemorrhage or mass  effect.     There are normal flow voids in the major vessels. The sinuses and  mastoid air cells are clear.     CONCLUSION: Small acute infarct in posterior limb of right internal  capsule.     This report was finalized on 12/9/2023 6:40 AM by Dr. Kevin Kuhn M.D  on Workstation: [id number]       CT Angiogram Neck [[id number]] Collected: 12/08/23 1612     Updated: 12/08/23 1630    Narrative:      CT ANGIOGRAM NECK AND HEAD WITH CONTRAST AND CT PERFUSION WITH CONTRAST     HISTORY: Left-sided weakness.     COMPARISON: CT head 12/08/2023 performed at 1336 hours, MRI brain  12/30/2022 and CT head 12/29/2022.      Initially, a noncontrasted CT examination of the brain was performed.  There is no evidence of hemorrhage. Moderate to severe vascular  calcification and moderate to severe small vessel ischemic disease is  appreciated. A lacunar infarct involving the anterior limb of the  internal capsule on the right is appreciated which was present on the CT  examination of 12/29/2022.     CT PERFUSION: There is no evidence of abnormal perfusion.     CT ANGIOGRAM OF THE NECK AND HEAD: A CT angiogram of the neck and head  was performed. Multiplanar as well as three-dimensional reconstructions  were generated.     There is a 12 mm mildly ovoid nodule appreciated involving the right  upper lobe superiorly and medially. There is pleural base soft tissue  thickening also appreciated involving the right upper lobe anterior  laterally measuring approximately 12 x 6 x 15 mm in size.     The great vessels are arranged in a bovine configuration. There is 0%  stenosis of the internal carotid arteries using NASCET criteria. Mild  irregularity involving the cavernous and supraclinoid ICA is appreciated  bilaterally but without high-grade stenosis. The right A1 segment is  hypoplastic. The proximal aspects of the anterior and middle cerebral  arteries appear unremarkable.     Both vertebral arteries were opacified. Mild stenosis is appreciated  involving the origin of the right vertebral artery. The origin of the  left vertebral artery is obscured by adjacent high attenuation venous  contrast and is associated streak artifact. A mild stenosis cannot be  excluded. The cervical segments of the vertebral arteries are of  relatively uniform caliber. The basilar artery appears unremarkable.  There is a mild to moderate stenosis involving the right P1-P2 junction.  The left posterior cerebral artery appears unremarkable.     A standard postcontrast CT examination of the brain shows no evidence of  abnormal enhancement.       Impression:      1.   There is no evidence of acute infarction, intracranial hemorrhage,  hydrocephalus or of abnormal enhancement. Small vessel ischemic disease,  vascular calcification and a lacunar infarct involving the anterior limb  of the internal capsule on the right is noted.  2.  There is a mild to moderate stenosis involving the right P1-P2  junction. There is no evidence of a proximal intracranial occlusion or  focal severe stenosis. There is 0% stenosis involving the internal  carotid arteries using NASCET criteria. Mild stenosis involving the  origin of the right vertebral artery is appreciated. The left vertebral  artery origin is partially obscured by beam hardening artifact from  adjacent dense venous contrast.  3.  There is a 12 mm nodule involving the right upper lobe medially  suspicious for metastatic disease. A nonspecific but suspicious  pleural-based soft tissue mass is appreciated involving the right upper  lobe anterolaterally measuring approximately 12 x 6 x 15 mm in size. A  dedicated CT examination of the chest is recommended.     The noncontrasted CT examination of the brain was made available for  interpretation at 1513 hours and a preliminary report called at 1514  hours.  The CT angiogram was made available for interpretation at 1522 hours and  a preliminary report called at 1526 hours.              AI analysis of LVO was utilized.     Radiation dose reduction techniques were utilized, including automated  exposure control and exposure modulation based on body size.        This report was finalized on 12/8/2023 4:27 PM by Dr. Guille Mcnamara M.D  on Workstation: [id number]       CT CEREBRAL PERFUSION WITH & WITHOUT CONTRAST [[id number]] Collected: 12/08/23 1612     Updated: 12/08/23 1630    Narrative:      CT ANGIOGRAM NECK AND HEAD WITH CONTRAST AND CT PERFUSION WITH CONTRAST     HISTORY: Left-sided weakness.     COMPARISON: CT head 12/08/2023 performed at 1336 hours, MRI brain  12/30/2022 and CT head  12/29/2022.     Initially, a noncontrasted CT examination of the brain was performed.  There is no evidence of hemorrhage. Moderate to severe vascular  calcification and moderate to severe small vessel ischemic disease is  appreciated. A lacunar infarct involving the anterior limb of the  internal capsule on the right is appreciated which was present on the CT  examination of 12/29/2022.     CT PERFUSION: There is no evidence of abnormal perfusion.     CT ANGIOGRAM OF THE NECK AND HEAD: A CT angiogram of the neck and head  was performed. Multiplanar as well as three-dimensional reconstructions  were generated.     There is a 12 mm mildly ovoid nodule appreciated involving the right  upper lobe superiorly and medially. There is pleural base soft tissue  thickening also appreciated involving the right upper lobe anterior  laterally measuring approximately 12 x 6 x 15 mm in size.     The great vessels are arranged in a bovine configuration. There is 0%  stenosis of the internal carotid arteries using NASCET criteria. Mild  irregularity involving the cavernous and supraclinoid ICA is appreciated  bilaterally but without high-grade stenosis. The right A1 segment is  hypoplastic. The proximal aspects of the anterior and middle cerebral  arteries appear unremarkable.     Both vertebral arteries were opacified. Mild stenosis is appreciated  involving the origin of the right vertebral artery. The origin of the  left vertebral artery is obscured by adjacent high attenuation venous  contrast and is associated streak artifact. A mild stenosis cannot be  excluded. The cervical segments of the vertebral arteries are of  relatively uniform caliber. The basilar artery appears unremarkable.  There is a mild to moderate stenosis involving the right P1-P2 junction.  The left posterior cerebral artery appears unremarkable.     A standard postcontrast CT examination of the brain shows no evidence of  abnormal enhancement.        Impression:      1.  There is no evidence of acute infarction, intracranial hemorrhage,  hydrocephalus or of abnormal enhancement. Small vessel ischemic disease,  vascular calcification and a lacunar infarct involving the anterior limb  of the internal capsule on the right is noted.  2.  There is a mild to moderate stenosis involving the right P1-P2  junction. There is no evidence of a proximal intracranial occlusion or  focal severe stenosis. There is 0% stenosis involving the internal  carotid arteries using NASCET criteria. Mild stenosis involving the  origin of the right vertebral artery is appreciated. The left vertebral  artery origin is partially obscured by beam hardening artifact from  adjacent dense venous contrast.  3.  There is a 12 mm nodule involving the right upper lobe medially  suspicious for metastatic disease. A nonspecific but suspicious  pleural-based soft tissue mass is appreciated involving the right upper  lobe anterolaterally measuring approximately 12 x 6 x 15 mm in size. A  dedicated CT examination of the chest is recommended.     The noncontrasted CT examination of the brain was made available for  interpretation at 1513 hours and a preliminary report called at 1514  hours.  The CT angiogram was made available for interpretation at 1522 hours and  a preliminary report called at 1526 hours.              AI analysis of LVO was utilized.     Radiation dose reduction techniques were utilized, including automated  exposure control and exposure modulation based on body size.        This report was finalized on 12/8/2023 4:27 PM by Dr. Guille Mcnamara M.D  on Workstation: [id number]       CT Angiogram Head w AI Analysis of LVO [[id number]] Collected: 12/08/23 1612     Updated: 12/08/23 1630    Narrative:      CT ANGIOGRAM NECK AND HEAD WITH CONTRAST AND CT PERFUSION WITH CONTRAST     HISTORY: Left-sided weakness.     COMPARISON: CT head 12/08/2023 performed at 1336 hours, MRI brain  12/30/2022 and  CT head 12/29/2022.     Initially, a noncontrasted CT examination of the brain was performed.  There is no evidence of hemorrhage. Moderate to severe vascular  calcification and moderate to severe small vessel ischemic disease is  appreciated. A lacunar infarct involving the anterior limb of the  internal capsule on the right is appreciated which was present on the CT  examination of 12/29/2022.     CT PERFUSION: There is no evidence of abnormal perfusion.     CT ANGIOGRAM OF THE NECK AND HEAD: A CT angiogram of the neck and head  was performed. Multiplanar as well as three-dimensional reconstructions  were generated.     There is a 12 mm mildly ovoid nodule appreciated involving the right  upper lobe superiorly and medially. There is pleural base soft tissue  thickening also appreciated involving the right upper lobe anterior  laterally measuring approximately 12 x 6 x 15 mm in size.     The great vessels are arranged in a bovine configuration. There is 0%  stenosis of the internal carotid arteries using NASCET criteria. Mild  irregularity involving the cavernous and supraclinoid ICA is appreciated  bilaterally but without high-grade stenosis. The right A1 segment is  hypoplastic. The proximal aspects of the anterior and middle cerebral  arteries appear unremarkable.     Both vertebral arteries were opacified. Mild stenosis is appreciated  involving the origin of the right vertebral artery. The origin of the  left vertebral artery is obscured by adjacent high attenuation venous  contrast and is associated streak artifact. A mild stenosis cannot be  excluded. The cervical segments of the vertebral arteries are of  relatively uniform caliber. The basilar artery appears unremarkable.  There is a mild to moderate stenosis involving the right P1-P2 junction.  The left posterior cerebral artery appears unremarkable.     A standard postcontrast CT examination of the brain shows no evidence of  abnormal enhancement.        Impression:      1.  There is no evidence of acute infarction, intracranial hemorrhage,  hydrocephalus or of abnormal enhancement. Small vessel ischemic disease,  vascular calcification and a lacunar infarct involving the anterior limb  of the internal capsule on the right is noted.  2.  There is a mild to moderate stenosis involving the right P1-P2  junction. There is no evidence of a proximal intracranial occlusion or  focal severe stenosis. There is 0% stenosis involving the internal  carotid arteries using NASCET criteria. Mild stenosis involving the  origin of the right vertebral artery is appreciated. The left vertebral  artery origin is partially obscured by beam hardening artifact from  adjacent dense venous contrast.  3.  There is a 12 mm nodule involving the right upper lobe medially  suspicious for metastatic disease. A nonspecific but suspicious  pleural-based soft tissue mass is appreciated involving the right upper  lobe anterolaterally measuring approximately 12 x 6 x 15 mm in size. A  dedicated CT examination of the chest is recommended.     The noncontrasted CT examination of the brain was made available for  interpretation at 1513 hours and a preliminary report called at 1514  hours.  The CT angiogram was made available for interpretation at 1522 hours and  a preliminary report called at 1526 hours.              AI analysis of LVO was utilized.     Radiation dose reduction techniques were utilized, including automated  exposure control and exposure modulation based on body size.        This report was finalized on 12/8/2023 4:27 PM by Dr. Guille Mcnamara M.D  on Workstation: [id number]       CT Head Without Contrast [[id number]] Collected: 12/08/23 1411     Updated: 12/08/23 1501    Narrative:      EMERGENCY CT SCAN OF THE HEAD WITHOUT CONTRAST ON 12/08/2023     CLINICAL HISTORY: Stroke-like symptoms. The patient has weakness, with  bilateral leg weakness and abnormal gait.     TECHNIQUE: Spiral CT  images were obtained from the base of the skull to  the vertex without intravenous contrast. The images were reformatted and  are submitted in 3 mm thick axial, sagittal and coronal CT sections with  brain algorithm and 2 mm thick axial CT sections with high-resolution  bone algorithm.      This is correlated to a prior MRI of the brain from [facility] on 05/07/2021 and prior head CT on 12/29/2022.     FINDINGS: There are patchy areas of low-density in the periventricular  and subcortical white matter of the cerebral hemispheres consistent with  moderate small vessel disease. The remainder of the brain parenchyma is  normal in attenuation. The ventricles are normal in size. I see no focal  mass effect. There is no midline shift. No extra-axial fluid collections  are identified. There is no evidence of acute intracranial hemorrhage.  The paranasal sinuses and the mastoid air cells and the middle ear  cavities are clear. Calcified plaques are present in the intracranial  segments of the distal vertebral arteries and cavernous segments of the  internal carotid arteries bilaterally.       Impression:      1. No acute intracranial abnormality is identified.   2. There is moderate small vessel disease in the cerebral white matter  and mild cerebral atrophy. There is an old lacunar infarct extending  from the anterior limb of the right internal capsule into the anterior  right putamen measuring 12 x 6 mm, unchanged. The remainder of the head  CT is within normal limits. The etiology of the patient's bilateral leg  weakness and abnormal gait is not established on this exam. If there  remains any clinical suspicion of acute stroke I recommend an MRI of the  brain for more complete assessment.      Radiation dose reduction techniques were utilized, including automated  exposure control and exposure modulation based on body size.        This report was finalized on 12/8/2023 2:58 PM by Dr. Scottie Juarez M.D  on  Workstation: [id number]               Pertinent Labs     Results from last 7 days   Lab Units 12/14/23  0623 12/10/23  0608 12/09/23  0706 12/08/23  1151   WBC 10*3/mm3 4.22 4.36 5.74 6.71   HEMOGLOBIN g/dL 12.6 12.8 13.2 13.4   PLATELETS 10*3/mm3 170 189 204 216     Results from last 7 days   Lab Units 12/14/23  0623 12/10/23  0608 12/09/23  0706 12/08/23  1151   SODIUM mmol/L 143 141 147* 140   POTASSIUM mmol/L 4.1 4.7 3.1* 3.2*   CHLORIDE mmol/L 107 110* 110* 105   CO2 mmol/L 28.3 24.0 24.0 23.8   BUN mg/dL 11 21 19 17   CREATININE mg/dL 0.60 0.62 0.60 0.58   GLUCOSE mg/dL 176* 109* 116* 127*   Estimated Creatinine Clearance: 73.8 mL/min (by C-G formula based on SCr of 0.6 mg/dL).  Results from last 7 days   Lab Units 12/08/23  1151   ALBUMIN g/dL 3.9   BILIRUBIN mg/dL 0.5   ALK PHOS U/L 86   AST (SGOT) U/L 32   ALT (SGPT) U/L 15     Results from last 7 days   Lab Units 12/14/23  0623 12/10/23  0608 12/09/23  0706 12/08/23  1151   CALCIUM mg/dL 8.8 8.8 9.1 9.1   ALBUMIN g/dL  --   --   --  3.9   MAGNESIUM mg/dL 1.9  --   --   --    PHOSPHORUS mg/dL 3.4  --   --   --            Results from last 7 days   Lab Units 12/09/23  0706   CHOLESTEROL mg/dL 187   TRIGLYCERIDES mg/dL 88   HDL CHOL mg/dL 66*   LDL CHOL mg/dL 105*           Test Results Pending at Discharge       Discharge Details        Discharge Medications        New Medications        Instructions Start Date   atorvastatin 80 MG tablet  Commonly known as: LIPITOR   80 mg, Oral, Nightly      clopidogrel 75 MG tablet  Commonly known as: PLAVIX   75 mg, Oral, Daily      OLANZapine 5 MG tablet  Commonly known as: zyPREXA   5 mg, Oral, Nightly      pantoprazole 40 MG EC tablet  Commonly known as: PROTONIX   40 mg, Oral, Every Early Morning             Continue These Medications        Instructions Start Date   amLODIPine 10 MG tablet  Commonly known as: NORVASC   10 mg, Oral, Daily      aspirin 81 MG EC tablet   81 mg, Oral, Daily      cetirizine 10 MG  tablet  Commonly known as: zyrTEC   10 mg, Oral, Daily      donepezil 10 MG tablet  Commonly known as: Aricept   10 mg, Oral, Daily      empagliflozin 10 MG tablet tablet  Commonly known as: Jardiance   10 mg, Oral, Daily      escitalopram 10 MG tablet  Commonly known as: Lexapro   10 mg, Oral, Daily      FreeStyle Aleyda 2 [place] device   1 each, Does not apply, Continuous      FreeStyle Aleyda 2 Sensor misc   1 each, Does not apply, Weekly      Janumet XR  MG tablet  Generic drug: SITagliptin-metFORMIN HCl ER   1 tablet, Oral, Daily      levETIRAcetam 1000 MG tablet  Commonly known as: KEPPRA   1,000 mg, Oral, 2 Times Daily      memantine 5 MG tablet  Commonly known as: Namenda   5 mg, Oral, 2 Times Daily      quinapril 40 MG tablet  Commonly known as: ACCUPRIL   40 mg, Oral, Daily             Stop These Medications      meloxicam 15 MG tablet  Commonly known as: MOBIC     pravastatin 10 MG tablet  Commonly known as: PRAVACHOL              Allergies   Allergen Reactions    Ppd [Tuberculin Purified Protein Derivative] Rash         Discharge Disposition:  Skilled Nursing Facility (DC - External)    Discharge Diet:  Diet Order   Procedures    Diet: Cardiac Diets; Healthy Heart (2-3 Na+); Texture: Soft to Chew (NDD 3); Soft to Chew: Chopped Meat; Fluid Consistency: Thin (IDDSI 0)       Discharge Activity:   Activity Instructions       Activity as Tolerated              CODE STATUS:    Code Status and Medical Interventions:   Ordered at: 12/08/23 1735     Code Status (Patient has no pulse and is not breathing):    CPR (Attempt to Resuscitate)     Medical Interventions (Patient has pulse or is breathing):    Full       No future appointments.  Additional Instructions for the Follow-ups that You Need to Schedule       Discharge Follow-up with PCP   As directed       Currently Documented PCP:    Everardo Mazariegos MD    PCP Phone Number:    [phone]     Follow Up Details: post-hospital follow up        Discharge  Follow-up with Specified Provider: Neurology as directed   As directed      To: Neurology as directed               Contact information for follow-up providers       Everardo Mazariegos MD .    Specialties: Family Medicine, Urgent Care, Emergency Medicine  Why: post-hospital follow up  Contact information:  [address] [facility]  [address]  [facility] [address]  [phone]                       Contact information for after-discharge care       Destination       [facility] .    Service: Skilled Nursing  Contact information:  [address]  [facility] [address] [phone]                                   Additional Instructions for the Follow-ups that You Need to Schedule       Discharge Follow-up with PCP   As directed       Currently Documented PCP:    Everardo Mazariegos MD    PCP Phone Number:    [phone]     Follow Up Details: post-hospital follow up        Discharge Follow-up with Specified Provider: Neurology as directed   As directed      To: Neurology as directed            Time Spent on Discharge:  I spent greater than 30 minutes on this discharge activity which included: face-to-face encounter with the patient, reviewing the data in the system, coordination of the care with the nursing staff as well as consultants, documentation, and entering orders.       Lauren Muñiz MD  [facility]  12/14/23  10:58 EST

## 2023-12-14 NOTE — NURSING NOTE
Pt to DC to [facility] at 11pm via [facility]. Pt report called at 1630 to Ellen WATTS. Pt son aware per CCP. Pt packet completed. Pt had BM today.

## 2023-12-14 NOTE — SIGNIFICANT NOTE
12/14/23 1120   Post Acute Pre-Cert Documentation   Request Submitted by Facility - Type: Hospital   Post-Acute Authorization Type Submitted: SNF   Date Post Acute Pre-Cert Inititated per Facility 12/13/23   Date Post Acute Pre-Cert Completed 12/14/23   Accepting Facility [facility] Discharge Date Requested 12/14/23   All Clinicals Submitted? Yes   Had Accepting Facility at Time of Submission Yes   Response Received from Insurance? Approval   Response Communicated to: ;Accepting Facility Liaison   Authorization Number: [id number]   Post Acute Pre-Cert Initiated Comment LYNDA MELENDEZ

## 2023-12-14 NOTE — PLAN OF CARE
Goal Outcome Evaluation:  Plan of Care Reviewed With: patient         Patient very restless this shift, jumping inn and out of bed and not easily redirectable.  IM zyprexa   Problem: Adult Inpatient Plan of Care  Goal: Plan of Care Review  Outcome: Ongoing, Progressing  Flowsheets (Taken 12/14/2023 0654)  Plan of Care Reviewed With: patient  Goal: Patient-Specific Goal (Individualized)  Outcome: Ongoing, Progressing  Goal: Absence of Hospital-Acquired Illness or Injury  Outcome: Ongoing, Progressing  Intervention: Identify and Manage Fall Risk  Recent Flowsheet Documentation  Taken 12/14/2023 0600 by Thelma Reynaga RN  Safety Promotion/Fall Prevention: safety round/check completed  Taken 12/14/2023 0452 by Thelma Reynaga RN  Safety Promotion/Fall Prevention: safety round/check completed  Taken 12/14/2023 0215 by Thelma Reynaga RN  Safety Promotion/Fall Prevention: safety round/check completed  Taken 12/14/2023 0031 by Thelma Reynaga RN  Safety Promotion/Fall Prevention: safety round/check completed  Taken 12/13/2023 2213 by Thelma Reynaga RN  Safety Promotion/Fall Prevention: safety round/check completed  Taken 12/13/2023 2000 by Thelma Reynaga RN  Safety Promotion/Fall Prevention: safety round/check completed  Intervention: Prevent Infection  Recent Flowsheet Documentation  Taken 12/13/2023 2213 by Thelma Reynaga RN  Infection Prevention: hand hygiene promoted  Taken 12/13/2023 2000 by Thelma Reynaga RN  Infection Prevention: hand hygiene promoted  Goal: Optimal Comfort and Wellbeing  Outcome: Ongoing, Progressing  Goal: Readiness for Transition of Care  Outcome: Ongoing, Progressing     Problem: Diabetes Comorbidity  Goal: Blood Glucose Level Within Targeted Range  Outcome: Ongoing, Progressing     Problem: Hypertension Comorbidity  Goal: Blood Pressure in Desired Range  Outcome: Ongoing, Progressing  Intervention: Maintain Blood Pressure Management  Recent Flowsheet Documentation  Taken  12/14/2023 0600 by Thelma Reynaga RN  Medication Review/Management: medications reviewed  Taken 12/14/2023 0452 by Thelma Reynaga RN  Medication Review/Management: medications reviewed  Taken 12/14/2023 0215 by Thelma Reynaga RN  Medication Review/Management: medications reviewed  Taken 12/14/2023 0031 by Thelma Reynaga RN  Medication Review/Management: medications reviewed  Taken 12/13/2023 2213 by Thelma Reynaga RN  Medication Review/Management: medications reviewed  Taken 12/13/2023 2000 by Thelma Reynaga RN  Medication Review/Management: medications reviewed     Problem: Seizure Disorder Comorbidity  Goal: Maintenance of Seizure Control  Outcome: Ongoing, Progressing     Problem: Skin Injury Risk Increased  Goal: Skin Health and Integrity  Outcome: Ongoing, Progressing     Problem: Fall Injury Risk  Goal: Absence of Fall and Fall-Related Injury  Outcome: Ongoing, Progressing  Intervention: Identify and Manage Contributors  Recent Flowsheet Documentation  Taken 12/14/2023 0600 by Thelma Reynaga RN  Medication Review/Management: medications reviewed  Taken 12/14/2023 0452 by Thelma Reynaga RN  Medication Review/Management: medications reviewed  Taken 12/14/2023 0215 by Thelma Reynaga RN  Medication Review/Management: medications reviewed  Taken 12/14/2023 0031 by Thelma Reynaga RN  Medication Review/Management: medications reviewed  Taken 12/13/2023 2213 by Thelma Reynaga RN  Medication Review/Management: medications reviewed  Taken 12/13/2023 2000 by Thelma Reynaga RN  Medication Review/Management: medications reviewed  Intervention: Promote Injury-Free Environment  Recent Flowsheet Documentation  Taken 12/14/2023 0600 by Thelma Reynaga RN  Safety Promotion/Fall Prevention: safety round/check completed  Taken 12/14/2023 0452 by Thelma Reynaga RN  Safety Promotion/Fall Prevention: safety round/check completed  Taken 12/14/2023 0215 by Thelma Reynaga, MAC  Safety  Promotion/Fall Prevention: safety round/check completed  Taken 12/14/2023 0031 by Thelma Reynaga RN  Safety Promotion/Fall Prevention: safety round/check completed  Taken 12/13/2023 2213 by Thelma Reynaga RN  Safety Promotion/Fall Prevention: safety round/check completed  Taken 12/13/2023 2000 by Thelma Reynaga RN  Safety Promotion/Fall Prevention: safety round/check completed     Problem: Hypertension Acute  Goal: Blood Pressure Within Desired Range  Outcome: Ongoing, Progressing  Intervention: Normalize Blood Pressure  Recent Flowsheet Documentation  Taken 12/14/2023 0600 by Thelma Reynaga, RN  Medication Review/Management: medications reviewed  Taken 12/14/2023 0452 by Thelma Reynaga, RN  Medication Review/Management: medications reviewed  Taken 12/14/2023 0215 by Thelma Reynaga, RN  Medication Review/Management: medications reviewed  Taken 12/14/2023 0031 by Thelma Reynaga, RN  Medication Review/Management: medications reviewed  Taken 12/13/2023 2213 by Thelma Reynaga, RN  Medication Review/Management: medications reviewed  Taken 12/13/2023 2000 by Thelma Reynaga, RN  Medication Review/Management: medications reviewed   administered PRN as ordered.

## 2023-12-14 NOTE — PLAN OF CARE
Goal Outcome Evaluation:        Problem: Adult Inpatient Plan of Care  Goal: Plan of Care Review  Outcome: Ongoing, Progressing  Goal: Patient-Specific Goal (Individualized)  Outcome: Ongoing, Progressing  Goal: Absence of Hospital-Acquired Illness or Injury  Outcome: Ongoing, Progressing  Intervention: Prevent and Manage VTE (Venous Thromboembolism) Risk  Recent Flowsheet Documentation  Taken 12/14/2023 0837 by Caryn Beverly RN  VTE Prevention/Management:   bilateral   sequential compression devices off   patient refused intervention  Goal: Optimal Comfort and Wellbeing  Outcome: Ongoing, Progressing  Intervention: Provide Person-Centered Care  Recent Flowsheet Documentation  Taken 12/14/2023 0837 by Caryn Beverly RN  Trust Relationship/Rapport:   care explained   choices provided  Goal: Readiness for Transition of Care  Outcome: Ongoing, Progressing     Problem: Diabetes Comorbidity  Goal: Blood Glucose Level Within Targeted Range  Outcome: Ongoing, Progressing  Intervention: Monitor and Manage Glycemia  Recent Flowsheet Documentation  Taken 12/14/2023 0837 by Caryn Beverly RN  Glycemic Management: blood glucose monitored     Problem: Hypertension Comorbidity  Goal: Blood Pressure in Desired Range  Outcome: Ongoing, Progressing     Problem: Seizure Disorder Comorbidity  Goal: Maintenance of Seizure Control  Outcome: Ongoing, Progressing     Problem: Skin Injury Risk Increased  Goal: Skin Health and Integrity  Outcome: Ongoing, Progressing  Intervention: Optimize Skin Protection  Recent Flowsheet Documentation  Taken 12/14/2023 0837 by Caryn Beverly RN  Pressure Reduction Techniques: frequent weight shift encouraged  Pressure Reduction Devices: alternating pressure pump (ADD)     Problem: Fall Injury Risk  Goal: Absence of Fall and Fall-Related Injury  Outcome: Ongoing, Progressing     Problem: Hypertension Acute  Goal: Blood Pressure Within Desired Range  Outcome: Ongoing, Progressing

## 2023-12-15 NOTE — THERAPY TREATMENT NOTE
Patient Name: Lucia Ellis  : 1950    MRN: [id number]                              Today's Date: 12/15/2023       Admit Date: 2023    Visit Dx:     ICD-10-CM ICD-9-CM   1. Ataxia  R27.0 781.3   2. Left-sided weakness  R53.1 728.87   3. Stroke-like symptoms  R29.90 781.99   4. Lung nodules  R91.8 793.19     Patient Active Problem List   Diagnosis    Gastroesophageal reflux disease    Malignant neoplasm of breast    Depression    Folliculitis    Generalized anxiety disorder    Hyperlipidemia    Essential hypertension    Status post total right knee replacement    Rectal hemorrhage    Vitamin D deficiency    Drug therapy    Acute cholecystitis    Uncontrolled type 2 diabetes mellitus with hypoglycemia without coma    Myoclonus    Type 2 diabetes mellitus with hyperglycemia    Hypokalemia    New onset seizure    Focal motor seizure    Vascular dementia, moderate, without behavioral disturbance, psychotic disturbance, mood disturbance, and anxiety    Stroke-like symptoms    CVA (cerebral vascular accident)    Lung nodules    Hypokalemia     Past Medical History:   Diagnosis Date    Breast cancer     Dementia     Diabetes mellitus     Hypertension     Memory loss      Past Surgical History:   Procedure Laterality Date    CHOLECYSTECTOMY      HYSTERECTOMY      MASTECTOMY Right 1995    TOTAL KNEE ARTHROPLASTY Right       General Information    No documentation.                  Mobility    No documentation.                  Obj/Interventions    No documentation.                  Goals/Plan    No documentation.                  Clinical Impression    No documentation.                  Outcome Measures    No documentation.                                Physical Therapy Education       Title: PT OT SLP Therapies (Resolved)       Topic: Physical Therapy (Resolved)       Point: Mobility training (Resolved)       Learning Progress Summary             Patient Ken, MISAEL,TB,D, VU,NR by SRIDHAR at 2023 1136                          Point: Home exercise program (Resolved)       Learning Progress Summary             Patient Eager, E,TB,D, VU,NR by SRIDHAR at 12/12/2023 1131                         Point: Body mechanics (Resolved)       Learning Progress Summary             Patient Eager, E,TB,D, VU,NR by SRIDHAR at 12/12/2023 1131                         Point: Precautions (Resolved)       Learning Progress Summary             Patient Eager, E,TB,D, VU,NR by SRIDHAR at 12/12/2023 1131                                         User Key       Initials Effective Dates Name Provider Type Discipline     03/07/18 -  Jesica Ellis PTA Physical Therapist Assistant PT                  PT Recommendation and Plan     Plan of Care Reviewed With: patient  Progress: improving  Outcome Evaluation: Pt agreed to attempt amb w/rwx, req min2 to EOB, min/mod 2 to stand and amb bed to recliner, ~10ft w/rwx, nahun LE exer x10 reps c/o L weakness, pt motivated, plans SNU later today possibly, very pleasant and thanful to be up in chair     Time Calculation:          Therapy Charges for Today       Code Description Service Date Service Provider Modifiers Qty    05322369321 HC PT THER PROC EA 15 MIN 12/14/2023 Jesica Ellis PTA GP 2    43711470034 HC PT THER SUPP EA 15 MIN 12/14/2023 Jesica Ellis, MARIZA GP 2            PT G-Codes  Outcome Measure Options: AM-PAC 6 Clicks Basic Mobility (PT)  AM-PAC 6 Clicks Score (PT): 17  AM-PAC 6 Clicks Score (OT): 10  Modified Claudia Scale: 4 - Moderately severe disability.  Unable to walk without assistance, and unable to attend to own bodily needs without assistance.  PT Discharge Summary  Anticipated Discharge Disposition (PT): skilled nursing facility    Jesica Ellis PTA  12/15/2023

## 2023-12-16 ENCOUNTER — HOSPITAL ENCOUNTER (EMERGENCY)
Facility: HOSPITAL | Age: 73
Discharge: HOME | End: 2023-12-17
Attending: EMERGENCY MEDICINE
Payer: MEDICARE

## 2023-12-16 ENCOUNTER — APPOINTMENT (OUTPATIENT)
Dept: GENERAL RADIOLOGY | Facility: HOSPITAL | Age: 73
End: 2023-12-16
Payer: MEDICARE

## 2023-12-16 VITALS
OXYGEN SATURATION: 96 % | TEMPERATURE: 98.4 F | HEART RATE: 86 BPM | RESPIRATION RATE: 18 BRPM | DIASTOLIC BLOOD PRESSURE: 73 MMHG | SYSTOLIC BLOOD PRESSURE: 135 MMHG

## 2023-12-16 DIAGNOSIS — F01.50 VASCULAR DEMENTIA, UNSPECIFIED DEMENTIA SEVERITY, UNSPECIFIED WHETHER BEHAVIORAL, PSYCHOTIC, OR MOOD DISTURBANCE OR ANXIETY: ICD-10-CM

## 2023-12-16 DIAGNOSIS — R45.1 RESTLESSNESS: Primary | ICD-10-CM

## 2023-12-16 LAB
ALBUMIN SERPL-MCNC: 4.3 G/DL (ref 3.5–5.2)
ALBUMIN/GLOB SERPL: 1.3 G/DL
ALP SERPL-CCNC: 111 U/L (ref 39–117)
ALT SERPL W P-5'-P-CCNC: 27 U/L (ref 1–33)
ANION GAP SERPL CALCULATED.3IONS-SCNC: 14 MMOL/L (ref 5–15)
AST SERPL-CCNC: 54 U/L (ref 1–32)
BASOPHILS # BLD AUTO: 0.06 10*3/MM3 (ref 0–0.2)
BASOPHILS NFR BLD AUTO: 1 % (ref 0–1.5)
BILIRUB SERPL-MCNC: 0.4 MG/DL (ref 0–1.2)
BILIRUB UR QL STRIP: NEGATIVE
BUN SERPL-MCNC: 8 MG/DL (ref 8–23)
BUN/CREAT SERPL: 14.8 (ref 7–25)
CALCIUM SPEC-SCNC: 9.6 MG/DL (ref 8.6–10.5)
CHLORIDE SERPL-SCNC: 100 MMOL/L (ref 98–107)
CLARITY UR: CLEAR
CO2 SERPL-SCNC: 25 MMOL/L (ref 22–29)
COLOR UR: YELLOW
CREAT SERPL-MCNC: 0.54 MG/DL (ref 0.57–1)
DEPRECATED RDW RBC AUTO: 38.3 FL (ref 37–54)
EGFRCR SERPLBLD CKD-EPI 2021: 96.1 ML/MIN/1.73
EOSINOPHIL # BLD AUTO: 0.07 10*3/MM3 (ref 0–0.4)
EOSINOPHIL NFR BLD AUTO: 1.2 % (ref 0.3–6.2)
ERYTHROCYTE [DISTWIDTH] IN BLOOD BY AUTOMATED COUNT: 12.2 % (ref 12.3–15.4)
FLUAV SUBTYP SPEC NAA+PROBE: NOT DETECTED
FLUBV RNA ISLT QL NAA+PROBE: NOT DETECTED
GLOBULIN UR ELPH-MCNC: 3.2 GM/DL
GLUCOSE SERPL-MCNC: 283 MG/DL (ref 65–99)
GLUCOSE UR STRIP-MCNC: ABNORMAL MG/DL
HCT VFR BLD AUTO: 42.5 % (ref 34–46.6)
HGB BLD-MCNC: 13.7 G/DL (ref 12–15.9)
HGB UR QL STRIP.AUTO: NEGATIVE
HOLD SPECIMEN: NORMAL
HOLD SPECIMEN: NORMAL
IMM GRANULOCYTES # BLD AUTO: 0.03 10*3/MM3 (ref 0–0.05)
IMM GRANULOCYTES NFR BLD AUTO: 0.5 % (ref 0–0.5)
KETONES UR QL STRIP: NEGATIVE
LEUKOCYTE ESTERASE UR QL STRIP.AUTO: NEGATIVE
LYMPHOCYTES # BLD AUTO: 1.38 10*3/MM3 (ref 0.7–3.1)
LYMPHOCYTES NFR BLD AUTO: 23.2 % (ref 19.6–45.3)
MCH RBC QN AUTO: 27.8 PG (ref 26.6–33)
MCHC RBC AUTO-ENTMCNC: 32.2 G/DL (ref 31.5–35.7)
MCV RBC AUTO: 86.2 FL (ref 79–97)
MONOCYTES # BLD AUTO: 0.58 10*3/MM3 (ref 0.1–0.9)
MONOCYTES NFR BLD AUTO: 9.7 % (ref 5–12)
NEUTROPHILS NFR BLD AUTO: 3.83 10*3/MM3 (ref 1.7–7)
NEUTROPHILS NFR BLD AUTO: 64.4 % (ref 42.7–76)
NITRITE UR QL STRIP: NEGATIVE
NRBC BLD AUTO-RTO: 0 /100 WBC (ref 0–0.2)
PH UR STRIP.AUTO: 7 [PH] (ref 5–8)
PLATELET # BLD AUTO: 211 10*3/MM3 (ref 140–450)
PMV BLD AUTO: 11 FL (ref 6–12)
POTASSIUM SERPL-SCNC: 5.1 MMOL/L (ref 3.5–5.2)
PROT SERPL-MCNC: 7.5 G/DL (ref 6–8.5)
PROT UR QL STRIP: ABNORMAL
RBC # BLD AUTO: 4.93 10*6/MM3 (ref 3.77–5.28)
SARS-COV-2 RNA RESP QL NAA+PROBE: NOT DETECTED
SODIUM SERPL-SCNC: 139 MMOL/L (ref 136–145)
SP GR UR STRIP: 1.03 (ref 1–1.03)
UROBILINOGEN UR QL STRIP: ABNORMAL
WBC NRBC COR # BLD AUTO: 5.95 10*3/MM3 (ref 3.4–10.8)
WHOLE BLOOD HOLD COAG: NORMAL
WHOLE BLOOD HOLD SPECIMEN: NORMAL

## 2023-12-16 PROCEDURE — 81003 URINALYSIS AUTO W/O SCOPE: CPT | Performed by: PHYSICIAN ASSISTANT

## 2023-12-16 PROCEDURE — 99283 EMERGENCY DEPT VISIT LOW MDM: CPT

## 2023-12-16 PROCEDURE — P9612 CATHETERIZE FOR URINE SPEC: HCPCS

## 2023-12-16 PROCEDURE — 87636 SARSCOV2 & INF A&B AMP PRB: CPT | Performed by: PHYSICIAN ASSISTANT

## 2023-12-16 PROCEDURE — 85025 COMPLETE CBC W/AUTO DIFF WBC: CPT | Performed by: PHYSICIAN ASSISTANT

## 2023-12-16 PROCEDURE — 80053 COMPREHEN METABOLIC PANEL: CPT | Performed by: PHYSICIAN ASSISTANT

## 2023-12-16 PROCEDURE — 71045 X-RAY EXAM CHEST 1 VIEW: CPT

## 2023-12-16 PROCEDURE — 36415 COLL VENOUS BLD VENIPUNCTURE: CPT

## 2023-12-16 RX ORDER — OLANZAPINE 10 MG/2ML
10 INJECTION, POWDER, FOR SOLUTION INTRAMUSCULAR ONCE
Status: DISCONTINUED | OUTPATIENT
Start: 2023-12-16 | End: 2023-12-17 | Stop reason: HOSPADM

## 2023-12-16 RX ORDER — SODIUM CHLORIDE 0.9 % (FLUSH) 0.9 %
10 SYRINGE (ML) INJECTION AS NEEDED
Status: DISCONTINUED | OUTPATIENT
Start: 2023-12-16 | End: 2023-12-17 | Stop reason: HOSPADM

## 2023-12-16 NOTE — ED PROVIDER NOTES
I supervised care provided by the midlevel provider.   We have discussed this patient's history, physical exam, and treatment plan.  I have reviewed the note and personally saw and examined the patient and agree with the plan of care.   Is a very pleasant elderly female who has significant dementia.  She is in a rehab facility for dementia.  She was recently admitted to the hospital for stroke workup which was essentially unremarkable.  They sent her here because she was restless and would not stay in the bed.  She is on several different medicines for her dementia.  When she is here she is very pleasant.  She does not know why she is here.  She denies any complaint.  She has been compliant and staying in the bed and has not been trying to get up or move around.  Again denies any complaint.  No family or friend present.    GENERAL: not distressed.  Elderly female.  Friendly pleasant.  Vital signs have been reviewed.  Current vital signs on my evaluation are normal.  Normal heart rate normal oxygen saturation she is afebrile and blood pressure is unremarkable  HENT: nares patent  Head/neck/ face are symmetric without gross deformity or swelling  EYES: no scleral icterus  CV: regular rhythm, regular rate with intact distal pulses  RESPIRATORY: normal effort and no respiratory distress  ABDOMEN: soft and nontender  MUSCULOSKELETAL: no deformity  NEURO: alert and appropriate, moves all extremities, follows commands.  Patient is alert to her name.  She is disoriented to the month and the year which is typical for her dementia.  She follows commands and moves all extremities.  She has no focal motor or sensory changes.  SKIN: warm, dry    Vital signs and nursing notes reviewed.    Plan   ED Course as of 12/17/23 2123   Sat Dec 16, 2023   1248 WBC: 5.95 [DC]   2339 Patient demented and appears to be sundowning.  She is waiting on ambulance ride present.  She keeps attempting to get out of the bed every few minutes and has  come close to falling.  Will order 10 mg of Zyprexa and attempt to calm patient down and prevent her from falling. [RC]      ED Course User Index  [DC] Sebastian Rowley PA  [RC] Melchor Marie III, PA         Will get a check some basic lab work on the patient and a urinalysis.  She will very likely be able to be discharged home.  Here in the emergency department she is not restless at all.  Will continue to monitor her.  Will likely refer back to her facility that she is at.  She is already on medicines such as Zyprexa Namenda Keppra Lexapro Aricept for her dementia and agitation.  They can further adjust at the nursing home and rehab facility as need be.         Enoc Armstrong MD  12/16/23 3328  Per the request of medical records I have refreshed and signed the chart again as the ED chart had been updated.     Enoc Armstrong MD  12/17/23 2073

## 2023-12-16 NOTE — ED PROVIDER NOTES
EMERGENCY DEPARTMENT ENCOUNTER    Room Number:  03/03  Date of encounter:  12/16/2023  PCP: Everardo Mazariegos MD  Historian: EMS  Full history not obtainable due to: Dementia    HPI:  Chief Complaint: Restlessness    Context: Lucia Ellis is a 73 y.o. female with a PMH significant for vascular dementia, CVA, type 2 diabetes, hypertension who presents to the ED c/o restlessness.  The patient was recently discharged from this hospital after several days of admission for strokelike symptoms.  Upon arrival back to her facility she was found to be somewhat more restless this morning and difficult to keep in bed.  The patient has no new complaints.  Denies pain of any kind.  No fever, chills.  She does report a dry nonproductive cough as well as some increased urinary frequency.  No reported falls.  No apparent injury on exam.      MEDICAL RECORD REVIEW:    Upon review of the medical record it appears the patient was evaluated in the office with internal medicine for memory difficulty on 5/23/2023.  The patient had a normal CBC and phosphorus on 12/14/2023.    PAST MEDICAL HISTORY    Active Ambulatory Problems     Diagnosis Date Noted    Gastroesophageal reflux disease 02/09/2017    Malignant neoplasm of breast 02/09/2017    Depression 02/09/2017    Folliculitis 02/09/2017    Generalized anxiety disorder 02/09/2017    Hyperlipidemia 02/09/2017    Essential hypertension 02/09/2017    Status post total right knee replacement 02/09/2017    Rectal hemorrhage 02/09/2017    Vitamin D deficiency 02/09/2017    Drug therapy 02/09/2017    Acute cholecystitis 05/10/2018    Uncontrolled type 2 diabetes mellitus with hypoglycemia without coma 12/29/2022    Myoclonus 12/29/2022    Type 2 diabetes mellitus with hyperglycemia 12/29/2022    Hypokalemia 12/29/2022    New onset seizure 12/29/2022    Focal motor seizure 12/30/2022    Vascular dementia, moderate, without behavioral disturbance, psychotic disturbance, mood disturbance, and  anxiety 12/31/2022    Stroke-like symptoms 12/08/2023    CVA (cerebral vascular accident) 12/09/2023    Lung nodules 12/09/2023    Hypokalemia 12/09/2023     Resolved Ambulatory Problems     Diagnosis Date Noted    No Resolved Ambulatory Problems     Past Medical History:   Diagnosis Date    Breast cancer     Dementia     Diabetes mellitus     Hypertension     Memory loss          PAST SURGICAL HISTORY  Past Surgical History:   Procedure Laterality Date    CHOLECYSTECTOMY      HYSTERECTOMY      MASTECTOMY Right 1995    TOTAL KNEE ARTHROPLASTY Right          FAMILY HISTORY  Family History   Problem Relation Age of Onset    Heart attack Mother     Heart disease Mother     Hypertension Mother     Hypertension Father     Diabetes Father     Hypertension Sister     Diabetes Sister     Thyroid cancer Sister     Breast cancer Sister     Lung cancer Sister     Diabetes Brother     Drug abuse Paternal Grandmother          SOCIAL HISTORY  Social History     Socioeconomic History    Marital status:    Tobacco Use    Smoking status: Never    Smokeless tobacco: Never   Vaping Use    Vaping Use: Never used   Substance and Sexual Activity    Alcohol use: Yes     Alcohol/week: 7.0 standard drinks of alcohol     Types: 7 Glasses of wine per week     Comment: per patient's son, patient drinks one glass of wine daily    Drug use: No    Sexual activity: Never         ALLERGIES  Ppd [tuberculin purified protein derivative]        REVIEW OF SYSTEMS    All systems reviewed and marked as negative except as listed in HPI     PHYSICAL EXAM    I have reviewed the triage vital signs and nursing notes.    ED Triage Vitals [12/16/23 1155]   Temp Heart Rate Resp BP SpO2   98.4 °F (36.9 °C) 90 16 162/82 100 %      Temp src Heart Rate Source Patient Position BP Location FiO2 (%)   Oral Monitor -- -- --       Physical Exam  Constitutional:       General: She is not in acute distress.     Appearance: She is well-developed.   HENT:      Head:  Normocephalic and atraumatic.   Eyes:      General: No scleral icterus.     Conjunctiva/sclera: Conjunctivae normal.   Neck:      Trachea: No tracheal deviation.   Cardiovascular:      Rate and Rhythm: Normal rate and regular rhythm.   Pulmonary:      Effort: Pulmonary effort is normal.      Breath sounds: Normal breath sounds.   Abdominal:      Palpations: Abdomen is soft.      Tenderness: There is no abdominal tenderness.   Musculoskeletal:         General: No deformity.      Cervical back: Normal range of motion.   Lymphadenopathy:      Cervical: No cervical adenopathy.   Skin:     General: Skin is warm and dry.   Neurological:      Mental Status: She is alert. Mental status is at baseline. She is confused.   Psychiatric:         Behavior: Behavior normal.         Cognition and Memory: Cognition is impaired. Memory is impaired.         Vital signs and nursing notes reviewed.            LAB RESULTS  Recent Results (from the past 24 hour(s))   Comprehensive Metabolic Panel    Collection Time: 12/16/23 12:26 PM    Specimen: Blood   Result Value Ref Range    Glucose 283 (H) 65 - 99 mg/dL    BUN 8 8 - 23 mg/dL    Creatinine 0.54 (L) 0.57 - 1.00 mg/dL    Sodium 139 136 - 145 mmol/L    Potassium 5.1 3.5 - 5.2 mmol/L    Chloride 100 98 - 107 mmol/L    CO2 25.0 22.0 - 29.0 mmol/L    Calcium 9.6 8.6 - 10.5 mg/dL    Total Protein 7.5 6.0 - 8.5 g/dL    Albumin 4.3 3.5 - 5.2 g/dL    ALT (SGPT) 27 1 - 33 U/L    AST (SGOT) 54 (H) 1 - 32 U/L    Alkaline Phosphatase 111 39 - 117 U/L    Total Bilirubin 0.4 0.0 - 1.2 mg/dL    Globulin 3.2 gm/dL    A/G Ratio 1.3 g/dL    BUN/Creatinine Ratio 14.8 7.0 - 25.0    Anion Gap 14.0 5.0 - 15.0 mmol/L    eGFR 96.1 >60.0 mL/min/1.73   Green Top (Gel)    Collection Time: 12/16/23 12:26 PM   Result Value Ref Range    Extra Tube Hold for add-ons.    Lavender Top    Collection Time: 12/16/23 12:26 PM   Result Value Ref Range    Extra Tube hold for add-on    Gold Top - SST    Collection Time:  12/16/23 12:26 PM   Result Value Ref Range    Extra Tube Hold for add-ons.    Light Blue Top    Collection Time: 12/16/23 12:26 PM   Result Value Ref Range    Extra Tube Hold for add-ons.    CBC Auto Differential    Collection Time: 12/16/23 12:26 PM    Specimen: Blood   Result Value Ref Range    WBC 5.95 3.40 - 10.80 10*3/mm3    RBC 4.93 3.77 - 5.28 10*6/mm3    Hemoglobin 13.7 12.0 - 15.9 g/dL    Hematocrit 42.5 34.0 - 46.6 %    MCV 86.2 79.0 - 97.0 fL    MCH 27.8 26.6 - 33.0 pg    MCHC 32.2 31.5 - 35.7 g/dL    RDW 12.2 (L) 12.3 - 15.4 %    RDW-SD 38.3 37.0 - 54.0 fl    MPV 11.0 6.0 - 12.0 fL    Platelets 211 140 - 450 10*3/mm3    Neutrophil % 64.4 42.7 - 76.0 %    Lymphocyte % 23.2 19.6 - 45.3 %    Monocyte % 9.7 5.0 - 12.0 %    Eosinophil % 1.2 0.3 - 6.2 %    Basophil % 1.0 0.0 - 1.5 %    Immature Grans % 0.5 0.0 - 0.5 %    Neutrophils, Absolute 3.83 1.70 - 7.00 10*3/mm3    Lymphocytes, Absolute 1.38 0.70 - 3.10 10*3/mm3    Monocytes, Absolute 0.58 0.10 - 0.90 10*3/mm3    Eosinophils, Absolute 0.07 0.00 - 0.40 10*3/mm3    Basophils, Absolute 0.06 0.00 - 0.20 10*3/mm3    Immature Grans, Absolute 0.03 0.00 - 0.05 10*3/mm3    nRBC 0.0 0.0 - 0.2 /100 WBC   COVID-19 and FLU A/B PCR, 1 HR TAT - Swab, Nasopharynx    Collection Time: 12/16/23 12:27 PM    Specimen: Nasopharynx; Swab   Result Value Ref Range    COVID19 Not Detected Not Detected - Ref. Range    Influenza A PCR Not Detected Not Detected    Influenza B PCR Not Detected Not Detected   Urinalysis With Microscopic If Indicated (No Culture) - Urine, Catheter    Collection Time: 12/16/23  1:48 PM    Specimen: Urine, Catheter   Result Value Ref Range    Color, UA Yellow Yellow, Straw    Appearance, UA Clear Clear    pH, UA 7.0 5.0 - 8.0    Specific Gravity, UA 1.027 1.005 - 1.030    Glucose, UA >=1000 mg/dL (3+) (A) Negative    Ketones, UA Negative Negative    Bilirubin, UA Negative Negative    Blood, UA Negative Negative    Protein, UA Trace (A) Negative    Leuk  Esterase, UA Negative Negative    Nitrite, UA Negative Negative    Urobilinogen, UA 1.0 E.U./dL 0.2 - 1.0 E.U./dL       Ordered the above labs and independently reviewed the results.        RADIOLOGY  XR Chest 1 View    Result Date: 12/16/2023  XR CHEST 1 VW-  HISTORY: Female who is 73 years-old, altered mental status  TECHNIQUE: Frontal view of the chest  COMPARISON: 10/19/2021  FINDINGS: Heart size is normal. Aorta is calcified. Pulmonary vasculature is unremarkable. No focal pulmonary consolidation, pleural effusion, or pneumothorax. No acute osseous process.      No evidence for acute pulmonary process. Follow-up as clinical indications persist.  This report was finalized on 12/16/2023 1:59 PM by Dr. Osito Gallegos M.D on Workstation: [id number]       I ordered the above noted radiological studies. Independently reviewed by me and discussed with radiologist.  See dictation above for official radiology interpretation.      PROCEDURES    Procedures        MEDICATIONS GIVEN IN ER    Medications   sodium chloride 0.9 % flush 10 mL (has no administration in time range)         PROGRESS, DATA ANALYSIS, CONSULTS, AND MEDICAL DECISION MAKING    All labs have been independently interpreted by me.  All radiology studies have been interpreted by me.  Discussion below represents my analysis of pertinent findings related to patient's condition, differential diagnosis, treatment plan and final disposition.    Patient presentation and evaluation consistent with increased restlessness likely related to her fluctuating dementia.  No indication for admission.  Reassuring ED workup.  Appropriate for outpatient management at this time.    - Chronic or social conditions impacting care: None      DIFFERENTIAL DIAGNOSIS INCLUDE BUT NOT LIMITED TO:     UTI, dehydration, viral syndrome      Orders placed during this visit:  Orders Placed This Encounter   Procedures    COVID-19 and FLU A/B PCR, 1 HR TAT - Swab, Nasopharynx    XR Chest  1 View    [place] Draw    Comprehensive Metabolic Panel    Urinalysis With Microscopic If Indicated (No Culture) - Urine, Catheter    CBC Auto Differential    Continuous Pulse Oximetry    Vital Signs    Oxygen Therapy- Nasal Cannula; Titrate 1-6 LPM Per SpO2; 90 - 95%    POC Glucose Once    Insert Peripheral IV    Fall Precautions    CBC & Differential    Green Top (Gel)    Lavender Top    Gold Top - SST    Light Blue Top         ED Course as of 12/16/23 1421   Sat Dec 16, 2023   1248 WBC: 5.95 [DC]      ED Course User Index  [DC] Sebastian Rowley PA       AS OF 14:21 EST VITALS:    BP - 162/82  HR - 90  TEMP - 98.4 °F (36.9 °C) (Oral)  02 SATS - 100%    1421 I rechecked the patient.  I discussed the patient's labs, radiology findings (including all incidental findings), diagnosis, and plan for discharge.  A repeat exam reveals no new worrisome changes from my initial exam findings.  The patient understands that the fact that they are being discharged does not denote that nothing is abnormal, it indicates that no clinical emergency is present and that they must follow-up as directed in order to properly maintain their health.  Follow-up instructions (specifically listed below) and return to ER precautions were given at this time.  I specifically instructed the patient to follow-up with their PCP.  The patient understands and agrees with the plan, and is ready for discharge.  All questions answered.    Everardo Mazariegos MD  [address] [id number] [phone]    Schedule an appointment as soon as possible for a visit in 2 days        DIAGNOSIS  Final diagnoses:   Restlessness   Vascular dementia, unspecified dementia severity, unspecified whether behavioral, psychotic, or mood disturbance or anxiety         DISPOSITION  D/c    Pt masked in first look. I wore a surgical mask throughout my encounters with the pt. I performed hand hygiene on entry into the pt room and upon exit.      Dictated utilizing Dragon dictation     Note Disclaimer: At [facility], we believe that sharing information builds trust and better relationships. You are receiving this note because you recently visited [facility]. It is possible you will see health information before a provider has talked with you about it. This kind of information can be easy to misunderstand. To help you fully understand what it means for your health, we urge you to discuss this note with your provider.

## 2023-12-16 NOTE — ED NOTES
Pt has been at [facility] one day - she was sent out because they can't keep her in her bed.  She has vascular dementia and is at baseline mentation.  [facility]

## 2023-12-16 NOTE — ED NOTES
"Per EMS,\"She had loss of bladder control; facility left pt in depends and did not give Jardiance.\" Transported by [facility]. Glucose was at 382.-Rukhsana Gray  "

## 2024-01-04 ENCOUNTER — TELEPHONE (OUTPATIENT)
Dept: INTERNAL MEDICINE | Facility: CLINIC | Age: 74
End: 2024-01-04
Payer: MEDICARE

## 2024-01-04 ENCOUNTER — TELEPHONE (OUTPATIENT)
Dept: INTERNAL MEDICINE | Facility: CLINIC | Age: 74
End: 2024-01-04

## 2024-01-04 DIAGNOSIS — R27.0 ATAXIA: Primary | ICD-10-CM

## 2024-01-04 NOTE — TELEPHONE ENCOUNTER
Caller: Enrike Munoz    Relationship: Emergency Contact    Best call back number: [phone]     What is the medical concern/diagnosis:   PATIENT HAD 2 STROKES AND SPENT 2 WEEKS IN THE [facility].   THE PATIENT ENDED UP WITH AROUND 17 MEDICATIONS FOR ENRIKE TO MANAGE FOR THE PATIENT.   HE IS FEELING VERY OVERHWHELMED BY THE SENSITIVE, UNIQUE INSTRUCTIONS OF THE INDIVIDUAL PRESCRIPTIONS AND WOULD GREATLY BENEFIT FROM IN-PERSON MEDICATION REVIEWS AND DETAILED MANAGEMENT / PACKAGING OF PRESCRIPTIONS    What specialty or service is being requested: [facility]    Any additional details: PATIENT WAS TOLD THAT [facility] WILL NEED ORDERS FROM DR. HERNANDEZ BEFORE THEY CAN HELP.

## 2024-01-04 NOTE — TELEPHONE ENCOUNTER
Caller: Enrike Munoz    Relationship: Emergency Contact    Best call back number: [phone]     What is the best time to reach you: ASAP    Who are you requesting to speak with (clinical staff, provider,  specific staff member): DR. HERNANDEZ    What was the call regarding: THE PATIENT HAS RECENTLY HAD TO CHANGE MUCH OF HER HEALTH ROUTINE.    ENRIKE IS FEELING VERY OVERWHELMED BY THE CHANGES AND WOULD GREATLY APPRECIATE A CALL FROM DR. HERNANDEZ.    HE DESCRIBES DR. HERNANDEZ AS HELPFUL AND EMPATHETIC AND LOOKS FORWARD TO HIS CALL.

## 2024-01-10 ENCOUNTER — APPOINTMENT (OUTPATIENT)
Dept: GENERAL RADIOLOGY | Facility: HOSPITAL | Age: 74
End: 2024-01-10
Payer: MEDICARE

## 2024-01-10 ENCOUNTER — HOSPITAL ENCOUNTER (EMERGENCY)
Facility: HOSPITAL | Age: 74
Discharge: HOME | End: 2024-01-10
Attending: STUDENT IN AN ORGANIZED HEALTH CARE EDUCATION/TRAINING PROGRAM | Admitting: STUDENT IN AN ORGANIZED HEALTH CARE EDUCATION/TRAINING PROGRAM
Payer: MEDICARE

## 2024-01-10 ENCOUNTER — APPOINTMENT (OUTPATIENT)
Dept: CT IMAGING | Facility: HOSPITAL | Age: 74
End: 2024-01-10
Payer: MEDICARE

## 2024-01-10 VITALS
TEMPERATURE: 97.7 F | HEART RATE: 83 BPM | WEIGHT: 140 LBS | DIASTOLIC BLOOD PRESSURE: 62 MMHG | SYSTOLIC BLOOD PRESSURE: 138 MMHG | HEIGHT: 63 IN | BODY MASS INDEX: 24.8 KG/M2 | OXYGEN SATURATION: 95 % | RESPIRATION RATE: 18 BRPM

## 2024-01-10 DIAGNOSIS — R53.1 WEAKNESS: Primary | ICD-10-CM

## 2024-01-10 LAB
ALBUMIN SERPL-MCNC: 3.6 G/DL (ref 3.5–5.2)
ALBUMIN/GLOB SERPL: 1.3 G/DL
ALP SERPL-CCNC: 98 U/L (ref 39–117)
ALT SERPL W P-5'-P-CCNC: 17 U/L (ref 1–33)
ANION GAP SERPL CALCULATED.3IONS-SCNC: 13.8 MMOL/L (ref 5–15)
AST SERPL-CCNC: 12 U/L (ref 1–32)
BASOPHILS # BLD AUTO: 0.04 10*3/MM3 (ref 0–0.2)
BASOPHILS NFR BLD AUTO: 0.8 % (ref 0–1.5)
BILIRUB SERPL-MCNC: 0.7 MG/DL (ref 0–1.2)
BILIRUB UR QL STRIP: NEGATIVE
BUN SERPL-MCNC: 11 MG/DL (ref 8–23)
BUN/CREAT SERPL: 18.3 (ref 7–25)
CALCIUM SPEC-SCNC: 9.4 MG/DL (ref 8.6–10.5)
CHLORIDE SERPL-SCNC: 108 MMOL/L (ref 98–107)
CLARITY UR: CLEAR
CO2 SERPL-SCNC: 22.2 MMOL/L (ref 22–29)
COLOR UR: YELLOW
CREAT SERPL-MCNC: 0.6 MG/DL (ref 0.57–1)
DEPRECATED RDW RBC AUTO: 43.6 FL (ref 37–54)
EGFRCR SERPLBLD CKD-EPI 2021: 93.7 ML/MIN/1.73
EOSINOPHIL # BLD AUTO: 0.08 10*3/MM3 (ref 0–0.4)
EOSINOPHIL NFR BLD AUTO: 1.5 % (ref 0.3–6.2)
ERYTHROCYTE [DISTWIDTH] IN BLOOD BY AUTOMATED COUNT: 13.3 % (ref 12.3–15.4)
GLOBULIN UR ELPH-MCNC: 2.7 GM/DL
GLUCOSE SERPL-MCNC: 80 MG/DL (ref 65–99)
GLUCOSE UR STRIP-MCNC: ABNORMAL MG/DL
HCT VFR BLD AUTO: 36.6 % (ref 34–46.6)
HGB BLD-MCNC: 11.9 G/DL (ref 12–15.9)
HGB UR QL STRIP.AUTO: NEGATIVE
HOLD SPECIMEN: NORMAL
HOLD SPECIMEN: NORMAL
IMM GRANULOCYTES # BLD AUTO: 0.02 10*3/MM3 (ref 0–0.05)
IMM GRANULOCYTES NFR BLD AUTO: 0.4 % (ref 0–0.5)
KETONES UR QL STRIP: ABNORMAL
LEUKOCYTE ESTERASE UR QL STRIP.AUTO: NEGATIVE
LYMPHOCYTES # BLD AUTO: 1.31 10*3/MM3 (ref 0.7–3.1)
LYMPHOCYTES NFR BLD AUTO: 25.1 % (ref 19.6–45.3)
MAGNESIUM SERPL-MCNC: 1.9 MG/DL (ref 1.6–2.4)
MCH RBC QN AUTO: 29 PG (ref 26.6–33)
MCHC RBC AUTO-ENTMCNC: 32.5 G/DL (ref 31.5–35.7)
MCV RBC AUTO: 89.3 FL (ref 79–97)
MONOCYTES # BLD AUTO: 0.46 10*3/MM3 (ref 0.1–0.9)
MONOCYTES NFR BLD AUTO: 8.8 % (ref 5–12)
NEUTROPHILS NFR BLD AUTO: 3.3 10*3/MM3 (ref 1.7–7)
NEUTROPHILS NFR BLD AUTO: 63.4 % (ref 42.7–76)
NITRITE UR QL STRIP: NEGATIVE
NRBC BLD AUTO-RTO: 0 /100 WBC (ref 0–0.2)
PH UR STRIP.AUTO: <=5 [PH] (ref 5–8)
PLATELET # BLD AUTO: 198 10*3/MM3 (ref 140–450)
PMV BLD AUTO: 10.5 FL (ref 6–12)
POTASSIUM SERPL-SCNC: 3 MMOL/L (ref 3.5–5.2)
PROT SERPL-MCNC: 6.3 G/DL (ref 6–8.5)
PROT UR QL STRIP: NEGATIVE
QT INTERVAL: 455 MS
QTC INTERVAL: 545 MS
RBC # BLD AUTO: 4.1 10*6/MM3 (ref 3.77–5.28)
SODIUM SERPL-SCNC: 144 MMOL/L (ref 136–145)
SP GR UR STRIP: >=1.03 (ref 1–1.03)
TROPONIN T SERPL HS-MCNC: 11 NG/L
UROBILINOGEN UR QL STRIP: ABNORMAL
WBC NRBC COR # BLD AUTO: 5.21 10*3/MM3 (ref 3.4–10.8)
WHOLE BLOOD HOLD COAG: NORMAL
WHOLE BLOOD HOLD SPECIMEN: NORMAL

## 2024-01-10 PROCEDURE — 93005 ELECTROCARDIOGRAM TRACING: CPT

## 2024-01-10 PROCEDURE — 93010 ELECTROCARDIOGRAM REPORT: CPT | Performed by: STUDENT IN AN ORGANIZED HEALTH CARE EDUCATION/TRAINING PROGRAM

## 2024-01-10 PROCEDURE — 83735 ASSAY OF MAGNESIUM: CPT

## 2024-01-10 PROCEDURE — 85025 COMPLETE CBC W/AUTO DIFF WBC: CPT

## 2024-01-10 PROCEDURE — 80053 COMPREHEN METABOLIC PANEL: CPT

## 2024-01-10 PROCEDURE — 84484 ASSAY OF TROPONIN QUANT: CPT

## 2024-01-10 PROCEDURE — 70450 CT HEAD/BRAIN W/O DYE: CPT

## 2024-01-10 PROCEDURE — 81003 URINALYSIS AUTO W/O SCOPE: CPT

## 2024-01-10 PROCEDURE — 36415 COLL VENOUS BLD VENIPUNCTURE: CPT

## 2024-01-10 PROCEDURE — 99284 EMERGENCY DEPT VISIT MOD MDM: CPT

## 2024-01-10 PROCEDURE — 71045 X-RAY EXAM CHEST 1 VIEW: CPT

## 2024-01-10 PROCEDURE — P9612 CATHETERIZE FOR URINE SPEC: HCPCS

## 2024-01-10 RX ORDER — SODIUM CHLORIDE 0.9 % (FLUSH) 0.9 %
10 SYRINGE (ML) INJECTION AS NEEDED
Status: DISCONTINUED | OUTPATIENT
Start: 2024-01-10 | End: 2024-01-10 | Stop reason: HOSPADM

## 2024-01-10 NOTE — ED NOTES
Pt to ED via ems from home with c/o being tired. Pt just DC'd from rehab last week. Pt denies any c/o at this time. Old large bruise to lateral L thigh, denies hip soreness on palp. Pt is a poor historian

## 2024-01-10 NOTE — ED TRIAGE NOTES
Patient to er from home per [facility] with c/o call out for weakness today. Patient having increasing in generalized weakness. Reported patient has had prior stroks with some left side residual. Reported some falls while in rehab and again once home last week. Patient is alert x 2 to base line and has hx of dementia.

## 2024-01-10 NOTE — ED NOTES
Sister at bedside, states she lives with her son, he called ems this am telling sister that she was unsure of where she was. Pt has not been ambulating since coming home from rehab, no falls since DC that family is aware of

## 2024-01-10 NOTE — ED PROVIDER NOTES
EMERGENCY DEPARTMENT ENCOUNTER  Room Number:  19/19  PCP: Everardo Mazariegos MD  Independent Historians: Patient and EMS      HPI:  Chief Complaint: had concerns including Weakness - Generalized.     A complete HPI/ROS/PMH/PSH/SH/FH are unobtainable due to: Dementia    Chronic or social conditions impacting patient care (Social Determinants of Health): None      Context: Lucia Ellis is a 73 y.o. female with a medical history of diabetes, hypertension, dementia, breast cancer, previous CVA who presents to the ED c/o acute exacerbation of weakness.  Family member at present at bedside states that patient has not been able to walk since having a stroke approximately 1 month ago and stay at subacute rehab.  Patient has been home for several weeks.    Son reports additional history via phone that patient was recently discharged from subacute rehab after stroke.  Patient has been nonambulatory since that time and does have dementia with intermittent confusion.  He states starting last night he noticed her to be a little more weak than usual.  He says this morning after giving patient her medicine she called him back to his room and stated she needed to go to the hospital.  Son states she was not moving her arms at that time.  Sister was present at bedside states she was told the patient was sent because she was more confused than normal.  Patient does not know why she is here and has no complaints.      Review of prior external notes (non-ED) -and- Review of prior external test results outside of this encounter:   DC summary from 12/14/2023 reviewed and notable for history of diabetes, hypertension, dementia, breast cancer, previous CVA presenting for unsteady gait and left-sided weakness.  Patient was found to have acute right basal ganglia stroke and was admitted for further evaluation and management.  Neurology consulted and patient was evaluated in the emergency department.  Patient was discharged to skilled nursing  facility    PAST MEDICAL HISTORY  Active Ambulatory Problems     Diagnosis Date Noted    Gastroesophageal reflux disease 02/09/2017    Malignant neoplasm of breast 02/09/2017    Depression 02/09/2017    Folliculitis 02/09/2017    Generalized anxiety disorder 02/09/2017    Hyperlipidemia 02/09/2017    Essential hypertension 02/09/2017    Status post total right knee replacement 02/09/2017    Rectal hemorrhage 02/09/2017    Vitamin D deficiency 02/09/2017    Drug therapy 02/09/2017    Acute cholecystitis 05/10/2018    Uncontrolled type 2 diabetes mellitus with hypoglycemia without coma 12/29/2022    Myoclonus 12/29/2022    Type 2 diabetes mellitus with hyperglycemia 12/29/2022    Hypokalemia 12/29/2022    New onset seizure 12/29/2022    Focal motor seizure 12/30/2022    Vascular dementia, moderate, without behavioral disturbance, psychotic disturbance, mood disturbance, and anxiety 12/31/2022    Stroke-like symptoms 12/08/2023    CVA (cerebral vascular accident) 12/09/2023    Lung nodules 12/09/2023    Hypokalemia 12/09/2023     Resolved Ambulatory Problems     Diagnosis Date Noted    No Resolved Ambulatory Problems     Past Medical History:   Diagnosis Date    Breast cancer     Dementia     Diabetes mellitus     Hypertension     Memory loss          PAST SURGICAL HISTORY  Past Surgical History:   Procedure Laterality Date    CHOLECYSTECTOMY      HYSTERECTOMY      MASTECTOMY Right 1995    TOTAL KNEE ARTHROPLASTY Right          FAMILY HISTORY  Family History   Problem Relation Age of Onset    Heart attack Mother     Heart disease Mother     Hypertension Mother     Hypertension Father     Diabetes Father     Hypertension Sister     Diabetes Sister     Thyroid cancer Sister     Breast cancer Sister     Lung cancer Sister     Diabetes Brother     Drug abuse Paternal Grandmother          SOCIAL HISTORY  Social History     Socioeconomic History    Marital status:    Tobacco Use    Smoking status: Never    Smokeless  tobacco: Never   Vaping Use    Vaping Use: Never used   Substance and Sexual Activity    Alcohol use: Yes     Alcohol/week: 7.0 standard drinks of alcohol     Types: 7 Glasses of wine per week     Comment: per patient's son, patient drinks one glass of wine daily    Drug use: No    Sexual activity: Never         ALLERGIES  Ppd [tuberculin purified protein derivative]      REVIEW OF SYSTEMS  Review of Systems  Included in HPI  All systems reviewed and negative except for those discussed in HPI.      PHYSICAL EXAM    I have reviewed the triage vital signs and nursing notes.    ED Triage Vitals [01/10/24 1141]   Temp Heart Rate Resp BP SpO2   97.7 °F (36.5 °C) 90 18 140/64 98 %      Temp src Heart Rate Source Patient Position BP Location FiO2 (%)   Tympanic -- -- -- --       Physical Exam  GENERAL: alert, no acute distress  SKIN: Warm, dry  HENT: Normocephalic, atraumatic  EYES: no scleral icterus  CV: regular rhythm, regular rate  RESPIRATORY: normal effort, lungs clear  ABDOMEN: soft, nontender, nondistended  MUSCULOSKELETAL: no deformity  NEURO: alert, moves all extremities, follows commands.  Baseline left-sided weakness.  Movement of all extremities noted although decreased on the left.  Patient is intermittently confused but no focal deficits are noted      LAB RESULTS  Recent Results (from the past 24 hour(s))   ECG 12 Lead ED Triage Standing Order; Weak / Dizzy / AMS    Collection Time: 01/10/24 11:59 AM   Result Value Ref Range    QT Interval 455 ms    QTC Interval 545 ms   Comprehensive Metabolic Panel    Collection Time: 01/10/24 12:08 PM    Specimen: Blood   Result Value Ref Range    Glucose 80 65 - 99 mg/dL    BUN 11 8 - 23 mg/dL    Creatinine 0.60 0.57 - 1.00 mg/dL    Sodium 144 136 - 145 mmol/L    Potassium 3.0 (L) 3.5 - 5.2 mmol/L    Chloride 108 (H) 98 - 107 mmol/L    CO2 22.2 22.0 - 29.0 mmol/L    Calcium 9.4 8.6 - 10.5 mg/dL    Total Protein 6.3 6.0 - 8.5 g/dL    Albumin 3.6 3.5 - 5.2 g/dL    ALT  (SGPT) 17 1 - 33 U/L    AST (SGOT) 12 1 - 32 U/L    Alkaline Phosphatase 98 39 - 117 U/L    Total Bilirubin 0.7 0.0 - 1.2 mg/dL    Globulin 2.7 gm/dL    A/G Ratio 1.3 g/dL    BUN/Creatinine Ratio 18.3 7.0 - 25.0    Anion Gap 13.8 5.0 - 15.0 mmol/L    eGFR 93.7 >60.0 mL/min/1.73   Single High Sensitivity Troponin T    Collection Time: 01/10/24 12:08 PM    Specimen: Blood   Result Value Ref Range    HS Troponin T 11 <14 ng/L   Magnesium    Collection Time: 01/10/24 12:08 PM    Specimen: Blood   Result Value Ref Range    Magnesium 1.9 1.6 - 2.4 mg/dL   Urinalysis With Microscopic If Indicated (No Culture) - Straight Cath    Collection Time: 01/10/24 12:08 PM    Specimen: Straight Cath; Urine   Result Value Ref Range    Color, UA Yellow Yellow, Straw    Appearance, UA Clear Clear    pH, UA <=5.0 5.0 - 8.0    Specific Gravity, UA >=1.030 1.005 - 1.030    Glucose, UA >=1000 mg/dL (3+) (A) Negative    Ketones, UA Trace (A) Negative    Bilirubin, UA Negative Negative    Blood, UA Negative Negative    Protein, UA Negative Negative    Leuk Esterase, UA Negative Negative    Nitrite, UA Negative Negative    Urobilinogen, UA 2.0 E.U./dL (A) 0.2 - 1.0 E.U./dL   Green Top (Gel)    Collection Time: 01/10/24 12:08 PM   Result Value Ref Range    Extra Tube Hold for add-ons.    Lavender Top    Collection Time: 01/10/24 12:08 PM   Result Value Ref Range    Extra Tube hold for add-on    Gold Top - SST    Collection Time: 01/10/24 12:08 PM   Result Value Ref Range    Extra Tube Hold for add-ons.    Light Blue Top    Collection Time: 01/10/24 12:08 PM   Result Value Ref Range    Extra Tube Hold for add-ons.    CBC Auto Differential    Collection Time: 01/10/24 12:08 PM    Specimen: Blood   Result Value Ref Range    WBC 5.21 3.40 - 10.80 10*3/mm3    RBC 4.10 3.77 - 5.28 10*6/mm3    Hemoglobin 11.9 (L) 12.0 - 15.9 g/dL    Hematocrit 36.6 34.0 - 46.6 %    MCV 89.3 79.0 - 97.0 fL    MCH 29.0 26.6 - 33.0 pg    MCHC 32.5 31.5 - 35.7 g/dL    RDW  13.3 12.3 - 15.4 %    RDW-SD 43.6 37.0 - 54.0 fl    MPV 10.5 6.0 - 12.0 fL    Platelets 198 140 - 450 10*3/mm3    Neutrophil % 63.4 42.7 - 76.0 %    Lymphocyte % 25.1 19.6 - 45.3 %    Monocyte % 8.8 5.0 - 12.0 %    Eosinophil % 1.5 0.3 - 6.2 %    Basophil % 0.8 0.0 - 1.5 %    Immature Grans % 0.4 0.0 - 0.5 %    Neutrophils, Absolute 3.30 1.70 - 7.00 10*3/mm3    Lymphocytes, Absolute 1.31 0.70 - 3.10 10*3/mm3    Monocytes, Absolute 0.46 0.10 - 0.90 10*3/mm3    Eosinophils, Absolute 0.08 0.00 - 0.40 10*3/mm3    Basophils, Absolute 0.04 0.00 - 0.20 10*3/mm3    Immature Grans, Absolute 0.02 0.00 - 0.05 10*3/mm3    nRBC 0.0 0.0 - 0.2 /100 WBC         RADIOLOGY  CT Head Without Contrast    Result Date: 1/10/2024  CT HEAD WO CONTRAST-  INDICATIONS: Head injury  TECHNIQUE: Radiation dose reduction techniques were utilized, including automated exposure control and exposure modulation based on body size. Noncontrast head CT  COMPARISON: 12/8/2023  FINDINGS:    No acute intracranial hemorrhage, midline shift or mass effect. No acute territorial infarct is identified. Old infarct changes are seen in right more than left basal ganglia.  Moderate periventricular hypodensities suggest chronic small vessel ischemic change in a patient this age.  Arterial calcifications are seen at the base of the brain.  Ventricles, cisterns, cerebral sulci are unremarkable for patient age.  The visualized paranasal sinuses, orbits, mastoid air cells are unremarkable.        No acute intracranial hemorrhage or hydrocephalus. Chronic appearing changes of the brain. If there is further clinical concern, MRI could be considered for further evaluation.  This report was finalized on 1/10/2024 1:55 PM by Dr. Osito Gallegos M.D on Workstation: [id number]      XR Chest 1 View    Result Date: 1/10/2024  XR CHEST 1 VW-  HISTORY: Female who is 73 years-old, weakness  TECHNIQUE: Frontal view of the chest  COMPARISON: 12/16/2023  FINDINGS: Heart size is  normal. Aorta is calcified. Pulmonary vasculature is unremarkable. No focal pulmonary consolidation, pleural effusion, or pneumothorax. No acute osseous process.      No evidence for acute pulmonary process. Follow-up as clinical indications persist.  This report was finalized on 1/10/2024 12:30 PM by Dr. Osito Gallegos M.D on Workstation: [id number]         MEDICATIONS GIVEN IN ER  Medications   sodium chloride 0.9 % flush 10 mL (has no administration in time range)         ORDERS PLACED DURING THIS VISIT:  Orders Placed This Encounter   Procedures    XR Chest 1 View    CT Head Without Contrast    [facility] Draw    Comprehensive Metabolic Panel    Single High Sensitivity Troponin T    Magnesium    Urinalysis With Microscopic If Indicated (No Culture) - Urine, Clean Catch    CBC Auto Differential    NPO Diet NPO Type: Strict NPO    Undress & Gown    Continuous Pulse Oximetry    Vital Signs    Oxygen Therapy- Nasal Cannula; Titrate 1-6 LPM Per SpO2; 90 - 95%    ECG 12 Lead ED Triage Standing Order; Weak / Dizzy / AMS    Insert Peripheral IV    Fall Precautions    CBC & Differential    Green Top (Gel)    Lavender Top    Gold Top - SST    Light Blue Top         OUTPATIENT MEDICATION MANAGEMENT:  Current Facility-Administered Medications Ordered in Epic   Medication Dose Route Frequency Provider Last Rate Last Admin    sodium chloride 0.9 % flush 10 mL  10 mL Intravenous PRN Emergency, Triage Protocol, MD         Current Outpatient Medications Ordered in Epic   Medication Sig Dispense Refill    amLODIPine (NORVASC) 10 MG tablet Take 1 tablet by mouth Daily. 30 tablet 0    aspirin 81 MG EC tablet Take 1 tablet by mouth Daily. 30 tablet 0    atorvastatin (LIPITOR) 80 MG tablet Take 1 tablet by mouth Every Night.      cetirizine (zyrTEC) 10 MG tablet Take 1 tablet by mouth Daily. 30 tablet 6    clopidogrel (PLAVIX) 75 MG tablet Take 1 tablet by mouth Daily.      Continuous Blood Gluc  (FreeStyle Aleyda 2 Flushing)  device 1 each Continuous. 1 each 0    Continuous Blood Gluc Sensor (FreeStyle Aleyda 2 Sensor) misc 1 each 1 (One) Time Per Week. 2 each 11    donepezil (Aricept) 10 MG tablet Take 1 tablet by mouth Daily. 90 tablet 1    empagliflozin (Jardiance) 10 MG tablet tablet Take 1 tablet by mouth Daily. Indications: Type 2 Diabetes 30 tablet 11    escitalopram (Lexapro) 10 MG tablet Take 1 tablet by mouth Daily. Indications: Major Depressive Disorder 90 tablet 1    levETIRAcetam (KEPPRA) 1000 MG tablet Take 1 tablet by mouth 2 (Two) Times a Day. 60 tablet 0    memantine (Namenda) 5 MG tablet Take 1 tablet by mouth 2 (Two) Times a Day. Indications: Lewy Body Dementia 180 tablet 1    OLANZapine (zyPREXA) 5 MG tablet Take 1 tablet by mouth Every Night.      pantoprazole (PROTONIX) 40 MG EC tablet Take 1 tablet by mouth Every Morning.      quinapril (ACCUPRIL) 40 MG tablet Take 1 tablet by mouth Daily. 30 tablet 0    SITagliptin-metFORMIN HCl ER (Janumet XR)  MG tablet Take 1 tablet by mouth Daily. Indications: Type 2 Diabetes 30 tablet 11         PROGRESS, DATA ANALYSIS, CONSULTS, AND MEDICAL DECISION MAKING  All labs have been independently interpreted by me.  All radiology studies have been reviewed by me. All EKG's have been independently viewed and interpreted by me.  Discussion below represents my analysis of pertinent findings related to patient's condition, differential diagnosis, treatment plan and final disposition.    Differential diagnosis includes but is not limited to UTI, CVA, exacerbation of dementia.    ED Course as of 01/10/24 1450   Wed Tobin 10, 2024   1234 Chest x-ray interpreted by me demonstrates no evidence of consolidation [MW]   1235 EKG interpreted by me demonstrates sinus rhythm, rate of 86, no MT [MW]   1235  prolongation, borderline prolonged QT interval [MW]   1446 73-year-old female with recent CVA presenting for unknown reasons.  Son reports some increased weakness, sister reports increased  confusion.  Patient has no complaints.  Patient does demonstrate intermittent confusion and occasional inappropriate answers to questions and occasional agitation.  Laboratory evaluation is overall unremarkable.  Radiological evaluation is unremarkable and EKG is reassuring.  Patient is noted to be moving all extremities with some diminished movement on the left that is reportedly baseline.  Low concern for new CVA given reassuring exam and unremarkable workup.  I suspect patient's symptoms may be secondary to her dementia and intermittent confusion/agitation.  I discussed findings and plan with son via telephone who understands.  I counseled him to follow-up with primary care in regards to patient's symptoms and for needs of establishing home health care.  Patient discharged back home in stable condition. [MW]      ED Course User Index  [MW] John Sarmiento MD             AS OF 14:50 EST VITALS:    BP - 138/62  HR - 80  TEMP - 97.7 °F (36.5 °C) (Tympanic)  O2 SATS - 95%    COMPLEXITY OF CARE  Admission was considered but after careful review of the patient's presentation, physical examination, diagnostic results, and response to treatment the patient may be safely discharged with outpatient follow-up.      DIAGNOSIS  Final diagnoses:   Weakness         DISPOSITION  ED Disposition       ED Disposition   Discharge    Condition   Stable    Comment   --                Please note that portions of this document were completed with a voice recognition program.    Note Disclaimer: At [facility], we believe that sharing information builds trust and better relationships. You are receiving this note because you recently visited [facility]. It is possible you will see health information before a provider has talked with you about it. This kind of information can be easy to misunderstand. To help you fully understand what it means for your health, we urge you to discuss this note with your provider.         Torsten  John MERCADO MD  01/10/24 145

## 2024-01-10 NOTE — ED TRIAGE NOTES
Ems did reported at end of triage that family reported patient was c/o some burning with urination.

## 2024-01-10 NOTE — ED NOTES
Spoke to son jd about DC'ing pt. Sister no longer at bedside, not answering her phone.     Dr ramon speaking to son now.

## 2024-01-10 NOTE — DISCHARGE INSTRUCTIONS
Please follow-up with your primary care physician for further evaluation and help with establishing home health care    Please return to the emergency department with new or worsening symptoms including but not limited to chest pain, shortness of breath, increased confusion, inability to move 1 side of the body or other concerns

## 2024-01-10 NOTE — ED NOTES
Called sister, again, NA. Called son. Going to try to reach her. Otherwise states he cannot be here until 6 pm

## 2024-03-07 ENCOUNTER — OFFICE VISIT (OUTPATIENT)
Dept: NEUROLOGY | Facility: CLINIC | Age: 74
End: 2024-03-07
Payer: MEDICARE

## 2024-03-07 VITALS
WEIGHT: 114 LBS | OXYGEN SATURATION: 98 % | HEIGHT: 63 IN | BODY MASS INDEX: 20.2 KG/M2 | DIASTOLIC BLOOD PRESSURE: 86 MMHG | HEART RATE: 73 BPM | SYSTOLIC BLOOD PRESSURE: 146 MMHG

## 2024-03-07 DIAGNOSIS — F01.B0 VASCULAR DEMENTIA, MODERATE, WITHOUT BEHAVIORAL DISTURBANCE, PSYCHOTIC DISTURBANCE, MOOD DISTURBANCE, AND ANXIETY: Primary | Chronic | ICD-10-CM

## 2024-03-07 DIAGNOSIS — G40.109 FOCAL MOTOR SEIZURE: ICD-10-CM

## 2024-03-07 DIAGNOSIS — F03.90 DEMENTIA WITHOUT BEHAVIORAL DISTURBANCE: ICD-10-CM

## 2024-03-07 DIAGNOSIS — Z86.73 HISTORY OF STROKE: ICD-10-CM

## 2024-03-07 RX ORDER — MEMANTINE HYDROCHLORIDE 5 MG/1
5 TABLET ORAL 2 TIMES DAILY
Qty: 180 TABLET | Refills: 0 | Status: SHIPPED | OUTPATIENT
Start: 2024-03-07

## 2024-03-07 RX ORDER — LEVETIRACETAM 1000 MG/1
1000 TABLET ORAL 2 TIMES DAILY
Qty: 60 TABLET | Refills: 2 | Status: SHIPPED | OUTPATIENT
Start: 2024-03-07

## 2024-03-07 RX ORDER — LISINOPRIL 40 MG/1
TABLET ORAL
COMMUNITY
Start: 2024-01-01

## 2024-03-07 RX ORDER — ASPIRIN 81 MG/1
81 TABLET ORAL DAILY
Qty: 30 TABLET | Refills: 2 | Status: SHIPPED | OUTPATIENT
Start: 2024-03-07

## 2024-03-07 RX ORDER — DONEPEZIL HYDROCHLORIDE 10 MG/1
10 TABLET, FILM COATED ORAL DAILY
Qty: 90 TABLET | Refills: 0 | Status: SHIPPED | OUTPATIENT
Start: 2024-03-07

## 2024-03-07 NOTE — ASSESSMENT & PLAN NOTE
She has had prior EEG which was abnormal due to presence of epileptiform discharges arising from the left fronto-temporal head regions which place patient at increased risk for focal and secondarily generalized seizures and she is currently on levetiracetam 1000 mg BID which I will renew for her and advised her to continues. Discussed seizure precautions.  She is not driving.

## 2024-03-07 NOTE — PROGRESS NOTES
Chief Complaint  Stroke (Pt is here today with Sister Jessica Recinos. ) and Memory Loss and epilepsy    Subjective          Lucia Ellis presents to [facility] NEUROLOGY for   HISTORY OF PRESENT ILLNESS:    Lucia Ellis is a 74 year old right handed woman who returns to neurology clinic for follow up evaluation and treatment of memory loss, epilepsy and follow up of most recent hospital visit for acute stroke on 12/9/2023.  She presents today with her sister, Jessica, on visit today who also provides information.  He reports some trouble with her memory starting about 5+ years and more obvious more recently.  She tells me what worries her the most is that she is getting lost when she is driving in familiar places like her neighborhood.  She is also forgetful and misplacing objects.  She is forgetting conversations with her family and tells me she took money out of the bank and did not remember what she did with it and then realized she gave the money to her for new motor.  Her son has noticed that she is paranoid and misplaces and hides things even from herself.  She has had lumpectomy done for breast cancer with radiation treatment several years ago.  She had a brain MRI scan done which did not demonstrate any acute intracranial findings.  She denies any family history of dementia but reports family history of cancer.  She has had lab work including normal CBC, CMP and TSH.  Her Vit D levels were low and she reports taking supplements.  She also had normal Vit B12 level.  She is no longer driving.  She denies any exposures to toxins.  Her son cooks food for her and he also gives her medicines.  She denies any trouble with ADLs but tells me she takes her time and is slowing down to make sure she does things appropriately.  She sometimes thinks too long about what she needs to be wearing.  She tells me she does not remember going to the doctor with her son and she is forgetting regular conversations.   She is living with her son who does the cooking.  They have smoke detectors in their house.  She has not decided the POA status and living will.  She denies any significant depression.  She denies exercising and socializing much.  She is living with her son at this time.  She is currently on combination of donepezil 10 mg daily along with memantine 5 mg BID.       She has had prior EEG which was abnormal due to presence of epileptiform discharges arising from the left fronto-temporal head regions which place patient at increased risk for focal and secondarily generalized seizures and she is currently on levetiracetam 1000 mg BID.      Of note, since her last visit she was evaluated in the hospital on 12/9/2023 due to trouble with her gait and left sided weakness.  She presented to the hospital from home with these symptoms which had reportedly started 2 days prior to her presentation to the hospital.  She was supposed to be taking a baby aspirin along with her atorvastatin but it was thought that she had forgotten to take her dose of baby aspirin.  CT head showed old stroke on the right caudate and anterior limb of internal capsule, small hypodensity was appreciated on posterior limb of the internal capsule vs lateral thalamus, CTA head and neck showed mild to moderate stenosis on the right P1 P2 junction, CTs also showed a nodule on the right lung concerning for metastasis, CT perfusion negative, she had a brain MRI scan which I independently reviewed the images on her visit today that confirmed a stroke on the right lateral thalamus/posterior limb of internal capsule.  In the ED she had elevated blood pressure with 180s systolic, her stroke labs showed A1c of 8.4 and .  She was placed on combination of baby aspirin along with Plavix (for 90 days) and then high dose statin along with baby aspirin.  She continues to have trouble with her balance.  She is not in physical therapy currently.  She has not had any  "recent falls.      Past Medical History:   Diagnosis Date    Breast cancer     Dementia     Diabetes mellitus     Hypertension     Memory loss         Family History   Problem Relation Age of Onset    Heart attack Mother     Heart disease Mother     Hypertension Mother     Hypertension Father     Diabetes Father     Hypertension Sister     Diabetes Sister     Thyroid cancer Sister     Breast cancer Sister     Lung cancer Sister     Diabetes Brother     Drug abuse Paternal Grandmother         Social History     Socioeconomic History    Marital status:    Tobacco Use    Smoking status: Never    Smokeless tobacco: Never   Vaping Use    Vaping status: Never Used   Substance and Sexual Activity    Alcohol use: Yes     Alcohol/week: 7.0 standard drinks of alcohol     Types: 7 Glasses of wine per week     Comment: per patient's son, patient drinks one glass of wine daily    Drug use: No    Sexual activity: Never        I have reviewed and confirmed the accuracy of the ROS as documented by the MA/LPN/RN Gia Montes De Oca MD   Review of Systems   Constitutional:  Positive for fatigue.   Musculoskeletal:  Positive for gait problem.   Neurological:  Positive for weakness and light-headedness. Negative for dizziness, tremors, seizures, syncope, facial asymmetry, speech difficulty, numbness, headache, memory problem and confusion.   Psychiatric/Behavioral:  Positive for sleep disturbance. Negative for agitation, behavioral problems, decreased concentration, dysphoric mood, hallucinations, self-injury, suicidal ideas, negative for hyperactivity, depressed mood and stress. The patient is not nervous/anxious.         Objective   Vital Signs:   /86   Pulse 73   Ht 160 cm (63\")   Wt 51.7 kg (114 lb)   SpO2 98%   BMI 20.19 kg/m²       PHYSICAL EXAM:    General   Mental Status - Alert. General Appearance - Well developed, Well groomed, Oriented and Cooperative.        Head and Neck  Head - normocephalic, atraumatic with " no lesions or palpable masses.  Neck    Global Assessment - supple.       Eye   Sclera/Conjunctiva - Bilateral - Normal.    ENMT  Mouth and Throat   Oral Cavity/Oropharynx: Oropharynx - the soft palate,uvula and tongue are normal in appearance.    Chest and Lung Exam   Chest - lung clear to auscultation bilaterally.    Cardiovascular   Cardiovascular examination reveals  - normal heart sounds, regular rate and rhythm.    Neurologic   Mental Status: Speech - Normal. Cognitive function - SLUMS 22/30 with 1/5 object recall compared to prior 23/30 with 0/5 object recall compared to 29/30 previously, appropriate fund of knowledge. No impairment of attention, Impairment of concentration, impairment of long term memory or impairment of short term memory.  Cranial Nerves:   II Optic: Visual acuity - Left - Normal. Right - Normal. Visual fields - Normal (to confrontation).  III Oculomotor: Pupillary constriction - Left - Normal. Right - Normal.  VII Facial: - Normal Bilaterally.  VIII Acoustic - Bilateral - Hearing normal and (Hearing tested by finger rub).   IX Glossopharyngeal / X Vagus - Normal.  XI Accessory: Trapezius - Bilateral - Normal. Sternocleidomastoid - Bilateral - Normal.  XII Hypoglossal - Bilateral - Normal.  Eye Movements: - Normal Bilaterally.  Sensory:   Light Touch: Intact - Globally.  Motor:   Bulk and Contour: - Normal.  Tone: - Normal.  Tremor: Not present.  Strength: 5/5 normal muscle strength - All Muscles.                                                        General Assessment of Reflexes: - deep tendon reflexes are normal. Coordination - No Impairment of finger-to-nose or Impairment of rapid alternating movements. Gait - Normal.     Result Review :                 Assessment and Plan    Problem List Items Addressed This Visit          Neuro    Focal motor seizure    Current Assessment & Plan     She has had prior EEG which was abnormal due to presence of epileptiform discharges arising from the  left fronto-temporal head regions which place patient at increased risk for focal and secondarily generalized seizures and she is currently on levetiracetam 1000 mg BID which I will renew for her and advised her to continues. Discussed seizure precautions.  She is not driving.          Relevant Medications    levETIRAcetam (KEPPRA) 1000 MG tablet    History of stroke    Current Assessment & Plan     Of note, since her last visit she was evaluated in the hospital on 12/9/2023 due to trouble with her gait and left sided weakness.  She presented to the hospital from home with these symptoms which had reportedly started 2 days prior to her presentation to the hospital.  She was supposed to be taking a baby aspirin along with her atorvastatin but it was thought that she had forgotten to take her dose of baby aspirin.  CT head showed old stroke on the right caudate and anterior limb of internal capsule, small hypodensity was appreciated on posterior limb of the internal capsule vs lateral thalamus, CTA head and neck showed mild to moderate stenosis on the right P1 P2 junction, CTs also showed a nodule on the right lung concerning for metastasis, CT perfusion negative, she had a brain MRI scan which I independently reviewed the images on her visit today that confirmed a stroke on the right lateral thalamus/posterior limb of internal capsule.  In the ED she had elevated blood pressure with 180s systolic, her stroke labs showed A1c of 8.4 and .  She was placed on combination of baby aspirin along with Plavix (for 90 days) and then high dose statin along with baby aspirin.  She continues to have trouble with her balance.  She is not in physical therapy currently.  She has not had any recent falls.  I will order physical therapy for her and since it has been over 3 months since her stroke she can stop the Plavix and make sure to take the baby aspirin with the ana laura dose statin.  Discussed stroke symptoms and advised her  to be taken to the nearest hospital with any new or worsening stroke like symptoms.  Provided patient education information on stroke. Advised her to follow up with her PCP with regards to further diabetes management and blood pressure monitoring and control.  She does not smoke.  No blood in stool or urine.           Relevant Medications    aspirin 81 MG EC tablet    Other Relevant Orders    Ambulatory Referral to Physical Therapy Evaluate and treat       Other    Vascular dementia, moderate, without behavioral disturbance, psychotic disturbance, mood disturbance, and anxiety - Primary (Chronic)    Current Assessment & Plan     74 year old right handed woman with memory loss, epilepsy and follow up of most recent hospital visit for acute stroke on 12/9/2023.  She presents today with her sister, Jessica, on visit today who also provides information.  He reports some trouble with her memory starting about 5+ years and more obvious more recently.  She tells me what worries her the most is that she is getting lost when she is driving in familiar places like her neighborhood.  She is also forgetful and misplacing objects.  She is forgetting conversations with her family and tells me she took money out of the bank and did not remember what she did with it and then realized she gave the money to her for new motor.  Her son has noticed that she is paranoid and misplaces and hides things even from herself.  She has had lumpectomy done for breast cancer with radiation treatment several years ago.  She had a brain MRI scan done which did not demonstrate any acute intracranial findings.  She denies any family history of dementia but reports family history of cancer.  She has had lab work including normal CBC, CMP and TSH.  Her Vit D levels were low and she reports taking supplements.  She also had normal Vit B12 level.  She is no longer driving.  She denies any exposures to toxins.  Her son cooks food for her and he also gives  her medicines.  She denies any trouble with ADLs but tells me she takes her time and is slowing down to make sure she does things appropriately.  She sometimes thinks too long about what she needs to be wearing.  She tells me she does not remember going to the doctor with her son and she is forgetting regular conversations.  She is living with her son who does the cooking.  They have smoke detectors in their house.  She has not decided the POA status and living will.  She denies any significant depression.  She denies exercising and socializing much.  She is living with her son at this time.  She is currently on combination of donepezil 10 mg daily along with memantine 5 mg BID.  Her SLUMS is stable at this time and I will continue current medications and advised her to exercise and socialize.           Relevant Medications    donepezil (Aricept) 10 MG tablet    memantine (Namenda) 5 MG tablet     Other Visit Diagnoses       Dementia without behavioral disturbance        Relevant Medications    donepezil (Aricept) 10 MG tablet    memantine (Namenda) 5 MG tablet          I spent 51 minutes caring for Lucia on this date of service. This time includes time spent by me in the following activities:preparing for the visit, reviewing tests, obtaining and/or reviewing a separately obtained history, performing a medically appropriate examination and/or evaluation , counseling and educating the patient/family/caregiver, ordering medications, tests, or procedures, documenting information in the medical record, independently interpreting results and communicating that information with the patient/family/caregiver, and care coordination    Follow Up   No follow-ups on file.  Patient was given instructions and counseling regarding her condition or for health maintenance advice. Please see specific information pulled into the AVS if appropriate.

## 2024-03-07 NOTE — ASSESSMENT & PLAN NOTE
Of note, since her last visit she was evaluated in the hospital on 12/9/2023 due to trouble with her gait and left sided weakness.  She presented to the hospital from home with these symptoms which had reportedly started 2 days prior to her presentation to the hospital.  She was supposed to be taking a baby aspirin along with her atorvastatin but it was thought that she had forgotten to take her dose of baby aspirin.  CT head showed old stroke on the right caudate and anterior limb of internal capsule, small hypodensity was appreciated on posterior limb of the internal capsule vs lateral thalamus, CTA head and neck showed mild to moderate stenosis on the right P1 P2 junction, CTs also showed a nodule on the right lung concerning for metastasis, CT perfusion negative, she had a brain MRI scan which I independently reviewed the images on her visit today that confirmed a stroke on the right lateral thalamus/posterior limb of internal capsule.  In the ED she had elevated blood pressure with 180s systolic, her stroke labs showed A1c of 8.4 and .  She was placed on combination of baby aspirin along with Plavix (for 90 days) and then high dose statin along with baby aspirin.  She continues to have trouble with her balance.  She is not in physical therapy currently.  She has not had any recent falls.  I will order physical therapy for her and since it has been over 3 months since her stroke she can stop the Plavix and make sure to take the baby aspirin with the ana laura dose statin.  Discussed stroke symptoms and advised her to be taken to the nearest hospital with any new or worsening stroke like symptoms.  Provided patient education information on stroke. Advised her to follow up with her PCP with regards to further diabetes management and blood pressure monitoring and control.  She does not smoke.  No blood in stool or urine.

## 2024-03-07 NOTE — ASSESSMENT & PLAN NOTE
74 year old right handed woman with memory loss, epilepsy and follow up of most recent hospital visit for acute stroke on 12/9/2023.  She presents today with her sister, Jessica, on visit today who also provides information.  He reports some trouble with her memory starting about 5+ years and more obvious more recently.  She tells me what worries her the most is that she is getting lost when she is driving in familiar places like her neighborhood.  She is also forgetful and misplacing objects.  She is forgetting conversations with her family and tells me she took money out of the bank and did not remember what she did with it and then realized she gave the money to her for new motor.  Her son has noticed that she is paranoid and misplaces and hides things even from herself.  She has had lumpectomy done for breast cancer with radiation treatment several years ago.  She had a brain MRI scan done which did not demonstrate any acute intracranial findings.  She denies any family history of dementia but reports family history of cancer.  She has had lab work including normal CBC, CMP and TSH.  Her Vit D levels were low and she reports taking supplements.  She also had normal Vit B12 level.  She is no longer driving.  She denies any exposures to toxins.  Her son cooks food for her and he also gives her medicines.  She denies any trouble with ADLs but tells me she takes her time and is slowing down to make sure she does things appropriately.  She sometimes thinks too long about what she needs to be wearing.  She tells me she does not remember going to the doctor with her son and she is forgetting regular conversations.  She is living with her son who does the cooking.  They have smoke detectors in their house.  She has not decided the POA status and living will.  She denies any significant depression.  She denies exercising and socializing much.  She is living with her son at this time.  She is currently on combination of  donepezil 10 mg daily along with memantine 5 mg BID.  Her SLUMS is stable at this time and I will continue current medications and advised her to exercise and socialize.

## 2024-03-07 NOTE — PATIENT INSTRUCTIONS
In general, we recommend using good judgement when you are doing certain activities and to avoid those activities that if you were to have a seizure, you could harm yourself or others. In the state of [place], it is the law that you cannot drive within 90 days of a seizure. We also recommend not standing over open flames, not getting on high ladders or the roof, not swimming or taking baths by yourself (showers are ok) and not operating heavy machinery or power tools.  [facility]  Gia Montes De Oca MD  Neurology clinic  [phone]    With anti-seizure medications, you may initially notice side effects of fatigue, drowsiness, unsteadiness, and dizziness.  Other possible side effects include nausea, abdominal pain, headache, blurry or double vision, slurred speech and mood changes.  Generally, patients will noticed these symptoms when the medication is first started or with higher doses and will go away with time.    It is import to consistently take your medication every day.  Missing just one dose may put you at risk for a breakthrough seizure.  Consider using reminders on your phone or a pill box.    If you develop a rash, please call the neurology clinic immediately or notify another healthcare professional, as this may be potentially life-threatening.  If you are unable to reach a healthcare professional, go to the emergency room immediately for further evaluation.    If you develop thoughts of wanting to hurt yourself or others, please call the neurology clinic immediately to notify another healthcare professional.  If you are unable to reach a healthcare professional, go to the emergency room immediately for further evaluation.    It is the [place] state law that you cannot drive within 90 days of a seizure.    You should avoid certain activities that if you were to have a seizure, you could harm yourself or others. In general, it is recommended that you avoid operating heavy machinery or  power tools, swimming or taking baths by yourself (showers are ok), don't stand over open flames, don't get on high ladders or the roof.  I also recommend to avoid sleeping on your stomach.    For further information on epilepsy and resources available to patients and their families, please visit the [facility] at www.efky.org or call [phone].    **Check out the [facility]'s monthly Art Group Gathering.  [facility], [address] [place] [address].  Call Yesenia Ginny at [phone] or email her at [email or web address] for the dates of future gatherings.**      **If you have having memory problems, consider HOBSCOTCH (Home-Based Self-management and Cognitive Training Changes lives).  It is an 8 week self-management program for adults with epilepsy and memory problems.  The program is free at the [facility].  Contact Ingrid Rea at [phone] or [email or web address].**         Lower blood pressure by restricting salt in diet (less than 2400 mg/day), weight loss, increase fruits, vegetables, whole grains, and low-fat dairy products.    Consider the DASH diet or Mediterranean diet.  Increase fish and poultry intake.    Avoid sodas, sweets, and red meat.    Limit alcohol consumption.    Monitor and keep blood pressure, cholesterol and blood sugar at goal.    Avoid the use of tobacco and avoid secondhand smoke.    Engage in moderate to vigorous intensity aerobic exercise for about 40 minutes 3-4 times per week.  Consider lower intensity miguel chi or yoga if necessary.    Take antiplatelet medication regularly.    If you snore, wake up unrefreshed or feel tired during the day, talk to your doctor as these may be signs of sleep apnea and a sleep study may be indicated. **Eat a high fiber diet    **Exercise regularly (physically and mentally)    **Floss daily    **Consider eating yogurt regularly    **Consider drinking green  tea    **Limit soda and alcohol    **Ensure safety in the home    **Check out th Alzheimer's Association (www.alz.org).    **If you are interested in participating in a clinical trial, check out the following centers:   [facility]:  [email or web address].medicine..[facility]/      [facility]:  [email or web address]     [facility]:  [email or web address]    **If you live in [facility], consider [facility]. It is a free agency that helps find placement for seniors. They do an assessment and find out the need and know which facilities have openings and would be the best fit. They help figure out the financial aspects as well.  Shivani Reis is the nurse navigator and her number is [phone].

## 2024-03-18 ENCOUNTER — OFFICE VISIT (OUTPATIENT)
Dept: INTERNAL MEDICINE | Facility: CLINIC | Age: 74
End: 2024-03-18
Payer: MEDICARE

## 2024-03-18 VITALS
RESPIRATION RATE: 16 BRPM | BODY MASS INDEX: 19.49 KG/M2 | TEMPERATURE: 96.4 F | SYSTOLIC BLOOD PRESSURE: 120 MMHG | OXYGEN SATURATION: 98 % | WEIGHT: 110 LBS | HEART RATE: 80 BPM | DIASTOLIC BLOOD PRESSURE: 80 MMHG | HEIGHT: 63 IN

## 2024-03-18 DIAGNOSIS — E11.65 TYPE 2 DIABETES MELLITUS WITH HYPERGLYCEMIA, WITHOUT LONG-TERM CURRENT USE OF INSULIN: ICD-10-CM

## 2024-03-18 DIAGNOSIS — I63.89 CEREBROVASCULAR ACCIDENT (CVA) DUE TO OTHER MECHANISM: Primary | ICD-10-CM

## 2024-03-18 DIAGNOSIS — E78.5 DYSLIPIDEMIA: ICD-10-CM

## 2024-03-18 RX ORDER — SITAGLIPTIN AND METFORMIN HYDROCHLORIDE 1000; 50 MG/1; MG/1
1 TABLET, FILM COATED, EXTENDED RELEASE ORAL DAILY
Qty: 30 TABLET | Refills: 11 | Status: SHIPPED | OUTPATIENT
Start: 2024-03-18

## 2024-03-19 LAB
ALBUMIN SERPL-MCNC: 4.7 G/DL (ref 3.8–4.8)
ALBUMIN/CREAT UR: 70 MG/G CREAT (ref 0–29)
ALBUMIN/GLOB SERPL: 2.2 {RATIO} (ref 1.2–2.2)
ALP SERPL-CCNC: 87 IU/L (ref 44–121)
ALT SERPL-CCNC: 9 IU/L (ref 0–32)
AST SERPL-CCNC: 15 IU/L (ref 0–40)
BASOPHILS # BLD AUTO: 0.1 X10E3/UL (ref 0–0.2)
BASOPHILS NFR BLD AUTO: 1 %
BILIRUB SERPL-MCNC: 0.5 MG/DL (ref 0–1.2)
BUN SERPL-MCNC: 13 MG/DL (ref 8–27)
BUN/CREAT SERPL: 17 (ref 12–28)
CALCIUM SERPL-MCNC: 10 MG/DL (ref 8.7–10.3)
CHLORIDE SERPL-SCNC: 98 MMOL/L (ref 96–106)
CHOLEST SERPL-MCNC: 176 MG/DL (ref 100–199)
CO2 SERPL-SCNC: 26 MMOL/L (ref 20–29)
CREAT SERPL-MCNC: 0.75 MG/DL (ref 0.57–1)
CREAT UR-MCNC: 79.6 MG/DL
EGFRCR SERPLBLD CKD-EPI 2021: 82 ML/MIN/1.73
EOSINOPHIL # BLD AUTO: 0 X10E3/UL (ref 0–0.4)
EOSINOPHIL NFR BLD AUTO: 0 %
ERYTHROCYTE [DISTWIDTH] IN BLOOD BY AUTOMATED COUNT: 12.6 % (ref 11.7–15.4)
GLOBULIN SER CALC-MCNC: 2.1 G/DL (ref 1.5–4.5)
GLUCOSE SERPL-MCNC: 272 MG/DL (ref 70–99)
HBA1C MFR BLD: 6.7 % (ref 4.8–5.6)
HCT VFR BLD AUTO: 38.7 % (ref 34–46.6)
HDLC SERPL-MCNC: 62 MG/DL
HGB BLD-MCNC: 12.6 G/DL (ref 11.1–15.9)
IMM GRANULOCYTES # BLD AUTO: 0 X10E3/UL (ref 0–0.1)
IMM GRANULOCYTES NFR BLD AUTO: 0 %
LDLC SERPL CALC-MCNC: 95 MG/DL (ref 0–99)
LDLC/HDLC SERPL: 1.5 RATIO (ref 0–3.2)
LYMPHOCYTES # BLD AUTO: 1.3 X10E3/UL (ref 0.7–3.1)
LYMPHOCYTES NFR BLD AUTO: 27 %
MCH RBC QN AUTO: 28.5 PG (ref 26.6–33)
MCHC RBC AUTO-ENTMCNC: 32.6 G/DL (ref 31.5–35.7)
MCV RBC AUTO: 88 FL (ref 79–97)
MICROALBUMIN UR-MCNC: 55.5 UG/ML
MONOCYTES # BLD AUTO: 0.3 X10E3/UL (ref 0.1–0.9)
MONOCYTES NFR BLD AUTO: 6 %
NEUTROPHILS # BLD AUTO: 3.2 X10E3/UL (ref 1.4–7)
NEUTROPHILS NFR BLD AUTO: 66 %
PLATELET # BLD AUTO: 208 X10E3/UL (ref 150–450)
POTASSIUM SERPL-SCNC: 3.4 MMOL/L (ref 3.5–5.2)
PROT SERPL-MCNC: 6.8 G/DL (ref 6–8.5)
RBC # BLD AUTO: 4.42 X10E6/UL (ref 3.77–5.28)
SODIUM SERPL-SCNC: 142 MMOL/L (ref 134–144)
TRIGL SERPL-MCNC: 106 MG/DL (ref 0–149)
VLDLC SERPL CALC-MCNC: 19 MG/DL (ref 5–40)
WBC # BLD AUTO: 4.9 X10E3/UL (ref 3.4–10.8)

## 2024-03-24 NOTE — PROGRESS NOTES
"Chief Complaint  Follow-up (Was in hospital in January )    Subjective          Lucia Ellis presents to [facility]  History of Present Illness  The patient is a 74-year-old female who presents for a follow-up. She is accompanied by her sister.    She recently had a stroke early in the year and she was at a rehab facility. She also suffers from some underlying dementia as well.    She was doing something and went to the bathroom and started shaking and knew it was a stroke. Her son took her to [facility] and EMS was called. They told her she had a stroke and a few days later they sent her to [facility] for rehab. She does not do well with the rehab. She never really learned how to walk that much. She followed up with the neurologist a couple of weeks ago.    She is taking Jardiance and Janumet extended release  daily for her diabetes.    She is taking Namenda 5 mg daily for her dementia. Dr. Ramos did not want to increase the Namenda. She has been forgetting things, but her sister has not noticed any difference in her memory after the stroke.    Objective   Vital Signs:   /80   Pulse 80   Temp 96.4 °F (35.8 °C)   Resp 16   Ht 160 cm (62.99\")   Wt 49.9 kg (110 lb)   SpO2 98%   BMI 19.49 kg/m²     Physical Exam  Vitals and nursing note reviewed.   Constitutional:       Appearance: She is well-developed.   HENT:      Head: Normocephalic and atraumatic.   Musculoskeletal:      Cervical back: Normal range of motion and neck supple.   Neurological:      Mental Status: She is alert and oriented to person, place, and time.   Psychiatric:         Behavior: Behavior normal.         Physical Exam       Result Review :                 Assessment and Plan    Diagnoses and all orders for this visit:    1. Cerebrovascular accident (CVA) due to other mechanism (Primary)  -     Ambulatory Referral to Home Health  -     CBC & Differential  -     Comprehensive Metabolic " Panel    2. Dyslipidemia  -     CBC & Differential  -     Comprehensive Metabolic Panel  -     Lipid Panel With LDL / HDL Ratio    3. Type 2 diabetes mellitus with hyperglycemia, without long-term current use of insulin  -     SITagliptin-metFORMIN HCl ER (Janumet XR)  MG tablet; Take 1 tablet by mouth Daily. Indications: Type 2 Diabetes  Dispense: 30 tablet; Refill: 11  -     empagliflozin (Jardiance) 10 MG tablet tablet; Take 1 tablet by mouth Daily. Indications: Type 2 Diabetes  Dispense: 30 tablet; Refill: 11  -     CBC & Differential  -     Comprehensive Metabolic Panel  -     Hemoglobin A1c  -     Microalbumin / Creatinine Urine Ratio - Urine, Clean Catch      Assessment & Plan  1. Stroke.  I will set her up with home health for physical therapy, occupational therapy, and speech therapy.    2. Dementia.  She is on Namenda 5 mg daily.    3. Diabetes.  She will continue Jardiance and Janumet extended release  daily. I will obtain labs today.    4. History of stroke.  We need to try to get her A1c and LDL lower because of the history of the stroke. I will obtain blood work today.    Follow-up  The patient will follow up in 1 month.    Follow Up   No follow-ups on file.  Patient was given instructions and counseling regarding her condition or for health maintenance advice. Please see specific information pulled into the AVS if appropriate.           Patient or patient representative verbalized consent for the use of Ambient Listening during the visit with  Everardo Mazariegos MD for chart documentation. 3/24/2024  10:18 EDT

## 2024-03-25 ENCOUNTER — HOME HEALTH ADMISSION (OUTPATIENT)
Dept: HOME HEALTH SERVICES | Facility: HOME HEALTHCARE | Age: 74
End: 2024-03-25
Payer: COMMERCIAL

## 2024-03-26 ENCOUNTER — TELEPHONE (OUTPATIENT)
Dept: INTERNAL MEDICINE | Facility: CLINIC | Age: 74
End: 2024-03-26
Payer: MEDICARE

## 2024-03-26 NOTE — TELEPHONE ENCOUNTER
I SPOKE TO SOHAN AT [facility]-PT WAS ACCEPTED AND WILL BE SEEN IN THE NEXT 48 HOURS. I TRIED TO CALL PT AND ALSO THE  FOR PT BUT NEITHER NUMBER WORKED .   74 y.o., 1950    Legal sex: Female    Marital status:     Languages    English    [address] [facility] [address]    SSN:     MRN: [id number]     Contact Information  [phone] (Home Phone)   [phone] (Mobile)    Alternate    +2 more     Jessica Recinos (Sister)  [phone] (Home Phone)

## 2024-03-27 ENCOUNTER — TELEPHONE (OUTPATIENT)
Dept: INTERNAL MEDICINE | Facility: CLINIC | Age: 74
End: 2024-03-27

## 2024-03-27 ENCOUNTER — TELEPHONE (OUTPATIENT)
Dept: INTERNAL MEDICINE | Facility: CLINIC | Age: 74
End: 2024-03-27
Payer: MEDICARE

## 2024-03-27 NOTE — TELEPHONE ENCOUNTER
Attempt to reach patient and family unsuccessful. Have patient reach office for further information.       OKAY FOR HUB TO RELAY.

## 2024-04-19 ENCOUNTER — OFFICE VISIT (OUTPATIENT)
Dept: INTERNAL MEDICINE | Facility: CLINIC | Age: 74
End: 2024-04-19
Payer: MEDICARE

## 2024-04-19 VITALS
SYSTOLIC BLOOD PRESSURE: 146 MMHG | OXYGEN SATURATION: 95 % | DIASTOLIC BLOOD PRESSURE: 90 MMHG | RESPIRATION RATE: 14 BRPM | BODY MASS INDEX: 20.25 KG/M2 | WEIGHT: 114.3 LBS | HEIGHT: 63 IN | HEART RATE: 68 BPM

## 2024-04-19 DIAGNOSIS — E11.65 TYPE 2 DIABETES MELLITUS WITH HYPERGLYCEMIA, WITHOUT LONG-TERM CURRENT USE OF INSULIN: ICD-10-CM

## 2024-04-19 DIAGNOSIS — Z00.00 WELL WOMAN EXAM (NO GYNECOLOGICAL EXAM): Primary | ICD-10-CM

## 2024-04-19 DIAGNOSIS — E11.9 TYPE 2 DIABETES MELLITUS WITHOUT COMPLICATION, WITHOUT LONG-TERM CURRENT USE OF INSULIN: ICD-10-CM

## 2024-04-19 DIAGNOSIS — I10 ESSENTIAL HYPERTENSION: ICD-10-CM

## 2024-04-19 DIAGNOSIS — F01.B0 VASCULAR DEMENTIA, MODERATE, WITHOUT BEHAVIORAL DISTURBANCE, PSYCHOTIC DISTURBANCE, MOOD DISTURBANCE, AND ANXIETY: Chronic | ICD-10-CM

## 2024-04-19 DIAGNOSIS — Z00.00 MEDICARE ANNUAL WELLNESS VISIT, SUBSEQUENT: ICD-10-CM

## 2024-04-19 DIAGNOSIS — I63.89 CEREBROVASCULAR ACCIDENT (CVA) DUE TO OTHER MECHANISM: ICD-10-CM

## 2024-04-19 DIAGNOSIS — E78.5 DYSLIPIDEMIA: ICD-10-CM

## 2024-04-19 RX ORDER — SITAGLIPTIN AND METFORMIN HYDROCHLORIDE 1000; 50 MG/1; MG/1
1 TABLET, FILM COATED, EXTENDED RELEASE ORAL DAILY
Qty: 30 TABLET | Refills: 11 | Status: SHIPPED | OUTPATIENT
Start: 2024-04-19

## 2024-04-19 RX ORDER — LISINOPRIL 40 MG/1
40 TABLET ORAL DAILY
Qty: 90 TABLET | Refills: 1 | Status: SHIPPED | OUTPATIENT
Start: 2024-04-19

## 2024-04-19 NOTE — PROGRESS NOTES
The ABCs of the Annual Wellness Visit  Subsequent Medicare Wellness Visit    Subjective      Lucia Ellis is a 74 y.o. female who presents for a Subsequent Medicare Wellness Visit.    The following portions of the patient's history were reviewed and   updated as appropriate: allergies, current medications, past family history, past medical history, past social history, past surgical history, and problem list.    Compared to one year ago, the patient feels her physical   health is worse.    Compared to one year ago, the patient feels her mental   health is worse.    Recent Hospitalizations:  This patient has had a [facility] admission record on file within the last 365 days.    Current Medical Providers:  Patient Care Team:  Everardo Mazariegos MD as PCP - General (Family Medicine)    Outpatient Medications Prior to Visit   Medication Sig Dispense Refill    amLODIPine (NORVASC) 10 MG tablet Take 1 tablet by mouth Daily. 30 tablet 0    aspirin 81 MG EC tablet Take 1 tablet by mouth Daily. Indications: Disease involving Lipid Deposits in the Arteries 30 tablet 2    atorvastatin (LIPITOR) 80 MG tablet Take 1 tablet by mouth Every Night.      cetirizine (zyrTEC) 10 MG tablet Take 1 tablet by mouth Daily. 30 tablet 6    donepezil (Aricept) 10 MG tablet Take 1 tablet by mouth Daily. 90 tablet 0    escitalopram (Lexapro) 10 MG tablet Take 1 tablet by mouth Daily. Indications: Major Depressive Disorder 90 tablet 1    levETIRAcetam (KEPPRA) 1000 MG tablet Take 1 tablet by mouth 2 (Two) Times a Day. Indications: Seizure 60 tablet 2    memantine (Namenda) 5 MG tablet Take 1 tablet by mouth 2 (Two) Times a Day. Indications: Lewy Body Dementia 180 tablet 0    OLANZapine (zyPREXA) 5 MG tablet Take 1 tablet by mouth Every Night.      pantoprazole (PROTONIX) 40 MG EC tablet Take 1 tablet by mouth Every Morning.      empagliflozin (Jardiance) 10 MG tablet tablet Take 1 tablet by mouth Daily. Indications: Type 2 Diabetes 30  tablet 11    lisinopril (PRINIVIL,ZESTRIL) 40 MG tablet       quinapril (ACCUPRIL) 40 MG tablet Take 1 tablet by mouth Daily. 30 tablet 0    SITagliptin-metFORMIN HCl ER (Janumet XR)  MG tablet Take 1 tablet by mouth Daily. Indications: Type 2 Diabetes 30 tablet 11    Continuous Blood Gluc  (FreeStyle Aleyda 2 Scio) device 1 each Continuous. 1 each 0    Continuous Blood Gluc Sensor (FreeStyle Aleyda 2 Sensor) misc 1 each 1 (One) Time Per Week. 2 each 11     No facility-administered medications prior to visit.       No opioid medication identified on active medication list. I have reviewed chart for other potential  high risk medication/s and harmful drug interactions in the elderly.        Aspirin is on active medication list. Aspirin use is indicated based on review of current medical condition/s. Pros and cons of this therapy have been discussed today. Benefits of this medication outweigh potential harm.  Patient has been encouraged to continue taking this medication.  .      Patient Active Problem List   Diagnosis    Gastroesophageal reflux disease    Malignant neoplasm of breast    Depression    Folliculitis    Generalized anxiety disorder    Hyperlipidemia    Essential hypertension    Status post total right knee replacement    Rectal hemorrhage    Vitamin D deficiency    Drug therapy    Acute cholecystitis    Uncontrolled type 2 diabetes mellitus with hypoglycemia without coma    Myoclonus    Type 2 diabetes mellitus with hyperglycemia    Hypokalemia    New onset seizure    Focal motor seizure    Vascular dementia, moderate, without behavioral disturbance, psychotic disturbance, mood disturbance, and anxiety    Stroke-like symptoms    CVA (cerebral vascular accident)    Lung nodules    Hypokalemia    History of stroke     Advance Care Planning   Advance Care Planning     Advance Directive is not on file.  ACP discussion was held with the patient during this visit. Patient does not have an  "advance directive, declines further assistance.     Objective    Vitals:    24 1237   BP: 146/90   BP Location: Left arm   Patient Position: Sitting   Cuff Size: Adult   Pulse: 68   Resp: 14   SpO2: 95%   Weight: 51.8 kg (114 lb 4.8 oz)   Height: 160 cm (62.99\")     Estimated body mass index is 20.25 kg/m² as calculated from the following:    Height as of this encounter: 160 cm (62.99\").    Weight as of this encounter: 51.8 kg (114 lb 4.8 oz).    BMI is within normal parameters. No other follow-up for BMI required.      Does the patient have evidence of cognitive impairment?   Yes: Continue current medications.    Lab Results   Component Value Date    CHLPL 176 2024    TRIG 106 2024    HDL 62 2024    LDL 95 2024    VLDL 19 2024    HGBA1C 6.7 (H) 2024          HEALTH RISK ASSESSMENT    Smoking Status:  Social History     Tobacco Use   Smoking Status Never   Smokeless Tobacco Never     Alcohol Consumption:  Social History     Substance and Sexual Activity   Alcohol Use Not Currently    Alcohol/week: 7.0 standard drinks of alcohol    Types: 7 Glasses of wine per week    Comment: per patient's son, patient drinks one glass of wine daily     Fall Risk Screen:    MOSES Fall Risk Assessment was completed, and patient is at LOW risk for falls.Assessment completed on:3/7/2024    Depression Screenin/19/2024    12:39 PM   PHQ-2/PHQ-9 Depression Screening   Little Interest or Pleasure in Doing Things 0-->not at all   Feeling Down, Depressed or Hopeless 0-->not at all   PHQ-9: Brief Depression Severity Measure Score 0       Health Habits and Functional and Cognitive Screenin/19/2024    12:00 PM   Functional & Cognitive Status   Do you have difficulty preparing food and eating? No   Do you have difficulty bathing yourself, getting dressed or grooming yourself? No   Do you have difficulty using the toilet? No   Do you have difficulty moving around from place to place? No "   Do you have trouble with steps or getting out of a bed or a chair? No   Current Diet Limited Junk Food   Dental Exam Up to date   Eye Exam Up to date   Exercise (times per week) 4 times per week   Current Exercises Include Walking   Do you need help using the phone?  Yes   Are you deaf or do you have serious difficulty hearing?  Yes   Do you need help to go to places out of walking distance? Yes   Do you need help shopping? Yes   Do you need help preparing meals?  Yes   Do you need help with housework?  Yes   Do you need help with laundry? Yes   Do you need help taking your medications? Yes   Do you need help managing money? Yes   Do you ever drive or ride in a car without wearing a seat belt? No   Have you felt unusual stress, anger or loneliness in the last month? No   Who do you live with? Community   If you need help, do you have trouble finding someone available to you? No   Have you been bothered in the last four weeks by sexual problems? No   Do you have difficulty concentrating, remembering or making decisions? No       Age-appropriate Screening Schedule:  Refer to the list below for future screening recommendations based on patient's age, sex and/or medical conditions. Orders for these recommended tests are listed in the plan section. The patient has been provided with a written plan.    Health Maintenance   Topic Date Due    DXA SCAN  Never done    COLORECTAL CANCER SCREENING  Never done    DIABETIC EYE EXAM  Never done    ZOSTER VACCINE (1 of 2) Never done    RSV Vaccine - Adults (1 - 1-dose 60+ series) Never done    Pneumococcal Vaccine 65+ (2 of 2 - PPSV23 or PCV20) 11/26/2017    ANNUAL WELLNESS VISIT  06/21/2022    COVID-19 Vaccine (6 - 2023-24 season) 09/01/2023    INFLUENZA VACCINE  08/01/2024    HEMOGLOBIN A1C  09/18/2024    LIPID PANEL  03/18/2025    URINE MICROALBUMIN  03/18/2025    TDAP/TD VACCINES (3 - Td or Tdap) 10/25/2027    HEPATITIS C SCREENING  Completed                  CMS Preventative  Services Quick Reference  Risk Factors Identified During Encounter:    None Identified    The above risks/problems have been discussed with the patient.  Pertinent information has been shared with the patient in the After Visit Summary.    Diagnoses and all orders for this visit:    1. Well woman exam (no gynecological exam) (Primary)    2. Medicare annual wellness visit, subsequent    3. Type 2 diabetes mellitus with hyperglycemia, without long-term current use of insulin  -     empagliflozin (Jardiance) 10 MG tablet tablet; Take 1 tablet by mouth Daily. Indications: Type 2 Diabetes  Dispense: 30 tablet; Refill: 11  -     SITagliptin-metFORMIN HCl ER (Janumet XR)  MG tablet; Take 1 tablet by mouth Daily. Indications: Type 2 Diabetes  Dispense: 30 tablet; Refill: 11  -     Hemoglobin A1c  -     Thyroid Panel With TSH  -     Microalbumin / Creatinine Urine Ratio - Urine, Clean Catch    4. Vascular dementia, moderate, without behavioral disturbance, psychotic disturbance, mood disturbance, and anxiety  -     CBC & Differential  -     Comprehensive Metabolic Panel    5. Cerebrovascular accident (CVA) due to other mechanism    6. Dyslipidemia  -     Comprehensive Metabolic Panel  -     Lipid Panel With LDL / HDL Ratio    7. Type 2 diabetes mellitus without complication, without long-term current use of insulin    8. Essential hypertension  -     lisinopril (PRINIVIL,ZESTRIL) 40 MG tablet; Take 1 tablet by mouth Daily.  Dispense: 90 tablet; Refill: 1        Follow Up:   Next Medicare Wellness visit to be scheduled in 1 year.      An After Visit Summary and PPPS were made available to the patient.

## 2024-04-20 LAB
ALBUMIN SERPL-MCNC: 4.3 G/DL (ref 3.8–4.8)
ALBUMIN/CREAT UR: <21 MG/G CREAT (ref 0–29)
ALBUMIN/GLOB SERPL: 1.9 {RATIO} (ref 1.2–2.2)
ALP SERPL-CCNC: 72 IU/L (ref 44–121)
ALT SERPL-CCNC: 7 IU/L (ref 0–32)
AST SERPL-CCNC: 13 IU/L (ref 0–40)
BASOPHILS # BLD AUTO: 0.1 X10E3/UL (ref 0–0.2)
BASOPHILS NFR BLD AUTO: 1 %
BILIRUB SERPL-MCNC: 0.2 MG/DL (ref 0–1.2)
BUN SERPL-MCNC: 12 MG/DL (ref 8–27)
BUN/CREAT SERPL: 18 (ref 12–28)
CALCIUM SERPL-MCNC: 9.6 MG/DL (ref 8.7–10.3)
CHLORIDE SERPL-SCNC: 105 MMOL/L (ref 96–106)
CHOLEST SERPL-MCNC: 184 MG/DL (ref 100–199)
CO2 SERPL-SCNC: 24 MMOL/L (ref 20–29)
CREAT SERPL-MCNC: 0.65 MG/DL (ref 0.57–1)
CREAT UR-MCNC: 14.4 MG/DL
EGFRCR SERPLBLD CKD-EPI 2021: 91 ML/MIN/1.73
EOSINOPHIL # BLD AUTO: 0 X10E3/UL (ref 0–0.4)
EOSINOPHIL NFR BLD AUTO: 1 %
ERYTHROCYTE [DISTWIDTH] IN BLOOD BY AUTOMATED COUNT: 12.4 % (ref 11.7–15.4)
FT4I SERPL CALC-MCNC: 1.4 (ref 1.2–4.9)
GLOBULIN SER CALC-MCNC: 2.3 G/DL (ref 1.5–4.5)
GLUCOSE SERPL-MCNC: 95 MG/DL (ref 70–99)
HBA1C MFR BLD: 6.6 % (ref 4.8–5.6)
HCT VFR BLD AUTO: 36.8 % (ref 34–46.6)
HDLC SERPL-MCNC: 80 MG/DL
HGB BLD-MCNC: 12 G/DL (ref 11.1–15.9)
IMM GRANULOCYTES # BLD AUTO: 0 X10E3/UL (ref 0–0.1)
IMM GRANULOCYTES NFR BLD AUTO: 0 %
LDLC SERPL CALC-MCNC: 94 MG/DL (ref 0–99)
LDLC/HDLC SERPL: 1.2 RATIO (ref 0–3.2)
LYMPHOCYTES # BLD AUTO: 1.9 X10E3/UL (ref 0.7–3.1)
LYMPHOCYTES NFR BLD AUTO: 44 %
MCH RBC QN AUTO: 28.4 PG (ref 26.6–33)
MCHC RBC AUTO-ENTMCNC: 32.6 G/DL (ref 31.5–35.7)
MCV RBC AUTO: 87 FL (ref 79–97)
MICROALBUMIN UR-MCNC: <3 UG/ML
MONOCYTES # BLD AUTO: 0.3 X10E3/UL (ref 0.1–0.9)
MONOCYTES NFR BLD AUTO: 8 %
NEUTROPHILS # BLD AUTO: 2 X10E3/UL (ref 1.4–7)
NEUTROPHILS NFR BLD AUTO: 46 %
PLATELET # BLD AUTO: 212 X10E3/UL (ref 150–450)
POTASSIUM SERPL-SCNC: 3.6 MMOL/L (ref 3.5–5.2)
PROT SERPL-MCNC: 6.6 G/DL (ref 6–8.5)
RBC # BLD AUTO: 4.22 X10E6/UL (ref 3.77–5.28)
SODIUM SERPL-SCNC: 143 MMOL/L (ref 134–144)
T3RU NFR SERPL: 30 % (ref 24–39)
T4 SERPL-MCNC: 4.7 UG/DL (ref 4.5–12)
TRIGL SERPL-MCNC: 49 MG/DL (ref 0–149)
TSH SERPL DL<=0.005 MIU/L-ACNC: 1.88 UIU/ML (ref 0.45–4.5)
VLDLC SERPL CALC-MCNC: 10 MG/DL (ref 5–40)
WBC # BLD AUTO: 4.3 X10E3/UL (ref 3.4–10.8)

## 2024-04-21 NOTE — PROGRESS NOTES
"Chief Complaint  Medicare Wellness-subsequent    Subjective          Lucia Ellis presents to [facility]  History of Present Illness  The patient is a 74-year-old female who presents for evaluation of multiple medical concerns. She is accompanied by her sister.    The patient reports an increased frequency of forgetfulness, particularly in the evenings, which she perceives as if her blood glucose levels are dropping. She is not currently monitoring her blood glucose levels, however, the exact levels in the evening are unknown to her. Her current medication regimen includes Janumet  once daily and Jardiance 10.    The patient is uncertain if she is currently taking lisinopril 40 mg or quinapril 40 mg.    Supplemental Information  She has dementia and is seeing a neurologist, Dr. Powers.    History is difficult to obtain as she has dementia, and her sister is present, but she is unsure of many things.     Objective   Vital Signs:   /90 (BP Location: Left arm, Patient Position: Sitting, Cuff Size: Adult)   Pulse 68   Resp 14   Ht 160 cm (62.99\")   Wt 51.8 kg (114 lb 4.8 oz)   SpO2 95%   BMI 20.25 kg/m²     Physical Exam  Vitals and nursing note reviewed.   Constitutional:       Appearance: She is well-developed.   HENT:      Head: Normocephalic and atraumatic.   Musculoskeletal:      Cervical back: Normal range of motion and neck supple.   Neurological:      Mental Status: She is alert and oriented to person, place, and time.   Psychiatric:         Behavior: Behavior normal.         Physical Exam       Result Review :                 Assessment and Plan    Diagnoses and all orders for this visit:    1. Well woman exam (no gynecological exam) (Primary)    2. Medicare annual wellness visit, subsequent    3. Type 2 diabetes mellitus with hyperglycemia, without long-term current use of insulin  -     empagliflozin (Jardiance) 10 MG tablet tablet; Take 1 tablet by mouth " Daily. Indications: Type 2 Diabetes  Dispense: 30 tablet; Refill: 11  -     SITagliptin-metFORMIN HCl ER (Janumet XR)  MG tablet; Take 1 tablet by mouth Daily. Indications: Type 2 Diabetes  Dispense: 30 tablet; Refill: 11  -     Hemoglobin A1c  -     Thyroid Panel With TSH  -     Microalbumin / Creatinine Urine Ratio - Urine, Clean Catch    4. Vascular dementia, moderate, without behavioral disturbance, psychotic disturbance, mood disturbance, and anxiety  -     CBC & Differential  -     Comprehensive Metabolic Panel  -     Ambulatory Referral to Home Health  -     Ambulatory Referral to Social Care Services (Amb Case Mgmt)    5. Cerebrovascular accident (CVA) due to other mechanism    6. Dyslipidemia  -     Comprehensive Metabolic Panel  -     Lipid Panel With LDL / HDL Ratio    7. Type 2 diabetes mellitus without complication, without long-term current use of insulin    8. Essential hypertension  -     lisinopril (PRINIVIL,ZESTRIL) 40 MG tablet; Take 1 tablet by mouth Daily.  Dispense: 90 tablet; Refill: 1      Assessment & Plan  1. Diabetes Mellitus.  The patient's A1c levels have shown a slight decrease, currently at 6.7. A hemoglobin A1c test will be conducted today.    2. Hypertension.  The patient's blood pressure is marginally elevated during this visit. The decision has been made to discontinue quinapril and initiate lisinopril 40 mg. Continue amlodipine.       Follow Up   No follow-ups on file.  Patient was given instructions and counseling regarding her condition or for health maintenance advice. Please see specific information pulled into the AVS if appropriate.           Patient or patient representative verbalized consent for the use of Ambient Listening during the visit with  Everardo Mazariegos MD for chart documentation. 4/21/2024  11:23 EDT

## 2024-04-21 NOTE — PROGRESS NOTES
Subjective   Lucia Ellis is a 74 y.o. female and is here for a comprehensive physical exam. The patient reports no problems.    Pt is UTD with annual gyn exam and mammo           Social History:   Social History     Socioeconomic History    Marital status:    Tobacco Use    Smoking status: Never    Smokeless tobacco: Never   Vaping Use    Vaping status: Never Used   Substance and Sexual Activity    Alcohol use: Not Currently     Alcohol/week: 7.0 standard drinks of alcohol     Types: 7 Glasses of wine per week     Comment: per patient's son, patient drinks one glass of wine daily    Drug use: No    Sexual activity: Never       Family History:   Family History   Problem Relation Age of Onset    Heart attack Mother     Heart disease Mother     Hypertension Mother     Hypertension Father     Diabetes Father     Hypertension Sister     Diabetes Sister     Thyroid cancer Sister     Breast cancer Sister     Lung cancer Sister     Diabetes Brother     Drug abuse Paternal Grandmother        Past Medical History:   Past Medical History:   Diagnosis Date    Breast cancer     Dementia     Diabetes mellitus     Hypertension     Memory loss        The following portions of the patient's history were reviewed and updated as appropriate: allergies, current medications, past family history, past medical history, past social history, past surgical history and problem list.    Review of Systems    Review of Systems   Constitutional:  Negative for chills and fever.   HENT:  Negative for congestion, rhinorrhea, sinus pain and sore throat.    Eyes:  Negative for photophobia and visual disturbance.   Respiratory:  Negative for cough, chest tightness and shortness of breath.    Cardiovascular:  Negative for chest pain and palpitations.   Gastrointestinal:  Negative for diarrhea, nausea and vomiting.   Genitourinary:  Negative for dysuria, frequency and urgency.   Skin:  Negative for rash and wound.   Neurological:  Negative for  dizziness and syncope.   Psychiatric/Behavioral:  Negative for behavioral problems and confusion.        Objective   Physical Exam  Vitals and nursing note reviewed.   Constitutional:       Appearance: She is well-developed.   HENT:      Head: Normocephalic and atraumatic.      Right Ear: External ear normal.      Left Ear: External ear normal.   Cardiovascular:      Rate and Rhythm: Normal rate and regular rhythm.      Heart sounds: Normal heart sounds.   Pulmonary:      Effort: Pulmonary effort is normal. No respiratory distress.      Breath sounds: Normal breath sounds.   Abdominal:      Palpations: Abdomen is soft.      Tenderness: There is no abdominal tenderness. There is no guarding.   Musculoskeletal:         General: Normal range of motion.      Cervical back: Normal range of motion and neck supple.   Lymphadenopathy:      Cervical: No cervical adenopathy.   Skin:     General: Skin is warm.   Neurological:      Mental Status: She is alert and oriented to person, place, and time.   Psychiatric:         Behavior: Behavior normal.         Vitals:    04/19/24 1237   BP: 146/90   Pulse: 68   Resp: 14   SpO2: 95%     Body mass index is 20.25 kg/m².      Medications:   Current Outpatient Medications:     amLODIPine (NORVASC) 10 MG tablet, Take 1 tablet by mouth Daily., Disp: 30 tablet, Rfl: 0    aspirin 81 MG EC tablet, Take 1 tablet by mouth Daily. Indications: Disease involving Lipid Deposits in the Arteries, Disp: 30 tablet, Rfl: 2    atorvastatin (LIPITOR) 80 MG tablet, Take 1 tablet by mouth Every Night., Disp: , Rfl:     cetirizine (zyrTEC) 10 MG tablet, Take 1 tablet by mouth Daily., Disp: 30 tablet, Rfl: 6    donepezil (Aricept) 10 MG tablet, Take 1 tablet by mouth Daily., Disp: 90 tablet, Rfl: 0    empagliflozin (Jardiance) 10 MG tablet tablet, Take 1 tablet by mouth Daily. Indications: Type 2 Diabetes, Disp: 30 tablet, Rfl: 11    escitalopram (Lexapro) 10 MG tablet, Take 1 tablet by mouth Daily.  Indications: Major Depressive Disorder, Disp: 90 tablet, Rfl: 1    levETIRAcetam (KEPPRA) 1000 MG tablet, Take 1 tablet by mouth 2 (Two) Times a Day. Indications: Seizure, Disp: 60 tablet, Rfl: 2    lisinopril (PRINIVIL,ZESTRIL) 40 MG tablet, Take 1 tablet by mouth Daily., Disp: 90 tablet, Rfl: 1    memantine (Namenda) 5 MG tablet, Take 1 tablet by mouth 2 (Two) Times a Day. Indications: Lewy Body Dementia, Disp: 180 tablet, Rfl: 0    OLANZapine (zyPREXA) 5 MG tablet, Take 1 tablet by mouth Every Night., Disp: , Rfl:     pantoprazole (PROTONIX) 40 MG EC tablet, Take 1 tablet by mouth Every Morning., Disp: , Rfl:     SITagliptin-metFORMIN HCl ER (Janumet XR)  MG tablet, Take 1 tablet by mouth Daily. Indications: Type 2 Diabetes, Disp: 30 tablet, Rfl: 11    Continuous Blood Gluc  (FreeStyle Aleyda 2 [place]) device, 1 each Continuous., Disp: 1 each, Rfl: 0    Continuous Blood Gluc Sensor (FreeStyle Aleyda 2 Sensor) misc, 1 each 1 (One) Time Per Week., Disp: 2 each, Rfl: 11       Assessment & Plan   Healthy female exam.      1. Healthcare Maintenance:  2. Patient Counseling:  --Nutrition: Stressed importance of moderation in sodium/caffeine intake, saturated fat and cholesterol, caloric balance, sufficient intake of fresh fruits, vegetables, fiber, calcium and vit D  --Exercise: Recommended 30 minutes of exercise daily.  --Immunizations reviewed.  --Discussed benefits of screening colonoscopy.    Diagnoses and all orders for this visit:    Well woman exam (no gynecological exam)    Medicare annual wellness visit, subsequent    Type 2 diabetes mellitus with hyperglycemia, without long-term current use of insulin  -     empagliflozin (Jardiance) 10 MG tablet tablet; Take 1 tablet by mouth Daily. Indications: Type 2 Diabetes  -     SITagliptin-metFORMIN HCl ER (Janumet XR)  MG tablet; Take 1 tablet by mouth Daily. Indications: Type 2 Diabetes  -     Hemoglobin A1c  -     Thyroid Panel With TSH  -      Microalbumin / Creatinine Urine Ratio - Urine, Clean Catch    Vascular dementia, moderate, without behavioral disturbance, psychotic disturbance, mood disturbance, and anxiety  -     CBC & Differential  -     Comprehensive Metabolic Panel  -     Ambulatory Referral to Home Health  -     Ambulatory Referral to Social Care Services (Amb Case Mgmt)    Cerebrovascular accident (CVA) due to other mechanism    Dyslipidemia  -     Comprehensive Metabolic Panel  -     Lipid Panel With LDL / HDL Ratio    Type 2 diabetes mellitus without complication, without long-term current use of insulin    Essential hypertension  -     lisinopril (PRINIVIL,ZESTRIL) 40 MG tablet; Take 1 tablet by mouth Daily.        No follow-ups on file.             Dictated utilizing Dragon Voice Recognition Software

## 2024-04-22 ENCOUNTER — HOME HEALTH ADMISSION (OUTPATIENT)
Dept: HOME HEALTH SERVICES | Facility: HOME HEALTHCARE | Age: 74
End: 2024-04-22
Payer: COMMERCIAL

## 2024-04-22 ENCOUNTER — REFERRAL TRIAGE (OUTPATIENT)
Age: 74
End: 2024-04-22
Payer: MEDICARE

## 2024-04-23 ENCOUNTER — PATIENT OUTREACH (OUTPATIENT)
Age: 74
End: 2024-04-23
Payer: MEDICARE

## 2024-04-23 NOTE — OUTREACH NOTE
MSW received referral from primary care providers office for assistance with community resources. MSW outreach to patient by phone and phone number has been disconnected. MSW left message and call back number for patient's sister. MSW will continue to attempt outreach for assistance.     Bouchra FIELD -   Ambulatory Case Management    4/23/2024, 13:48 EDT

## 2024-04-25 ENCOUNTER — PATIENT OUTREACH (OUTPATIENT)
Age: 74
End: 2024-04-25
Payer: MEDICARE

## 2024-04-25 NOTE — OUTREACH NOTE
Social Work Assessment  Questions/Answers      Flowsheet Row Most Recent Value   Referral Source outpatient staff, outpatient clinic, physician   Reason for Consult community resources, other (see comments), financial concerns  [in home services, adult day centers]   Preferred Language English   Advance Care Planning Reviewed no concerns identified   People in Home child(donovan), adult   Current Living Arrangements home   Potentially Unsafe Housing Conditions none   In the past 12 months has the electric, gas, oil, or water company threatened to shut off services in your home? No   Primary Care Provided by self   Provides Primary Care For no one   Quality of Family Relationships helpful, involved, supportive   Employment Status retired   Source of Income unable to assess   Application for Public Assistance pending public assistance pending number   Usual Activity Tolerance moderate   Current Activity Tolerance moderate   Medications assistive person   Meal Preparation assistive person   Housekeeping assistive person   Laundry assistive person   Shopping assistive person   Transportation Anticipated family or friend will provide          SDOH updated and reviewed with the patient during this program:  --     Employment: Not At Risk (4/25/2024)    Employment     Do you want help finding or keeping work or a job?: I do not need or want help      Financial Resource Strain: Medium Risk (4/25/2024)    Overall Financial Resource Strain (CARDIA)     Difficulty of Paying Living Expenses: Somewhat hard      --     Food Insecurity: No Food Insecurity (4/25/2024)    Hunger Vital Sign     Worried About Running Out of Food in the Last Year: Never true     Ran Out of Food in the Last Year: Never true      --     Housing Stability: Low Risk  (4/25/2024)    Housing Stability Vital Sign     Unable to Pay for Housing in the Last Year: No     Number of Times Moved in the Last Year: 1     Homeless in the Last Year: No      --      Transportation Needs: No Transportation Needs (4/25/2024)    PRAPARE - Transportation     Lack of Transportation (Medical): No     Lack of Transportation (Non-Medical): No      --     Utilities: Not At Risk (4/25/2024)    [facility] Utilities     Threatened with loss of utilities: No      Continuing Care   Community & DME   [facility]    [address] [address] , [facility] [address]    Phone: [phone]    Resource for: Food Insecurity     Patient Outreach    MSW outreach to patient's sister regarding community resource needs as requested per primary care provider. MSW and patient's sister discussed options for caregiver assistance through [employer] including assistance with bathing, dressing, housekeeping, and laundry assistance. Patient's sister states that patient lives with her son who is only able to provide limited assistance and patient's sister would like more information regarding [employer] programs. Patient's sister aware that patient would need to be agreeable to this program and that the program is income based and would require patient's income information. Patient's sister agreeable to MSW referral to [facility] for staff to outreach to patient's sister to discuss programs. Patient's sister and MSW also discussed potential assisted living facilities and costs associated and patient's sister states that patient would not be able to afford this cost. MSW and patient's sister also discussed option of adult day centers for patient to attend during the day to be able to participate in social activities throughout the day. Patient's sister open to receiving more information about community resources via mail and MSW will send out list via mail as requested.    Bouchra FIELD -   Ambulatory Case Management    4/25/2024, 10:42 EDT

## 2024-05-06 ENCOUNTER — TELEPHONE (OUTPATIENT)
Dept: INTERNAL MEDICINE | Facility: CLINIC | Age: 74
End: 2024-05-06

## 2024-05-06 DIAGNOSIS — I10 ESSENTIAL HYPERTENSION: ICD-10-CM

## 2024-05-06 RX ORDER — LISINOPRIL 40 MG/1
40 TABLET ORAL DAILY
Qty: 90 TABLET | Refills: 0 | Status: SHIPPED | OUTPATIENT
Start: 2024-05-06

## 2024-05-06 NOTE — TELEPHONE ENCOUNTER
Caller: [facility] - [place], [place] - [address] - [phone]  - [phone]    Relationship: Pharmacy    Best call back number: [phone]    What form or medical record are you requesting: CURRENT MEDICATION LIST    Who is requesting this form or medical record from you: PHARMACY    How would you like to receive the form or medical records (pick-up, mail, fax): FAX  If fax, what is the fax number: [phone]    Timeframe paperwork needed: ASAP    Additional notes: [facility] IS REQUESTING A LIST OF PATIENT CURRENT MEDICATION LIST AS PATIENT IS STARTING TO USE THEM FOR HER MEDICATION PACKAGING.

## 2024-05-08 DIAGNOSIS — F01.B0 VASCULAR DEMENTIA, MODERATE, WITHOUT BEHAVIORAL DISTURBANCE, PSYCHOTIC DISTURBANCE, MOOD DISTURBANCE, AND ANXIETY: Chronic | ICD-10-CM

## 2024-05-08 DIAGNOSIS — F03.90 DEMENTIA WITHOUT BEHAVIORAL DISTURBANCE: ICD-10-CM

## 2024-05-08 DIAGNOSIS — G40.109 FOCAL MOTOR SEIZURE: ICD-10-CM

## 2024-05-08 RX ORDER — DONEPEZIL HYDROCHLORIDE 10 MG/1
10 TABLET, FILM COATED ORAL DAILY
Qty: 90 TABLET | Refills: 0 | Status: SHIPPED | OUTPATIENT
Start: 2024-05-08

## 2024-05-08 RX ORDER — LEVETIRACETAM 1000 MG/1
1000 TABLET ORAL 2 TIMES DAILY
Qty: 180 TABLET | Refills: 0 | Status: SHIPPED | OUTPATIENT
Start: 2024-05-08

## 2024-05-09 RX ORDER — OLANZAPINE 5 MG/1
5 TABLET ORAL
Qty: 90 TABLET | Refills: 0 | Status: SHIPPED | OUTPATIENT
Start: 2024-05-09

## 2024-05-09 RX ORDER — PANTOPRAZOLE SODIUM 40 MG/1
40 TABLET, DELAYED RELEASE ORAL EVERY MORNING
Qty: 90 TABLET | Refills: 0 | Status: SHIPPED | OUTPATIENT
Start: 2024-05-09

## 2024-05-09 RX ORDER — ATORVASTATIN CALCIUM 80 MG/1
80 TABLET, FILM COATED ORAL
Qty: 90 TABLET | Refills: 0 | Status: SHIPPED | OUTPATIENT
Start: 2024-05-09

## 2024-05-11 DIAGNOSIS — F01.B0 VASCULAR DEMENTIA, MODERATE, WITHOUT BEHAVIORAL DISTURBANCE, PSYCHOTIC DISTURBANCE, MOOD DISTURBANCE, AND ANXIETY: Chronic | ICD-10-CM

## 2024-05-11 DIAGNOSIS — F03.90 DEMENTIA WITHOUT BEHAVIORAL DISTURBANCE: ICD-10-CM

## 2024-05-13 RX ORDER — MEMANTINE HYDROCHLORIDE 5 MG/1
5 TABLET ORAL 2 TIMES DAILY
Qty: 180 TABLET | Refills: 0 | Status: SHIPPED | OUTPATIENT
Start: 2024-05-13

## 2024-05-21 ENCOUNTER — APPOINTMENT (OUTPATIENT)
Dept: GENERAL RADIOLOGY | Facility: HOSPITAL | Age: 74
End: 2024-05-21
Payer: MEDICARE

## 2024-05-21 ENCOUNTER — HOSPITAL ENCOUNTER (OUTPATIENT)
Facility: HOSPITAL | Age: 74
Setting detail: OBSERVATION
Discharge: NURSING, REHAB OR LONG-TERM CARE | End: 2024-05-28
Attending: EMERGENCY MEDICINE | Admitting: INTERNAL MEDICINE
Payer: MEDICARE

## 2024-05-21 ENCOUNTER — OFFICE VISIT (OUTPATIENT)
Dept: INTERNAL MEDICINE | Facility: CLINIC | Age: 74
End: 2024-05-21
Payer: MEDICARE

## 2024-05-21 ENCOUNTER — APPOINTMENT (OUTPATIENT)
Dept: CT IMAGING | Facility: HOSPITAL | Age: 74
End: 2024-05-21
Payer: MEDICARE

## 2024-05-21 VITALS
RESPIRATION RATE: 14 BRPM | WEIGHT: 112 LBS | TEMPERATURE: 96.8 F | DIASTOLIC BLOOD PRESSURE: 74 MMHG | OXYGEN SATURATION: 97 % | BODY MASS INDEX: 19.84 KG/M2 | HEIGHT: 63 IN | HEART RATE: 88 BPM | SYSTOLIC BLOOD PRESSURE: 124 MMHG

## 2024-05-21 DIAGNOSIS — K29.70 GASTRITIS WITHOUT BLEEDING, UNSPECIFIED CHRONICITY, UNSPECIFIED GASTRITIS TYPE: ICD-10-CM

## 2024-05-21 DIAGNOSIS — R53.1 GENERALIZED WEAKNESS: Primary | ICD-10-CM

## 2024-05-21 DIAGNOSIS — Z09 FOLLOW-UP EXAM: ICD-10-CM

## 2024-05-21 DIAGNOSIS — F03.90 DEMENTIA, UNSPECIFIED DEMENTIA SEVERITY, UNSPECIFIED DEMENTIA TYPE, UNSPECIFIED WHETHER BEHAVIORAL, PSYCHOTIC, OR MOOD DISTURBANCE OR ANXIETY: ICD-10-CM

## 2024-05-21 DIAGNOSIS — K29.30 CHRONIC SUPERFICIAL GASTRITIS WITHOUT BLEEDING: ICD-10-CM

## 2024-05-21 DIAGNOSIS — F01.B0 VASCULAR DEMENTIA, MODERATE, WITHOUT BEHAVIORAL DISTURBANCE, PSYCHOTIC DISTURBANCE, MOOD DISTURBANCE, AND ANXIETY: Chronic | ICD-10-CM

## 2024-05-21 DIAGNOSIS — R93.3 ABNORMAL CT SCAN, STOMACH: ICD-10-CM

## 2024-05-21 DIAGNOSIS — R53.1 WEAKNESS: ICD-10-CM

## 2024-05-21 DIAGNOSIS — R26.81 UNSTABLE GAIT: ICD-10-CM

## 2024-05-21 DIAGNOSIS — E43 SEVERE MALNUTRITION: ICD-10-CM

## 2024-05-21 DIAGNOSIS — K21.9 GASTROESOPHAGEAL REFLUX DISEASE, UNSPECIFIED WHETHER ESOPHAGITIS PRESENT: ICD-10-CM

## 2024-05-21 DIAGNOSIS — R53.83 OTHER FATIGUE: Primary | ICD-10-CM

## 2024-05-21 LAB
ALBUMIN SERPL-MCNC: 3.9 G/DL (ref 3.5–5.2)
ALBUMIN/GLOB SERPL: 1.5 G/DL
ALP SERPL-CCNC: 65 U/L (ref 39–117)
ALT SERPL W P-5'-P-CCNC: 11 U/L (ref 1–33)
ANION GAP SERPL CALCULATED.3IONS-SCNC: 15.7 MMOL/L (ref 5–15)
AST SERPL-CCNC: 13 U/L (ref 1–32)
BASOPHILS # BLD AUTO: 0.05 10*3/MM3 (ref 0–0.2)
BASOPHILS NFR BLD AUTO: 1.1 % (ref 0–1.5)
BILIRUB BLD-MCNC: NEGATIVE MG/DL
BILIRUB SERPL-MCNC: <0.2 MG/DL (ref 0–1.2)
BILIRUB UR QL STRIP: NEGATIVE
BUN SERPL-MCNC: 20 MG/DL (ref 8–23)
BUN/CREAT SERPL: 28.6 (ref 7–25)
CALCIUM SPEC-SCNC: 9.2 MG/DL (ref 8.6–10.5)
CHLORIDE SERPL-SCNC: 100 MMOL/L (ref 98–107)
CLARITY UR: CLEAR
CLARITY, POC: CLEAR
CO2 SERPL-SCNC: 22.3 MMOL/L (ref 22–29)
COLOR UR: YELLOW
COLOR UR: YELLOW
CREAT SERPL-MCNC: 0.7 MG/DL (ref 0.57–1)
DEPRECATED RDW RBC AUTO: 38.5 FL (ref 37–54)
EGFRCR SERPLBLD CKD-EPI 2021: 89.8 ML/MIN/1.73
EOSINOPHIL # BLD AUTO: 0.02 10*3/MM3 (ref 0–0.4)
EOSINOPHIL NFR BLD AUTO: 0.5 % (ref 0.3–6.2)
ERYTHROCYTE [DISTWIDTH] IN BLOOD BY AUTOMATED COUNT: 12.2 % (ref 12.3–15.4)
EXPIRATION DATE: NORMAL
GLOBULIN UR ELPH-MCNC: 2.6 GM/DL
GLUCOSE SERPL-MCNC: 109 MG/DL (ref 65–99)
GLUCOSE UR STRIP-MCNC: ABNORMAL MG/DL
GLUCOSE UR STRIP-MCNC: NORMAL MG/DL
HCT VFR BLD AUTO: 38.1 % (ref 34–46.6)
HGB BLD-MCNC: 12.6 G/DL (ref 12–15.9)
HGB UR QL STRIP.AUTO: NEGATIVE
IMM GRANULOCYTES # BLD AUTO: 0.01 10*3/MM3 (ref 0–0.05)
IMM GRANULOCYTES NFR BLD AUTO: 0.2 % (ref 0–0.5)
KETONES UR QL STRIP: NEGATIVE
KETONES UR QL: NEGATIVE
LEUKOCYTE EST, POC: NEGATIVE
LEUKOCYTE ESTERASE UR QL STRIP.AUTO: NEGATIVE
LYMPHOCYTES # BLD AUTO: 1.3 10*3/MM3 (ref 0.7–3.1)
LYMPHOCYTES NFR BLD AUTO: 29.7 % (ref 19.6–45.3)
Lab: NORMAL
MAGNESIUM SERPL-MCNC: 1.8 MG/DL (ref 1.6–2.4)
MCH RBC QN AUTO: 28.8 PG (ref 26.6–33)
MCHC RBC AUTO-ENTMCNC: 33.1 G/DL (ref 31.5–35.7)
MCV RBC AUTO: 87 FL (ref 79–97)
MONOCYTES # BLD AUTO: 0.3 10*3/MM3 (ref 0.1–0.9)
MONOCYTES NFR BLD AUTO: 6.9 % (ref 5–12)
NEUTROPHILS NFR BLD AUTO: 2.69 10*3/MM3 (ref 1.7–7)
NEUTROPHILS NFR BLD AUTO: 61.6 % (ref 42.7–76)
NITRITE UR QL STRIP: NEGATIVE
NITRITE UR-MCNC: NEGATIVE MG/ML
NRBC BLD AUTO-RTO: 0 /100 WBC (ref 0–0.2)
PH UR STRIP.AUTO: 5.5 [PH] (ref 5–8)
PH UR: 6 [PH] (ref 5–8)
PLATELET # BLD AUTO: 162 10*3/MM3 (ref 140–450)
PMV BLD AUTO: 11 FL (ref 6–12)
POTASSIUM SERPL-SCNC: 3.9 MMOL/L (ref 3.5–5.2)
PROT SERPL-MCNC: 6.5 G/DL (ref 6–8.5)
PROT UR QL STRIP: NEGATIVE
PROT UR STRIP-MCNC: NEGATIVE MG/DL
RBC # BLD AUTO: 4.38 10*6/MM3 (ref 3.77–5.28)
RBC # UR STRIP: NEGATIVE /UL
SODIUM SERPL-SCNC: 138 MMOL/L (ref 136–145)
SP GR UR STRIP: 1.01 (ref 1–1.03)
SP GR UR: 1 (ref 1–1.03)
TSH SERPL DL<=0.05 MIU/L-ACNC: 0.97 UIU/ML (ref 0.27–4.2)
UROBILINOGEN UR QL STRIP: ABNORMAL
UROBILINOGEN UR QL: NORMAL
WBC NRBC COR # BLD AUTO: 4.37 10*3/MM3 (ref 3.4–10.8)

## 2024-05-21 PROCEDURE — G0378 HOSPITAL OBSERVATION PER HR: HCPCS

## 2024-05-21 PROCEDURE — 25810000003 SODIUM CHLORIDE 0.9 % SOLUTION: Performed by: EMERGENCY MEDICINE

## 2024-05-21 PROCEDURE — 99213 OFFICE O/P EST LOW 20 MIN: CPT | Performed by: NURSE PRACTITIONER

## 2024-05-21 PROCEDURE — 1160F RVW MEDS BY RX/DR IN RCRD: CPT | Performed by: NURSE PRACTITIONER

## 2024-05-21 PROCEDURE — 99285 EMERGENCY DEPT VISIT HI MDM: CPT

## 2024-05-21 PROCEDURE — 83735 ASSAY OF MAGNESIUM: CPT | Performed by: EMERGENCY MEDICINE

## 2024-05-21 PROCEDURE — 25510000001 IOPAMIDOL 61 % SOLUTION: Performed by: EMERGENCY MEDICINE

## 2024-05-21 PROCEDURE — 71045 X-RAY EXAM CHEST 1 VIEW: CPT

## 2024-05-21 PROCEDURE — 74177 CT ABD & PELVIS W/CONTRAST: CPT

## 2024-05-21 PROCEDURE — 85025 COMPLETE CBC W/AUTO DIFF WBC: CPT | Performed by: EMERGENCY MEDICINE

## 2024-05-21 PROCEDURE — 1159F MED LIST DOCD IN RCRD: CPT | Performed by: NURSE PRACTITIONER

## 2024-05-21 PROCEDURE — 81003 URINALYSIS AUTO W/O SCOPE: CPT | Performed by: EMERGENCY MEDICINE

## 2024-05-21 PROCEDURE — 3078F DIAST BP <80 MM HG: CPT | Performed by: NURSE PRACTITIONER

## 2024-05-21 PROCEDURE — 80053 COMPREHEN METABOLIC PANEL: CPT | Performed by: EMERGENCY MEDICINE

## 2024-05-21 PROCEDURE — 84443 ASSAY THYROID STIM HORMONE: CPT | Performed by: EMERGENCY MEDICINE

## 2024-05-21 PROCEDURE — 3074F SYST BP LT 130 MM HG: CPT | Performed by: NURSE PRACTITIONER

## 2024-05-21 PROCEDURE — 81003 URINALYSIS AUTO W/O SCOPE: CPT | Performed by: NURSE PRACTITIONER

## 2024-05-21 RX ORDER — MEMANTINE HYDROCHLORIDE 5 MG/1
5 TABLET ORAL 2 TIMES DAILY
Status: DISCONTINUED | OUTPATIENT
Start: 2024-05-22 | End: 2024-05-28 | Stop reason: HOSPADM

## 2024-05-21 RX ORDER — CETIRIZINE HYDROCHLORIDE 10 MG/1
10 TABLET ORAL DAILY PRN
Status: DISCONTINUED | OUTPATIENT
Start: 2024-05-21 | End: 2024-05-28 | Stop reason: HOSPADM

## 2024-05-21 RX ORDER — ATORVASTATIN CALCIUM 80 MG/1
80 TABLET, FILM COATED ORAL DAILY
Status: DISCONTINUED | OUTPATIENT
Start: 2024-05-22 | End: 2024-05-28 | Stop reason: HOSPADM

## 2024-05-21 RX ORDER — DONEPEZIL HYDROCHLORIDE 10 MG/1
10 TABLET, FILM COATED ORAL DAILY
Status: DISCONTINUED | OUTPATIENT
Start: 2024-05-22 | End: 2024-05-28 | Stop reason: HOSPADM

## 2024-05-21 RX ORDER — LISINOPRIL 40 MG/1
40 TABLET ORAL DAILY
Status: DISCONTINUED | OUTPATIENT
Start: 2024-05-22 | End: 2024-05-28 | Stop reason: HOSPADM

## 2024-05-21 RX ORDER — ASPIRIN 81 MG/1
81 TABLET ORAL DAILY
Status: DISCONTINUED | OUTPATIENT
Start: 2024-05-22 | End: 2024-05-28 | Stop reason: HOSPADM

## 2024-05-21 RX ORDER — ONDANSETRON 2 MG/ML
4 INJECTION INTRAMUSCULAR; INTRAVENOUS EVERY 6 HOURS PRN
Status: DISCONTINUED | OUTPATIENT
Start: 2024-05-21 | End: 2024-05-28 | Stop reason: HOSPADM

## 2024-05-21 RX ORDER — ESCITALOPRAM OXALATE 10 MG/1
10 TABLET ORAL DAILY
Status: DISCONTINUED | OUTPATIENT
Start: 2024-05-22 | End: 2024-05-28 | Stop reason: HOSPADM

## 2024-05-21 RX ORDER — POLYETHYLENE GLYCOL 3350 17 G/17G
17 POWDER, FOR SOLUTION ORAL DAILY PRN
Status: DISCONTINUED | OUTPATIENT
Start: 2024-05-21 | End: 2024-05-28 | Stop reason: HOSPADM

## 2024-05-21 RX ORDER — ONDANSETRON 4 MG/1
4 TABLET, ORALLY DISINTEGRATING ORAL EVERY 6 HOURS PRN
Status: DISCONTINUED | OUTPATIENT
Start: 2024-05-21 | End: 2024-05-28 | Stop reason: HOSPADM

## 2024-05-21 RX ORDER — AMOXICILLIN 250 MG
2 CAPSULE ORAL 2 TIMES DAILY PRN
Status: DISCONTINUED | OUTPATIENT
Start: 2024-05-21 | End: 2024-05-28 | Stop reason: HOSPADM

## 2024-05-21 RX ORDER — BISACODYL 10 MG
10 SUPPOSITORY, RECTAL RECTAL DAILY PRN
Status: DISCONTINUED | OUTPATIENT
Start: 2024-05-21 | End: 2024-05-28 | Stop reason: HOSPADM

## 2024-05-21 RX ORDER — AMLODIPINE BESYLATE 10 MG/1
10 TABLET ORAL DAILY
Status: DISCONTINUED | OUTPATIENT
Start: 2024-05-22 | End: 2024-05-28 | Stop reason: HOSPADM

## 2024-05-21 RX ORDER — LEVETIRACETAM 500 MG/1
1000 TABLET ORAL 2 TIMES DAILY
Status: DISCONTINUED | OUTPATIENT
Start: 2024-05-22 | End: 2024-05-28 | Stop reason: HOSPADM

## 2024-05-21 RX ORDER — UREA 10 %
2.5 LOTION (ML) TOPICAL NIGHTLY PRN
Status: DISCONTINUED | OUTPATIENT
Start: 2024-05-21 | End: 2024-05-28 | Stop reason: HOSPADM

## 2024-05-21 RX ORDER — ACETAMINOPHEN 325 MG/1
650 TABLET ORAL EVERY 4 HOURS PRN
Status: DISCONTINUED | OUTPATIENT
Start: 2024-05-21 | End: 2024-05-28 | Stop reason: HOSPADM

## 2024-05-21 RX ORDER — PANTOPRAZOLE SODIUM 40 MG/1
40 TABLET, DELAYED RELEASE ORAL EVERY MORNING
Status: DISCONTINUED | OUTPATIENT
Start: 2024-05-22 | End: 2024-05-28 | Stop reason: HOSPADM

## 2024-05-21 RX ORDER — BISACODYL 5 MG/1
5 TABLET, DELAYED RELEASE ORAL DAILY PRN
Status: DISCONTINUED | OUTPATIENT
Start: 2024-05-21 | End: 2024-05-28 | Stop reason: HOSPADM

## 2024-05-21 RX ORDER — OLANZAPINE 5 MG/1
5 TABLET ORAL NIGHTLY
Status: DISCONTINUED | OUTPATIENT
Start: 2024-05-22 | End: 2024-05-28 | Stop reason: HOSPADM

## 2024-05-21 RX ADMIN — SODIUM CHLORIDE 1000 ML: 9 INJECTION, SOLUTION INTRAVENOUS at 16:11

## 2024-05-21 RX ADMIN — IOPAMIDOL 85 ML: 612 INJECTION, SOLUTION INTRAVENOUS at 18:18

## 2024-05-21 NOTE — NURSING NOTE
"Nursing report ED to floor  Lucia Ellis  74 y.o.  female    HPI :  HPI (Adult)  Stated Reason for Visit: weakness x3 weeks    Chief Complaint  Chief Complaint   Patient presents with    Weakness - Generalized       Admitting doctor:   Cammie Mi MD    Admitting diagnosis:   The primary encounter diagnosis was Generalized weakness. A diagnosis of Dementia, unspecified dementia severity, unspecified dementia type, unspecified whether behavioral, psychotic, or mood disturbance or anxiety was also pertinent to this visit.    Code status:   Current Code Status       Date Active Code Status Order ID Comments User Context       Prior            Allergies:   Ppd [tuberculin purified protein derivative]    Isolation:   No active isolations    Intake and Output  No intake or output data in the 24 hours ending 05/21/24 1831    Weight:       05/21/24  1444   Weight: 59 kg (130 lb)       Most recent vitals:   Vitals:    05/21/24 1444 05/21/24 1445 05/21/24 1631 05/21/24 1705   BP:  158/79 159/90    Pulse: 81  74    Resp: 15      Temp: 98.7 °F (37.1 °C)   98.1 °F (36.7 °C)   TempSrc: Tympanic   Oral   SpO2: 95%  96%    Weight: 59 kg (130 lb)      Height: 162.6 cm (64\")          Active LDAs/IV Access:   Lines, Drains & Airways       Active LDAs       Name Placement date Placement time Site Days    Peripheral IV 05/21/24 1611 Right Antecubital 05/21/24  1611  Antecubital  less than 1                    Labs (abnormal labs have a star):   Labs Reviewed   COMPREHENSIVE METABOLIC PANEL - Abnormal; Notable for the following components:       Result Value    Glucose 109 (*)     BUN/Creatinine Ratio 28.6 (*)     Anion Gap 15.7 (*)     All other components within normal limits    Narrative:     GFR Normal >60  Chronic Kidney Disease <60  Kidney Failure <15    The GFR formula is only valid for adults with stable renal function between ages 18 and 70.   URINALYSIS W/ MICROSCOPIC IF INDICATED (NO CULTURE) - Abnormal; Notable for " the following components:    Glucose, UA >=1000 mg/dL (3+) (*)     All other components within normal limits    Narrative:     Urine microscopic not indicated.   CBC WITH AUTO DIFFERENTIAL - Abnormal; Notable for the following components:    RDW 12.2 (*)     All other components within normal limits   TSH - Normal   MAGNESIUM - Normal   CBC AND DIFFERENTIAL    Narrative:     The following orders were created for panel order CBC & Differential.  Procedure                               Abnormality         Status                     ---------                               -----------         ------                     CBC Auto Differential[[id number]]        Abnormal            Final result                 Please view results for these tests on the individual orders.       EKG:   No orders to display       Meds given in ED:   Medications   sodium chloride 0.9 % bolus 1,000 mL (1,000 mL Intravenous New Bag 5/21/24 1885)   iopamidol (ISOVUE-300) 61 % injection 100 mL (85 mL Intravenous Given by Other 5/21/24 2014)       Imaging results:  XR Chest 1 View    Result Date: 5/21/2024  1. No acute process.   This report was finalized on 5/21/2024 3:56 PM by Dr. Oli Madden M.D on Workstation: [id number]       Ambulatory status:   - br    Social issues:   Social History     Socioeconomic History    Marital status:    Tobacco Use    Smoking status: Never    Smokeless tobacco: Never   Vaping Use    Vaping status: Never Used   Substance and Sexual Activity    Alcohol use: Not Currently     Alcohol/week: 7.0 standard drinks of alcohol     Types: 7 Glasses of wine per week     Comment: per patient's son, patient drinks one glass of wine daily    Drug use: No    Sexual activity: Never       Peripheral Neurovascular  Peripheral Neurovascular (Adult)  Peripheral Neurovascular WDL: WDL    Neuro Cognitive  Neuro Cognitive (Adult)  Cognitive/Neuro/Behavioral WDL: WDL    Learning  Learning Assessment (Adult)  Learning Readiness  and Ability: no barriers identified    Respiratory  Respiratory WDL  Respiratory WDL: WDL    Abdominal Pain       Pain Assessments  Pain (Adult)  (0-10) Pain Rating: Rest: 0    NIH Stroke Scale       Morro Powers RN  05/21/24 18:31 EDT

## 2024-05-21 NOTE — PROGRESS NOTES
"Chief Complaint  Fatigue (2 weeks)    Subjective        Lucia Ellis presents to [facility]  History of Present Illness    Patient is a pleasant 74-year-old female who typically sees  here in the office.  Patient is new to me and presents to the clinic with a 2 to 3-week history of fatigue.  Upon entering the room patient is leaned over in her chair to the left and about ready to fall out into the floor.  Patient seems to be healthy and emancipated.  Her sister is here today and she is a poor historian due to her dx of vascular dementia.    Patient's sister states that her son lives in the home with patient in the basement.  Often times, she is left alone upstairs without food, water, or medication management.    Denies any worsening confusion, chest pain or shortness of breath.       Objective   Vital Signs:  /74   Pulse 88   Temp 96.8 °F (36 °C)   Resp 14   Ht 160 cm (62.99\")   Wt 50.8 kg (112 lb)   SpO2 97%   BMI 19.84 kg/m²   Estimated body mass index is 19.84 kg/m² as calculated from the following:    Height as of this encounter: 160 cm (62.99\").    Weight as of this encounter: 50.8 kg (112 lb).       BMI is within normal parameters. No other follow-up for BMI required.       Physical Exam  Vitals and nursing note reviewed.   Constitutional:       Appearance: She is ill-appearing.   HENT:      Head: Normocephalic.      Nose: Nose normal.      Mouth/Throat:      Mouth: Mucous membranes are moist.   Eyes:      Pupils: Pupils are equal, round, and reactive to light.   Cardiovascular:      Rate and Rhythm: Normal rate and regular rhythm.      Pulses: Normal pulses.      Heart sounds: Normal heart sounds.      Comments: No peripheral edema  Pulmonary:      Effort: Pulmonary effort is normal. No respiratory distress.      Breath sounds: Normal breath sounds. No stridor. No wheezing, rhonchi or rales.      Comments: Denies shortness of breath  Chest:      Chest " wall: No tenderness.   Musculoskeletal:         General: Normal range of motion.   Skin:     General: Skin is warm.      Capillary Refill: Capillary refill takes less than 2 seconds.   Neurological:      Mental Status: She is oriented to person, place, and time.      Motor: Weakness present.      Gait: Gait abnormal.      Comments: Complains of weakness and fatigue worsening x 3 weeks        Result Review :          99 get ready going to see Lucia Ellis  Common labs          1/10/2024    12:08 3/18/2024    12:47 4/19/2024    13:24   Common Labs   Glucose 80  272  95    BUN 11  13  12    Creatinine 0.60  0.75  0.65    Sodium 144  142  143    Potassium 3.0  3.4  3.6    Chloride 108  98  105    Calcium 9.4  10.0  9.6    Total Protein  6.8  6.6    Albumin 3.6  4.7  4.3    Total Bilirubin 0.7  0.5  0.2    Alkaline Phosphatase 98  87  72    AST (SGOT) 12  15  13    ALT (SGPT) 17  9  7    WBC 5.21  4.9  4.3    Hemoglobin 11.9  12.6  12.0    Hematocrit 36.6  38.7  36.8    Platelets 198  208  212    Total Cholesterol  176  184    Triglycerides  106  49    HDL Cholesterol  62  80    LDL Cholesterol   95  94    Hemoglobin A1C  6.7  6.6    Microalbumin, Urine  55.5  <3.0        Office Visit with Everardo Mazariegos MD (04/19/2024)           Assessment and Plan     Diagnoses and all orders for this visit:    1. Other fatigue (Primary)  -     POCT urinalysis dipstick, automated    2. Weakness    3. Follow-up exam    4. Unstable gait    5. Vascular dementia, moderate, without behavioral disturbance, psychotic disturbance, mood disturbance, and anxiety      Patient's sister will take her to the ER now for further evaluation and treatment.        I spent 30 minutes caring for Lucia on this date of service. This time includes time spent by me in the following activities:preparing for the visit, reviewing tests, obtaining and/or reviewing a separately obtained history, performing a medically appropriate examination and/or evaluation ,  counseling and educating the patient/family/caregiver, ordering medications, tests, or procedures, referring and communicating with other health care professionals , documenting information in the medical record, independently interpreting results and communicating that information with the patient/family/caregiver, and care coordination  Follow Up     Return if symptoms worsen or fail to improve.  Patient was given instructions and counseling regarding her condition or for health maintenance advice. Please see specific information pulled into the AVS if appropriate.

## 2024-05-21 NOTE — ED PROVIDER NOTES
EMERGENCY DEPARTMENT ENCOUNTER  Room Number:  13/13  PCP: Everardo Mazariegos MD  Independent Historians: Patient and Family      HPI:  Chief Complaint: had concerns including Weakness - Generalized.     A complete HPI/ROS/PMH/PSH/SH/FH are unobtainable due to: Dementia    Chronic or social conditions impacting patient care (Social Determinants of Health): None      Context: Lucia Ellis is a 74 y.o. female with a medical history of breast cancer, memory loss, dementia, diabetes who presents to the ED c/o acute generalized weakness.  Family reports that for the last 3 weeks she has had progressive generalized weakness.  She has had decreased appetite.  She has had loss of weight.  The patient denies pain.  She has not had any falls.  He went to the primary care doctor today and was directed here for further evaluation.  Family reports that her son lives in the basement in the same house as the patient.      Review of prior external notes (non-ED) -and- Review of prior external test results outside of this encounter:  Primary care note dated today where she was directed to the emergency room due to generally weak    Prescription drug monitoring program review:         PAST MEDICAL HISTORY  Active Ambulatory Problems     Diagnosis Date Noted    Gastroesophageal reflux disease 02/09/2017    Malignant neoplasm of breast 02/09/2017    Depression 02/09/2017    Folliculitis 02/09/2017    Generalized anxiety disorder 02/09/2017    Hyperlipidemia 02/09/2017    Essential hypertension 02/09/2017    Status post total right knee replacement 02/09/2017    Rectal hemorrhage 02/09/2017    Vitamin D deficiency 02/09/2017    Drug therapy 02/09/2017    Acute cholecystitis 05/10/2018    Uncontrolled type 2 diabetes mellitus with hypoglycemia without coma 12/29/2022    Myoclonus 12/29/2022    Type 2 diabetes mellitus with hyperglycemia 12/29/2022    Hypokalemia 12/29/2022    New onset seizure 12/29/2022    Focal motor seizure 12/30/2022     Vascular dementia, moderate, without behavioral disturbance, psychotic disturbance, mood disturbance, and anxiety 12/31/2022    Stroke-like symptoms 12/08/2023    CVA (cerebral vascular accident) 12/09/2023    Lung nodules 12/09/2023    Hypokalemia 12/09/2023    History of stroke 03/07/2024     Resolved Ambulatory Problems     Diagnosis Date Noted    No Resolved Ambulatory Problems     Past Medical History:   Diagnosis Date    Breast cancer     Dementia     Diabetes mellitus     Hypertension     Memory loss          PAST SURGICAL HISTORY  Past Surgical History:   Procedure Laterality Date    CHOLECYSTECTOMY      HYSTERECTOMY      MASTECTOMY Right 1995    TOTAL KNEE ARTHROPLASTY Right          FAMILY HISTORY  Family History   Problem Relation Age of Onset    Heart attack Mother     Heart disease Mother     Hypertension Mother     Hypertension Father     Diabetes Father     Hypertension Sister     Diabetes Sister     Thyroid cancer Sister     Breast cancer Sister     Lung cancer Sister     Diabetes Brother     Drug abuse Paternal Grandmother          SOCIAL HISTORY  Social History     Socioeconomic History    Marital status:    Tobacco Use    Smoking status: Never    Smokeless tobacco: Never   Vaping Use    Vaping status: Never Used   Substance and Sexual Activity    Alcohol use: Not Currently     Alcohol/week: 7.0 standard drinks of alcohol     Types: 7 Glasses of wine per week     Comment: per patient's son, patient drinks one glass of wine daily    Drug use: No    Sexual activity: Never         ALLERGIES  Ppd [tuberculin purified protein derivative]      REVIEW OF SYSTEMS  Review of Systems  Included in HPI  All systems reviewed and negative except for those discussed in HPI.      PHYSICAL EXAM    I have reviewed the triage vital signs and nursing notes.    ED Triage Vitals   Temp Heart Rate Resp BP SpO2   05/21/24 1444 05/21/24 1444 05/21/24 1444 05/21/24 1445 05/21/24 1444   98.7 °F (37.1 °C) 81 15  158/79 95 %      Temp src Heart Rate Source Patient Position BP Location FiO2 (%)   05/21/24 1444 05/21/24 1444 05/21/24 1444 05/21/24 1444 --   Tympanic Monitor Sitting Right arm        Physical Exam  GENERAL: Awake, alert, no acute distress, chronically ill-appearing, thin  SKIN: Warm, dry  HENT: Normocephalic, atraumatic  EYES: no scleral icterus  CV: regular rhythm, regular rate  RESPIRATORY: normal effort, lungs clear  ABDOMEN: soft, nontender, nondistended  MUSCULOSKELETAL: no deformity  NEURO: alert, moves all extremities, follows commands            LAB RESULTS  Recent Results (from the past 24 hour(s))   POCT urinalysis dipstick, automated    Collection Time: 05/21/24  1:41 PM    Specimen: Urine   Result Value Ref Range    Color Yellow Yellow, Straw, Dark Yellow, Lucía    Clarity, UA Clear Clear    Specific Gravity  1.005 1.005 - 1.030    pH, Urine 6.0 5.0 - 8.0    Leukocytes Negative Negative    Nitrite, UA Negative Negative    Protein, POC Negative Negative mg/dL    Glucose, UA 1000 mg/dL (3+) Negative mg/dL    Ketones, UA Negative Negative    Urobilinogen, UA 0.2 E.U./dL Normal, 0.2 E.U./dL    Bilirubin Negative Negative    Blood, UA Negative Negative    Lot Number [id number]     Expiration Date [id number]    Comprehensive Metabolic Panel    Collection Time: 05/21/24  4:04 PM    Specimen: Blood   Result Value Ref Range    Glucose 109 (H) 65 - 99 mg/dL    BUN 20 8 - 23 mg/dL    Creatinine 0.70 0.57 - 1.00 mg/dL    Sodium 138 136 - 145 mmol/L    Potassium 3.9 3.5 - 5.2 mmol/L    Chloride 100 98 - 107 mmol/L    CO2 22.3 22.0 - 29.0 mmol/L    Calcium 9.2 8.6 - 10.5 mg/dL    Total Protein 6.5 6.0 - 8.5 g/dL    Albumin 3.9 3.5 - 5.2 g/dL    ALT (SGPT) 11 1 - 33 U/L    AST (SGOT) 13 1 - 32 U/L    Alkaline Phosphatase 65 39 - 117 U/L    Total Bilirubin <0.2 0.0 - 1.2 mg/dL    Globulin 2.6 gm/dL    A/G Ratio 1.5 g/dL    BUN/Creatinine Ratio 28.6 (H) 7.0 - 25.0    Anion Gap 15.7 (H) 5.0 - 15.0 mmol/L    eGFR  89.8 >60.0 mL/min/1.73   CBC Auto Differential    Collection Time: 05/21/24  4:04 PM    Specimen: Blood   Result Value Ref Range    WBC 4.37 3.40 - 10.80 10*3/mm3    RBC 4.38 3.77 - 5.28 10*6/mm3    Hemoglobin 12.6 12.0 - 15.9 g/dL    Hematocrit 38.1 34.0 - 46.6 %    MCV 87.0 79.0 - 97.0 fL    MCH 28.8 26.6 - 33.0 pg    MCHC 33.1 31.5 - 35.7 g/dL    RDW 12.2 (L) 12.3 - 15.4 %    RDW-SD 38.5 37.0 - 54.0 fl    MPV 11.0 6.0 - 12.0 fL    Platelets 162 140 - 450 10*3/mm3    Neutrophil % 61.6 42.7 - 76.0 %    Lymphocyte % 29.7 19.6 - 45.3 %    Monocyte % 6.9 5.0 - 12.0 %    Eosinophil % 0.5 0.3 - 6.2 %    Basophil % 1.1 0.0 - 1.5 %    Immature Grans % 0.2 0.0 - 0.5 %    Neutrophils, Absolute 2.69 1.70 - 7.00 10*3/mm3    Lymphocytes, Absolute 1.30 0.70 - 3.10 10*3/mm3    Monocytes, Absolute 0.30 0.10 - 0.90 10*3/mm3    Eosinophils, Absolute 0.02 0.00 - 0.40 10*3/mm3    Basophils, Absolute 0.05 0.00 - 0.20 10*3/mm3    Immature Grans, Absolute 0.01 0.00 - 0.05 10*3/mm3    nRBC 0.0 0.0 - 0.2 /100 WBC   TSH    Collection Time: 05/21/24  4:04 PM    Specimen: Blood   Result Value Ref Range    TSH 0.969 0.270 - 4.200 uIU/mL   Magnesium    Collection Time: 05/21/24  4:04 PM    Specimen: Blood   Result Value Ref Range    Magnesium 1.8 1.6 - 2.4 mg/dL   Urinalysis With Microscopic If Indicated (No Culture) - Urine, Clean Catch    Collection Time: 05/21/24  5:25 PM    Specimen: Urine, Clean Catch   Result Value Ref Range    Color, UA Yellow Yellow, Straw    Appearance, UA Clear Clear    pH, UA 5.5 5.0 - 8.0    Specific Gravity, UA 1.012 1.005 - 1.030    Glucose, UA >=1000 mg/dL (3+) (A) Negative    Ketones, UA Negative Negative    Bilirubin, UA Negative Negative    Blood, UA Negative Negative    Protein, UA Negative Negative    Leuk Esterase, UA Negative Negative    Nitrite, UA Negative Negative    Urobilinogen, UA 0.2 E.U./dL 0.2 - 1.0 E.U./dL         RADIOLOGY  XR Chest 1 View    Result Date: 5/21/2024  XR CHEST 1 VW-5/21/2024   HISTORY: Weakness.  Patient is rotated to the left. Lungs are underinflated but appear clear. Bones and soft tissues are unremarkable.      1. No acute process.   This report was finalized on 5/21/2024 3:56 PM by Dr. Oli Madden M.D on Workstation: [id number]         MEDICATIONS GIVEN IN ER  Medications   sodium chloride 0.9 % bolus 1,000 mL (1,000 mL Intravenous New Bag 5/21/24 1619)         ORDERS PLACED DURING THIS VISIT:  Orders Placed This Encounter   Procedures    XR Chest 1 View    CT Abdomen Pelvis With Contrast    Comprehensive Metabolic Panel    Urinalysis With Microscopic If Indicated (No Culture) - Urine, Clean Catch    CBC Auto Differential    TSH    Magnesium    Initiate Observation Status    CBC & Differential         OUTPATIENT MEDICATION MANAGEMENT:  No current Epic-ordered facility-administered medications on file.     Current Outpatient Medications Ordered in Epic   Medication Sig Dispense Refill    amLODIPine (NORVASC) 10 MG tablet Take 1 tablet by mouth Daily. 30 tablet 0    aspirin 81 MG EC tablet Take 1 tablet by mouth Daily. Indications: Disease involving Lipid Deposits in the Arteries 30 tablet 2    atorvastatin (LIPITOR) 80 MG tablet TAKE ONE TABLET BY MOUTH AT BEDTIME 90 tablet 0    cetirizine (zyrTEC) 10 MG tablet Take 1 tablet by mouth Daily. 30 tablet 6    Continuous Blood Gluc  (FreeStyle Aleyda 2 Logan) device 1 each Continuous. 1 each 0    Continuous Blood Gluc Sensor (FreeStyle Aleyda 2 Sensor) misc 1 each 1 (One) Time Per Week. 2 each 11    donepezil (Aricept) 10 MG tablet Take 1 tablet by mouth Daily. 90 tablet 0    empagliflozin (Jardiance) 10 MG tablet tablet Take 1 tablet by mouth Daily. Indications: Type 2 Diabetes 30 tablet 11    escitalopram (Lexapro) 10 MG tablet Take 1 tablet by mouth Daily. Indications: Major Depressive Disorder 90 tablet 1    levETIRAcetam (KEPPRA) 1000 MG tablet Take 1 tablet by mouth 2 (Two) Times a Day. Indications: Seizure 180 tablet 0     lisinopril (PRINIVIL,ZESTRIL) 40 MG tablet TAKE ONE TABLET BY MOUTH DAILY 90 tablet 0    memantine (NAMENDA) 5 MG tablet TAKE ONE TABLET BY MOUTH TWICE A  tablet 0    OLANZapine (zyPREXA) 5 MG tablet TAKE ONE TABLET BY MOUTH AT BEDTIME 90 tablet 0    pantoprazole (PROTONIX) 40 MG EC tablet TAKE ONE TABLET BY MOUTH EVERY MORNING 90 tablet 0    SITagliptin-metFORMIN HCl ER (Janumet XR)  MG tablet Take 1 tablet by mouth Daily. Indications: Type 2 Diabetes 30 tablet 11         PROCEDURES  Procedures            PROGRESS, DATA ANALYSIS, CONSULTS, AND MEDICAL DECISION MAKING  All labs have been independently interpreted by me.  All radiology studies have been reviewed by me. All EKG's have been independently viewed and interpreted by me.  Discussion below represents my analysis of pertinent findings related to patient's condition, differential diagnosis, treatment plan and final disposition.    Differential diagnosis includes but is not limited to dementia, metastatic cancer, hyponatremia, hypokalemia, sepsis, UTI, pneumonia.    Clinical Scores:                   ED Course as of 05/21/24 1809 Tue May 21, 2024   1535 Weak x 3 weeks. No falls. Partial appetite. Lost a lot of weight. No pain.  [TR]   1624 XR Chest 1 View  My independent interpretation of the imaging study is no dense consolidation [TR]   1748 The patient is now getting agitated and requiring constant redirection [TR]   1808 The patient's laboratory evaluation is generally unremarkable.  She has normal chemistries and blood counts.  She has normal thyroid studies.  Her primary care note from today said she was ill-appearing and slumped over in the stretcher.  She is now getting agitated.  I suspect her weight loss and decreased oral intake is related to her dementia.  Her sister has left the building.  With her agitation I am not sure that she can go back to her current living situation.  I have a CT of the abdomen pelvis ordered to rule out  any sort of intra-abdominal mass that would be causing the decreased appetite and weight loss.  She has no abdominal pain or vomiting however.  Plan admission to the hospital for further evaluation and probable placement.  I discussed with Dr. Mi with A.  She agrees to admit. [TR]      ED Course User Index  [TR] Jon Mackay MD             AS OF 18:09 EDT VITALS:    BP - 159/90  HR - 74  TEMP - 98.1 °F (36.7 °C) (Oral)  O2 SATS - 96%    COMPLEXITY OF CARE  The patient requires admission.      DIAGNOSIS  Final diagnoses:   Generalized weakness   Dementia, unspecified dementia severity, unspecified dementia type, unspecified whether behavioral, psychotic, or mood disturbance or anxiety         DISPOSITION  ED Disposition       ED Disposition   Decision to Admit    Condition   --    Comment   Level of Care: Telemetry [5]   Diagnosis: Generalized weakness [[id number]]   Admitting Physician: CHOCO MI [[id number]]   Attending Physician: CHOCO MI [[id number]]                  Please note that portions of this document were completed with a voice recognition program.    Note Disclaimer: At [facility], we believe that sharing information builds trust and better relationships. You are receiving this note because you recently visited [facility]. It is possible you will see health information before a provider has talked with you about it. This kind of information can be easy to misunderstand. To help you fully understand what it means for your health, we urge you to discuss this note with your provider.         Jon Mackay MD  05/21/24 5088

## 2024-05-21 NOTE — ED NOTES
"Nursing report ED to floor  Lucia Ellis  74 y.o.  female    HPI :  HPI (Adult)  Stated Reason for Visit: weakness x3 weeks    Chief Complaint  Chief Complaint   Patient presents with    Weakness - Generalized       Admitting doctor:   Cammie Mi MD    Admitting diagnosis:   The primary encounter diagnosis was Generalized weakness. A diagnosis of Dementia, unspecified dementia severity, unspecified dementia type, unspecified whether behavioral, psychotic, or mood disturbance or anxiety was also pertinent to this visit.    Code status:   Current Code Status       Date Active Code Status Order ID Comments User Context       Prior            Allergies:   Ppd [tuberculin purified protein derivative]    Isolation:   No active isolations    Intake and Output  No intake or output data in the 24 hours ending 05/21/24 1836    Weight:       05/21/24  1444   Weight: 59 kg (130 lb)       Most recent vitals:   Vitals:    05/21/24 1444 05/21/24 1445 05/21/24 1631 05/21/24 1705   BP:  158/79 159/90    Pulse: 81  74    Resp: 15      Temp: 98.7 °F (37.1 °C)   98.1 °F (36.7 °C)   TempSrc: Tympanic   Oral   SpO2: 95%  96%    Weight: 59 kg (130 lb)      Height: 162.6 cm (64\")          Active LDAs/IV Access:   Lines, Drains & Airways       Active LDAs       Name Placement date Placement time Site Days    Peripheral IV 05/21/24 1611 Right Antecubital 05/21/24  1611  Antecubital  less than 1                    Labs (abnormal labs have a star):   Labs Reviewed   COMPREHENSIVE METABOLIC PANEL - Abnormal; Notable for the following components:       Result Value    Glucose 109 (*)     BUN/Creatinine Ratio 28.6 (*)     Anion Gap 15.7 (*)     All other components within normal limits    Narrative:     GFR Normal >60  Chronic Kidney Disease <60  Kidney Failure <15    The GFR formula is only valid for adults with stable renal function between ages 18 and 70.   URINALYSIS W/ MICROSCOPIC IF INDICATED (NO CULTURE) - Abnormal; Notable for " the following components:    Glucose, UA >=1000 mg/dL (3+) (*)     All other components within normal limits    Narrative:     Urine microscopic not indicated.   CBC WITH AUTO DIFFERENTIAL - Abnormal; Notable for the following components:    RDW 12.2 (*)     All other components within normal limits   TSH - Normal   MAGNESIUM - Normal   CBC AND DIFFERENTIAL    Narrative:     The following orders were created for panel order CBC & Differential.  Procedure                               Abnormality         Status                     ---------                               -----------         ------                     CBC Auto Differential[[id number]]        Abnormal            Final result                 Please view results for these tests on the individual orders.       EKG:   No orders to display       Meds given in ED:   Medications   sodium chloride 0.9 % bolus 1,000 mL (1,000 mL Intravenous New Bag 5/21/24 9268)   iopamidol (ISOVUE-300) 61 % injection 100 mL (85 mL Intravenous Given by Other 5/21/24 2643)       Imaging results:  XR Chest 1 View    Result Date: 5/21/2024  1. No acute process.   This report was finalized on 5/21/2024 3:56 PM by Dr. Oli Madden M.D on Workstation: [id number]       Ambulatory status:   - assist     Social issues:   Social History     Socioeconomic History    Marital status:    Tobacco Use    Smoking status: Never    Smokeless tobacco: Never   Vaping Use    Vaping status: Never Used   Substance and Sexual Activity    Alcohol use: Not Currently     Alcohol/week: 7.0 standard drinks of alcohol     Types: 7 Glasses of wine per week     Comment: per patient's son, patient drinks one glass of wine daily    Drug use: No    Sexual activity: Never       Peripheral Neurovascular  Peripheral Neurovascular (Adult)  Peripheral Neurovascular WDL: WDL    Neuro Cognitive  Neuro Cognitive (Adult)  Cognitive/Neuro/Behavioral WDL: WDL    Learning  Learning Assessment (Adult)  Learning  Readiness and Ability: no barriers identified    Respiratory  Respiratory WDL  Respiratory WDL: WDL    Abdominal Pain       Pain Assessments  Pain (Adult)  (0-10) Pain Rating: Rest: 0    NIH Stroke Scale       Cherie Parks RN  05/21/24 18:36 EDT

## 2024-05-22 PROBLEM — E43 SEVERE MALNUTRITION: Status: ACTIVE | Noted: 2024-05-22

## 2024-05-22 LAB
ANION GAP SERPL CALCULATED.3IONS-SCNC: 15 MMOL/L (ref 5–15)
BUN SERPL-MCNC: 15 MG/DL (ref 8–23)
BUN/CREAT SERPL: 25.4 (ref 7–25)
CALCIUM SPEC-SCNC: 8.9 MG/DL (ref 8.6–10.5)
CHLORIDE SERPL-SCNC: 104 MMOL/L (ref 98–107)
CO2 SERPL-SCNC: 24 MMOL/L (ref 22–29)
CREAT SERPL-MCNC: 0.59 MG/DL (ref 0.57–1)
DEPRECATED RDW RBC AUTO: 39.6 FL (ref 37–54)
EGFRCR SERPLBLD CKD-EPI 2021: 93.5 ML/MIN/1.73
ERYTHROCYTE [DISTWIDTH] IN BLOOD BY AUTOMATED COUNT: 12.5 % (ref 12.3–15.4)
GLUCOSE SERPL-MCNC: 78 MG/DL (ref 65–99)
HCT VFR BLD AUTO: 38.9 % (ref 34–46.6)
HGB BLD-MCNC: 12.5 G/DL (ref 12–15.9)
MCH RBC QN AUTO: 27.8 PG (ref 26.6–33)
MCHC RBC AUTO-ENTMCNC: 32.1 G/DL (ref 31.5–35.7)
MCV RBC AUTO: 86.6 FL (ref 79–97)
PLATELET # BLD AUTO: 170 10*3/MM3 (ref 140–450)
PMV BLD AUTO: 11.4 FL (ref 6–12)
POTASSIUM SERPL-SCNC: 3.4 MMOL/L (ref 3.5–5.2)
POTASSIUM SERPL-SCNC: 4 MMOL/L (ref 3.5–5.2)
RBC # BLD AUTO: 4.49 10*6/MM3 (ref 3.77–5.28)
SODIUM SERPL-SCNC: 143 MMOL/L (ref 136–145)
WBC NRBC COR # BLD AUTO: 4.62 10*3/MM3 (ref 3.4–10.8)

## 2024-05-22 PROCEDURE — 36415 COLL VENOUS BLD VENIPUNCTURE: CPT | Performed by: INTERNAL MEDICINE

## 2024-05-22 PROCEDURE — 80048 BASIC METABOLIC PNL TOTAL CA: CPT | Performed by: INTERNAL MEDICINE

## 2024-05-22 PROCEDURE — G0378 HOSPITAL OBSERVATION PER HR: HCPCS

## 2024-05-22 PROCEDURE — 84132 ASSAY OF SERUM POTASSIUM: CPT | Performed by: INTERNAL MEDICINE

## 2024-05-22 PROCEDURE — 97530 THERAPEUTIC ACTIVITIES: CPT

## 2024-05-22 PROCEDURE — 97162 PT EVAL MOD COMPLEX 30 MIN: CPT

## 2024-05-22 PROCEDURE — 85027 COMPLETE CBC AUTOMATED: CPT | Performed by: INTERNAL MEDICINE

## 2024-05-22 RX ORDER — POTASSIUM CHLORIDE 750 MG/1
40 TABLET, FILM COATED, EXTENDED RELEASE ORAL EVERY 4 HOURS
Status: COMPLETED | OUTPATIENT
Start: 2024-05-22 | End: 2024-05-22

## 2024-05-22 RX ADMIN — MEMANTINE HYDROCHLORIDE 5 MG: 5 TABLET, FILM COATED ORAL at 01:01

## 2024-05-22 RX ADMIN — OLANZAPINE 5 MG: 5 TABLET, FILM COATED ORAL at 01:01

## 2024-05-22 RX ADMIN — ATORVASTATIN CALCIUM 80 MG: 80 TABLET, FILM COATED ORAL at 09:10

## 2024-05-22 RX ADMIN — MEMANTINE HYDROCHLORIDE 5 MG: 5 TABLET, FILM COATED ORAL at 09:10

## 2024-05-22 RX ADMIN — PANTOPRAZOLE SODIUM 40 MG: 40 TABLET, DELAYED RELEASE ORAL at 07:17

## 2024-05-22 RX ADMIN — LISINOPRIL 40 MG: 40 TABLET ORAL at 09:10

## 2024-05-22 RX ADMIN — ESCITALOPRAM OXALATE 10 MG: 10 TABLET, FILM COATED ORAL at 09:10

## 2024-05-22 RX ADMIN — MEMANTINE HYDROCHLORIDE 5 MG: 5 TABLET, FILM COATED ORAL at 21:01

## 2024-05-22 RX ADMIN — ASPIRIN 81 MG: 81 TABLET, COATED ORAL at 09:10

## 2024-05-22 RX ADMIN — DONEPEZIL HYDROCHLORIDE 10 MG: 10 TABLET, FILM COATED ORAL at 09:10

## 2024-05-22 RX ADMIN — POTASSIUM CHLORIDE 40 MEQ: 750 TABLET, EXTENDED RELEASE ORAL at 17:03

## 2024-05-22 RX ADMIN — LEVETIRACETAM 1000 MG: 500 TABLET, FILM COATED ORAL at 09:10

## 2024-05-22 RX ADMIN — OLANZAPINE 5 MG: 5 TABLET, FILM COATED ORAL at 21:01

## 2024-05-22 RX ADMIN — LEVETIRACETAM 1000 MG: 500 TABLET, FILM COATED ORAL at 01:00

## 2024-05-22 RX ADMIN — POTASSIUM CHLORIDE 40 MEQ: 750 TABLET, EXTENDED RELEASE ORAL at 12:18

## 2024-05-22 RX ADMIN — LEVETIRACETAM 1000 MG: 500 TABLET, FILM COATED ORAL at 21:01

## 2024-05-22 RX ADMIN — AMLODIPINE BESYLATE 10 MG: 10 TABLET ORAL at 09:10

## 2024-05-22 NOTE — H&P
HISTORY AND PHYSICAL   [facility]        Date of Admission: 2024  Patient Identification:  Name: Lucia Ellis  Age: 74 y.o.  Sex: female  :  1950  MRN: [id number]                     Primary Care Physician: Everardo Mazariegos MD    Chief Complaint:  74 year old female was brought in by family; she was seen by her pcp today due to weakness and fatigue; she says her son lives in her house with her; per history provided the patient is left alone for periods of time and does not have access to food, water or medications; the patient is not able to give any history and the sister has left; no visitors are present at this time; she has had weight loss and decreased appetite;     History of Present Illness:   As above    Past Medical History:  Past Medical History:   Diagnosis Date    Breast cancer     Dementia     Diabetes mellitus     Hypertension     Memory loss      Past Surgical History:  Past Surgical History:   Procedure Laterality Date    CHOLECYSTECTOMY      HYSTERECTOMY      MASTECTOMY Right 1995    TOTAL KNEE ARTHROPLASTY Right       Home Meds:  Medications Prior to Admission   Medication Sig Dispense Refill Last Dose    amLODIPine (NORVASC) 10 MG tablet Take 1 tablet by mouth Daily. 30 tablet 0 2024    aspirin 81 MG EC tablet Take 1 tablet by mouth Daily. Indications: Disease involving Lipid Deposits in the Arteries 30 tablet 2 2024    atorvastatin (LIPITOR) 80 MG tablet TAKE ONE TABLET BY MOUTH AT BEDTIME 90 tablet 0 2024    donepezil (Aricept) 10 MG tablet Take 1 tablet by mouth Daily. 90 tablet 0 2024    empagliflozin (Jardiance) 10 MG tablet tablet Take 1 tablet by mouth Daily. Indications: Type 2 Diabetes 30 tablet 11 2024    levETIRAcetam (KEPPRA) 1000 MG tablet Take 1 tablet by mouth 2 (Two) Times a Day. Indications: Seizure 180 tablet 0 2024    lisinopril (PRINIVIL,ZESTRIL) 40 MG tablet TAKE ONE TABLET BY MOUTH DAILY 90 tablet 0 2024     memantine (NAMENDA) 5 MG tablet TAKE ONE TABLET BY MOUTH TWICE A  tablet 0 5/21/2024    OLANZapine (zyPREXA) 5 MG tablet TAKE ONE TABLET BY MOUTH AT BEDTIME 90 tablet 0 5/20/2024    pantoprazole (PROTONIX) 40 MG EC tablet TAKE ONE TABLET BY MOUTH EVERY MORNING 90 tablet 0 5/21/2024    SITagliptin-metFORMIN HCl ER (Janumet XR)  MG tablet Take 1 tablet by mouth Daily. Indications: Type 2 Diabetes 30 tablet 11 5/21/2024    cetirizine (zyrTEC) 10 MG tablet Take 1 tablet by mouth Daily. (Patient taking differently: Take 1 tablet by mouth Daily As Needed for Allergies. Indications: Hayfever) 30 tablet 6     Continuous Blood Gluc  (FreeStyle Aleyda 2 [place]) device 1 each Continuous. 1 each 0     Continuous Blood Gluc Sensor (FreeStyle Aleyda 2 Sensor) misc 1 each 1 (One) Time Per Week. 2 each 11     escitalopram (Lexapro) 10 MG tablet Take 1 tablet by mouth Daily. Indications: Major Depressive Disorder 90 tablet 1        Allergies:  Allergies   Allergen Reactions    Ppd [Tuberculin Purified Protein Derivative] Rash     Immunizations:  Immunization History   Administered Date(s) Administered    COVID-19 (MODERNA) 1st,2nd,3rd Dose Monovalent 02/11/2021, 03/10/2021    COVID-19 (PFIZER) BIVALENT 12+YRS 10/11/2022    COVID-19 (PFIZER) Purple Cap Monovalent 10/27/2021    Covid-19 (Pfizer) Gray Cap Monovalent 05/01/2022    Fluzone High Dose =>65 Years ([employer] ONLY) 10/19/2017, 09/29/2020    Fluzone High-Dose 65+yrs 09/29/2020, 12/02/2022    Pneumococcal Conjugate 13-Valent (PCV13) 10/01/2017    Tdap 10/01/2017     Social History:   Social History     Social History Narrative    Not on file     Social History     Socioeconomic History    Marital status:    Tobacco Use    Smoking status: Never    Smokeless tobacco: Never   Vaping Use    Vaping status: Never Used   Substance and Sexual Activity    Alcohol use: Not Currently     Alcohol/week: 7.0 standard drinks of alcohol     Types: 7 Glasses of wine per  "week     Comment: per patient's son, patient drinks one glass of wine daily    Drug use: No    Sexual activity: Never       Family History:  Family History   Problem Relation Age of Onset    Heart attack Mother     Heart disease Mother     Hypertension Mother     Hypertension Father     Diabetes Father     Hypertension Sister     Diabetes Sister     Thyroid cancer Sister     Breast cancer Sister     Lung cancer Sister     Diabetes Brother     Drug abuse Paternal Grandmother         Review of Systems    Not obtainable from the patient    Objective:  T Max 24 hrs: Temp (24hrs), Av.8 °F (36.6 °C), Min:96.8 °F (36 °C), Max:98.7 °F (37.1 °C)    Vitals Ranges:   Temp:  [96.8 °F (36 °C)-98.7 °F (37.1 °C)] 97.7 °F (36.5 °C)  Heart Rate:  [73-88] 73  Resp:  [14-19] 19  BP: (124-199)/(74-90) 199/89      Exam:  BP (!) 199/89 (BP Location: Right arm, Patient Position: Lying)   Pulse 73   Temp 97.7 °F (36.5 °C) (Oral)   Resp 19   Ht 162.6 cm (64\")   Wt 59 kg (130 lb)   SpO2 97%   BMI 22.31 kg/m²     General Appearance:    Alert, cooperative, no distress, appears stated age; chronically ill appearing   Head:    Normocephalic, without obvious abnormality, atraumatic; bitemporal wasting   Eyes:    PERRL, conjunctivae/corneas clear, EOM's intact, both eyes   Ears:    Normal external ear canals, both ears   Nose:   Nares normal, septum midline, mucosa normal, no drainage    or sinus tenderness   Throat:   Lips, mucosa, and tongue normal   Neck:   Supple, symmetrical, trachea midline, no adenopathy;     thyroid:  no enlargement/tenderness/nodules; no carotid    bruit or JVD   Back:     Symmetric, no curvature, ROM normal, no CVA tenderness   Lungs:     Clear to auscultation bilaterally, respirations unlabored   Chest Wall:    No tenderness or deformity    Heart:    Regular rate and rhythm, S1 and S2 normal, no murmur, rub   or gallop   Abdomen:     Soft, nontender, bowel sounds active all four quadrants,     no masses, no " hepatomegaly, no splenomegaly   Extremities:   Extremities normal, atraumatic, no cyanosis or edema                       .    Data Review:  Labs in chart were reviewed.  WBC   Date Value Ref Range Status   05/21/2024 4.37 3.40 - 10.80 10*3/mm3 Final     Hemoglobin   Date Value Ref Range Status   05/21/2024 12.6 12.0 - 15.9 g/dL Final     Hematocrit   Date Value Ref Range Status   05/21/2024 38.1 34.0 - 46.6 % Final     Platelets   Date Value Ref Range Status   05/21/2024 162 140 - 450 10*3/mm3 Final     Sodium   Date Value Ref Range Status   05/21/2024 138 136 - 145 mmol/L Final     Potassium   Date Value Ref Range Status   05/21/2024 3.9 3.5 - 5.2 mmol/L Final     Chloride   Date Value Ref Range Status   05/21/2024 100 98 - 107 mmol/L Final     CO2   Date Value Ref Range Status   05/21/2024 22.3 22.0 - 29.0 mmol/L Final     BUN   Date Value Ref Range Status   05/21/2024 20 8 - 23 mg/dL Final     Creatinine   Date Value Ref Range Status   05/21/2024 0.70 0.57 - 1.00 mg/dL Final     Glucose   Date Value Ref Range Status   05/21/2024 109 (H) 65 - 99 mg/dL Final     Calcium   Date Value Ref Range Status   05/21/2024 9.2 8.6 - 10.5 mg/dL Final     Magnesium   Date Value Ref Range Status   05/21/2024 1.8 1.6 - 2.4 mg/dL Final       Results from last 7 days   Lab Units 05/21/24  1604   TSH uIU/mL 0.969          Imaging Results (All)       Procedure Component Value Units Date/Time    CT Abdomen Pelvis With Contrast [[id number]] Collected: 05/21/24 1859     Updated: 05/21/24 1859    Narrative:      CT ABDOMEN AND PELVIS WITH IV CONTRAST     HISTORY: 74-year-old female with weight loss and anorexia.  Cholecystectomy and hysterectomy in the past.     TECHNIQUE: Radiation dose reduction techniques were utilized, including  automated exposure control and exposure modulation based on body size.   3 mm images were obtained through the abdomen and pelvis after the  administration of IV contrast. No prior CTs for comparison      FINDINGS:  1. Gastric folds appear thickened and acute gastritis is suspected.  There is equivocal mild thickening of the ileum and there may be mild or  early enteritis as well. Please correlate clinically. There is formed  stool throughout the entire colon and rectum, and constipation is  suspected. Appendix appears within normal limits.     2. There are a few hepatic cysts. No suspicious liver lesions are seen.  The common bile duct measures 8 mm and the pancreatic duct measures 2-3  mm. This may be within normal limits, but the appearance can also be  seen with papillary stenosis. If there are obstructive LFTs, GI  follow-up is recommended. The pancreas, spleen, adrenals, and kidneys  appear unremarkable other than a few tiny renal cysts. Urinary bladder  wall is mildly diffusely and the bladder is mildly distended. Please  correlate clinically for acute UTI/cystitis.      3. There are moderately extensive abdominal aortic atherosclerotic  changes without aneurysmal dilatation.  There is no evidence for acute pneumonia and there are no pleural  effusions at the visualized lower chest.       XR Chest 1 View [[id number]] Collected: 05/21/24 1555     Updated: 05/21/24 1600    Narrative:      XR CHEST 1 VW-5/21/2024     HISTORY: Weakness.     Patient is rotated to the left. Lungs are underinflated but appear  clear. Bones and soft tissues are unremarkable.       Impression:      1. No acute process.        This report was finalized on 5/21/2024 3:56 PM by Dr. Oli Madden M.D on Workstation: [id number]                 Assessment:  Active Hospital Problems    Diagnosis  POA    **Generalized weakness [R53.1]  Yes      Resolved Hospital Problems   No resolved problems to display.   Weight loss  Dementia  Hypertension  Diabetes  Underweight     Plan:  Fluids  Ccp is aware  May need a higher level of care  If reports from the sister are correct the son is not able to provide adequate care for the patient  Dw ed  provider    Cammie Mi MD  5/21/2024  22:24 EDT

## 2024-05-22 NOTE — PROGRESS NOTES
"    Name: Lucia Ellis ADMIT: 2024   : 1950  PCP: Everardo Mazariegos MD    MRN: [id number] LOS: 0 days   AGE/SEX: 74 y.o. female  ROOM: [facility]     Subjective   Subjective   Patient is seen at bedside, no new complaints.       Objective   Objective   Vital Signs  Temp:  [97.7 °F (36.5 °C)-97.9 °F (36.6 °C)] 97.7 °F (36.5 °C)  Heart Rate:  [73-82] 82  Resp:  [18-19] 18  BP: (121-199)/(56-89) 121/56  SpO2:  [90 %-97 %] 94 %  on   ;   Device (Oxygen Therapy): room air  Body mass index is 18.73 kg/m².  Physical Exam  General, awake and alert.  Head and ENT, normocephalic and atraumatic.  Lungs, symmetric expansion, equal air entry bilaterally.  Heart, regular rate and rhythm.  Abdomen, soft and nontender.  Extremities, no clubbing or cyanosis.  Neuro, cranial nerves intact  Skin: Warm and no rash.  Psych, normal mood and affect.  Musculoskeletal, joint examination is grossly normal.      Results Review     I reviewed the patient's new clinical results.  Results from last 7 days   Lab Units 24  0345 24  1604   WBC 10*3/mm3 4.62 4.37   HEMOGLOBIN g/dL 12.5 12.6   PLATELETS 10*3/mm3 170 162     Results from last 7 days   Lab Units 24  0345 24  1604   SODIUM mmol/L 143 138   POTASSIUM mmol/L 3.4* 3.9   CHLORIDE mmol/L 104 100   CO2 mmol/L 24.0 22.3   BUN mg/dL 15 20   CREATININE mg/dL 0.59 0.70   GLUCOSE mg/dL 78 109*   EGFR mL/min/1.73 93.5 89.8     Results from last 7 days   Lab Units 24  1604   ALBUMIN g/dL 3.9   BILIRUBIN mg/dL <0.2   ALK PHOS U/L 65   AST (SGOT) U/L 13   ALT (SGPT) U/L 11     Results from last 7 days   Lab Units 24  0345 24  1604   CALCIUM mg/dL 8.9 9.2   ALBUMIN g/dL  --  3.9   MAGNESIUM mg/dL  --  1.8       No results found for: \"HGBA1C\", \"POCGLU\"    XR Chest 1 View    Result Date: 2024  1. No acute process.   This report was finalized on 2024 3:56 PM by Dr. Oli Madden M.D on Workstation: [id number]       I have personally reviewed " all medications:  Scheduled Medications  amLODIPine, 10 mg, Oral, Daily  aspirin, 81 mg, Oral, Daily  atorvastatin, 80 mg, Oral, Daily  donepezil, 10 mg, Oral, Daily  escitalopram, 10 mg, Oral, Daily  levETIRAcetam, 1,000 mg, Oral, BID  lisinopril, 40 mg, Oral, Daily  memantine, 5 mg, Oral, BID  OLANZapine, 5 mg, Oral, Nightly  pantoprazole, 40 mg, Oral, QAM    Infusions   Diet  Diet: Cardiac; Healthy Heart (2-3 Na+); Fluid Consistency: Thin (IDDSI 0)    I have personally reviewed:  [x]  Laboratory   [x]  Microbiology   [x]  Radiology   [x]  EKG/Telemetry  [x]  Cardiology/Vascular   []  Pathology    []  Records       Assessment/Plan     Active Hospital Problems    Diagnosis  POA   • **Generalized weakness [R53.1]  Yes   • History of stroke [Z86.73]  Not Applicable   • Vascular dementia, moderate, without behavioral disturbance, psychotic disturbance, mood disturbance, and anxiety [F01.B0]  Yes   • Uncontrolled type 2 diabetes mellitus with hypoglycemia without coma [E11.649]  Yes   • Hyperlipidemia [E78.5]  Yes   • Generalized anxiety disorder [F41.1]  Yes   • Gastroesophageal reflux disease [K21.9]  Yes      Resolved Hospital Problems   No resolved problems to display.       74 y.o. female admitted with Generalized weakness.    Assessment and plan  1.  Vascular dementia, history of psychotic disturbance, anxiety, generalized weakness, monitor for mental status changes as well as get physical therapy whenever possible.  Continue home medications.    2.  Hypertension, continue lisinopril.    3.  Generalized anxiety disorder, continue home SSRI therapy.    4.  CODE STATUS is full code.  Patient would likely need placement.      Suresh Chang MD  [facility]  05/22/24  19:06 EDT

## 2024-05-22 NOTE — PLAN OF CARE
Problem: Malnutrition  Goal: Improved Nutritional Intake  Outcome: Ongoing, Not Progressing  Intervention: Promote and Optimize Oral Intake  Flowsheets (Taken 5/22/2024 1809)  Oral Nutrition Promotion:   calorie-dense liquids provided   nutritional therapy counseling provided   Goal Outcome Evaluation:   Boost Plus BID (chocolate) ordered. Will follow closely.

## 2024-05-22 NOTE — PROGRESS NOTES
"Nutrition Services    Patient Name:  Lucia Ellis  YOB: 1950  MRN: [id number]  Admit Date:  5/21/2024    Assessment Date:  05/22/24    Summary: Consulted per RN Admit Screen/ MSA:  Consulted per RN Admit Screen for weight loss and KRISTIN. Pt here with generalized weakness, dementia. Pt's son lives in her house with her and pt is left alone for periods of time and does not have access to food, water or medications. Pt has had weight loss and KRISTIN. Pt states she can't remember how much she ate of her meals today. Pt states she has Boost/ensure at home and likes chocolate.  lb   Weighed pt on standing scale today, 109 lb which indicates 20 lb (16%) wt loss x 5 months and moderate fat/muscle wasting noted on NFPE.     Severe Malnutrition (Based on ASPEN/AND criteria, pt meets nutrition diagnosis of Severe Malnutrition of Chronic Disease due to inadequate po intake, 20 lb (16%) wt loss x 5 months and moderate fat/muscle wasting noted on NFPE.)    Recommend:  Boost Plus BID (chocolate), will order.    Will follow per protocol.    CLINICAL NUTRITION ASSESSMENT      Reason for Assessment BMI, Malnutrition Severity Assessment, MST score 2+, Nurse Admission Screen     Diagnosis/Problem   Generalized weakness, dementia, weight loss   Medical/Surgical History Past Medical History:   Diagnosis Date    Breast cancer     Dementia     Diabetes mellitus     Hypertension     Memory loss        Past Surgical History:   Procedure Laterality Date    CHOLECYSTECTOMY      HYSTERECTOMY      MASTECTOMY Right 1995    TOTAL KNEE ARTHROPLASTY Right         Anthropometrics        Current Height  Current Weight  BMI kg/m2 Height: 162.6 cm (64\")  Weight: 49.5 kg (109 lb 2 oz) (05/22/24 1500)  Body mass index is 18.73 kg/m².   Adjusted BMI (if applicable)    BMI Category Underweight (18.4 or below)   Ideal Body Weight (IBW) 120 lb   Usual Body Weight (UBW) 129 lb   Weight Trend Loss, Amount/Timeframe: 20 lb (16%) wt loss x 5 " months   Weight History Wt Readings from Last 30 Encounters:   05/22/24 1500 49.5 kg (109 lb 2 oz)   05/22/24 0701 54.1 kg (119 lb 4.3 oz)   05/21/24 1444 59 kg (130 lb)   05/21/24 1313 50.8 kg (112 lb)   04/19/24 1237 51.8 kg (114 lb 4.8 oz)   03/18/24 1111 49.9 kg (110 lb)   03/07/24 1313 51.7 kg (114 lb)   01/10/24 1220 63.5 kg (140 lb)   12/14/23 0600 57.7 kg (127 lb 3.3 oz)   12/13/23 0802 58.6 kg (129 lb 3 oz)   12/12/23 0834 58.9 kg (129 lb 13.6 oz)   12/08/23 1122 57.2 kg (126 lb)   12/08/23 2131 57.2 kg (126 lb)   05/23/23 1132 57.2 kg (126 lb)   03/02/23 1514 58.1 kg (128 lb)   03/01/23 1243 57.9 kg (127 lb 9.6 oz)   01/11/23 1407 60.3 kg (133 lb)   12/29/22 0900 58.1 kg (128 lb)   12/30/22 1622 58.1 kg (128 lb)   12/02/22 0801 59.9 kg (132 lb)   11/03/21 1038 59.2 kg (130 lb 8 oz)   10/19/21 1414 108 kg (237 lb 8 oz)   09/29/21 1133 59.5 kg (131 lb 3.2 oz)   08/12/21 0856 59 kg (130 lb)   06/21/21 1052 61.3 kg (135 lb 1.6 oz)   05/07/21 1621 60.8 kg (134 lb 0.6 oz)   04/08/21 1559 60.8 kg (134 lb)   04/06/21 1013 61.7 kg (136 lb)   03/02/21 1253 61.7 kg (136 lb)   06/16/20 1132 62.1 kg (136 lb 14.4 oz)   06/24/19 1230 64.3 kg (141 lb 12.8 oz)   02/26/19 1610 66.7 kg (147 lb 1.6 oz)   09/21/18 1101 66.7 kg (147 lb)   03/12/18 1557 70.4 kg (155 lb 3.2 oz)   02/09/18 1505 70 kg (154 lb 6.4 oz)      --  Estimated/Assessed Needs        Current Weight  Weight: 49.5 kg (109 lb 2 oz) (05/22/24 1500)       Energy Requirements    Weight for Calculation 49 kg   Method for Estimation  30 kcal/kg   EST Needs (kcal/day) 1470 kcals       Protein Requirements    Weight for Calculation 49 kg   EST Protein Needs (g/kg) 1.2 - 1.5 gm/kg   EST Daily Needs (g/day) 58-73 g       Fluid Requirements     Method for Estimation 30 mL/kg    EST Needs (mL/day) 1470 ml     Labs       Pertinent Labs    Results from last 7 days   Lab Units 05/22/24  0345 05/21/24  1604   SODIUM mmol/L 143 138   POTASSIUM mmol/L 3.4* 3.9   CHLORIDE mmol/L  104 100   CO2 mmol/L 24.0 22.3   BUN mg/dL 15 20   CREATININE mg/dL 0.59 0.70   CALCIUM mg/dL 8.9 9.2   BILIRUBIN mg/dL  --  <0.2   ALK PHOS U/L  --  65   ALT (SGPT) U/L  --  11   AST (SGOT) U/L  --  13   GLUCOSE mg/dL 78 109*     Results from last 7 days   Lab Units 05/22/24  0345 05/21/24  1604   MAGNESIUM mg/dL  --  1.8   HEMOGLOBIN g/dL 12.5 12.6   HEMATOCRIT % 38.9 38.1   WBC 10*3/mm3 4.62 4.37   ALBUMIN g/dL  --  3.9     Results from last 7 days   Lab Units 05/22/24  0345 05/21/24  1604   PLATELETS 10*3/mm3 170 162     COVID19   Date Value Ref Range Status   12/16/2023 Not Detected Not Detected - Ref. Range Final     Lab Results   Component Value Date    HGBA1C 6.6 (H) 04/19/2024          Medications           Scheduled Medications amLODIPine, 10 mg, Oral, Daily  aspirin, 81 mg, Oral, Daily  atorvastatin, 80 mg, Oral, Daily  donepezil, 10 mg, Oral, Daily  escitalopram, 10 mg, Oral, Daily  levETIRAcetam, 1,000 mg, Oral, BID  lisinopril, 40 mg, Oral, Daily  memantine, 5 mg, Oral, BID  OLANZapine, 5 mg, Oral, Nightly  pantoprazole, 40 mg, Oral, QAM       Infusions     PRN Medications   acetaminophen    senna-docusate sodium **AND** polyethylene glycol **AND** bisacodyl **AND** bisacodyl    Calcium Replacement - Follow Nurse / BPA Driven Protocol    cetirizine    Magnesium Standard Dose Replacement - Follow Nurse / BPA Driven Protocol    melatonin    ondansetron ODT **OR** ondansetron    Phosphorus Replacement - Follow Nurse / BPA Driven Protocol    Potassium Replacement - Follow Nurse / BPA Driven Protocol     Physical Findings          General Findings alert, confused, disoriented, frail, loss of muscle mass, loss of subcutaneous fat   Oral/Mouth Cavity WDL   Edema  not assessed   Gastrointestinal last bowel movement: 5/20   Skin  skin intact   Tubes/Drains/Lines none   NFPE Consented to exam, See Malnutrition Severity Assessment, Date Completed: 5/22   --  Malnutrition Severity Assessment      Patient meets  criteria for : Severe Malnutrition (Based on ASPEN/AND criteria, pt meets nutrition diagnosis of Severe Malnutrition of Chronic Disease due to inadequate po intake, 20 lb (16%) wt loss x 5 months and moderate fat/muscle wasting noted on NFPE.)  Malnutrition Type (Last 8 Hours)       Malnutrition Severity Assessment       Row Name 05/22/24 1525       Malnutrition Severity Assessment    Malnutrition Type Chronic Disease - Related Malnutrition      Row Name 05/22/24 1525       Insufficient Energy Intake     Insufficient Energy Intake Findings Severe    Insufficient Energy Intake  <75% of est. energy requirement for > or equal to 3 months      Row Name 05/22/24 1525       Unintentional Weight Loss     Unintentional Weight Loss Findings Severe    Unintentional Weight Loss  Weight loss greater than 10% in six months  20 lb (16%) wt loss x 5 months      Row Name 05/22/24 1525       Muscle Loss    Loss of Muscle Mass Findings Moderate    [place] Region Moderate - slight depression    Clavicle Bone Region Moderate - some protrusion in females, visible in males    Acromion Bone Region Moderate - acromion may slightly protrude    Scapular Bone Region Moderate - mild depression, bones may show slightly    Dorsal Hand Region Moderate - slight depression      Row Name 05/22/24 1525       Fat Loss    Subcutaneous Fat Loss Findings Moderate    Orbital Region  Moderate -  somewhat hollowness, slightly dark circles    Upper Arm Region Moderate - some fat tissue, not ample      Row Name 05/22/24 1525       Criteria Met (Must meet criteria for severity in at least 2 of these categories: M Wasting, Fat Loss, Fluid, Secondary Signs, Wt. Status, Intake)    Patient meets criteria for  Severe Malnutrition  Based on ASPEN/AND criteria, pt meets nutrition diagnosis of Severe Malnutrition of Chronic Disease due to inadequate po intake, 20 lb (16%) wt loss x 5 months and moderate fat/muscle wasting noted on NFPE.                       Current  Nutrition Orders & Evaluation of Intake       Oral Nutrition     Food Allergies NKFA   Current PO Diet Diet: Cardiac; Healthy Heart (2-3 Na+); Fluid Consistency: Thin (IDDSI 0)   Supplement n/a   PO Evaluation     % PO Intake Unsure, no intakes recorded and pt can't remember what she ate today    Factors Affecting Intake: decreased appetite   --  PES STATEMENT / NUTRITION DIAGNOSIS      Nutrition Dx Problem  Problem: Malnutrition (severe)  Etiology: Factors Affecting Nutrition - KRISTIN    Signs/Symptoms: Report of Minimal PO Intake, NFPE Results, BMI, and Unintended Weight Change     NUTRITION INTERVENTION / PLAN OF CARE      Intervention Goal(s) Maintain nutrition status, Reduce/improve symptoms, Meet estimated needs, Disease management/therapy, Increase intake, Accepts oral nutrition supplement, and Maintain weight         RD Intervention/Action Encourage intake, Continue to monitor, Care plan reviewed, and Recommend/order: supplement   --      Prescription/Orders:       PO Diet       Supplements Boost Plus BID (chocolate)      Enteral Nutrition       Parenteral Nutrition    New Prescription Ordered? Yes   --      Monitor/Evaluation Per protocol   Discharge Plan/Needs Pending clinical course   --    RD to follow per protocol.      Electronically signed by:  Adriana Medina RD  05/22/24 17:54 EDT

## 2024-05-22 NOTE — PLAN OF CARE
Goal Outcome Evaluation:              Outcome Evaluation: pt is A&O, pt is up with stand by assist, family updated at bedside, potassium replaced, VSS, Plan of care ongoing.

## 2024-05-22 NOTE — CASE MANAGEMENT/SOCIAL WORK
Continued Stay Note  [facility]     Patient Name: Lucia Ellis  MRN: [id number]  Today's Date: 5/22/2024    Admit Date: 5/21/2024        Discharge Plan       Row Name 05/22/24 0952       Plan    Plan Comments Left VM for patient's son-Vern [phone] requesting call back. CCP to awaiting call back. Kev OLIVER RN                   Discharge Codes    No documentation.                       Kev Hernandes RN

## 2024-05-22 NOTE — THERAPY EVALUATION
Patient Name: Lucia Ellis  : 1950    MRN: [id number]                              Today's Date: 2024       Admit Date: 2024    Visit Dx:     ICD-10-CM ICD-9-CM   1. Generalized weakness  R53.1 780.79   2. Dementia, unspecified dementia severity, unspecified dementia type, unspecified whether behavioral, psychotic, or mood disturbance or anxiety  F03.90 294.20     Patient Active Problem List   Diagnosis    Gastroesophageal reflux disease    Malignant neoplasm of breast    Depression    Folliculitis    Generalized anxiety disorder    Hyperlipidemia    Essential hypertension    Status post total right knee replacement    Rectal hemorrhage    Vitamin D deficiency    Drug therapy    Acute cholecystitis    Uncontrolled type 2 diabetes mellitus with hypoglycemia without coma    Myoclonus    Type 2 diabetes mellitus with hyperglycemia    Hypokalemia    New onset seizure    Focal motor seizure    Vascular dementia, moderate, without behavioral disturbance, psychotic disturbance, mood disturbance, and anxiety    Stroke-like symptoms    CVA (cerebral vascular accident)    Lung nodules    Hypokalemia    History of stroke    Generalized weakness     Past Medical History:   Diagnosis Date    Breast cancer     Dementia     Diabetes mellitus     Hypertension     Memory loss      Past Surgical History:   Procedure Laterality Date    CHOLECYSTECTOMY      HYSTERECTOMY      MASTECTOMY Right 1995    TOTAL KNEE ARTHROPLASTY Right       General Information       Row Name 24 0956          Physical Therapy Time and Intention    Document Type evaluation  Pt. admitted with General weakness  -MS     Mode of Treatment physical therapy;individual therapy  -MS       Row Name 24 0956          General Information    Patient Profile Reviewed yes  -MS     Prior Level of Function independent:  No use of A.D. prior to admission per pt. report  -MS     Existing Precautions/Restrictions fall   Exit alarm  -MS     Barriers  to Rehab none identified  -MS       Row Name 05/22/24 0956          Cognition    Orientation Status (Cognition) oriented x 3  -MS       Row Name 05/22/24 0956          Safety Issues, Functional Mobility    Comment, Safety Issues/Impairments (Mobility) Gait belt used for safety.  -MS               User Key  (r) = Recorded By, (t) = Taken By, (c) = Cosigned By      Initials Name Provider Type    John Gaytan EMILIANA, PT Physical Therapist                   Mobility       Row Name 05/22/24 0957          Bed Mobility    Bed Mobility supine-sit;sit-supine  -MS     Supine-Sit Wallace (Bed Mobility) contact guard  -MS     Sit-Supine Wallace (Bed Mobility) contact guard  -MS       Row Name 05/22/24 0957          Sit-Stand Transfer    Sit-Stand Wallace (Transfers) contact guard  -MS       Row Name 05/22/24 0957          Gait/Stairs (Locomotion)    Wallace Level (Gait) minimum assist (75% patient effort)  -MS     Assistive Device (Gait) --  HHA x 1  -MS     Distance in Feet (Gait) 30  -MS     Deviations/Abnormal Patterns (Gait) bina decreased  -MS     Bilateral Gait Deviations forward flexed posture  -MS     Comment, (Gait/Stairs) Verbal/tactile cues given for posture correction. Mild unsteadiness with upright mobility (requiring HHA) but no overt losses of balance noted.  -MS               User Key  (r) = Recorded By, (t) = Taken By, (c) = Cosigned By      Initials Name Provider Type    Eden Gaytanblaise HOPSON PT Physical Therapist                   Obj/Interventions       Row Name 05/22/24 0958          Range of Motion Comprehensive    Comment, General Range of Motion BUE/LE (WFL's)  -MS       Row Name 05/22/24 0958          Strength Comprehensive (MMT)    Comment, General Manual Muscle Testing (MMT) Assessment BUE/LE (3+/5)  -MS       Row Name 05/22/24 0958          Motor Skills    Therapeutic Exercise --  BLE ther. ex. program x 10 reps completed (Ankle pumps, Hip Flexion, LAQ's)  -MS                User Key  (r) = Recorded By, (t) = Taken By, (c) = Cosigned By      Initials Name Provider Type    John Gaytan, PT Physical Therapist                   Goals/Plan       Row Name 05/22/24 0959          Bed Mobility Goal 1 (PT)    Activity/Assistive Device (Bed Mobility Goal 1, PT) bed mobility activities, all  -MS     [place] Level/Cues Needed (Bed Mobility Goal 1, PT) independent  -MS     Time Frame (Bed Mobility Goal 1, PT) long term goal (LTG);1 week  -MS       Row Name 05/22/24 0959          Transfer Goal 1 (PT)    Activity/Assistive Device (Transfer Goal 1, PT) transfers, all  -MS     [place] Level/Cues Needed (Transfer Goal 1, PT) standby assist  -MS     Time Frame (Transfer Goal 1, PT) long term goal (LTG);1 week  -MS       [facility] Name 05/22/24 0959          Gait Training Goal 1 (PT)    Activity/Assistive Device (Gait Training Goal 1, PT) gait (walking locomotion)  With or without AAD  -MS     [place] Level (Gait Training Goal 1, PT) standby assist  -MS     Distance (Gait Training Goal 1, PT) 100 feet  -MS     Time Frame (Gait Training Goal 1, PT) long term goal (LTG);1 week  -MS       Row Name 05/22/24 0959          Therapy Assessment/Plan (PT)    Planned Therapy Interventions (PT) balance training;bed mobility training;gait training;home exercise program;patient/family education;postural re-education;transfer training;strengthening  -MS               User Key  (r) = Recorded By, (t) = Taken By, (c) = Cosigned By      Initials Name Provider Type    John Gaytan, PT Physical Therapist                   Clinical Impression       Row Name 05/22/24 0959          Pain    Pretreatment Pain Rating 0/10 - no pain  -MS     Posttreatment Pain Rating 0/10 - no pain  -MS       Row Name 05/22/24 0959          Plan of Care Review    Plan of Care Reviewed With patient  -MS       Row Name 05/22/24 0959          Therapy Assessment/Plan (PT)    Rehab Potential (PT) good, to achieve stated therapy  goals  -MS     Criteria for Skilled Interventions Met (PT) skilled treatment is necessary  -MS     Therapy Frequency (PT) 6 times/wk  -MS       Row Name 05/22/24 0959          Positioning and Restraints    Pre-Treatment Position in bed  -MS     Post Treatment Position bed  -MS     In Bed notified nsg;supine;call light within reach;encouraged to call for assist;exit alarm on  -MS               User Key  (r) = Recorded By, (t) = Taken By, (c) = Cosigned By      Initials Name Provider Type    John Gaytan PT Physical Therapist                   Outcome Measures       Row Name 05/22/24 0959          How much help from another person do you currently need...    Turning from your back to your side while in flat bed without using bedrails? 3  -MS     Moving from lying on back to sitting on the side of a flat bed without bedrails? 3  -MS     Moving to and from a bed to a chair (including a wheelchair)? 3  -MS     Standing up from a chair using your arms (e.g., wheelchair, bedside chair)? 3  -MS     Climbing 3-5 steps with a railing? 3  -MS     To walk in hospital room? 3  -MS     AM-PAC 6 Clicks Score (PT) 18  -MS     Highest Level of Mobility Goal 6 --> Walk 10 steps or more  -MS       Row Name 05/22/24 0959          Functional Assessment    Outcome Measure Options AM-PAC 6 Clicks Basic Mobility (PT)  -MS               User Key  (r) = Recorded By, (t) = Taken By, (c) = Cosigned By      Initials Name Provider Type    John Gaytan PT Physical Therapist                                 Physical Therapy Education       Title: PT OT SLP Therapies (Done)       Topic: Physical Therapy (Done)       Point: Mobility training (Done)       Learning Progress Summary             Patient Acceptance, E,D, VU,NR by MS at 5/22/2024 1000                         Point: Home exercise program (Done)       Learning Progress Summary             Patient Acceptance, E,D, VU,NR by MS at 5/22/2024 1000                         Point:  Body mechanics (Done)       Learning Progress Summary             Patient Acceptance, E,D, VU,NR by MS at 5/22/2024 1000                         Point: Precautions (Done)       Learning Progress Summary             Patient Acceptance, E,D, VU,NR by MS at 5/22/2024 1000                                         User Key       Initials Effective Dates Name Provider Type Discipline    MS 06/16/21 -  John Serrano, PT Physical Therapist PT                  PT Recommendation and Plan  Planned Therapy Interventions (PT): balance training, bed mobility training, gait training, home exercise program, patient/family education, postural re-education, transfer training, strengthening  Plan of Care Reviewed With: patient  Outcome Evaluation: Pt. is a 74 year old Female admitted with general weakness.  Pt. reports that prior to admission she was independent with functional mobility and used no A.D. during ambulation.  Pt. currently presents with decreased strength, decreased balance, and decreased tolerance to functional activity.  This AM, pt. able to ambulate 30 feet, Min. assist x 1, with HHA x1. Pt. requires CGA x 1 for bed mobility and CGA x 1 for sit <-> stand transfers. BLE ther. ex. program x 10 reps completed for general strengthening. Mild unsteadiness throughout ambulation but no overt losses of balance noted. Pt. will benefit from skilled inpt. P.T. to address her functional deficits and to assist pt. in regaining her maximum level of independence with functional mobility.     Time Calculation:         PT Charges       Row Name 05/22/24 1005             Time Calculation    Start Time 0925  -MS      Stop Time 0940  -MS      Time Calculation (min) 15 min  -MS      PT Received On 05/22/24  -MS      PT - Next Appointment 05/23/24  -MS      PT Goal Re-Cert Due Date 05/29/24  -MS         Time Calculation- PT    Total Timed Code Minutes- PT 14 minute(s)  -MS                User Key  (r) = Recorded By, (t) = Taken By, (c)  = Cosigned By      Initials Name Provider Type    John Gaytan, PT Physical Therapist                  Therapy Charges for Today       Code Description Service Date Service Provider Modifiers Qty    43022556468 HC PT EVAL MOD COMPLEXITY 2 5/22/2024 John Serrano, PT GP 1    60030008565 HC PT THERAPEUTIC ACT EA 15 MIN 5/22/2024 John Serrano, PT GP 1            PT G-Codes  Outcome Measure Options: AM-PAC 6 Clicks Basic Mobility (PT)  AM-PAC 6 Clicks Score (PT): 18  PT Discharge Summary  Anticipated Discharge Disposition (PT): home with assist, home with home health    John Serrano, PT  5/22/2024

## 2024-05-22 NOTE — PLAN OF CARE
Goal Outcome Evaluation:  Plan of Care Reviewed With: patient           Outcome Evaluation: Pt. is a 74 year old Female admitted with general weakness.  Pt. reports that prior to admission she was independent with functional mobility and used no A.D. during ambulation.  Pt. currently presents with decreased strength, decreased balance, and decreased tolerance to functional activity.  This AM, pt. able to ambulate 30 feet, Min. assist x 1, with HHA x1. Pt. requires CGA x 1 for bed mobility and CGA x 1 for sit <-> stand transfers. BLE ther. ex. program x 10 reps completed for general strengthening. Mild unsteadiness throughout ambulation but no overt losses of balance noted. Pt. will benefit from skilled inpt. P.T. to address her functional deficits and to assist pt. in regaining her maximum level of independence with functional mobility.      Anticipated Discharge Disposition (PT): home with assist, home with home health

## 2024-05-22 NOTE — PLAN OF CARE
Goal Outcome Evaluation:  Plan of Care Reviewed With: patient        Progress: no change  Problem: Fall Injury Risk  Goal: Absence of Fall and Fall-Related Injury  5/22/2024 0503 by Maria Luisa Spann RN  Outcome: Ongoing, Progressing  5/21/2024 2220 by Maria Luisa Spann RN  Outcome: Ongoing, Progressing  Intervention: Identify and Manage Contributors  Recent Flowsheet Documentation  Taken 5/21/2024 2213 by Maria Luisa Spann RN  Medication Review/Management: medications reviewed  Intervention: Promote Injury-Free Environment  Recent Flowsheet Documentation  Taken 5/22/2024 0400 by Maria Luisa Spann RN  Safety Promotion/Fall Prevention:   activity supervised   assistive device/personal items within reach   fall prevention program maintained  Taken 5/22/2024 0200 by Maria Luisa Spann RN  Safety Promotion/Fall Prevention:   activity supervised   assistive device/personal items within reach   fall prevention program maintained  Taken 5/22/2024 0023 by Maria Luisa Spann RN  Safety Promotion/Fall Prevention:   activity supervised   assistive device/personal items within reach   fall prevention program maintained  Taken 5/21/2024 2213 by Maria Luisa Spann RN  Safety Promotion/Fall Prevention:   activity supervised   assistive device/personal items within reach   fall prevention program maintained

## 2024-05-23 LAB
ALBUMIN SERPL-MCNC: 3.9 G/DL (ref 3.5–5.2)
ALBUMIN/GLOB SERPL: 1.7 G/DL
ALP SERPL-CCNC: 66 U/L (ref 39–117)
ALT SERPL W P-5'-P-CCNC: 10 U/L (ref 1–33)
ANION GAP SERPL CALCULATED.3IONS-SCNC: 8.5 MMOL/L (ref 5–15)
AST SERPL-CCNC: 12 U/L (ref 1–32)
BASOPHILS # BLD AUTO: 0.06 10*3/MM3 (ref 0–0.2)
BASOPHILS NFR BLD AUTO: 1.3 % (ref 0–1.5)
BILIRUB SERPL-MCNC: <0.2 MG/DL (ref 0–1.2)
BUN SERPL-MCNC: 16 MG/DL (ref 8–23)
BUN/CREAT SERPL: 26.2 (ref 7–25)
CALCIUM SPEC-SCNC: 9.4 MG/DL (ref 8.6–10.5)
CHLORIDE SERPL-SCNC: 108 MMOL/L (ref 98–107)
CO2 SERPL-SCNC: 25.5 MMOL/L (ref 22–29)
CREAT SERPL-MCNC: 0.61 MG/DL (ref 0.57–1)
DEPRECATED RDW RBC AUTO: 39.1 FL (ref 37–54)
EGFRCR SERPLBLD CKD-EPI 2021: 92.8 ML/MIN/1.73
EOSINOPHIL # BLD AUTO: 0.04 10*3/MM3 (ref 0–0.4)
EOSINOPHIL NFR BLD AUTO: 0.9 % (ref 0.3–6.2)
ERYTHROCYTE [DISTWIDTH] IN BLOOD BY AUTOMATED COUNT: 12.4 % (ref 12.3–15.4)
GLOBULIN UR ELPH-MCNC: 2.3 GM/DL
GLUCOSE BLDC GLUCOMTR-MCNC: 199 MG/DL (ref 70–130)
GLUCOSE SERPL-MCNC: 78 MG/DL (ref 65–99)
HCT VFR BLD AUTO: 38 % (ref 34–46.6)
HGB BLD-MCNC: 12.5 G/DL (ref 12–15.9)
IMM GRANULOCYTES # BLD AUTO: 0.01 10*3/MM3 (ref 0–0.05)
IMM GRANULOCYTES NFR BLD AUTO: 0.2 % (ref 0–0.5)
LYMPHOCYTES # BLD AUTO: 1.72 10*3/MM3 (ref 0.7–3.1)
LYMPHOCYTES NFR BLD AUTO: 37.6 % (ref 19.6–45.3)
MCH RBC QN AUTO: 28.4 PG (ref 26.6–33)
MCHC RBC AUTO-ENTMCNC: 32.9 G/DL (ref 31.5–35.7)
MCV RBC AUTO: 86.4 FL (ref 79–97)
MONOCYTES # BLD AUTO: 0.42 10*3/MM3 (ref 0.1–0.9)
MONOCYTES NFR BLD AUTO: 9.2 % (ref 5–12)
NEUTROPHILS NFR BLD AUTO: 2.32 10*3/MM3 (ref 1.7–7)
NEUTROPHILS NFR BLD AUTO: 50.8 % (ref 42.7–76)
NRBC BLD AUTO-RTO: 0 /100 WBC (ref 0–0.2)
PLATELET # BLD AUTO: 174 10*3/MM3 (ref 140–450)
PMV BLD AUTO: 11.7 FL (ref 6–12)
POTASSIUM SERPL-SCNC: 4 MMOL/L (ref 3.5–5.2)
PROT SERPL-MCNC: 6.2 G/DL (ref 6–8.5)
RBC # BLD AUTO: 4.4 10*6/MM3 (ref 3.77–5.28)
SODIUM SERPL-SCNC: 142 MMOL/L (ref 136–145)
WBC NRBC COR # BLD AUTO: 4.57 10*3/MM3 (ref 3.4–10.8)

## 2024-05-23 PROCEDURE — 85025 COMPLETE CBC W/AUTO DIFF WBC: CPT | Performed by: INTERNAL MEDICINE

## 2024-05-23 PROCEDURE — 80053 COMPREHEN METABOLIC PANEL: CPT | Performed by: INTERNAL MEDICINE

## 2024-05-23 PROCEDURE — G0378 HOSPITAL OBSERVATION PER HR: HCPCS

## 2024-05-23 PROCEDURE — 82948 REAGENT STRIP/BLOOD GLUCOSE: CPT

## 2024-05-23 PROCEDURE — 97530 THERAPEUTIC ACTIVITIES: CPT

## 2024-05-23 RX ADMIN — MEMANTINE HYDROCHLORIDE 5 MG: 5 TABLET, FILM COATED ORAL at 09:05

## 2024-05-23 RX ADMIN — LEVETIRACETAM 1000 MG: 500 TABLET, FILM COATED ORAL at 20:41

## 2024-05-23 RX ADMIN — MEMANTINE HYDROCHLORIDE 5 MG: 5 TABLET, FILM COATED ORAL at 20:41

## 2024-05-23 RX ADMIN — AMLODIPINE BESYLATE 10 MG: 10 TABLET ORAL at 09:05

## 2024-05-23 RX ADMIN — ASPIRIN 81 MG: 81 TABLET, COATED ORAL at 09:05

## 2024-05-23 RX ADMIN — DONEPEZIL HYDROCHLORIDE 10 MG: 10 TABLET, FILM COATED ORAL at 09:05

## 2024-05-23 RX ADMIN — ATORVASTATIN CALCIUM 80 MG: 80 TABLET, FILM COATED ORAL at 09:05

## 2024-05-23 RX ADMIN — ESCITALOPRAM OXALATE 10 MG: 10 TABLET, FILM COATED ORAL at 09:05

## 2024-05-23 RX ADMIN — PANTOPRAZOLE SODIUM 40 MG: 40 TABLET, DELAYED RELEASE ORAL at 06:49

## 2024-05-23 RX ADMIN — LISINOPRIL 40 MG: 40 TABLET ORAL at 09:05

## 2024-05-23 RX ADMIN — LEVETIRACETAM 1000 MG: 500 TABLET, FILM COATED ORAL at 09:05

## 2024-05-23 RX ADMIN — OLANZAPINE 5 MG: 5 TABLET, FILM COATED ORAL at 20:41

## 2024-05-23 NOTE — PLAN OF CARE
Goal Outcome Evaluation:           Progress: no change     No acute changes this shift. Normal sinus rhythm and tachycardic with activity, but does not sustain on the monitor. Sister updated at bedside.

## 2024-05-23 NOTE — DISCHARGE PLACEMENT REQUEST
"Randi Swanson (74 y.o. Female)       Date of Birth   1950    Social Security Number       Address   [address] [place] [address]    Home Phone   [phone]    MRN   [id number]       Anglican   Yazidism    Marital Status                               Admission Date   5/21/24    Admission Type   Emergency    Admitting Provider   Cammie Mi MD    Attending Provider   Suresh Chang MD    Department, Room/Bed   [address], [address]       Discharge Date       Discharge Disposition       Discharge Destination                                 Attending Provider: Suresh Chang MD    Allergies: Ppd [Tuberculin Purified Protein Derivative]    Isolation: None   Infection: None   Code Status: CPR    Ht: 162.6 cm (64\")   Wt: 50.1 kg (110 lb 7.2 oz)    Admission Cmt: None   Principal Problem: Generalized weakness [R53.1]                   Active Insurance as of 5/21/2024       Primary Coverage       Payor Plan Insurance Group Employer/Plan Group    HUMANA MEDICARE REPLACEMENT HUMANA MEDICARE REPLACEMENT [id number]       Payor Plan Address Payor Plan Phone Number Payor Plan Fax Number Effective Dates    [address] [address] [phone]  3/1/2023 - None Entered    [facility] [id number]         Subscriber Name Subscriber Birth Date Member ID       RANDI SWANSON 1950 [id number]                     Emergency Contacts        (Rel.) Home Phone Work Phone Mobile Phone    JimmyshantePeterwilbert (Son) [phone]    Jessica Recinos (Sister) [phone] -- --    Jj Recinos (nephew) (Relative) [phone] -- --                "

## 2024-05-23 NOTE — PROGRESS NOTES
Name: Lucia Ellis ADMIT: 2024   : 1950  PCP: Everardo Mazariegos MD    MRN: [id number] LOS: 0 days   AGE/SEX: 74 y.o. female  ROOM: [facility]     Subjective   Subjective   Patient seen at bedside.       Objective   Objective   Vital Signs  Temp:  [97.5 °F (36.4 °C)-97.9 °F (36.6 °C)] 97.9 °F (36.6 °C)  Heart Rate:  [74-99] 99  Resp:  [18] 18  BP: (131-165)/(59-70) 144/69  SpO2:  [94 %-99 %] 94 %  on   ;   Device (Oxygen Therapy): room air  Body mass index is 18.96 kg/m².  Physical Exam  General, awake and alert.  Head and ENT, normocephalic and atraumatic.  Lungs, symmetric expansion, equal air entry bilaterally.  Heart, regular rate and rhythm.  Abdomen, soft and nontender.  Extremities, no clubbing or cyanosis.  Neuro, cranial nerves intact  Skin: Warm and no rash.  Psych, normal mood and affect.  Musculoskeletal, joint examination is grossly normal.      Copied text material from yesterday's note has been reviewed for appropriate changes and remains accurate as of 24.        Results Review     I reviewed the patient's new clinical results.  Results from last 7 days   Lab Units 24  0621 24  0345 24  1604   WBC 10*3/mm3 4.57 4.62 4.37   HEMOGLOBIN g/dL 12.5 12.5 12.6   PLATELETS 10*3/mm3 174 170 162     Results from last 7 days   Lab Units 24  0621 24  1850 24  0345 24  1604   SODIUM mmol/L 142  --  143 138   POTASSIUM mmol/L 4.0 4.0 3.4* 3.9   CHLORIDE mmol/L 108*  --  104 100   CO2 mmol/L 25.5  --  24.0 22.3   BUN mg/dL 16  --  15 20   CREATININE mg/dL 0.61  --  0.59 0.70   GLUCOSE mg/dL 78  --  78 109*   EGFR mL/min/1.73 92.8  --  93.5 89.8     Results from last 7 days   Lab Units 24  0621 24  1604   ALBUMIN g/dL 3.9 3.9   BILIRUBIN mg/dL <0.2 <0.2   ALK PHOS U/L 66 65   AST (SGOT) U/L 12 13   ALT (SGPT) U/L 10 11     Results from last 7 days   Lab Units 24  0621 24  0345 24  1604   CALCIUM mg/dL 9.4 8.9 9.2   ALBUMIN g/dL  "3.9  --  3.9   MAGNESIUM mg/dL  --   --  1.8       No results found for: \"HGBA1C\", \"POCGLU\"    No radiology results for the last day    I have personally reviewed all medications:  Scheduled Medications  amLODIPine, 10 mg, Oral, Daily  aspirin, 81 mg, Oral, Daily  atorvastatin, 80 mg, Oral, Daily  donepezil, 10 mg, Oral, Daily  escitalopram, 10 mg, Oral, Daily  levETIRAcetam, 1,000 mg, Oral, BID  lisinopril, 40 mg, Oral, Daily  memantine, 5 mg, Oral, BID  OLANZapine, 5 mg, Oral, Nightly  pantoprazole, 40 mg, Oral, QAM    Infusions   Diet  Diet: Cardiac; Healthy Heart (2-3 Na+); Fluid Consistency: Thin (IDDSI 0)    I have personally reviewed:  [x]  Laboratory   [x]  Microbiology   [x]  Radiology   [x]  EKG/Telemetry  [x]  Cardiology/Vascular   []  Pathology    []  Records       Assessment/Plan     Active Hospital Problems    Diagnosis  POA   • **Generalized weakness [R53.1]  Yes   • Severe malnutrition [E43]  Yes   • History of stroke [Z86.73]  Not Applicable   • Vascular dementia, moderate, without behavioral disturbance, psychotic disturbance, mood disturbance, and anxiety [F01.B0]  Yes   • Uncontrolled type 2 diabetes mellitus with hypoglycemia without coma [E11.649]  Yes   • Hyperlipidemia [E78.5]  Yes   • Generalized anxiety disorder [F41.1]  Yes   • Gastroesophageal reflux disease [K21.9]  Yes      Resolved Hospital Problems   No resolved problems to display.       74 y.o. female admitted with Generalized weakness.    Assessment and plan  1.  Vascular dementia, history of psychotic disturbance, anxiety, generalized weakness, monitor for mental status changes as well as get physical therapy whenever possible.  Continue home medications.     2.  Hypertension, continue lisinopril.     3.  Generalized anxiety disorder, continue home SSRI therapy.     4.  CODE STATUS is full code.  Patient would likely need placement.         Suresh Chang MD  [facility]  05/23/24  18:11 EDT    "

## 2024-05-23 NOTE — CASE MANAGEMENT/SOCIAL WORK
Discharge Planning Assessment  [facility]     Patient Name: Lucia Ellis  MRN: [id number]  Today's Date: 5/23/2024    Admit Date: 5/21/2024    Plan: Referral to [facility] pending, will need [employer] [facility] pre-cert   Discharge Needs Assessment       Row Name 05/23/24 1408       Living Environment    People in Home child(donovan), adult    Current Living Arrangements home    Potentially Unsafe Housing Conditions none    Primary Care Provided by child(donovan)    Provides Primary Care For no one    Family Caregiver if Needed child(donovan), adult    Quality of Family Relationships helpful;involved;supportive    Able to Return to Prior Arrangements no  Needs SNF at discharge       Resource/Environmental Concerns    Resource/Environmental Concerns none    Transportation Concerns none       Transition Planning    Patient/Family Anticipates Transition to inpatient rehabilitation facility    Patient/Family Anticipated Services at Transition rehabilitation services    Transportation Anticipated family or friend will provide       Discharge Needs Assessment    Readmission Within the Last 30 Days no previous admission in last 30 days    Equipment Currently Used at Home none    Concerns to be Addressed care coordination/care conferences;discharge planning    Anticipated Changes Related to Illness none    Equipment Needed After Discharge none    Provided Post Acute Provider List? N/A    Provided Post Acute Provider Quality & Resource List? N/A    Patient's Choice of Community Agency(s) [facility]                   Discharge Plan       Row Name 05/23/24 1409       Plan    Plan Referral to [facility] pending, will need [employer] [facility] pre-cert    Patient/Family in Agreement with Plan yes    Plan Comments MARIBEL noted. [facility] spoke to the patient’s son Vern via telephone due to the patient noted to have Dementia and confused. He confirmed the information on her face sheet was accurate. He said he lives with the patient in a 1  story home on a basement. He confirmed her PCP is Everardo Mazariegos. He denied any history of HH for the patient but states she has been to [facility] in the past. He said she has a rolling walker and a wheelchair at home, but she doesn't use them. He said there are 3 steps to enter the home at the front door without any handrails. CCP discussed recommendations for SNF at discharge and he requested a referral to [facility]. CCP made referral in EPIC, patient will need a [employer] pre-cert when medically stable for discharge to go to [facility]. Family to transport. CCP to follow. CD, CSW.                  Continued Care and Services - Admitted Since 5/21/2024       Destination       Service Provider Request Status Selected Services Address Phone Fax Patient Preferred    [facility]ed N[facility]A [address] [facility] [address] [phone] --                  Selected Continued Care - Episodes Includes continued care and service providers with selected services from the active episodes listed below      Ambulatory Social Work Case Management Episode start date: 4/22/2024      [facility]       [place] Services Address Phone Fax Patient Preferred    [facility] [address] [facility] [address] [phone] -- --                             Demographic Summary       Row Name 05/23/24 1355       General Information    Admission Type observation    Arrived From physician office - external    Required Notices Provided Observation Status Notice    Referral Source admission list    Reason for Consult discharge planning    Preferred Language English                   Functional Status       Row Name 05/23/24 1047       Functional Status    Usual Activity Tolerance moderate    Current Activity Tolerance moderate       Functional Status, IADL    Medications assistive person    Meal  Preparation assistive person    Housekeeping assistive person    Laundry assistive person    Shopping assistive person       Mental Status    General Appearance WDL WDL       Mental Status Summary    Recent Changes in Mental Status/Cognitive Functioning no changes                   Psychosocial    No documentation.                  Abuse/Neglect    No documentation.                  Legal    No documentation.                  Substance Abuse    No documentation.                  Patient Forms    No documentation.

## 2024-05-23 NOTE — PLAN OF CARE
Problem: Adult Inpatient Plan of Care  Goal: Plan of Care Review  Outcome: Ongoing, Progressing  Flowsheets (Taken 5/23/2024 0611)  Progress: no change  Plan of Care Reviewed With: patient  Outcome Evaluation: Pt. Alert and disoriented to situation at times.  Appeared to sleep well throughout the shift.  No requests at this time.  Call light within reach.  Goal: Patient-Specific Goal (Individualized)  Outcome: Ongoing, Progressing  Goal: Absence of Hospital-Acquired Illness or Injury  Outcome: Ongoing, Progressing  Intervention: Identify and Manage Fall Risk  Recent Flowsheet Documentation  Taken 5/23/2024 0435 by Janet Donnelly RN  Safety Promotion/Fall Prevention: safety round/check completed  Taken 5/23/2024 0219 by Janet Donnelly RN  Safety Promotion/Fall Prevention: safety round/check completed  Taken 5/23/2024 0011 by Janet Donnelly RN  Safety Promotion/Fall Prevention:   safety round/check completed   room organization consistent   fall prevention program maintained  Taken 5/22/2024 2240 by Janet Donnelly RN  Safety Promotion/Fall Prevention: safety round/check completed  Taken 5/22/2024 2101 by Janet Donnelly RN  Safety Promotion/Fall Prevention:   safety round/check completed   room organization consistent   nonskid shoes/slippers when out of bed   fall prevention program maintained  Intervention: Prevent Skin Injury  Recent Flowsheet Documentation  Taken 5/23/2024 0435 by Janet Donnelly RN  Body Position:   left   side-lying  Taken 5/23/2024 0219 by Janet Donnelly RN  Body Position:   left   side-lying  Taken 5/23/2024 0011 by Janet Donnelly RN  Body Position:   right   tilted  Taken 5/22/2024 2240 by Janet Donnelly RN  Body Position: sitting up in bed  Taken 5/22/2024 2101 by Janet Donnelly RN  Body Position: sitting up in bed  Skin Protection: adhesive use limited  Intervention: Prevent and Manage VTE (Venous Thromboembolism) Risk  Recent  Flowsheet Documentation  Taken 5/23/2024 0011 by Janet Donnelly RN  Activity Management: activity encouraged  Range of Motion: active ROM (range of motion) encouraged  Taken 5/22/2024 2101 by Janet Donnelly RN  Activity Management: activity encouraged  Range of Motion: active ROM (range of motion) encouraged  Intervention: Prevent Infection  Recent Flowsheet Documentation  Taken 5/23/2024 0011 by Janet Donnelly RN  Infection Prevention: single patient room provided  Taken 5/22/2024 2101 by Janet Donnelly RN  Infection Prevention:   single patient room provided   hand hygiene promoted  Goal: Optimal Comfort and Wellbeing  Outcome: Ongoing, Progressing  Intervention: Provide Person-Centered Care  Recent Flowsheet Documentation  Taken 5/23/2024 0011 by Janet Donnelly RN  Trust Relationship/Rapport: care explained  Taken 5/22/2024 2101 by Janet Donnelly RN  Trust Relationship/Rapport:   care explained   choices provided  Goal: Readiness for Transition of Care  Outcome: Ongoing, Progressing     Problem: Fall Injury Risk  Goal: Absence of Fall and Fall-Related Injury  Outcome: Ongoing, Progressing  Intervention: Identify and Manage Contributors  Recent Flowsheet Documentation  Taken 5/23/2024 0011 by Janet Donnelly RN  Self-Care Promotion:   independence encouraged   BADL personal objects within reach  Taken 5/22/2024 2101 by Janet Donnelly RN  Medication Review/Management: medications reviewed  Self-Care Promotion:   independence encouraged   BADL personal objects within reach  Intervention: Promote Injury-Free Environment  Recent Flowsheet Documentation  Taken 5/23/2024 0435 by Janet Donnelly RN  Safety Promotion/Fall Prevention: safety round/check completed  Taken 5/23/2024 0219 by Janet Donnelly RN  Safety Promotion/Fall Prevention: safety round/check completed  Taken 5/23/2024 0011 by Janet Donnelly RN  Safety Promotion/Fall Prevention:   safety  round/check completed   room organization consistent   fall prevention program maintained  Taken 5/22/2024 2240 by Janet Donnelly, RN  Safety Promotion/Fall Prevention: safety round/check completed  Taken 5/22/2024 2101 by Janet Donnelly, RN  Safety Promotion/Fall Prevention:   safety round/check completed   room organization consistent   nonskid shoes/slippers when out of bed   fall prevention program maintained     Problem: Malnutrition  Goal: Improved Nutritional Intake  Outcome: Ongoing, Progressing   Goal Outcome Evaluation:  Plan of Care Reviewed With: patient        Progress: no change  Outcome Evaluation: Pt. Alert and disoriented to situation at times.  Appeared to sleep well throughout the shift.  No requests at this time.  Call light within reach.

## 2024-05-23 NOTE — THERAPY TREATMENT NOTE
Patient Name: Lucia Ellis  : 1950    MRN: [id number]                              Today's Date: 2024       Admit Date: 2024    Visit Dx:     ICD-10-CM ICD-9-CM   1. Generalized weakness  R53.1 780.79   2. Dementia, unspecified dementia severity, unspecified dementia type, unspecified whether behavioral, psychotic, or mood disturbance or anxiety  F03.90 294.20     Patient Active Problem List   Diagnosis    Gastroesophageal reflux disease    Malignant neoplasm of breast    Depression    Folliculitis    Generalized anxiety disorder    Hyperlipidemia    Essential hypertension    Status post total right knee replacement    Rectal hemorrhage    Vitamin D deficiency    Drug therapy    Acute cholecystitis    Uncontrolled type 2 diabetes mellitus with hypoglycemia without coma    Myoclonus    Type 2 diabetes mellitus with hyperglycemia    Hypokalemia    New onset seizure    Focal motor seizure    Vascular dementia, moderate, without behavioral disturbance, psychotic disturbance, mood disturbance, and anxiety    Stroke-like symptoms    CVA (cerebral vascular accident)    Lung nodules    Hypokalemia    History of stroke    Generalized weakness    Severe malnutrition     Past Medical History:   Diagnosis Date    Breast cancer     Dementia     Diabetes mellitus     Hypertension     Memory loss      Past Surgical History:   Procedure Laterality Date    CHOLECYSTECTOMY      HYSTERECTOMY      MASTECTOMY Right 1995    TOTAL KNEE ARTHROPLASTY Right       General Information       Row Name 24 0946          Physical Therapy Time and Intention    Document Type therapy note (daily note)  -PH     Mode of Treatment physical therapy  -PH       Row Name 24 0946          General Information    Existing Precautions/Restrictions fall  -PH       Row Name 24 0946          Cognition    Orientation Status (Cognition) oriented x 3  -PH       Row Name 24 0946          Safety Issues, Functional Mobility     Impairments Affecting Function (Mobility) balance;endurance/activity tolerance;strength  -PH     Comment, Safety Issues/Impairments (Mobility) gt belt and non skid socks donned  -PH               User Key  (r) = Recorded By, (t) = Taken By, (c) = Cosigned By      Initials Name Provider Type     Krystyna Aguirre PTA Physical Therapist Assistant                   Mobility       Row Name 05/23/24 0996          Bed Mobility    Bed Mobility supine-sit;sit-supine  -PH     Supine-Sit Douglas (Bed Mobility) supervision  -PH     Sit-Supine Douglas (Bed Mobility) supervision  -PH     Assistive Device (Bed Mobility) bed rails;head of bed elevated  -PH     Comment, (Bed Mobility) --  -PH       Row Name 05/23/24 0912          Sit-Stand Transfer    Sit-Stand Douglas (Transfers) contact guard  -PH     Assistive Device (Sit-Stand Transfers) walker, front-wheeled  -PH     Comment, (Sit-Stand Transfer) cues for B hand and foot placement then for postural correction  -PH       Row Name 05/23/24 0904          Gait/Stairs (Locomotion)    Douglas Level (Gait) contact guard;minimum assist (75% patient effort)  -PH     Assistive Device (Gait) walker, front-wheeled  -PH     Distance in Feet (Gait) 45  -PH     Deviations/Abnormal Patterns (Gait) ibna decreased;gait speed decreased;stride length decreased  -PH     Bilateral Gait Deviations forward flexed posture  -PH     Douglas Level (Stairs) not tested  -PH     Comment, (Gait/Stairs) cues for postural correction; mild unsteadiness noted w/ no overt LOB  -PH               User Key  (r) = Recorded By, (t) = Taken By, (c) = Cosigned By      Initials Name Provider Type     Krystyna Aguirre PTA Physical Therapist Assistant                   Obj/Interventions       Row Name 05/23/24 0935          Motor Skills    Therapeutic Exercise other (see comments)  BAP, LAQ, seated march; x 10 reps  -       Row Name 05/23/24 0911          Balance    Balance  Assessment sitting static balance;standing static balance  -PH     Static Sitting Balance standby assist  -PH     Static Standing Balance contact guard  -PH     Position/Device Used, Standing Balance walker, front-wheeled  -PH               User Key  (r) = Recorded By, (t) = Taken By, (c) = Cosigned By      Initials Name Provider Type     Krystyna Aguirre PTA Physical Therapist Assistant                   Goals/Plan    No documentation.                  Clinical Impression       Row Name 05/23/24 0949          Pain    Pretreatment Pain Rating 0/10 - no pain  -PH     Posttreatment Pain Rating 0/10 - no pain  -PH     Additional Documentation Pain Scale: Numbers Pre/Post-Treatment (Group)  -PH       Row Name 05/23/24 0949          Plan of Care Review    Plan of Care Reviewed With patient  -PH     Progress improving  -PH     Outcome Evaluation Pt seen by PT this AM for tx. Pt was in bed and sat up to EOB w/ SV and incr time. Pt stood req min A/ CGA. Pt's distance incr today w/ pt amb 45' req CGA w/ use of fww. Pt was mildly unsteady w/ no overt LOB. Pt performed ther ex then returned to bed w/ SV. PT will prog as pt nahun.  -PH       Row Name 05/23/24 0949          Vital Signs    O2 Delivery Pre Treatment room air  -PH     O2 Delivery Intra Treatment room air  -PH     O2 Delivery Post Treatment room air  -PH       Row Name 05/23/24 0949          Positioning and Restraints    Pre-Treatment Position in bed  -PH     Post Treatment Position bed  -PH     In Bed fowlers;call light within reach;encouraged to call for assist;exit alarm on;notified nsg  -PH               User Key  (r) = Recorded By, (t) = Taken By, (c) = Cosigned By      Initials Name Provider Type     Krystyna Aguirre PTA Physical Therapist Assistant                   Outcome Measures       Row Name 05/23/24 0951          How much help from another person do you currently need...    Turning from your back to your side while in flat bed without  using bedrails? 3  -PH     Moving from lying on back to sitting on the side of a flat bed without bedrails? 3  -PH     Moving to and from a bed to a chair (including a wheelchair)? 3  -PH     Standing up from a chair using your arms (e.g., wheelchair, bedside chair)? 3  -PH     Climbing 3-5 steps with a railing? 2  -PH     To walk in hospital room? 3  -PH     AM-PAC 6 Clicks Score (PT) 17  -PH     Highest Level of Mobility Goal 5 --> Static standing  -PH       Row Name 05/23/24 0951          Functional Assessment    Outcome Measure Options AM-PAC 6 Clicks Basic Mobility (PT)  -PH               User Key  (r) = Recorded By, (t) = Taken By, (c) = Cosigned By      Initials Name Provider Type     Krystyna Aguirre PTA Physical Therapist Assistant                                 Physical Therapy Education       Title: PT OT SLP Therapies (Done)       Topic: Physical Therapy (Done)       Point: Mobility training (Done)       Learning Progress Summary             Patient Acceptance, E,TB,D, VU,NR by  at 5/23/2024 0951    Acceptance, E,D, VU,NR by MS at 5/22/2024 1000                         Point: Home exercise program (Done)       Learning Progress Summary             Patient Acceptance, E,TB,D, VU,NR by  at 5/23/2024 0951    Acceptance, E,D, VU,NR by MS at 5/22/2024 1000                         Point: Body mechanics (Done)       Learning Progress Summary             Patient Acceptance, E,TB,D, VU,NR by  at 5/23/2024 0951    Acceptance, E,D, VU,NR by MS at 5/22/2024 1000                         Point: Precautions (Done)       Learning Progress Summary             Patient Acceptance, E,TB,D, VU,NR by  at 5/23/2024 0951    Acceptance, E,D, VU,NR by MS at 5/22/2024 1000                                         User Key       Initials Effective Dates Name Provider Type Discipline    MS 06/16/21 -  John Serrano, PT Physical Therapist PT     06/16/21 -  Krystyna Aguirre PTA Physical Therapist  Assistant PT                  PT Recommendation and Plan     Plan of Care Reviewed With: patient  Progress: improving  Outcome Evaluation: Pt seen by PT this AM for tx. Pt was in bed and sat up to EOB w/ SV and incr time. Pt stood req min A/ CGA. Pt's distance incr today w/ pt amb 45' req CGA w/ use of fww. Pt was mildly unsteady w/ no overt LOB. Pt performed ther ex then returned to bed w/ SV. PT will prog as pt nahun.     Time Calculation:         PT Charges       Row Name 05/23/24 0951             Time Calculation    Start Time 0807  -PH      Stop Time 0823  -PH      Time Calculation (min) 16 min  -PH      PT Received On 05/23/24  -PH      PT - Next Appointment 05/24/24  -PH         Timed Charges    41897 - PT Therapeutic Exercise Minutes 4  -PH      98978 - PT Therapeutic Activity Minutes 12  -PH         Total Minutes    Timed Charges Total Minutes 16  -PH       Total Minutes 16  -PH                User Key  (r) = Recorded By, (t) = Taken By, (c) = Cosigned By      Initials Name Provider Type    PH Krystyna Aguirre PTA Physical Therapist Assistant                  Therapy Charges for Today       Code Description Service Date Service Provider Modifiers Qty    23161659389 HC PT THERAPEUTIC ACT EA 15 MIN 5/23/2024 Krystyna Aguirre PTA GP 1            PT G-Codes  Outcome Measure Options: AM-PAC 6 Clicks Basic Mobility (PT)  AM-PAC 6 Clicks Score (PT): 17  PT Discharge Summary  Anticipated Discharge Disposition (PT): home with home health, home with assist    Krystyna Aguirre PTA  5/23/2024

## 2024-05-24 LAB
ALBUMIN SERPL-MCNC: 3.8 G/DL (ref 3.5–5.2)
ALBUMIN/GLOB SERPL: 1.7 G/DL
ALP SERPL-CCNC: 66 U/L (ref 39–117)
ALT SERPL W P-5'-P-CCNC: 10 U/L (ref 1–33)
ANION GAP SERPL CALCULATED.3IONS-SCNC: 11.1 MMOL/L (ref 5–15)
AST SERPL-CCNC: 13 U/L (ref 1–32)
BASOPHILS # BLD AUTO: 0.05 10*3/MM3 (ref 0–0.2)
BASOPHILS NFR BLD AUTO: 0.6 % (ref 0–1.5)
BILIRUB SERPL-MCNC: 0.4 MG/DL (ref 0–1.2)
BUN SERPL-MCNC: 18 MG/DL (ref 8–23)
BUN/CREAT SERPL: 29 (ref 7–25)
CALCIUM SPEC-SCNC: 9.3 MG/DL (ref 8.6–10.5)
CHLORIDE SERPL-SCNC: 104 MMOL/L (ref 98–107)
CO2 SERPL-SCNC: 24.9 MMOL/L (ref 22–29)
CREAT SERPL-MCNC: 0.62 MG/DL (ref 0.57–1)
DEPRECATED RDW RBC AUTO: 38.2 FL (ref 37–54)
EGFRCR SERPLBLD CKD-EPI 2021: 92.4 ML/MIN/1.73
EOSINOPHIL # BLD AUTO: 0.01 10*3/MM3 (ref 0–0.4)
EOSINOPHIL NFR BLD AUTO: 0.1 % (ref 0.3–6.2)
ERYTHROCYTE [DISTWIDTH] IN BLOOD BY AUTOMATED COUNT: 12.3 % (ref 12.3–15.4)
GLOBULIN UR ELPH-MCNC: 2.2 GM/DL
GLUCOSE SERPL-MCNC: 147 MG/DL (ref 65–99)
HCT VFR BLD AUTO: 38.3 % (ref 34–46.6)
HGB BLD-MCNC: 12.7 G/DL (ref 12–15.9)
IMM GRANULOCYTES # BLD AUTO: 0.05 10*3/MM3 (ref 0–0.05)
IMM GRANULOCYTES NFR BLD AUTO: 0.6 % (ref 0–0.5)
LYMPHOCYTES # BLD AUTO: 0.73 10*3/MM3 (ref 0.7–3.1)
LYMPHOCYTES NFR BLD AUTO: 9.2 % (ref 19.6–45.3)
MCH RBC QN AUTO: 28.5 PG (ref 26.6–33)
MCHC RBC AUTO-ENTMCNC: 33.2 G/DL (ref 31.5–35.7)
MCV RBC AUTO: 85.9 FL (ref 79–97)
MONOCYTES # BLD AUTO: 0.39 10*3/MM3 (ref 0.1–0.9)
MONOCYTES NFR BLD AUTO: 4.9 % (ref 5–12)
NEUTROPHILS NFR BLD AUTO: 6.69 10*3/MM3 (ref 1.7–7)
NEUTROPHILS NFR BLD AUTO: 84.6 % (ref 42.7–76)
NRBC BLD AUTO-RTO: 0 /100 WBC (ref 0–0.2)
PLATELET # BLD AUTO: 142 10*3/MM3 (ref 140–450)
PMV BLD AUTO: 10.8 FL (ref 6–12)
POTASSIUM SERPL-SCNC: 3.7 MMOL/L (ref 3.5–5.2)
PROT SERPL-MCNC: 6 G/DL (ref 6–8.5)
RBC # BLD AUTO: 4.46 10*6/MM3 (ref 3.77–5.28)
SODIUM SERPL-SCNC: 140 MMOL/L (ref 136–145)
WBC NRBC COR # BLD AUTO: 7.92 10*3/MM3 (ref 3.4–10.8)

## 2024-05-24 PROCEDURE — 99214 OFFICE O/P EST MOD 30 MIN: CPT

## 2024-05-24 PROCEDURE — G0378 HOSPITAL OBSERVATION PER HR: HCPCS

## 2024-05-24 PROCEDURE — 85025 COMPLETE CBC W/AUTO DIFF WBC: CPT | Performed by: INTERNAL MEDICINE

## 2024-05-24 PROCEDURE — 80053 COMPREHEN METABOLIC PANEL: CPT | Performed by: INTERNAL MEDICINE

## 2024-05-24 PROCEDURE — 97110 THERAPEUTIC EXERCISES: CPT

## 2024-05-24 PROCEDURE — 97530 THERAPEUTIC ACTIVITIES: CPT

## 2024-05-24 RX ADMIN — LEVETIRACETAM 1000 MG: 500 TABLET, FILM COATED ORAL at 20:16

## 2024-05-24 RX ADMIN — ATORVASTATIN CALCIUM 80 MG: 80 TABLET, FILM COATED ORAL at 10:09

## 2024-05-24 RX ADMIN — MEMANTINE HYDROCHLORIDE 5 MG: 5 TABLET, FILM COATED ORAL at 10:09

## 2024-05-24 RX ADMIN — LISINOPRIL 40 MG: 40 TABLET ORAL at 10:10

## 2024-05-24 RX ADMIN — AMLODIPINE BESYLATE 10 MG: 10 TABLET ORAL at 10:09

## 2024-05-24 RX ADMIN — ASPIRIN 81 MG: 81 TABLET, COATED ORAL at 10:10

## 2024-05-24 RX ADMIN — MEMANTINE HYDROCHLORIDE 5 MG: 5 TABLET, FILM COATED ORAL at 20:16

## 2024-05-24 RX ADMIN — DONEPEZIL HYDROCHLORIDE 10 MG: 10 TABLET, FILM COATED ORAL at 10:10

## 2024-05-24 RX ADMIN — LEVETIRACETAM 1000 MG: 500 TABLET, FILM COATED ORAL at 10:09

## 2024-05-24 RX ADMIN — OLANZAPINE 5 MG: 5 TABLET, FILM COATED ORAL at 20:16

## 2024-05-24 RX ADMIN — ESCITALOPRAM OXALATE 10 MG: 10 TABLET, FILM COATED ORAL at 10:10

## 2024-05-24 RX ADMIN — PANTOPRAZOLE SODIUM 40 MG: 40 TABLET, DELAYED RELEASE ORAL at 06:50

## 2024-05-24 NOTE — CASE MANAGEMENT/SOCIAL WORK
Post Acute Pre-Cert Documentation  Request Submitted by Facility - Type:: Hospital  Post-Acute Authorization Type Submitted:: SNF  Date Post Acute Pre-Cert Inititated per Facility: 05/24/24  Accepting Facility: [facility] Discharge Date Requested: 05/24/24  All Clinicals Submitted?: Yes  Had Accepting Facility at Time of Submission: Yes  Response Communicated to:: , Accepting Facility Liaison  Authorization Number:: PENDING [id number]              KUSH Aparicio

## 2024-05-24 NOTE — PLAN OF CARE
Goal Outcome Evaluation:  Plan of Care Reviewed With: patient        Progress: no change     VS WNL. Patient rested well all shift. No complaints verbalized.

## 2024-05-24 NOTE — CASE MANAGEMENT/SOCIAL WORK
Continued Stay Note  [facility]     Patient Name: Lucia Ellis  MRN: [id number]  Today's Date: 5/24/2024    Admit Date: 5/21/2024    Plan: SNF at [facility], did receive [employer] MCR precert. Bed available through the weekend and precert expires 5/29/24 at 2359   Discharge Plan       Row Name 05/24/24 1636       Plan    Plan SNF at [facility], did receive [employer] MCR precert. Bed available through the weekend and precert expires 5/29/24 at 2359    Patient/Family in Agreement with Plan yes    Plan Comments Spoke with Wilfredo/Jaylin and pt did get [employer] precert for SNF at [facility] (good thru midnight 5/29/24). There is a bed available for the patient thru the weekend. Family is anticipated to transport pt from hospital to SNF....JW                   Discharge Codes    No documentation.                       Shaina Bullock RN

## 2024-05-24 NOTE — PROGRESS NOTES
Name: Lucia Ellis ADMIT: 2024   : 1950  PCP: Everardo Mazariegos MD    MRN: [id number] LOS: 0 days   AGE/SEX: 74 y.o. female  ROOM: [facility]     Subjective   Subjective   Patient is seen at bedside, no new complaints.       Objective   Objective   Vital Signs  Temp:  [98.1 °F (36.7 °C)-98.6 °F (37 °C)] 98.1 °F (36.7 °C)  Heart Rate:  [82-98] 92  Resp:  [18] 18  BP: (113-140)/(50-65) 119/57  SpO2:  [94 %-97 %] 97 %  on   ;   Device (Oxygen Therapy): room air  Body mass index is 20.28 kg/m².  Physical Exam  General, awake and alert.  Head and ENT, normocephalic and atraumatic.  Lungs, symmetric expansion, equal air entry bilaterally.  Heart, regular rate and rhythm.  Abdomen, soft and nontender.  Extremities, no clubbing or cyanosis.  Neuro, cranial nerves intact  Skin: Warm and no rash.  Psych, normal mood and affect.  Musculoskeletal, joint examination is grossly normal.        Copied text material from yesterday's note has been reviewed for appropriate changes and remains accurate as of 24.      Results Review     I reviewed the patient's new clinical results.  Results from last 7 days   Lab Units 24  0524  0624  03424  1604   WBC 10*3/mm3 7.92 4.57 4.62 4.37   HEMOGLOBIN g/dL 12.7 12.5 12.5 12.6   PLATELETS 10*3/mm3 142 174 170 162     Results from last 7 days   Lab Units 24  0524 24  0621 24  1850 24  0345 24  1604   SODIUM mmol/L 140 142  --  143 138   POTASSIUM mmol/L 3.7 4.0 4.0 3.4* 3.9   CHLORIDE mmol/L 104 108*  --  104 100   CO2 mmol/L 24.9 25.5  --  24.0 22.3   BUN mg/dL 18 16  --  15 20   CREATININE mg/dL 0.62 0.61  --  0.59 0.70   GLUCOSE mg/dL 147* 78  --  78 109*   EGFR mL/min/1.73 92.4 92.8  --  93.5 89.8     Results from last 7 days   Lab Units 24  0524 24  0621 24  1604   ALBUMIN g/dL 3.8 3.9 3.9   BILIRUBIN mg/dL 0.4 <0.2 <0.2   ALK PHOS U/L 66 66 65   AST (SGOT) U/L 13 12 13   ALT (SGPT) U/L 10 10  11     Results from last 7 days   Lab Units 05/24/24  0524 05/23/24  0621 05/22/24  0345 05/21/24  1604   CALCIUM mg/dL 9.3 9.4 8.9 9.2   ALBUMIN g/dL 3.8 3.9  --  3.9   MAGNESIUM mg/dL  --   --   --  1.8       Glucose   Date/Time Value Ref Range Status   05/23/2024 1840 199 (H) 70 - 130 mg/dL Final       No radiology results for the last day    I have personally reviewed all medications:  Scheduled Medications  amLODIPine, 10 mg, Oral, Daily  aspirin, 81 mg, Oral, Daily  atorvastatin, 80 mg, Oral, Daily  donepezil, 10 mg, Oral, Daily  escitalopram, 10 mg, Oral, Daily  levETIRAcetam, 1,000 mg, Oral, BID  lisinopril, 40 mg, Oral, Daily  memantine, 5 mg, Oral, BID  OLANZapine, 5 mg, Oral, Nightly  pantoprazole, 40 mg, Oral, QAM    Infusions   Diet  Diet: Cardiac; Healthy Heart (2-3 Na+); Fluid Consistency: Thin (IDDSI 0)  NPO Diet NPO Type: Strict NPO    I have personally reviewed:  [x]  Laboratory   [x]  Microbiology   [x]  Radiology   [x]  EKG/Telemetry  [x]  Cardiology/Vascular   []  Pathology    []  Records       Assessment/Plan     Active Hospital Problems    Diagnosis  POA    **Generalized weakness [R53.1]  Yes    Severe malnutrition [E43]  Yes    History of stroke [Z86.73]  Not Applicable    Vascular dementia, moderate, without behavioral disturbance, psychotic disturbance, mood disturbance, and anxiety [F01.B0]  Yes    Uncontrolled type 2 diabetes mellitus with hypoglycemia without coma [E11.649]  Yes    Hyperlipidemia [E78.5]  Yes    Generalized anxiety disorder [F41.1]  Yes    Gastroesophageal reflux disease [K21.9]  Yes      Resolved Hospital Problems   No resolved problems to display.       74 y.o. female admitted with Generalized weakness.    Assessment and plan  1.  Vascular dementia, history of psychotic disturbance, anxiety, generalized weakness, monitor for mental status changes as well as get physical therapy.  Continue home medications.     2.  Hypertension, continue lisinopril.     3.  Generalized  anxiety disorder, continue home SSRI therapy.    4.  Anorexia, unintentional weight loss, severe malnutrition, GERD, CT scan shows gastritis and possible papillary stenosis.  It also shows few hepatic cysts.  Patient would benefit from gastroenterology evaluation.  Follow management recommendations.     5.  CODE STATUS is full code.  Patient would likely need placement.         Suresh Chang MD  [facility]  05/24/24  16:30 EDT

## 2024-05-24 NOTE — PLAN OF CARE
Goal Outcome Evaluation:  Plan of Care Reviewed With: patient        Progress: no change  Outcome Evaluation: Pt seen by PT this PM for tx. Pt was in bed and sat up to EOB w/ SBA. Posterior lean noted as pt was seated EOB req cues to correct and min A at times. Pt stood w/ min A. Pt amb approx 55' w/ min A and use of fww. Pt was slow and unsteady at times w/ no overt LOB. Pt sat on BR toilet then stood w/ CGA/min A. Pt req CGA to min A for stand bal as brief was changed. Pt at times lacked safety awareness and let go of fww to try to assist in spite of cues to keep B hands on fww. Pt returned to bed w/ SBA. PT will prog as pt nahun. Rec SNF after dc to address deficits.      Anticipated Discharge Disposition (PT): skilled nursing facility

## 2024-05-24 NOTE — CONSULTS
Gastroenterology   Initial Inpatient Consult Note    Referring Provider: Dr. Chang     Reason for Consultation: Hepatic cyst    Subjective     History of present illness:      74 y.o. female patient unfamiliar to our service who we are asked to see for further recommendations regarding hepatic cystic lesions noted on 5/21 CT assessment.  She has a significant past medical history of vascular dementia, GERD, uncontrolled type 2 diabetes, stroke who presented to the ER on 5/21 with concerns of generalized weakness and decreased appetite with malnutrition.     Previous surgical history includes cholecystectomy for acute cholecystitis on 5/18/2018    For GERD, she is currently on pantoprazole 40 mg once daily.  Denies taking this prior to current hospitalization.  Denies heartburn, vomiting, or dysphagia.  However she does report over the past several months a gradual decline in appetite and secondary unintentional weight loss.      Bowel habits are described as occurring every 1 to 2 days without visible melena or hematochezia.  Denies abdominal pain.    Recent imaging significant for:    5/21- Ct A/P Acute gastritis characterized by thickened gastric folds, equivocal mild thickening of the ileum suspicious for mild or early enteritis, formed stool throughout the colon, few hepatic cyst without suspicious liver lesion, CBD measuring 8 mm in diameter, PD 2 to 3 mm suspicious for possible papillary stenosis    LIVER FUNCTION TESTS:      Lab 05/24/24  0524 05/23/24  0621 05/21/24  1604   TOTAL PROTEIN 6.0 6.2 6.5   ALBUMIN 3.8 3.9 3.9   GLOBULIN 2.2 2.3 2.6   ALT (SGPT) 10 10 11   AST (SGOT) 13 12 13   BILIRUBIN 0.4 <0.2 <0.2   ALK PHOS 66 66 65     Denies known family history of liver or pancreatic disease       Past Medical History:  Past Medical History:   Diagnosis Date    Breast cancer     Dementia     Diabetes mellitus     Hypertension     Memory loss      Past Surgical History:  Past Surgical History:   Procedure  Laterality Date    CHOLECYSTECTOMY      HYSTERECTOMY      MASTECTOMY Right 1995    TOTAL KNEE ARTHROPLASTY Right       Social History:   Social History     Tobacco Use    Smoking status: Never    Smokeless tobacco: Never   Substance Use Topics    Alcohol use: Not Currently     Alcohol/week: 7.0 standard drinks of alcohol     Types: 7 Glasses of wine per week     Comment: per patient's son, patient drinks one glass of wine daily      Family History:  Family History   Problem Relation Age of Onset    Heart attack Mother     Heart disease Mother     Hypertension Mother     Hypertension Father     Diabetes Father     Hypertension Sister     Diabetes Sister     Thyroid cancer Sister     Breast cancer Sister     Lung cancer Sister     Diabetes Brother     Drug abuse Paternal Grandmother        Home Meds:  Medications Prior to Admission   Medication Sig Dispense Refill Last Dose    amLODIPine (NORVASC) 10 MG tablet Take 1 tablet by mouth Daily. 30 tablet 0 5/21/2024    aspirin 81 MG EC tablet Take 1 tablet by mouth Daily. Indications: Disease involving Lipid Deposits in the Arteries 30 tablet 2 5/21/2024    atorvastatin (LIPITOR) 80 MG tablet TAKE ONE TABLET BY MOUTH AT BEDTIME 90 tablet 0 5/21/2024    donepezil (Aricept) 10 MG tablet Take 1 tablet by mouth Daily. 90 tablet 0 5/21/2024    empagliflozin (Jardiance) 10 MG tablet tablet Take 1 tablet by mouth Daily. Indications: Type 2 Diabetes 30 tablet 11 5/21/2024    levETIRAcetam (KEPPRA) 1000 MG tablet Take 1 tablet by mouth 2 (Two) Times a Day. Indications: Seizure 180 tablet 0 5/21/2024    lisinopril (PRINIVIL,ZESTRIL) 40 MG tablet TAKE ONE TABLET BY MOUTH DAILY 90 tablet 0 5/21/2024    memantine (NAMENDA) 5 MG tablet TAKE ONE TABLET BY MOUTH TWICE A  tablet 0 5/21/2024    OLANZapine (zyPREXA) 5 MG tablet TAKE ONE TABLET BY MOUTH AT BEDTIME 90 tablet 0 5/20/2024    pantoprazole (PROTONIX) 40 MG EC tablet TAKE ONE TABLET BY MOUTH EVERY MORNING 90 tablet 0  5/21/2024    SITagliptin-metFORMIN HCl ER (Janumet XR)  MG tablet Take 1 tablet by mouth Daily. Indications: Type 2 Diabetes 30 tablet 11 5/21/2024    cetirizine (zyrTEC) 10 MG tablet Take 1 tablet by mouth Daily. (Patient taking differently: Take 1 tablet by mouth Daily As Needed for Allergies. Indications: Hayfever) 30 tablet 6     Continuous Blood Gluc  (FreeStyle Aleyda 2 Plummer) device 1 each Continuous. 1 each 0     Continuous Blood Gluc Sensor (FreeStyle Aleyda 2 Sensor) misc 1 each 1 (One) Time Per Week. 2 each 11     escitalopram (Lexapro) 10 MG tablet Take 1 tablet by mouth Daily. Indications: Major Depressive Disorder 90 tablet 1      Current Meds:   amLODIPine, 10 mg, Oral, Daily  aspirin, 81 mg, Oral, Daily  atorvastatin, 80 mg, Oral, Daily  donepezil, 10 mg, Oral, Daily  escitalopram, 10 mg, Oral, Daily  levETIRAcetam, 1,000 mg, Oral, BID  lisinopril, 40 mg, Oral, Daily  memantine, 5 mg, Oral, BID  OLANZapine, 5 mg, Oral, Nightly  pantoprazole, 40 mg, Oral, QAM      Allergies:  Allergies   Allergen Reactions    Ppd [Tuberculin Purified Protein Derivative] Rash     Review of Systems  Pertinent items are noted in HPI, all other systems reviewed and negative    Objective     Vital Signs  Temp:  [98.1 °F (36.7 °C)-98.6 °F (37 °C)] 98.1 °F (36.7 °C)  Heart Rate:  [82-98] 92  Resp:  [18] 18  BP: (113-140)/(50-65) 119/57    Physical Exam:  CONSTITUTIONAL:  today's vital signs reviewed  EARS NOSE THROAT: trachea midline and no deformity of the nares  EYES: no scleral icterus  GASTROINTESTINAL: abdomen is soft, nontender, nondistended with normal active bowel sounds, no masses are appreciated  PSYCHIATRIC: appropriate mood and affect  RESPIRATORY: normal inspiratory effort with no increased work of breathing  NEUROLOGIC: patient is awake and alert  DERMATOLOGIC: skin is warm with no cyanosis  LYMPHATIC: no periumbilical lymphadenopathy     Results Review:              I reviewed the patient's new  clinical results.    Results from last 7 days   Lab Units 05/24/24  0524 05/23/24  0621 05/22/24  0345   WBC 10*3/mm3 7.92 4.57 4.62   HEMOGLOBIN g/dL 12.7 12.5 12.5   HEMATOCRIT % 38.3 38.0 38.9   PLATELETS 10*3/mm3 142 174 170     Results from last 7 days   Lab Units 05/24/24  0524 05/23/24  0621 05/22/24  1850 05/22/24  0345 05/21/24  1604   SODIUM mmol/L 140 142  --  143 138   POTASSIUM mmol/L 3.7 4.0 4.0 3.4* 3.9   CHLORIDE mmol/L 104 108*  --  104 100   CO2 mmol/L 24.9 25.5  --  24.0 22.3   BUN mg/dL 18 16  --  15 20   CREATININE mg/dL 0.62 0.61  --  0.59 0.70   CALCIUM mg/dL 9.3 9.4  --  8.9 9.2   BILIRUBIN mg/dL 0.4 <0.2  --   --  <0.2   ALK PHOS U/L 66 66  --   --  65   ALT (SGPT) U/L 10 10  --   --  11   AST (SGOT) U/L 13 12  --   --  13   GLUCOSE mg/dL 147* 78  --  78 109*         Lab Results   Lab Value Date/Time    LIPASE 23 05/09/2018 1923       Radiology:  CT Abdomen Pelvis With Contrast   Final Result      XR Chest 1 View   Final Result   1. No acute process.           This report was finalized on 5/21/2024 3:56 PM by Dr. Oli Madden M.D on Workstation: [id number]              Assessment & Plan   Active Hospital Problems    Diagnosis     **Generalized weakness     Severe malnutrition     History of stroke     Vascular dementia, moderate, without behavioral disturbance, psychotic disturbance, mood disturbance, and anxiety     Uncontrolled type 2 diabetes mellitus with hypoglycemia without coma     Hyperlipidemia     Generalized anxiety disorder     Gastroesophageal reflux disease        Assessment:  Anorexia  Unintentional weight loss and severe malnutrition   GERD  CT with evidence of gastritis and possible papillary stenosis    Plan:  CT reviewed at length with patient and family at bedside with recommendations to proceed with EGD evaluation during current inpatient admission in the setting of persistent decreased oral intake without improvement with PPI use.    Patient and family agreeable  for EGD evaluation.  Case request placed.  N.p.o. after midnight.  Continue with pantoprazole 40 mg once daily  I appreciate nutritional consultation for malnutrition  In regards to possible papillary stenosis on CT evaluation, offered MRI evaluation during current inpatient admission however in the setting of normal liver function testing this is likely to be normal.  Could consider interval 3-month follow-up MRCP outpatient to allow for assessment of stability or sooner pending clinical course if LFTs become elevated.  Patient declines inpatient MRCP at this time though is agreeable if LFTs become elevated.   Continue supportive care with IV fluids and antiemetics  Trend LFTs  Nursing staff to notify GI service on call if any change in clinical status.    Thank you for allowing us to participate in the care of this patient.   Please call us with questions or if we can be of further assistance.     I discussed the patients findings and my recommendations with patient, family, nursing staff, and Dr. More .             HERNANDO Singh  [facility]  [address] [place] [address]

## 2024-05-24 NOTE — CONSULTS
"Nutrition Services    Patient Name:  Lucia Ellis  YOB: 1950  MRN: [id number]  Admit Date:  5/21/2024    Assessment Date:  05/24/24    Summary: Consulted for Chronic Poor PO Intake:  Please see full nutrition assessment completed on 5/22. Pt tolerating HH diet with % po intake of meals and getting Boost Plus (chocolate) BID.   Last BM 5/20  Noted plans for EGD with GI in am.  Meds/labs reviewed.     Recommend:  Will change Boost Plus to Boost Glucose Control BID (chocolate).  Add bowel regimen to promote BM if no BM soon.     Will follow per protocol.    CLINICAL NUTRITION ASSESSMENT      Reason for Assessment Chronic Poor Intake, Physician Consult     Diagnosis/Problem   Generalized weakness, dementia, weight loss         Anthropometrics        Current Height  Current Weight  BMI kg/m2 Height: 162.6 cm (64\")  Weight: 53.6 kg (118 lb 2.7 oz) (05/24/24 0550)  Body mass index is 20.28 kg/m².   Adjusted BMI (if applicable)    BMI Category Underweight (18.4 or below)   Ideal Body Weight (IBW) 120 lb   Usual Body Weight (UBW) 129 lb   Weight Trend Loss, Amount/Timeframe: 20 lb (16%) wt loss x 5 months   Weight History Wt Readings from Last 30 Encounters:   05/24/24 0550 53.6 kg (118 lb 2.7 oz)   05/23/24 0515 50.1 kg (110 lb 7.2 oz)   05/22/24 1500 49.5 kg (109 lb 2 oz)   05/22/24 0701 54.1 kg (119 lb 4.3 oz)   05/21/24 1444 59 kg (130 lb)   05/21/24 1313 50.8 kg (112 lb)   04/19/24 1237 51.8 kg (114 lb 4.8 oz)   03/18/24 1111 49.9 kg (110 lb)   03/07/24 1313 51.7 kg (114 lb)   01/10/24 1220 63.5 kg (140 lb)   12/14/23 0600 57.7 kg (127 lb 3.3 oz)   12/13/23 0802 58.6 kg (129 lb 3 oz)   12/12/23 0834 58.9 kg (129 lb 13.6 oz)   12/08/23 1122 57.2 kg (126 lb)   12/08/23 2131 57.2 kg (126 lb)   05/23/23 1132 57.2 kg (126 lb)   03/02/23 1514 58.1 kg (128 lb)   03/01/23 1243 57.9 kg (127 lb 9.6 oz)   01/11/23 1407 60.3 kg (133 lb)   12/29/22 0900 58.1 kg (128 lb)   12/30/22 1622 58.1 kg (128 lb) "   12/02/22 0801 59.9 kg (132 lb)   11/03/21 1038 59.2 kg (130 lb 8 oz)   10/19/21 1414 108 kg (237 lb 8 oz)   09/29/21 1133 59.5 kg (131 lb 3.2 oz)   08/12/21 0856 59 kg (130 lb)   06/21/21 1052 61.3 kg (135 lb 1.6 oz)   05/07/21 1621 60.8 kg (134 lb 0.6 oz)   04/08/21 1559 60.8 kg (134 lb)   04/06/21 1013 61.7 kg (136 lb)   03/02/21 1253 61.7 kg (136 lb)   06/16/20 1132 62.1 kg (136 lb 14.4 oz)   06/24/19 1230 64.3 kg (141 lb 12.8 oz)   02/26/19 1610 66.7 kg (147 lb 1.6 oz)   09/21/18 1101 66.7 kg (147 lb)   03/12/18 1557 70.4 kg (155 lb 3.2 oz)   02/09/18 1505 70 kg (154 lb 6.4 oz)      --  Estimated/Assessed Needs        Current Weight  Weight: 53.6 kg (118 lb 2.7 oz) (05/24/24 0550)       Energy Requirements    Weight for Calculation 49 kg   Method for Estimation  30 kcal/kg   EST Needs (kcal/day) 1470 kcals       Protein Requirements    Weight for Calculation 49 kg   EST Protein Needs (g/kg) 1.2 - 1.5 gm/kg   EST Daily Needs (g/day) 58-73 g       Fluid Requirements     Method for Estimation 30 mL/kg    EST Needs (mL/day) 1470 ml     Labs       Pertinent Labs    Results from last 7 days   Lab Units 05/24/24  0524 05/23/24  0621 05/22/24  1850 05/22/24  0345 05/21/24  1604   SODIUM mmol/L 140 142  --  143 138   POTASSIUM mmol/L 3.7 4.0 4.0 3.4* 3.9   CHLORIDE mmol/L 104 108*  --  104 100   CO2 mmol/L 24.9 25.5  --  24.0 22.3   BUN mg/dL 18 16  --  15 20   CREATININE mg/dL 0.62 0.61  --  0.59 0.70   CALCIUM mg/dL 9.3 9.4  --  8.9 9.2   BILIRUBIN mg/dL 0.4 <0.2  --   --  <0.2   ALK PHOS U/L 66 66  --   --  65   ALT (SGPT) U/L 10 10  --   --  11   AST (SGOT) U/L 13 12  --   --  13   GLUCOSE mg/dL 147* 78  --  78 109*     Results from last 7 days   Lab Units 05/24/24  0524 05/22/24  0345 05/21/24  1604   MAGNESIUM mg/dL  --   --  1.8   HEMOGLOBIN g/dL 12.7   < > 12.6   HEMATOCRIT % 38.3   < > 38.1   WBC 10*3/mm3 7.92   < > 4.37   ALBUMIN g/dL 3.8   < > 3.9    < > = values in this interval not displayed.     Results  from last 7 days   Lab Units 05/24/24  0524 05/23/24  0621 05/22/24  0345 05/21/24  1604   PLATELETS 10*3/mm3 142 174 170 162     COVID19   Date Value Ref Range Status   12/16/2023 Not Detected Not Detected - Ref. Range Final     Lab Results   Component Value Date    HGBA1C 6.6 (H) 04/19/2024          Medications           Scheduled Medications amLODIPine, 10 mg, Oral, Daily  aspirin, 81 mg, Oral, Daily  atorvastatin, 80 mg, Oral, Daily  donepezil, 10 mg, Oral, Daily  escitalopram, 10 mg, Oral, Daily  levETIRAcetam, 1,000 mg, Oral, BID  lisinopril, 40 mg, Oral, Daily  memantine, 5 mg, Oral, BID  OLANZapine, 5 mg, Oral, Nightly  pantoprazole, 40 mg, Oral, QAM       Infusions     PRN Medications   acetaminophen    senna-docusate sodium **AND** polyethylene glycol **AND** bisacodyl **AND** bisacodyl    Calcium Replacement - Follow Nurse / BPA Driven Protocol    cetirizine    Magnesium Standard Dose Replacement - Follow Nurse / BPA Driven Protocol    melatonin    ondansetron ODT **OR** ondansetron    Phosphorus Replacement - Follow Nurse / BPA Driven Protocol    Potassium Replacement - Follow Nurse / BPA Driven Protocol     Physical Findings          General Findings alert, confused, disoriented, frail, loss of muscle mass, loss of subcutaneous fat   Oral/Mouth Cavity WDL   Edema  not assessed   Gastrointestinal last bowel movement: 5/20   Skin  skin intact   Tubes/Drains/Lines none   NFPE Consented to exam, See Malnutrition Severity Assessment, Date Completed: 5/22   --  Malnutrition Severity Assessment      Patient meets criteria for : Severe Malnutrition (Based on ASPEN/AND criteria, pt meets nutrition diagnosis of Severe Malnutrition of Chronic Disease due to inadequate po intake, 20 lb (16%) wt loss x 5 months and moderate fat/muscle wasting noted on NFPE.)           Current Nutrition Orders & Evaluation of Intake       Oral Nutrition     Food Allergies NKFA   Current PO Diet Diet: Cardiac; Healthy Heart (2-3 Na+);  Fluid Consistency: Thin (IDDSI 0)  NPO Diet NPO Type: Strict NPO   Supplement Boost Plus, BID (chocolate)   PO Evaluation     % PO Intake %    Factors Affecting Intake: decreased appetite   --  PES STATEMENT / NUTRITION DIAGNOSIS      Nutrition Dx Problem  Problem: Malnutrition (severe)  Etiology: Factors Affecting Nutrition - KRISTIN    Signs/Symptoms: Report of Minimal PO Intake, NFPE Results, BMI, and Unintended Weight Change     NUTRITION INTERVENTION / PLAN OF CARE      Intervention Goal(s) Maintain nutrition status, Reduce/improve symptoms, Meet estimated needs, Disease management/therapy, Increase intake, Accepts oral nutrition supplement, and Maintain weight         RD Intervention/Action Encourage intake, Continue to monitor, Care plan reviewed, and Recommend/order: supplement   --      Prescription/Orders:       PO Diet       Supplements Change Boost Plus BID (chocolate) to Boost GC      Enteral Nutrition       Parenteral Nutrition    New Prescription Ordered? Yes   --      Monitor/Evaluation Per protocol   Discharge Plan/Needs Pending clinical course   --    RD to follow per protocol.      Electronically signed by:  Adriana Medina RD  05/24/24 16:48 EDT

## 2024-05-24 NOTE — PLAN OF CARE
Goal Outcome Evaluation:              Outcome Evaluation: No acute changes this shift. nutritional drinks ordered. VSS. AOX3. disoriented to month and year. Medications given per MAR. Strict NPO after midnight due to EGD in the morning. WCTM.

## 2024-05-24 NOTE — H&P (VIEW-ONLY)
Gastroenterology   Initial Inpatient Consult Note    Referring Provider: Dr. Chang     Reason for Consultation: Hepatic cyst    Subjective     History of present illness:      74 y.o. female patient unfamiliar to our service who we are asked to see for further recommendations regarding hepatic cystic lesions noted on 5/21 CT assessment.  She has a significant past medical history of vascular dementia, GERD, uncontrolled type 2 diabetes, stroke who presented to the ER on 5/21 with concerns of generalized weakness and decreased appetite with malnutrition.     Previous surgical history includes cholecystectomy for acute cholecystitis on 5/18/2018    For GERD, she is currently on pantoprazole 40 mg once daily.  Denies taking this prior to current hospitalization.  Denies heartburn, vomiting, or dysphagia.  However she does report over the past several months a gradual decline in appetite and secondary unintentional weight loss.      Bowel habits are described as occurring every 1 to 2 days without visible melena or hematochezia.  Denies abdominal pain.    Recent imaging significant for:    5/21- Ct A/P Acute gastritis characterized by thickened gastric folds, equivocal mild thickening of the ileum suspicious for mild or early enteritis, formed stool throughout the colon, few hepatic cyst without suspicious liver lesion, CBD measuring 8 mm in diameter, PD 2 to 3 mm suspicious for possible papillary stenosis    LIVER FUNCTION TESTS:      Lab 05/24/24  0524 05/23/24  0621 05/21/24  1604   TOTAL PROTEIN 6.0 6.2 6.5   ALBUMIN 3.8 3.9 3.9   GLOBULIN 2.2 2.3 2.6   ALT (SGPT) 10 10 11   AST (SGOT) 13 12 13   BILIRUBIN 0.4 <0.2 <0.2   ALK PHOS 66 66 65     Denies known family history of liver or pancreatic disease       Past Medical History:  Past Medical History:   Diagnosis Date    Breast cancer     Dementia     Diabetes mellitus     Hypertension     Memory loss      Past Surgical History:  Past Surgical History:   Procedure  Laterality Date    CHOLECYSTECTOMY      HYSTERECTOMY      MASTECTOMY Right 1995    TOTAL KNEE ARTHROPLASTY Right       Social History:   Social History     Tobacco Use    Smoking status: Never    Smokeless tobacco: Never   Substance Use Topics    Alcohol use: Not Currently     Alcohol/week: 7.0 standard drinks of alcohol     Types: 7 Glasses of wine per week     Comment: per patient's son, patient drinks one glass of wine daily      Family History:  Family History   Problem Relation Age of Onset    Heart attack Mother     Heart disease Mother     Hypertension Mother     Hypertension Father     Diabetes Father     Hypertension Sister     Diabetes Sister     Thyroid cancer Sister     Breast cancer Sister     Lung cancer Sister     Diabetes Brother     Drug abuse Paternal Grandmother        Home Meds:  Medications Prior to Admission   Medication Sig Dispense Refill Last Dose    amLODIPine (NORVASC) 10 MG tablet Take 1 tablet by mouth Daily. 30 tablet 0 5/21/2024    aspirin 81 MG EC tablet Take 1 tablet by mouth Daily. Indications: Disease involving Lipid Deposits in the Arteries 30 tablet 2 5/21/2024    atorvastatin (LIPITOR) 80 MG tablet TAKE ONE TABLET BY MOUTH AT BEDTIME 90 tablet 0 5/21/2024    donepezil (Aricept) 10 MG tablet Take 1 tablet by mouth Daily. 90 tablet 0 5/21/2024    empagliflozin (Jardiance) 10 MG tablet tablet Take 1 tablet by mouth Daily. Indications: Type 2 Diabetes 30 tablet 11 5/21/2024    levETIRAcetam (KEPPRA) 1000 MG tablet Take 1 tablet by mouth 2 (Two) Times a Day. Indications: Seizure 180 tablet 0 5/21/2024    lisinopril (PRINIVIL,ZESTRIL) 40 MG tablet TAKE ONE TABLET BY MOUTH DAILY 90 tablet 0 5/21/2024    memantine (NAMENDA) 5 MG tablet TAKE ONE TABLET BY MOUTH TWICE A  tablet 0 5/21/2024    OLANZapine (zyPREXA) 5 MG tablet TAKE ONE TABLET BY MOUTH AT BEDTIME 90 tablet 0 5/20/2024    pantoprazole (PROTONIX) 40 MG EC tablet TAKE ONE TABLET BY MOUTH EVERY MORNING 90 tablet 0  5/21/2024    SITagliptin-metFORMIN HCl ER (Janumet XR)  MG tablet Take 1 tablet by mouth Daily. Indications: Type 2 Diabetes 30 tablet 11 5/21/2024    cetirizine (zyrTEC) 10 MG tablet Take 1 tablet by mouth Daily. (Patient taking differently: Take 1 tablet by mouth Daily As Needed for Allergies. Indications: Hayfever) 30 tablet 6     Continuous Blood Gluc  (FreeStyle Aleyda 2 [place]) device 1 each Continuous. 1 each 0     Continuous Blood Gluc Sensor (FreeStyle Aleyda 2 Sensor) misc 1 each 1 (One) Time Per Week. 2 each 11     escitalopram (Lexapro) 10 MG tablet Take 1 tablet by mouth Daily. Indications: Major Depressive Disorder 90 tablet 1      Current Meds:   amLODIPine, 10 mg, Oral, Daily  aspirin, 81 mg, Oral, Daily  atorvastatin, 80 mg, Oral, Daily  donepezil, 10 mg, Oral, Daily  escitalopram, 10 mg, Oral, Daily  levETIRAcetam, 1,000 mg, Oral, BID  lisinopril, 40 mg, Oral, Daily  memantine, 5 mg, Oral, BID  OLANZapine, 5 mg, Oral, Nightly  pantoprazole, 40 mg, Oral, QAM      Allergies:  Allergies   Allergen Reactions    Ppd [Tuberculin Purified Protein Derivative] Rash     Review of Systems  Pertinent items are noted in HPI, all other systems reviewed and negative    Objective     Vital Signs  Temp:  [98.1 °F (36.7 °C)-98.6 °F (37 °C)] 98.1 °F (36.7 °C)  Heart Rate:  [82-98] 92  Resp:  [18] 18  BP: (113-140)/(50-65) 119/57    Physical Exam:  CONSTITUTIONAL:  today's vital signs reviewed  EARS NOSE THROAT: trachea midline and no deformity of the nares  EYES: no scleral icterus  GASTROINTESTINAL: abdomen is soft, nontender, nondistended with normal active bowel sounds, no masses are appreciated  PSYCHIATRIC: appropriate mood and affect  RESPIRATORY: normal inspiratory effort with no increased work of breathing  NEUROLOGIC: patient is awake and alert  DERMATOLOGIC: skin is warm with no cyanosis  LYMPHATIC: no periumbilical lymphadenopathy     Results Review:              I reviewed the patient's new  clinical results.    Results from last 7 days   Lab Units 05/24/24  0524 05/23/24  0621 05/22/24  0345   WBC 10*3/mm3 7.92 4.57 4.62   HEMOGLOBIN g/dL 12.7 12.5 12.5   HEMATOCRIT % 38.3 38.0 38.9   PLATELETS 10*3/mm3 142 174 170     Results from last 7 days   Lab Units 05/24/24  0524 05/23/24  0621 05/22/24  1850 05/22/24  0345 05/21/24  1604   SODIUM mmol/L 140 142  --  143 138   POTASSIUM mmol/L 3.7 4.0 4.0 3.4* 3.9   CHLORIDE mmol/L 104 108*  --  104 100   CO2 mmol/L 24.9 25.5  --  24.0 22.3   BUN mg/dL 18 16  --  15 20   CREATININE mg/dL 0.62 0.61  --  0.59 0.70   CALCIUM mg/dL 9.3 9.4  --  8.9 9.2   BILIRUBIN mg/dL 0.4 <0.2  --   --  <0.2   ALK PHOS U/L 66 66  --   --  65   ALT (SGPT) U/L 10 10  --   --  11   AST (SGOT) U/L 13 12  --   --  13   GLUCOSE mg/dL 147* 78  --  78 109*         Lab Results   Lab Value Date/Time    LIPASE 23 05/09/2018 1923       Radiology:  CT Abdomen Pelvis With Contrast   Final Result      XR Chest 1 View   Final Result   1. No acute process.           This report was finalized on 5/21/2024 3:56 PM by Dr. Oli Madden M.D on Workstation: [id number]              Assessment & Plan   Active Hospital Problems    Diagnosis     **Generalized weakness     Severe malnutrition     History of stroke     Vascular dementia, moderate, without behavioral disturbance, psychotic disturbance, mood disturbance, and anxiety     Uncontrolled type 2 diabetes mellitus with hypoglycemia without coma     Hyperlipidemia     Generalized anxiety disorder     Gastroesophageal reflux disease        Assessment:  Anorexia  Unintentional weight loss and severe malnutrition   GERD  CT with evidence of gastritis and possible papillary stenosis    Plan:  CT reviewed at length with patient and family at bedside with recommendations to proceed with EGD evaluation during current inpatient admission in the setting of persistent decreased oral intake without improvement with PPI use.    Patient and family agreeable  for EGD evaluation.  Case request placed.  N.p.o. after midnight.  Continue with pantoprazole 40 mg once daily  I appreciate nutritional consultation for malnutrition  In regards to possible papillary stenosis on CT evaluation, offered MRI evaluation during current inpatient admission however in the setting of normal liver function testing this is likely to be normal.  Could consider interval 3-month follow-up MRCP outpatient to allow for assessment of stability or sooner pending clinical course if LFTs become elevated.  Patient declines inpatient MRCP at this time though is agreeable if LFTs become elevated.   Continue supportive care with IV fluids and antiemetics  Trend LFTs  Nursing staff to notify GI service on call if any change in clinical status.    Thank you for allowing us to participate in the care of this patient.   Please call us with questions or if we can be of further assistance.     I discussed the patients findings and my recommendations with patient, family, nursing staff, and Dr. More .             HERNANDO Singh  [facility] [place]  [address] [place] [address]

## 2024-05-24 NOTE — CASE MANAGEMENT/SOCIAL WORK
Post-Acute Authorization Submission      Post Acute Pre-Cert Documentation  Request Submitted by Facility - Type:: Hospital  Post-Acute Authorization Type Submitted:: SNF  Date Post Acute Pre-Cert Inititated per Facility: 05/24/24  Date Post Acute Pre-Cert Completed: 05/24/24  Accepting Facility: [facility] Discharge Date Requested: 05/24/24  All Clinicals Submitted?: Yes  Had Accepting Facility at Time of Submission: Yes  Response Received from Insurance?: Approval  Response Communicated to:: , Accepting Facility Liaison, Accepting Facility Auth Department  Authorization Number:: [id number]  Post Acute Pre-Cert Initiated Comment: VALID TO ADMIT UPTO 11:59PM ON 5/29/24.              Anselmo Villagomez, PCT

## 2024-05-24 NOTE — THERAPY TREATMENT NOTE
Patient Name: Lucia Ellis  : 1950    MRN: [id number]                              Today's Date: 2024       Admit Date: 2024    Visit Dx:     ICD-10-CM ICD-9-CM   1. Generalized weakness  R53.1 780.79   2. Dementia, unspecified dementia severity, unspecified dementia type, unspecified whether behavioral, psychotic, or mood disturbance or anxiety  F03.90 294.20     Patient Active Problem List   Diagnosis    Gastroesophageal reflux disease    Malignant neoplasm of breast    Depression    Folliculitis    Generalized anxiety disorder    Hyperlipidemia    Essential hypertension    Status post total right knee replacement    Rectal hemorrhage    Vitamin D deficiency    Drug therapy    Acute cholecystitis    Uncontrolled type 2 diabetes mellitus with hypoglycemia without coma    Myoclonus    Type 2 diabetes mellitus with hyperglycemia    Hypokalemia    New onset seizure    Focal motor seizure    Vascular dementia, moderate, without behavioral disturbance, psychotic disturbance, mood disturbance, and anxiety    Stroke-like symptoms    CVA (cerebral vascular accident)    Lung nodules    Hypokalemia    History of stroke    Generalized weakness    Severe malnutrition     Past Medical History:   Diagnosis Date    Breast cancer     Dementia     Diabetes mellitus     Hypertension     Memory loss      Past Surgical History:   Procedure Laterality Date    CHOLECYSTECTOMY      HYSTERECTOMY      MASTECTOMY Right 1995    TOTAL KNEE ARTHROPLASTY Right       General Information       Row Name 24 1436          Physical Therapy Time and Intention    Document Type therapy note (daily note)  -PH     Mode of Treatment physical therapy  -PH       Row Name 24 1436          General Information    Existing Precautions/Restrictions fall  -PH       Row Name 24 1436          Cognition    Orientation Status (Cognition) oriented x 3  -PH       Row Name 24 1436          Safety Issues, Functional Mobility     Safety Issues Affecting Function (Mobility) impulsivity;insight into deficits/self-awareness;safety precaution awareness;safety precautions follow-through/compliance;judgment  -PH     Impairments Affecting Function (Mobility) balance;endurance/activity tolerance;strength  -PH     Comment, Safety Issues/Impairments (Mobility) gt belt and non skid socks donned  -PH               User Key  (r) = Recorded By, (t) = Taken By, (c) = Cosigned By      Initials Name Provider Type     Krystyna Aguirre PTA Physical Therapist Assistant                   Mobility       Row Name 05/24/24 1436          Bed Mobility    Bed Mobility supine-sit;sit-supine  -PH     Supine-Sit [place] (Bed Mobility) standby assist  -PH     Sit-Supine [place] (Bed Mobility) standby assist  -PH     Assistive Device (Bed Mobility) bed rails;head of bed elevated  -PH       Row Name 05/24/24 1436          Gait/Stairs (Locomotion)    [place] Level (Gait) contact guard;verbal cues;nonverbal cues (demo/gesture)  -PH     Assistive Device (Gait) walker, front-wheeled  -PH     Distance in Feet (Gait) 55  -PH     Deviations/Abnormal Patterns (Gait) bina decreased;gait speed decreased;stride length decreased  -PH     Bilateral Gait Deviations forward flexed posture;heel strike decreased  -PH     [place] Level (Stairs) unable to assess  -PH     Comment, (Gait/Stairs) cues for postural correction and to remain closer to fww; cues then max A to avoid obstacles w/ fww; mild unsteadiness w/ no overt LOB.  -PH               User Key  (r) = Recorded By, (t) = Taken By, (c) = Cosigned By      Initials Name Provider Type     Krystyna Aguirre PTA Physical Therapist Assistant                   Obj/Interventions       Row Name 05/24/24 1438          Motor Skills    Therapeutic Exercise other (see comments)  BAP, LAQ; x 10 reps; difficulty sequencing bet B LE  -PH       Row Name 05/24/24 1438          Balance    Balance Assessment  sitting static balance;standing static balance;standing dynamic balance  -PH     Static Sitting Balance minimal assist;contact guard  -PH     Static Standing Balance contact guard  -PH     Dynamic Standing Balance contact guard;minimal assist  -PH     Position/Device Used, Standing Balance walker, front-wheeled  -PH     Comment, Balance posterior lean when seated w/ cues for correction; pt stood for brief change at times unsteady and letting go of fww to assist in spite of cues to keep B hands on fww . No overt LOB  -PH               User Key  (r) = Recorded By, (t) = Taken By, (c) = Cosigned By      Initials Name Provider Type    PH Krystyna Aguirre, PTA Physical Therapist Assistant                   Goals/Plan    No documentation.                  Clinical Impression       Row Name 05/24/24 4943          Pain    Pretreatment Pain Rating 0/10 - no pain  -PH     Posttreatment Pain Rating 0/10 - no pain  -PH     Additional Documentation Pain Scale: Numbers Pre/Post-Treatment (Group)  -PH       Row Name 05/24/24 1447          Plan of Care Review    Plan of Care Reviewed With patient  -PH     Progress no change  -PH     Outcome Evaluation Pt seen by PT this PM for tx. Pt was in bed and sat up to EOB w/ SBA. Posterior lean noted as pt was seated EOB req cues to correct and min A at times. Pt stood w/ min A. Pt amb approx 55' w/ min A and use of fww. Pt was slow and unsteady at times w/ no overt LOB. Pt sat on BR toilet then stood w/ CGA/min A. Pt req CGA to min A for stand bal as brief was changed. Pt at times lacked safety awareness and let go of fww to try to assist in spite of cues to keep B hands on fww. Pt returned to bed w/ SBA. PT will prog as pt nahun. Rec SNF after dc to address deficits.  -PH       Row Name 05/24/24 1443          Positioning and Restraints    Pre-Treatment Position in bed  -PH     Post Treatment Position bed  -PH     In Bed fowlers;call light within reach;encouraged to call for  assist;exit alarm on;notified nsg  -PH               User Key  (r) = Recorded By, (t) = Taken By, (c) = Cosigned By      Initials Name Provider Type     Krystyna Aguirre PTA Physical Therapist Assistant                   Outcome Measures       Row Name 05/24/24 1443 05/24/24 0800       How much help from another person do you currently need...    Turning from your back to your side while in flat bed without using bedrails? 3  -PH 3  -RF    Moving from lying on back to sitting on the side of a flat bed without bedrails? 3  -PH 3  -RF    Moving to and from a bed to a chair (including a wheelchair)? 3  -PH 3  -RF    Standing up from a chair using your arms (e.g., wheelchair, bedside chair)? 3  -PH 3  -RF    Climbing 3-5 steps with a railing? 2  -PH 2  -RF    To walk in hospital room? 3  -PH 3  -RF    AM-PAC 6 Clicks Score (PT) 17  -PH 17  -RF    Highest Level of Mobility Goal 5 --> Static standing  -PH 5 --> Static standing  -RF      Row Name 05/24/24 1443          Functional Assessment    Outcome Measure Options AM-PAC 6 Clicks Basic Mobility (PT)  -PH               User Key  (r) = Recorded By, (t) = Taken By, (c) = Cosigned By      Initials Name Provider Type     Krystyna Aguirre PTA Physical Therapist Assistant    RF Maritza Mejia, RN Registered Nurse                                 Physical Therapy Education       Title: PT OT SLP Therapies (Done)       Topic: Physical Therapy (Done)       Point: Mobility training (Done)       Learning Progress Summary             Patient Acceptance, E,TB,D, VU,NR by  at 5/24/2024 1444    Acceptance, E,TB,D, VU,NR by  at 5/23/2024 0951    Acceptance, E,D, VU,NR by MS at 5/22/2024 1000                         Point: Home exercise program (Done)       Learning Progress Summary             Patient Acceptance, E,TB,D, VU,NR by  at 5/24/2024 1444    Acceptance, E,TB,D, VU,NR by  at 5/23/2024 0951    Acceptance, E,D, VU,NR by MS at 5/22/2024 1000                          Point: Body mechanics (Done)       Learning Progress Summary             Patient Acceptance, E,TB,D, VU,NR by  at 5/24/2024 1444    Acceptance, E,TB,D, VU,NR by  at 5/23/2024 0951    Acceptance, E,D, VU,NR by MS at 5/22/2024 1000                         Point: Precautions (Done)       Learning Progress Summary             Patient Acceptance, E,TB,D, VU,NR by  at 5/24/2024 1444    Acceptance, E,TB,D, VU,NR by  at 5/23/2024 0951    Acceptance, E,D, VU,NR by MS at 5/22/2024 1000                                         User Key       Initials Effective Dates Name Provider Type Discipline    MS 06/16/21 -  John Serrano PT Physical Therapist PT     06/16/21 -  Krystyna Aguirre PTA Physical Therapist Assistant PT                  PT Recommendation and Plan     Plan of Care Reviewed With: patient  Progress: no change  Outcome Evaluation: Pt seen by PT this PM for tx. Pt was in bed and sat up to EOB w/ SBA. Posterior lean noted as pt was seated EOB req cues to correct and min A at times. Pt stood w/ min A. Pt amb approx 55' w/ min A and use of fww. Pt was slow and unsteady at times w/ no overt LOB. Pt sat on BR toilet then stood w/ CGA/min A. Pt req CGA to min A for stand bal as brief was changed. Pt at times lacked safety awareness and let go of fww to try to assist in spite of cues to keep B hands on fww. Pt returned to bed w/ SBA. PT will prog as pt nahun. Rec SNF after dc to address deficits.     Time Calculation:         PT Charges       Row Name 05/24/24 1444             Time Calculation    Start Time 1407  -PH      Stop Time 1431  -PH      Time Calculation (min) 24 min  -PH      PT Received On 05/24/24  -PH      PT - Next Appointment 05/25/24  -PH         Timed Charges    49484 - PT Therapeutic Exercise Minutes 6  -PH      29042 - PT Therapeutic Activity Minutes 18  -PH         Total Minutes    Timed Charges Total Minutes 24  -PH       Total Minutes 24  -PH                User Key   (r) = Recorded By, (t) = Taken By, (c) = Cosigned By      Initials Name Provider Type    Krystyna Carranza PTA Physical Therapist Assistant                  Therapy Charges for Today       Code Description Service Date Service Provider Modifiers Qty    93415039815 HC PT THERAPEUTIC ACT EA 15 MIN 5/23/2024 Krystyna Aguirre PTA GP 1    37430144907 HC PT THER PROC EA 15 MIN 5/24/2024 Krystyna Aguirre PTA GP 1    59239356844 HC PT THERAPEUTIC ACT EA 15 MIN 5/24/2024 Krystyna Aguirre PTA GP 1            PT G-Codes  Outcome Measure Options: AM-PAC 6 Clicks Basic Mobility (PT)  AM-PAC 6 Clicks Score (PT): 17  PT Discharge Summary  Anticipated Discharge Disposition (PT): skilled nursing facility    Krystyna Aguirre PTA  5/24/2024

## 2024-05-25 ENCOUNTER — ANESTHESIA (OUTPATIENT)
Dept: GASTROENTEROLOGY | Facility: HOSPITAL | Age: 74
End: 2024-05-25
Payer: MEDICARE

## 2024-05-25 ENCOUNTER — ANESTHESIA EVENT (OUTPATIENT)
Dept: GASTROENTEROLOGY | Facility: HOSPITAL | Age: 74
End: 2024-05-25
Payer: MEDICARE

## 2024-05-25 PROBLEM — K29.70 GASTRITIS WITHOUT BLEEDING: Status: ACTIVE | Noted: 2024-05-21

## 2024-05-25 LAB
ALBUMIN SERPL-MCNC: 3.4 G/DL (ref 3.5–5.2)
ALBUMIN/GLOB SERPL: 1.5 G/DL
ALP SERPL-CCNC: 58 U/L (ref 39–117)
ALT SERPL W P-5'-P-CCNC: 18 U/L (ref 1–33)
ANION GAP SERPL CALCULATED.3IONS-SCNC: 12 MMOL/L (ref 5–15)
AST SERPL-CCNC: 15 U/L (ref 1–32)
BASOPHILS # BLD AUTO: 0.04 10*3/MM3 (ref 0–0.2)
BASOPHILS NFR BLD AUTO: 1.3 % (ref 0–1.5)
BILIRUB SERPL-MCNC: <0.2 MG/DL (ref 0–1.2)
BUN SERPL-MCNC: 19 MG/DL (ref 8–23)
BUN/CREAT SERPL: 29.2 (ref 7–25)
CALCIUM SPEC-SCNC: 8.8 MG/DL (ref 8.6–10.5)
CHLORIDE SERPL-SCNC: 104 MMOL/L (ref 98–107)
CO2 SERPL-SCNC: 24 MMOL/L (ref 22–29)
CREAT SERPL-MCNC: 0.65 MG/DL (ref 0.57–1)
DEPRECATED RDW RBC AUTO: 37.8 FL (ref 37–54)
EGFRCR SERPLBLD CKD-EPI 2021: 91.4 ML/MIN/1.73
EOSINOPHIL # BLD AUTO: 0.02 10*3/MM3 (ref 0–0.4)
EOSINOPHIL NFR BLD AUTO: 0.6 % (ref 0.3–6.2)
ERYTHROCYTE [DISTWIDTH] IN BLOOD BY AUTOMATED COUNT: 12.1 % (ref 12.3–15.4)
GLOBULIN UR ELPH-MCNC: 2.3 GM/DL
GLUCOSE SERPL-MCNC: 140 MG/DL (ref 65–99)
HCT VFR BLD AUTO: 37.4 % (ref 34–46.6)
HGB BLD-MCNC: 12.3 G/DL (ref 12–15.9)
IMM GRANULOCYTES # BLD AUTO: 0.02 10*3/MM3 (ref 0–0.05)
IMM GRANULOCYTES NFR BLD AUTO: 0.6 % (ref 0–0.5)
LYMPHOCYTES # BLD AUTO: 1.02 10*3/MM3 (ref 0.7–3.1)
LYMPHOCYTES NFR BLD AUTO: 33 % (ref 19.6–45.3)
MCH RBC QN AUTO: 28 PG (ref 26.6–33)
MCHC RBC AUTO-ENTMCNC: 32.9 G/DL (ref 31.5–35.7)
MCV RBC AUTO: 85.2 FL (ref 79–97)
MONOCYTES # BLD AUTO: 0.4 10*3/MM3 (ref 0.1–0.9)
MONOCYTES NFR BLD AUTO: 12.9 % (ref 5–12)
NEUTROPHILS NFR BLD AUTO: 1.59 10*3/MM3 (ref 1.7–7)
NEUTROPHILS NFR BLD AUTO: 51.6 % (ref 42.7–76)
NRBC BLD AUTO-RTO: 0 /100 WBC (ref 0–0.2)
PLATELET # BLD AUTO: 143 10*3/MM3 (ref 140–450)
PMV BLD AUTO: 10.8 FL (ref 6–12)
POTASSIUM SERPL-SCNC: 4.1 MMOL/L (ref 3.5–5.2)
PROT SERPL-MCNC: 5.7 G/DL (ref 6–8.5)
RBC # BLD AUTO: 4.39 10*6/MM3 (ref 3.77–5.28)
SODIUM SERPL-SCNC: 140 MMOL/L (ref 136–145)
WBC NRBC COR # BLD AUTO: 3.09 10*3/MM3 (ref 3.4–10.8)

## 2024-05-25 PROCEDURE — 85025 COMPLETE CBC W/AUTO DIFF WBC: CPT | Performed by: INTERNAL MEDICINE

## 2024-05-25 PROCEDURE — 80053 COMPREHEN METABOLIC PANEL: CPT | Performed by: INTERNAL MEDICINE

## 2024-05-25 PROCEDURE — G0463 HOSPITAL OUTPT CLINIC VISIT: HCPCS | Performed by: INTERNAL MEDICINE

## 2024-05-25 PROCEDURE — 99232 SBSQ HOSP IP/OBS MODERATE 35: CPT | Performed by: INTERNAL MEDICINE

## 2024-05-25 PROCEDURE — G0378 HOSPITAL OBSERVATION PER HR: HCPCS

## 2024-05-25 RX ADMIN — LISINOPRIL 40 MG: 40 TABLET ORAL at 12:19

## 2024-05-25 RX ADMIN — MEMANTINE HYDROCHLORIDE 5 MG: 5 TABLET, FILM COATED ORAL at 12:16

## 2024-05-25 RX ADMIN — MEMANTINE HYDROCHLORIDE 5 MG: 5 TABLET, FILM COATED ORAL at 20:39

## 2024-05-25 RX ADMIN — OLANZAPINE 5 MG: 5 TABLET, FILM COATED ORAL at 20:39

## 2024-05-25 RX ADMIN — AMLODIPINE BESYLATE 10 MG: 10 TABLET ORAL at 12:16

## 2024-05-25 RX ADMIN — ATORVASTATIN CALCIUM 80 MG: 80 TABLET, FILM COATED ORAL at 12:16

## 2024-05-25 RX ADMIN — DONEPEZIL HYDROCHLORIDE 10 MG: 10 TABLET, FILM COATED ORAL at 12:16

## 2024-05-25 RX ADMIN — ESCITALOPRAM OXALATE 10 MG: 10 TABLET, FILM COATED ORAL at 12:16

## 2024-05-25 RX ADMIN — LEVETIRACETAM 1000 MG: 500 TABLET, FILM COATED ORAL at 20:39

## 2024-05-25 RX ADMIN — ASPIRIN 81 MG: 81 TABLET, COATED ORAL at 12:16

## 2024-05-25 RX ADMIN — PANTOPRAZOLE SODIUM 40 MG: 40 TABLET, DELAYED RELEASE ORAL at 12:15

## 2024-05-25 RX ADMIN — LEVETIRACETAM 1000 MG: 500 TABLET, FILM COATED ORAL at 12:15

## 2024-05-25 NOTE — PROGRESS NOTES
Name: Lucia Ellis ADMIT: 2024   : 1950  PCP: Everardo Mazariegos MD    MRN: [id number] LOS: 0 days   AGE/SEX: 74 y.o. female  ROOM: [facility]     Subjective   Subjective   Patient is lying on the bed and appears weak lethargic and is oriented to person and place only.  No reports of nausea, vomiting abdominal pain, chest pain.       Objective   Objective   Vital Signs  Temp:  [98.2 °F (36.8 °C)-98.6 °F (37 °C)] 98.6 °F (37 °C)  Heart Rate:  [76-97] 85  Resp:  [16-18] 16  BP: (114-137)/(61-72) 123/63  SpO2:  [96 %-100 %] 100 %  on   ;   Device (Oxygen Therapy): room air  Body mass index is 19.6 kg/m².  Physical Exam  General, awake and alert.  Head and ENT, normocephalic and atraumatic.  Lungs, symmetric expansion, equal air entry bilaterally.  Heart, regular rate and rhythm.  Abdomen, soft and nontender.  Extremities, no clubbing or cyanosis.  Neuro, cranial nerves intact  Skin: Warm and no rash.  Psych, normal mood and affect.  Musculoskeletal, joint examination is grossly normal.        Copied text material from yesterday's note has been reviewed for appropriate changes and remains accurate as of 24.      Results Review     I reviewed the patient's new clinical results.  Results from last 7 days   Lab Units 24  0345   WBC 10*3/mm3 3.09* 7.92 4.57 4.62   HEMOGLOBIN g/dL 12.3 12.7 12.5 12.5   PLATELETS 10*3/mm3 143 142 174 170     Results from last 7 days   Lab Units 24  0524  0624  1850 24  0345   SODIUM mmol/L 140 140 142  --  143   POTASSIUM mmol/L 4.1 3.7 4.0 4.0 3.4*   CHLORIDE mmol/L 104 104 108*  --  104   CO2 mmol/L 24.0 24.9 25.5  --  24.0   BUN mg/dL 19 18 16  --  15   CREATININE mg/dL 0.65 0.62 0.61  --  0.59   GLUCOSE mg/dL 140* 147* 78  --  78   EGFR mL/min/1.73 91.4 92.4 92.8  --  93.5     Results from last 7 days   Lab Units 24  0419 24  0524 24  0621 24  1600    ALBUMIN g/dL 3.4* 3.8 3.9 3.9   BILIRUBIN mg/dL <0.2 0.4 <0.2 <0.2   ALK PHOS U/L 58 66 66 65   AST (SGOT) U/L 15 13 12 13   ALT (SGPT) U/L 18 10 10 11     Results from last 7 days   Lab Units 05/25/24  0419 05/24/24  0524 05/23/24  0621 05/22/24  0345 05/21/24  1604   CALCIUM mg/dL 8.8 9.3 9.4 8.9 9.2   ALBUMIN g/dL 3.4* 3.8 3.9  --  3.9   MAGNESIUM mg/dL  --   --   --   --  1.8       Glucose   Date/Time Value Ref Range Status   05/23/2024 1840 199 (H) 70 - 130 mg/dL Final       No radiology results for the last day    I have personally reviewed all medications:  Scheduled Medications  amLODIPine, 10 mg, Oral, Daily  aspirin, 81 mg, Oral, Daily  atorvastatin, 80 mg, Oral, Daily  donepezil, 10 mg, Oral, Daily  escitalopram, 10 mg, Oral, Daily  levETIRAcetam, 1,000 mg, Oral, BID  lisinopril, 40 mg, Oral, Daily  memantine, 5 mg, Oral, BID  OLANZapine, 5 mg, Oral, Nightly  pantoprazole, 40 mg, Oral, QAM    Infusions   Diet  Diet: Regular/House; Fluid Consistency: Thin (IDDSI 0)    I have personally reviewed:  [x]  Laboratory   [x]  Microbiology   [x]  Radiology   [x]  EKG/Telemetry  [x]  Cardiology/Vascular   []  Pathology    []  Records       Assessment/Plan     Active Hospital Problems    Diagnosis  POA    **Generalized weakness [R53.1]  Yes    Severe malnutrition [E43]  Yes    Gastritis without bleeding [K29.70]  Unknown    History of stroke [Z86.73]  Not Applicable    Vascular dementia, moderate, without behavioral disturbance, psychotic disturbance, mood disturbance, and anxiety [F01.B0]  Yes    Uncontrolled type 2 diabetes mellitus with hypoglycemia without coma [E11.649]  Yes    Hyperlipidemia [E78.5]  Yes    Generalized anxiety disorder [F41.1]  Yes    Gastroesophageal reflux disease [K21.9]  Yes      Resolved Hospital Problems   No resolved problems to display.       74 y.o. female admitted with Generalized weakness.    Assessment and plan  1.  Vascular dementia, history of psychotic disturbance, anxiety,  generalized weakness, currently does not appear to have any behavioral issues and will continue with Aricept, Namenda, Lexapro and olanzapine.     2.  Hypertension, continue with lisinopril and amlodipine.  Monitor blood pressure closely.     3.  Generalized anxiety disorder, continue home SSRI therapy.    4.  Anorexia, unintentional weight loss, severe malnutrition, GERD, CT scan shows gastritis and possible papillary stenosis.  It also shows few hepatic cysts.  Will have her follow-up with gastroenterology as an outpatient basis.    5.  Seizures, on Keppra.     6.  CODE STATUS is full code.     Estimated discharge date, to rehab once a bed is available.         Leonel Lopez MD  [facility]  05/25/24  15:42 EDT

## 2024-05-25 NOTE — PROGRESS NOTES
[facility]  Inpatient Progress Note    Reason for Followup:  Abnormal CT scan stomach    Subjective     Interval History:   Pt presented to endo today for EGD, but states she does not want procedure at this time.  She denies any stomach pain.      Current Facility-Administered Medications:     acetaminophen (TYLENOL) tablet 650 mg, 650 mg, Oral, Q4H PRN, Cammie Mi MD    amLODIPine (NORVASC) tablet 10 mg, 10 mg, Oral, Daily, Cammie Mi MD, 10 mg at 05/24/24 1009    aspirin EC tablet 81 mg, 81 mg, Oral, Daily, Cammie Mi MD, 81 mg at 05/24/24 1010    atorvastatin (LIPITOR) tablet 80 mg, 80 mg, Oral, Daily, Cammie Mi MD, 80 mg at 05/24/24 1009    sennosides-docusate (PERICOLACE) 8.6-50 MG per tablet 2 tablet, 2 tablet, Oral, BID PRN **AND** polyethylene glycol (MIRALAX) packet 17 g, 17 g, Oral, Daily PRN **AND** bisacodyl (DULCOLAX) EC tablet 5 mg, 5 mg, Oral, Daily PRN **AND** bisacodyl (DULCOLAX) suppository 10 mg, 10 mg, Rectal, Daily PRN, Cammie Mi MD    Calcium Replacement - Follow Nurse / BPA Driven Protocol, , Does not apply, PRN, Suresh Chang MD    cetirizine (zyrTEC) tablet 10 mg, 10 mg, Oral, Daily PRN, Cammie Mi MD    donepezil (ARICEPT) tablet 10 mg, 10 mg, Oral, Daily, Cammie Mi MD, 10 mg at 05/24/24 1010    escitalopram (LEXAPRO) tablet 10 mg, 10 mg, Oral, Daily, Cammie Mi MD, 10 mg at 05/24/24 1010    levETIRAcetam (KEPPRA) tablet 1,000 mg, 1,000 mg, Oral, BID, Cammie Mi MD, 1,000 mg at 05/24/24 2016    lisinopril (PRINIVIL,ZESTRIL) tablet 40 mg, 40 mg, Oral, Daily, Cammie Mi MD, 40 mg at 05/24/24 1010    Magnesium Standard Dose Replacement - Follow Nurse / BPA Driven Protocol, , Does not apply, PRN, Suresh Chang MD    melatonin tablet 2.5 mg, 2.5 mg, Oral, Nightly PRN, Cammie Mi MD    memantine (NAMENDA) tablet 5 mg, 5 mg, Oral,  BID, Cammie Mi MD, 5 mg at 05/24/24 2016    OLANZapine (zyPREXA) tablet 5 mg, 5 mg, Oral, Nightly, Cammie Mi MD, 5 mg at 05/24/24 2016    ondansetron ODT (ZOFRAN-ODT) disintegrating tablet 4 mg, 4 mg, Oral, Q6H PRN **OR** ondansetron (ZOFRAN) injection 4 mg, 4 mg, Intravenous, Q6H PRN, Cammie Mi MD    pantoprazole (PROTONIX) EC tablet 40 mg, 40 mg, Oral, QAM, Cammie Mi MD, 40 mg at 05/24/24 0650    Phosphorus Replacement - Follow Nurse / BPA Driven Protocol, , Does not apply, Bernardo SUTHERLAND Abhishek, MD    Potassium Replacement - Follow Nurse / BPA Driven Protocol, , Does not apply, Bernardo SUTHERLAND Abhishek, MD    Objective     Vital Signs  Temp:  [98.1 °F (36.7 °C)-98.6 °F (37 °C)] 98.4 °F (36.9 °C)  Heart Rate:  [76-97] 76  Resp:  [16-18] 16  BP: (114-137)/(57-72) 133/72  Body mass index is 19.6 kg/m².    Intake/Output Summary (Last 24 hours) at 5/25/2024 1135  Last data filed at 5/25/2024 0500  Gross per 24 hour   Intake 210 ml   Output 900 ml   Net -690 ml     No intake/output data recorded.     Physical Exam:   General: patient awake, alert and cooperative   Abdomen: soft, nontender, nondistended; normal bowel sounds     Results Review:     I reviewed the patient's new clinical results.    Results from last 7 days   Lab Units 05/25/24  0419 05/24/24  0524 05/23/24  0621   WBC 10*3/mm3 3.09* 7.92 4.57   HEMOGLOBIN g/dL 12.3 12.7 12.5   HEMATOCRIT % 37.4 38.3 38.0   PLATELETS 10*3/mm3 143 142 174     Results from last 7 days   Lab Units 05/25/24  0419 05/24/24  0524 05/23/24  0621   SODIUM mmol/L 140 140 142   POTASSIUM mmol/L 4.1 3.7 4.0   CHLORIDE mmol/L 104 104 108*   CO2 mmol/L 24.0 24.9 25.5   BUN mg/dL 19 18 16   CREATININE mg/dL 0.65 0.62 0.61   CALCIUM mg/dL 8.8 9.3 9.4   BILIRUBIN mg/dL <0.2 0.4 <0.2   ALK PHOS U/L 58 66 66   ALT (SGPT) U/L 18 10 10   AST (SGOT) U/L 15 13 12   GLUCOSE mg/dL 140* 147* 78         Lab Results   Lab Value Date/Time    LIPASE 23  05/09/2018 1923       Radiology:  [unfilled]      Assessment & Plan   Assessment:   Anorexia  Unintentional weight loss and severe malnutrition   GERD  CT with evidence of gastritis and possible papillary stenosis    All problems new to me today    Plan:   Pt declined EGD today, will discuss with family   She will return to floor, can have regular diet today    I discussed the patient's findings and my recommendations with patient.          Fermin Bennett M.D.  [facility]  [address] [place] [address]  Office: [phone]

## 2024-05-25 NOTE — PLAN OF CARE
Goal Outcome Evaluation:      Pt was scheduled for EGD, pt family signed consent due to pt not being fully oriented.  Pt got downstairs for EGD and refused stated that no one told her about the test or explained it to her.  Once pt returned, diet order placed, pt received all meds and food tray.  Pt family was in to see her.                                           Refill Approved    Rx renewed per Medication Renewal Policy. Medication was last renewed on 11/23/18.    Amairani Crespo, [facility] Connection Triage/Med Refill 12/11/2019     Requested Prescriptions   Pending Prescriptions Disp Refills     fluticasone propionate (FLONASE) 50 mcg/actuation nasal spray 18.2 g 5     Sig: USE 2 SPRAY(S) IN EACH NOSTRIL ONCE DAILY       Nasal Steroid Refill Protocol Passed - 12/11/2019  2:42 PM        Passed - Patient has had office visit/physical in last 2 years     Last office visit with prescriber/PCP: 10/18/2019 OR same dept: 10/18/2019 Devon Lund MD OR same specialty: 10/18/2019 Devon Lund MD Last physical: Visit date not found Last MTM visit: Visit date not found    Next appt within 3 mo: Visit date not found  Next physical within 3 mo: Visit date not found  Prescriber OR PCP: Devon Lund MD  Last diagnosis associated with med order: 1. Allergy  - fluticasone propionate (FLONASE) 50 mcg/actuation nasal spray; USE 2 SPRAY(S) IN EACH NOSTRIL ONCE DAILY  Dispense: 18.2 g; Refill: 5     If protocol passes may refill for 12 months if within 3 months of last provider visit (or a total of 15 months).

## 2024-05-25 NOTE — PLAN OF CARE
Goal Outcome Evaluation:  Plan of Care Reviewed With: patient        Progress: no change     VS WNL. Rested well all shift. NPO since midnight.

## 2024-05-26 PROBLEM — R93.3 ABNORMAL CT SCAN, STOMACH: Status: ACTIVE | Noted: 2024-05-21

## 2024-05-26 LAB
ANION GAP SERPL CALCULATED.3IONS-SCNC: 10 MMOL/L (ref 5–15)
BASOPHILS # BLD AUTO: 0.03 10*3/MM3 (ref 0–0.2)
BASOPHILS NFR BLD AUTO: 0.8 % (ref 0–1.5)
BUN SERPL-MCNC: 13 MG/DL (ref 8–23)
BUN/CREAT SERPL: 21.7 (ref 7–25)
CALCIUM SPEC-SCNC: 8.4 MG/DL (ref 8.6–10.5)
CHLORIDE SERPL-SCNC: 104 MMOL/L (ref 98–107)
CO2 SERPL-SCNC: 26 MMOL/L (ref 22–29)
CREAT SERPL-MCNC: 0.6 MG/DL (ref 0.57–1)
DEPRECATED RDW RBC AUTO: 38.4 FL (ref 37–54)
EGFRCR SERPLBLD CKD-EPI 2021: 93.2 ML/MIN/1.73
EOSINOPHIL # BLD AUTO: 0.01 10*3/MM3 (ref 0–0.4)
EOSINOPHIL NFR BLD AUTO: 0.3 % (ref 0.3–6.2)
ERYTHROCYTE [DISTWIDTH] IN BLOOD BY AUTOMATED COUNT: 12.2 % (ref 12.3–15.4)
GLUCOSE SERPL-MCNC: 256 MG/DL (ref 65–99)
HCT VFR BLD AUTO: 37.2 % (ref 34–46.6)
HGB BLD-MCNC: 12 G/DL (ref 12–15.9)
IMM GRANULOCYTES # BLD AUTO: 0.01 10*3/MM3 (ref 0–0.05)
IMM GRANULOCYTES NFR BLD AUTO: 0.3 % (ref 0–0.5)
LYMPHOCYTES # BLD AUTO: 0.7 10*3/MM3 (ref 0.7–3.1)
LYMPHOCYTES NFR BLD AUTO: 19.7 % (ref 19.6–45.3)
MCH RBC QN AUTO: 28 PG (ref 26.6–33)
MCHC RBC AUTO-ENTMCNC: 32.3 G/DL (ref 31.5–35.7)
MCV RBC AUTO: 86.9 FL (ref 79–97)
MONOCYTES # BLD AUTO: 0.35 10*3/MM3 (ref 0.1–0.9)
MONOCYTES NFR BLD AUTO: 9.9 % (ref 5–12)
NEUTROPHILS NFR BLD AUTO: 2.45 10*3/MM3 (ref 1.7–7)
NEUTROPHILS NFR BLD AUTO: 69 % (ref 42.7–76)
NRBC BLD AUTO-RTO: 0 /100 WBC (ref 0–0.2)
PLATELET # BLD AUTO: 137 10*3/MM3 (ref 140–450)
PMV BLD AUTO: 11.1 FL (ref 6–12)
POTASSIUM SERPL-SCNC: 3.9 MMOL/L (ref 3.5–5.2)
RBC # BLD AUTO: 4.28 10*6/MM3 (ref 3.77–5.28)
SODIUM SERPL-SCNC: 140 MMOL/L (ref 136–145)
WBC NRBC COR # BLD AUTO: 3.55 10*3/MM3 (ref 3.4–10.8)

## 2024-05-26 PROCEDURE — 97530 THERAPEUTIC ACTIVITIES: CPT

## 2024-05-26 PROCEDURE — G0378 HOSPITAL OBSERVATION PER HR: HCPCS

## 2024-05-26 PROCEDURE — 85025 COMPLETE CBC W/AUTO DIFF WBC: CPT | Performed by: INTERNAL MEDICINE

## 2024-05-26 PROCEDURE — 80048 BASIC METABOLIC PNL TOTAL CA: CPT | Performed by: INTERNAL MEDICINE

## 2024-05-26 PROCEDURE — 99232 SBSQ HOSP IP/OBS MODERATE 35: CPT | Performed by: NURSE PRACTITIONER

## 2024-05-26 RX ADMIN — ATORVASTATIN CALCIUM 80 MG: 80 TABLET, FILM COATED ORAL at 10:01

## 2024-05-26 RX ADMIN — AMLODIPINE BESYLATE 10 MG: 10 TABLET ORAL at 10:01

## 2024-05-26 RX ADMIN — LEVETIRACETAM 1000 MG: 500 TABLET, FILM COATED ORAL at 20:30

## 2024-05-26 RX ADMIN — PANTOPRAZOLE SODIUM 40 MG: 40 TABLET, DELAYED RELEASE ORAL at 05:27

## 2024-05-26 RX ADMIN — MEMANTINE HYDROCHLORIDE 5 MG: 5 TABLET, FILM COATED ORAL at 10:01

## 2024-05-26 RX ADMIN — DONEPEZIL HYDROCHLORIDE 10 MG: 10 TABLET, FILM COATED ORAL at 10:01

## 2024-05-26 RX ADMIN — LEVETIRACETAM 1000 MG: 500 TABLET, FILM COATED ORAL at 10:01

## 2024-05-26 RX ADMIN — MEMANTINE HYDROCHLORIDE 5 MG: 5 TABLET, FILM COATED ORAL at 20:30

## 2024-05-26 RX ADMIN — LISINOPRIL 40 MG: 40 TABLET ORAL at 10:01

## 2024-05-26 RX ADMIN — OLANZAPINE 5 MG: 5 TABLET, FILM COATED ORAL at 20:30

## 2024-05-26 RX ADMIN — ASPIRIN 81 MG: 81 TABLET, COATED ORAL at 10:01

## 2024-05-26 RX ADMIN — ESCITALOPRAM OXALATE 10 MG: 10 TABLET, FILM COATED ORAL at 10:02

## 2024-05-26 NOTE — PLAN OF CARE
Goal Outcome Evaluation:  Plan of Care Reviewed With: patient        Progress: no change  Outcome Evaluation: Pt seen this PM for PT treatment, she is eager to participate in therapy this date. She comes to EOB with min A, requiring assist to scoot. Sits EOB x3 mins, intermittent posterior leaning requiring cueing for upright posture. She stands and amb 15' with rwx to bathroom, CGA/occasional min Ax1 for leg buckling. She completes toileting hygine tasks w/ assist to pull up brief when done. Does require min A for return to standing from low commode. She completed seated ther ex at end of session, made comfortable in the chair and had lunch in front of her.      Anticipated Discharge Disposition (PT): skilled nursing facility

## 2024-05-26 NOTE — THERAPY TREATMENT NOTE
Patient Name: Lucia Ellis  : 1950    MRN: [id number]                              Today's Date: 2024       Admit Date: 2024    Visit Dx:     ICD-10-CM ICD-9-CM   1. Generalized weakness  R53.1 780.79   2. Dementia, unspecified dementia severity, unspecified dementia type, unspecified whether behavioral, psychotic, or mood disturbance or anxiety  F03.90 294.20   3. Gastroesophageal reflux disease, unspecified whether esophagitis present  K21.9 530.81   4. Gastritis without bleeding, unspecified chronicity, unspecified gastritis type  K29.70 535.50   5. Severe malnutrition  E43 261     Patient Active Problem List   Diagnosis    Gastroesophageal reflux disease    Malignant neoplasm of breast    Depression    Folliculitis    Generalized anxiety disorder    Hyperlipidemia    Essential hypertension    Status post total right knee replacement    Rectal hemorrhage    Vitamin D deficiency    Drug therapy    Acute cholecystitis    Uncontrolled type 2 diabetes mellitus with hypoglycemia without coma    Myoclonus    Type 2 diabetes mellitus with hyperglycemia    Hypokalemia    New onset seizure    Focal motor seizure    Vascular dementia, moderate, without behavioral disturbance, psychotic disturbance, mood disturbance, and anxiety    Stroke-like symptoms    CVA (cerebral vascular accident)    Lung nodules    Hypokalemia    History of stroke    Generalized weakness    Severe malnutrition    Gastritis without bleeding     Past Medical History:   Diagnosis Date    Breast cancer     Dementia     Diabetes mellitus     Hypertension     Memory loss      Past Surgical History:   Procedure Laterality Date    CHOLECYSTECTOMY      HYSTERECTOMY      MASTECTOMY Right 1995    TOTAL KNEE ARTHROPLASTY Right       General Information       Row Name 24 1507          Physical Therapy Time and Intention    Document Type therapy note (daily note)  -MO     Mode of Treatment physical therapy  -MO       Row Name 24  1507          General Information    Patient Profile Reviewed yes  -MO     Existing Precautions/Restrictions fall  -MO       Row Name 05/26/24 1507          Cognition    Orientation Status (Cognition) person;place  -MO       Row Name 05/26/24 1507          Safety Issues, Functional Mobility    Impairments Affecting Function (Mobility) balance;endurance/activity tolerance;strength  -MO               User Key  (r) = Recorded By, (t) = Taken By, (c) = Cosigned By      Initials Name Provider Type    MO Lucia Rendon, PT Physical Therapist                   Mobility       Row Name 05/26/24 1508          Bed Mobility    Bed Mobility supine-sit  -MO     Supine-Sit [place] (Bed Mobility) minimum assist (75% patient effort);1 person assist  -MO     Assistive Device (Bed Mobility) head of bed elevated;bed rails  -MO     Comment, (Bed Mobility) Min Ax1 for scooting towards EOB. UIC at session termination  -MO       Row Name 05/26/24 1508          Sit-Stand Transfer    Sit-Stand [place] (Transfers) contact guard;minimum assist (75% patient effort)  -MO     Assistive Device (Sit-Stand Transfers) walker, front-wheeled  -MO     Comment, (Sit-Stand Transfer) STS x1 from bed, x1 from low commode requiring Min A  -MO       Row Name 05/26/24 1508          Gait/Stairs (Locomotion)    [place] Level (Gait) contact guard;minimum assist (75% patient effort);verbal cues;1 person assist  -MO     Assistive Device (Gait) walker, front-wheeled  -MO     Distance in Feet (Gait) 30  -MO     Deviations/Abnormal Patterns (Gait) bina decreased;gait speed decreased;stride length decreased  -MO     Bilateral Gait Deviations forward flexed posture;heel strike decreased  -MO     Comment, (Gait/Stairs) Pt amb 15' from bed to bathroom and an additional 15' back to the chair. Has leg buckling x2 requiring min A, frequent cueing for upright posture  -MO               User Key  (r) = Recorded By, (t) = Taken By, (c) = Cosigned By       Initials Name Provider Type    Lucia Gomez, PT Physical Therapist                   Obj/Interventions       Row Name 05/26/24 1511          Motor Skills    Therapeutic Exercise --  Seated x10 BL LAQ, seated march  -MO       Row Name 05/26/24 1511          Balance    Static Sitting Balance contact guard;minimal assist  -MO     Static Standing Balance contact guard  -MO     Dynamic Standing Balance contact guard;minimal assist  -MO     Comment, Balance Posterior lean with sitting, corrects with cueing  -MO               User Key  (r) = Recorded By, (t) = Taken By, (c) = Cosigned By      Initials Name Provider Type    Lucia Gomez, PT Physical Therapist                   Goals/Plan    No documentation.                  Clinical Impression       Row Name 05/26/24 1512          Pain    Pretreatment Pain Rating 0/10 - no pain  -MO     Posttreatment Pain Rating 0/10 - no pain  -MO       Row Name 05/26/24 1512          Plan of Care Review    Plan of Care Reviewed With patient  -MO     Progress no change  -MO     Outcome Evaluation Pt seen this PM for PT treatment, she is eager to participate in therapy this date. She comes to EOB with min A, requiring assist to scoot. Sits EOB x3 mins, intermittent posterior leaning requiring cueing for upright posture. She stands and amb 15' with rwx to bathroom, CGA/occasional min Ax1 for leg buckling. She completes toileting hygine tasks w/ assist to pull up brief when done. Does require min A for return to standing from low commode. She completed seated ther ex at end of session, made comfortable in the chair and had lunch in front of her.  -MO       Row Name 05/26/24 1512          Therapy Assessment/Plan (PT)    Rehab Potential (PT) good, to achieve stated therapy goals  -MO     Criteria for Skilled Interventions Met (PT) skilled treatment is necessary  -MO     Therapy Frequency (PT) 5 times/wk  -MO       Row Name 05/26/24 1512          Positioning and Restraints     Pre-Treatment Position in bed  -MO     Post Treatment Position chair  -MO     In Chair notified nsg;reclined;call light within reach;encouraged to call for assist;exit alarm on  -MO               User Key  (r) = Recorded By, (t) = Taken By, (c) = Cosigned By      Initials Name Provider Type    Lucia Gomez, PT Physical Therapist                   Outcome Measures       Row Name 05/26/24 1514          How much help from another person do you currently need...    Turning from your back to your side while in flat bed without using bedrails? 3  -MO     Moving from lying on back to sitting on the side of a flat bed without bedrails? 3  -MO     Moving to and from a bed to a chair (including a wheelchair)? 3  -MO     Standing up from a chair using your arms (e.g., wheelchair, bedside chair)? 3  -MO     Climbing 3-5 steps with a railing? 2  -MO     To walk in hospital room? 3  -MO     AM-PAC 6 Clicks Score (PT) 17  -MO     Highest Level of Mobility Goal 5 --> Static standing  -MO       Row Name 05/26/24 1514          Functional Assessment    Outcome Measure Options AM-PAC 6 Clicks Basic Mobility (PT)  -MO               User Key  (r) = Recorded By, (t) = Taken By, (c) = Cosigned By      Initials Name Provider Type    Lucia Gomez PT Physical Therapist                                 Physical Therapy Education       Title: PT OT SLP Therapies (Done)       Topic: Physical Therapy (Done)       Point: Mobility training (Done)       Learning Progress Summary             Patient Acceptance, E, VU,NR by MO at 5/26/2024 1515    Acceptance, E,TB,D, VU,NR by PH at 5/24/2024 1444    Acceptance, E,TB,D, VU,NR by PH at 5/23/2024 0951    Acceptance, E,D, VU,NR by MS at 5/22/2024 1000                         Point: Home exercise program (Done)       Learning Progress Summary             Patient Acceptance, E, VU,NR by MO at 5/26/2024 1515    Acceptance, E,TB,D, VU,NR by PH at 5/24/2024 1444    Acceptance, E,TB,D, VU,NR by PH at  5/23/2024 0951    Acceptance, E,D, VU,NR by MS at 5/22/2024 1000                         Point: Body mechanics (Done)       Learning Progress Summary             Patient Acceptance, E, VU,NR by MO at 5/26/2024 1515    Acceptance, E,TB,D, VU,NR by PH at 5/24/2024 1444    Acceptance, E,TB,D, VU,NR by PH at 5/23/2024 0951    Acceptance, E,D, VU,NR by MS at 5/22/2024 1000                         Point: Precautions (Done)       Learning Progress Summary             Patient Acceptance, E, VU,NR by MO at 5/26/2024 1515    Acceptance, E,TB,D, VU,NR by PH at 5/24/2024 1444    Acceptance, E,TB,D, VU,NR by PH at 5/23/2024 0951    Acceptance, E,D, VU,NR by MS at 5/22/2024 1000                                         User Key       Initials Effective Dates Name Provider Type Discipline    MS 06/16/21 -  John Serrano, PT Physical Therapist PT     06/16/21 -  Krystyna Aguirre, PTA Physical Therapist Assistant PT    MO 05/26/23 -  Lucia Rendon, PT Physical Therapist PT                  PT Recommendation and Plan     Plan of Care Reviewed With: patient  Progress: no change  Outcome Evaluation: Pt seen this PM for PT treatment, she is eager to participate in therapy this date. She comes to EOB with min A, requiring assist to scoot. Sits EOB x3 mins, intermittent posterior leaning requiring cueing for upright posture. She stands and amb 15' with rwx to bathroom, CGA/occasional min Ax1 for leg buckling. She completes toileting hygine tasks w/ assist to pull up brief when done. Does require min A for return to standing from low commode. She completed seated ther ex at end of session, made comfortable in the chair and had lunch in front of her.     Time Calculation:         PT Charges       Row Name 05/26/24 1515             Time Calculation    Start Time 1406  -MO      Stop Time 1424  -MO      Time Calculation (min) 18 min  -MO      PT Received On 05/26/24  -MO      PT - Next Appointment 05/28/24  -MO         Time  Calculation- PT    Total Timed Code Minutes- PT 18 minute(s)  -MO         Timed Charges    30242 - PT Therapeutic Activity Minutes 18  -MO         Total Minutes    Timed Charges Total Minutes 18  -MO       Total Minutes 18  -MO                User Key  (r) = Recorded By, (t) = Taken By, (c) = Cosigned By      Initials Name Provider Type    Lucia Gomez PT Physical Therapist                  Therapy Charges for Today       Code Description Service Date Service Provider Modifiers Qty    72776798149 HC PT THERAPEUTIC ACT EA 15 MIN 5/26/2024 Lucia Rendon PT GP 1            PT G-Codes  Outcome Measure Options: AM-PAC 6 Clicks Basic Mobility (PT)  AM-PAC 6 Clicks Score (PT): 17  PT Discharge Summary  Anticipated Discharge Disposition (PT): skilled nursing facility    Lucia Rendon PT  5/26/2024

## 2024-05-26 NOTE — CASE MANAGEMENT/SOCIAL WORK
Continued Stay Note  [facility]     Patient Name: Lucia Ellis  MRN: [id number]  Today's Date: 5/26/2024    Admit Date: 5/21/2024    Plan: SNF at [facility] receive [facility] precert. Bed available through the weekend and precert expires 5/29/24 at 9217   Discharge Plan       Row Name 05/26/24 1659       Plan    Plan Comments F/U call made to [facility] since pt isnt discharging this weekend. Liaison confirmed pt would have a bed available Monday 5/27 if pt is medically ready for discharge.                   Discharge Codes    No documentation.                       Zeinab Gibson RN

## 2024-05-26 NOTE — PROGRESS NOTES
Gastroenterology   Inpatient Progress Note    Reason for Follow Up:  abnormal CT scan, unintentional weight loss    Subjective  Interval History:   EGD scheduled yesterday, patient declined and Dr. Bennett spoke with patient's son and they decided to hold off on EGD.  Previously consent had been obtained from family.        Current Facility-Administered Medications:     acetaminophen (TYLENOL) tablet 650 mg, 650 mg, Oral, Q4H PRN, Fermin Bennett MD    amLODIPine (NORVASC) tablet 10 mg, 10 mg, Oral, Daily, Fermin Bennett MD, 10 mg at 05/25/24 1216    aspirin EC tablet 81 mg, 81 mg, Oral, Daily, Fermin Bennett MD, 81 mg at 05/25/24 1216    atorvastatin (LIPITOR) tablet 80 mg, 80 mg, Oral, Daily, Fermin Bennett MD, 80 mg at 05/25/24 1216    sennosides-docusate (PERICOLACE) 8.6-50 MG per tablet 2 tablet, 2 tablet, Oral, BID PRN **AND** polyethylene glycol (MIRALAX) packet 17 g, 17 g, Oral, Daily PRN **AND** bisacodyl (DULCOLAX) EC tablet 5 mg, 5 mg, Oral, Daily PRN **AND** bisacodyl (DULCOLAX) suppository 10 mg, 10 mg, Rectal, Daily PRN, Fermin Bennett MD    Calcium Replacement - Follow Nurse / BPA Driven Protocol, , Does not apply, PRN, Fermin Bennett MD    cetirizine (zyrTEC) tablet 10 mg, 10 mg, Oral, Daily PRN, Fermin Bennett MD    donepezil (ARICEPT) tablet 10 mg, 10 mg, Oral, Daily, Fermin Bennett MD, 10 mg at 05/25/24 1216    escitalopram (LEXAPRO) tablet 10 mg, 10 mg, Oral, Daily, Fermin Bennett MD, 10 mg at 05/25/24 1216    levETIRAcetam (KEPPRA) tablet 1,000 mg, 1,000 mg, Oral, BID, Fermin Bennett MD, 1,000 mg at 05/25/24 2039    lisinopril (PRINIVIL,ZESTRIL) tablet 40 mg, 40 mg, Oral, Daily, Fermin Bennett MD, 40 mg at 05/25/24 1219    Magnesium Standard Dose Replacement - Follow Nurse / BPA Driven Protocol, , Does not apply, PRN, Fermin Bennett MD    melatonin tablet 2.5 mg, 2.5 mg, Oral, Nightly PRN, Sabrina,  Fermin Cortes MD    memantine (NAMENDA) tablet 5 mg, 5 mg, Oral, BID, Fermni Bennett MD, 5 mg at 05/25/24 2039    OLANZapine (zyPREXA) tablet 5 mg, 5 mg, Oral, Nightly, Fermin Bennett MD, 5 mg at 05/25/24 2039    ondansetron ODT (ZOFRAN-ODT) disintegrating tablet 4 mg, 4 mg, Oral, Q6H PRN **OR** ondansetron (ZOFRAN) injection 4 mg, 4 mg, Intravenous, Q6H PRN, Fermin Bennett MD    pantoprazole (PROTONIX) EC tablet 40 mg, 40 mg, Oral, QAM, Fermin Bennett MD, 40 mg at 05/26/24 0527    Phosphorus Replacement - Follow Nurse / BPA Driven Protocol, , Does not apply, Sabrina SUTHERLAND Brian David, MD    Potassium Replacement - Follow Nurse / BPA Driven Protocol, , Does not apply, Sabrina SUTHERLAND Brian David, MD  Review of Systems:               Review of systems could not be obtained due to  patient confusion.    Objective     Vital Signs  Temp:  [98.2 °F (36.8 °C)-99 °F (37.2 °C)] 98.2 °F (36.8 °C)  Heart Rate:  [] 89  Resp:  [16-18] 16  BP: (123-148)/(57-75) 124/57  Body mass index is 19.6 kg/m².                  Physical Exam:              General: patient awake, alert to person, chronic, weak              Eyes: no scleral icterus              Skin: warm and dry, not jaundiced              Abdomen: soft, normal bowel sounds, nontender, nondistended              Psychiatric: Pleasant but flat affect                Results Review:                I reviewed the patient's new clinical results.    Results from last 7 days   Lab Units 05/26/24  0509 05/25/24 0419 05/24/24 0524   WBC 10*3/mm3 3.55 3.09* 7.92   HEMOGLOBIN g/dL 12.0 12.3 12.7   HEMATOCRIT % 37.2 37.4 38.3   PLATELETS 10*3/mm3 137* 143 142     Results from last 7 days   Lab Units 05/26/24  0509 05/25/24 0419 05/24/24  0524 05/23/24  0621   SODIUM mmol/L 140 140 140 142   POTASSIUM mmol/L 3.9 4.1 3.7 4.0   CHLORIDE mmol/L 104 104 104 108*   CO2 mmol/L 26.0 24.0 24.9 25.5   BUN mg/dL 13 19 18 16   CREATININE mg/dL 0.60 0.65 0.62  0.61   CALCIUM mg/dL 8.4* 8.8 9.3 9.4   BILIRUBIN mg/dL  --  <0.2 0.4 <0.2   ALK PHOS U/L  --  58 66 66   ALT (SGPT) U/L  --  18 10 10   AST (SGOT) U/L  --  15 13 12   GLUCOSE mg/dL 256* 140* 147* 78         Lab Results   Lab Value Date/Time    LIPASE 23 05/09/2018 1923       Radiology:  CT Abdomen Pelvis With Contrast   Final Result      XR Chest 1 View   Final Result   1. No acute process.           This report was finalized on 5/21/2024 3:56 PM by Dr. Oli Madden M.D on Workstation: [id number]              Assessment & Plan     Active Hospital Problems    Diagnosis     **Generalized weakness     Severe malnutrition     Gastritis without bleeding     History of stroke     Vascular dementia, moderate, without behavioral disturbance, psychotic disturbance, mood disturbance, and anxiety     Uncontrolled type 2 diabetes mellitus with hypoglycemia without coma     Hyperlipidemia     Generalized anxiety disorder     Gastroesophageal reflux disease        Assessment:  Anorexia with unintentional weight loss and malnutrition  GERD  CT scan with evidence of gastritis and possible papillary stenosis  Constipation, last bowel movement May 20, 2024  Vascular dementia, oriented to person      Plan:  Continue daily pantoprazole  I have tried to contact patient's son to discuss EGD, no answer, will try again later today    ADDENDUM:   I spoke with patients son Vern via telephone, orders placed for EGD 5/27/2024. Al    I discussed the patients findings and my recommendations with patient and primary care team.           Kathy VILLAGOMEZ  [facility] [address] [address]

## 2024-05-26 NOTE — PLAN OF CARE
Goal Outcome Evaluation:  Plan of Care Reviewed With: patient        Progress: no change     VS WNL. Increased restlessness noted with multiple attempts to exit bed.

## 2024-05-26 NOTE — PLAN OF CARE
Goal Outcome Evaluation:            GI to contact family about EGD, if approved will be done 05/27/241.  Pt approved for placement, has room til 05/29/24.  Educated pt repeatedly on safety and calling before getting up.

## 2024-05-26 NOTE — PROGRESS NOTES
Name: Lucia Ellis ADMIT: 2024   : 1950  PCP: Everardo Mazariegos MD    MRN: [id number] LOS: 0 days   AGE/SEX: 74 y.o. female  ROOM: [facility]     Subjective   Subjective   Patient is lying on the bed and appears weak lethargic and is oriented to person and place only.  No reports of nausea, vomiting abdominal pain, chest pain.       Objective   Objective   Vital Signs  Temp:  [98.2 °F (36.8 °C)-99 °F (37.2 °C)] 98.6 °F (37 °C)  Heart Rate:  [] 90  Resp:  [16-18] 17  BP: (122-148)/(57-75) 122/71  SpO2:  [93 %-96 %] 96 %  on   ;   Device (Oxygen Therapy): room air  Body mass index is 19.6 kg/m².  Physical Exam  General, awake and alert.  Head and ENT, normocephalic and atraumatic.  Lungs, symmetric expansion, equal air entry bilaterally.  Heart, regular rate and rhythm.  Abdomen, soft and nontender.  Extremities, no clubbing or cyanosis.  Neuro, cranial nerves intact  Skin: Warm and no rash.  Psych, normal mood and affect.  Musculoskeletal, joint examination is grossly normal.           Results Review     I reviewed the patient's new clinical results.  Results from last 7 days   Lab Units 24  05024  04124  0524  0621   WBC 10*3/mm3 3.55 3.09* 7.92 4.57   HEMOGLOBIN g/dL 12.0 12.3 12.7 12.5   PLATELETS 10*3/mm3 137* 143 142 174     Results from last 7 days   Lab Units 24  0509 24  0419 24  0524  0621   SODIUM mmol/L 140 140 140 142   POTASSIUM mmol/L 3.9 4.1 3.7 4.0   CHLORIDE mmol/L 104 104 104 108*   CO2 mmol/L 26.0 24.0 24.9 25.5   BUN mg/dL 13 19 18 16   CREATININE mg/dL 0.60 0.65 0.62 0.61   GLUCOSE mg/dL 256* 140* 147* 78   EGFR mL/min/1.73 93.2 91.4 92.4 92.8     Results from last 7 days   Lab Units 24  0419 24  0524 24  0621 24  1604   ALBUMIN g/dL 3.4* 3.8 3.9 3.9   BILIRUBIN mg/dL <0.2 0.4 <0.2 <0.2   ALK PHOS U/L 58 66 66 65   AST (SGOT) U/L 15 13 12 13   ALT (SGPT) U/L 18 10 10 11     Results from last 7  days   Lab Units 05/26/24  0509 05/25/24  0419 05/24/24  0524 05/23/24  0621 05/22/24  0345 05/21/24  1604   CALCIUM mg/dL 8.4* 8.8 9.3 9.4   < > 9.2   ALBUMIN g/dL  --  3.4* 3.8 3.9  --  3.9   MAGNESIUM mg/dL  --   --   --   --   --  1.8    < > = values in this interval not displayed.       Glucose   Date/Time Value Ref Range Status   05/23/2024 1840 199 (H) 70 - 130 mg/dL Final       No radiology results for the last day    I have personally reviewed all medications:  Scheduled Medications  amLODIPine, 10 mg, Oral, Daily  aspirin, 81 mg, Oral, Daily  atorvastatin, 80 mg, Oral, Daily  donepezil, 10 mg, Oral, Daily  escitalopram, 10 mg, Oral, Daily  levETIRAcetam, 1,000 mg, Oral, BID  lisinopril, 40 mg, Oral, Daily  memantine, 5 mg, Oral, BID  OLANZapine, 5 mg, Oral, Nightly  pantoprazole, 40 mg, Oral, QAM    Infusions   Diet  Diet: Regular/House; Fluid Consistency: Thin (IDDSI 0)    I have personally reviewed:  [x]  Laboratory   [x]  Microbiology   [x]  Radiology   [x]  EKG/Telemetry  [x]  Cardiology/Vascular   []  Pathology    []  Records       Assessment/Plan     Active Hospital Problems    Diagnosis  POA    **Generalized weakness [R53.1]  Yes    Severe malnutrition [E43]  Yes    Gastritis without bleeding [K29.70]  Unknown    History of stroke [Z86.73]  Not Applicable    Vascular dementia, moderate, without behavioral disturbance, psychotic disturbance, mood disturbance, and anxiety [F01.B0]  Yes    Uncontrolled type 2 diabetes mellitus with hypoglycemia without coma [E11.649]  Yes    Hyperlipidemia [E78.5]  Yes    Generalized anxiety disorder [F41.1]  Yes    Gastroesophageal reflux disease [K21.9]  Yes      Resolved Hospital Problems   No resolved problems to display.       74 y.o. female admitted with Generalized weakness.    Assessment and plan  1.  Vascular dementia, history of psychotic disturbance, anxiety, generalized weakness, currently does not appear to have any behavioral issues and will continue with  Aricept, Namenda, Lexapro and olanzapine.     2.  Hypertension, continue with lisinopril and amlodipine.  Monitor blood pressure closely.     3.  Generalized anxiety disorder, continue home SSRI therapy.    4.  Anorexia, unintentional weight loss, severe malnutrition, GERD, CT scan shows gastritis and possible papillary stenosis.  It also shows few hepatic cysts.  GI following and defer timing of EGD to GI.    5.  Seizures, on Keppra.     6.  CODE STATUS is full code.     Estimated discharge date, to rehab once a bed is available.     Copied text on this note has been reviewed by me on 5/26/2024    Leonel Lopez MD  [facility]  05/26/24  14:02 EDT

## 2024-05-27 ENCOUNTER — ANESTHESIA (OUTPATIENT)
Dept: GASTROENTEROLOGY | Facility: HOSPITAL | Age: 74
End: 2024-05-27
Payer: MEDICARE

## 2024-05-27 ENCOUNTER — ANESTHESIA EVENT (OUTPATIENT)
Dept: GASTROENTEROLOGY | Facility: HOSPITAL | Age: 74
End: 2024-05-27
Payer: MEDICARE

## 2024-05-27 LAB
ANION GAP SERPL CALCULATED.3IONS-SCNC: 9.2 MMOL/L (ref 5–15)
BASOPHILS # BLD MANUAL: 0.03 10*3/MM3 (ref 0–0.2)
BASOPHILS NFR BLD MANUAL: 1 % (ref 0–1.5)
BUN SERPL-MCNC: 16 MG/DL (ref 8–23)
BUN/CREAT SERPL: 23.2 (ref 7–25)
CALCIUM SPEC-SCNC: 8.9 MG/DL (ref 8.6–10.5)
CHLORIDE SERPL-SCNC: 104 MMOL/L (ref 98–107)
CO2 SERPL-SCNC: 26.8 MMOL/L (ref 22–29)
CREAT SERPL-MCNC: 0.69 MG/DL (ref 0.57–1)
DEPRECATED RDW RBC AUTO: 38.2 FL (ref 37–54)
EGFRCR SERPLBLD CKD-EPI 2021: 90.1 ML/MIN/1.73
EOSINOPHIL # BLD MANUAL: 0.07 10*3/MM3 (ref 0–0.4)
EOSINOPHIL NFR BLD MANUAL: 2 % (ref 0.3–6.2)
ERYTHROCYTE [DISTWIDTH] IN BLOOD BY AUTOMATED COUNT: 12.2 % (ref 12.3–15.4)
GLUCOSE SERPL-MCNC: 185 MG/DL (ref 65–99)
HCT VFR BLD AUTO: 33.3 % (ref 34–46.6)
HGB BLD-MCNC: 10.8 G/DL (ref 12–15.9)
LYMPHOCYTES # BLD MANUAL: 1.52 10*3/MM3 (ref 0.7–3.1)
LYMPHOCYTES NFR BLD MANUAL: 12 % (ref 5–12)
MCH RBC QN AUTO: 28.1 PG (ref 26.6–33)
MCHC RBC AUTO-ENTMCNC: 32.4 G/DL (ref 31.5–35.7)
MCV RBC AUTO: 86.5 FL (ref 79–97)
MONOCYTES # BLD: 0.41 10*3/MM3 (ref 0.1–0.9)
NEUTROPHILS # BLD AUTO: 1.35 10*3/MM3 (ref 1.7–7)
NEUTROPHILS NFR BLD MANUAL: 40 % (ref 42.7–76)
NRBC BLD AUTO-RTO: 0 /100 WBC (ref 0–0.2)
OVALOCYTES BLD QL SMEAR: ABNORMAL
PLAT MORPH BLD: NORMAL
PLATELET # BLD AUTO: 149 10*3/MM3 (ref 140–450)
PMV BLD AUTO: 11.3 FL (ref 6–12)
POTASSIUM SERPL-SCNC: 3.7 MMOL/L (ref 3.5–5.2)
RBC # BLD AUTO: 3.85 10*6/MM3 (ref 3.77–5.28)
SCHISTOCYTES BLD QL SMEAR: ABNORMAL
SODIUM SERPL-SCNC: 140 MMOL/L (ref 136–145)
VARIANT LYMPHS NFR BLD MANUAL: 45 % (ref 19.6–45.3)
WBC MORPH BLD: NORMAL
WBC NRBC COR # BLD AUTO: 3.38 10*3/MM3 (ref 3.4–10.8)

## 2024-05-27 PROCEDURE — 85007 BL SMEAR W/DIFF WBC COUNT: CPT | Performed by: INTERNAL MEDICINE

## 2024-05-27 PROCEDURE — 87081 CULTURE SCREEN ONLY: CPT | Performed by: INTERNAL MEDICINE

## 2024-05-27 PROCEDURE — G0378 HOSPITAL OBSERVATION PER HR: HCPCS

## 2024-05-27 PROCEDURE — 25010000002 PROPOFOL 1000 MG/100ML EMULSION: Performed by: NURSE ANESTHETIST, CERTIFIED REGISTERED

## 2024-05-27 PROCEDURE — 43239 EGD BIOPSY SINGLE/MULTIPLE: CPT | Performed by: INTERNAL MEDICINE

## 2024-05-27 PROCEDURE — 88305 TISSUE EXAM BY PATHOLOGIST: CPT | Performed by: INTERNAL MEDICINE

## 2024-05-27 PROCEDURE — 80048 BASIC METABOLIC PNL TOTAL CA: CPT | Performed by: INTERNAL MEDICINE

## 2024-05-27 PROCEDURE — 25810000003 SODIUM CHLORIDE 0.9 % SOLUTION: Performed by: INTERNAL MEDICINE

## 2024-05-27 PROCEDURE — 85025 COMPLETE CBC W/AUTO DIFF WBC: CPT | Performed by: INTERNAL MEDICINE

## 2024-05-27 RX ORDER — HYDRALAZINE HYDROCHLORIDE 20 MG/ML
5 INJECTION INTRAMUSCULAR; INTRAVENOUS
Status: CANCELLED | OUTPATIENT
Start: 2024-05-27

## 2024-05-27 RX ORDER — EPHEDRINE SULFATE 50 MG/ML
5 INJECTION, SOLUTION INTRAVENOUS ONCE AS NEEDED
Status: CANCELLED | OUTPATIENT
Start: 2024-05-27

## 2024-05-27 RX ORDER — PROPOFOL 10 MG/ML
INJECTION, EMULSION INTRAVENOUS AS NEEDED
Status: DISCONTINUED | OUTPATIENT
Start: 2024-05-27 | End: 2024-05-27 | Stop reason: SURG

## 2024-05-27 RX ORDER — IPRATROPIUM BROMIDE AND ALBUTEROL SULFATE 2.5; .5 MG/3ML; MG/3ML
3 SOLUTION RESPIRATORY (INHALATION) ONCE AS NEEDED
Status: CANCELLED | OUTPATIENT
Start: 2024-05-27

## 2024-05-27 RX ORDER — DROPERIDOL 2.5 MG/ML
0.62 INJECTION, SOLUTION INTRAMUSCULAR; INTRAVENOUS
Status: CANCELLED | OUTPATIENT
Start: 2024-05-27

## 2024-05-27 RX ORDER — DIPHENHYDRAMINE HYDROCHLORIDE 50 MG/ML
12.5 INJECTION INTRAMUSCULAR; INTRAVENOUS
Status: CANCELLED | OUTPATIENT
Start: 2024-05-27

## 2024-05-27 RX ORDER — PROMETHAZINE HYDROCHLORIDE 25 MG/1
25 TABLET ORAL ONCE AS NEEDED
Status: CANCELLED | OUTPATIENT
Start: 2024-05-27

## 2024-05-27 RX ORDER — FENTANYL CITRATE 50 UG/ML
25 INJECTION, SOLUTION INTRAMUSCULAR; INTRAVENOUS
Status: CANCELLED | OUTPATIENT
Start: 2024-05-27

## 2024-05-27 RX ORDER — NALOXONE HCL 0.4 MG/ML
0.2 VIAL (ML) INJECTION AS NEEDED
Status: CANCELLED | OUTPATIENT
Start: 2024-05-27

## 2024-05-27 RX ORDER — LABETALOL HYDROCHLORIDE 5 MG/ML
5 INJECTION, SOLUTION INTRAVENOUS
Status: CANCELLED | OUTPATIENT
Start: 2024-05-27

## 2024-05-27 RX ORDER — HYDROMORPHONE HYDROCHLORIDE 1 MG/ML
0.25 INJECTION, SOLUTION INTRAMUSCULAR; INTRAVENOUS; SUBCUTANEOUS
Status: CANCELLED | OUTPATIENT
Start: 2024-05-27

## 2024-05-27 RX ORDER — HYDROCODONE BITARTRATE AND ACETAMINOPHEN 5; 325 MG/1; MG/1
1 TABLET ORAL ONCE AS NEEDED
Status: CANCELLED | OUTPATIENT
Start: 2024-05-27

## 2024-05-27 RX ORDER — FLUMAZENIL 0.1 MG/ML
0.2 INJECTION INTRAVENOUS AS NEEDED
Status: CANCELLED | OUTPATIENT
Start: 2024-05-27

## 2024-05-27 RX ORDER — ONDANSETRON 2 MG/ML
4 INJECTION INTRAMUSCULAR; INTRAVENOUS ONCE AS NEEDED
Status: CANCELLED | OUTPATIENT
Start: 2024-05-27

## 2024-05-27 RX ORDER — HYDROCODONE BITARTRATE AND ACETAMINOPHEN 7.5; 325 MG/1; MG/1
1 TABLET ORAL EVERY 4 HOURS PRN
Status: CANCELLED | OUTPATIENT
Start: 2024-05-27 | End: 2024-06-03

## 2024-05-27 RX ORDER — LIDOCAINE HYDROCHLORIDE 20 MG/ML
INJECTION, SOLUTION INFILTRATION; PERINEURAL AS NEEDED
Status: DISCONTINUED | OUTPATIENT
Start: 2024-05-27 | End: 2024-05-27 | Stop reason: SURG

## 2024-05-27 RX ORDER — PROMETHAZINE HYDROCHLORIDE 25 MG/1
25 SUPPOSITORY RECTAL ONCE AS NEEDED
Status: CANCELLED | OUTPATIENT
Start: 2024-05-27

## 2024-05-27 RX ORDER — SODIUM CHLORIDE 9 MG/ML
30 INJECTION, SOLUTION INTRAVENOUS CONTINUOUS PRN
Status: DISCONTINUED | OUTPATIENT
Start: 2024-05-27 | End: 2024-05-28 | Stop reason: HOSPADM

## 2024-05-27 RX ADMIN — OLANZAPINE 5 MG: 5 TABLET, FILM COATED ORAL at 20:30

## 2024-05-27 RX ADMIN — LEVETIRACETAM 1000 MG: 500 TABLET, FILM COATED ORAL at 12:35

## 2024-05-27 RX ADMIN — ESCITALOPRAM OXALATE 10 MG: 10 TABLET, FILM COATED ORAL at 12:35

## 2024-05-27 RX ADMIN — MEMANTINE HYDROCHLORIDE 5 MG: 5 TABLET, FILM COATED ORAL at 20:30

## 2024-05-27 RX ADMIN — AMLODIPINE BESYLATE 10 MG: 10 TABLET ORAL at 12:36

## 2024-05-27 RX ADMIN — ASPIRIN 81 MG: 81 TABLET, COATED ORAL at 12:36

## 2024-05-27 RX ADMIN — LIDOCAINE HYDROCHLORIDE 100 MG: 20 INJECTION, SOLUTION INFILTRATION; PERINEURAL at 10:25

## 2024-05-27 RX ADMIN — PROPOFOL INJECTABLE EMULSION 80 MG: 10 INJECTION, EMULSION INTRAVENOUS at 10:35

## 2024-05-27 RX ADMIN — MEMANTINE HYDROCHLORIDE 5 MG: 5 TABLET, FILM COATED ORAL at 12:34

## 2024-05-27 RX ADMIN — LEVETIRACETAM 1000 MG: 500 TABLET, FILM COATED ORAL at 20:30

## 2024-05-27 RX ADMIN — ATORVASTATIN CALCIUM 80 MG: 80 TABLET, FILM COATED ORAL at 12:36

## 2024-05-27 RX ADMIN — LISINOPRIL 40 MG: 40 TABLET ORAL at 12:35

## 2024-05-27 RX ADMIN — PROPOFOL INJECTABLE EMULSION 100 MCG/KG/MIN: 10 INJECTION, EMULSION INTRAVENOUS at 10:25

## 2024-05-27 RX ADMIN — DONEPEZIL HYDROCHLORIDE 10 MG: 10 TABLET, FILM COATED ORAL at 12:36

## 2024-05-27 RX ADMIN — PANTOPRAZOLE SODIUM 40 MG: 40 TABLET, DELAYED RELEASE ORAL at 12:37

## 2024-05-27 RX ADMIN — SODIUM CHLORIDE 30 ML/HR: 9 INJECTION, SOLUTION INTRAVENOUS at 10:20

## 2024-05-27 NOTE — ANESTHESIA PREPROCEDURE EVALUATION
Anesthesia Evaluation                  Airway   Mallampati: II  TM distance: >3 FB  Neck ROM: full  No difficulty expected  Dental - normal exam     Pulmonary    (-) rhonchi, decreased breath sounds, wheezes  Cardiovascular     Rhythm: regular  Rate: normal    (+) hypertension, hyperlipidemia  (-) murmur      Neuro/Psych  (+) seizures, CVA, psychiatric history, dementia, poor historian.  GI/Hepatic/Renal/Endo    (+) GERD, GI bleeding , diabetes mellitus type 2 poorly controlled    Musculoskeletal     Abdominal     Abdomen: soft.   Substance History      OB/GYN          Other      history of cancer                Anesthesia Plan    ASA 3     MAC   total IV anesthesia  intravenous induction     Anesthetic plan, risks, benefits, and alternatives have been provided, discussed and informed consent has been obtained with: patient.    CODE STATUS:    Code Status (Patient has no pulse and is not breathing): CPR (Attempt to Resuscitate)  Medical Interventions (Patient has pulse or is breathing): Full

## 2024-05-27 NOTE — PROGRESS NOTES
Name: Lucia Ellis ADMIT: 2024   : 1950  PCP: Everardo Mazariegos MD    MRN: [id number] LOS: 0 days   AGE/SEX: 74 y.o. female  ROOM: [facility]     Subjective   Subjective   Patient is lying on the bed and appears weak lethargic and is oriented to person and place only.  No reports of nausea, vomiting abdominal pain, chest pain.       Objective   Objective   Vital Signs  Temp:  [97.8 °F (36.6 °C)-99.1 °F (37.3 °C)] 97.8 °F (36.6 °C)  Heart Rate:  [67-91] 77  Resp:  [16-18] 16  BP: (120-142)/(59-69) 126/69  SpO2:  [94 %-97 %] 94 %  on   ;   Device (Oxygen Therapy): room air  Body mass index is 19.53 kg/m².  Physical Exam  General, awake and alert.  Head and ENT, normocephalic and atraumatic.  Lungs, symmetric expansion, equal air entry bilaterally.  Heart, regular rate and rhythm.  Abdomen, soft and nontender.  Extremities, no clubbing or cyanosis.  Neuro, cranial nerves intact  Skin: Warm and no rash.  Psych, normal mood and affect.  Musculoskeletal, joint examination is grossly normal.            Results Review     I reviewed the patient's new clinical results.  Results from last 7 days   Lab Units 24  03524  05024  0524   WBC 10*3/mm3 3.38* 3.55 3.09* 7.92   HEMOGLOBIN g/dL 10.8* 12.0 12.3 12.7   PLATELETS 10*3/mm3 149 137* 143 142     Results from last 7 days   Lab Units 24  03524  0509 24  04124  0524   SODIUM mmol/L 140 140 140 140   POTASSIUM mmol/L 3.7 3.9 4.1 3.7   CHLORIDE mmol/L 104 104 104 104   CO2 mmol/L 26.8 26.0 24.0 24.9   BUN mg/dL 16 13 19 18   CREATININE mg/dL 0.69 0.60 0.65 0.62   GLUCOSE mg/dL 185* 256* 140* 147*   EGFR mL/min/1.73 90.1 93.2 91.4 92.4     Results from last 7 days   Lab Units 24  0419 24  0524 24  0621 24  1604   ALBUMIN g/dL 3.4* 3.8 3.9 3.9   BILIRUBIN mg/dL <0.2 0.4 <0.2 <0.2   ALK PHOS U/L 58 66 66 65   AST (SGOT) U/L 15 13 12 13   ALT (SGPT) U/L 18 10 10 11     Results from  "last 7 days   Lab Units 05/27/24  0359 05/26/24  0509 05/25/24  0419 05/24/24  0524 05/23/24  0621 05/22/24  0345 05/21/24  1604   CALCIUM mg/dL 8.9 8.4* 8.8 9.3 9.4   < > 9.2   ALBUMIN g/dL  --   --  3.4* 3.8 3.9  --  3.9   MAGNESIUM mg/dL  --   --   --   --   --   --  1.8    < > = values in this interval not displayed.       No results found for: \"HGBA1C\", \"POCGLU\"      No radiology results for the last day    I have personally reviewed all medications:  Scheduled Medications  amLODIPine, 10 mg, Oral, Daily  aspirin, 81 mg, Oral, Daily  atorvastatin, 80 mg, Oral, Daily  donepezil, 10 mg, Oral, Daily  escitalopram, 10 mg, Oral, Daily  levETIRAcetam, 1,000 mg, Oral, BID  lisinopril, 40 mg, Oral, Daily  memantine, 5 mg, Oral, BID  OLANZapine, 5 mg, Oral, Nightly  pantoprazole, 40 mg, Oral, QAM    Infusions  sodium chloride, 30 mL/hr, Last Rate: Stopped (05/27/24 1058)    Diet  Diet: Regular/House; Fluid Consistency: Thin (IDDSI 0)    I have personally reviewed:  [x]  Laboratory   [x]  Microbiology   [x]  Radiology   [x]  EKG/Telemetry  [x]  Cardiology/Vascular   []  Pathology    []  Records       Assessment/Plan     Active Hospital Problems    Diagnosis  POA    **Generalized weakness [R53.1]  Yes    Severe malnutrition [E43]  Yes    Gastritis without bleeding [K29.70]  Unknown    Abnormal CT scan, stomach [R93.3]  Unknown    History of stroke [Z86.73]  Not Applicable    Vascular dementia, moderate, without behavioral disturbance, psychotic disturbance, mood disturbance, and anxiety [F01.B0]  Yes    Uncontrolled type 2 diabetes mellitus with hypoglycemia without coma [E11.649]  Yes    Hyperlipidemia [E78.5]  Yes    Generalized anxiety disorder [F41.1]  Yes    Gastroesophageal reflux disease [K21.9]  Yes      Resolved Hospital Problems   No resolved problems to display.       74 y.o. female admitted with Generalized weakness.    Assessment and plan  1.  Vascular dementia, history of psychotic disturbance, anxiety, " generalized weakness, currently does not appear to have any behavioral issues and will continue with Aricept, Namenda, Lexapro and olanzapine.     2.  Hypertension, continue with lisinopril and amlodipine.  Monitor blood pressure closely.     3.  Generalized anxiety disorder, continue home SSRI therapy.    4.  Anorexia, unintentional weight loss, severe malnutrition, GERD, CT scan shows gastritis and possible papillary stenosis.  It also shows few hepatic cysts.  Timing of EGD per GI.    5.  Seizures, on Keppra.     6.  CODE STATUS is full code.     Estimated discharge date, to rehab once a bed is available.         Leonel Lopez MD  [facility]  05/27/24  12:54 EDT

## 2024-05-27 NOTE — PLAN OF CARE
Goal Outcome Evaluation:     Pt had her EGD today.  Possible d/c 05/28.  Pt calm and using her call light.  Did have to call her son when Endo got here, did not want to go to the test, after speaking with her son pt went to Endo without issues.

## 2024-05-27 NOTE — ANESTHESIA POSTPROCEDURE EVALUATION
"Patient: Lucia Ellis    Procedure Summary       Date: 05/27/24 Room / Location:  [facility]    Anesthesia Start: 1024 Anesthesia Stop: 1044    Procedure: ESOPHAGOGASTRODUODENOSCOPY (Esophagus) Diagnosis:       Chronic superficial gastritis without bleeding      Abnormal CT scan, stomach      (Chronic superficial gastritis without bleeding [K29.30])      (Abnormal CT scan, stomach [R93.3])    Surgeons: Clive More MD Provider: Sreedhar Johnson MD    Anesthesia Type: MAC ASA Status: 3            Anesthesia Type: MAC    Vitals  Vitals Value Taken Time   /69 05/27/24 1052   Temp 36.6 °C (97.8 °F) 05/27/24 1052   Pulse 77 05/27/24 1052   Resp 16 05/27/24 1052   SpO2 94 % 05/27/24 1052           Post Anesthesia Care and Evaluation    Level of consciousness: awake and alert  Pain management: adequate    Airway patency: patent  Anesthetic complications: No anesthetic complications  PONV Status: none  Cardiovascular status: acceptable  Respiratory status: acceptable  Hydration status: acceptable    Comments: /69 (BP Location: Left arm, Patient Position: Lying)   Pulse 77   Temp 36.6 °C (97.8 °F) (Oral)   Resp 16   Ht 162.6 cm (64\")   Wt 51.6 kg (113 lb 12.1 oz)   SpO2 94%   BMI 19.53 kg/m²       "

## 2024-05-27 NOTE — PLAN OF CARE
Goal Outcome Evaluation:  Plan of Care Reviewed With: patient        Progress: no change  Outcome Evaluation: Pt. A&O to self with confusion with place, time and situation.  NPO since midnight in preperation of EGD today.  Appeared to sleep throughout the night.  VSS.  No requests per patient at this time.  Call light within reach.

## 2024-05-27 NOTE — PROGRESS NOTES
Name: Lucia Ellis ADMIT: 2024   : 1950  PCP: Everardo Mazariegos MD    MRN: [id number] LOS: 0 days   AGE/SEX: 74 y.o. female  ROOM: [facility]     Subjective   Subjective   Patient is lying on the bed and appears weak lethargic and is oriented to person and place only.  No reports of nausea, vomiting abdominal pain, chest pain, shortness of breath.       Objective   Objective   Vital Signs  Temp:  [97.8 °F (36.6 °C)-99.1 °F (37.3 °C)] 97.8 °F (36.6 °C)  Heart Rate:  [67-91] 77  Resp:  [16-18] 16  BP: (120-142)/(59-69) 126/69  SpO2:  [94 %-97 %] 94 %  on   ;   Device (Oxygen Therapy): room air  Body mass index is 19.53 kg/m².  Physical Exam  General, awake and alert.  Head and ENT, normocephalic and atraumatic.  Lungs, symmetric expansion, equal air entry bilaterally.  Heart, regular rate and rhythm.  Abdomen, soft and nontender.  Extremities, no clubbing or cyanosis.  Neuro, cranial nerves intact  Skin: Warm and no rash.  Psych, normal mood and affect.  Musculoskeletal, joint examination is grossly normal.            Results Review     I reviewed the patient's new clinical results.  Results from last 7 days   Lab Units 24  03524  05024  0524   WBC 10*3/mm3 3.38* 3.55 3.09* 7.92   HEMOGLOBIN g/dL 10.8* 12.0 12.3 12.7   PLATELETS 10*3/mm3 149 137* 143 142     Results from last 7 days   Lab Units 24  03524  0509 24  0524   SODIUM mmol/L 140 140 140 140   POTASSIUM mmol/L 3.7 3.9 4.1 3.7   CHLORIDE mmol/L 104 104 104 104   CO2 mmol/L 26.8 26.0 24.0 24.9   BUN mg/dL 16 13 19 18   CREATININE mg/dL 0.69 0.60 0.65 0.62   GLUCOSE mg/dL 185* 256* 140* 147*   EGFR mL/min/1.73 90.1 93.2 91.4 92.4     Results from last 7 days   Lab Units 24  0419 24  0524 24  0621 24  1604   ALBUMIN g/dL 3.4* 3.8 3.9 3.9   BILIRUBIN mg/dL <0.2 0.4 <0.2 <0.2   ALK PHOS U/L 58 66 66 65   AST (SGOT) U/L 15 13 12 13   ALT (SGPT) U/L 18 10 10  "11     Results from last 7 days   Lab Units 05/27/24  0359 05/26/24  0509 05/25/24  0419 05/24/24  0524 05/23/24  0621 05/22/24  0345 05/21/24  1604   CALCIUM mg/dL 8.9 8.4* 8.8 9.3 9.4   < > 9.2   ALBUMIN g/dL  --   --  3.4* 3.8 3.9  --  3.9   MAGNESIUM mg/dL  --   --   --   --   --   --  1.8    < > = values in this interval not displayed.       No results found for: \"HGBA1C\", \"POCGLU\"      No radiology results for the last day    I have personally reviewed all medications:  Scheduled Medications  amLODIPine, 10 mg, Oral, Daily  aspirin, 81 mg, Oral, Daily  atorvastatin, 80 mg, Oral, Daily  donepezil, 10 mg, Oral, Daily  escitalopram, 10 mg, Oral, Daily  levETIRAcetam, 1,000 mg, Oral, BID  lisinopril, 40 mg, Oral, Daily  memantine, 5 mg, Oral, BID  OLANZapine, 5 mg, Oral, Nightly  pantoprazole, 40 mg, Oral, QAM    Infusions  sodium chloride, 30 mL/hr, Last Rate: Stopped (05/27/24 1058)    Diet  Diet: Regular/House; Fluid Consistency: Thin (IDDSI 0)    I have personally reviewed:  [x]  Laboratory   [x]  Microbiology   [x]  Radiology   [x]  EKG/Telemetry  [x]  Cardiology/Vascular   []  Pathology    []  Records       Assessment/Plan     Active Hospital Problems    Diagnosis  POA    **Generalized weakness [R53.1]  Yes    Severe malnutrition [E43]  Yes    Gastritis without bleeding [K29.70]  Unknown    Abnormal CT scan, stomach [R93.3]  Unknown    History of stroke [Z86.73]  Not Applicable    Vascular dementia, moderate, without behavioral disturbance, psychotic disturbance, mood disturbance, and anxiety [F01.B0]  Yes    Uncontrolled type 2 diabetes mellitus with hypoglycemia without coma [E11.649]  Yes    Hyperlipidemia [E78.5]  Yes    Generalized anxiety disorder [F41.1]  Yes    Gastroesophageal reflux disease [K21.9]  Yes      Resolved Hospital Problems   No resolved problems to display.       74 y.o. female admitted with Generalized weakness.    Assessment and plan  1.  Vascular dementia, history of psychotic " disturbance, anxiety, generalized weakness, currently does not appear to have any behavioral issues and will continue with Aricept, Namenda, Lexapro and olanzapine.     2.  Hypertension, continue with lisinopril and amlodipine.  Monitor blood pressure closely.     3.  Generalized anxiety disorder, continue home SSRI therapy.    4.  Anorexia, unintentional weight loss, severe malnutrition, GERD, CT scan shows gastritis and possible papillary stenosis.  It also shows few hepatic cysts.  Status post EGD earlier today which revealed 3 cm hiatal hernia, gastritis.    5.  History of seizures, on Keppra.     6.  CODE STATUS is full code.     Estimated discharge date, to rehab once a bed is available.         Leonel Lopez MD  [facility]  05/27/24  12:55 EDT

## 2024-05-27 NOTE — CONSULTS
"Nutrition Services    Patient Name:  Lucia Ellis  YOB: 1950  MRN: [id number]  Admit Date:  5/21/2024    Assessment Date:  05/27/24    Summary: Follow up  Pt on Regular/Thin with 25-75% po intake of meals per RN records.Pt also receiving Boost Plus (chocolate) BID.   Last BM 5/26 small, pt on bowel regimen prn  Upper GI endoscopy done today showing gastritis and hiatal hernia  Meds/labs reviewed.     Recommend:  Will continue to monitor po intake  Boost Glucose Control BID (chocolate).  3.   RD to follow    Will follow per protocol.    CLINICAL NUTRITION ASSESSMENT      Reason for Assessment Follow-up Protocol     Diagnosis/Problem   Generalized weakness, dementia, weight loss         Anthropometrics        Current Height  Current Weight  BMI kg/m2 Height: 162.6 cm (64\")  Weight: 51.6 kg (113 lb 12.1 oz) (05/27/24 0616)  Body mass index is 19.53 kg/m².   Adjusted BMI (if applicable)    BMI Category Underweight (18.4 or below)   Ideal Body Weight (IBW) 120 lb   Usual Body Weight (UBW) 129 lb   Weight Trend Loss, Amount/Timeframe: 20 lb (16%) wt loss x 5 months   Weight History Wt Readings from Last 30 Encounters:   05/27/24 0616 51.6 kg (113 lb 12.1 oz)   05/25/24 0500 51.8 kg (114 lb 3.2 oz)   05/24/24 0550 53.6 kg (118 lb 2.7 oz)   05/23/24 0515 50.1 kg (110 lb 7.2 oz)   05/22/24 1500 49.5 kg (109 lb 2 oz)   05/22/24 0701 54.1 kg (119 lb 4.3 oz)   05/21/24 1444 59 kg (130 lb)   05/21/24 1313 50.8 kg (112 lb)   04/19/24 1237 51.8 kg (114 lb 4.8 oz)   03/18/24 1111 49.9 kg (110 lb)   03/07/24 1313 51.7 kg (114 lb)   01/10/24 1220 63.5 kg (140 lb)   12/14/23 0600 57.7 kg (127 lb 3.3 oz)   12/13/23 0802 58.6 kg (129 lb 3 oz)   12/12/23 0834 58.9 kg (129 lb 13.6 oz)   12/08/23 1122 57.2 kg (126 lb)   12/08/23 2131 57.2 kg (126 lb)   05/23/23 1132 57.2 kg (126 lb)   03/02/23 1514 58.1 kg (128 lb)   03/01/23 1243 57.9 kg (127 lb 9.6 oz)   01/11/23 1407 60.3 kg (133 lb)   12/29/22 0900 58.1 kg (128 lb) "   12/30/22 1622 58.1 kg (128 lb)   12/02/22 0801 59.9 kg (132 lb)   11/03/21 1038 59.2 kg (130 lb 8 oz)   10/19/21 1414 108 kg (237 lb 8 oz)   09/29/21 1133 59.5 kg (131 lb 3.2 oz)   08/12/21 0856 59 kg (130 lb)   06/21/21 1052 61.3 kg (135 lb 1.6 oz)   05/07/21 1621 60.8 kg (134 lb 0.6 oz)   04/08/21 1559 60.8 kg (134 lb)   04/06/21 1013 61.7 kg (136 lb)   03/02/21 1253 61.7 kg (136 lb)   06/16/20 1132 62.1 kg (136 lb 14.4 oz)   06/24/19 1230 64.3 kg (141 lb 12.8 oz)   02/26/19 1610 66.7 kg (147 lb 1.6 oz)   09/21/18 1101 66.7 kg (147 lb)   03/12/18 1557 70.4 kg (155 lb 3.2 oz)   02/09/18 1505 70 kg (154 lb 6.4 oz)      --  Estimated/Assessed Needs        Current Weight  Weight: 51.6 kg (113 lb 12.1 oz) (05/27/24 0616)       Energy Requirements    Weight for Calculation 49 kg   Method for Estimation  30 kcal/kg   EST Needs (kcal/day) 1470 kcals       Protein Requirements    Weight for Calculation 49 kg   EST Protein Needs (g/kg) 1.2 - 1.5 gm/kg   EST Daily Needs (g/day) 58-73 g       Fluid Requirements     Method for Estimation 30 mL/kg    EST Needs (mL/day) 1470 ml     Labs       Pertinent Labs    Results from last 7 days   Lab Units 05/27/24  0359 05/26/24  0509 05/25/24  0419 05/24/24  0524 05/23/24  0621   SODIUM mmol/L 140 140 140 140 142   POTASSIUM mmol/L 3.7 3.9 4.1 3.7 4.0   CHLORIDE mmol/L 104 104 104 104 108*   CO2 mmol/L 26.8 26.0 24.0 24.9 25.5   BUN mg/dL 16 13 19 18 16   CREATININE mg/dL 0.69 0.60 0.65 0.62 0.61   CALCIUM mg/dL 8.9 8.4* 8.8 9.3 9.4   BILIRUBIN mg/dL  --   --  <0.2 0.4 <0.2   ALK PHOS U/L  --   --  58 66 66   ALT (SGPT) U/L  --   --  18 10 10   AST (SGOT) U/L  --   --  15 13 12   GLUCOSE mg/dL 185* 256* 140* 147* 78     Results from last 7 days   Lab Units 05/27/24  0359 05/26/24  0509 05/25/24  0419 05/22/24  0345 05/21/24  1604   MAGNESIUM mg/dL  --   --   --   --  1.8   HEMOGLOBIN g/dL 10.8*   < > 12.3   < > 12.6   HEMATOCRIT % 33.3*   < > 37.4   < > 38.1   WBC 10*3/mm3 3.38*   < >  3.09*   < > 4.37   ALBUMIN g/dL  --   --  3.4*   < > 3.9    < > = values in this interval not displayed.     Results from last 7 days   Lab Units 05/27/24  0359 05/26/24  0509 05/25/24  0419 05/24/24  0524 05/23/24  0621   PLATELETS 10*3/mm3 149 137* 143 142 174     COVID19   Date Value Ref Range Status   12/16/2023 Not Detected Not Detected - Ref. Range Final     Lab Results   Component Value Date    HGBA1C 6.6 (H) 04/19/2024          Medications           Scheduled Medications amLODIPine, 10 mg, Oral, Daily  aspirin, 81 mg, Oral, Daily  atorvastatin, 80 mg, Oral, Daily  donepezil, 10 mg, Oral, Daily  escitalopram, 10 mg, Oral, Daily  levETIRAcetam, 1,000 mg, Oral, BID  lisinopril, 40 mg, Oral, Daily  memantine, 5 mg, Oral, BID  OLANZapine, 5 mg, Oral, Nightly  pantoprazole, 40 mg, Oral, QAM       Infusions sodium chloride, 30 mL/hr, Last Rate: Stopped (05/27/24 1058)       PRN Medications   acetaminophen    senna-docusate sodium **AND** polyethylene glycol **AND** bisacodyl **AND** bisacodyl    Calcium Replacement - Follow Nurse / BPA Driven Protocol    cetirizine    Magnesium Standard Dose Replacement - Follow Nurse / BPA Driven Protocol    melatonin    ondansetron ODT **OR** ondansetron    Phosphorus Replacement - Follow Nurse / BPA Driven Protocol    Potassium Replacement - Follow Nurse / BPA Driven Protocol    sodium chloride     Physical Findings          General Findings alert, confused, disoriented, frail, loss of muscle mass, loss of subcutaneous fat, room air   Oral/Mouth Cavity WDL   Edema  not assessed   Gastrointestinal last bowel movement: 5/26   Skin  skin intact   Tubes/Drains/Lines none   NFPE Consented to exam, See Malnutrition Severity Assessment, Date Completed: 5/22   --  Malnutrition Severity Assessment      Patient meets criteria for : Severe Malnutrition (Based on ASPEN/AND criteria, pt meets nutrition diagnosis of Severe Malnutrition of Chronic Disease due to inadequate po intake, 20 lb  (16%) wt loss x 5 months and moderate fat/muscle wasting noted on NFPE.)           Current Nutrition Orders & Evaluation of Intake       Oral Nutrition     Food Allergies NKFA   Current PO Diet Diet: Regular/House; Fluid Consistency: Thin (IDDSI 0)   Supplement Boost Glucose Control, BID (chocolate)   PO Evaluation     % PO Intake %    Factors Affecting Intake: decreased appetite   --  PES STATEMENT / NUTRITION DIAGNOSIS      Nutrition Dx Problem  Problem: Malnutrition (severe)  Etiology: Factors Affecting Nutrition - KRISTIN    Signs/Symptoms: Report of Minimal PO Intake, NFPE Results, BMI, and Unintended Weight Change     NUTRITION INTERVENTION / PLAN OF CARE      Intervention Goal(s) Maintain nutrition status, Reduce/improve symptoms, Meet estimated needs, Disease management/therapy, Tolerate PO , Increase intake, Accepts oral nutrition supplement, Maintain weight, and PO intake goal %: 75+         RD Intervention/Action Encourage intake, Continue to monitor, Care plan reviewed, and Recommend/order: supplement   --      Prescription/Orders:       PO Diet       Supplements  Boost GC      Enteral Nutrition       Parenteral Nutrition    New Prescription Ordered? Continue same per protocol   --      Monitor/Evaluation Per protocol   Discharge Plan/Needs Pending clinical course   --    RD to follow per protocol.      Electronically signed by:  Reta Clarke RD  05/27/24 14:53 EDT

## 2024-05-28 VITALS
HEIGHT: 64 IN | HEART RATE: 93 BPM | DIASTOLIC BLOOD PRESSURE: 68 MMHG | SYSTOLIC BLOOD PRESSURE: 138 MMHG | OXYGEN SATURATION: 99 % | TEMPERATURE: 97.5 F | WEIGHT: 112.1 LBS | RESPIRATION RATE: 16 BRPM | BODY MASS INDEX: 19.14 KG/M2

## 2024-05-28 LAB
ANION GAP SERPL CALCULATED.3IONS-SCNC: 10 MMOL/L (ref 5–15)
ANISOCYTOSIS BLD QL: ABNORMAL
BASOPHILS # BLD MANUAL: 0.07 10*3/MM3 (ref 0–0.2)
BASOPHILS NFR BLD MANUAL: 2 % (ref 0–1.5)
BUN SERPL-MCNC: 20 MG/DL (ref 8–23)
BUN/CREAT SERPL: 36.4 (ref 7–25)
CALCIUM SPEC-SCNC: 8.7 MG/DL (ref 8.6–10.5)
CHLORIDE SERPL-SCNC: 103 MMOL/L (ref 98–107)
CO2 SERPL-SCNC: 28 MMOL/L (ref 22–29)
CREAT SERPL-MCNC: 0.55 MG/DL (ref 0.57–1)
DEPRECATED RDW RBC AUTO: 38.2 FL (ref 37–54)
EGFRCR SERPLBLD CKD-EPI 2021: 95.1 ML/MIN/1.73
ELLIPTOCYTES BLD QL SMEAR: ABNORMAL
EOSINOPHIL # BLD MANUAL: 0.07 10*3/MM3 (ref 0–0.4)
EOSINOPHIL NFR BLD MANUAL: 2 % (ref 0.3–6.2)
ERYTHROCYTE [DISTWIDTH] IN BLOOD BY AUTOMATED COUNT: 12.3 % (ref 12.3–15.4)
GLUCOSE SERPL-MCNC: 156 MG/DL (ref 65–99)
HCT VFR BLD AUTO: 35.1 % (ref 34–46.6)
HGB BLD-MCNC: 11.5 G/DL (ref 12–15.9)
LYMPHOCYTES # BLD MANUAL: 1.63 10*3/MM3 (ref 0.7–3.1)
LYMPHOCYTES NFR BLD MANUAL: 11.2 % (ref 5–12)
MCH RBC QN AUTO: 28.1 PG (ref 26.6–33)
MCHC RBC AUTO-ENTMCNC: 32.8 G/DL (ref 31.5–35.7)
MCV RBC AUTO: 85.8 FL (ref 79–97)
MONOCYTES # BLD: 0.42 10*3/MM3 (ref 0.1–0.9)
NEUTROPHILS # BLD AUTO: 1.52 10*3/MM3 (ref 1.7–7)
NEUTROPHILS NFR BLD MANUAL: 40.8 % (ref 42.7–76)
PLAT MORPH BLD: NORMAL
PLATELET # BLD AUTO: 160 10*3/MM3 (ref 140–450)
PMV BLD AUTO: 11.4 FL (ref 6–12)
POTASSIUM SERPL-SCNC: 3.9 MMOL/L (ref 3.5–5.2)
RBC # BLD AUTO: 4.09 10*6/MM3 (ref 3.77–5.28)
SMUDGE CELLS BLD QL SMEAR: ABNORMAL
SODIUM SERPL-SCNC: 141 MMOL/L (ref 136–145)
UREASE TISS QL: POSITIVE
VARIANT LYMPHS NFR BLD MANUAL: 43.9 % (ref 19.6–45.3)
WBC NRBC COR # BLD AUTO: 3.72 10*3/MM3 (ref 3.4–10.8)

## 2024-05-28 PROCEDURE — 85007 BL SMEAR W/DIFF WBC COUNT: CPT | Performed by: INTERNAL MEDICINE

## 2024-05-28 PROCEDURE — 80048 BASIC METABOLIC PNL TOTAL CA: CPT | Performed by: INTERNAL MEDICINE

## 2024-05-28 PROCEDURE — 99232 SBSQ HOSP IP/OBS MODERATE 35: CPT | Performed by: PHYSICIAN ASSISTANT

## 2024-05-28 PROCEDURE — G0378 HOSPITAL OBSERVATION PER HR: HCPCS

## 2024-05-28 PROCEDURE — 85025 COMPLETE CBC W/AUTO DIFF WBC: CPT | Performed by: INTERNAL MEDICINE

## 2024-05-28 PROCEDURE — 97530 THERAPEUTIC ACTIVITIES: CPT

## 2024-05-28 RX ADMIN — ATORVASTATIN CALCIUM 80 MG: 80 TABLET, FILM COATED ORAL at 08:28

## 2024-05-28 RX ADMIN — ASPIRIN 81 MG: 81 TABLET, COATED ORAL at 08:28

## 2024-05-28 RX ADMIN — MEMANTINE HYDROCHLORIDE 5 MG: 5 TABLET, FILM COATED ORAL at 08:28

## 2024-05-28 RX ADMIN — LEVETIRACETAM 1000 MG: 500 TABLET, FILM COATED ORAL at 08:28

## 2024-05-28 RX ADMIN — BISACODYL 5 MG: 5 TABLET, COATED ORAL at 11:34

## 2024-05-28 RX ADMIN — AMLODIPINE BESYLATE 10 MG: 10 TABLET ORAL at 08:28

## 2024-05-28 RX ADMIN — PANTOPRAZOLE SODIUM 40 MG: 40 TABLET, DELAYED RELEASE ORAL at 06:42

## 2024-05-28 RX ADMIN — ESCITALOPRAM OXALATE 10 MG: 10 TABLET, FILM COATED ORAL at 08:28

## 2024-05-28 RX ADMIN — LISINOPRIL 40 MG: 40 TABLET ORAL at 08:28

## 2024-05-28 RX ADMIN — DONEPEZIL HYDROCHLORIDE 10 MG: 10 TABLET, FILM COATED ORAL at 08:28

## 2024-05-28 NOTE — CASE MANAGEMENT/SOCIAL WORK
Continued Stay Note  [facility]     Patient Name: Lucia Ellis  MRN: [id number]  Today's Date: 5/28/2024    Admit Date: 5/21/2024    Plan: SNF at [facility] receive [facility] [facility] precert. Bed available through the weekend and precert expires 5/29/24 at 2359   Discharge Plan       Row Name 05/28/24 1451       Plan    Plan Comments DC orders in [facility]. Spoke with Wilfredo/Jaylin and he confirms they can accept today. Spoke with sister, Rola, and she will transport patient about 1500 today. Patient updated and agreeable. Transfer packet in cubbie. Nurse aware.                   Discharge Codes    No documentation.                 Expected Discharge Date and Time       Expected Discharge Date Expected Discharge Time    May 28, 2024               Kim Santiago RN

## 2024-05-28 NOTE — PROGRESS NOTES
[facility]  Inpatient Progress Note    Reason for Follow Up:  GERD, gastritis     Subjective     Interval History:   S/p Egd 5/27/2024 w/ Dr. More:  3cm HH, mild gastritis    Pt reports she feels fine. Denies epigastric pain, nausea/vomiting. Has been tolerating po intake w/o any issues.       Current Facility-Administered Medications:     acetaminophen (TYLENOL) tablet 650 mg, 650 mg, Oral, Q4H PRN, Clive More MD    amLODIPine (NORVASC) tablet 10 mg, 10 mg, Oral, Daily, Clive More MD, 10 mg at 05/28/24 0828    aspirin EC tablet 81 mg, 81 mg, Oral, Daily, Clive More MD, 81 mg at 05/28/24 0828    atorvastatin (LIPITOR) tablet 80 mg, 80 mg, Oral, Daily, Clive More MD, 80 mg at 05/28/24 0828    sennosides-docusate (PERICOLACE) 8.6-50 MG per tablet 2 tablet, 2 tablet, Oral, BID PRN **AND** polyethylene glycol (MIRALAX) packet 17 g, 17 g, Oral, Daily PRN **AND** bisacodyl (DULCOLAX) EC tablet 5 mg, 5 mg, Oral, Daily PRN **AND** bisacodyl (DULCOLAX) suppository 10 mg, 10 mg, Rectal, Daily PRN, Clive More MD    Calcium Replacement - Follow Nurse / BPA Driven Protocol, , Does not apply, PRN, Clive More MD    cetirizine (zyrTEC) tablet 10 mg, 10 mg, Oral, Daily PRN, Clive More MD    donepezil (ARICEPT) tablet 10 mg, 10 mg, Oral, Daily, Clive More MD, 10 mg at 05/28/24 0828    escitalopram (LEXAPRO) tablet 10 mg, 10 mg, Oral, Daily, Clive More MD, 10 mg at 05/28/24 0828    levETIRAcetam (KEPPRA) tablet 1,000 mg, 1,000 mg, Oral, BID, Clive More MD, 1,000 mg at 05/28/24 0828    lisinopril (PRINIVIL,ZESTRIL) tablet 40 mg, 40 mg, Oral, Daily, Clive More MD, 40 mg at 05/28/24 0828    Magnesium Standard Dose Replacement - Follow Nurse / BPA Driven Protocol, , Does not apply, PRN, Clive More MD    melatonin tablet 2.5 mg, 2.5 mg, Oral, Nightly PRN, Clive More MD    memantine  (NAMENDA) tablet 5 mg, 5 mg, Oral, BID, Clive More MD, 5 mg at 05/28/24 0828    OLANZapine (zyPREXA) tablet 5 mg, 5 mg, Oral, Nightly, Clive More MD, 5 mg at 05/27/24 2030    ondansetron ODT (ZOFRAN-ODT) disintegrating tablet 4 mg, 4 mg, Oral, Q6H PRN **OR** ondansetron (ZOFRAN) injection 4 mg, 4 mg, Intravenous, Q6H PRN, Clive More MD    pantoprazole (PROTONIX) EC tablet 40 mg, 40 mg, Oral, QAM, Clive More MD, 40 mg at 05/28/24 0642    Phosphorus Replacement - Follow Nurse / BPA Driven Protocol, , Does not apply, Derik SUTHERLAND Frederick J, MD    Potassium Replacement - Follow Nurse / BPA Driven Protocol, , Does not apply, Derik SUTHERLAND Frederick J, MD    sodium chloride 0.9 % infusion, 30 mL/hr, Intravenous, Continuous PRN, Clive More MD, Stopped at 05/27/24 1058        Objective     Vital Signs  Temp:  [97.8 °F (36.6 °C)-98.8 °F (37.1 °C)] 98.1 °F (36.7 °C)  Heart Rate:  [70-91] 70  Resp:  [16] 16  BP: (107-134)/(56-69) 123/61  Body mass index is 19.24 kg/m².    Intake/Output Summary (Last 24 hours) at 5/28/2024 1030  Last data filed at 5/27/2024 1058  Gross per 24 hour   Intake 300 ml   Output 0 ml   Net 300 ml     No intake/output data recorded.     Physical Exam:   General: patient awake, alert and cooperative   Eyes: Normal lids and lashes, no scleral icterus   Abdomen: soft, nontender, nondistended; normal bowel sounds      Results Review:     I reviewed the patient's new clinical results.    Results from last 7 days   Lab Units 05/28/24  0526 05/27/24  0359 05/26/24  0509   WBC 10*3/mm3 3.72 3.38* 3.55   HEMOGLOBIN g/dL 11.5* 10.8* 12.0   HEMATOCRIT % 35.1 33.3* 37.2   PLATELETS 10*3/mm3 160 149 137*     Results from last 7 days   Lab Units 05/28/24  0526 05/27/24  0359 05/26/24  0509 05/25/24  0419 05/24/24  0524 05/23/24  0621   SODIUM mmol/L 141 140 140 140 140 142   POTASSIUM mmol/L 3.9 3.7 3.9 4.1 3.7 4.0   CHLORIDE mmol/L 103 104 104 104 104 108*   CO2  mmol/L 28.0 26.8 26.0 24.0 24.9 25.5   BUN mg/dL 20 16 13 19 18 16   CREATININE mg/dL 0.55* 0.69 0.60 0.65 0.62 0.61   CALCIUM mg/dL 8.7 8.9 8.4* 8.8 9.3 9.4   BILIRUBIN mg/dL  --   --   --  <0.2 0.4 <0.2   ALK PHOS U/L  --   --   --  58 66 66   ALT (SGPT) U/L  --   --   --  18 10 10   AST (SGOT) U/L  --   --   --  15 13 12   GLUCOSE mg/dL 156* 185* 256* 140* 147* 78         Lab Results   Lab Value Date/Time    LIPASE 23 05/09/2018 1923       Radiology:  CT Abdomen Pelvis With Contrast   Final Result      XR Chest 1 View   Final Result   1. No acute process.           This report was finalized on 5/21/2024 3:56 PM by Dr. Oli Madden M.D on Workstation: [id number]              Assessment & Plan     Active Hospital Problems    Diagnosis     **Generalized weakness     Severe malnutrition     Gastritis without bleeding     Abnormal CT scan, stomach     History of stroke     Vascular dementia, moderate, without behavioral disturbance, psychotic disturbance, mood disturbance, and anxiety     Uncontrolled type 2 diabetes mellitus with hypoglycemia without coma     Hyperlipidemia     Generalized anxiety disorder     Gastroesophageal reflux disease          Assessment:   Anorexia with unintentional weight loss and malnutrition  GERD  CT scan with evidence of gastritis and possible papillary stenosis s/p EGD 5/27 w/ gastritis and 3cm HH  Constipation  Vascular dementia    All problems new to me   Plan:   Path is pending. We will reach out to her in next few weeks to review results. She should call our office if no call received in the next 3 weeks.   Given gastritis on EGD and mild GERD symptoms, recommend completing 8 weeks of PPI once daily.  Can continue diet as tolerating otherwise.  No further recs from GI. We will s/o. Please call us with any questions or concerns.     I discussed the patients findings and my recommendations with patient.         Umm Haywood PA-C  [facility]  [address]  [place] [address]  Office: [phone]

## 2024-05-28 NOTE — PLAN OF CARE
Goal Outcome Evaluation:  Plan of Care Reviewed With: patient        Progress: improving  Outcome Evaluation: Pt was seen by PT this AM for tx. Pt sat up to R side of bed w/ SBA. Pt then stood w/ CGA and use of fww. Pt amb approx 50'+ req CGA / SV and use of fww. Pt performed standing ther ex and was UIC at end of session. PT will prog as pt nahun. Likely dc to SNF today.      Anticipated Discharge Disposition (PT): skilled nursing facility

## 2024-05-28 NOTE — PLAN OF CARE
Problem: Fall Injury Risk  Goal: Absence of Fall and Fall-Related Injury  Outcome: Ongoing, Progressing     Problem: Skin Injury Risk Increased  Goal: Skin Health and Integrity  Outcome: Ongoing, Progressing   Goal Outcome Evaluation:  Plan of Care Reviewed With: patient        Progress: no change  Outcome Evaluation: Pt appeared to sleep well, alert and oriented x2, forgetful, coop with care, up to br with assist, possible d/c today

## 2024-05-28 NOTE — CASE MANAGEMENT/SOCIAL WORK
Case Management Discharge Note      Final Note: dc to skilled bed at [facility]    Provided Post Acute Provider List?: N/A  Provided Post Acute Provider Quality & Resource List?: N/A    Selected Continued Care - Discharged on 5/28/2024 Admission date: 5/21/2024 - Discharge disposition: Skilled Nursing Facility (DC - External)      Destination Coordination complete.      Service Provider Selected Services Address Phone Fax Patient Preferred    [facility] [place] [facility] [address] [facility] [address] [address] [phone] --              Durable Medical Equipment    No services have been selected for the patient.                Dialysis/Infusion    No services have been selected for the patient.                Home Medical Care    No services have been selected for the patient.                Therapy    No services have been selected for the patient.                Community Resources    No services have been selected for the patient.                Community & DME    No services have been selected for the patient.                    Selected Continued Care - Episodes Includes continued care and service providers with selected services from the active episodes listed below      Ambulatory Social Work Case Management Episode start date: 4/22/2024      Community & DME       Service Provider Selected Services Address Phone Fax Patient Preferred    [facility] Social Care [address] [facility] [address] [phone] -- --                          Transportation Services  Private: Car    Final Discharge Disposition Code: 03 - skilled nursing facility (SNF)

## 2024-05-28 NOTE — PLAN OF CARE
Goal Outcome Evaluation:      AVS completed and in packet for DC. PIV x1 removed. Primary RN to give report to receiving facility.

## 2024-05-28 NOTE — THERAPY TREATMENT NOTE
Patient Name: Lucia Ellis  : 1950    MRN: [id number]                              Today's Date: 2024       Admit Date: 2024    Visit Dx:     ICD-10-CM ICD-9-CM   1. Generalized weakness  R53.1 780.79   2. Dementia, unspecified dementia severity, unspecified dementia type, unspecified whether behavioral, psychotic, or mood disturbance or anxiety  F03.90 294.20   3. Gastroesophageal reflux disease, unspecified whether esophagitis present  K21.9 530.81   4. Gastritis without bleeding, unspecified chronicity, unspecified gastritis type  K29.70 535.50   5. Severe malnutrition  E43 261   6. Chronic superficial gastritis without bleeding  K29.30 535.10   7. Abnormal CT scan, stomach  R93.3 793.4     Patient Active Problem List   Diagnosis    Gastroesophageal reflux disease    Malignant neoplasm of breast    Depression    Folliculitis    Generalized anxiety disorder    Hyperlipidemia    Essential hypertension    Status post total right knee replacement    Rectal hemorrhage    Vitamin D deficiency    Drug therapy    Acute cholecystitis    Uncontrolled type 2 diabetes mellitus with hypoglycemia without coma    Myoclonus    Type 2 diabetes mellitus with hyperglycemia    Hypokalemia    New onset seizure    Focal motor seizure    Vascular dementia, moderate, without behavioral disturbance, psychotic disturbance, mood disturbance, and anxiety    Stroke-like symptoms    CVA (cerebral vascular accident)    Lung nodules    Hypokalemia    History of stroke    Generalized weakness    Severe malnutrition    Gastritis without bleeding    Abnormal CT scan, stomach     Past Medical History:   Diagnosis Date    Breast cancer     Dementia     Diabetes mellitus     Hypertension     Memory loss      Past Surgical History:   Procedure Laterality Date    CHOLECYSTECTOMY      ENDOSCOPY N/A 2024    Procedure: ESOPHAGOGASTRODUODENOSCOPY;  Surgeon: Clive More MD;  Location: [facility];  Service:  Gastroenterology;  Laterality: N/A;  PREOP/ ABNORMAL CT OF STOMACH- POSTOP/ GASTRITIS    HYSTERECTOMY      MASTECTOMY Right 1995    TOTAL KNEE ARTHROPLASTY Right       General Information       Row Name 05/28/24 0915          Physical Therapy Time and Intention    Document Type therapy note (daily note)  -PH     Mode of Treatment physical therapy  -       Row Name 05/28/24 0915          General Information    Existing Precautions/Restrictions fall  -PH       Row Name 05/28/24 0915          Cognition    Orientation Status (Cognition) oriented to;person;place  -PH       Row Name 05/28/24 0915          Safety Issues, Functional Mobility    Impairments Affecting Function (Mobility) endurance/activity tolerance;balance;strength  -PH     Comment, Safety Issues/Impairments (Mobility) gt belt and non skid socks donned  -PH               User Key  (r) = Recorded By, (t) = Taken By, (c) = Cosigned By      Initials Name Provider Type    PH Krystyna Aguirre PTA Physical Therapist Assistant                   Mobility       Row Name 05/28/24 0916          Bed Mobility    Bed Mobility supine-sit  -PH     Supine-Sit Snow (Bed Mobility) standby assist  -PH     Assistive Device (Bed Mobility) head of bed elevated;bed rails  -PH     Comment, (Bed Mobility) incr time to EOB; extra time seated EOB w/ RN administering meds.  -PH       Row Name 05/28/24 0916          Sit-Stand Transfer    Sit-Stand Snow (Transfers) contact guard  -PH     Assistive Device (Sit-Stand Transfers) walker, front-wheeled  -PH       Row Name 05/28/24 0916          Gait/Stairs (Locomotion)    Snow Level (Gait) contact guard;supervision  -PH     Assistive Device (Gait) walker, front-wheeled  -PH     Distance in Feet (Gait) 50  50'+  -PH     Deviations/Abnormal Patterns (Gait) bina decreased;gait speed decreased;stride length decreased  -PH     Bilateral Gait Deviations forward flexed posture  -PH     Snow Level (Stairs)  not tested  -PH     Comment, (Gait/Stairs) slow although fairly steady w/ no overt LOB  -PH               User Key  (r) = Recorded By, (t) = Taken By, (c) = Cosigned By      Initials Name Provider Type     Krystyna Aguirre PTA Physical Therapist Assistant                   Obj/Interventions       Row Name 05/28/24 0917          Motor Skills    Therapeutic Exercise other (see comments)  B heel raises, mini squats; x 10 reps each  -PH       Row Name 05/28/24 0917          Balance    Balance Assessment sitting static balance;standing static balance  -PH     Static Sitting Balance standby assist  -PH     Static Standing Balance supervision  -PH     Dynamic Standing Balance contact guard  -PH     Position/Device Used, Standing Balance walker, front-wheeled  -PH               User Key  (r) = Recorded By, (t) = Taken By, (c) = Cosigned By      Initials Name Provider Type     Krystyna Aguirre PTA Physical Therapist Assistant                   Goals/Plan    No documentation.                  Clinical Impression       Row Name 05/28/24 0918          Pain    Pretreatment Pain Rating 0/10 - no pain  -PH     Posttreatment Pain Rating 0/10 - no pain  -PH     Additional Documentation Pain Scale: Numbers Pre/Post-Treatment (Group)  -PH       Row Name 05/28/24 0918          Plan of Care Review    Plan of Care Reviewed With patient  -PH     Progress improving  -PH     Outcome Evaluation Pt was seen by PT this AM for tx. Pt sat up to R side of bed w/ SBA. Pt then stood w/ CGA and use of fww. Pt amb approx 50'+ req CGA / SV and use of fww. Pt performed standing ther ex and was UIC at end of session. PT will prog as pt nahun. Likely dc to SNF today.  -PH       Row Name 05/28/24 0918          Vital Signs    O2 Delivery Pre Treatment room air  -PH     O2 Delivery Intra Treatment room air  -PH     O2 Delivery Post Treatment room air  -PH       Row Name 05/28/24 0918          Positioning and Restraints    Pre-Treatment  Position in bed  -PH     Post Treatment Position chair  -PH     In Chair reclined;call light within reach;encouraged to call for assist;exit alarm on  -PH               User Key  (r) = Recorded By, (t) = Taken By, (c) = Cosigned By      Initials Name Provider Type    Krystyna Carranza PTA Physical Therapist Assistant                   Outcome Measures       Row Name 05/28/24 0919          How much help from another person do you currently need...    Turning from your back to your side while in flat bed without using bedrails? 3  -PH     Moving from lying on back to sitting on the side of a flat bed without bedrails? 3  -PH     Moving to and from a bed to a chair (including a wheelchair)? 3  -PH     Standing up from a chair using your arms (e.g., wheelchair, bedside chair)? 3  -PH     Climbing 3-5 steps with a railing? 2  -PH       Row Name 05/28/24 0919          Functional Assessment    Outcome Measure Options AM-PAC 6 Clicks Basic Mobility (PT)  -PH               User Key  (r) = Recorded By, (t) = Taken By, (c) = Cosigned By      Initials Name Provider Type    Krystyna Carranza PTA Physical Therapist Assistant                                 Physical Therapy Education       Title: PT OT SLP Therapies (Done)       Topic: Physical Therapy (Done)       Point: Mobility training (Done)       Learning Progress Summary             Patient Acceptance, E,TB,D, VU by  at 5/28/2024 0919    Acceptance, E, VU,NR by MO at 5/26/2024 1515    Acceptance, E,TB,D, VU,NR by PH at 5/24/2024 1444    Acceptance, E,TB,D, VU,NR by PH at 5/23/2024 0951    Acceptance, E,D, VU,NR by MS at 5/22/2024 1000                         Point: Home exercise program (Done)       Learning Progress Summary             Patient Acceptance, E,TB,D, VU by PH at 5/28/2024 0919    Acceptance, E, VU,NR by MO at 5/26/2024 1515    Acceptance, E,TB,D, VU,NR by PH at 5/24/2024 1444    Acceptance, E,TB,D, VU,NR by PH at 5/23/2024 0951     Acceptance, E,D, VU,NR by MS at 5/22/2024 1000                         Point: Body mechanics (Done)       Learning Progress Summary             Patient Acceptance, E,TB,D, VU by PH at 5/28/2024 0919    Acceptance, E, VU,NR by MO at 5/26/2024 1515    Acceptance, E,TB,D, VU,NR by PH at 5/24/2024 1444    Acceptance, E,TB,D, VU,NR by PH at 5/23/2024 0951    Acceptance, E,D, VU,NR by MS at 5/22/2024 1000                         Point: Precautions (Done)       Learning Progress Summary             Patient Acceptance, E,TB,D, VU by PH at 5/28/2024 0919    Acceptance, E, VU,NR by MO at 5/26/2024 1515    Acceptance, E,TB,D, VU,NR by PH at 5/24/2024 1444    Acceptance, E,TB,D, VU,NR by PH at 5/23/2024 0951    Acceptance, E,D, VU,NR by MS at 5/22/2024 1000                                         User Key       Initials Effective Dates Name Provider Type Discipline    MS 06/16/21 -  John Serrano, PT Physical Therapist PT     06/16/21 -  Krystyna Aguirre PTA Physical Therapist Assistant PT    MO 05/26/23 -  Lucia Rendon, PT Physical Therapist PT                  PT Recommendation and Plan     Plan of Care Reviewed With: patient  Progress: improving  Outcome Evaluation: Pt was seen by PT this AM for tx. Pt sat up to R side of bed w/ SBA. Pt then stood w/ CGA and use of fww. Pt amb approx 50'+ req CGA / SV and use of fww. Pt performed standing ther ex and was UIC at end of session. PT will prog as pt nahun. Likely dc to SNF today.     Time Calculation:         PT Charges       Row Name 05/28/24 0920             Time Calculation    Start Time 0827  -PH      Stop Time 0840  -PH      Time Calculation (min) 13 min  -PH      PT Received On 05/28/24  -PH      PT - Next Appointment 05/29/24  -PH         Timed Charges    13899 - PT Therapeutic Exercise Minutes 3  -PH      21943 - PT Therapeutic Activity Minutes 10  -PH         Total Minutes    Timed Charges Total Minutes 13  -PH       Total Minutes 13  -PH                User  Key  (r) = Recorded By, (t) = Taken By, (c) = Cosigned By      Initials Name Provider Type    PH Krystyna Aguirre PTA Physical Therapist Assistant                  Therapy Charges for Today       Code Description Service Date Service Provider Modifiers Qty    17998041349  PT THERAPEUTIC ACT EA 15 MIN 5/28/2024 Krystyna Aguirre PTA GP 1            PT G-Codes  Outcome Measure Options: AM-PAC 6 Clicks Basic Mobility (PT)  AM-PAC 6 Clicks Score (PT): 18  PT Discharge Summary  Anticipated Discharge Disposition (PT): skilled nursing facility    Krystyna Aguirre PTA  5/28/2024

## 2024-05-28 NOTE — DISCHARGE SUMMARY
Patient Name: Lucia Ellis  : 1950  MRN: [id number]    Date of Admission: 2024  Date of Discharge:  2024  Primary Care Physician: Everardo Mazariegos MD      Chief Complaint:   Weakness - Generalized      Discharge Diagnoses     Active Hospital Problems    Diagnosis  POA    **Generalized weakness [R53.1]  Yes    Severe malnutrition [E43]  Yes    Gastritis without bleeding [K29.70]  Unknown    Abnormal CT scan, stomach [R93.3]  Unknown    History of stroke [Z86.73]  Not Applicable    Vascular dementia, moderate, without behavioral disturbance, psychotic disturbance, mood disturbance, and anxiety [F01.B0]  Yes    Uncontrolled type 2 diabetes mellitus with hypoglycemia without coma [E11.649]  Yes    Hyperlipidemia [E78.5]  Yes    Generalized anxiety disorder [F41.1]  Yes    Gastroesophageal reflux disease [K21.9]  Yes      Resolved Hospital Problems   No resolved problems to display.        Hospital Course   74 year old female was brought in by family; she was seen by her pcp today due to weakness and fatigue; she says her son lives in her house with her; per history provided the patient is left alone for periods of time and does not have access to food, water or medications; the patient is not able to give any history and the sister has left; no visitors are present at this time; she has had weight loss and decreased appetite.    1.  Vascular dementia, history of psychotic disturbance, anxiety, generalized weakness, currently does not appear to have any behavioral issues and will continue with Aricept, Namenda, Lexapro and olanzapine.     2.  Hypertension, continue with lisinopril and amlodipine.     3.  Generalized anxiety disorder, continue home SSRI therapy.     4.  Anorexia, unintentional weight loss, severe malnutrition, GERD, CT scan shows gastritis and possible papillary stenosis.  It also shows few hepatic cysts.  Status post EGD on 2024 which revealed 3 cm hiatal hernia, gastritis.   Patient's p.o. intake is in the acceptable range during this hospital stay.  Follow-up with GI as an outpatient basis.     5.  History of seizures, on Keppra.    6.  Diabetes mellitus, resume home meds.  Continue checking blood sugars before meals and at bedtime.    7.  Generalized weakness, PT/OT did evaluate and recommended rehab placement and a bed is available today.    Time taken to discharge 40 minutes    Day of Discharge         Physical Exam:  Temp:  [98.1 °F (36.7 °C)-98.8 °F (37.1 °C)] 98.1 °F (36.7 °C)  Heart Rate:  [70-91] 70  Resp:  [16] 16  BP: (107-134)/(56-61) 123/61  Body mass index is 19.24 kg/m².  Physical Exam  General, awake and alert, oriented to person and place  Head and ENT, normocephalic and atraumatic.  Lungs, symmetric expansion, equal air entry bilaterally.  Heart, regular rate and rhythm.  Abdomen, soft and nontender.  Extremities, no clubbing or cyanosis.  Neuro, cranial nerves intact  Skin: Warm and no rash.  Psych, normal mood and affect.  Musculoskeletal, joint examination is grossly normal.         Consultants     Consult Orders (all) (From admission, onward)       Start     Ordered    05/24/24 1330  Inpatient Nutrition Consult  Once        Provider:  (Not yet assigned)    05/24/24 1329    05/24/24 1111  Inpatient Gastroenterology Consult  Once        Specialty:  Gastroenterology  Provider:  Elvia Everett MD    05/24/24 1110    05/22/24 0732  Inpatient Case Management  Consult  Once        Provider:  (Not yet assigned)    05/22/24 0731                  Procedures     ESOPHAGOGASTRODUODENOSCOPY    Imaging Results (All)       Procedure Component Value Units Date/Time    CT Abdomen Pelvis With Contrast [[id number]] Collected: 05/21/24 1859     Updated: 05/22/24 0732    Narrative:      CT ABDOMEN AND PELVIS WITH IV CONTRAST     HISTORY: 74-year-old female with weight loss and anorexia.  Cholecystectomy and hysterectomy in the past.     TECHNIQUE: Radiation dose  reduction techniques were utilized, including  automated exposure control and exposure modulation based on body size.   3 mm images were obtained through the abdomen and pelvis after the  administration of IV contrast. No prior CTs for comparison     FINDINGS:  1. Gastric folds appear thickened and acute gastritis is suspected.  There is equivocal mild thickening of the ileum and there may be mild or  early enteritis as well. Please correlate clinically. There is formed  stool throughout the entire colon and rectum, and constipation is  suspected. Appendix appears within normal limits.     2. There are a few hepatic cysts. No suspicious liver lesions are seen.  The common bile duct measures 8 mm and the pancreatic duct measures 2-3  mm. This may be within normal limits, but the appearance can also be  seen with papillary stenosis. If there are obstructive LFTs, GI  follow-up is recommended. The pancreas, spleen, adrenals, and kidneys  appear unremarkable other than a few tiny renal cysts. Urinary bladder  wall is mildly diffusely and the bladder is mildly distended. Please  correlate clinically for acute UTI/cystitis.      3. There are moderately extensive abdominal aortic atherosclerotic  changes without aneurysmal dilatation.  There is no evidence for acute pneumonia and there are no pleural  effusions at the visualized lower chest.     This report was finalized on 5/22/2024 7:29 AM by Dr. Norma Fletcher M.D on  Workstation: [id number]       XR Chest 1 View [[id number]] Collected: 05/21/24 1555     Updated: 05/21/24 1600    Narrative:      XR CHEST 1 VW-5/21/2024     HISTORY: Weakness.     Patient is rotated to the left. Lungs are underinflated but appear  clear. Bones and soft tissues are unremarkable.       Impression:      1. No acute process.        This report was finalized on 5/21/2024 3:56 PM by Dr. Oli Madden M.D on Workstation: [id number]               Results for orders placed during the hospital  "encounter of 12/08/23    Adult transthoracic echo complete    Interpretation Summary    Left ventricular systolic function is hyperdynamic (EF > 70%). Calculated left ventricular EF = 70.8%    Left ventricular diastolic function was normal.    Normal echo    Saline test results are negative.    Pertinent Labs     Results from last 7 days   Lab Units 05/28/24  0526 05/27/24 0359 05/26/24 0509 05/25/24 0419   WBC 10*3/mm3 3.72 3.38* 3.55 3.09*   HEMOGLOBIN g/dL 11.5* 10.8* 12.0 12.3   PLATELETS 10*3/mm3 160 149 137* 143     Results from last 7 days   Lab Units 05/28/24 0526 05/27/24 0359 05/26/24 0509 05/25/24 0419   SODIUM mmol/L 141 140 140 140   POTASSIUM mmol/L 3.9 3.7 3.9 4.1   CHLORIDE mmol/L 103 104 104 104   CO2 mmol/L 28.0 26.8 26.0 24.0   BUN mg/dL 20 16 13 19   CREATININE mg/dL 0.55* 0.69 0.60 0.65   GLUCOSE mg/dL 156* 185* 256* 140*   EGFR mL/min/1.73 95.1 90.1 93.2 91.4     Results from last 7 days   Lab Units 05/25/24 0419 05/24/24 0524 05/23/24 0621 05/21/24  1604   ALBUMIN g/dL 3.4* 3.8 3.9 3.9   BILIRUBIN mg/dL <0.2 0.4 <0.2 <0.2   ALK PHOS U/L 58 66 66 65   AST (SGOT) U/L 15 13 12 13   ALT (SGPT) U/L 18 10 10 11     Results from last 7 days   Lab Units 05/28/24 0526 05/27/24 0359 05/26/24  0509 05/25/24 0419 05/24/24 0524 05/23/24 0621 05/22/24  0345 05/21/24  1604   CALCIUM mg/dL 8.7 8.9 8.4* 8.8 9.3 9.4   < > 9.2   ALBUMIN g/dL  --   --   --  3.4* 3.8 3.9  --  3.9   MAGNESIUM mg/dL  --   --   --   --   --   --   --  1.8    < > = values in this interval not displayed.               Invalid input(s): \"LDLCALC\"          Test Results Pending at Discharge     Pending Labs       Order Current Status    Tissue Pathology Exam In process    Urease For H Pylori - Tissue, Stomach In process            Discharge Details        Discharge Medications        Changes to Medications        Instructions Start Date   cetirizine 10 MG tablet  Commonly known as: zyrTEC  What changed:   when to take " this  reasons to take this   10 mg, Oral, Daily             Continue These Medications        Instructions Start Date   amLODIPine 10 MG tablet  Commonly known as: NORVASC   10 mg, Oral, Daily      aspirin 81 MG EC tablet   81 mg, Oral, Daily      atorvastatin 80 MG tablet  Commonly known as: LIPITOR   80 mg, Oral, Every Night at Bedtime      donepezil 10 MG tablet  Commonly known as: Aricept   10 mg, Oral, Daily      empagliflozin 10 MG tablet tablet  Commonly known as: Jardiance   10 mg, Oral, Daily      escitalopram 10 MG tablet  Commonly known as: Lexapro   10 mg, Oral, Daily      FreeStyle Aleyda 2 [place] device   1 each, Does not apply, Continuous      FreeStyle Aleyda 2 Sensor misc   1 each, Does not apply, Weekly      Janumet XR  MG tablet  Generic drug: SITagliptin-metFORMIN HCl ER   1 tablet, Oral, Daily      levETIRAcetam 1000 MG tablet  Commonly known as: KEPPRA   1,000 mg, Oral, 2 Times Daily      lisinopril 40 MG tablet  Commonly known as: PRINIVIL,ZESTRIL   40 mg, Oral, Daily      memantine 5 MG tablet  Commonly known as: NAMENDA   5 mg, Oral, 2 Times Daily      OLANZapine 5 MG tablet  Commonly known as: zyPREXA   5 mg, Oral, Every Night at Bedtime      pantoprazole 40 MG EC tablet  Commonly known as: PROTONIX   40 mg, Oral, Every Morning               Allergies   Allergen Reactions    Ppd [Tuberculin Purified Protein Derivative] Rash       Discharge Disposition:  Skilled Nursing Facility (DC - External)      Discharge Diet:  Diet Order   Procedures    Diet: Regular/House; Fluid Consistency: Thin (IDDSI 0)       Discharge Activity:   Activity Instructions       Activity as Tolerated              CODE STATUS:    Code Status and Medical Interventions:   Ordered at: 05/21/24 1913     Code Status (Patient has no pulse and is not breathing):    CPR (Attempt to Resuscitate)     Medical Interventions (Patient has pulse or is breathing):    Full       Future Appointments   Date Time Provider Department  [place]   6/6/2024  2:00 PM Gia Montes De Oca MD [facility]   7/29/2024 12:45 PM [facility]     Additional Instructions for the Follow-ups that You Need to Schedule       Discharge Follow-up with PCP   As directed       Currently Documented PCP:    Everardo Mazariegos MD    PCP Phone Number:    [phone]     Follow Up Details: 1 week        Discharge Follow-up with Specified Provider: ; 1 Month   As directed      To:    Follow Up: 1 Month               Follow-up Information       Everardo Mazariegos MD .    Specialties: Family Medicine, Urgent Care, Emergency Medicine  Why: 1 week  Contact information:  [address] [address]  [phone]                             Additional Instructions for the Follow-ups that You Need to Schedule       Discharge Follow-up with PCP   As directed       Currently Documented PCP:    Everardo Mazariegos MD    PCP Phone Number:    [phone]     Follow Up Details: 1 week        Discharge Follow-up with Specified Provider: ; 1 Month   As directed      To:    Follow Up: 1 Month            Time Spent on Discharge:  Greater than 30 minutes      Leonel Lopez MD  [facility]  05/28/24  11:35 EDT

## 2024-05-29 LAB
LAB AP CASE REPORT: NORMAL
PATH REPORT.FINAL DX SPEC: NORMAL
PATH REPORT.GROSS SPEC: NORMAL

## 2024-05-30 DIAGNOSIS — E11.65 TYPE 2 DIABETES MELLITUS WITH HYPERGLYCEMIA, WITHOUT LONG-TERM CURRENT USE OF INSULIN: ICD-10-CM

## 2024-05-30 NOTE — TELEPHONE ENCOUNTER
CHANGE IN PHARMACY      Caller: [facility] - [facility] [address] [place] - [phone] [facility] [phone] FX    Relationship: Pharmacy        Requested Prescriptions:   Requested Prescriptions     Pending Prescriptions Disp Refills    empagliflozin (Jardiance) 10 MG tablet tablet 30 tablet 11     Sig: Take 1 tablet by mouth Daily. Indications: Type 2 Diabetes        Pharmacy where request should be sent: [facility] - [facility] [address] [place] - [phone] [facility] [phone] FX     Last office visit with prescribing clinician: 4/19/2024   Last telemedicine visit with prescribing clinician: Visit date not found   Next office visit with prescribing clinician: 7/29/2024     Additional details provided by patient:     Does the patient have less than a 3 day supply:  [] Yes  [] No    Would you like a call back once the refill request has been completed: [] Yes [] No    If the office needs to give you a call back, can they leave a voicemail: [] Yes [] No    Arlette Adams Rep   05/30/24 14:13 EDT

## 2024-06-05 DIAGNOSIS — A04.8 BACTERIAL INFECTION DUE TO HELICOBACTER PYLORI: Primary | ICD-10-CM

## 2024-06-06 ENCOUNTER — OFFICE VISIT (OUTPATIENT)
Dept: NEUROLOGY | Facility: CLINIC | Age: 74
End: 2024-06-06
Payer: MEDICARE

## 2024-06-06 ENCOUNTER — HOME HEALTH ADMISSION (OUTPATIENT)
Dept: HOME HEALTH SERVICES | Facility: HOME HEALTHCARE | Age: 74
End: 2024-06-06
Payer: COMMERCIAL

## 2024-06-06 VITALS
DIASTOLIC BLOOD PRESSURE: 60 MMHG | WEIGHT: 110 LBS | HEART RATE: 87 BPM | SYSTOLIC BLOOD PRESSURE: 130 MMHG | OXYGEN SATURATION: 97 % | HEIGHT: 64 IN | BODY MASS INDEX: 18.78 KG/M2

## 2024-06-06 DIAGNOSIS — G40.109 FOCAL MOTOR SEIZURE: Primary | ICD-10-CM

## 2024-06-06 DIAGNOSIS — F01.B0 VASCULAR DEMENTIA, MODERATE, WITHOUT BEHAVIORAL DISTURBANCE, PSYCHOTIC DISTURBANCE, MOOD DISTURBANCE, AND ANXIETY: Chronic | ICD-10-CM

## 2024-06-06 DIAGNOSIS — Z86.73 HISTORY OF STROKE: ICD-10-CM

## 2024-06-06 DIAGNOSIS — F03.90 DEMENTIA WITHOUT BEHAVIORAL DISTURBANCE: ICD-10-CM

## 2024-06-06 DIAGNOSIS — E43 SEVERE MALNUTRITION: ICD-10-CM

## 2024-06-06 DIAGNOSIS — R53.1 GENERALIZED WEAKNESS: ICD-10-CM

## 2024-06-06 RX ORDER — ASPIRIN 81 MG/1
81 TABLET ORAL DAILY
Qty: 30 TABLET | Refills: 5 | Status: SHIPPED | OUTPATIENT
Start: 2024-06-06

## 2024-06-06 RX ORDER — BISMUTH SUBSALICYLATE 262 MG/1
524 TABLET, CHEWABLE ORAL
COMMUNITY

## 2024-06-06 RX ORDER — DONEPEZIL HYDROCHLORIDE 10 MG/1
10 TABLET, FILM COATED ORAL DAILY
Qty: 90 TABLET | Refills: 1 | Status: SHIPPED | OUTPATIENT
Start: 2024-06-06

## 2024-06-06 RX ORDER — OMEPRAZOLE 40 MG/1
40 CAPSULE, DELAYED RELEASE ORAL DAILY
COMMUNITY

## 2024-06-06 RX ORDER — LEVETIRACETAM 1000 MG/1
1000 TABLET ORAL 2 TIMES DAILY
Qty: 180 TABLET | Refills: 1 | Status: SHIPPED | OUTPATIENT
Start: 2024-06-06

## 2024-06-06 RX ORDER — MEMANTINE HYDROCHLORIDE 5 MG/1
5 TABLET ORAL 2 TIMES DAILY
Qty: 180 TABLET | Refills: 1 | Status: SHIPPED | OUTPATIENT
Start: 2024-06-06

## 2024-06-06 NOTE — ASSESSMENT & PLAN NOTE
Since her prior visit she was most recently hospitalized from 5/21/2024 to 5/28/2024 and from reports she had been seen by her pcp and was referred to the ED due to weakness and fatigue.  Patient's son lives in her house with her and had left patient alone for long periods of time without access to food, water or medications; she has had weight loss and decreased appetite.  She reports not being able to eat like she used to.  She is eating more at the facility she is currently at and I informed her she needs to also eat at home.  I will place a referral for skilled home health nursing for further assistance with patient's symptoms.  To check on patient and make sure she is doing well at home.

## 2024-06-06 NOTE — PATIENT INSTRUCTIONS
In general, we recommend using good judgement when you are doing certain activities and to avoid those activities that if you were to have a seizure, you could harm yourself or others. In the state of [place], it is the law that you cannot drive within 90 days of a seizure. We also recommend not standing over open flames, not getting on high ladders or the roof, not swimming or taking baths by yourself (showers are ok) and not operating heavy machinery or power tools.  [facility]  Gia Montes De Oca MD  Neurology clinic  [phone]    With anti-seizure medications, you may initially notice side effects of fatigue, drowsiness, unsteadiness, and dizziness.  Other possible side effects include nausea, abdominal pain, headache, blurry or double vision, slurred speech and mood changes.  Generally, patients will noticed these symptoms when the medication is first started or with higher doses and will go away with time.    It is import to consistently take your medication every day.  Missing just one dose may put you at risk for a breakthrough seizure.  Consider using reminders on your phone or a pill box.    If you develop a rash, please call the neurology clinic immediately or notify another healthcare professional, as this may be potentially life-threatening.  If you are unable to reach a healthcare professional, go to the emergency room immediately for further evaluation.    If you develop thoughts of wanting to hurt yourself or others, please call the neurology clinic immediately to notify another healthcare professional.  If you are unable to reach a healthcare professional, go to the emergency room immediately for further evaluation.    It is the [place] state law that you cannot drive within 90 days of a seizure.    You should avoid certain activities that if you were to have a seizure, you could harm yourself or others. In general, it is recommended that you avoid operating heavy machinery or  power tools, swimming or taking baths by yourself (showers are ok), don't stand over open flames, don't get on high ladders or the roof.  I also recommend to avoid sleeping on your stomach.    For further information on epilepsy and resources available to patients and their families, please visit the [facility] at www.efky.org or call [phone].    **Check out the [facility]'s monthly Art Group Gathering.  They are [facility], [address] [place] [address].  Call Yesenia Ginny at [phone] or email her at [email or web address] for the dates of future gatherings.**      **If you have having memory problems, consider HOBSCOTCH (Home-Based Self-management and Cognitive Training Changes lives).  It is an 8 week self-management program for adults with epilepsy and memory problems.  The program is free at the [facility].  Contact Ingrid Rea at [phone] or [email or web address].**         Lower blood pressure by restricting salt in diet (less than 2400 mg/day), weight loss, increase fruits, vegetables, whole grains, and low-fat dairy products.    Consider the DASH diet or Mediterranean diet.  Increase fish and poultry intake.    Avoid sodas, sweets, and red meat.    Limit alcohol consumption.    Monitor and keep blood pressure, cholesterol and blood sugar at goal.    Avoid the use of tobacco and avoid secondhand smoke.    Engage in moderate to vigorous intensity aerobic exercise for about 40 minutes 3-4 times per week.  Consider lower intensity miguel chi or yoga if necessary.    Take antiplatelet medication regularly.    If you snore, wake up unrefreshed or feel tired during the day, talk to your doctor as these may be signs of sleep apnea and a sleep study may be indicated. **Eat a high fiber diet    **Exercise regularly (physically and mentally)    **Floss daily    **Consider eating yogurt regularly    **Consider drinking green  tea    **Limit soda and alcohol    **Ensure safety in the home    **Check out th Alzheimer's Association (www.alz.org).    **If you are interested in participating in a clinical trial, check out the following centers:   [facility]:  [email or web address]      [facility]:  [email or web address]     [facility]:  [email or web address]    **If you live in [facility], consider [facility]. It is a free agency that helps find placement for seniors. They do an assessment and find out the need and know which facilities have openings and would be the best fit. They help figure out the financial aspects as well.  Shivani Reis is the nurse navigator and her number is [phone].

## 2024-06-06 NOTE — PROGRESS NOTES
Chief Complaint  Memory Loss (Sister Jessica is with her today recent hospital stay- sister reports she had something wrong with her stomach- no new concerns today )    Subjective          Lucia Ellis presents to [facility] NEUROLOGY for   HISTORY OF PRESENT ILLNESS:    Lucia Ellis is a 74 year old right handed woman who returns to neurology clinic for follow up evaluation and treatment of memory loss, epilepsy, acute stroke on 12/9/2023 and more recent hospitalization for generalized weakness and severe malnutrition.  She presents today with her sister, Jessica, on visit today who also provides information.  She reports some trouble with her memory starting about 5+ years and more obvious more recently.  She is no longer driving.  She is also forgetful and misplacing objects.  She is forgetting conversations with her family and tells me she took money out of the bank and did not remember what she did with it and then realized she gave the money to her for new motor.  Her son has noticed that she is paranoid and misplaces and hides things even from herself.  She has had lumpectomy done for breast cancer with radiation treatment several years ago.  She had a brain MRI scan done which did not demonstrate any acute intracranial findings.  She denies any family history of dementia but reports family history of cancer.  She has had lab work including normal CBC, CMP and TSH.  Her Vit D levels were low and she reports taking supplements.  She also had normal Vit B12 level.  She is no longer driving.  She denies any exposures to toxins.  Her son cooks food for her and he also gives her medicines.  She denies any trouble with ADLs but tells me she takes her time and is slowing down to make sure she does things appropriately.  She sometimes thinks too long about what she needs to be wearing.  She tells me she does not remember going to the doctor with her son and she is forgetting regular conversations.   She is living with her son who does the cooking.  They have smoke detectors in their house.  She has not decided the POA status and living will.  She denies any significant depression.  She denies exercising and socializing much.  She is currently at [facility] till Monday and then again returning to living with her son at her house.  She is currently on combination of donepezil 10 mg daily along with memantine 5 mg BID.        She has had prior EEG which was abnormal due to presence of epileptiform discharges arising from the left fronto-temporal head regions which place patient at increased risk for focal and secondarily generalized seizures and she is currently on levetiracetam 1000 mg BID.  She has not had any seizure like activity.       She was evaluated in the hospital on 12/9/2023 due to trouble with her gait and left sided weakness.  She presented to the hospital from home with these symptoms which had reportedly started 2 days prior to her presentation to the hospital.  She was supposed to be taking a baby aspirin along with her atorvastatin but it was thought that she had forgotten to take her dose of baby aspirin.  CT head showed old stroke on the right caudate and anterior limb of internal capsule, small hypodensity was appreciated on posterior limb of the internal capsule vs lateral thalamus, CTA head and neck showed mild to moderate stenosis on the right P1 P2 junction, CTs also showed a nodule on the right lung concerning for metastasis, CT perfusion negative, she had a brain MRI scan which confirmed a stroke on the right lateral thalamus/posterior limb of internal capsule.  In the ED she had elevated blood pressure with 180s systolic, her stroke labs showed A1c of 8.4 and .  She was placed on combination of baby aspirin along with Plavix (for 90 days) and then high dose statin along with baby aspirin.  She continues to have trouble with her balance.  She has had some physical therapy.   She has not had any recent falls.     Since her prior visit she was most recently hospitalized from 5/21/2024 to 5/28/2024 and from reports she had been seen by her pcp and was referred to the ED due to weakness and fatigue.  Patient's son lives in her house with her and had left patient alone for long periods of time without access to food, water or medications; she has had weight loss and decreased appetite.  She reports not being able to eat like she used to.  She is eating more at the facility she is currently at and I informed her she needs to also eat at home.      Past Medical History:   Diagnosis Date    Breast cancer     Dementia     Diabetes mellitus     Hypertension     Memory loss         Family History   Problem Relation Age of Onset    Heart attack Mother     Heart disease Mother     Hypertension Mother     Hypertension Father     Diabetes Father     Hypertension Sister     Diabetes Sister     Thyroid cancer Sister     Breast cancer Sister     Lung cancer Sister     Diabetes Brother     Drug abuse Paternal Grandmother         Social History     Socioeconomic History    Marital status:    Tobacco Use    Smoking status: Never    Smokeless tobacco: Never   Vaping Use    Vaping status: Never Used   Substance and Sexual Activity    Alcohol use: Not Currently     Alcohol/week: 7.0 standard drinks of alcohol     Types: 7 Glasses of wine per week     Comment: per patient's son, patient drinks one glass of wine daily    Drug use: No    Sexual activity: Never        I have reviewed and confirmed the accuracy of the ROS as documented by the MA/LPN/RN Gia Montes De Oca MD   Review of Systems   Neurological:  Negative for dizziness, tremors, seizures, syncope, facial asymmetry, speech difficulty, weakness, light-headedness, numbness, headache, memory problem and confusion.   Psychiatric/Behavioral:  Negative for agitation, behavioral problems, decreased concentration, dysphoric mood, hallucinations,  "self-injury, sleep disturbance, suicidal ideas, negative for hyperactivity, depressed mood and stress. The patient is not nervous/anxious.         Objective   Vital Signs:   /60   Pulse 87   Ht 162.6 cm (64.02\")   Wt 49.9 kg (110 lb)   SpO2 97%   BMI 18.87 kg/m²       PHYSICAL EXAM:    General   Mental Status - Alert. General Appearance - Thin, Well groomed, Oriented and Cooperative.        Head and Neck  Head - normocephalic, atraumatic with no lesions or palpable masses.  Neck    Global Assessment - supple.       Eye   Sclera/Conjunctiva - Bilateral - Normal.    ENMT  Mouth and Throat   Oral Cavity/Oropharynx: Oropharynx - the soft palate,uvula and tongue are normal in appearance.    Chest and Lung Exam   Chest - lung clear to auscultation bilaterally.    Cardiovascular   Cardiovascular examination reveals  - normal heart sounds, regular rate and rhythm.    Neurologic   Mental Status: Speech - Normal. Cognitive function - SLUMS 23/30 today with 0/5 object recall compared to prior 22/30 with 1/5 object recall compared to prior 23/30 with 0/5 object recall compared to 29/30 previously, appropriate fund of knowledge. No impairment of attention, Impairment of concentration, impairment of long term memory or impairment of short term memory.  Cranial Nerves:   II Optic: Visual acuity - Left - Normal. Right - Normal. Visual fields - Normal (to confrontation).  III Oculomotor: Pupillary constriction - Left - Normal. Right - Normal.  VII Facial: - Normal Bilaterally.  VIII Acoustic - Bilateral - Hearing normal and (Hearing tested by finger rub).   IX Glossopharyngeal / X Vagus - Normal.  XI Accessory: Trapezius - Bilateral - Normal. Sternocleidomastoid - Bilateral - Normal.  XII Hypoglossal - Bilateral - Normal.  Eye Movements: - Normal Bilaterally.  Sensory:   Light Touch: Intact - Globally.  Motor:   Bulk and Contour: - Normal.  Tone: - Normal.  Tremor: Not present.  Strength: 5/5 normal muscle strength - All " Muscles.                                                        General Assessment of Reflexes: - deep tendon reflexes are normal. Coordination - No Impairment of finger-to-nose or Impairment of rapid alternating movements. Gait - Sitting in wheelchair today, walks with walker at home     Result Review :                 Assessment and Plan    Problem List Items Addressed This Visit          Endocrine and Metabolic    Severe malnutrition    Current Assessment & Plan     Will place referral for home health for further assistance and make sure patient receives food and medications with help from  if needed.           Relevant Orders    Ambulatory Referral to Home Health (Outpatient)       Neuro    Focal motor seizure - Primary    Current Assessment & Plan     She has had prior EEG which was abnormal due to presence of epileptiform discharges arising from the left fronto-temporal head regions which place patient at increased risk for focal and secondarily generalized seizures and she is currently on levetiracetam 1000 mg BID.  She has not had any seizure like activity.  Discussed seizure precautions and will continue the levetiracetam 1000 mg BID.  She is not driving and following seizure precautions.           Relevant Medications    levETIRAcetam (KEPPRA) 1000 MG tablet    History of stroke    Current Assessment & Plan     She was evaluated in the hospital on 12/9/2023 due to trouble with her gait and left sided weakness.  She presented to the hospital from home with these symptoms which had reportedly started 2 days prior to her presentation to the hospital.  She was supposed to be taking a baby aspirin along with her atorvastatin but it was thought that she had forgotten to take her dose of baby aspirin.  CT head showed old stroke on the right caudate and anterior limb of internal capsule, small hypodensity was appreciated on posterior limb of the internal capsule vs lateral thalamus, CTA head and neck  showed mild to moderate stenosis on the right P1 P2 junction, CTs also showed a nodule on the right lung concerning for metastasis, CT perfusion negative, she had a brain MRI scan which confirmed a stroke on the right lateral thalamus/posterior limb of internal capsule.  In the ED she had elevated blood pressure with 180s systolic, her stroke labs showed A1c of 8.4 and .  She was placed on combination of baby aspirin along with Plavix (for 90 days) and then high dose statin along with baby aspirin.  She continues to have trouble with her balance.  She has had some physical therapy.  She has not had any recent falls.  No new stoke like symptoms.  No blood in her stool or urine. Advised her to be taken to the nearest ED with a any new stroke like symptoms.           Relevant Medications    aspirin 81 MG EC tablet       Symptoms and Signs    Generalized weakness    Current Assessment & Plan     Since her prior visit she was most recently hospitalized from 5/21/2024 to 5/28/2024 and from reports she had been seen by her pcp and was referred to the ED due to weakness and fatigue.  Patient's son lives in her house with her and had left patient alone for long periods of time without access to food, water or medications; she has had weight loss and decreased appetite.  She reports not being able to eat like she used to.  She is eating more at the facility she is currently at and I informed her she needs to also eat at home.  I will place a referral for skilled home health nursing for further assistance with patient's symptoms.  To check on patient and make sure she is doing well at home.             Other    Vascular dementia, moderate, without behavioral disturbance, psychotic disturbance, mood disturbance, and anxiety (Chronic)    Current Assessment & Plan     74 year old right handed woman who returns to neurology clinic for follow up evaluation and treatment of memory loss, epilepsy, acute stroke on 12/9/2023 and  more recent hospitalization for generalized weakness and severe malnutrition.  She presents today with her sister, Jessica, on visit today who also provides information.  She reports some trouble with her memory starting about 5+ years and more obvious more recently.  She is no longer driving.  She is also forgetful and misplacing objects.  She is forgetting conversations with her family and tells me she took money out of the bank and did not remember what she did with it and then realized she gave the money to her for new motor.  Her son has noticed that she is paranoid and misplaces and hides things even from herself.  She has had lumpectomy done for breast cancer with radiation treatment several years ago.  She had a brain MRI scan done which did not demonstrate any acute intracranial findings.  She denies any family history of dementia but reports family history of cancer.  She has had lab work including normal CBC, CMP and TSH.  Her Vit D levels were low and she reports taking supplements.  She also had normal Vit B12 level.  She is no longer driving.  She denies any exposures to toxins.  Her son cooks food for her and he also gives her medicines.  She denies any trouble with ADLs but tells me she takes her time and is slowing down to make sure she does things appropriately.  She sometimes thinks too long about what she needs to be wearing.  She tells me she does not remember going to the doctor with her son and she is forgetting regular conversations.  She is living with her son who does the cooking.  They have smoke detectors in their house.  She has not decided the POA status and living will.  She denies any significant depression.  She denies exercising and socializing much.  She is currently at [facility] till Monday and then again returning to living with her son at her house.  She is currently on combination of donepezil 10 mg daily along with memantine 5 mg BID.  Her SLUMS today is stable compared  to prior testing so I will continue the same dose of her memory medications, memantine and donepezil.           Relevant Medications    donepezil (Aricept) 10 MG tablet    memantine (NAMENDA) 5 MG tablet    Other Relevant Orders    Ambulatory Referral to Home Health (Outpatient)     Other Visit Diagnoses       Dementia without behavioral disturbance        Relevant Medications    donepezil (Aricept) 10 MG tablet    memantine (NAMENDA) 5 MG tablet          I spent 47 minutes caring for Lucia on this date of service. This time includes time spent by me in the following activities:preparing for the visit, obtaining and/or reviewing a separately obtained history, performing a medically appropriate examination and/or evaluation , counseling and educating the patient/family/caregiver, ordering medications, tests, or procedures, documenting information in the medical record, and care coordination    Follow Up   Return in about 6 months (around 12/6/2024).  Patient was given instructions and counseling regarding her condition or for health maintenance advice. Please see specific information pulled into the AVS if appropriate.

## 2024-06-06 NOTE — ASSESSMENT & PLAN NOTE
Will place referral for home health for further assistance and make sure patient receives food and medications with help from  if needed.

## 2024-06-06 NOTE — ASSESSMENT & PLAN NOTE
She was evaluated in the hospital on 12/9/2023 due to trouble with her gait and left sided weakness.  She presented to the hospital from home with these symptoms which had reportedly started 2 days prior to her presentation to the hospital.  She was supposed to be taking a baby aspirin along with her atorvastatin but it was thought that she had forgotten to take her dose of baby aspirin.  CT head showed old stroke on the right caudate and anterior limb of internal capsule, small hypodensity was appreciated on posterior limb of the internal capsule vs lateral thalamus, CTA head and neck showed mild to moderate stenosis on the right P1 P2 junction, CTs also showed a nodule on the right lung concerning for metastasis, CT perfusion negative, she had a brain MRI scan which confirmed a stroke on the right lateral thalamus/posterior limb of internal capsule.  In the ED she had elevated blood pressure with 180s systolic, her stroke labs showed A1c of 8.4 and .  She was placed on combination of baby aspirin along with Plavix (for 90 days) and then high dose statin along with baby aspirin.  She continues to have trouble with her balance.  She has had some physical therapy.  She has not had any recent falls.  No new stoke like symptoms.  No blood in her stool or urine. Advised her to be taken to the nearest ED with a any new stroke like symptoms.

## 2024-06-06 NOTE — ASSESSMENT & PLAN NOTE
She has had prior EEG which was abnormal due to presence of epileptiform discharges arising from the left fronto-temporal head regions which place patient at increased risk for focal and secondarily generalized seizures and she is currently on levetiracetam 1000 mg BID.  She has not had any seizure like activity.  Discussed seizure precautions and will continue the levetiracetam 1000 mg BID.  She is not driving and following seizure precautions.

## 2024-06-06 NOTE — ASSESSMENT & PLAN NOTE
74 year old right handed woman who returns to neurology clinic for follow up evaluation and treatment of memory loss, epilepsy, acute stroke on 12/9/2023 and more recent hospitalization for generalized weakness and severe malnutrition.  She presents today with her sister, Jessica, on visit today who also provides information.  She reports some trouble with her memory starting about 5+ years and more obvious more recently.  She is no longer driving.  She is also forgetful and misplacing objects.  She is forgetting conversations with her family and tells me she took money out of the bank and did not remember what she did with it and then realized she gave the money to her for new motor.  Her son has noticed that she is paranoid and misplaces and hides things even from herself.  She has had lumpectomy done for breast cancer with radiation treatment several years ago.  She had a brain MRI scan done which did not demonstrate any acute intracranial findings.  She denies any family history of dementia but reports family history of cancer.  She has had lab work including normal CBC, CMP and TSH.  Her Vit D levels were low and she reports taking supplements.  She also had normal Vit B12 level.  She is no longer driving.  She denies any exposures to toxins.  Her son cooks food for her and he also gives her medicines.  She denies any trouble with ADLs but tells me she takes her time and is slowing down to make sure she does things appropriately.  She sometimes thinks too long about what she needs to be wearing.  She tells me she does not remember going to the doctor with her son and she is forgetting regular conversations.  She is living with her son who does the cooking.  They have smoke detectors in their house.  She has not decided the POA status and living will.  She denies any significant depression.  She denies exercising and socializing much.  She is currently at [facility] till Monday and then again returning to  living with her son at her house.  She is currently on combination of donepezil 10 mg daily along with memantine 5 mg BID.  Her SLUMS today is stable compared to prior testing so I will continue the same dose of her memory medications, memantine and donepezil.

## 2024-06-10 ENCOUNTER — TELEPHONE (OUTPATIENT)
Dept: INTERNAL MEDICINE | Facility: CLINIC | Age: 74
End: 2024-06-10

## 2024-06-10 NOTE — TELEPHONE ENCOUNTER
Caller: [facility]    Relationship to patient:     Best call back number: [phone]    New or established patient?  [] New  [x] Established    Date of discharge: 6/10/24    Facility discharged from: [facility]    Diagnosis/Symptoms: WEAKNESS      Specialty Only: Did you see a [facility] health provider?    [] Yes  [x] No  If so, who?       PLEASE CALL THE SON TO SCHEDULE.  SEAN SWANSON [phone]

## 2024-07-30 ENCOUNTER — TELEPHONE (OUTPATIENT)
Dept: NEUROLOGY | Facility: CLINIC | Age: 74
End: 2024-07-30

## 2024-07-30 NOTE — TELEPHONE ENCOUNTER
BRIT Ramírez call from [facility] grazyna Jean wanting to report that she was able to do an eval on patient, but has not been able to get back in touch with the patient since. It has been over 2 wks since the eval. Grazyna has tried calling son, no response. Pts phone is cut off, no answer from the sister.     Gave Grazyna pts nephews number. Reviewed chart- I asked that grazyna get back in touch with us if she is not able to get in touch with the nephew or any other family. She vu    Pt has memeory/ sz/krystynaon dx.

## 2024-08-06 DIAGNOSIS — I10 ESSENTIAL HYPERTENSION: ICD-10-CM

## 2024-08-06 DIAGNOSIS — Z86.73 HISTORY OF STROKE: ICD-10-CM

## 2024-08-06 RX ORDER — LISINOPRIL 40 MG/1
40 TABLET ORAL DAILY
Qty: 90 TABLET | Refills: 0 | Status: SHIPPED | OUTPATIENT
Start: 2024-08-06 | End: 2024-08-09 | Stop reason: SDUPTHER

## 2024-08-06 RX ORDER — PANTOPRAZOLE SODIUM 40 MG/1
40 TABLET, DELAYED RELEASE ORAL EVERY MORNING
Qty: 90 TABLET | Refills: 0 | Status: SHIPPED | OUTPATIENT
Start: 2024-08-06

## 2024-08-06 RX ORDER — OLANZAPINE 5 MG/1
5 TABLET ORAL
Qty: 90 TABLET | Refills: 0 | Status: SHIPPED | OUTPATIENT
Start: 2024-08-06 | End: 2024-08-09 | Stop reason: SDUPTHER

## 2024-08-06 RX ORDER — ATORVASTATIN CALCIUM 80 MG/1
80 TABLET, FILM COATED ORAL
Qty: 90 TABLET | Refills: 0 | Status: SHIPPED | OUTPATIENT
Start: 2024-08-06 | End: 2024-08-09 | Stop reason: SDUPTHER

## 2024-08-06 RX ORDER — ASPIRIN 81 MG/1
81 TABLET, COATED ORAL DAILY
Qty: 90 TABLET | Refills: 0 | Status: SHIPPED | OUTPATIENT
Start: 2024-08-06

## 2024-08-06 NOTE — TELEPHONE ENCOUNTER
Rx Refill Note  Requested Prescriptions     Pending Prescriptions Disp Refills    Aspirin Low Dose 81 MG EC tablet [Pharmacy Med Name: aspirin 81 mg tablet,delayed release] 90 tablet 0     Sig: TAKE ONE TABLET BY MOUTH DAILY    lisinopril (PRINIVIL,ZESTRIL) 40 MG tablet [Pharmacy Med Name: lisinopril 40 mg tablet] 90 tablet 0     Sig: TAKE ONE TABLET BY MOUTH DAILY    atorvastatin (LIPITOR) 80 MG tablet [Pharmacy Med Name: atorvastatin 80 mg tablet] 90 tablet 0     Sig: TAKE ONE TABLET BY MOUTH AT BEDTIME    OLANZapine (zyPREXA) 5 MG tablet [Pharmacy Med Name: olanzapine 5 mg tablet] 90 tablet 0     Sig: TAKE ONE TABLET BY MOUTH AT BEDTIME    pantoprazole (PROTONIX) 40 MG EC tablet [Pharmacy Med Name: pantoprazole 40 mg tablet,delayed release] 90 tablet 0     Sig: TAKE ONE TABLET BY MOUTH EVERY MORNING      Last office visit with prescribing clinician: 4/19/2024   Last telemedicine visit with prescribing clinician: Visit date not found   Next office visit with prescribing clinician: Visit date not found                         Would you like a call back once the refill request has been completed: [] Yes [] No    If the office needs to give you a call back, can they leave a voicemail: [] Yes [] No    Shivani Crowder MA  08/06/24, 13:26 EDT

## 2024-08-09 ENCOUNTER — OFFICE VISIT (OUTPATIENT)
Dept: INTERNAL MEDICINE | Facility: CLINIC | Age: 74
End: 2024-08-09
Payer: MEDICARE

## 2024-08-09 VITALS
HEART RATE: 78 BPM | BODY MASS INDEX: 18.27 KG/M2 | SYSTOLIC BLOOD PRESSURE: 134 MMHG | WEIGHT: 107 LBS | HEIGHT: 64 IN | TEMPERATURE: 97.2 F | OXYGEN SATURATION: 98 % | DIASTOLIC BLOOD PRESSURE: 68 MMHG

## 2024-08-09 DIAGNOSIS — F01.B0 VASCULAR DEMENTIA, MODERATE, WITHOUT BEHAVIORAL DISTURBANCE, PSYCHOTIC DISTURBANCE, MOOD DISTURBANCE, AND ANXIETY: Primary | ICD-10-CM

## 2024-08-09 DIAGNOSIS — I10 ESSENTIAL HYPERTENSION: ICD-10-CM

## 2024-08-09 DIAGNOSIS — E78.5 DYSLIPIDEMIA: ICD-10-CM

## 2024-08-09 DIAGNOSIS — F32.A DEPRESSION, UNSPECIFIED DEPRESSION TYPE: ICD-10-CM

## 2024-08-09 DIAGNOSIS — E11.65 TYPE 2 DIABETES MELLITUS WITH HYPERGLYCEMIA, WITHOUT LONG-TERM CURRENT USE OF INSULIN: ICD-10-CM

## 2024-08-09 PROCEDURE — G2211 COMPLEX E/M VISIT ADD ON: HCPCS | Performed by: FAMILY MEDICINE

## 2024-08-09 PROCEDURE — 1159F MED LIST DOCD IN RCRD: CPT | Performed by: FAMILY MEDICINE

## 2024-08-09 PROCEDURE — 99214 OFFICE O/P EST MOD 30 MIN: CPT | Performed by: FAMILY MEDICINE

## 2024-08-09 PROCEDURE — 3078F DIAST BP <80 MM HG: CPT | Performed by: FAMILY MEDICINE

## 2024-08-09 PROCEDURE — 3075F SYST BP GE 130 - 139MM HG: CPT | Performed by: FAMILY MEDICINE

## 2024-08-09 PROCEDURE — 1160F RVW MEDS BY RX/DR IN RCRD: CPT | Performed by: FAMILY MEDICINE

## 2024-08-09 RX ORDER — ESCITALOPRAM OXALATE 10 MG/1
10 TABLET ORAL DAILY
Qty: 90 TABLET | Refills: 1 | Status: SHIPPED | OUTPATIENT
Start: 2024-08-09

## 2024-08-09 RX ORDER — LISINOPRIL 40 MG/1
40 TABLET ORAL DAILY
Qty: 90 TABLET | Refills: 3 | Status: SHIPPED | OUTPATIENT
Start: 2024-08-09

## 2024-08-09 RX ORDER — AMLODIPINE BESYLATE 10 MG/1
10 TABLET ORAL DAILY
Qty: 90 TABLET | Refills: 3 | Status: SHIPPED | OUTPATIENT
Start: 2024-08-09

## 2024-08-09 RX ORDER — SITAGLIPTIN AND METFORMIN HYDROCHLORIDE 1000; 50 MG/1; MG/1
1 TABLET, FILM COATED, EXTENDED RELEASE ORAL DAILY
Qty: 30 TABLET | Refills: 11 | Status: SHIPPED | OUTPATIENT
Start: 2024-08-09

## 2024-08-09 RX ORDER — ATORVASTATIN CALCIUM 80 MG/1
80 TABLET, FILM COATED ORAL
Qty: 90 TABLET | Refills: 2 | Status: SHIPPED | OUTPATIENT
Start: 2024-08-09

## 2024-08-09 RX ORDER — OLANZAPINE 5 MG/1
5 TABLET ORAL
Qty: 90 TABLET | Refills: 3 | Status: SHIPPED | OUTPATIENT
Start: 2024-08-09

## 2024-08-10 LAB
ALBUMIN SERPL-MCNC: 4.5 G/DL (ref 3.8–4.8)
ALP SERPL-CCNC: 72 IU/L (ref 44–121)
ALT SERPL-CCNC: 9 IU/L (ref 0–32)
AST SERPL-CCNC: 12 IU/L (ref 0–40)
BASOPHILS # BLD AUTO: 0.1 X10E3/UL (ref 0–0.2)
BASOPHILS NFR BLD AUTO: 1 %
BILIRUB SERPL-MCNC: 0.4 MG/DL (ref 0–1.2)
BUN SERPL-MCNC: 14 MG/DL (ref 8–27)
BUN/CREAT SERPL: 23 (ref 12–28)
CALCIUM SERPL-MCNC: 9.6 MG/DL (ref 8.7–10.3)
CHLORIDE SERPL-SCNC: 102 MMOL/L (ref 96–106)
CHOLEST SERPL-MCNC: 174 MG/DL (ref 100–199)
CO2 SERPL-SCNC: 27 MMOL/L (ref 20–29)
CREAT SERPL-MCNC: 0.6 MG/DL (ref 0.57–1)
CREAT UR-MCNC: NORMAL MG/DL
EGFRCR SERPLBLD CKD-EPI 2021: 93 ML/MIN/1.73
EOSINOPHIL # BLD AUTO: 0 X10E3/UL (ref 0–0.4)
EOSINOPHIL NFR BLD AUTO: 1 %
ERYTHROCYTE [DISTWIDTH] IN BLOOD BY AUTOMATED COUNT: 14.2 % (ref 11.7–15.4)
FT4I SERPL CALC-MCNC: 1.3 (ref 1.2–4.9)
GLOBULIN SER CALC-MCNC: 2.3 G/DL (ref 1.5–4.5)
GLUCOSE SERPL-MCNC: 115 MG/DL (ref 70–99)
HBA1C MFR BLD: 6.9 % (ref 4.8–5.6)
HCT VFR BLD AUTO: 40.3 % (ref 34–46.6)
HDLC SERPL-MCNC: 70 MG/DL
HGB BLD-MCNC: 13 G/DL (ref 11.1–15.9)
IMM GRANULOCYTES # BLD AUTO: 0 X10E3/UL (ref 0–0.1)
IMM GRANULOCYTES NFR BLD AUTO: 0 %
LDLC SERPL CALC-MCNC: 87 MG/DL (ref 0–99)
LDLC/HDLC SERPL: 1.2 RATIO (ref 0–3.2)
LYMPHOCYTES # BLD AUTO: 1.9 X10E3/UL (ref 0.7–3.1)
LYMPHOCYTES NFR BLD AUTO: 44 %
MCH RBC QN AUTO: 28.3 PG (ref 26.6–33)
MCHC RBC AUTO-ENTMCNC: 32.3 G/DL (ref 31.5–35.7)
MCV RBC AUTO: 88 FL (ref 79–97)
MICROALBUMIN UR-MCNC: NORMAL
MONOCYTES # BLD AUTO: 0.4 X10E3/UL (ref 0.1–0.9)
MONOCYTES NFR BLD AUTO: 9 %
NEUTROPHILS # BLD AUTO: 1.9 X10E3/UL (ref 1.4–7)
NEUTROPHILS NFR BLD AUTO: 45 %
PLATELET # BLD AUTO: 188 X10E3/UL (ref 150–450)
POTASSIUM SERPL-SCNC: 3.5 MMOL/L (ref 3.5–5.2)
PROT SERPL-MCNC: 6.8 G/DL (ref 6–8.5)
RBC # BLD AUTO: 4.6 X10E6/UL (ref 3.77–5.28)
SODIUM SERPL-SCNC: 142 MMOL/L (ref 134–144)
T3RU NFR SERPL: 28 % (ref 24–39)
T4 SERPL-MCNC: 4.8 UG/DL (ref 4.5–12)
TRIGL SERPL-MCNC: 96 MG/DL (ref 0–149)
TSH SERPL DL<=0.005 MIU/L-ACNC: 1.21 UIU/ML (ref 0.45–4.5)
VLDLC SERPL CALC-MCNC: 17 MG/DL (ref 5–40)
WBC # BLD AUTO: 4.4 X10E3/UL (ref 3.4–10.8)

## 2024-08-11 NOTE — PROGRESS NOTES
"Chief Complaint  Follow-up    Subjective          Lucia Ellis presents to [facility] PRIMARY [facility]ARE  History of Present Illness  The patient is a 74-year-old female who presents for evaluation of multiple medical concerns. She is accompanied by her sister.    She continues to experience memory lapses. Her dementia appears to be deteriorating. Her last consultation with Dr. Forte, a neurologist, was in 06/2023. Additionally, she has consulted a gastroenterologist. Her memory loss is believed to be related to her vascular dementia. She is currently on Aricept and Namenda.    She is seeking refills for all her medications.    Supplemental Information  She has a history of hyperlipidemia. She is on atorvastatin 80 mg nightly. She is also taking baby aspirin. She has essential hypertension and she is taking amlodipine 10 mg daily. She is also on lisinopril 40 mg daily. She has a history of type 2 diabetes. She is on the Janumet extended release 50/1000 daily and then she is also on Jardiance 10 mg daily.    Objective   Vital Signs:   /68   Pulse 78   Temp 97.2 °F (36.2 °C) (Temporal)   Ht 162.6 cm (64.02\")   Wt 48.5 kg (107 lb)   SpO2 98%   BMI 18.36 kg/m²     Physical Exam  Vitals and nursing note reviewed.   Constitutional:       Appearance: She is well-developed.   HENT:      Head: Normocephalic and atraumatic.   Musculoskeletal:      Cervical back: Normal range of motion and neck supple.   Neurological:      Mental Status: She is alert and oriented to person, place, and time.   Psychiatric:         Behavior: Behavior normal.         Physical Exam       Result Review :                 Assessment and Plan    Diagnoses and all orders for this visit:    1. Vascular dementia, moderate, without behavioral disturbance, psychotic disturbance, mood disturbance, and anxiety (Primary)    2. Type 2 diabetes mellitus with hyperglycemia, without long-term current use of insulin  -     " SITagliptin-metFORMIN HCl ER (Janumet XR)  MG tablet; Take 1 tablet by mouth Daily. Indications: Type 2 Diabetes  Dispense: 30 tablet; Refill: 11  -     empagliflozin (Jardiance) 10 MG tablet tablet; Take 1 tablet by mouth Daily. Indications: Type 2 Diabetes  Dispense: 30 tablet; Refill: 5  -     Hemoglobin A1c  -     Thyroid Panel With TSH  -     Microalbumin / Creatinine Urine Ratio - Urine, Clean Catch    3. Essential hypertension  -     amLODIPine (NORVASC) 10 MG tablet; Take 1 tablet by mouth Daily. Indications: High Blood Pressure Disorder  Dispense: 90 tablet; Refill: 3  -     lisinopril (PRINIVIL,ZESTRIL) 40 MG tablet; Take 1 tablet by mouth Daily.  Dispense: 90 tablet; Refill: 3  -     CBC & Differential  -     Comprehensive Metabolic Panel    4. Dyslipidemia  -     atorvastatin (LIPITOR) 80 MG tablet; Take 1 tablet by mouth every night at bedtime.  Dispense: 90 tablet; Refill: 2  -     Comprehensive Metabolic Panel  -     Lipid Panel With LDL / HDL Ratio    5. Depression, unspecified depression type  -     escitalopram (Lexapro) 10 MG tablet; Take 1 tablet by mouth Daily. Indications: Major Depressive Disorder  Dispense: 90 tablet; Refill: 1    Other orders  -     OLANZapine (zyPREXA) 5 MG tablet; Take 1 tablet by mouth every night at bedtime.  Dispense: 90 tablet; Refill: 3  -     Microalbumin / Creatinine Urine Ratio -      Assessment & Plan  1. Dementia.  The aim is to slow down her dementia process. Continue current treatment.  She is taking Namenda and Aricept.  She is to continue to follow-up with neurology.    2. Anxiety and depression.  Her Lexapro prescription will be refilled.  Continue Zyprexa as well.    3. Type 2 diabetes.  Blood work will be conducted today to monitor her diabetes.  Will continue patient on Jardiance 10 mg daily.  She is also taking Janumet extended release  mg daily.    Follow Up   No follow-ups on file.  Patient was given instructions and counseling regarding  her condition or for health maintenance advice. Please see specific information pulled into the AVS if appropriate.           Patient or patient representative verbalized consent for the use of Ambient Listening during the visit with  Everardo Mazariegos MD for chart documentation. 8/11/2024  12:52 EDT

## 2024-08-13 DIAGNOSIS — G40.109 FOCAL MOTOR SEIZURE: ICD-10-CM

## 2024-08-13 DIAGNOSIS — F01.B0 VASCULAR DEMENTIA, MODERATE, WITHOUT BEHAVIORAL DISTURBANCE, PSYCHOTIC DISTURBANCE, MOOD DISTURBANCE, AND ANXIETY: Chronic | ICD-10-CM

## 2024-08-13 DIAGNOSIS — F03.90 DEMENTIA WITHOUT BEHAVIORAL DISTURBANCE: ICD-10-CM

## 2024-08-13 RX ORDER — LEVETIRACETAM 1000 MG/1
1000 TABLET ORAL 2 TIMES DAILY
Qty: 180 TABLET | Refills: 1 | Status: SHIPPED | OUTPATIENT
Start: 2024-08-13

## 2024-08-13 RX ORDER — MEMANTINE HYDROCHLORIDE 5 MG/1
5 TABLET ORAL 2 TIMES DAILY
Qty: 180 TABLET | Refills: 1 | Status: SHIPPED | OUTPATIENT
Start: 2024-08-13

## 2024-08-13 NOTE — TELEPHONE ENCOUNTER
Recd refill request for keppra and memenatine [facility] [place] [facility]

## 2024-08-15 ENCOUNTER — TELEPHONE (OUTPATIENT)
Dept: GASTROENTEROLOGY | Facility: CLINIC | Age: 74
End: 2024-08-15
Payer: MEDICARE

## 2024-08-15 NOTE — TELEPHONE ENCOUNTER
I called patient three times : 08/05/2024 , 8/8/24  , 8/15/24 , regarding overdue test:       H. Pylori Breath Test - Breath, Lung [[id number]] (Order [id number])  Order  Date: 7/2/2024 Department: [facility] GASTROENTEROLOGY Released By: Lee Ann Candelaria RN (auto-released) Authorizing: Clive More MD   Letter sent 08/15/2024  I called pt again, Number [phone] not in service. 08/15/2024 EI.  I called pt again, Number [phone] not in service. 08/08/2024 EI.  Number [phone] not in service, 08/05/2024 EI.    Number is not in service.  I sent a letter.

## 2024-09-26 ENCOUNTER — TELEPHONE (OUTPATIENT)
Dept: NEUROLOGY | Facility: CLINIC | Age: 74
End: 2024-09-26
Payer: MEDICARE

## 2024-10-24 ENCOUNTER — OFFICE VISIT (OUTPATIENT)
Dept: INTERNAL MEDICINE | Facility: CLINIC | Age: 74
End: 2024-10-24
Payer: MEDICARE

## 2024-10-24 VITALS
BODY MASS INDEX: 16.73 KG/M2 | OXYGEN SATURATION: 98 % | DIASTOLIC BLOOD PRESSURE: 86 MMHG | WEIGHT: 98 LBS | HEIGHT: 64 IN | HEART RATE: 70 BPM | SYSTOLIC BLOOD PRESSURE: 128 MMHG | TEMPERATURE: 97 F | RESPIRATION RATE: 16 BRPM

## 2024-10-24 DIAGNOSIS — E11.65 TYPE 2 DIABETES MELLITUS WITH HYPERGLYCEMIA, WITHOUT LONG-TERM CURRENT USE OF INSULIN: ICD-10-CM

## 2024-10-24 DIAGNOSIS — F01.B0 VASCULAR DEMENTIA, MODERATE, WITHOUT BEHAVIORAL DISTURBANCE, PSYCHOTIC DISTURBANCE, MOOD DISTURBANCE, AND ANXIETY: Primary | ICD-10-CM

## 2024-10-24 DIAGNOSIS — E78.5 DYSLIPIDEMIA: ICD-10-CM

## 2024-10-24 DIAGNOSIS — I10 ESSENTIAL HYPERTENSION: ICD-10-CM

## 2024-10-24 RX ORDER — AMLODIPINE BESYLATE 10 MG/1
10 TABLET ORAL DAILY
Qty: 90 TABLET | Refills: 3 | Status: SHIPPED | OUTPATIENT
Start: 2024-10-24

## 2024-10-24 RX ORDER — LISINOPRIL 40 MG/1
40 TABLET ORAL DAILY
Qty: 90 TABLET | Refills: 3 | Status: SHIPPED | OUTPATIENT
Start: 2024-10-24

## 2024-10-24 RX ORDER — SITAGLIPTIN AND METFORMIN HYDROCHLORIDE 1000; 50 MG/1; MG/1
1 TABLET, FILM COATED, EXTENDED RELEASE ORAL DAILY
Qty: 30 TABLET | Refills: 11 | Status: SHIPPED | OUTPATIENT
Start: 2024-10-24

## 2024-10-24 RX ORDER — ATORVASTATIN CALCIUM 80 MG/1
80 TABLET, FILM COATED ORAL
Qty: 90 TABLET | Refills: 2 | Status: SHIPPED | OUTPATIENT
Start: 2024-10-24

## 2024-10-25 ENCOUNTER — REFERRAL TRIAGE (OUTPATIENT)
Age: 74
End: 2024-10-25
Payer: MEDICARE

## 2024-10-25 LAB
ALBUMIN SERPL-MCNC: 4.2 G/DL (ref 3.5–5.2)
ALBUMIN/CREAT UR: 13 MG/G CREAT (ref 0–29)
ALBUMIN/GLOB SERPL: 1.8 G/DL
ALP SERPL-CCNC: 79 U/L (ref 39–117)
ALT SERPL-CCNC: 8 U/L (ref 1–33)
AST SERPL-CCNC: 15 U/L (ref 1–32)
BASOPHILS # BLD AUTO: 0.05 10*3/MM3 (ref 0–0.2)
BASOPHILS NFR BLD AUTO: 1 % (ref 0–1.5)
BILIRUB SERPL-MCNC: 0.5 MG/DL (ref 0–1.2)
BUN SERPL-MCNC: 9 MG/DL (ref 8–23)
BUN/CREAT SERPL: 10.6 (ref 7–25)
CALCIUM SERPL-MCNC: 9.6 MG/DL (ref 8.6–10.5)
CHLORIDE SERPL-SCNC: 100 MMOL/L (ref 98–107)
CHOLEST SERPL-MCNC: 210 MG/DL (ref 0–200)
CO2 SERPL-SCNC: 30.1 MMOL/L (ref 22–29)
CREAT SERPL-MCNC: 0.85 MG/DL (ref 0.57–1)
CREAT UR-MCNC: 168.7 MG/DL
EGFRCR SERPLBLD CKD-EPI 2021: 71.1 ML/MIN/1.73
EOSINOPHIL # BLD AUTO: 0.04 10*3/MM3 (ref 0–0.4)
EOSINOPHIL NFR BLD AUTO: 0.8 % (ref 0.3–6.2)
ERYTHROCYTE [DISTWIDTH] IN BLOOD BY AUTOMATED COUNT: 12.5 % (ref 12.3–15.4)
GLOBULIN SER CALC-MCNC: 2.4 GM/DL
GLUCOSE SERPL-MCNC: 105 MG/DL (ref 65–99)
HBA1C MFR BLD: 6.6 % (ref 4.8–5.6)
HCT VFR BLD AUTO: 41.1 % (ref 34–46.6)
HDLC SERPL-MCNC: 67 MG/DL (ref 40–60)
HGB BLD-MCNC: 13.1 G/DL (ref 12–15.9)
IMM GRANULOCYTES # BLD AUTO: 0.02 10*3/MM3 (ref 0–0.05)
IMM GRANULOCYTES NFR BLD AUTO: 0.4 % (ref 0–0.5)
LDLC SERPL CALC-MCNC: 129 MG/DL (ref 0–100)
LDLC/HDLC SERPL: 1.91 {RATIO}
LYMPHOCYTES # BLD AUTO: 2.35 10*3/MM3 (ref 0.7–3.1)
LYMPHOCYTES NFR BLD AUTO: 48.3 % (ref 19.6–45.3)
MCH RBC QN AUTO: 28.4 PG (ref 26.6–33)
MCHC RBC AUTO-ENTMCNC: 31.9 G/DL (ref 31.5–35.7)
MCV RBC AUTO: 89 FL (ref 79–97)
MICROALBUMIN UR-MCNC: 22.4 UG/ML
MONOCYTES # BLD AUTO: 0.3 10*3/MM3 (ref 0.1–0.9)
MONOCYTES NFR BLD AUTO: 6.2 % (ref 5–12)
NEUTROPHILS # BLD AUTO: 2.11 10*3/MM3 (ref 1.7–7)
NEUTROPHILS NFR BLD AUTO: 43.3 % (ref 42.7–76)
NRBC BLD AUTO-RTO: 0 /100 WBC (ref 0–0.2)
PLATELET # BLD AUTO: 158 10*3/MM3 (ref 140–450)
POTASSIUM SERPL-SCNC: 3.4 MMOL/L (ref 3.5–5.2)
PROT SERPL-MCNC: 6.6 G/DL (ref 6–8.5)
RBC # BLD AUTO: 4.62 10*6/MM3 (ref 3.77–5.28)
SODIUM SERPL-SCNC: 142 MMOL/L (ref 136–145)
TRIGL SERPL-MCNC: 76 MG/DL (ref 0–150)
VLDLC SERPL CALC-MCNC: 14 MG/DL (ref 5–40)
WBC # BLD AUTO: 4.87 10*3/MM3 (ref 3.4–10.8)

## 2024-10-29 ENCOUNTER — PATIENT OUTREACH (OUTPATIENT)
Age: 74
End: 2024-10-29
Payer: MEDICARE

## 2024-10-29 NOTE — OUTREACH NOTE
Social Work Assessment  Questions/Answers      Flowsheet Row Most Recent Value   Referral Source physician, outpatient staff, outpatient clinic   Reason for Consult community resources, other (see comments)  [in home services]   Preferred Language English   Advance Care Planning Reviewed no concerns identified   People in Home child(donovan), adult   Current Living Arrangements home   Potentially Unsafe Housing Conditions none   In the past 12 months has the electric, gas, oil, or water company threatened to shut off services in your home? No   Primary Care Provided by self   Provides Primary Care For no one, unable/limited ability to care for self   Quality of Family Relationships helpful, involved, supportive   Employment Status retired   Source of Income unable to assess   Application for Public Assistance pending public assistance pending number   Meal Preparation assistive person   Housekeeping assistive person   Laundry assistive person   Shopping assistive person   If for any reason you need help with day-to-day activities such as bathing, preparing meals, shopping, managing finances, etc., do you get the help you need? I could use a little more help   Major Change/Loss/Stressor loss of independence   Sources of Support community support          SDOH updated and reviewed with the patient during this program:  --     Disabilities: At Risk (10/29/2024)    Disabilities     Concentrating, Remembering, or Making Decisions Difficulty: yes     Doing Errands Independently Difficulty: yes      --     Employment: Not At Risk (4/25/2024)    Employment     Do you want help finding or keeping work or a job?: I do not need or want help      Financial Resource Strain: Medium Risk (10/29/2024)    Overall Financial Resource Strain (CARDIA)     Difficulty of Paying Living Expenses: Somewhat hard      --     Food Insecurity: No Food Insecurity (10/29/2024)    Hunger Vital Sign     Worried About Running Out of Food in the Last  Year: Never true     Ran Out of Food in the Last Year: Never true      --     Health Literacy: Unknown (10/29/2024)    Education     Preferred Language: English      --     Housing Stability: Low Risk  (10/29/2024)    Housing Stability Vital Sign     Unable to Pay for Housing in the Last Year: No     Number of Times Moved in the Last Year: 1     Homeless in the Last Year: No      --     Transportation Needs: No Transportation Needs (10/29/2024)    PRAPARE - Transportation     Lack of Transportation (Medical): No     Lack of Transportation (Non-Medical): No      --     Utilities: Not At Risk (10/29/2024)    [facility] Utilities     Threatened with loss of utilities: No      Continuing Care   [facility] PLANN[facility] DEVELOPMENT    [id number] , [place] [id number]    Phone: [phone]    Request Status: Pending - No Request Sent    Services: Food Delivery, Social Care    Resource for: Food Insecurity     Patient Outreach    MSW outreach to patient's sister regarding community resource needs as requested per primary care provider. MSW and patient's sister discussed options for caregiver assistance through [facility] including assistance with bathing, dressing, housekeeping, and laundry assistance. Patient's sister also discussed possible interest in prepared meal delivery. Patient's sister states that patient lives with her son who is only able to provide limited assistance and patient's sister would like more information regarding [facility] programs. Patient's sister aware that patient would need to be agreeable to this program and that the program is income based and would require patient's income information. Patient's sister agreeable to MSW referral to [facility] for staff to outreach to patient's sister to discuss programs but would like MSW to call back tomorrow as she is moving and unsure what her new address will be. MSW scheduled follow-up with patient's sister tomorrow and will send out [facility]  referral for patient's sister after discussion tomorrow.    [facility] FIELD -   Ambulatory Case Management    10/29/2024, 15:38 EDT

## 2024-10-30 RX ORDER — POTASSIUM CHLORIDE 750 MG/1
10 TABLET, EXTENDED RELEASE ORAL DAILY
Qty: 5 TABLET | Refills: 0 | Status: SHIPPED | OUTPATIENT
Start: 2024-10-30 | End: 2024-11-04

## 2024-10-31 NOTE — PROGRESS NOTES
Please inform the patient of the following abnormal results. Low potassium, start potassium chloride for 5 days. Other labs are stable.

## 2024-11-03 NOTE — PROGRESS NOTES
"Chief Complaint  Dementia    Subjective          Lucia Ellis presents to [facility] PRIMARY CARE  History of Present Illness  The patient is a 74-year-old female who presents for evaluation of dementia. She is accompanied by her sister.    Her sister reports an increase in confusion today, suggesting a possible worsening of her dementia. She has been under the care of a neurologist for this condition and is currently on medication. However, it is unclear whether she is taking her medications as prescribed, as her son is responsible for administering them. Her sister admits that she is not taking all of her prescribed medications.    She also has diabetes and is on Janumet extended release  mg daily and Jardiance 10 mg daily. Additionally, she is on Lipitor 80 mg daily for high cholesterol, amlodipine 10 mg daily, and lisinopril 40 mg daily for blood pressure management.    Her sister expresses a desire for her to have blood work done today.    Objective   Vital Signs:   /86   Pulse 70   Temp 97 °F (36.1 °C) (Oral)   Resp 16   Ht 162.6 cm (64.02\")   Wt 44.5 kg (98 lb)   SpO2 98%   BMI 16.81 kg/m²     Physical Exam  Vitals and nursing note reviewed.   Constitutional:       Appearance: She is well-developed.   HENT:      Head: Normocephalic and atraumatic.   Musculoskeletal:      Cervical back: Normal range of motion and neck supple.   Neurological:      Mental Status: She is alert and oriented to person, place, and time.   Psychiatric:         Behavior: Behavior normal.         Physical Exam  Vital Signs  Blood pressure reading today is 128/86.     Result Review :                 Assessment and Plan    Diagnoses and all orders for this visit:    1. Vascular dementia, moderate, without behavioral disturbance, psychotic disturbance, mood disturbance, and anxiety (Primary)  -     Ambulatory Referral to Social Care Services (Amb Case Mgmt)    2. Type 2 diabetes mellitus with " hyperglycemia, without long-term current use of insulin  -     SITagliptin-metFORMIN HCl ER (Janumet XR)  MG tablet; Take 1 tablet by mouth Daily. Indications: Type 2 Diabetes  Dispense: 30 tablet; Refill: 11  -     empagliflozin (Jardiance) 10 MG tablet tablet; Take 1 tablet by mouth Daily. Indications: Type 2 Diabetes  Dispense: 30 tablet; Refill: 5  -     Hemoglobin A1c  -     Microalbumin / Creatinine Urine Ratio - Urine, Clean Catch    3. Dyslipidemia  -     atorvastatin (LIPITOR) 80 MG tablet; Take 1 tablet by mouth every night at bedtime.  Dispense: 90 tablet; Refill: 2  -     Lipid Panel With LDL / HDL Ratio    4. Essential hypertension  -     amLODIPine (NORVASC) 10 MG tablet; Take 1 tablet by mouth Daily. Indications: High Blood Pressure  Dispense: 90 tablet; Refill: 3  -     lisinopril (PRINIVIL,ZESTRIL) 40 MG tablet; Take 1 tablet by mouth Daily.  Dispense: 90 tablet; Refill: 3  -     Comprehensive Metabolic Panel  -     CBC & Differential      Assessment & Plan  1. Dementia.  Her dementia appears to be worsening, with increased confusion noted. She is currently on Namenda 5 mg twice a day and Aricept 10 mg daily. There is uncertainty about whether she is consistently taking her medications. A referral to a  has been made to provide additional support and ensure medication adherence. She was advised to contact her neurologist, Dr. Ludwig, to refill her dementia medications and reschedule the canceled December appointment.    2. Diabetes Mellitus.  She is taking Janumet extended release  mg daily and Jardiance 10 mg daily. There are no reported issues with her diabetes management. Blood work was ordered to monitor her condition.    3. Hyperlipidemia.  She is on Lipitor 80 mg daily for high cholesterol. No issues were reported with her current regimen.    4. Hypertension.  Her blood pressure today is 128/86, which is well-controlled. She is taking amlodipine 10 mg daily and  lisinopril 40 mg daily. She was reminded of the importance of taking her medications regularly.        Follow Up   No follow-ups on file.  Patient was given instructions and counseling regarding her condition or for health maintenance advice. Please see specific information pulled into the AVS if appropriate.           Patient or patient representative verbalized consent for the use of Ambient Listening during the visit with  Everardo Mazariegos MD for chart documentation. 11/3/2024  10:12 EST

## 2024-11-05 ENCOUNTER — PATIENT OUTREACH (OUTPATIENT)
Age: 74
End: 2024-11-05
Payer: MEDICARE

## 2024-11-05 NOTE — OUTREACH NOTE
MSW continues to attempt follow-up with patients sister Jessica regarding community resource needs. MSW unable to leave voicemail as patient's sister mailbox full.    [facility] FIELD -   Ambulatory Case Management    11/5/2024, 10:43 EST

## 2024-11-12 ENCOUNTER — PATIENT OUTREACH (OUTPATIENT)
Age: 74
End: 2024-11-12
Payer: MEDICARE

## 2024-11-12 NOTE — OUTREACH NOTE
Patient Outreach    MSW outreach to patient's sister Jessica to see if she is still interested in obtaining in home assistance for her sister after MSW having difficulty reaching by phone. Patient's sister states that she is still interested and would like the forms that need to be completed with [facility] to be mailed to her home address. MSW has obtained address for patient's sister and will refer patient to [facility] as discussed per previous phone call. No other needs at this time and patient's sister will await call and mailed information from [facility].    Care Coordination    MSW placed referral to [facility] via [facility] for in home assistance.     Bouchra FIELD -   Ambulatory Case Management    11/12/2024, 14:43 EST

## 2025-01-04 ENCOUNTER — APPOINTMENT (OUTPATIENT)
Dept: GENERAL RADIOLOGY | Facility: HOSPITAL | Age: 75
End: 2025-01-04
Payer: MEDICARE

## 2025-01-04 ENCOUNTER — HOSPITAL ENCOUNTER (INPATIENT)
Facility: HOSPITAL | Age: 75
LOS: 18 days | Discharge: NURSING, REHAB OR LONG-TERM CARE | End: 2025-01-23
Attending: STUDENT IN AN ORGANIZED HEALTH CARE EDUCATION/TRAINING PROGRAM | Admitting: INTERNAL MEDICINE
Payer: MEDICARE

## 2025-01-04 ENCOUNTER — APPOINTMENT (OUTPATIENT)
Dept: CT IMAGING | Facility: HOSPITAL | Age: 75
End: 2025-01-04
Payer: MEDICARE

## 2025-01-04 DIAGNOSIS — R41.82 ALTERED MENTAL STATUS, UNSPECIFIED ALTERED MENTAL STATUS TYPE: Primary | ICD-10-CM

## 2025-01-04 LAB
ALBUMIN SERPL-MCNC: 4.5 G/DL (ref 3.5–5.2)
ALBUMIN/GLOB SERPL: 1.7 G/DL
ALP SERPL-CCNC: 91 U/L (ref 39–117)
ALT SERPL W P-5'-P-CCNC: 10 U/L (ref 1–33)
ANION GAP SERPL CALCULATED.3IONS-SCNC: 14.2 MMOL/L (ref 5–15)
AST SERPL-CCNC: 18 U/L (ref 1–32)
BASOPHILS # BLD AUTO: 0.05 10*3/MM3 (ref 0–0.2)
BASOPHILS NFR BLD AUTO: 0.9 % (ref 0–1.5)
BILIRUB SERPL-MCNC: 0.6 MG/DL (ref 0–1.2)
BUN SERPL-MCNC: 24 MG/DL (ref 8–23)
BUN/CREAT SERPL: 29.3 (ref 7–25)
CALCIUM SPEC-SCNC: 9.5 MG/DL (ref 8.6–10.5)
CHLORIDE SERPL-SCNC: 97 MMOL/L (ref 98–107)
CO2 SERPL-SCNC: 25.8 MMOL/L (ref 22–29)
CREAT SERPL-MCNC: 0.82 MG/DL (ref 0.57–1)
DEPRECATED RDW RBC AUTO: 40.1 FL (ref 37–54)
EGFRCR SERPLBLD CKD-EPI 2021: 74.2 ML/MIN/1.73
EOSINOPHIL # BLD AUTO: 0.02 10*3/MM3 (ref 0–0.4)
EOSINOPHIL NFR BLD AUTO: 0.4 % (ref 0.3–6.2)
ERYTHROCYTE [DISTWIDTH] IN BLOOD BY AUTOMATED COUNT: 12.5 % (ref 12.3–15.4)
GEN 5 1HR TROPONIN T REFLEX: 14 NG/L
GLOBULIN UR ELPH-MCNC: 2.6 GM/DL
GLUCOSE SERPL-MCNC: 131 MG/DL (ref 65–99)
HCT VFR BLD AUTO: 39.4 % (ref 34–46.6)
HGB BLD-MCNC: 13.5 G/DL (ref 12–15.9)
IMM GRANULOCYTES # BLD AUTO: 0.01 10*3/MM3 (ref 0–0.05)
IMM GRANULOCYTES NFR BLD AUTO: 0.2 % (ref 0–0.5)
LYMPHOCYTES # BLD AUTO: 1.61 10*3/MM3 (ref 0.7–3.1)
LYMPHOCYTES NFR BLD AUTO: 29.5 % (ref 19.6–45.3)
MAGNESIUM SERPL-MCNC: 2.1 MG/DL (ref 1.6–2.4)
MCH RBC QN AUTO: 30.1 PG (ref 26.6–33)
MCHC RBC AUTO-ENTMCNC: 34.3 G/DL (ref 31.5–35.7)
MCV RBC AUTO: 87.9 FL (ref 79–97)
MONOCYTES # BLD AUTO: 0.31 10*3/MM3 (ref 0.1–0.9)
MONOCYTES NFR BLD AUTO: 5.7 % (ref 5–12)
NEUTROPHILS NFR BLD AUTO: 3.45 10*3/MM3 (ref 1.7–7)
NEUTROPHILS NFR BLD AUTO: 63.3 % (ref 42.7–76)
NRBC BLD AUTO-RTO: 0 /100 WBC (ref 0–0.2)
PLATELET # BLD AUTO: 237 10*3/MM3 (ref 140–450)
PMV BLD AUTO: 10.6 FL (ref 6–12)
POTASSIUM SERPL-SCNC: 3.3 MMOL/L (ref 3.5–5.2)
PROT SERPL-MCNC: 7.1 G/DL (ref 6–8.5)
RBC # BLD AUTO: 4.48 10*6/MM3 (ref 3.77–5.28)
SODIUM SERPL-SCNC: 137 MMOL/L (ref 136–145)
TROPONIN T % DELTA: -7 %
TROPONIN T NUMERIC DELTA: -1 NG/L
TROPONIN T SERPL HS-MCNC: 15 NG/L
WBC NRBC COR # BLD AUTO: 5.45 10*3/MM3 (ref 3.4–10.8)

## 2025-01-04 PROCEDURE — 99285 EMERGENCY DEPT VISIT HI MDM: CPT

## 2025-01-04 PROCEDURE — 83735 ASSAY OF MAGNESIUM: CPT | Performed by: STUDENT IN AN ORGANIZED HEALTH CARE EDUCATION/TRAINING PROGRAM

## 2025-01-04 PROCEDURE — 84484 ASSAY OF TROPONIN QUANT: CPT | Performed by: STUDENT IN AN ORGANIZED HEALTH CARE EDUCATION/TRAINING PROGRAM

## 2025-01-04 PROCEDURE — 71045 X-RAY EXAM CHEST 1 VIEW: CPT

## 2025-01-04 PROCEDURE — 70450 CT HEAD/BRAIN W/O DYE: CPT

## 2025-01-04 PROCEDURE — G0378 HOSPITAL OBSERVATION PER HR: HCPCS

## 2025-01-04 PROCEDURE — 85025 COMPLETE CBC W/AUTO DIFF WBC: CPT | Performed by: STUDENT IN AN ORGANIZED HEALTH CARE EDUCATION/TRAINING PROGRAM

## 2025-01-04 PROCEDURE — 36415 COLL VENOUS BLD VENIPUNCTURE: CPT

## 2025-01-04 PROCEDURE — 93005 ELECTROCARDIOGRAM TRACING: CPT | Performed by: STUDENT IN AN ORGANIZED HEALTH CARE EDUCATION/TRAINING PROGRAM

## 2025-01-04 PROCEDURE — 80053 COMPREHEN METABOLIC PANEL: CPT | Performed by: STUDENT IN AN ORGANIZED HEALTH CARE EDUCATION/TRAINING PROGRAM

## 2025-01-04 PROCEDURE — 93010 ELECTROCARDIOGRAM REPORT: CPT | Performed by: INTERNAL MEDICINE

## 2025-01-04 PROCEDURE — 83036 HEMOGLOBIN GLYCOSYLATED A1C: CPT | Performed by: NURSE PRACTITIONER

## 2025-01-04 RX ORDER — SODIUM CHLORIDE 0.9 % (FLUSH) 0.9 %
10 SYRINGE (ML) INJECTION AS NEEDED
Status: DISCONTINUED | OUTPATIENT
Start: 2025-01-04 | End: 2025-01-23 | Stop reason: HOSPADM

## 2025-01-04 RX ORDER — SODIUM CHLORIDE 9 MG/ML
40 INJECTION, SOLUTION INTRAVENOUS AS NEEDED
Status: DISCONTINUED | OUTPATIENT
Start: 2025-01-04 | End: 2025-01-23 | Stop reason: HOSPADM

## 2025-01-04 RX ORDER — BISACODYL 10 MG
10 SUPPOSITORY, RECTAL RECTAL DAILY PRN
Status: DISCONTINUED | OUTPATIENT
Start: 2025-01-04 | End: 2025-01-23 | Stop reason: HOSPADM

## 2025-01-04 RX ORDER — POLYETHYLENE GLYCOL 3350 17 G/17G
17 POWDER, FOR SOLUTION ORAL DAILY PRN
Status: DISCONTINUED | OUTPATIENT
Start: 2025-01-04 | End: 2025-01-23 | Stop reason: HOSPADM

## 2025-01-04 RX ORDER — ONDANSETRON 2 MG/ML
4 INJECTION INTRAMUSCULAR; INTRAVENOUS EVERY 6 HOURS PRN
Status: DISCONTINUED | OUTPATIENT
Start: 2025-01-04 | End: 2025-01-23 | Stop reason: HOSPADM

## 2025-01-04 RX ORDER — ACETAMINOPHEN 325 MG/1
650 TABLET ORAL EVERY 4 HOURS PRN
Status: DISCONTINUED | OUTPATIENT
Start: 2025-01-04 | End: 2025-01-23 | Stop reason: HOSPADM

## 2025-01-04 RX ORDER — BISACODYL 5 MG/1
5 TABLET, DELAYED RELEASE ORAL DAILY PRN
Status: DISCONTINUED | OUTPATIENT
Start: 2025-01-04 | End: 2025-01-23 | Stop reason: HOSPADM

## 2025-01-04 RX ORDER — CALCIUM CARBONATE 500 MG/1
2 TABLET, CHEWABLE ORAL 2 TIMES DAILY PRN
Status: DISCONTINUED | OUTPATIENT
Start: 2025-01-04 | End: 2025-01-23 | Stop reason: HOSPADM

## 2025-01-04 RX ORDER — ACETAMINOPHEN 650 MG/1
650 SUPPOSITORY RECTAL EVERY 4 HOURS PRN
Status: DISCONTINUED | OUTPATIENT
Start: 2025-01-04 | End: 2025-01-23 | Stop reason: HOSPADM

## 2025-01-04 RX ORDER — ONDANSETRON 4 MG/1
4 TABLET, ORALLY DISINTEGRATING ORAL EVERY 6 HOURS PRN
Status: DISCONTINUED | OUTPATIENT
Start: 2025-01-04 | End: 2025-01-23 | Stop reason: HOSPADM

## 2025-01-04 RX ORDER — ACETAMINOPHEN 160 MG/5ML
650 SOLUTION ORAL EVERY 4 HOURS PRN
Status: DISCONTINUED | OUTPATIENT
Start: 2025-01-04 | End: 2025-01-23 | Stop reason: HOSPADM

## 2025-01-04 RX ORDER — SODIUM CHLORIDE 0.9 % (FLUSH) 0.9 %
10 SYRINGE (ML) INJECTION EVERY 12 HOURS SCHEDULED
Status: DISCONTINUED | OUTPATIENT
Start: 2025-01-04 | End: 2025-01-23 | Stop reason: HOSPADM

## 2025-01-04 RX ORDER — AMOXICILLIN 250 MG
2 CAPSULE ORAL 2 TIMES DAILY PRN
Status: DISCONTINUED | OUTPATIENT
Start: 2025-01-04 | End: 2025-01-23 | Stop reason: HOSPADM

## 2025-01-05 ENCOUNTER — APPOINTMENT (OUTPATIENT)
Dept: NEUROLOGY | Facility: HOSPITAL | Age: 75
End: 2025-01-05
Payer: MEDICARE

## 2025-01-05 LAB
25(OH)D3 SERPL-MCNC: 35.5 NG/ML (ref 30–100)
ANION GAP SERPL CALCULATED.3IONS-SCNC: 14 MMOL/L (ref 5–15)
ANION GAP SERPL CALCULATED.3IONS-SCNC: 8 MMOL/L (ref 5–15)
BACTERIA UR QL AUTO: ABNORMAL /HPF
BILIRUB UR QL STRIP: NEGATIVE
BUN SERPL-MCNC: 28 MG/DL (ref 8–23)
BUN SERPL-MCNC: 29 MG/DL (ref 8–23)
BUN/CREAT SERPL: 25.7 (ref 7–25)
BUN/CREAT SERPL: 26.1 (ref 7–25)
CALCIUM SPEC-SCNC: 8.4 MG/DL (ref 8.6–10.5)
CALCIUM SPEC-SCNC: 8.9 MG/DL (ref 8.6–10.5)
CHLORIDE SERPL-SCNC: 109 MMOL/L (ref 98–107)
CHLORIDE SERPL-SCNC: 98 MMOL/L (ref 98–107)
CLARITY UR: CLEAR
CO2 SERPL-SCNC: 24 MMOL/L (ref 22–29)
CO2 SERPL-SCNC: 25 MMOL/L (ref 22–29)
COLOR UR: YELLOW
CREAT SERPL-MCNC: 1.09 MG/DL (ref 0.57–1)
CREAT SERPL-MCNC: 1.11 MG/DL (ref 0.57–1)
DEPRECATED RDW RBC AUTO: 40.3 FL (ref 37–54)
EGFRCR SERPLBLD CKD-EPI 2021: 51.6 ML/MIN/1.73
EGFRCR SERPLBLD CKD-EPI 2021: 52.8 ML/MIN/1.73
ERYTHROCYTE [DISTWIDTH] IN BLOOD BY AUTOMATED COUNT: 12.5 % (ref 12.3–15.4)
GLUCOSE BLDC GLUCOMTR-MCNC: 122 MG/DL (ref 70–130)
GLUCOSE BLDC GLUCOMTR-MCNC: 161 MG/DL (ref 70–130)
GLUCOSE BLDC GLUCOMTR-MCNC: 165 MG/DL (ref 70–130)
GLUCOSE BLDC GLUCOMTR-MCNC: 210 MG/DL (ref 70–130)
GLUCOSE SERPL-MCNC: 129 MG/DL (ref 65–99)
GLUCOSE SERPL-MCNC: 162 MG/DL (ref 65–99)
GLUCOSE UR STRIP-MCNC: NEGATIVE MG/DL
HBA1C MFR BLD: 7.3 % (ref 4.8–5.6)
HCT VFR BLD AUTO: 37.4 % (ref 34–46.6)
HGB BLD-MCNC: 12.7 G/DL (ref 12–15.9)
HGB UR QL STRIP.AUTO: NEGATIVE
HYALINE CASTS UR QL AUTO: ABNORMAL /LPF
KETONES UR QL STRIP: ABNORMAL
LEUKOCYTE ESTERASE UR QL STRIP.AUTO: ABNORMAL
MCH RBC QN AUTO: 30.3 PG (ref 26.6–33)
MCHC RBC AUTO-ENTMCNC: 34 G/DL (ref 31.5–35.7)
MCV RBC AUTO: 89.3 FL (ref 79–97)
NITRITE UR QL STRIP: NEGATIVE
PH UR STRIP.AUTO: <=5 [PH] (ref 5–8)
PLATELET # BLD AUTO: 213 10*3/MM3 (ref 140–450)
PMV BLD AUTO: 10.8 FL (ref 6–12)
POTASSIUM SERPL-SCNC: 3 MMOL/L (ref 3.5–5.2)
POTASSIUM SERPL-SCNC: 5 MMOL/L (ref 3.5–5.2)
PROT UR QL STRIP: NEGATIVE
QT INTERVAL: 390 MS
QTC INTERVAL: 436 MS
RBC # BLD AUTO: 4.19 10*6/MM3 (ref 3.77–5.28)
RBC # UR STRIP: ABNORMAL /HPF
REF LAB TEST METHOD: ABNORMAL
SODIUM SERPL-SCNC: 137 MMOL/L (ref 136–145)
SODIUM SERPL-SCNC: 141 MMOL/L (ref 136–145)
SP GR UR STRIP: 1.02 (ref 1–1.03)
SQUAMOUS #/AREA URNS HPF: ABNORMAL /HPF
TROPONIN T SERPL HS-MCNC: 20 NG/L
TSH SERPL DL<=0.05 MIU/L-ACNC: 1.96 UIU/ML (ref 0.27–4.2)
UROBILINOGEN UR QL STRIP: ABNORMAL
VIT B12 BLD-MCNC: 943 PG/ML (ref 211–946)
WBC # UR STRIP: ABNORMAL /HPF
WBC NRBC COR # BLD AUTO: 5.75 10*3/MM3 (ref 3.4–10.8)

## 2025-01-05 PROCEDURE — 80048 BASIC METABOLIC PNL TOTAL CA: CPT | Performed by: NURSE PRACTITIONER

## 2025-01-05 PROCEDURE — 97162 PT EVAL MOD COMPLEX 30 MIN: CPT

## 2025-01-05 PROCEDURE — 99222 1ST HOSP IP/OBS MODERATE 55: CPT | Performed by: STUDENT IN AN ORGANIZED HEALTH CARE EDUCATION/TRAINING PROGRAM

## 2025-01-05 PROCEDURE — 36415 COLL VENOUS BLD VENIPUNCTURE: CPT | Performed by: NURSE PRACTITIONER

## 2025-01-05 PROCEDURE — 95816 EEG AWAKE AND DROWSY: CPT

## 2025-01-05 PROCEDURE — 95816 EEG AWAKE AND DROWSY: CPT | Performed by: PSYCHIATRY & NEUROLOGY

## 2025-01-05 PROCEDURE — 81001 URINALYSIS AUTO W/SCOPE: CPT | Performed by: STUDENT IN AN ORGANIZED HEALTH CARE EDUCATION/TRAINING PROGRAM

## 2025-01-05 PROCEDURE — 80048 BASIC METABOLIC PNL TOTAL CA: CPT | Performed by: STUDENT IN AN ORGANIZED HEALTH CARE EDUCATION/TRAINING PROGRAM

## 2025-01-05 PROCEDURE — 84484 ASSAY OF TROPONIN QUANT: CPT | Performed by: NURSE PRACTITIONER

## 2025-01-05 PROCEDURE — 85027 COMPLETE CBC AUTOMATED: CPT | Performed by: NURSE PRACTITIONER

## 2025-01-05 PROCEDURE — 25010000002 THIAMINE HCL 200 MG/2ML SOLUTION 2 ML VIAL: Performed by: STUDENT IN AN ORGANIZED HEALTH CARE EDUCATION/TRAINING PROGRAM

## 2025-01-05 PROCEDURE — 84443 ASSAY THYROID STIM HORMONE: CPT | Performed by: NURSE PRACTITIONER

## 2025-01-05 PROCEDURE — 82948 REAGENT STRIP/BLOOD GLUCOSE: CPT

## 2025-01-05 PROCEDURE — 82607 VITAMIN B-12: CPT | Performed by: NURSE PRACTITIONER

## 2025-01-05 PROCEDURE — 25810000003 SODIUM CHLORIDE 0.9 % SOLUTION: Performed by: STUDENT IN AN ORGANIZED HEALTH CARE EDUCATION/TRAINING PROGRAM

## 2025-01-05 PROCEDURE — 63710000001 INSULIN LISPRO (HUMAN) PER 5 UNITS: Performed by: NURSE PRACTITIONER

## 2025-01-05 PROCEDURE — 97530 THERAPEUTIC ACTIVITIES: CPT

## 2025-01-05 PROCEDURE — 82306 VITAMIN D 25 HYDROXY: CPT | Performed by: NURSE PRACTITIONER

## 2025-01-05 PROCEDURE — 63710000001 INSULIN GLARGINE PER 5 UNITS: Performed by: STUDENT IN AN ORGANIZED HEALTH CARE EDUCATION/TRAINING PROGRAM

## 2025-01-05 RX ORDER — SODIUM CHLORIDE 9 MG/ML
75 INJECTION, SOLUTION INTRAVENOUS CONTINUOUS
Status: ACTIVE | OUTPATIENT
Start: 2025-01-05 | End: 2025-01-06

## 2025-01-05 RX ORDER — NICOTINE POLACRILEX 4 MG
15 LOZENGE BUCCAL
Status: DISCONTINUED | OUTPATIENT
Start: 2025-01-05 | End: 2025-01-23 | Stop reason: HOSPADM

## 2025-01-05 RX ORDER — DEXTROSE MONOHYDRATE 25 G/50ML
25 INJECTION, SOLUTION INTRAVENOUS
Status: DISCONTINUED | OUTPATIENT
Start: 2025-01-05 | End: 2025-01-23 | Stop reason: HOSPADM

## 2025-01-05 RX ORDER — AMLODIPINE BESYLATE 10 MG/1
10 TABLET ORAL DAILY
Status: DISCONTINUED | OUTPATIENT
Start: 2025-01-05 | End: 2025-01-07

## 2025-01-05 RX ORDER — HYDRALAZINE HYDROCHLORIDE 20 MG/ML
10 INJECTION INTRAMUSCULAR; INTRAVENOUS EVERY 6 HOURS PRN
Status: DISCONTINUED | OUTPATIENT
Start: 2025-01-05 | End: 2025-01-23 | Stop reason: HOSPADM

## 2025-01-05 RX ORDER — OLANZAPINE 2.5 MG/1
5 TABLET, FILM COATED ORAL NIGHTLY
Status: DISCONTINUED | OUTPATIENT
Start: 2025-01-05 | End: 2025-01-23 | Stop reason: HOSPADM

## 2025-01-05 RX ORDER — PANTOPRAZOLE SODIUM 40 MG/1
40 TABLET, DELAYED RELEASE ORAL EVERY MORNING
Status: DISCONTINUED | OUTPATIENT
Start: 2025-01-05 | End: 2025-01-23 | Stop reason: HOSPADM

## 2025-01-05 RX ORDER — CETIRIZINE HYDROCHLORIDE 10 MG/1
10 TABLET ORAL DAILY
Status: DISCONTINUED | OUTPATIENT
Start: 2025-01-05 | End: 2025-01-23 | Stop reason: HOSPADM

## 2025-01-05 RX ORDER — LEVETIRACETAM 500 MG/1
1000 TABLET ORAL 2 TIMES DAILY
Status: DISCONTINUED | OUTPATIENT
Start: 2025-01-05 | End: 2025-01-23 | Stop reason: HOSPADM

## 2025-01-05 RX ORDER — POTASSIUM CHLORIDE 750 MG/1
40 TABLET, FILM COATED, EXTENDED RELEASE ORAL EVERY 4 HOURS
Status: COMPLETED | OUTPATIENT
Start: 2025-01-05 | End: 2025-01-05

## 2025-01-05 RX ORDER — IBUPROFEN 600 MG/1
1 TABLET ORAL
Status: DISCONTINUED | OUTPATIENT
Start: 2025-01-05 | End: 2025-01-23 | Stop reason: HOSPADM

## 2025-01-05 RX ORDER — ASPIRIN 81 MG/1
81 TABLET ORAL DAILY
Status: DISCONTINUED | OUTPATIENT
Start: 2025-01-05 | End: 2025-01-23 | Stop reason: HOSPADM

## 2025-01-05 RX ORDER — INSULIN LISPRO 100 [IU]/ML
2-7 INJECTION, SOLUTION INTRAVENOUS; SUBCUTANEOUS
Status: DISCONTINUED | OUTPATIENT
Start: 2025-01-05 | End: 2025-01-23 | Stop reason: HOSPADM

## 2025-01-05 RX ORDER — ESCITALOPRAM OXALATE 10 MG/1
10 TABLET ORAL DAILY
Status: DISCONTINUED | OUTPATIENT
Start: 2025-01-05 | End: 2025-01-23 | Stop reason: HOSPADM

## 2025-01-05 RX ORDER — LISINOPRIL 40 MG/1
40 TABLET ORAL DAILY
Status: DISCONTINUED | OUTPATIENT
Start: 2025-01-05 | End: 2025-01-09

## 2025-01-05 RX ORDER — ATORVASTATIN CALCIUM 20 MG/1
80 TABLET, FILM COATED ORAL DAILY
Status: DISCONTINUED | OUTPATIENT
Start: 2025-01-05 | End: 2025-01-23 | Stop reason: HOSPADM

## 2025-01-05 RX ORDER — MEMANTINE HYDROCHLORIDE 5 MG/1
5 TABLET ORAL 2 TIMES DAILY
Status: DISCONTINUED | OUTPATIENT
Start: 2025-01-05 | End: 2025-01-23 | Stop reason: HOSPADM

## 2025-01-05 RX ORDER — DONEPEZIL HYDROCHLORIDE 10 MG/1
10 TABLET, FILM COATED ORAL DAILY
Status: DISCONTINUED | OUTPATIENT
Start: 2025-01-05 | End: 2025-01-23 | Stop reason: HOSPADM

## 2025-01-05 RX ADMIN — LEVETIRACETAM 1000 MG: 500 TABLET, FILM COATED ORAL at 01:20

## 2025-01-05 RX ADMIN — PANTOPRAZOLE SODIUM 40 MG: 40 TABLET, DELAYED RELEASE ORAL at 06:33

## 2025-01-05 RX ADMIN — CETIRIZINE HYDROCHLORIDE 10 MG: 10 TABLET, FILM COATED ORAL at 10:57

## 2025-01-05 RX ADMIN — POTASSIUM CHLORIDE 40 MEQ: 750 TABLET, EXTENDED RELEASE ORAL at 13:24

## 2025-01-05 RX ADMIN — ESCITALOPRAM OXALATE 10 MG: 10 TABLET, FILM COATED ORAL at 10:57

## 2025-01-05 RX ADMIN — THIAMINE HYDROCHLORIDE 500 MG: 100 INJECTION, SOLUTION INTRAMUSCULAR; INTRAVENOUS at 10:56

## 2025-01-05 RX ADMIN — ATORVASTATIN CALCIUM 80 MG: 20 TABLET, FILM COATED ORAL at 10:56

## 2025-01-05 RX ADMIN — INSULIN LISPRO 2 UNITS: 100 INJECTION, SOLUTION INTRAVENOUS; SUBCUTANEOUS at 10:56

## 2025-01-05 RX ADMIN — SODIUM CHLORIDE 75 ML/HR: 9 INJECTION, SOLUTION INTRAVENOUS at 10:59

## 2025-01-05 RX ADMIN — INSULIN LISPRO 2 UNITS: 100 INJECTION, SOLUTION INTRAVENOUS; SUBCUTANEOUS at 17:46

## 2025-01-05 RX ADMIN — DONEPEZIL HYDROCHLORIDE 10 MG: 10 TABLET, FILM COATED ORAL at 10:57

## 2025-01-05 RX ADMIN — OLANZAPINE 5 MG: 2.5 TABLET, FILM COATED ORAL at 01:20

## 2025-01-05 RX ADMIN — LEVETIRACETAM 1000 MG: 500 TABLET, FILM COATED ORAL at 13:24

## 2025-01-05 RX ADMIN — ASPIRIN 81 MG: 81 TABLET, COATED ORAL at 10:57

## 2025-01-05 RX ADMIN — OLANZAPINE 5 MG: 2.5 TABLET, FILM COATED ORAL at 21:38

## 2025-01-05 RX ADMIN — INSULIN GLARGINE 6 UNITS: 100 INJECTION, SOLUTION SUBCUTANEOUS at 13:24

## 2025-01-05 RX ADMIN — MEMANTINE HYDROCHLORIDE 5 MG: 5 TABLET, FILM COATED ORAL at 01:20

## 2025-01-05 RX ADMIN — POTASSIUM CHLORIDE 40 MEQ: 750 TABLET, EXTENDED RELEASE ORAL at 17:46

## 2025-01-05 RX ADMIN — MEMANTINE HYDROCHLORIDE 5 MG: 5 TABLET, FILM COATED ORAL at 13:24

## 2025-01-05 RX ADMIN — POTASSIUM CHLORIDE 40 MEQ: 750 TABLET, EXTENDED RELEASE ORAL at 10:57

## 2025-01-05 RX ADMIN — Medication 10 ML: at 01:20

## 2025-01-05 NOTE — ED NOTES
Nursing report ED to floor  Lucia Ellis  74 y.o.  female    HPI :  HPI  Stated Reason for Visit: patient living w/o power, water, lives w/ son (family from out of town called [facility])  History Obtained From: patient, EMS    Chief Complaint  Chief Complaint   Patient presents with    Wellness Check       Admitting doctor:   Renaldo Lee MD    Admitting diagnosis:   The encounter diagnosis was Altered mental status, unspecified altered mental status type.    Code status:   Current Code Status       Date Active Code Status Order ID Comments User Context       1/4/2025 2253 CPR (Attempt to Resuscitate) [id number]  Jesica Leigh APRN ED        Question Answer    Code Status (Patient has no pulse and is not breathing) CPR (Attempt to Resuscitate)    Medical Interventions (Patient has pulse or is breathing) Full Support                    Allergies:   Ppd [tuberculin purified protein derivative]    Isolation:   No active isolations    Intake and Output  No intake or output data in the 24 hours ending 01/04/25 2254    Weight:   There were no vitals filed for this visit.    Most recent vitals:   Vitals:    01/04/25 2053   BP: (!) 151/105   Pulse: 98   Resp: 18   Temp: 98.2 °F (36.8 °C)   TempSrc: Tympanic   SpO2: 99%       Active LDAs/IV Access:   Lines, Drains & Airways       Active LDAs       Name Placement date Placement time Site Days    Peripheral IV 01/04/25 2116 Left Antecubital 01/04/25 2116  Antecubital  less than 1                    Labs (abnormal labs have a star):   Labs Reviewed   COMPREHENSIVE METABOLIC PANEL - Abnormal; Notable for the following components:       Result Value    Glucose 131 (*)     BUN 24 (*)     Potassium 3.3 (*)     Chloride 97 (*)     BUN/Creatinine Ratio 29.3 (*)     All other components within normal limits    Narrative:     GFR Categories in Chronic Kidney Disease (CKD)      GFR Category          GFR (mL/min/1.73)    Interpretation  G1                     90 or greater          Normal or high (1)  G2                      60-89                Mild decrease (1)  G3a                   45-59                Mild to moderate decrease  G3b                   30-44                Moderate to severe decrease  G4                    15-29                Severe decrease  G5                    14 or less           Kidney failure          (1)In the absence of evidence of kidney disease, neither GFR category G1 or G2 fulfill the criteria for CKD.    eGFR calculation 2021 CKD-EPI creatinine equation, which does not include race as a factor   TROPONIN - Abnormal; Notable for the following components:    HS Troponin T 15 (*)     All other components within normal limits    Narrative:     High Sensitive Troponin T Reference Range:  <14.0 ng/L- Negative Female for AMI  <22.0 ng/L- Negative Male for AMI  >=14 - Abnormal Female indicating possible myocardial injury.  >=22 - Abnormal Male indicating possible myocardial injury.   Clinicians would have to utilize clinical acumen, EKG, Troponin, and serial changes to determine if it is an Acute Myocardial Infarction or myocardial injury due to an underlying chronic condition.        HIGH SENSITIVITIY TROPONIN T 1HR - Abnormal; Notable for the following components:    HS Troponin T 14 (*)     All other components within normal limits    Narrative:     High Sensitive Troponin T Reference Range:  <14.0 ng/L- Negative Female for AMI  <22.0 ng/L- Negative Male for AMI  >=14 - Abnormal Female indicating possible myocardial injury.  >=22 - Abnormal Male indicating possible myocardial injury.   Clinicians would have to utilize clinical acumen, EKG, Troponin, and serial changes to determine if it is an Acute Myocardial Infarction or myocardial injury due to an underlying chronic condition.        MAGNESIUM - Normal   CBC WITH AUTO DIFFERENTIAL - Normal   URINALYSIS W/ MICROSCOPIC IF INDICATED (NO CULTURE)   BASIC METABOLIC PANEL   CBC (NO DIFF)   CBC AND DIFFERENTIAL     Narrative:     The following orders were created for panel order CBC & Differential.  Procedure                               Abnormality         Status                     ---------                               -----------         ------                     CBC Auto Differential[[id number]]        Normal              Final result                 Please view results for these tests on the individual orders.       EKG:   ECG 12 Lead Altered Mental Status   Preliminary Result   HEART RATE=75  bpm   RR Onjyeogm=498  ms   UT Pdudhafc=830  ms   P Horizontal Axis=-8  deg   P Front Axis=75  deg   QRSD Interval=79  ms   QT Crzveopb=954  ms   ICyK=656  ms   QRS Axis=54  deg   T Wave Axis=49  deg   - BORDERLINE ECG -   Sinus rhythm   Probable left atrial enlargement   Minimal ST elevation, anterior leads   Date and Time of Study:2025-01-04 21:23:01          Meds given in ED:   Medications   sodium chloride 0.9 % flush 10 mL (has no administration in time range)   sodium chloride 0.9 % flush 10 mL (has no administration in time range)   sodium chloride 0.9 % infusion 40 mL (has no administration in time range)   acetaminophen (TYLENOL) tablet 650 mg (has no administration in time range)     Or   acetaminophen (TYLENOL) 160 MG/5ML oral solution 650 mg (has no administration in time range)     Or   acetaminophen (TYLENOL) suppository 650 mg (has no administration in time range)   sennosides-docusate (PERICOLACE) 8.6-50 MG per tablet 2 tablet (has no administration in time range)     And   polyethylene glycol (MIRALAX) packet 17 g (has no administration in time range)     And   bisacodyl (DULCOLAX) EC tablet 5 mg (has no administration in time range)     And   bisacodyl (DULCOLAX) suppository 10 mg (has no administration in time range)   ondansetron ODT (ZOFRAN-ODT) disintegrating tablet 4 mg (has no administration in time range)     Or   ondansetron (ZOFRAN) injection 4 mg (has no administration in time range)   calcium  carbonate (TUMS) chewable tablet 500 mg (200 mg elemental) (has no administration in time range)       Imaging results:  XR Chest 1 View    Result Date: 1/4/2025  No acute findings.  This report was finalized on 1/4/2025 9:45 PM by Dr. Elvira Plasencia M.D on Workstation: [id number]       Ambulatory status:   - stand by assist     Social issues:   Social History     Socioeconomic History    Marital status:    Tobacco Use    Smoking status: Never    Smokeless tobacco: Never   Vaping Use    Vaping status: Never Used   Substance and Sexual Activity    Alcohol use: Not Currently     Alcohol/week: 7.0 standard drinks of alcohol     Types: 7 Glasses of wine per week     Comment: per patient's son, patient drinks one glass of wine daily    Drug use: No    Sexual activity: Never       Peripheral Neurovascular  Peripheral Neurovascular (Adult)  Peripheral Neurovascular WDL: WDL    Neuro Cognitive  Neuro Cognitive (Adult)  Cognitive/Neuro/Behavioral WDL: .WDL except, orientation  Orientation: disoriented to, time    Learning  Learning Assessment  Learning Readiness and Ability: cognitive limitation noted  Education Provided  Person Taught: patient, family member/friend  Teaching Method: verbal instruction  Teaching Focus: symptom/problem overview  Education Outcome Evaluation: acceptance expressed    Respiratory  Respiratory WDL  Respiratory WDL: WDL    Abdominal Pain       Pain Assessments  Pain (Adult)  (0-10) Pain Rating: Rest: 0    NIH Stroke Scale       Megan Mckeon RN  01/04/25 22:54 EST

## 2025-01-05 NOTE — SIGNIFICANT NOTE
Attempted to call son for MRI screening sheet. The number listed on chart has been disconnected. Spoke with sister who stated this was the same number she has. She will attempt to reach him and give him the number to reach us.

## 2025-01-05 NOTE — PROGRESS NOTES
Name: Lucia Ellis ADMIT: 2025   : 1950  PCP: Everardo Mazariegos MD    MRN: [id number] LOS: 0 days   AGE/SEX: 74 y.o. female  ROOM: [facility]     Subjective   Subjective   Patient seen this morning.  Laying in bed.  Oriented to name, knows that she is in the hospital however was not able to tell me the name of the hospital.  Denies complaints when asked.    Review of Systems   AaS abovr  Objective   Objective   Vital Signs  Temp:  [97.1 °F (36.2 °C)-98.2 °F (36.8 °C)] 97.6 °F (36.4 °C)  Heart Rate:  [69-98] 83  Resp:  [16-18] 16  BP: (103-177)/() 103/57  SpO2:  [98 %-99 %] 98 %  on   ;   Device (Oxygen Therapy): room air  Body mass index is 18.79 kg/m².  Physical Exam    General: Alert, laying in bed, cooperative   HEENT: Normocephalic, atraumatic  CV: Regular rate and rhythm, no murmurs rubs or gallops  Lungs: Clear to auscultation bilaterally, no crackles or wheezes  Abdomen: Soft, nontender, nondistended  Extremities: No significant peripheral edema , no cyanosis     Results Review     I reviewed the patient's new clinical results.  Results from last 7 days   Lab Units 25  0655 25  2115   WBC 10*3/mm3 5.75 5.45   HEMOGLOBIN g/dL 12.7 13.5   PLATELETS 10*3/mm3 213 237     Results from last 7 days   Lab Units 25  0655 25  211   SODIUM mmol/L 137 137   POTASSIUM mmol/L 3.0* 3.3*   CHLORIDE mmol/L 98 97*   CO2 mmol/L 25.0 25.8   BUN mg/dL 29* 24*   CREATININE mg/dL 1.11* 0.82   GLUCOSE mg/dL 162* 131*   Estimated Creatinine Clearance: 30.7 mL/min (A) (by C-G formula based on SCr of 1.11 mg/dL (H)).  Results from last 7 days   Lab Units 25  2115   ALBUMIN g/dL 4.5   BILIRUBIN mg/dL 0.6   ALK PHOS U/L 91   AST (SGOT) U/L 18   ALT (SGPT) U/L 10     Results from last 7 days   Lab Units 25  0655 25  2115   CALCIUM mg/dL 8.9 9.5   ALBUMIN g/dL  --  4.5   MAGNESIUM mg/dL  --  2.1       COVID19   Date Value Ref Range Status   2023 Not Detected Not Detected -  Ref. Range Final     Hemoglobin A1C   Date/Time Value Ref Range Status   01/04/2025 2115 7.30 (H) 4.80 - 5.60 % Final     Glucose   Date/Time Value Ref Range Status   01/05/2025 1046 165 (H) 70 - 130 mg/dL Final   01/05/2025 0845 210 (H) 70 - 130 mg/dL Final           CT Head Without Contrast  Narrative: CT OF THE HEAD WITHOUT CONTRAST     HISTORY: Altered mental status     COMPARISON: January 10, 2024     TECHNIQUE: Axial CT imaging was obtained through the brain. No IV  contrast was administered.     FINDINGS:  No acute intracranial hemorrhage is seen. There is diffuse atrophy.  There is periventricular and deep white matter microangiopathic change.  There is no midline shift or mass effect. Old infarct is noted within  the anterior limb of the right internal capsule. Additional old infarct  is noted within the posterior limb of the right internal capsule.  Paranasal sinuses and mastoid air cells appear clear.     Impression: No acute intracranial findings.     Radiation dose reduction techniques were utilized, including automated  exposure control and exposure modulation based on body size.        This report was finalized on 1/4/2025 11:59 PM by Dr. Elvira Plasencia M.D on Workstation: [id number]       Scheduled Medications  amLODIPine, 10 mg, Oral, Daily  aspirin, 81 mg, Oral, Daily  atorvastatin, 80 mg, Oral, Daily  cetirizine, 10 mg, Oral, Daily  donepezil, 10 mg, Oral, Daily  escitalopram, 10 mg, Oral, Daily  insulin lispro, 2-7 Units, Subcutaneous, 4x Daily AC & at Bedtime  levETIRAcetam, 1,000 mg, Oral, BID  [Held by provider] lisinopril, 40 mg, Oral, Daily  memantine, 5 mg, Oral, BID  OLANZapine, 5 mg, Oral, Nightly  pantoprazole, 40 mg, Oral, QAM  potassium chloride ER, 40 mEq, Oral, Q4H  sodium chloride, 10 mL, Intravenous, Q12H  thiamine (B-1) IV, 500 mg, Intravenous, Daily    Infusions   Diet  Diet: Regular/House; Fluid Consistency: Thin (IDDSI 0)    I have personally reviewed     [x]  Laboratory    [x]  Microbiology   [x]  Radiology   [x]  EKG/Telemetry  []  Cardiology/Vascular   []  Pathology    []  Records       Assessment/Plan     Active Hospital Problems    Diagnosis  POA    **AMS (altered mental status) [R41.82]  Yes    History of stroke [Z86.73]  Not Applicable    Vascular dementia, moderate, without behavioral disturbance, psychotic disturbance, mood disturbance, and anxiety [F01.B0]  Yes    Type 2 diabetes mellitus with hyperglycemia [E11.65]  Yes    Hypokalemia [E87.6]  Yes    Essential hypertension [I10]  Yes    Hyperlipidemia [E78.5]  Yes      Resolved Hospital Problems   No resolved problems to display.     74 y.o. female w/ dementia, DM2, HLD, HTN presenting with altered mental status and failure to thrive.     Vascular dementia  Failure to thrive  -She is A&Ox2 but has no focal deficits on exam  -AMS is most likely secondary to worsening dementia  -CT head is negative for an acute intracranial process  -Infectious workup unremarkable  -TSH normal, B12 normal  -Continue outpatient donepezil, memantine  -Neurology following, symptoms likely due to progression of dementia  -MRI brain pending       history of CVA  -Continue aspirin, statin    Epilepsy  -Continue Keppra  -Neurology following, plan for 20-minute repeat EEG    Hypokalemia/close creatinine  -Potassium 3.0 this morning  -Replacement per protocol.  -Creatinine 1.11 this morning, baseline creatinine around 0.8  -Hold lisinopril, IV fluids.  Recheck BMP this afternoon.        Hypertension  -BP soft, holding amlodipine, hold lisinopril       Type 2 Diabetes Mellitus  -Hold oral diabetic medications  - correctional factor insulin  -Add low-dose Lantus  -Monitor blood sugars ACHS  -Hemoglobin A1c 7.3     Hyperlipidemia   statin       GERD  -Continue Protonix       SCDs for DVT prophylaxis.  Full code.  Discussed with patient.  Expected discharge date/ time has not been documented.       Copied text in this note has been reviewed and is  accurate as of 01/05/25.         Dictated utilizing Dragon dictation        Cecile Villegas MD  [facility]  01/05/25  10:52 EST

## 2025-01-05 NOTE — PLAN OF CARE
Goal Outcome Evaluation:              Outcome Evaluation: Pt is a 74 y.o. female admitted to [facility] with c/o AMS and failure to thrive on 1/4/2025. PMHx includes dementia, DM, HTN, hx stroke. Pt oriented to self only. Pt presents today with impaired cognition, and decreased functional mobility. Pt required SBA for bed mobility, SBA for STS transfers, and CGA for ambulation with RW for 15 ft. Pt impulsive throughout functional mobility however maintains safety overall.  Anticipate pt will D/C to LTC.    Anticipated Discharge Disposition (PT): [facility] (long-term care hospital)

## 2025-01-05 NOTE — PLAN OF CARE
Goal Outcome Evaluation:  Plan of Care Reviewed With: patient        Progress: no change  Outcome Evaluation: Patient is alert and oriented to self. Needs frequent reminders of where she is and why she's here. Skin checked. SCDs placed and kept on throughout the night. Tolerating diet- ate dinesh crackers with peanut butter and drank ginger ale and water. Meds given whole w/o complaints. No c/o N/V/D or pain. VSS. Family to be called in AM to asure they know she's here. Neurology consult called. Sezuire and fall precautions implemented. POC ongoing.  Clean catch U/A collected.

## 2025-01-05 NOTE — H&P
Patient Name:  Lucia Ellis  YOB: 1950  MRN:  [id number]  Admit Date:  1/4/2025  Patient Care Team:  Everardo Mazariegos MD as PCP - General (Family Medicine)      Subjective   History Present Illness     Chief Complaint   Patient presents with   • Wellness Check       Ms. Ellis is a 74 y.o. non-smoker with a history of vascular dementia, hypertension, stroke, hyperlipidemia, and type 2 diabetes mellitus that presents to [facility] following a wellness check. She is confused on exam due to dementia, so history is obtained from medical records. Per ED notes, she was brought in after [employer] was called to her home for a wellness check. Apparently the patient lives with her son and her sister visited her earlier and found that there was no electricity or running water  in the home. An APS report has been filed and the patient is being admitted for nursing home placement. She denies any acute complaints at this time.      History of Present Illness  Review of Systems   Constitutional:  Negative for chills and fever.   HENT:  Negative for congestion and sore throat.    Eyes:  Negative for photophobia and visual disturbance.   Respiratory:  Negative for cough, shortness of breath and wheezing.    Cardiovascular:  Negative for chest pain, palpitations and leg swelling.   Gastrointestinal:  Negative for abdominal pain, nausea and vomiting.   Musculoskeletal:  Negative for arthralgias and myalgias.   Skin:  Negative for color change and wound.   Neurological:  Negative for dizziness, seizures, speech difficulty, weakness, light-headedness, numbness and headaches.        Personal History     Past Medical History:   Diagnosis Date   • Breast cancer    • Dementia    • Diabetes mellitus    • Hypertension    • Memory loss      Past Surgical History:   Procedure Laterality Date   • CHOLECYSTECTOMY     • ENDOSCOPY N/A 5/27/2024    Procedure: ESOPHAGOGASTRODUODENOSCOPY;  Surgeon: Clive More  MD ROGELIO;  Location: [facility] ENDOSCOPY;  Service: Gastroenterology;  Laterality: N/A;  PREOP/ ABNORMAL CT OF STOMACH- POSTOP/ GASTRITIS   • HYSTERECTOMY     • MASTECTOMY Right 1995   • TOTAL KNEE ARTHROPLASTY Right      Family History   Problem Relation Age of Onset   • Heart attack Mother    • Heart disease Mother    • Hypertension Mother    • Hypertension Father    • Diabetes Father    • Hypertension Sister    • Diabetes Sister    • Thyroid cancer Sister    • Breast cancer Sister    • Lung cancer Sister    • Diabetes Brother    • Drug abuse Paternal Grandmother      Social History     Tobacco Use   • Smoking status: Never     Passive exposure: Never   • Smokeless tobacco: Never   Vaping Use   • Vaping status: Never Used   Substance Use Topics   • Alcohol use: Not Currently     Alcohol/week: 7.0 standard drinks of alcohol     Types: 7 Glasses of wine per week     Comment: per patient's son, patient drinks one glass of wine daily   • Drug use: No     Medications Prior to Admission   Medication Sig Dispense Refill Last Dose/Taking   • amLODIPine (NORVASC) 10 MG tablet Take 1 tablet by mouth Daily. Indications: High Blood Pressure 90 tablet 3    • Aspirin Low Dose 81 MG EC tablet TAKE ONE TABLET BY MOUTH DAILY 90 tablet 0    • atorvastatin (LIPITOR) 80 MG tablet Take 1 tablet by mouth every night at bedtime. 90 tablet 2    • bismuth subsalicylate (PEPTO BISMOL) 262 MG chewable tablet Chew 2 tablets 4 (Four) Times a Day Before Meals & at Bedtime.      • cetirizine (zyrTEC) 10 MG tablet Take 1 tablet by mouth Daily. 30 tablet 6    • Continuous Blood Gluc  (FreeStyle Aleyda 2 [place]) device 1 each Continuous. 1 each 0    • Continuous Blood Gluc Sensor (FreeStyle Aleyda 2 Sensor) misc 1 each 1 (One) Time Per Week. 2 each 11    • donepezil (Aricept) 10 MG tablet Take 1 tablet by mouth Daily. 90 tablet 1    • empagliflozin (Jardiance) 10 MG tablet tablet Take 1 tablet by mouth Daily. Indications: Type 2 Diabetes 30 tablet  5    • escitalopram (Lexapro) 10 MG tablet Take 1 tablet by mouth Daily. Indications: Major Depressive Disorder 90 tablet 1    • levETIRAcetam (KEPPRA) 1000 MG tablet Take 1 tablet by mouth 2 (Two) Times a Day. Indications: Seizure 180 tablet 1    • lisinopril (PRINIVIL,ZESTRIL) 40 MG tablet Take 1 tablet by mouth Daily. 90 tablet 3    • memantine (NAMENDA) 5 MG tablet Take 1 tablet by mouth 2 (Two) Times a Day. 180 tablet 1    • OLANZapine (zyPREXA) 5 MG tablet Take 1 tablet by mouth every night at bedtime. 90 tablet 3    • omeprazole (priLOSEC) 40 MG capsule Take 1 capsule by mouth Daily.      • pantoprazole (PROTONIX) 40 MG EC tablet TAKE ONE TABLET BY MOUTH EVERY MORNING 90 tablet 0    • SITagliptin-metFORMIN HCl ER (Janumet XR)  MG tablet Take 1 tablet by mouth Daily. Indications: Type 2 Diabetes 30 tablet 11      Allergies:    Allergies   Allergen Reactions   • Ppd [Tuberculin Purified Protein Derivative] Rash       Objective    Objective     Vital Signs  Temp:  [98 °F (36.7 °C)-98.2 °F (36.8 °C)] 98 °F (36.7 °C)  Heart Rate:  [81-98] 81  Resp:  [18] 18  BP: (151-177)/() 177/84  SpO2:  [99 %] 99 %  on   ;   Device (Oxygen Therapy): room air  Body mass index is 18.79 kg/m².    Physical Exam  Vitals and nursing note reviewed.   Constitutional:       Appearance: Normal appearance.   HENT:      Head: Normocephalic and atraumatic.      Nose: Nose normal.      Mouth/Throat:      Mouth: Mucous membranes are moist.      Pharynx: Oropharynx is clear.   Eyes:      Extraocular Movements: Extraocular movements intact.      Conjunctiva/sclera: Conjunctivae normal.   Cardiovascular:      Rate and Rhythm: Normal rate and regular rhythm.      Pulses: Normal pulses.      Heart sounds: Normal heart sounds.   Pulmonary:      Effort: Pulmonary effort is normal.      Breath sounds: Normal breath sounds.   Abdominal:      General: Bowel sounds are normal.      Palpations: Abdomen is soft.   Musculoskeletal:          General: No swelling. Normal range of motion.      Cervical back: Normal range of motion and neck supple.   Skin:     General: Skin is warm and dry.   Neurological:      General: No focal deficit present.      Mental Status: She is alert. She is confused.      Cranial Nerves: No cranial nerve deficit, dysarthria or facial asymmetry.      Motor: Motor function is intact. No weakness.      Comments: She is alert and oriented to person and place only.   Psychiatric:         Attention and Perception: Attention normal.         Mood and Affect: Mood normal.         Speech: Speech normal.         Behavior: Behavior normal. Behavior is cooperative.         Thought Content: Thought content normal.         Cognition and Memory: Memory is impaired.         Results Review:  I reviewed the patient's new clinical results.  I reviewed the patient's new imaging results and agree with the interpretation.  I reviewed the patient's other test results and agree with the interpretation  I personally viewed and interpreted the patient's EKG/Telemetry data  Discussed with ED provider.    Lab Results (last 24 hours)       Procedure Component Value Units Date/Time    CBC & Differential [[id number]]  (Normal) Collected: 01/04/25 2115    Specimen: Blood Updated: 01/04/25 2128    Narrative:      The following orders were created for panel order CBC & Differential.  Procedure                               Abnormality         Status                     ---------                               -----------         ------                     CBC Auto Differential[[id number]]        Normal              Final result                 Please view results for these tests on the individual orders.    Comprehensive Metabolic Panel [[id number]]  (Abnormal) Collected: 01/04/25 2115    Specimen: Blood Updated: 01/04/25 2148     Glucose 131 mg/dL      BUN 24 mg/dL      Creatinine 0.82 mg/dL      Sodium 137 mmol/L      Potassium 3.3 mmol/L      Chloride 97 mmol/L       CO2 25.8 mmol/L      Calcium 9.5 mg/dL      Total Protein 7.1 g/dL      Albumin 4.5 g/dL      ALT (SGPT) 10 U/L      AST (SGOT) 18 U/L      Alkaline Phosphatase 91 U/L      Total Bilirubin 0.6 mg/dL      Globulin 2.6 gm/dL      A/G Ratio 1.7 g/dL      BUN/Creatinine Ratio 29.3     Anion Gap 14.2 mmol/L      eGFR 74.2 mL/min/1.73     Narrative:      GFR Categories in Chronic Kidney Disease (CKD)      GFR Category          GFR (mL/min/1.73)    Interpretation  G1                     90 or greater         Normal or high (1)  G2                      60-89                Mild decrease (1)  G3a                   45-59                Mild to moderate decrease  G3b                   30-44                Moderate to severe decrease  G4                    15-29                Severe decrease  G5                    14 or less           Kidney failure          (1)In the absence of evidence of kidney disease, neither GFR category G1 or G2 fulfill the criteria for CKD.    eGFR calculation 2021 CKD-EPI creatinine equation, which does not include race as a factor    Magnesium [[id number]]  (Normal) Collected: 01/04/25 2115    Specimen: Blood Updated: 01/04/25 2148     Magnesium 2.1 mg/dL     High Sensitivity Troponin T [[id number]]  (Abnormal) Collected: 01/04/25 2115    Specimen: Blood Updated: 01/04/25 2148     HS Troponin T 15 ng/L     Narrative:      High Sensitive Troponin T Reference Range:  <14.0 ng/L- Negative Female for AMI  <22.0 ng/L- Negative Male for AMI  >=14 - Abnormal Female indicating possible myocardial injury.  >=22 - Abnormal Male indicating possible myocardial injury.   Clinicians would have to utilize clinical acumen, EKG, Troponin, and serial changes to determine if it is an Acute Myocardial Infarction or myocardial injury due to an underlying chronic condition.         CBC Auto Differential [[id number]]  (Normal) Collected: 01/04/25 2115    Specimen: Blood Updated: 01/04/25 2128     WBC 5.45 10*3/mm3       RBC 4.48 10*6/mm3      Hemoglobin 13.5 g/dL      Hematocrit 39.4 %      MCV 87.9 fL      MCH 30.1 pg      MCHC 34.3 g/dL      RDW 12.5 %      RDW-SD 40.1 fl      MPV 10.6 fL      Platelets 237 10*3/mm3      Neutrophil % 63.3 %      Lymphocyte % 29.5 %      Monocyte % 5.7 %      Eosinophil % 0.4 %      Basophil % 0.9 %      Immature Grans % 0.2 %      Neutrophils, Absolute 3.45 10*3/mm3      Lymphocytes, Absolute 1.61 10*3/mm3      Monocytes, Absolute 0.31 10*3/mm3      Eosinophils, Absolute 0.02 10*3/mm3      Basophils, Absolute 0.05 10*3/mm3      Immature Grans, Absolute 0.01 10*3/mm3      nRBC 0.0 /100 WBC     High Sensitivity Troponin T 1Hr [[id number]]  (Abnormal) Collected: 01/04/25 2222    Specimen: Blood Updated: 01/04/25 2253     HS Troponin T 14 ng/L      Troponin T Numeric Delta -1 ng/L      Troponin T % Delta -7 %     Narrative:      High Sensitive Troponin T Reference Range:  <14.0 ng/L- Negative Female for AMI  <22.0 ng/L- Negative Male for AMI  >=14 - Abnormal Female indicating possible myocardial injury.  >=22 - Abnormal Male indicating possible myocardial injury.   Clinicians would have to utilize clinical acumen, EKG, Troponin, and serial changes to determine if it is an Acute Myocardial Infarction or myocardial injury due to an underlying chronic condition.                 Imaging Results (Last 24 Hours)       Procedure Component Value Units Date/Time    CT Head Without Contrast [[id number]] Collected: 01/04/25 2356     Updated: 01/05/25 0002    Narrative:      CT OF THE HEAD WITHOUT CONTRAST     HISTORY: Altered mental status     COMPARISON: January 10, 2024     TECHNIQUE: Axial CT imaging was obtained through the brain. No IV  contrast was administered.     FINDINGS:  No acute intracranial hemorrhage is seen. There is diffuse atrophy.  There is periventricular and deep white matter microangiopathic change.  There is no midline shift or mass effect. Old infarct is noted within  the anterior limb of  the right internal capsule. Additional old infarct  is noted within the posterior limb of the right internal capsule.  Paranasal sinuses and mastoid air cells appear clear.       Impression:      No acute intracranial findings.     Radiation dose reduction techniques were utilized, including automated  exposure control and exposure modulation based on body size.        This report was finalized on 1/4/2025 11:59 PM by Dr. Elvira Plasencia M.D on Workstation: [id number]       XR Chest 1 View [[id number]] Collected: 01/04/25 2144     Updated: 01/04/25 2148    Narrative:      SINGLE VIEW OF THE CHEST     HISTORY: Altered mental status     COMPARISON: May 21, 2024     FINDINGS:  Heart size is within normal limits. No pneumothorax, pleural effusion,  or acute infiltrate is seen. There is calcification of the aorta.       Impression:      No acute findings.     This report was finalized on 1/4/2025 9:45 PM by Dr. Elvira Plasencia M.D on Workstation: [id number]               Results for orders placed during the hospital encounter of 12/08/23    Adult transthoracic echo complete    Interpretation Summary  •  Left ventricular systolic function is hyperdynamic (EF > 70%). Calculated left ventricular EF = 70.8%  •  Left ventricular diastolic function was normal.  •  Normal echo  •  Saline test results are negative.      ECG 12 Lead Altered Mental Status   Preliminary Result   HEART RATE=75  bpm   RR Zssdmcki=703  ms   LA Lnljlimt=875  ms   P Horizontal Axis=-8  deg   P Front Axis=75  deg   QRSD Interval=79  ms   QT Nabbhpat=396  ms   OIcO=221  ms   QRS Axis=54  deg   T Wave Axis=49  deg   - BORDERLINE ECG -   Sinus rhythm   Probable left atrial enlargement   Minimal ST elevation, anterior leads   Date and Time of Study:2025-01-04 21:23:01           Assessment/Plan     Active Hospital Problems    Diagnosis  POA   • **AMS (altered mental status) [R41.82]  Yes   • History of stroke [Z86.73]  Not Applicable   • Vascular  dementia, moderate, without behavioral disturbance, psychotic disturbance, mood disturbance, and anxiety [F01.B0]  Yes   • Type 2 diabetes mellitus with hyperglycemia [E11.65]  Yes   • Hypokalemia [E87.6]  Yes   • Essential hypertension [I10]  Yes   • Hyperlipidemia [E78.5]  Yes       AMS  -Admit for monitoring  -She is A&Ox2 but has no focal deficits on exam  -AMS is most likely secondary to worsening dementia  -CT head is negative for an acute intracranial process  -Neurology consult  -Neuro checks  -Check urinalysis  -PT consult  -CCP consult for placement at discharge    Hypokalemia  -Replace potassium per protocol  -Repeat labs in AM    Hypertension  -Blood pressures have been stable  -Continue amlodipine and lisinopril  -Monitor    Seizure disorder  -Continue home dose of Keppra  -Seizure precautions    Type 2 Diabetes Mellitus  -Hold oral diabetic medications  -Initiate correctional factor insulin  -Monitor blood sugars ACHS  -Check hgb A1C    Hyperlipidemia  History of stroke  -Continue aspirin and statin    Vascular Dementia  -Continue Aricept, Namenda, Lexapro, and Olanzapine  -Monitor behaviors    GERD  -Continue Protonix    -Medication reconciliation completed from previous medical records.    -I discussed the patients findings and my recommendations with patient.    VTE Prophylaxis - SCDs.  Code Status - Full code.       HERNANDO Fall  [facility]  01/04/25  23:59 EST

## 2025-01-05 NOTE — ED TRIAGE NOTES
Patient to ED per EMS from home after [employer] called to patient's home (where she lives w/ her 40-year-old son) because sister from out of town (who hasn't seen patient in months) called police due to living conditions; patient has no power and no running water. Per EMS, refrigerator had a strong foul odor, appears that water and power hav been out for an extended period of time. Patient alert/oriented to self and place, but not to date (states that it is 2002). Patient states she is not aware that power or water is out. Patient has no complaints. Per EMS, [employer] contacted APS.

## 2025-01-05 NOTE — THERAPY EVALUATION
Patient Name: Lucia Ellis  : 1950    MRN: [id number]                              Today's Date: 2025       Admit Date: 2025    Visit Dx:     ICD-10-CM ICD-9-CM   1. Altered mental status, unspecified altered mental status type  R41.82 780.97     Patient Active Problem List   Diagnosis    Gastroesophageal reflux disease    Malignant neoplasm of breast    Depression    Folliculitis    Generalized anxiety disorder    Hyperlipidemia    Essential hypertension    Status post total right knee replacement    Rectal hemorrhage    Vitamin D deficiency    Drug therapy    Acute cholecystitis    Uncontrolled type 2 diabetes mellitus with hypoglycemia without coma    Myoclonus    Type 2 diabetes mellitus with hyperglycemia    Hypokalemia    New onset seizure    Focal motor seizure    Vascular dementia, moderate, without behavioral disturbance, psychotic disturbance, mood disturbance, and anxiety    Stroke-like symptoms    CVA (cerebral vascular accident)    Lung nodules    Hypokalemia    History of stroke    Generalized weakness    Severe malnutrition    Gastritis without bleeding    Abnormal CT scan, stomach    AMS (altered mental status)     Past Medical History:   Diagnosis Date    Breast cancer     Dementia     Diabetes mellitus     Hypertension     Memory loss      Past Surgical History:   Procedure Laterality Date    CHOLECYSTECTOMY      ENDOSCOPY N/A 2024    Procedure: ESOPHAGOGASTRODUODENOSCOPY;  Surgeon: Clive More MD;  Location: [facility] ENDOSCOPY;  Service: Gastroenterology;  Laterality: N/A;  PREOP/ ABNORMAL CT OF STOMACH- POSTOP/ GASTRITIS    HYSTERECTOMY      MASTECTOMY Right 1995    TOTAL KNEE ARTHROPLASTY Right       General Information       Row Name 25 1148          Physical Therapy Time and Intention    Document Type evaluation  -RS     Mode of Treatment individual therapy;physical therapy  -RS       Row Name 25 1148          General Information    Patient Profile  Reviewed yes  -RS     Prior Level of Function --  pt poor historian due to cognitive status  -RS     Existing Precautions/Restrictions fall  -RS     Barriers to Rehab cognitive status  -RS       Row Name 01/05/25 1148          Cognition    Orientation Status (Cognition) oriented to;person  -RS       Row Name 01/05/25 1148          Safety Issues/Impairments Affecting Functional Mobility    Impairments Affecting Function (Mobility) cognition;coordination;strength  -RS               User Key  (r) = Recorded By, (t) = Taken By, (c) = Cosigned By      Initials Name Provider Type    RS Lizzie Vo PT Physical Therapist                   Mobility       Row Name 01/05/25 1149          Bed Mobility    Bed Mobility supine-sit;sit-supine  -RS     Supine-Sit [place] (Bed Mobility) standby assist  -RS     Sit-Supine [place] (Bed Mobility) standby assist  -RS     Assistive Device (Bed Mobility) bed rails  -RS       Row Name 01/05/25 1149          Sit-Stand Transfer    Sit-Stand [place] (Transfers) standby assist  -RS     Assistive Device (Sit-Stand Transfers) walker, front-wheeled  -RS       Row Name 01/05/25 1149          Gait/Stairs (Locomotion)    [place] Level (Gait) contact guard  -RS     Assistive Device (Gait) walker, front-wheeled  -RS     Distance in Feet (Gait) 15  -RS     Deviations/Abnormal Patterns (Gait) bina decreased;stride length decreased;weight shifting decreased  -RS     Bilateral Gait Deviations forward flexed posture;heel strike decreased  -RS               User Key  (r) = Recorded By, (t) = Taken By, (c) = Cosigned By      Initials Name Provider Type    RS Lizzie Vo PT Physical Therapist                   Obj/Interventions       Row Name 01/05/25 1149          Range of Motion Comprehensive    General Range of Motion no range of motion deficits identified  -RS       Row Name 01/05/25 1149          Strength Comprehensive (MMT)    Comment, General Manual Muscle Testing  (MMT) Assessment BLE grossly 4/5 based on observed functional mobility, formal MMT difficult due to cognitive status  -       Row Name 01/05/25 1149          Balance    Balance Assessment sitting static balance;standing static balance;standing dynamic balance  -RS     Static Sitting Balance modified independence  -RS     Static Standing Balance contact guard  -RS     Dynamic Standing Balance contact guard  -RS               User Key  (r) = Recorded By, (t) = Taken By, (c) = Cosigned By      Initials Name Provider Type    RS Lizzie Vo, PT Physical Therapist                   Goals/Plan       Row Name 01/05/25 1150          Bed Mobility Goal 1 (PT)    Activity/Assistive Device (Bed Mobility Goal 1, PT) bed mobility activities, all  -RS     [place] Level/Cues Needed (Bed Mobility Goal 1, PT) modified independence  -RS     Time Frame (Bed Mobility Goal 1, PT) short term goal (STG);2 days  -       Row Name 01/05/25 1150          Gait Training Goal 1 (PT)    Activity/Assistive Device (Gait Training Goal 1, PT) gait (walking locomotion);assistive device use  -RS     [place] Level (Gait Training Goal 1, PT) standby assist  -RS     Distance (Gait Training Goal 1, PT) 50  -RS     Time Frame (Gait Training Goal 1, PT) long term goal (LTG);by discharge  -       Row Name 01/05/25 1150          Therapy Assessment/Plan (PT)    Planned Therapy Interventions (PT) balance training;bed mobility training;gait training;home exercise program;patient/family education;strengthening;stretching;ROM (range of motion);transfer training  -RS               User Key  (r) = Recorded By, (t) = Taken By, (c) = Cosigned By      Initials Name Provider Type    RS Lizzie Vo, PT Physical Therapist                   Clinical Impression       Row Name 01/05/25 1150          Pain    Pretreatment Pain Rating 0/10 - no pain  -RS     Posttreatment Pain Rating 0/10 - no pain  -RS       Row Name 01/05/25 1150          Plan of Care  Review    Outcome Evaluation Pt is a 74 y.o. female admitted to [facility] with c/o AMS and failure to thrive on 1/4/2025. PMHx includes dementia, DM, HTN, hx stroke. Pt oriented to self only. Pt presents today with impaired cognition, and decreased functional mobility. Pt required SBA for bed mobility, SBA for STS transfers, and CGA for ambulation with RW for 15 ft. Pt impulsive throughout functional mobility however maintains safety overall.  Anticipate pt will D/C to LTC.  -RS       Row Name 01/05/25 1150          Therapy Assessment/Plan (PT)    Rehab Potential (PT) fair  -RS     Criteria for Skilled Interventions Met (PT) yes  -RS     Therapy Frequency (PT) 3 times/wk  -RS     Predicted Duration of Therapy Intervention (PT) 1 week  -RS       Row Name 01/05/25 1150          Positioning and Restraints    Pre-Treatment Position in bed  -RS     Post Treatment Position bed  -RS     In Bed supine;call light within reach;encouraged to call for assist;exit alarm on  -RS               User Key  (r) = Recorded By, (t) = Taken By, (c) = Cosigned By      Initials Name Provider Type    RS Lizzie Vo, PT Physical Therapist                   Outcome Measures       Row Name 01/05/25 1151 01/05/25 0002       How much help from another person do you currently need...    Turning from your back to your side while in flat bed without using bedrails? 4  -RS 4  -RC    Moving from lying on back to sitting on the side of a flat bed without bedrails? 4  -RS 4  -RC    Moving to and from a bed to a chair (including a wheelchair)? 4  -RS 4  -RC    Standing up from a chair using your arms (e.g., wheelchair, bedside chair)? 4  -RS 4  -RC    Climbing 3-5 steps with a railing? 3  -RS 3  -RC    To walk in hospital room? 3  -RS 4  -RC    AM-PAC 6 Clicks Score (PT) 22  -RS 23  -RC    Highest Level of Mobility Goal 7 --> Walk 25 feet or more  -RS 7 --> Walk 25 feet or more  -RC      Row Name 01/05/25 1151          Functional Assessment    Outcome  Measure Options AM-PAC 6 Clicks Basic Mobility (PT)  -               User Key  (r) = Recorded By, (t) = Taken By, (c) = Cosigned By      Initials Name Provider Type    RS Lizzie Vo PT Physical Therapist    Maryana Barry, RN Registered Nurse                                 Physical Therapy Education       Title: PT OT SLP Therapies (In Progress)       Topic: Physical Therapy (In Progress)       Point: Mobility training (In Progress)       Learning Progress Summary            Patient Acceptance, E, NR,NL by  at 1/5/2025 1151                      Point: Home exercise program (In Progress)       Learning Progress Summary            Patient Acceptance, E, NR,NL by RS at 1/5/2025 1151                      Point: Body mechanics (In Progress)       Learning Progress Summary            Patient Acceptance, E, NR,NL by RS at 1/5/2025 1151                      Point: Precautions (In Progress)       Learning Progress Summary            Patient Acceptance, E, NR,NL by  at 1/5/2025 1151                                      User Key       Initials Effective Dates Name Provider Type Discipline     06/16/21 -  Lizzie Vo PT Physical Therapist PT                  PT Recommendation and Plan  Planned Therapy Interventions (PT): balance training, bed mobility training, gait training, home exercise program, patient/family education, strengthening, stretching, ROM (range of motion), transfer training  Outcome Evaluation: Pt is a 74 y.o. female admitted to [facility] with c/o AMS and failure to thrive on 1/4/2025. PMHx includes dementia, DM, HTN, hx stroke. Pt oriented to self only. Pt presents today with impaired cognition, and decreased functional mobility. Pt required SBA for bed mobility, SBA for STS transfers, and CGA for ambulation with RW for 15 ft. Pt impulsive throughout functional mobility however maintains safety overall.  Anticipate pt will D/C to LTC.     Time Calculation:   PT Evaluation  Complexity  History, PT Evaluation Complexity: 1-2 personal factors and/or comorbidities  Examination of Body Systems (PT Eval Complexity): total of 3 or more elements  Clinical Presentation (PT Evaluation Complexity): evolving  Clinical Decision Making (PT Evaluation Complexity): moderate complexity  Overall Complexity (PT Evaluation Complexity): moderate complexity     PT Charges       Row Name 01/05/25 1151             Time Calculation    Start Time 1009  -RS      Stop Time 1027  -RS      Time Calculation (min) 18 min  -RS      PT Received On 01/05/25  -RS      PT - Next Appointment 01/07/25  -RS      PT Goal Re-Cert Due Date 01/12/25  -RS         Time Calculation- PT    Total Timed Code Minutes- PT 10 minute(s)  -RS         Timed Charges    09187 - PT Therapeutic Activity Minutes 10  -RS         Untimed Charges    PT Eval/Re-eval Minutes 8  -RS         Total Minutes    Timed Charges Total Minutes 10  -RS      Untimed Charges Total Minutes 8  -RS       Total Minutes 18  -RS                User Key  (r) = Recorded By, (t) = Taken By, (c) = Cosigned By      Initials Name Provider Type    RS Lizzie Vo, PT Physical Therapist                      PT G-Codes  Outcome Measure Options: AM-PAC 6 Clicks Basic Mobility (PT)  AM-PAC 6 Clicks Score (PT): 22  PT Discharge Summary  Anticipated Discharge Disposition (PT): [facility] (long-term care hospital)    Lizzie Vo, GILDARDO  1/5/2025
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
## 2025-01-05 NOTE — ED PROVIDER NOTES
EMERGENCY DEPARTMENT ENCOUNTER  Room Number:  13/13  PCP: Everardo Mazariegos MD  Independent Historians: Patient and Family      HPI:  Chief Complaint: had concerns including Wellness Check.     Context: Lucia Ellis is a 74 y.o. female with a medical history of GERD, HLD, HTN, diabetes, CVA who presents to the ED c/o acute altered mental status.  Patient lives at home with her son.  Family members from out of town came to check on patient and found that there was no running water in the house and had not been for several months.  They also found that electricity had been out for several days and was not expected to be turned on until next week.  They noted that patient was not even aware of these issues and concern for altered mental status.  They felt that it was unsafe conditions for the patient to live in.      Review of prior external notes (non-ED) -and- Review of prior external test results outside of this encounter: Office visit from 10/24/2024 reviewed and notable for history of vascular dementia, type 2 diabetes, hyperlipidemia, hypertension.  At that time it was noted her dementia was worsening and plan was to have her follow-up closely with her neurologist for review of her dementia medications    Prescription drug monitoring program review:         PAST MEDICAL HISTORY  Active Ambulatory Problems     Diagnosis Date Noted    Gastroesophageal reflux disease 02/09/2017    Malignant neoplasm of breast 02/09/2017    Depression 02/09/2017    Folliculitis 02/09/2017    Generalized anxiety disorder 02/09/2017    Hyperlipidemia 02/09/2017    Essential hypertension 02/09/2017    Status post total right knee replacement 02/09/2017    Rectal hemorrhage 02/09/2017    Vitamin D deficiency 02/09/2017    Drug therapy 02/09/2017    Acute cholecystitis 05/10/2018    Uncontrolled type 2 diabetes mellitus with hypoglycemia without coma 12/29/2022    Myoclonus 12/29/2022    Type 2 diabetes mellitus with hyperglycemia  12/29/2022    Hypokalemia 12/29/2022    New onset seizure 12/29/2022    Focal motor seizure 12/30/2022    Vascular dementia, moderate, without behavioral disturbance, psychotic disturbance, mood disturbance, and anxiety 12/31/2022    Stroke-like symptoms 12/08/2023    CVA (cerebral vascular accident) 12/09/2023    Lung nodules 12/09/2023    Hypokalemia 12/09/2023    History of stroke 03/07/2024    Generalized weakness 05/21/2024    Severe malnutrition 05/22/2024    Gastritis without bleeding 05/21/2024    Abnormal CT scan, stomach 05/21/2024     Resolved Ambulatory Problems     Diagnosis Date Noted    No Resolved Ambulatory Problems     Past Medical History:   Diagnosis Date    Breast cancer     Dementia     Diabetes mellitus     Hypertension     Memory loss          PAST SURGICAL HISTORY  Past Surgical History:   Procedure Laterality Date    CHOLECYSTECTOMY      ENDOSCOPY N/A 5/27/2024    Procedure: ESOPHAGOGASTRODUODENOSCOPY;  Surgeon: Clive More MD;  Location: [facility] ENDOSCOPY;  Service: Gastroenterology;  Laterality: N/A;  PREOP/ ABNORMAL CT OF STOMACH- POSTOP/ GASTRITIS    HYSTERECTOMY      MASTECTOMY Right 1995    TOTAL KNEE ARTHROPLASTY Right          FAMILY HISTORY  Family History   Problem Relation Age of Onset    Heart attack Mother     Heart disease Mother     Hypertension Mother     Hypertension Father     Diabetes Father     Hypertension Sister     Diabetes Sister     Thyroid cancer Sister     Breast cancer Sister     Lung cancer Sister     Diabetes Brother     Drug abuse Paternal Grandmother          SOCIAL HISTORY  Social History     Socioeconomic History    Marital status:    Tobacco Use    Smoking status: Never    Smokeless tobacco: Never   Vaping Use    Vaping status: Never Used   Substance and Sexual Activity    Alcohol use: Not Currently     Alcohol/week: 7.0 standard drinks of alcohol     Types: 7 Glasses of wine per week     Comment: per patient's son, patient drinks one glass  of wine daily    Drug use: No    Sexual activity: Never         ALLERGIES  Ppd [tuberculin purified protein derivative]      REVIEW OF SYSTEMS  Review of Systems  Included in HPI  All systems reviewed and negative except for those discussed in HPI.      PHYSICAL EXAM    I have reviewed the triage vital signs and nursing notes.    ED Triage Vitals [01/04/25 2053]   Temp Heart Rate Resp BP SpO2   98.2 °F (36.8 °C) 98 18 (!) 151/105 99 %      Temp src Heart Rate Source Patient Position BP Location FiO2 (%)   Tympanic Monitor -- -- --       Physical Exam  GENERAL: alert, no acute distress, confusion  SKIN: Warm, dry  HENT: Normocephalic, atraumatic  EYES: no scleral icterus  CV: regular rhythm, regular rate  RESPIRATORY: normal effort, lungs clear  ABDOMEN: soft, nontender, nondistended  MUSCULOSKELETAL: no deformity  NEURO: alert, moves all extremities, follows commands            LAB RESULTS  Recent Results (from the past 24 hours)   Comprehensive Metabolic Panel    Collection Time: 01/04/25  9:15 PM    Specimen: Blood   Result Value Ref Range    Glucose 131 (H) 65 - 99 mg/dL    BUN 24 (H) 8 - 23 mg/dL    Creatinine 0.82 0.57 - 1.00 mg/dL    Sodium 137 136 - 145 mmol/L    Potassium 3.3 (L) 3.5 - 5.2 mmol/L    Chloride 97 (L) 98 - 107 mmol/L    CO2 25.8 22.0 - 29.0 mmol/L    Calcium 9.5 8.6 - 10.5 mg/dL    Total Protein 7.1 6.0 - 8.5 g/dL    Albumin 4.5 3.5 - 5.2 g/dL    ALT (SGPT) 10 1 - 33 U/L    AST (SGOT) 18 1 - 32 U/L    Alkaline Phosphatase 91 39 - 117 U/L    Total Bilirubin 0.6 0.0 - 1.2 mg/dL    Globulin 2.6 gm/dL    A/G Ratio 1.7 g/dL    BUN/Creatinine Ratio 29.3 (H) 7.0 - 25.0    Anion Gap 14.2 5.0 - 15.0 mmol/L    eGFR 74.2 >60.0 mL/min/1.73   Magnesium    Collection Time: 01/04/25  9:15 PM    Specimen: Blood   Result Value Ref Range    Magnesium 2.1 1.6 - 2.4 mg/dL   High Sensitivity Troponin T    Collection Time: 01/04/25  9:15 PM    Specimen: Blood   Result Value Ref Range    HS Troponin T 15 (H) <14 ng/L    CBC Auto Differential    Collection Time: 01/04/25  9:15 PM    Specimen: Blood   Result Value Ref Range    WBC 5.45 3.40 - 10.80 10*3/mm3    RBC 4.48 3.77 - 5.28 10*6/mm3    Hemoglobin 13.5 12.0 - 15.9 g/dL    Hematocrit 39.4 34.0 - 46.6 %    MCV 87.9 79.0 - 97.0 fL    MCH 30.1 26.6 - 33.0 pg    MCHC 34.3 31.5 - 35.7 g/dL    RDW 12.5 12.3 - 15.4 %    RDW-SD 40.1 37.0 - 54.0 fl    MPV 10.6 6.0 - 12.0 fL    Platelets 237 140 - 450 10*3/mm3    Neutrophil % 63.3 42.7 - 76.0 %    Lymphocyte % 29.5 19.6 - 45.3 %    Monocyte % 5.7 5.0 - 12.0 %    Eosinophil % 0.4 0.3 - 6.2 %    Basophil % 0.9 0.0 - 1.5 %    Immature Grans % 0.2 0.0 - 0.5 %    Neutrophils, Absolute 3.45 1.70 - 7.00 10*3/mm3    Lymphocytes, Absolute 1.61 0.70 - 3.10 10*3/mm3    Monocytes, Absolute 0.31 0.10 - 0.90 10*3/mm3    Eosinophils, Absolute 0.02 0.00 - 0.40 10*3/mm3    Basophils, Absolute 0.05 0.00 - 0.20 10*3/mm3    Immature Grans, Absolute 0.01 0.00 - 0.05 10*3/mm3    nRBC 0.0 0.0 - 0.2 /100 WBC   ECG 12 Lead Altered Mental Status    Collection Time: 01/04/25  9:23 PM   Result Value Ref Range    QT Interval 390 ms    QTC Interval 436 ms         RADIOLOGY  XR Chest 1 View    Result Date: 1/4/2025  SINGLE VIEW OF THE CHEST  HISTORY: Altered mental status  COMPARISON: May 21, 2024  FINDINGS: Heart size is within normal limits. No pneumothorax, pleural effusion, or acute infiltrate is seen. There is calcification of the aorta.      No acute findings.  This report was finalized on 1/4/2025 9:45 PM by Dr. Elvira Plasencia M.D on Workstation: [id number]         MEDICATIONS GIVEN IN ER  Medications - No data to display      ORDERS PLACED DURING THIS VISIT:  Orders Placed This Encounter   Procedures    CT Head Without Contrast    XR Chest 1 View    Comprehensive Metabolic Panel    Urinalysis With Microscopic If Indicated (No Culture) - Urine, Clean Catch    Magnesium    High Sensitivity Troponin T    CBC Auto Differential    High Sensitivity Troponin T  1Hr    [facility] (on-call MD unless specified) Details    ECG 12 Lead Altered Mental Status    Initiate Observation Status    CBC & Differential         OUTPATIENT MEDICATION MANAGEMENT:  No current Epic-ordered facility-administered medications on file.     Current Outpatient Medications Ordered in Epic   Medication Sig Dispense Refill    amLODIPine (NORVASC) 10 MG tablet Take 1 tablet by mouth Daily. Indications: High Blood Pressure 90 tablet 3    Aspirin Low Dose 81 MG EC tablet TAKE ONE TABLET BY MOUTH DAILY 90 tablet 0    atorvastatin (LIPITOR) 80 MG tablet Take 1 tablet by mouth every night at bedtime. 90 tablet 2    bismuth subsalicylate (PEPTO BISMOL) 262 MG chewable tablet Chew 2 tablets 4 (Four) Times a Day Before Meals & at Bedtime.      cetirizine (zyrTEC) 10 MG tablet Take 1 tablet by mouth Daily. 30 tablet 6    Continuous Blood Gluc  (FreeStyle Aleyda 2 [place]) device 1 each Continuous. 1 each 0    Continuous Blood Gluc Sensor (FreeStyle Aleyda 2 Sensor) misc 1 each 1 (One) Time Per Week. 2 each 11    donepezil (Aricept) 10 MG tablet Take 1 tablet by mouth Daily. 90 tablet 1    empagliflozin (Jardiance) 10 MG tablet tablet Take 1 tablet by mouth Daily. Indications: Type 2 Diabetes 30 tablet 5    escitalopram (Lexapro) 10 MG tablet Take 1 tablet by mouth Daily. Indications: Major Depressive Disorder 90 tablet 1    levETIRAcetam (KEPPRA) 1000 MG tablet Take 1 tablet by mouth 2 (Two) Times a Day. Indications: Seizure 180 tablet 1    lisinopril (PRINIVIL,ZESTRIL) 40 MG tablet Take 1 tablet by mouth Daily. 90 tablet 3    memantine (NAMENDA) 5 MG tablet Take 1 tablet by mouth 2 (Two) Times a Day. 180 tablet 1    OLANZapine (zyPREXA) 5 MG tablet Take 1 tablet by mouth every night at bedtime. 90 tablet 3    omeprazole (priLOSEC) 40 MG capsule Take 1 capsule by mouth Daily.      pantoprazole (PROTONIX) 40 MG EC tablet TAKE ONE TABLET BY MOUTH EVERY MORNING 90 tablet 0    SITagliptin-metFORMIN HCl ER (Janumet  XR)  MG tablet Take 1 tablet by mouth Daily. Indications: Type 2 Diabetes 30 tablet 11         PROCEDURES  Procedures            PROGRESS, DATA ANALYSIS, CONSULTS, AND MEDICAL DECISION MAKING  All labs have been independently interpreted by me.  All radiology studies have been reviewed by me. All EKG's have been independently viewed and interpreted by me.  Discussion below represents my analysis of pertinent findings related to patient's condition, differential diagnosis, treatment plan and final disposition.    Differential diagnosis includes but is not limited to dementia, altered mental status, UTI, unsafe living condition.    Clinical Scores:                                       ED Course as of 01/04/25 2242   Sat Jan 04, 2025 2148 EKG interpreted by me demonstrates sinus rhythm, rate of 75, no MN/QT prolongation, no ST elevation [MW]   2241 Workup in the emergency department is overall unremarkable.  Will plan on admission for further evaluation and management [MW]   2242 Discussed with Lorin SHANNON with [facility] who agrees to admit [MW]      ED Course User Index  [MW] John Sarmiento MD             AS OF 22:42 EST VITALS:    BP - (!) 151/105  HR - 98  TEMP - 98.2 °F (36.8 °C) (Tympanic)  O2 SATS - 99%    COMPLEXITY OF CARE  The patient requires admission.      DIAGNOSIS  Final diagnoses:   Altered mental status, unspecified altered mental status type         DISPOSITION  ED Disposition       ED Disposition   Decision to Admit    Condition   --    Comment   Level of Care: Med/Surg [1]   Diagnosis: AMS (altered mental status) [[id number]]   Admitting Physician: STUART MARQUEZ [[id number]]   Attending Physician: STUART MARQUEZ [[id number]]   Is patient appropriate for Inpatient Observation Unit?: No [0]                  Please note that portions of this document were completed with a voice recognition program.    Note Disclaimer: At [facility], we believe that sharing information builds trust and  better relationships. You are receiving this note because you recently visited [facility]. It is possible you will see health information before a provider has talked with you about it. This kind of information can be easy to misunderstand. To help you fully understand what it means for your health, we urge you to discuss this note with your provider.         John Sarmiento MD  01/04/25 [id number]

## 2025-01-06 ENCOUNTER — APPOINTMENT (OUTPATIENT)
Dept: CT IMAGING | Facility: HOSPITAL | Age: 75
End: 2025-01-06
Payer: MEDICARE

## 2025-01-06 LAB
ANION GAP SERPL CALCULATED.3IONS-SCNC: 8.2 MMOL/L (ref 5–15)
BUN SERPL-MCNC: 25 MG/DL (ref 8–23)
BUN/CREAT SERPL: 32.5 (ref 7–25)
CALCIUM SPEC-SCNC: 8.3 MG/DL (ref 8.6–10.5)
CHLORIDE SERPL-SCNC: 111 MMOL/L (ref 98–107)
CO2 SERPL-SCNC: 22.8 MMOL/L (ref 22–29)
CREAT SERPL-MCNC: 0.77 MG/DL (ref 0.57–1)
DEPRECATED RDW RBC AUTO: 42.1 FL (ref 37–54)
EGFRCR SERPLBLD CKD-EPI 2021: 80.1 ML/MIN/1.73
ERYTHROCYTE [DISTWIDTH] IN BLOOD BY AUTOMATED COUNT: 12.6 % (ref 12.3–15.4)
GLUCOSE BLDC GLUCOMTR-MCNC: 125 MG/DL (ref 70–130)
GLUCOSE BLDC GLUCOMTR-MCNC: 168 MG/DL (ref 70–130)
GLUCOSE BLDC GLUCOMTR-MCNC: 180 MG/DL (ref 70–130)
GLUCOSE SERPL-MCNC: 105 MG/DL (ref 65–99)
HCT VFR BLD AUTO: 32.6 % (ref 34–46.6)
HGB BLD-MCNC: 10.4 G/DL (ref 12–15.9)
IRON 24H UR-MRATE: 53 MCG/DL (ref 37–145)
IRON SATN MFR SERPL: 16 % (ref 20–50)
MAGNESIUM SERPL-MCNC: 2.1 MG/DL (ref 1.6–2.4)
MCH RBC QN AUTO: 28.9 PG (ref 26.6–33)
MCHC RBC AUTO-ENTMCNC: 31.9 G/DL (ref 31.5–35.7)
MCV RBC AUTO: 90.6 FL (ref 79–97)
PHOSPHATE SERPL-MCNC: 2.4 MG/DL (ref 2.5–4.5)
PLATELET # BLD AUTO: 172 10*3/MM3 (ref 140–450)
PMV BLD AUTO: 10.7 FL (ref 6–12)
POTASSIUM SERPL-SCNC: 4.7 MMOL/L (ref 3.5–5.2)
RBC # BLD AUTO: 3.6 10*6/MM3 (ref 3.77–5.28)
SODIUM SERPL-SCNC: 142 MMOL/L (ref 136–145)
TIBC SERPL-MCNC: 338 MCG/DL (ref 298–536)
TRANSFERRIN SERPL-MCNC: 227 MG/DL (ref 200–360)
WBC NRBC COR # BLD AUTO: 3.47 10*3/MM3 (ref 3.4–10.8)

## 2025-01-06 PROCEDURE — 83540 ASSAY OF IRON: CPT | Performed by: STUDENT IN AN ORGANIZED HEALTH CARE EDUCATION/TRAINING PROGRAM

## 2025-01-06 PROCEDURE — 25810000003 SODIUM CHLORIDE 0.9 % SOLUTION: Performed by: STUDENT IN AN ORGANIZED HEALTH CARE EDUCATION/TRAINING PROGRAM

## 2025-01-06 PROCEDURE — 63710000001 INSULIN LISPRO (HUMAN) PER 5 UNITS: Performed by: NURSE PRACTITIONER

## 2025-01-06 PROCEDURE — 25010000002 THIAMINE HCL 200 MG/2ML SOLUTION 2 ML VIAL: Performed by: STUDENT IN AN ORGANIZED HEALTH CARE EDUCATION/TRAINING PROGRAM

## 2025-01-06 PROCEDURE — 82948 REAGENT STRIP/BLOOD GLUCOSE: CPT

## 2025-01-06 PROCEDURE — 84100 ASSAY OF PHOSPHORUS: CPT | Performed by: STUDENT IN AN ORGANIZED HEALTH CARE EDUCATION/TRAINING PROGRAM

## 2025-01-06 PROCEDURE — 83735 ASSAY OF MAGNESIUM: CPT | Performed by: STUDENT IN AN ORGANIZED HEALTH CARE EDUCATION/TRAINING PROGRAM

## 2025-01-06 PROCEDURE — 63710000001 INSULIN GLARGINE PER 5 UNITS: Performed by: STUDENT IN AN ORGANIZED HEALTH CARE EDUCATION/TRAINING PROGRAM

## 2025-01-06 PROCEDURE — 80048 BASIC METABOLIC PNL TOTAL CA: CPT | Performed by: STUDENT IN AN ORGANIZED HEALTH CARE EDUCATION/TRAINING PROGRAM

## 2025-01-06 PROCEDURE — 84466 ASSAY OF TRANSFERRIN: CPT | Performed by: STUDENT IN AN ORGANIZED HEALTH CARE EDUCATION/TRAINING PROGRAM

## 2025-01-06 PROCEDURE — 85027 COMPLETE CBC AUTOMATED: CPT | Performed by: STUDENT IN AN ORGANIZED HEALTH CARE EDUCATION/TRAINING PROGRAM

## 2025-01-06 PROCEDURE — 70450 CT HEAD/BRAIN W/O DYE: CPT

## 2025-01-06 RX ORDER — SODIUM CHLORIDE 9 MG/ML
75 INJECTION, SOLUTION INTRAVENOUS CONTINUOUS
Status: DISCONTINUED | OUTPATIENT
Start: 2025-01-06 | End: 2025-01-06

## 2025-01-06 RX ADMIN — SODIUM CHLORIDE 75 ML/HR: 9 INJECTION, SOLUTION INTRAVENOUS at 02:19

## 2025-01-06 RX ADMIN — THIAMINE HYDROCHLORIDE 500 MG: 100 INJECTION, SOLUTION INTRAMUSCULAR; INTRAVENOUS at 09:04

## 2025-01-06 RX ADMIN — LEVETIRACETAM 1000 MG: 500 TABLET, FILM COATED ORAL at 00:50

## 2025-01-06 RX ADMIN — ATORVASTATIN CALCIUM 80 MG: 20 TABLET, FILM COATED ORAL at 09:04

## 2025-01-06 RX ADMIN — INSULIN LISPRO 2 UNITS: 100 INJECTION, SOLUTION INTRAVENOUS; SUBCUTANEOUS at 22:26

## 2025-01-06 RX ADMIN — DIBASIC SODIUM PHOSPHATE, MONOBASIC POTASSIUM PHOSPHATE AND MONOBASIC SODIUM PHOSPHATE 2 TABLET: 852; 155; 130 TABLET ORAL at 20:52

## 2025-01-06 RX ADMIN — INSULIN GLARGINE 6 UNITS: 100 INJECTION, SOLUTION SUBCUTANEOUS at 09:04

## 2025-01-06 RX ADMIN — Medication 10 ML: at 20:52

## 2025-01-06 RX ADMIN — OLANZAPINE 5 MG: 2.5 TABLET, FILM COATED ORAL at 20:52

## 2025-01-06 RX ADMIN — ASPIRIN 81 MG: 81 TABLET, COATED ORAL at 09:04

## 2025-01-06 RX ADMIN — ESCITALOPRAM OXALATE 10 MG: 10 TABLET, FILM COATED ORAL at 09:04

## 2025-01-06 RX ADMIN — MEMANTINE HYDROCHLORIDE 5 MG: 5 TABLET, FILM COATED ORAL at 00:50

## 2025-01-06 RX ADMIN — CETIRIZINE HYDROCHLORIDE 10 MG: 10 TABLET, FILM COATED ORAL at 09:04

## 2025-01-06 RX ADMIN — DIBASIC SODIUM PHOSPHATE, MONOBASIC POTASSIUM PHOSPHATE AND MONOBASIC SODIUM PHOSPHATE 2 TABLET: 852; 155; 130 TABLET ORAL at 13:15

## 2025-01-06 RX ADMIN — LEVETIRACETAM 1000 MG: 500 TABLET, FILM COATED ORAL at 15:33

## 2025-01-06 RX ADMIN — DONEPEZIL HYDROCHLORIDE 10 MG: 10 TABLET, FILM COATED ORAL at 09:04

## 2025-01-06 RX ADMIN — MEMANTINE HYDROCHLORIDE 5 MG: 5 TABLET, FILM COATED ORAL at 15:33

## 2025-01-06 RX ADMIN — PANTOPRAZOLE SODIUM 40 MG: 40 TABLET, DELAYED RELEASE ORAL at 05:50

## 2025-01-06 NOTE — PLAN OF CARE
Goal Outcome Evaluation:  Plan of Care Reviewed With: patient        Progress: improving  Outcome Evaluation: Went over the plan of care and answered all questions. Vitals stable and pain is well controlled. Grace is up with assistnce and tolerating her diet. All meds given without complications. No other issues this shift.

## 2025-01-06 NOTE — PROGRESS NOTES
Name: Lucia Ellis ADMIT: 2025   : 1950  PCP: Everardo Mazariegos MD    MRN: [id number] LOS: 1 days   AGE/SEX: 74 y.o. female  ROOM: [facility]     Subjective   Subjective   No new events overnight.  Laying in bed.  Oriented to name, not to time or place.  No complaints when asked.    Review of Systems   AaS abovr  Objective   Objective   Vital Signs  Temp:  [96.6 °F (35.9 °C)-97.8 °F (36.6 °C)] 97.6 °F (36.4 °C)  Heart Rate:  [70-87] 70  Resp:  [16] 16  BP: (109-127)/(60-65) 117/61  SpO2:  [95 %-98 %] 98 %  on   ;   Device (Oxygen Therapy): room air  Body mass index is 18.79 kg/m².  Physical Exam    General: Alert, laying in bed, pleasantly confused  HEENT: Normocephalic, atraumatic  CV: Regular rate and rhythm, no murmurs  Lungs: Clear to auscultation bilaterally, no crackles or wheezes  Abdomen: Soft, nontender, nondistended  Extremities: No significant peripheral edema , no cyanosis     Results Review     I reviewed the patient's new clinical results.  Results from last 7 days   Lab Units 25  0538 25  0625  2115   WBC 10*3/mm3 3.47 5.75 5.45   HEMOGLOBIN g/dL 10.4* 12.7 13.5   PLATELETS 10*3/mm3 172 213 237     Results from last 7 days   Lab Units 25  0538 25  0655 25  2115   SODIUM mmol/L 142 141 137 137   POTASSIUM mmol/L 4.7 5.0 3.0* 3.3*   CHLORIDE mmol/L 111* 109* 98 97*   CO2 mmol/L 22.8 24.0 25.0 25.8   BUN mg/dL 25* 28* 29* 24*   CREATININE mg/dL 0.77 1.09* 1.11* 0.82   GLUCOSE mg/dL 105* 129* 162* 131*   Estimated Creatinine Clearance: 44.3 mL/min (by C-G formula based on SCr of 0.77 mg/dL).  Results from last 7 days   Lab Units 25  2115   ALBUMIN g/dL 4.5   BILIRUBIN mg/dL 0.6   ALK PHOS U/L 91   AST (SGOT) U/L 18   ALT (SGPT) U/L 10     Results from last 7 days   Lab Units 25  0538 25  2013 25  0655 25  2115   CALCIUM mg/dL 8.3* 8.4* 8.9 9.5   ALBUMIN g/dL  --   --   --  4.5   MAGNESIUM mg/dL 2.1  --   --   2.1   PHOSPHORUS mg/dL 2.4*  --   --   --        COVID19   Date Value Ref Range Status   12/16/2023 Not Detected Not Detected - Ref. Range Final     Hemoglobin A1C   Date/Time Value Ref Range Status   01/04/2025 2115 7.30 (H) 4.80 - 5.60 % Final     Glucose   Date/Time Value Ref Range Status   01/06/2025 1227 125 70 - 130 mg/dL Final   01/05/2025 2137 122 70 - 130 mg/dL Final   01/05/2025 1647 161 (H) 70 - 130 mg/dL Final   01/05/2025 1046 165 (H) 70 - 130 mg/dL Final   01/05/2025 0845 210 (H) 70 - 130 mg/dL Final           EEG  Table formatting from the original result was not included.  EEG Report          #[id number]    Indication: Dementia with progressive cognitive decline    History: 74-year-old woman with dementia and progressive cognitive   decline.  The study includes time locked video    Medical diagnoses: Diabetes, hypertension, vascular dementia, breast   cancer    Current Facility-Administered Medications   Medication Dose Route Frequency Provider Last Rate Last Admin    acetaminophen (TYLENOL) tablet 650 mg  650 mg Oral Q4H PRN Jesica Leigh APRN        Or    acetaminophen (TYLENOL) 160 MG/5ML oral solution 650 mg  650 mg Oral Q4H   PRN Jesica Leigh APRN        Or    acetaminophen (TYLENOL) suppository 650 mg  650 mg Rectal Q4H PRN   Jesica Leigh APRN        [Held by provider] amLODIPine (NORVASC) tablet 10 mg  10 mg Oral Daily   Jescia Leigh APRN        aspirin EC tablet 81 mg  81 mg Oral Daily Jesica Leigh APRN     81 mg at 01/05/25 1057    atorvastatin (LIPITOR) tablet 80 mg  80 mg Oral Daily Jesica Leigh APRN   80 mg at 01/05/25 1056    sennosides-docusate (PERICOLACE) 8.6-50 MG per tablet 2 tablet  2 tablet   Oral BID PRN Jesica Leigh APRN        And    polyethylene glycol (MIRALAX) packet 17 g  17 g Oral Daily PRN Jesica Leigh APRN        And    bisacodyl (DULCOLAX) EC tablet 5 mg  5 mg Oral Daily PRN Nicole Leighnifer Celine,  HERNANDO        And    bisacodyl (DULCOLAX) suppository 10 mg  10 mg Rectal Daily PRN Jesica Leigh APRN        calcium carbonate (TUMS) chewable tablet 500 mg (200 mg elemental)  2   tablet Oral BID PRN Jesica Leigh APRN        Calcium Replacement - Follow Nurse / BPA Driven Protocol   Not   Applicable PRN Jesica Leigh APRN        cetirizine (zyrTEC) tablet 10 mg  10 mg Oral Daily Jesica Leigh APRN   10 mg at 01/05/25 1057    dextrose (D50W) (25 g/50 mL) IV injection 25 g  25 g Intravenous Q15 Min   PRN Jesica Leigh APRN        dextrose (GLUTOSE) oral gel 15 g  15 g Oral Q15 Min PRN Jesica Leigh APRN        donepezil (ARICEPT) tablet 10 mg  10 mg Oral Daily Jesica Leigh APRN   10 mg at 01/05/25 1057    escitalopram (LEXAPRO) tablet 10 mg  10 mg Oral Daily Jesica Leigh APRN   10 mg at 01/05/25 1057    glucagon (GLUCAGEN) injection 1 mg  1 mg Intramuscular Q15 Min PRN   Jesica Leigh APRN        hydrALAZINE (APRESOLINE) injection 10 mg  10 mg Intravenous Q6H PRN   Cecile Villegas MD        insulin glargine (LANTUS, SEMGLEE) injection 6 Units  6 Units   Subcutaneous Daily Cecile Villegas MD   6 Units at 01/05/25 1324    insulin lispro (HUMALOG/ADMELOG) injection 2-7 Units  2-7 Units   Subcutaneous 4x Daily AC & at Bedtime Jesica Leigh APRN   2 Units   at 01/05/25 1746    levETIRAcetam (KEPPRA) tablet 1,000 mg  1,000 mg Oral BID Jesica Leigh APRN   1,000 mg at 01/05/25 1324    [Held by provider] lisinopril (PRINIVIL,ZESTRIL) tablet 40 mg  40 mg   Oral Daily Jesica Leigh APRN        Magnesium Standard Dose Replacement - Follow Nurse / BPA Driven Protocol     Not Applicable PRN Jesica Leigh APRN        memantine (NAMENDA) tablet 5 mg  5 mg Oral BID Jesica Leigh APRN   5 mg at 01/05/25 1324    OLANZapine (zyPREXA) tablet 5 mg  5 mg Oral Nightly Jesica Leigh APRN   5 mg  at 01/05/25 0120    ondansetron ODT (ZOFRAN-ODT) disintegrating tablet 4 mg  4 mg Oral Q6H   PRN Jesica Leigh APRN        Or    ondansetron (ZOFRAN) injection 4 mg  4 mg Intravenous Q6H PRN Jesica Leigh APRN        pantoprazole (PROTONIX) EC tablet 40 mg  40 mg Oral QAM Jesica Leigh APRN   40 mg at 01/05/25 0633    Phosphorus Replacement - Follow Nurse / BPA Driven Protocol   Not   Applicable PRN Jesica Leigh APRN        Potassium Replacement - Follow Nurse / BPA Driven Protocol   Not   Applicable PRN Jesica Leigh APRN        sodium chloride 0.9 % flush 10 mL  10 mL Intravenous Q12H Jesica Leigh APRN   10 mL at 01/05/25 0120    sodium chloride 0.9 % flush 10 mL  10 mL Intravenous PRN Jesica Leigh APRN        sodium chloride 0.9 % infusion 40 mL  40 mL Intravenous PRN Jesica Leigh APRN        sodium chloride 0.9 % infusion  75 mL/hr Intravenous Continuous Cecile Villegas MD 75 mL/hr at 01/05/25 1059 75 mL/hr at 01/05/25 1059    thiamine (B-1) 500 mg in sodium chloride 0.9 % 100 mL IVPB  500 mg   Intravenous Daily Wilner Hendricks  mL/hr at 01/05/25   1056 500 mg at 01/05/25 1056     Time of study: 25 minutes 58 seconds    Technical summary: The 10-20 system was used for electrode placement   Background: The background rhythm was composed of 8 Hz low to moderate   amplitude posteriorly dominant alpha rhythm.  Faster 15 to 20 Hz beta   activities are seen bifrontally.  There were frequent left fronto temporal   delta activities seen.     Sleep: The patient became drowsy as noted by attenuation of the alpha   rhythm and an increased portion of slower activities were seen.     Hyperventilation: Not obtained     Photic stimulation: Photic stimulation was performed at various flash   frequencies showing no significant change in the background activity     EKG: Sinus rhythm in the 80s    Impression:  Abnormal EEG  showing left fronto temporal slowing consistent with a   structural lesion.  No epileptiform activities were seen.    Dictated utilizing Dragon dictation.    CT Head Without Contrast  Narrative: CT OF THE HEAD WITHOUT CONTRAST     HISTORY: Altered mental status     COMPARISON: January 10, 2024     TECHNIQUE: Axial CT imaging was obtained through the brain. No IV  contrast was administered.     FINDINGS:  No acute intracranial hemorrhage is seen. There is diffuse atrophy.  There is periventricular and deep white matter microangiopathic change.  There is no midline shift or mass effect. Old infarct is noted within  the anterior limb of the right internal capsule. Additional old infarct  is noted within the posterior limb of the right internal capsule.  Paranasal sinuses and mastoid air cells appear clear.     Impression: No acute intracranial findings.     Radiation dose reduction techniques were utilized, including automated  exposure control and exposure modulation based on body size.        This report was finalized on 1/4/2025 11:59 PM by Dr. Elvira Plasencia M.D on Workstation: [id number]       Scheduled Medications  [Held by provider] amLODIPine, 10 mg, Oral, Daily  aspirin, 81 mg, Oral, Daily  atorvastatin, 80 mg, Oral, Daily  cetirizine, 10 mg, Oral, Daily  donepezil, 10 mg, Oral, Daily  escitalopram, 10 mg, Oral, Daily  insulin glargine, 6 Units, Subcutaneous, Daily  insulin lispro, 2-7 Units, Subcutaneous, 4x Daily AC & at Bedtime  levETIRAcetam, 1,000 mg, Oral, BID  [Held by provider] lisinopril, 40 mg, Oral, Daily  memantine, 5 mg, Oral, BID  OLANZapine, 5 mg, Oral, Nightly  pantoprazole, 40 mg, Oral, QAM  phosphorus, 500 mg, Oral, BID  sodium chloride, 10 mL, Intravenous, Q12H  thiamine (B-1) IV, 500 mg, Intravenous, Daily    Infusions   Diet  Diet: Regular/House; Fluid Consistency: Thin (IDDSI 0)    I have personally reviewed     [x]  Laboratory   [x]  Microbiology   [x]  Radiology   [x]   EKG/Telemetry  []  Cardiology/Vascular   []  Pathology    []  Records       Assessment/Plan     Active Hospital Problems    Diagnosis  POA    **AMS (altered mental status) [R41.82]  Yes    History of stroke [Z86.73]  Not Applicable    Vascular dementia, moderate, without behavioral disturbance, psychotic disturbance, mood disturbance, and anxiety [F01.B0]  Yes    Type 2 diabetes mellitus with hyperglycemia [E11.65]  Yes    Hypokalemia [E87.6]  Yes    Essential hypertension [I10]  Yes    Hyperlipidemia [E78.5]  Yes      Resolved Hospital Problems   No resolved problems to display.     74 y.o. female w/ dementia, DM2, HLD, HTN presenting with altered mental status and failure to thrive.     Vascular dementia  Failure to thrive  -She is A&Ox2 but has no focal deficits on exam  -AMS is most likely secondary to worsening dementia  -CT head is negative for an acute intracranial process  -Infectious workup unremarkable  -TSH normal, B12 normal  -Continue outpatient donepezil, memantine  -Continue olanzapine  -Neurology following, symptoms likely due to progression of dementia  -MRI brain pending       history of CVA  -Continue aspirin, statin    Epilepsy  -Continue Keppra  -Stop close EEG, results pending    Hypokalemia /elevated creatinine  -Replacement per protocol.  -Potassium normal, creatinine improved after fluids.  Laboratory BMP.        Hypertension  -BP soft, holding amlodipine, hold lisinopril       Type 2 Diabetes Mellitus  -Hold oral diabetic medications  - correctional factor insulin  - low-dose Lantus  -Monitor blood sugars ACHS  -Hemoglobin A1c 7.3     Hyperlipidemia   Statin    Hypophosphatemia  -Replacement ordered    GERD  -Continue Protonix     Anemia   hemoglobin 10.4 on  1/6 no signs of bleeding.  Suspect partly dilutional secondary to IV fluids.  Monitor daily CBC.    SCDs for DVT prophylaxis.  Full code.  Discussed with patient.  Discussed with RN  Disposition: SNF/LTC, likely stable to be discharged  tomorrow.  Expected Discharge Date: 1/8/2025; Expected Discharge Time:        Copied text in this note has been reviewed and is accurate as of 01/06/25.         Dictated utilizing Dragon dictation        Cecile Villegas MD  [facility]  01/06/25  16:23 EST

## 2025-01-06 NOTE — PLAN OF CARE
Goal Outcome Evaluation:              Outcome Evaluation: VSS. SSI given with lunch and dinner. Long acting insulin initiated today. Patient pleasantly confused.

## 2025-01-06 NOTE — PLAN OF CARE
Goal Outcome Evaluation:  Plan of Care Reviewed With: patient        Progress: no change  Outcome Evaluation: pt remains pleasantly confused, took all meds whole w/ water, ivf infusing per order, still no call from son about MRI screening sheet, monitoring blood sugars, room air, morning labs ordered, no c/o of pain or nausea

## 2025-01-06 NOTE — PROGRESS NOTES
Continued Stay Note  [facility]     Patient Name: Lucia Ellis  MRN: [id number]  Today's Date: 1/6/2025    Admit Date: 1/4/2025    Plan: asessment pending for SNF to possible LTC   Discharge Plan       Row Name 01/06/25 1530       Plan    Plan asessment pending for SNF to possible LTC    Plan Comments Attempted to call ptls son Vern Munoz [phone] but phone number not working.  Call placed to pt's sister Jessica Recinos [phone] who did confirm pt's facesheet ifnormation as correct.  per demetrius pt's son Vern does live in pt's home in the basement.  Pt's sister stated pt's son did move in with pt to help care for pt since she has dementia.  Pt;s sister also has not been able to reach pt's son.  Pt's sis ter did state that pt does need to go to a SNF at [place] but unsure if the plan will be for LTC. Pt's sister did not wnat to offer any recs for any SNF's for pt.  Informed pt's sister if she does talk with pt's son to let him knoe CCP needs to talk with him about pt's DCP/needs.  Pt's sister confirmed that she will do that.  CCP will work with nursing also to see if they have has any contact with pt's son.                   Discharge Codes    No documentation.                 Expected Discharge Date and Time       Expected Discharge Date Expected Discharge Time    Jan 8, 2025               CRISTIANA iMxon

## 2025-01-07 PROBLEM — E44.0 MODERATE PROTEIN-CALORIE MALNUTRITION: Status: ACTIVE | Noted: 2025-01-07

## 2025-01-07 LAB
ANION GAP SERPL CALCULATED.3IONS-SCNC: 7 MMOL/L (ref 5–15)
BUN SERPL-MCNC: 16 MG/DL (ref 8–23)
BUN/CREAT SERPL: 22.2 (ref 7–25)
CALCIUM SPEC-SCNC: 8.3 MG/DL (ref 8.6–10.5)
CHLORIDE SERPL-SCNC: 110 MMOL/L (ref 98–107)
CO2 SERPL-SCNC: 26 MMOL/L (ref 22–29)
CREAT SERPL-MCNC: 0.72 MG/DL (ref 0.57–1)
DEPRECATED RDW RBC AUTO: 42.7 FL (ref 37–54)
EGFRCR SERPLBLD CKD-EPI 2021: 86.8 ML/MIN/1.73
ERYTHROCYTE [DISTWIDTH] IN BLOOD BY AUTOMATED COUNT: 12.7 % (ref 12.3–15.4)
FERRITIN SERPL-MCNC: 92.3 NG/ML (ref 13–150)
FOLATE SERPL-MCNC: 4.51 NG/ML (ref 4.78–24.2)
GLUCOSE BLDC GLUCOMTR-MCNC: 108 MG/DL (ref 70–130)
GLUCOSE BLDC GLUCOMTR-MCNC: 146 MG/DL (ref 70–130)
GLUCOSE BLDC GLUCOMTR-MCNC: 167 MG/DL (ref 70–130)
GLUCOSE BLDC GLUCOMTR-MCNC: 209 MG/DL (ref 70–130)
GLUCOSE SERPL-MCNC: 113 MG/DL (ref 65–99)
HCT VFR BLD AUTO: 31.8 % (ref 34–46.6)
HGB BLD-MCNC: 9.9 G/DL (ref 12–15.9)
MAGNESIUM SERPL-MCNC: 2 MG/DL (ref 1.6–2.4)
MCH RBC QN AUTO: 28.3 PG (ref 26.6–33)
MCHC RBC AUTO-ENTMCNC: 31.1 G/DL (ref 31.5–35.7)
MCV RBC AUTO: 90.9 FL (ref 79–97)
PHOSPHATE SERPL-MCNC: 3.2 MG/DL (ref 2.5–4.5)
PLATELET # BLD AUTO: 163 10*3/MM3 (ref 140–450)
PMV BLD AUTO: 11 FL (ref 6–12)
POTASSIUM SERPL-SCNC: 4.2 MMOL/L (ref 3.5–5.2)
RBC # BLD AUTO: 3.5 10*6/MM3 (ref 3.77–5.28)
SODIUM SERPL-SCNC: 143 MMOL/L (ref 136–145)
WBC NRBC COR # BLD AUTO: 4.08 10*3/MM3 (ref 3.4–10.8)

## 2025-01-07 PROCEDURE — 63710000001 INSULIN GLARGINE PER 5 UNITS: Performed by: STUDENT IN AN ORGANIZED HEALTH CARE EDUCATION/TRAINING PROGRAM

## 2025-01-07 PROCEDURE — 63710000001 INSULIN LISPRO (HUMAN) PER 5 UNITS: Performed by: NURSE PRACTITIONER

## 2025-01-07 PROCEDURE — 83735 ASSAY OF MAGNESIUM: CPT | Performed by: STUDENT IN AN ORGANIZED HEALTH CARE EDUCATION/TRAINING PROGRAM

## 2025-01-07 PROCEDURE — 82746 ASSAY OF FOLIC ACID SERUM: CPT | Performed by: STUDENT IN AN ORGANIZED HEALTH CARE EDUCATION/TRAINING PROGRAM

## 2025-01-07 PROCEDURE — 25010000002 THIAMINE HCL 200 MG/2ML SOLUTION 2 ML VIAL: Performed by: STUDENT IN AN ORGANIZED HEALTH CARE EDUCATION/TRAINING PROGRAM

## 2025-01-07 PROCEDURE — 82948 REAGENT STRIP/BLOOD GLUCOSE: CPT

## 2025-01-07 PROCEDURE — 82728 ASSAY OF FERRITIN: CPT | Performed by: STUDENT IN AN ORGANIZED HEALTH CARE EDUCATION/TRAINING PROGRAM

## 2025-01-07 PROCEDURE — 85027 COMPLETE CBC AUTOMATED: CPT | Performed by: STUDENT IN AN ORGANIZED HEALTH CARE EDUCATION/TRAINING PROGRAM

## 2025-01-07 PROCEDURE — 80048 BASIC METABOLIC PNL TOTAL CA: CPT | Performed by: STUDENT IN AN ORGANIZED HEALTH CARE EDUCATION/TRAINING PROGRAM

## 2025-01-07 PROCEDURE — 97110 THERAPEUTIC EXERCISES: CPT

## 2025-01-07 PROCEDURE — 84100 ASSAY OF PHOSPHORUS: CPT | Performed by: STUDENT IN AN ORGANIZED HEALTH CARE EDUCATION/TRAINING PROGRAM

## 2025-01-07 PROCEDURE — 99232 SBSQ HOSP IP/OBS MODERATE 35: CPT | Performed by: STUDENT IN AN ORGANIZED HEALTH CARE EDUCATION/TRAINING PROGRAM

## 2025-01-07 RX ORDER — AMLODIPINE BESYLATE 5 MG/1
5 TABLET ORAL DAILY
Status: DISCONTINUED | OUTPATIENT
Start: 2025-01-08 | End: 2025-01-07

## 2025-01-07 RX ORDER — AMLODIPINE BESYLATE 5 MG/1
5 TABLET ORAL DAILY
Status: DISCONTINUED | OUTPATIENT
Start: 2025-01-07 | End: 2025-01-08

## 2025-01-07 RX ORDER — FOLIC ACID 1 MG/1
1 TABLET ORAL DAILY
Status: DISCONTINUED | OUTPATIENT
Start: 2025-01-07 | End: 2025-01-23 | Stop reason: HOSPADM

## 2025-01-07 RX ADMIN — ESCITALOPRAM OXALATE 10 MG: 10 TABLET, FILM COATED ORAL at 09:34

## 2025-01-07 RX ADMIN — CETIRIZINE HYDROCHLORIDE 10 MG: 10 TABLET, FILM COATED ORAL at 09:35

## 2025-01-07 RX ADMIN — MEMANTINE HYDROCHLORIDE 5 MG: 5 TABLET, FILM COATED ORAL at 17:34

## 2025-01-07 RX ADMIN — DONEPEZIL HYDROCHLORIDE 10 MG: 10 TABLET, FILM COATED ORAL at 09:34

## 2025-01-07 RX ADMIN — THIAMINE HYDROCHLORIDE 500 MG: 100 INJECTION, SOLUTION INTRAMUSCULAR; INTRAVENOUS at 09:35

## 2025-01-07 RX ADMIN — AMLODIPINE BESYLATE 5 MG: 5 TABLET ORAL at 21:25

## 2025-01-07 RX ADMIN — INSULIN LISPRO 2 UNITS: 100 INJECTION, SOLUTION INTRAVENOUS; SUBCUTANEOUS at 17:34

## 2025-01-07 RX ADMIN — Medication 10 ML: at 21:26

## 2025-01-07 RX ADMIN — FOLIC ACID 1 MG: 1 TABLET ORAL at 09:39

## 2025-01-07 RX ADMIN — INSULIN GLARGINE 6 UNITS: 100 INJECTION, SOLUTION SUBCUTANEOUS at 09:34

## 2025-01-07 RX ADMIN — LEVETIRACETAM 1000 MG: 500 TABLET, FILM COATED ORAL at 17:34

## 2025-01-07 RX ADMIN — ATORVASTATIN CALCIUM 80 MG: 20 TABLET, FILM COATED ORAL at 09:34

## 2025-01-07 RX ADMIN — ASPIRIN 81 MG: 81 TABLET, COATED ORAL at 09:35

## 2025-01-07 RX ADMIN — Medication 10 ML: at 09:40

## 2025-01-07 RX ADMIN — MEMANTINE HYDROCHLORIDE 5 MG: 5 TABLET, FILM COATED ORAL at 05:26

## 2025-01-07 RX ADMIN — LEVETIRACETAM 1000 MG: 500 TABLET, FILM COATED ORAL at 05:26

## 2025-01-07 RX ADMIN — PANTOPRAZOLE SODIUM 40 MG: 40 TABLET, DELAYED RELEASE ORAL at 06:14

## 2025-01-07 RX ADMIN — INSULIN LISPRO 3 UNITS: 100 INJECTION, SOLUTION INTRAVENOUS; SUBCUTANEOUS at 23:40

## 2025-01-07 RX ADMIN — OLANZAPINE 5 MG: 2.5 TABLET, FILM COATED ORAL at 21:25

## 2025-01-07 NOTE — PROGRESS NOTES
"DOS: 2025  NAME: Lucia Ellis   : 1950  PCP: Everardo Mazariegos MD  Chief Complaint   Patient presents with    Wellness Check       Chief complaint: Altered mental status    Subjective: Patient has been seen and evaluated, no acute events overnight.  Patient's sister and nephew at bedside everyone agrees that patient is back to her baseline    Objective:  Vital signs: /81 (BP Location: Left arm, Patient Position: Lying)   Pulse 81   Temp 97.1 °F (36.2 °C) (Oral)   Resp 16   Ht 154.9 cm (61\")   Wt 45.1 kg (99 lb 6.8 oz)   SpO2 98%   BMI 18.79 kg/m²    Gen: NAD, vitals reviewed  MS: oriented x 1 normal comprehension repetition and fluency  CN: visual acuity grossly normal, PERRL, EOMI, no facial droop, no dysarthria  Motor: 5/5 throughout upper and lower extremities, normal tone  Sensory: intact to light touch all 4 ext.    Laboratory results:  Lab Results   Component Value Date    GLUCOSE 113 (H) 2025    CALCIUM 8.3 (L) 2025     2025    K 4.2 2025    CO2 26.0 2025     (H) 2025    BUN 16 2025    CREATININE 0.72 2025    EGFRIFAFRI 101 2021    EGFRIFNONA 83 2018    BCR 22.2 2025    ANIONGAP 7.0 2025     Lab Results   Component Value Date    WBC 4.08 2025    HGB 9.9 (L) 2025    HCT 31.8 (L) 2025    MCV 90.9 2025     2025     Lab Results   Component Value Date     (H) 10/24/2024    LDL 87 2024    LDL 94 2024         Lab 25   HEMOGLOBIN A1C 7.30*        Review of labs:     A1c 7.3     TSH 1.96  B12 943  Vitamin D 35     Normal CBC including WBC hemoglobin platelets     Urine analysis showed small leukocytes and WBC     Imaging review:   CT head showed no acute hypodensity or hyperdensity     MRI brain done in 2023 acute ischemia of the right posterior limb of internal capsule mild T2 flair changes Arst was small vessel disease no SWI changes   "   EEG in 2022 left frontotemporal discharges    Repeat EEG this admission showed left fronto temporal slowing     Diagnoses:  Acute encephalopathy, now resolved  Vascular dementia  Epilepsy on Keppra  Type 2 diabetes mellitus  Hypertension  Hyperlipidemia  Chronic right basal ganglia ischemic stroke     Pre-stroke MRS: 1     Altered mental status  -I believe this might be due to worsening underlying vascular dementia  -Continue home memantine and donepezil  -TSH B12 within normal limit  -Will repeat MRI brain without   - EEG unremarkable  -Received IV thiamine for 3 days      2.  Vascular dementia  -Continue donepezil and memantine home doses     3.  Epilepsy  -Previous EEG showed frontotemporal discharges she is supposed to be on Keppra  -Patient does not know what she is her medication she is on I am unsure she is actually taking it  -continue Keppra 1000 mg twice daily  -Repeat EEG showed no seizures     4.  History of stroke  -Mostly small vessel disease  - continue aspirin 81 mg daily and Lipitor 80 mg daily     Follow the results of the MRI peripherally at this point considering family agrees patient is back to her baseline I am not sure how much more information the MRI is going to give.    Will follow the results of the MRI peripherally.      MDM   Reviewed: Previous charts, nursing notes and vitals   Reviewed: Previous labs and EEG    Interpretation: Labs and EEG  Total time providing care is :30-74 minutes. This excluded time spent performing separately reportable procedures and services  Consults :Neurology/Stroke    Please note that portions of this note were completed with a voice recognition program.     Wilner Hendricks MD  Neuro Hospitalist /Vascular Neurology.

## 2025-01-07 NOTE — PROGRESS NOTES
"Nutrition Services    Patient Name:  Lucia Ellis  YOB: 1950  MRN: [id number]  Admit Date:  1/4/2025    Assessment Date:  01/07/25    Summary: 75 yo female adm with AMS, vascular dementia    Patient meets ASPEN/AND criteria for nutrition diagnosis of Moderate malnutrition of Chronic illness based on: 10% weight loss over past 6 months with decreased intake        CLINICAL NUTRITION ASSESSMENT      Reason for Assessment BMI, MST score 2+     Diagnosis/Problem   Weight loss   Medical/Surgical History Past Medical History:   Diagnosis Date    Breast cancer     Dementia     Diabetes mellitus     Hypertension     Memory loss        Past Surgical History:   Procedure Laterality Date    CHOLECYSTECTOMY      ENDOSCOPY N/A 5/27/2024    Procedure: ESOPHAGOGASTRODUODENOSCOPY;  Surgeon: Clive More MD;  Location: [facility] ENDOSCOPY;  Service: Gastroenterology;  Laterality: N/A;  PREOP/ ABNORMAL CT OF STOMACH- POSTOP/ GASTRITIS    HYSTERECTOMY      MASTECTOMY Right 1995    TOTAL KNEE ARTHROPLASTY Right         Anthropometrics        Current Height  Current Weight  BMI kg/m2 Height: 154.9 cm (61\")  Weight: 45.1 kg (99 lb 6.8 oz) (01/04/25 2351)  Body mass index is 18.79 kg/m².   Adjusted BMI (if applicable)    BMI Category Normal/Healthy (18.4 - 24.9)   Ideal Body Weight (IBW) 105#+/-10%   Usual Body Weight (UBW) 110-120#   Weight Trend Loss, Amount/Timeframe: Loss of 10% in  past 6 months   Weight History Wt Readings from Last 30 Encounters:   01/04/25 2351 45.1 kg (99 lb 6.8 oz)   10/24/24 0950 44.5 kg (98 lb)   08/09/24 0929 48.5 kg (107 lb)   06/06/24 1351 49.9 kg (110 lb)   05/28/24 0619 50.8 kg (112 lb 1.6 oz)   05/27/24 0616 51.6 kg (113 lb 12.1 oz)   05/25/24 0500 51.8 kg (114 lb 3.2 oz)   05/24/24 0550 53.6 kg (118 lb 2.7 oz)   05/23/24 0515 50.1 kg (110 lb 7.2 oz)   05/22/24 1500 49.5 kg (109 lb 2 oz)   05/22/24 0701 54.1 kg (119 lb 4.3 oz)   05/21/24 1444 59 kg (130 lb)   05/21/24 1313 50.8 kg " (112 lb)   04/19/24 1237 51.8 kg (114 lb 4.8 oz)   03/18/24 1111 49.9 kg (110 lb)   03/07/24 1313 51.7 kg (114 lb)   01/10/24 1220 63.5 kg (140 lb)   12/14/23 0600 57.7 kg (127 lb 3.3 oz)   12/13/23 0802 58.6 kg (129 lb 3 oz)   12/12/23 0834 58.9 kg (129 lb 13.6 oz)   12/08/23 1122 57.2 kg (126 lb)   12/08/23 2131 57.2 kg (126 lb)   05/23/23 1132 57.2 kg (126 lb)   03/02/23 1514 58.1 kg (128 lb)   03/01/23 1243 57.9 kg (127 lb 9.6 oz)   01/11/23 1407 60.3 kg (133 lb)   12/29/22 0900 58.1 kg (128 lb)   12/30/22 1622 58.1 kg (128 lb)   12/02/22 0801 59.9 kg (132 lb)   11/03/21 1038 59.2 kg (130 lb 8 oz)   10/19/21 1414 108 kg (237 lb 8 oz)   09/29/21 1133 59.5 kg (131 lb 3.2 oz)   08/12/21 0856 59 kg (130 lb)   06/21/21 1052 61.3 kg (135 lb 1.6 oz)   05/07/21 1621 60.8 kg (134 lb 0.6 oz)   04/08/21 1559 60.8 kg (134 lb)   04/06/21 1013 61.7 kg (136 lb)   03/02/21 1253 61.7 kg (136 lb)   06/16/20 1132 62.1 kg (136 lb 14.4 oz)   06/24/19 1230 64.3 kg (141 lb 12.8 oz)      --  Labs       Pertinent Labs    Results from last 7 days   Lab Units 01/07/25  0455 01/06/25  0538 01/05/25 2013 01/05/25  0655 01/04/25  2115   SODIUM mmol/L 143 142 141   < > 137   POTASSIUM mmol/L 4.2 4.7 5.0   < > 3.3*   CHLORIDE mmol/L 110* 111* 109*   < > 97*   CO2 mmol/L 26.0 22.8 24.0   < > 25.8   BUN mg/dL 16 25* 28*   < > 24*   CREATININE mg/dL 0.72 0.77 1.09*   < > 0.82   CALCIUM mg/dL 8.3* 8.3* 8.4*   < > 9.5   BILIRUBIN mg/dL  --   --   --   --  0.6   ALK PHOS U/L  --   --   --   --  91   ALT (SGPT) U/L  --   --   --   --  10   AST (SGOT) U/L  --   --   --   --  18   GLUCOSE mg/dL 113* 105* 129*   < > 131*    < > = values in this interval not displayed.     Results from last 7 days   Lab Units 01/07/25  0455 01/06/25  0538 01/05/25  0655 01/04/25  2115   MAGNESIUM mg/dL 2.0 2.1  --  2.1   PHOSPHORUS mg/dL 3.2 2.4*   < >  --    HEMOGLOBIN g/dL 9.9* 10.4*   < > 13.5   HEMATOCRIT % 31.8* 32.6*   < > 39.4   WBC 10*3/mm3 4.08 3.47   < > 5.45    ALBUMIN g/dL  --   --   --  4.5    < > = values in this interval not displayed.     Results from last 7 days   Lab Units 01/07/25  0455 01/06/25  0538 01/05/25  0655 01/04/25  2115   PLATELETS 10*3/mm3 163 172 213 237     COVID19   Date Value Ref Range Status   12/16/2023 Not Detected Not Detected - Ref. Range Final     Lab Results   Component Value Date    HGBA1C 7.30 (H) 01/04/2025          Medications           Scheduled Medications [Held by provider] amLODIPine, 10 mg, Oral, Daily  aspirin, 81 mg, Oral, Daily  atorvastatin, 80 mg, Oral, Daily  cetirizine, 10 mg, Oral, Daily  donepezil, 10 mg, Oral, Daily  escitalopram, 10 mg, Oral, Daily  folic acid, 1 mg, Oral, Daily  insulin glargine, 6 Units, Subcutaneous, Daily  insulin lispro, 2-7 Units, Subcutaneous, 4x Daily AC & at Bedtime  levETIRAcetam, 1,000 mg, Oral, BID  [Held by provider] lisinopril, 40 mg, Oral, Daily  memantine, 5 mg, Oral, BID  OLANZapine, 5 mg, Oral, Nightly  pantoprazole, 40 mg, Oral, QAM  sodium chloride, 10 mL, Intravenous, Q12H  thiamine (B-1) IV, 500 mg, Intravenous, Daily       Infusions     PRN Medications   acetaminophen **OR** acetaminophen **OR** acetaminophen    senna-docusate sodium **AND** polyethylene glycol **AND** bisacodyl **AND** bisacodyl    calcium carbonate    Calcium Replacement - Follow Nurse / BPA Driven Protocol    dextrose    dextrose    glucagon (human recombinant)    hydrALAZINE    Magnesium Standard Dose Replacement - Follow Nurse / BPA Driven Protocol    ondansetron ODT **OR** ondansetron    Potassium Replacement - Follow Nurse / BPA Driven Protocol    sodium chloride    sodium chloride     Physical Findings          General Findings alert, disoriented   Oral/Mouth Cavity WDL   Edema  no edema   Gastrointestinal WDL   Skin  skin intact   Tubes/Drains/Lines none   NFPE See Malnutrition Severity Assessment   --  Malnutrition Severity Assessment      Patient meets criteria for : Moderate (non-severe)  Malnutrition  Malnutrition Type (Last 8 Hours)       Malnutrition Severity Assessment       Row Name 01/07/25 0908       Malnutrition Severity Assessment    Malnutrition Type Chronic Disease - Related Malnutrition      Row Name 01/07/25 0908       Insufficient Energy Intake     Insufficient Energy Intake Findings Moderate    Insufficient Energy Intake  <75% of est. energy requirement for > or equal to 1 month      Row Name 01/07/25 0908       Unintentional Weight Loss     Unintentional Weight Loss Findings Moderate    Unintentional Weight Loss  Weight loss of 10% in six months  weight loss of 10% in  past 6 months      Row Name 01/07/25 0908       Criteria Met (Must meet criteria for severity in at least 2 of these categories: M Wasting, Fat Loss, Fluid, Secondary Signs, Wt. Status, Intake)    Patient meets criteria for  Moderate (non-severe) Malnutrition                       Current Nutrition Orders & Evaluation of Intake       Oral Nutrition     Food Allergies NKFA   Current PO Diet Diet: Regular/House; Fluid Consistency: Thin (IDDSI 0)   Supplement n/a   PO Evaluation     % PO Intake %     Factors Affecting Intake: altered mental status   --  PES STATEMENT / NUTRITION DIAGNOSIS      Nutrition Dx Problem  Problem: Malnutrition (moderate)  Etiology: Medical Diagnosis - AMS, Dementia    Signs/Symptoms: BMI and Unintended Weight Change     NUTRITION INTERVENTION / PLAN OF CARE      Intervention Goal(s) Maintain intake, Accepts oral nutrition supplement, Continue positive trend, Maintain weight, and Appropriate weight gain         RD Intervention/Action Supplement provided, Encourage intake, Continue to monitor, and Care plan reviewed   --      Prescription/Orders:       PO Diet       Supplements Add Magic Cups TID      Enteral Nutrition       Parenteral Nutrition    New Prescription Ordered? Yes   --      Monitor/Evaluation PO intake, Supplement intake, Weight, Skin status, GI status, Symptoms, Swallow  function   Discharge Plan/Needs Pending clinical course   --    RD to follow per protocol.      Electronically signed by:  Jesus Birch RD  01/07/25 08:50 EST

## 2025-01-07 NOTE — PLAN OF CARE
Problem: Adult Inpatient Plan of Care  Goal: Plan of Care Review  Outcome: Progressing  Flowsheets (Taken 1/7/2025 0346)  Outcome Evaluation: alert only to self, disoriented x3, very forgetful, Pt tried to up from the chair to go to the bathroom and fell as her feet were tangled with her sheet and blanket, ROGELIO Leigh made aware CT scan head stat done, nothing acute noted, Pt's sister aware, assisted to bed, falls precaution maintained, no c/o pain, VSS, slept between care, wakes up in between and would ask for food, snacks provided, VSS  Plan of Care Reviewed With: patient   Goal Outcome Evaluation:  Plan of Care Reviewed With: patient           Outcome Evaluation: alert only to self, disoriented x3, very forgetful, Pt tried to up from the chair to go to the bathroom and fell as her feet were tangled with her sheet and blanket, ROGELIO Leigh made aware CT scan head stat done, nothing acute noted, Pt's sister aware, assisted to bed, falls precaution maintained, no c/o pain, VSS, slept between care, wakes up in between and would ask for food, snacks provided, VSS

## 2025-01-07 NOTE — PROGRESS NOTES
Continued Stay Note  [facility]     Patient Name: Lucia Ellis  MRN: [id number]  Today's Date: 1/7/2025    Admit Date: 1/4/2025    Plan: SNF (referrals in EPIC/Pending)   Discharge Plan       Row Name 01/07/25 1524       Plan    Plan SNF (referrals in EPIC/Pending)    Plan Comments Spoke to sister Jessica and NephewMarsJj at bedside. Jj provided [facility] with an updated phone number for sonVern of [phone]. Sister stated rehab at [place]. Provided sister and nephew with R2R , list and medicare.gov. Both reviewed and requested [facility] as patient had been there in the past. VM left for chidi Porras to discuss. Informed Wilfredo with [facility] of referrals                   Discharge Codes    No documentation.                 Expected Discharge Date and Time       Expected Discharge Date Expected Discharge Time    Jan 8, 2025               Ariella Valdes RN

## 2025-01-07 NOTE — PLAN OF CARE
Goal Outcome Evaluation:  Plan of Care Reviewed With: patient           Outcome Evaluation: Pt was pleasantly confused, agreeable to work with PT, she walked with HHA x 2 today, pt did not use a walker prior to admission, pt was unsteady, rec pt cont to use the rwx at this time

## 2025-01-07 NOTE — PROGRESS NOTES
Name: Lcuia Ellis ADMIT: 2025   : 1950  PCP: Everardo Mazariegos MD    MRN: [id number] LOS: 2 days   AGE/SEX: 74 y.o. female  ROOM: [facility]     Subjective   Subjective   Sitting up in chair.  No family at bedside.  She denies any pain or trouble breathing.  Is able to tell me her name and that she is at [facility], but has no recollection of why she is here.  She does not remember falling last night.  She states the year is .  She states she was living with her son prior to coming into the hospital, but denies any issues with caring for herself at home.  She denies any nausea or vomiting and is eating well.     Objective   Objective   Vital Signs  Temp:  [97.8 °F (36.6 °C)-99.2 °F (37.3 °C)] 99.2 °F (37.3 °C)  Heart Rate:  [69-84] 69  Resp:  [16-17] 17  BP: (135-169)/(65-72) 169/72  SpO2:  [96 %-97 %] 96 %  on   ;   Device (Oxygen Therapy): room air  Body mass index is 18.79 kg/m².    Physical Exam  Vitals and nursing note reviewed.   Constitutional:       Appearance: She is ill-appearing. She is not toxic-appearing.   Cardiovascular:      Rate and Rhythm: Normal rate and regular rhythm.      Pulses: Normal pulses.   Pulmonary:      Effort: Pulmonary effort is normal. No respiratory distress.      Breath sounds: Normal breath sounds.   Abdominal:      General: Bowel sounds are normal. There is no distension.      Palpations: Abdomen is soft.      Tenderness: There is no abdominal tenderness.   Musculoskeletal:         General: No swelling. Normal range of motion.   Skin:     General: Skin is warm and dry.      Findings: No bruising.   Neurological:      General: No focal deficit present.      Mental Status: She is alert. She is disoriented.      Sensory: No sensory deficit.      Motor: Weakness present.      Coordination: Coordination normal.   Psychiatric:         Mood and Affect: Mood normal.         Behavior: Behavior normal.       Results Review:       I reviewed the patient's new  clinical results.  Results from last 7 days   Lab Units 01/07/25 0455 01/06/25 0538 01/05/25 0655 01/04/25 2115   WBC 10*3/mm3 4.08 3.47 5.75 5.45   HEMOGLOBIN g/dL 9.9* 10.4* 12.7 13.5   PLATELETS 10*3/mm3 163 172 213 237     Results from last 7 days   Lab Units 01/07/25 0455 01/06/25 0538 01/05/25 2013 01/05/25  0655   SODIUM mmol/L 143 142 141 137   POTASSIUM mmol/L 4.2 4.7 5.0 3.0*   CHLORIDE mmol/L 110* 111* 109* 98   CO2 mmol/L 26.0 22.8 24.0 25.0   BUN mg/dL 16 25* 28* 29*   CREATININE mg/dL 0.72 0.77 1.09* 1.11*   GLUCOSE mg/dL 113* 105* 129* 162*   Estimated Creatinine Clearance: 47.3 mL/min (by C-G formula based on SCr of 0.72 mg/dL).  Results from last 7 days   Lab Units 01/04/25 2115   ALBUMIN g/dL 4.5   BILIRUBIN mg/dL 0.6   ALK PHOS U/L 91   AST (SGOT) U/L 18   ALT (SGPT) U/L 10     Results from last 7 days   Lab Units 01/07/25 0455 01/06/25 0538 01/05/25 2013 01/05/25 0655 01/04/25  2115   CALCIUM mg/dL 8.3* 8.3* 8.4* 8.9 9.5   ALBUMIN g/dL  --   --   --   --  4.5   MAGNESIUM mg/dL 2.0 2.1  --   --  2.1   PHOSPHORUS mg/dL 3.2 2.4*  --   --   --        Hemoglobin A1C   Date/Time Value Ref Range Status   01/04/2025 2115 7.30 (H) 4.80 - 5.60 % Final     Glucose   Date/Time Value Ref Range Status   01/07/2025 0912 108 70 - 130 mg/dL Final   01/06/2025 2207 180 (H) 70 - 130 mg/dL Final   01/06/2025 1740 168 (H) 70 - 130 mg/dL Final   01/06/2025 1227 125 70 - 130 mg/dL Final   01/05/2025 2137 122 70 - 130 mg/dL Final   01/05/2025 1647 161 (H) 70 - 130 mg/dL Final   01/05/2025 1046 165 (H) 70 - 130 mg/dL Final       [Held by provider] amLODIPine, 10 mg, Oral, Daily  aspirin, 81 mg, Oral, Daily  atorvastatin, 80 mg, Oral, Daily  cetirizine, 10 mg, Oral, Daily  donepezil, 10 mg, Oral, Daily  escitalopram, 10 mg, Oral, Daily  folic acid, 1 mg, Oral, Daily  insulin glargine, 6 Units, Subcutaneous, Daily  insulin lispro, 2-7 Units, Subcutaneous, 4x Daily AC & at Bedtime  levETIRAcetam, 1,000 mg, Oral,  BID  [Held by provider] lisinopril, 40 mg, Oral, Daily  memantine, 5 mg, Oral, BID  OLANZapine, 5 mg, Oral, Nightly  pantoprazole, 40 mg, Oral, QAM  sodium chloride, 10 mL, Intravenous, Q12H  thiamine (B-1) IV, 500 mg, Intravenous, Daily       Diet: Regular/House; Fluid Consistency: Thin (IDDSI 0)       Assessment/Plan     Active Hospital Problems    Diagnosis  POA    **AMS (altered mental status) [R41.82]  Yes    History of stroke [Z86.73]  Not Applicable    Vascular dementia, moderate, without behavioral disturbance, psychotic disturbance, mood disturbance, and anxiety [F01.B0]  Yes    Type 2 diabetes mellitus with hyperglycemia [E11.65]  Yes    Hypokalemia [E87.6]  Yes    Essential hypertension [I10]  Yes    Hyperlipidemia [E78.5]  Yes      Resolved Hospital Problems   No resolved problems to display.     Ms. Ellis is a 74 y.o. female with a history of dementia, DM 2, hyperlipidemia and hypertension that presented to the hospital with altered mental status and failure to thrive.  She was apparently living with her adult son but was noted to be without any electricity or running water.  Her sister and nephew provided history on admission and emergency personnel was called to the patient's home for a wellness check.     Vascular dementia  Failure to thrive  -AMS is most likely secondary to worsening dementia  -CT head is negative for an acute intracranial process  -Infectious workup unremarkable  -TSH normal, B12 normal  -Continue outpatient donepezil, memantine  -Continue olanzapine  -S/P IV thiamine x 3 days  -Neurology following, symptoms likely due to progression of dementia  -MRI brain ordered but nursing having trouble getting MRI screening sheet completed.  -Fall overnight and CT head checked and negative.      History of CVA  -Continue aspirin, statin  -MRI planned for above with neurology following.     Epilepsy  -Continue Keppra  -EEG unremarkable.     Hypokalemia  Elevated  creatinine  Hypophosphatemia  -Replacement per protocol.  -Potassium normal, creatinine improved after fluids.      Hypertension  -BP on softer side so Norvasc and lisinopril held. BP up to 150-160 range so will add back modified Norvasc dosing for now and monitor.      Type 2 Diabetes Mellitus  -Hold oral diabetic medications  -Correctional factor insulin  -Low-dose Lantus  -Monitor blood sugars ACHS  -Hemoglobin A1c 7.3     Hyperlipidemia  -Statin     GERD  -Continue Protonix     Anemia  -Hemoglobin 9.9 with no signs of bleeding. Monitor daily CBC.  -Folic acid supplement on board. Folate 4.51 on 1/7/25     SCDs for DVT prophylaxis.  Full code.  Discussed with patient and nursing staff as well as Dr. Villegas.  Disposition: SNF/LTC, medically stable once placed.       HERNANDO Vera  [facility]  01/07/25  09:55 EST

## 2025-01-07 NOTE — DISCHARGE PLACEMENT REQUEST
"Randi Swanson (74 y.o. Female)       Date of Birth   1950    Social Security Number       Address   [address] [place] [address]    Home Phone   [phone]    MRN   [id number]       Quaker   Sabianist    Marital Status                               Admission Date   1/4/25    Admission Type   Emergency    Admitting Provider   Cecile Villegas MD    Attending Provider   Cecile Villegas MD    Department, Room/Bed   [address], 87/[address]       Discharge Date       Discharge Disposition       Discharge Destination                                 Attending Provider: Cecile Villegas MD    Allergies: Ppd [Tuberculin Purified Protein Derivative]    Isolation: None   Infection: None   Code Status: CPR    Ht: 154.9 cm (61\")   Wt: 45.1 kg (99 lb 6.8 oz)    Admission Cmt: None   Principal Problem: AMS (altered mental status) [R41.82]                   Active Insurance as of 1/4/2025       Primary Coverage       Payor Plan Insurance Group Employer/Plan Group    [facility] MEDICARE REPLACEMENT [employer] OPTIONS MEDICARE REPLACEMENT        Payor Plan Address Payor Plan Phone Number Payor Plan Fax Number Effective Dates    [facility] [phone]  1/1/2025 - None Entered    [address]       [facility] [address]         Subscriber Name Subscriber Birth Date Member ID       RANDI SWANSON 1950 [id number]                     Emergency Contacts        (Rel.) Home Phone Work Phone Mobile Phone    Peter Munozwilbert (Son) [phone]    Jessica Recinos (Sister) [phone] -- --    Jj Recinos (nephew) (Relative) [phone] -- --                "

## 2025-01-07 NOTE — PLAN OF CARE
Goal Outcome Evaluation:           Progress: improving  Outcome Evaluation: VSS. Pt up with assist X 1 and RW. Pt was able to ambulate without walker and assist X 2 with PT. Pt incontinent of urine, she doesn't make it to the toilet before being incontinent. Tolerating diet. ACHS. SSI as needed. Up in chair most of shift. Falls precautions in place. Waiting on placement. Seizure precautions in place.

## 2025-01-07 NOTE — THERAPY TREATMENT NOTE
Patient Name: Lucia Ellis  : 1950    MRN: [id number]                              Today's Date: 2025       Admit Date: 2025    Visit Dx:     ICD-10-CM ICD-9-CM   1. Altered mental status, unspecified altered mental status type  R41.82 780.97     Patient Active Problem List   Diagnosis    Gastroesophageal reflux disease    Malignant neoplasm of breast    Depression    Folliculitis    Generalized anxiety disorder    Hyperlipidemia    Essential hypertension    Status post total right knee replacement    Rectal hemorrhage    Vitamin D deficiency    Drug therapy    Acute cholecystitis    Uncontrolled type 2 diabetes mellitus with hypoglycemia without coma    Myoclonus    Type 2 diabetes mellitus with hyperglycemia    Hypokalemia    New onset seizure    Focal motor seizure    Vascular dementia, moderate, without behavioral disturbance, psychotic disturbance, mood disturbance, and anxiety    Stroke-like symptoms    CVA (cerebral vascular accident)    Lung nodules    Hypokalemia    History of stroke    Generalized weakness    Severe malnutrition    Gastritis without bleeding    Abnormal CT scan, stomach    AMS (altered mental status)     Past Medical History:   Diagnosis Date    Breast cancer     Dementia     Diabetes mellitus     Hypertension     Memory loss      Past Surgical History:   Procedure Laterality Date    CHOLECYSTECTOMY      ENDOSCOPY N/A 2024    Procedure: ESOPHAGOGASTRODUODENOSCOPY;  Surgeon: Clive More MD;  Location: [facility] ENDOSCOPY;  Service: Gastroenterology;  Laterality: N/A;  PREOP/ ABNORMAL CT OF STOMACH- POSTOP/ GASTRITIS    HYSTERECTOMY      MASTECTOMY Right 1995    TOTAL KNEE ARTHROPLASTY Right       General Information       Row Name 25 1527          Physical Therapy Time and Intention    Document Type therapy note (daily note)  -PC     Mode of Treatment physical therapy  -PC               User Key  (r) = Recorded By, (t) = Taken By, (c) = Cosigned By       Initials Name Provider Type    PC Lolis Oro, PT Physical Therapist                   Mobility       Row Name 01/07/25 1527          Bed Mobility    Bed Mobility sit-supine  -PC     Supine-Sit [place] (Bed Mobility) standby assist  -PC       Row Name 01/07/25 1527          Sit-Stand Transfer    Sit-Stand [place] (Transfers) contact guard;standby assist  -PC       Row Name 01/07/25 1527          Gait/Stairs (Locomotion)    [place] Level (Gait) minimum assist (75% patient effort);contact guard;2 person assist  -PC     Assistive Device (Gait) --  HHA x 2  -PC     Distance in Feet (Gait) 30  -PC     Deviations/Abnormal Patterns (Gait) gait speed decreased;stride length decreased;ataxic  -PC     Bilateral Gait Deviations forward flexed posture  -PC     Comment, (Gait/Stairs) attempted walking without assistive device as she did not use one prior, pt was unsteady and ataxic, needed HHA x 2, tried HHA x 1, pt very unsteady, needed assist of 2, rec Rwx  -PC               User Key  (r) = Recorded By, (t) = Taken By, (c) = Cosigned By      Initials Name Provider Type    PC Lolis Oro, PT Physical Therapist                   Obj/Interventions       Row Name 01/07/25 1529          Balance    Static Standing Balance contact guard  -PC     Dynamic Standing Balance minimal assist  -PC               User Key  (r) = Recorded By, (t) = Taken By, (c) = Cosigned By      Initials Name Provider Type    PC Lolis Oro, PT Physical Therapist                   Goals/Plan    No documentation.                  Clinical Impression       Row Name 01/07/25 1530          Pain    Pretreatment Pain Rating 0/10 - no pain  -PC       Row Name 01/07/25 1530          Plan of Care Review    Plan of Care Reviewed With patient  -PC     Outcome Evaluation Pt was pleasantly confused, agreeable to work with PT, she walked with HHA x 2 today, pt did not use a walker prior to admission, pt was unsteady, rec pt cont to use the rwx at  this time  -PC               User Key  (r) = Recorded By, (t) = Taken By, (c) = Cosigned By      Initials Name Provider Type    PC Lolis Oro, PT Physical Therapist                   Outcome Measures       Row Name 01/07/25 1531 01/07/25 0942       How much help from another person do you currently need...    Turning from your back to your side while in flat bed without using bedrails? 4  -PC 4  -MB    Moving from lying on back to sitting on the side of a flat bed without bedrails? 4  -PC 4  -MB    Moving to and from a bed to a chair (including a wheelchair)? 3  -PC 3  -MB    Standing up from a chair using your arms (e.g., wheelchair, bedside chair)? 3  -PC 3  -MB    Climbing 3-5 steps with a railing? 3  -PC 3  -MB    To walk in hospital room? 3  -PC 3  -MB    AM-PAC 6 Clicks Score (PT) 20  -PC 20  -MB    Highest Level of Mobility Goal 6 --> Walk 10 steps or more  -PC 6 --> Walk 10 steps or more  -MB              User Key  (r) = Recorded By, (t) = Taken By, (c) = Cosigned By      Initials Name Provider Type    Lolis Medeiros, PT Physical Therapist    Radha Cooper RN Registered Nurse                                 Physical Therapy Education       Title: PT OT SLP Therapies (In Progress)       Topic: Physical Therapy (In Progress)       Point: Mobility training (In Progress)       Learning Progress Summary            Patient Acceptance, E,D, NR by PC at 1/7/2025 1532    Acceptance, E, NR,NL by RS at 1/5/2025 1151                      Point: Home exercise program (In Progress)       Learning Progress Summary            Patient Acceptance, E, NR,NL by RS at 1/5/2025 1151                      Point: Body mechanics (In Progress)       Learning Progress Summary            Patient Acceptance, E,D, NR by PC at 1/7/2025 1532    Acceptance, E, NR,NL by RS at 1/5/2025 1151                      Point: Precautions (In Progress)       Learning Progress Summary            Patient Acceptance, E,D, NR by PC at  1/7/2025 1532    Acceptance, E, NR,NL by RS at 1/5/2025 1151                                      User Key       Initials Effective Dates Name Provider Type Discipline    PC 06/16/21 -  Lolis Oro PT Physical Therapist PT    RS 06/16/21 -  Lizzie Vo PT Physical Therapist PT                  PT Recommendation and Plan     Outcome Evaluation: Pt was pleasantly confused, agreeable to work with PT, she walked with HHA x 2 today, pt did not use a walker prior to admission, pt was unsteady, rec pt cont to use the rwx at this time     Time Calculation:         PT Charges       Row Name 01/07/25 1532             Time Calculation    Start Time 1512  -PC      Stop Time 1520  -PC      Time Calculation (min) 8 min  -PC      PT Received On 01/07/25  -PC      PT - Next Appointment 01/08/25  -PC                User Key  (r) = Recorded By, (t) = Taken By, (c) = Cosigned By      Initials Name Provider Type    PC Lolis Oro, PT Physical Therapist                      PT G-Codes  Outcome Measure Options: AM-PAC 6 Clicks Basic Mobility (PT)  AM-PAC 6 Clicks Score (PT): 20       Lolis Oro, PT  1/7/2025

## 2025-01-08 LAB
ANION GAP SERPL CALCULATED.3IONS-SCNC: 7.2 MMOL/L (ref 5–15)
BUN SERPL-MCNC: 17 MG/DL (ref 8–23)
BUN/CREAT SERPL: 25.4 (ref 7–25)
CALCIUM SPEC-SCNC: 8.5 MG/DL (ref 8.6–10.5)
CHLORIDE SERPL-SCNC: 105 MMOL/L (ref 98–107)
CO2 SERPL-SCNC: 26.8 MMOL/L (ref 22–29)
CREAT SERPL-MCNC: 0.67 MG/DL (ref 0.57–1)
DEPRECATED RDW RBC AUTO: 40.2 FL (ref 37–54)
EGFRCR SERPLBLD CKD-EPI 2021: 90.7 ML/MIN/1.73
ERYTHROCYTE [DISTWIDTH] IN BLOOD BY AUTOMATED COUNT: 12.3 % (ref 12.3–15.4)
GLUCOSE BLDC GLUCOMTR-MCNC: 119 MG/DL (ref 70–130)
GLUCOSE BLDC GLUCOMTR-MCNC: 135 MG/DL (ref 70–130)
GLUCOSE BLDC GLUCOMTR-MCNC: 178 MG/DL (ref 70–130)
GLUCOSE BLDC GLUCOMTR-MCNC: 187 MG/DL (ref 70–130)
GLUCOSE SERPL-MCNC: 190 MG/DL (ref 65–99)
HCT VFR BLD AUTO: 32.9 % (ref 34–46.6)
HGB BLD-MCNC: 11.4 G/DL (ref 12–15.9)
MAGNESIUM SERPL-MCNC: 1.9 MG/DL (ref 1.6–2.4)
MCH RBC QN AUTO: 31.1 PG (ref 26.6–33)
MCHC RBC AUTO-ENTMCNC: 34.7 G/DL (ref 31.5–35.7)
MCV RBC AUTO: 89.9 FL (ref 79–97)
PHOSPHATE SERPL-MCNC: 2.9 MG/DL (ref 2.5–4.5)
PLATELET # BLD AUTO: 168 10*3/MM3 (ref 140–450)
PMV BLD AUTO: 10.8 FL (ref 6–12)
POTASSIUM SERPL-SCNC: 3.6 MMOL/L (ref 3.5–5.2)
POTASSIUM SERPL-SCNC: 4.2 MMOL/L (ref 3.5–5.2)
RBC # BLD AUTO: 3.66 10*6/MM3 (ref 3.77–5.28)
SODIUM SERPL-SCNC: 139 MMOL/L (ref 136–145)
WBC NRBC COR # BLD AUTO: 3.69 10*3/MM3 (ref 3.4–10.8)

## 2025-01-08 PROCEDURE — 82948 REAGENT STRIP/BLOOD GLUCOSE: CPT

## 2025-01-08 PROCEDURE — 63710000001 INSULIN LISPRO (HUMAN) PER 5 UNITS: Performed by: NURSE PRACTITIONER

## 2025-01-08 PROCEDURE — 63710000001 INSULIN GLARGINE PER 5 UNITS: Performed by: STUDENT IN AN ORGANIZED HEALTH CARE EDUCATION/TRAINING PROGRAM

## 2025-01-08 PROCEDURE — 85027 COMPLETE CBC AUTOMATED: CPT | Performed by: STUDENT IN AN ORGANIZED HEALTH CARE EDUCATION/TRAINING PROGRAM

## 2025-01-08 PROCEDURE — 80048 BASIC METABOLIC PNL TOTAL CA: CPT | Performed by: STUDENT IN AN ORGANIZED HEALTH CARE EDUCATION/TRAINING PROGRAM

## 2025-01-08 PROCEDURE — 97110 THERAPEUTIC EXERCISES: CPT

## 2025-01-08 PROCEDURE — 84132 ASSAY OF SERUM POTASSIUM: CPT | Performed by: STUDENT IN AN ORGANIZED HEALTH CARE EDUCATION/TRAINING PROGRAM

## 2025-01-08 PROCEDURE — 83735 ASSAY OF MAGNESIUM: CPT | Performed by: STUDENT IN AN ORGANIZED HEALTH CARE EDUCATION/TRAINING PROGRAM

## 2025-01-08 PROCEDURE — 84100 ASSAY OF PHOSPHORUS: CPT | Performed by: STUDENT IN AN ORGANIZED HEALTH CARE EDUCATION/TRAINING PROGRAM

## 2025-01-08 RX ORDER — AMLODIPINE BESYLATE 10 MG/1
10 TABLET ORAL DAILY
Status: DISCONTINUED | OUTPATIENT
Start: 2025-01-08 | End: 2025-01-23 | Stop reason: HOSPADM

## 2025-01-08 RX ORDER — POTASSIUM CHLORIDE 750 MG/1
40 TABLET, FILM COATED, EXTENDED RELEASE ORAL EVERY 4 HOURS
Status: COMPLETED | OUTPATIENT
Start: 2025-01-08 | End: 2025-01-08

## 2025-01-08 RX ADMIN — ASPIRIN 81 MG: 81 TABLET, COATED ORAL at 11:07

## 2025-01-08 RX ADMIN — ESCITALOPRAM OXALATE 10 MG: 10 TABLET, FILM COATED ORAL at 11:07

## 2025-01-08 RX ADMIN — PANTOPRAZOLE SODIUM 40 MG: 40 TABLET, DELAYED RELEASE ORAL at 06:12

## 2025-01-08 RX ADMIN — Medication 10 ML: at 22:43

## 2025-01-08 RX ADMIN — ATORVASTATIN CALCIUM 80 MG: 20 TABLET, FILM COATED ORAL at 11:07

## 2025-01-08 RX ADMIN — AMLODIPINE BESYLATE 10 MG: 10 TABLET ORAL at 12:40

## 2025-01-08 RX ADMIN — FOLIC ACID 1 MG: 1 TABLET ORAL at 11:07

## 2025-01-08 RX ADMIN — MEMANTINE HYDROCHLORIDE 5 MG: 5 TABLET, FILM COATED ORAL at 05:17

## 2025-01-08 RX ADMIN — POTASSIUM CHLORIDE 40 MEQ: 750 TABLET, EXTENDED RELEASE ORAL at 17:15

## 2025-01-08 RX ADMIN — LEVETIRACETAM 1000 MG: 500 TABLET, FILM COATED ORAL at 17:15

## 2025-01-08 RX ADMIN — INSULIN LISPRO 2 UNITS: 100 INJECTION, SOLUTION INTRAVENOUS; SUBCUTANEOUS at 12:40

## 2025-01-08 RX ADMIN — OLANZAPINE 5 MG: 2.5 TABLET, FILM COATED ORAL at 22:43

## 2025-01-08 RX ADMIN — INSULIN LISPRO 2 UNITS: 100 INJECTION, SOLUTION INTRAVENOUS; SUBCUTANEOUS at 22:52

## 2025-01-08 RX ADMIN — INSULIN GLARGINE 6 UNITS: 100 INJECTION, SOLUTION SUBCUTANEOUS at 11:07

## 2025-01-08 RX ADMIN — DONEPEZIL HYDROCHLORIDE 10 MG: 10 TABLET, FILM COATED ORAL at 11:07

## 2025-01-08 RX ADMIN — Medication 10 ML: at 12:41

## 2025-01-08 RX ADMIN — POTASSIUM CHLORIDE 40 MEQ: 750 TABLET, EXTENDED RELEASE ORAL at 12:41

## 2025-01-08 RX ADMIN — MEMANTINE HYDROCHLORIDE 5 MG: 5 TABLET, FILM COATED ORAL at 17:15

## 2025-01-08 RX ADMIN — LEVETIRACETAM 1000 MG: 500 TABLET, FILM COATED ORAL at 05:17

## 2025-01-08 RX ADMIN — CETIRIZINE HYDROCHLORIDE 10 MG: 10 TABLET, FILM COATED ORAL at 11:07

## 2025-01-08 NOTE — PLAN OF CARE
Problem: Adult Inpatient Plan of Care  Goal: Plan of Care Review  Outcome: Progressing  Flowsheets (Taken 1/8/2025 3252)  Outcome Evaluation: pleasantly confused, very short term memory loss, has urinary frequency, and Pt does not remember to call out, toiletting offered frequently, Pt still unsteady, falls precaution maintained, blood sugar monitored, Insulin coverage given, waiing for placement  Plan of Care Reviewed With: patient   Goal Outcome Evaluation:  Plan of Care Reviewed With: patient           Outcome Evaluation: pleasantly confused, very short term memory loss, has urinary frequency, and Pt does not remember to call out, toiletting offered frequently, Pt still unsteady, falls precaution maintained, blood sugar monitored, Insulin coverage given, waiing for placement

## 2025-01-08 NOTE — PROGRESS NOTES
"    Name: Lucia Ellis ADMIT: 2025   : 1950  PCP: Everardo Mazariegos MD    MRN: [id number] LOS: 3 days   AGE/SEX: 74 y.o. female  ROOM: [facility]     Subjective   Subjective   Resting in bed.  No family at bedside.  She currently denies any pain or trouble breathing.  She just woke up so she is asking to use the restroom.  Her breakfast is on her bedside table and states she will eat when she has gone to the restroom.  No acute issues noted overnight.     Objective   Objective   Vital Signs  Temp:  [97.1 °F (36.2 °C)-98.4 °F (36.9 °C)] 97.2 °F (36.2 °C)  Heart Rate:  [76-87] 76  Resp:  [16] 16  BP: (158-193)/(70-89) 168/78  SpO2:  [93 %-98 %] 95 %  on   ;   Device (Oxygen Therapy): room air  Body mass index is 18.79 kg/m².    Physical Exam  Vitals and nursing note reviewed.   Constitutional:       Appearance: She is ill-appearing. She is not toxic-appearing.   Cardiovascular:      Rate and Rhythm: Normal rate and regular rhythm.      Pulses: Normal pulses.   Pulmonary:      Effort: Pulmonary effort is normal. No respiratory distress.      Breath sounds: Normal breath sounds.   Abdominal:      General: Bowel sounds are normal. There is no distension.      Palpations: Abdomen is soft.      Tenderness: There is no abdominal tenderness.   Musculoskeletal:         General: No swelling. Normal range of motion.   Skin:     General: Skin is warm and dry.      Findings: No bruising.   Neurological:      General: No focal deficit present.      Mental Status: She is alert. She is disoriented. States \"2000 something\" and know she is at hospital but doesn't know why     Sensory: No sensory deficit.      Motor: Weakness present.      Coordination: Coordination normal.   Psychiatric:         Mood and Affect: Mood normal.         Behavior: Behavior normal.     Results Review:       I reviewed the patient's new clinical results.  Results from last 7 days   Lab Units 25  0652 25  0455 25  0538 " 01/05/25 0655   WBC 10*3/mm3 3.69 4.08 3.47 5.75   HEMOGLOBIN g/dL 11.4* 9.9* 10.4* 12.7   PLATELETS 10*3/mm3 168 163 172 213     Results from last 7 days   Lab Units 01/08/25  0652 01/07/25  0455 01/06/25 0538 01/05/25 2013   SODIUM mmol/L 139 143 142 141   POTASSIUM mmol/L 3.6 4.2 4.7 5.0   CHLORIDE mmol/L 105 110* 111* 109*   CO2 mmol/L 26.8 26.0 22.8 24.0   BUN mg/dL 17 16 25* 28*   CREATININE mg/dL 0.67 0.72 0.77 1.09*   GLUCOSE mg/dL 190* 113* 105* 129*   Estimated Creatinine Clearance: 50.9 mL/min (by C-G formula based on SCr of 0.67 mg/dL).  Results from last 7 days   Lab Units 01/04/25  2115   ALBUMIN g/dL 4.5   BILIRUBIN mg/dL 0.6   ALK PHOS U/L 91   AST (SGOT) U/L 18   ALT (SGPT) U/L 10     Results from last 7 days   Lab Units 01/08/25  0652 01/07/25  0455 01/06/25 0538 01/05/25 2013 01/05/25 0655 01/04/25  2115   CALCIUM mg/dL 8.5* 8.3* 8.3* 8.4*   < > 9.5   ALBUMIN g/dL  --   --   --   --   --  4.5   MAGNESIUM mg/dL 1.9 2.0 2.1  --   --  2.1   PHOSPHORUS mg/dL 2.9 3.2 2.4*  --   --   --     < > = values in this interval not displayed.       Glucose   Date/Time Value Ref Range Status   01/08/2025 0816 135 (H) 70 - 130 mg/dL Final   01/07/2025 2313 209 (H) 70 - 130 mg/dL Final   01/07/2025 1726 167 (H) 70 - 130 mg/dL Final   01/07/2025 1145 146 (H) 70 - 130 mg/dL Final   01/07/2025 0912 108 70 - 130 mg/dL Final   01/06/2025 2207 180 (H) 70 - 130 mg/dL Final   01/06/2025 1740 168 (H) 70 - 130 mg/dL Final       amLODIPine, 10 mg, Oral, Daily  aspirin, 81 mg, Oral, Daily  atorvastatin, 80 mg, Oral, Daily  cetirizine, 10 mg, Oral, Daily  donepezil, 10 mg, Oral, Daily  escitalopram, 10 mg, Oral, Daily  folic acid, 1 mg, Oral, Daily  insulin glargine, 6 Units, Subcutaneous, Daily  insulin lispro, 2-7 Units, Subcutaneous, 4x Daily AC & at Bedtime  levETIRAcetam, 1,000 mg, Oral, BID  [Held by provider] lisinopril, 40 mg, Oral, Daily  memantine, 5 mg, Oral, BID  OLANZapine, 5 mg, Oral, Nightly  pantoprazole,  40 mg, Oral, QAM  potassium chloride ER, 40 mEq, Oral, Q4H  sodium chloride, 10 mL, Intravenous, Q12H       Diet: Regular/House; Fluid Consistency: Thin (IDDSI 0)       Assessment/Plan     Active Hospital Problems    Diagnosis  POA    **AMS (altered mental status) [R41.82]  Yes    Moderate protein-calorie malnutrition [E44.0]  Yes    History of stroke [Z86.73]  Not Applicable    Vascular dementia, moderate, without behavioral disturbance, psychotic disturbance, mood disturbance, and anxiety [F01.B0]  Yes    Type 2 diabetes mellitus with hyperglycemia [E11.65]  Yes    Hypokalemia [E87.6]  Yes    Essential hypertension [I10]  Yes    Hyperlipidemia [E78.5]  Yes      Resolved Hospital Problems   No resolved problems to display.     Ms. Ellis is a 74 y.o. female with a history of dementia, DM 2, hyperlipidemia and hypertension that presented to the hospital with altered mental status and failure to thrive.  She was apparently living with her adult son but was noted to be without any electricity or running water.  Her sister and nephew provided history on admission and emergency personnel was called to the patient's home for a wellness check.     Vascular dementia  Failure to thrive  -AMS is most likely secondary to worsening dementia  -CT head is negative for an acute intracranial process  -Infectious workup unremarkable  -TSH normal, B12 normal  -Continue outpatient donepezil, memantine  -Continue olanzapine  -S/P IV thiamine x 3 days  -Neurology following, symptoms likely due to progression of dementia  -MRI brain ordered but nursing having trouble getting MRI screening sheet completed.  -Fall overnight 1/7 and CT head checked and negative.      History of CVA  -Continue aspirin, statin  -MRI planned for above with neurology following peripherally now     Epilepsy  -Continue Keppra  -EEG unremarkable.     Hypokalemia  Elevated creatinine  Hypophosphatemia  -Replacement per protocol.  -Potassium normal, creatinine  improved after fluids.      Hypertension  -BP on softer side so Norvasc and lisinopril held. BP steadily increased and up to 180 systolic today. Norvasc at half-dosing added back. Will increase back to her home dosing today. Consider adding back lisinopril tomorrow.      Type 2 Diabetes Mellitus  -Hold oral diabetic medications  -Correctional factor insulin  -Low-dose Lantus  -Monitor blood sugars ACHS- overall stable  -Hemoglobin A1c 7.3     Hyperlipidemia  -Statin     GERD  -Continue Protonix     Anemia  -Hemoglobin 11.4 with no signs of bleeding. Monitor daily CBC.  -Folic acid supplement on board. Folate 4.51 on 1/7/25     SCDs for DVT prophylaxis.  Full code.  Discussed with patient and nurse.  Disposition: SNF/LTC, medically stable once placed.    HERNANDO Vera  [facility]  01/08/25  09:50 EST

## 2025-01-08 NOTE — THERAPY TREATMENT NOTE
Patient Name: Lucia Ellis  : 1950    MRN: [id number]                              Today's Date: 2025       Admit Date: 2025    Visit Dx:     ICD-10-CM ICD-9-CM   1. Altered mental status, unspecified altered mental status type  R41.82 780.97     Patient Active Problem List   Diagnosis    Gastroesophageal reflux disease    Malignant neoplasm of breast    Depression    Folliculitis    Generalized anxiety disorder    Hyperlipidemia    Essential hypertension    Status post total right knee replacement    Rectal hemorrhage    Vitamin D deficiency    Drug therapy    Acute cholecystitis    Uncontrolled type 2 diabetes mellitus with hypoglycemia without coma    Myoclonus    Type 2 diabetes mellitus with hyperglycemia    Hypokalemia    New onset seizure    Focal motor seizure    Vascular dementia, moderate, without behavioral disturbance, psychotic disturbance, mood disturbance, and anxiety    Stroke-like symptoms    CVA (cerebral vascular accident)    Lung nodules    Hypokalemia    History of stroke    Generalized weakness    Severe malnutrition    Gastritis without bleeding    Abnormal CT scan, stomach    AMS (altered mental status)    Moderate protein-calorie malnutrition     Past Medical History:   Diagnosis Date    Breast cancer     Dementia     Diabetes mellitus     Hypertension     Memory loss      Past Surgical History:   Procedure Laterality Date    CHOLECYSTECTOMY      ENDOSCOPY N/A 2024    Procedure: ESOPHAGOGASTRODUODENOSCOPY;  Surgeon: Clive More MD;  Location: [facility] ENDOSCOPY;  Service: Gastroenterology;  Laterality: N/A;  PREOP/ ABNORMAL CT OF STOMACH- POSTOP/ GASTRITIS    HYSTERECTOMY      MASTECTOMY Right 1995    TOTAL KNEE ARTHROPLASTY Right       General Information       Row Name 25 3521          Physical Therapy Time and Intention    Document Type therapy note (daily note)  -PC     Mode of Treatment physical therapy  -PC       Row Name 25 2849           General Information    Existing Precautions/Restrictions fall  -PC               User Key  (r) = Recorded By, (t) = Taken By, (c) = Cosigned By      Initials Name Provider Type    PC Lolis Oro, PT Physical Therapist                   Mobility       Row Name 01/08/25 1341          Bed Mobility    Supine-Sit [place] (Bed Mobility) standby assist  -PC     Sit-Supine [place] (Bed Mobility) standby assist  -PC       Row Name 01/08/25 1341          Sit-Stand Transfer    Sit-Stand [place] (Transfers) contact guard  -PC     Assistive Device (Sit-Stand Transfers) walker, front-wheeled  -PC       Row Name 01/08/25 1341          Gait/Stairs (Locomotion)    [place] Level (Gait) contact guard  -PC     Assistive Device (Gait) walker, front-wheeled  -PC     Distance in Feet (Gait) 40  -PC     Deviations/Abnormal Patterns (Gait) gait speed decreased;stride length decreased  -PC     Bilateral Gait Deviations forward flexed posture  -PC     Comment, (Gait/Stairs) occasional assist to steer walker but demonstrated inc steadiness with walker  -PC               User Key  (r) = Recorded By, (t) = Taken By, (c) = Cosigned By      Initials Name Provider Type    PC Lolis Oro, PT Physical Therapist                   Obj/Interventions       Row Name 01/08/25 1345          Balance    Static Standing Balance contact guard  -PC     Dynamic Standing Balance contact guard  -PC     Position/Device Used, Standing Balance walker, front-wheeled  -PC               User Key  (r) = Recorded By, (t) = Taken By, (c) = Cosigned By      Initials Name Provider Type    PC Lolis Oro, PT Physical Therapist                   Goals/Plan    No documentation.                  Clinical Impression       Row Name 01/08/25 1345          Pain    Pretreatment Pain Rating 0/10 - no pain  -PC     Posttreatment Pain Rating 0/10 - no pain  -PC       Row Name 01/08/25 1345          Plan of Care Review    Plan of Care Reviewed With patient   -PC     Progress improving  -PC     Outcome Evaluation Pt pleasantly confused and agreeable to participate, showing improved mobility and consistent ability to follow commands and stay on task. Pt walked 40 ft with a rolling walker with CGA, needed occasional assist to steer walker, will cont to follow  -PC       Row Name 01/08/25 1345          Positioning and Restraints    Pre-Treatment Position in bed  -PC     Post Treatment Position chair  -PC     In Chair reclined;call light within reach;encouraged to call for assist;exit alarm on  -PC               User Key  (r) = Recorded By, (t) = Taken By, (c) = Cosigned By      Initials Name Provider Type    PC Lolis Oro PT Physical Therapist                   Outcome Measures       Row Name 01/08/25 1348          How much help from another person do you currently need...    Turning from your back to your side while in flat bed without using bedrails? 4  -PC     Moving from lying on back to sitting on the side of a flat bed without bedrails? 4  -PC     Moving to and from a bed to a chair (including a wheelchair)? 3  -PC     Standing up from a chair using your arms (e.g., wheelchair, bedside chair)? 3  -PC     Climbing 3-5 steps with a railing? 3  -PC     To walk in hospital room? 3  -PC     AM-PAC 6 Clicks Score (PT) 20  -PC     Highest Level of Mobility Goal 6 --> Walk 10 steps or more  -PC               User Key  (r) = Recorded By, (t) = Taken By, (c) = Cosigned By      Initials Name Provider Type    PC Lolis Oro PT Physical Therapist                                 Physical Therapy Education       Title: PT OT SLP Therapies (In Progress)       Topic: Physical Therapy (In Progress)       Point: Mobility training (Done)       Learning Progress Summary            Patient Acceptance, E,D, DU by PC at 1/8/2025 1348    Acceptance, E,D, NR by PC at 1/7/2025 1532    Acceptance, E, NR,NL by RS at 1/5/2025 1151                      Point: Home exercise program (In  Progress)       Learning Progress Summary            Patient Acceptance, E, NR,NL by RS at 1/5/2025 1151                      Point: Body mechanics (Done)       Learning Progress Summary            Patient Acceptance, E,D, DU by PC at 1/8/2025 1348    Acceptance, E,D, NR by PC at 1/7/2025 1532    Acceptance, E, NR,NL by RS at 1/5/2025 1151                      Point: Precautions (Done)       Learning Progress Summary            Patient Acceptance, E,D, DU by PC at 1/8/2025 1348    Acceptance, E,D, NR by PC at 1/7/2025 1532    Acceptance, E, NR,NL by RS at 1/5/2025 1151                                      User Key       Initials Effective Dates Name Provider Type Discipline    PC 06/16/21 -  Lolis Oro, PT Physical Therapist PT    RS 06/16/21 -  Lizzie Vo PT Physical Therapist PT                  PT Recommendation and Plan     Progress: improving  Outcome Evaluation: Pt pleasantly confused and agreeable to participate, showing improved mobility and consistent ability to follow commands and stay on task. Pt walked 40 ft with a rolling walker with CGA, needed occasional assist to steer walker, will cont to follow     Time Calculation:         PT Charges       Row Name 01/08/25 1349             Time Calculation    Start Time 1133  -PC      Stop Time 1146  -PC      Time Calculation (min) 13 min  -PC      PT Received On 01/08/25  -PC      PT - Next Appointment 01/10/25  -PC                User Key  (r) = Recorded By, (t) = Taken By, (c) = Cosigned By      Initials Name Provider Type    PC Lolis Oro, PT Physical Therapist                  Therapy Charges for Today       Code Description Service Date Service Provider Modifiers Qty    20465265197 HC PT THER PROC EA 15 MIN 1/7/2025 Lolis Oro, PT GP 1    89469897523 HC PT THER PROC EA 15 MIN 1/8/2025 Lolis Oro, PT GP 1            PT G-Codes  Outcome Measure Options: AM-PAC 6 Clicks Basic Mobility (PT)  AM-PAC 6 Clicks Score (PT): 20       Lolis  AMINA Oro, PT  1/8/2025

## 2025-01-08 NOTE — PLAN OF CARE
Goal Outcome Evaluation:  Plan of Care Reviewed With: patient        Progress: improving  Outcome Evaluation: Pt pleasantly confused and agreeable to participate, showing improved mobility and consistent ability to follow commands and stay on task. Pt walked 40 ft with a rolling walker with CGA, needed occasional assist to steer walker, will cont to follow

## 2025-01-09 LAB
ANION GAP SERPL CALCULATED.3IONS-SCNC: 7.5 MMOL/L (ref 5–15)
BUN SERPL-MCNC: 17 MG/DL (ref 8–23)
BUN/CREAT SERPL: 25 (ref 7–25)
CALCIUM SPEC-SCNC: 8.6 MG/DL (ref 8.6–10.5)
CHLORIDE SERPL-SCNC: 107 MMOL/L (ref 98–107)
CO2 SERPL-SCNC: 25.5 MMOL/L (ref 22–29)
CREAT SERPL-MCNC: 0.68 MG/DL (ref 0.57–1)
DEPRECATED RDW RBC AUTO: 38.7 FL (ref 37–54)
EGFRCR SERPLBLD CKD-EPI 2021: 90.4 ML/MIN/1.73
ERYTHROCYTE [DISTWIDTH] IN BLOOD BY AUTOMATED COUNT: 12 % (ref 12.3–15.4)
GLUCOSE BLDC GLUCOMTR-MCNC: 111 MG/DL (ref 70–130)
GLUCOSE BLDC GLUCOMTR-MCNC: 122 MG/DL (ref 70–130)
GLUCOSE BLDC GLUCOMTR-MCNC: 173 MG/DL (ref 70–130)
GLUCOSE BLDC GLUCOMTR-MCNC: 188 MG/DL (ref 70–130)
GLUCOSE SERPL-MCNC: 122 MG/DL (ref 65–99)
HCT VFR BLD AUTO: 34.1 % (ref 34–46.6)
HGB BLD-MCNC: 11.5 G/DL (ref 12–15.9)
MAGNESIUM SERPL-MCNC: 2 MG/DL (ref 1.6–2.4)
MCH RBC QN AUTO: 29.8 PG (ref 26.6–33)
MCHC RBC AUTO-ENTMCNC: 33.7 G/DL (ref 31.5–35.7)
MCV RBC AUTO: 88.3 FL (ref 79–97)
PHOSPHATE SERPL-MCNC: 3 MG/DL (ref 2.5–4.5)
PLATELET # BLD AUTO: 175 10*3/MM3 (ref 140–450)
PMV BLD AUTO: 11 FL (ref 6–12)
POTASSIUM SERPL-SCNC: 4.2 MMOL/L (ref 3.5–5.2)
RBC # BLD AUTO: 3.86 10*6/MM3 (ref 3.77–5.28)
SODIUM SERPL-SCNC: 140 MMOL/L (ref 136–145)
WBC NRBC COR # BLD AUTO: 4.26 10*3/MM3 (ref 3.4–10.8)

## 2025-01-09 PROCEDURE — 85027 COMPLETE CBC AUTOMATED: CPT | Performed by: STUDENT IN AN ORGANIZED HEALTH CARE EDUCATION/TRAINING PROGRAM

## 2025-01-09 PROCEDURE — 82948 REAGENT STRIP/BLOOD GLUCOSE: CPT

## 2025-01-09 PROCEDURE — 63710000001 INSULIN LISPRO (HUMAN) PER 5 UNITS: Performed by: NURSE PRACTITIONER

## 2025-01-09 PROCEDURE — 84100 ASSAY OF PHOSPHORUS: CPT | Performed by: STUDENT IN AN ORGANIZED HEALTH CARE EDUCATION/TRAINING PROGRAM

## 2025-01-09 PROCEDURE — 63710000001 INSULIN GLARGINE PER 5 UNITS: Performed by: STUDENT IN AN ORGANIZED HEALTH CARE EDUCATION/TRAINING PROGRAM

## 2025-01-09 PROCEDURE — 83735 ASSAY OF MAGNESIUM: CPT | Performed by: STUDENT IN AN ORGANIZED HEALTH CARE EDUCATION/TRAINING PROGRAM

## 2025-01-09 PROCEDURE — 80048 BASIC METABOLIC PNL TOTAL CA: CPT | Performed by: STUDENT IN AN ORGANIZED HEALTH CARE EDUCATION/TRAINING PROGRAM

## 2025-01-09 RX ORDER — LISINOPRIL 20 MG/1
20 TABLET ORAL DAILY
Status: DISCONTINUED | OUTPATIENT
Start: 2025-01-09 | End: 2025-01-13

## 2025-01-09 RX ADMIN — LISINOPRIL 20 MG: 20 TABLET ORAL at 10:50

## 2025-01-09 RX ADMIN — DONEPEZIL HYDROCHLORIDE 10 MG: 10 TABLET, FILM COATED ORAL at 09:12

## 2025-01-09 RX ADMIN — INSULIN LISPRO 2 UNITS: 100 INJECTION, SOLUTION INTRAVENOUS; SUBCUTANEOUS at 12:52

## 2025-01-09 RX ADMIN — ASPIRIN 81 MG: 81 TABLET, COATED ORAL at 09:10

## 2025-01-09 RX ADMIN — PANTOPRAZOLE SODIUM 40 MG: 40 TABLET, DELAYED RELEASE ORAL at 05:45

## 2025-01-09 RX ADMIN — ATORVASTATIN CALCIUM 80 MG: 20 TABLET, FILM COATED ORAL at 09:10

## 2025-01-09 RX ADMIN — LEVETIRACETAM 1000 MG: 500 TABLET, FILM COATED ORAL at 05:01

## 2025-01-09 RX ADMIN — Medication 10 ML: at 23:14

## 2025-01-09 RX ADMIN — LEVETIRACETAM 1000 MG: 500 TABLET, FILM COATED ORAL at 17:42

## 2025-01-09 RX ADMIN — FOLIC ACID 1 MG: 1 TABLET ORAL at 09:12

## 2025-01-09 RX ADMIN — Medication 10 ML: at 09:12

## 2025-01-09 RX ADMIN — CETIRIZINE HYDROCHLORIDE 10 MG: 10 TABLET, FILM COATED ORAL at 09:10

## 2025-01-09 RX ADMIN — ESCITALOPRAM OXALATE 10 MG: 10 TABLET, FILM COATED ORAL at 09:12

## 2025-01-09 RX ADMIN — MEMANTINE HYDROCHLORIDE 5 MG: 5 TABLET, FILM COATED ORAL at 17:42

## 2025-01-09 RX ADMIN — OLANZAPINE 5 MG: 2.5 TABLET, FILM COATED ORAL at 23:05

## 2025-01-09 RX ADMIN — INSULIN LISPRO 2 UNITS: 100 INJECTION, SOLUTION INTRAVENOUS; SUBCUTANEOUS at 17:42

## 2025-01-09 RX ADMIN — AMLODIPINE BESYLATE 10 MG: 10 TABLET ORAL at 09:10

## 2025-01-09 RX ADMIN — MEMANTINE HYDROCHLORIDE 5 MG: 5 TABLET, FILM COATED ORAL at 05:01

## 2025-01-09 RX ADMIN — INSULIN GLARGINE 6 UNITS: 100 INJECTION, SOLUTION SUBCUTANEOUS at 09:12

## 2025-01-09 NOTE — PLAN OF CARE
Goal Outcome Evaluation:              Outcome Evaluation: BP elevated this AM otherwise VSS. Confused. Fall precautions maintained. Monitored blood sugars. Up with assist and walker.

## 2025-01-09 NOTE — PROGRESS NOTES
Name: Lucia Ellis ADMIT: 2025   : 1950  PCP: Everardo Mazariegos MD    MRN: [id number] LOS: 4 days   AGE/SEX: 74 y.o. female  ROOM: [facility]     Subjective   Subjective   Resting in bed.  No acute distress.  Oriented to name, place.  Pleasant.  No complaints when asked.     Objective   Objective   Vital Signs  Temp:  [97 °F (36.1 °C)-99.8 °F (37.7 °C)] 97.1 °F (36.2 °C)  Heart Rate:  [] 81  Resp:  [16] 16  BP: ()/(47-82) 96/47  SpO2:  [97 %-98 %] 98 %  on   ;   Device (Oxygen Therapy): room air  Body mass index is 18.79 kg/m².    Physical Exam    General: Alert, laying in bed, oriented to name and place, pleasant  HEENT: Normocephalic, atraumatic  CV: Regular rate and rhythm, no murmurs rubs or gallops  Lungs: Clear to auscultation bilaterally, no crackles or wheezes  Abdomen: Soft, nontender, nondistended  Extremities: No significant peripheral edema , no cyanosis       Results Review:       I reviewed the patient's new clinical results.  Results from last 7 days   Lab Units 25  0512 25  0652 25  0538   WBC 10*3/mm3 4.26 3.69 4.08 3.47   HEMOGLOBIN g/dL 11.5* 11.4* 9.9* 10.4*   PLATELETS 10*3/mm3 175 168 163 172     Results from last 7 days   Lab Units 25  0512 25  1713 25  0652 25  0455 25  0538   SODIUM mmol/L 140  --  139 143 142   POTASSIUM mmol/L 4.2 4.2 3.6 4.2 4.7   CHLORIDE mmol/L 107  --  105 110* 111*   CO2 mmol/L 25.5  --  26.8 26.0 22.8   BUN mg/dL 17  --  17 16 25*   CREATININE mg/dL 0.68  --  0.67 0.72 0.77   GLUCOSE mg/dL 122*  --  190* 113* 105*   Estimated Creatinine Clearance: 50.1 mL/min (by C-G formula based on SCr of 0.68 mg/dL).  Results from last 7 days   Lab Units 25  2115   ALBUMIN g/dL 4.5   BILIRUBIN mg/dL 0.6   ALK PHOS U/L 91   AST (SGOT) U/L 18   ALT (SGPT) U/L 10     Results from last 7 days   Lab Units 25  0512 25  0652 25  0455 25  0538 25  0655 25  2115    CALCIUM mg/dL 8.6 8.5* 8.3* 8.3*   < > 9.5   ALBUMIN g/dL  --   --   --   --   --  4.5   MAGNESIUM mg/dL 2.0 1.9 2.0 2.1  --  2.1   PHOSPHORUS mg/dL 3.0 2.9 3.2 2.4*  --   --     < > = values in this interval not displayed.       Glucose   Date/Time Value Ref Range Status   01/09/2025 1658 173 (H) 70 - 130 mg/dL Final   01/09/2025 1144 188 (H) 70 - 130 mg/dL Final   01/09/2025 0819 122 70 - 130 mg/dL Final   01/08/2025 2249 178 (H) 70 - 130 mg/dL Final   01/08/2025 1722 119 70 - 130 mg/dL Final   01/08/2025 1208 187 (H) 70 - 130 mg/dL Final   01/08/2025 0816 135 (H) 70 - 130 mg/dL Final       amLODIPine, 10 mg, Oral, Daily  aspirin, 81 mg, Oral, Daily  atorvastatin, 80 mg, Oral, Daily  cetirizine, 10 mg, Oral, Daily  donepezil, 10 mg, Oral, Daily  escitalopram, 10 mg, Oral, Daily  folic acid, 1 mg, Oral, Daily  insulin glargine, 6 Units, Subcutaneous, Daily  insulin lispro, 2-7 Units, Subcutaneous, 4x Daily AC & at Bedtime  levETIRAcetam, 1,000 mg, Oral, BID  lisinopril, 20 mg, Oral, Daily  memantine, 5 mg, Oral, BID  OLANZapine, 5 mg, Oral, Nightly  pantoprazole, 40 mg, Oral, QAM  sodium chloride, 10 mL, Intravenous, Q12H       Diet: Regular/House; Fluid Consistency: Thin (IDDSI 0)       Assessment/Plan     Active Hospital Problems    Diagnosis  POA    **AMS (altered mental status) [R41.82]  Yes    Moderate protein-calorie malnutrition [E44.0]  Yes    History of stroke [Z86.73]  Not Applicable    Vascular dementia, moderate, without behavioral disturbance, psychotic disturbance, mood disturbance, and anxiety [F01.B0]  Yes    Type 2 diabetes mellitus with hyperglycemia [E11.65]  Yes    Hypokalemia [E87.6]  Yes    Essential hypertension [I10]  Yes    Hyperlipidemia [E78.5]  Yes      Resolved Hospital Problems   No resolved problems to display.     Ms. Ellis is a 74 y.o. female with a history of dementia, DM 2, hyperlipidemia and hypertension that presented to the hospital with altered mental status and failure to  thrive.  She was apparently living with her adult son but was noted to be without any electricity or running water.  Her sister and nephew provided history on admission and emergency personnel was called to the patient's home for a wellness check.     Vascular dementia  Failure to thrive  -AMS is most likely secondary to worsening dementia  -CT head is negative for an acute intracranial process  -Infectious workup unremarkable  -TSH normal, B12 normal  -Continue outpatient donepezil, memantine  -Continue olanzapine  -S/P IV thiamine x 3 days  -Neurology following, symptoms likely due to progression of dementia  -MRI brain ordered but nursing having trouble getting MRI screening sheet completed.  -Fall overnight 1/7 and CT head checked and negative.      History of CVA  -Continue aspirin, statin  -MRI planned for above with neurology following peripherally now     Epilepsy  -Continue Keppra  -EEG unremarkable.     Hypokalemia  Elevated creatinine  Hypophosphatemia  -Replacement per protocol.  -Potassium normal, creatinine improved after fluids.      Hypertension  -BP on softer side so Norvasc and lisinopril held. BP steadily increased and up to 180 systolic today.   -Continue Norvasc.  Resumed lisinopril at lower dose 20 mg daily ( on 40 mg oupatient) BP soft this afternoon.  Will repeat      Type 2 Diabetes Mellitus  -Hold oral diabetic medications  -Correctional factor insulin  -Low-dose Lantus  -Monitor blood sugars ACHS- overall stable  -Hemoglobin A1c 7.3     Hyperlipidemia  -Statin     GERD  -Continue Protonix     Anemia  -Hemoglobin 11.4 with no signs of bleeding. Monitor daily CBC.  -Folate 4.51 on 1/7/25 patient is on full Supplement.     SCDs for DVT prophylaxis.  Full code.  Discussed with patient and nurse.  Disposition: SNF/LTC, medically stable once arranged      Cecile Villegas   [facility]  01/09/25  17:13 EST

## 2025-01-09 NOTE — PLAN OF CARE
Problem: Adult Inpatient Plan of Care  Goal: Plan of Care Review  Outcome: Progressing  Flowsheets (Taken 1/9/2025 0502)  Outcome Evaluation: pleasantlyly confused and disoriented x3, reoriented but is very forgetful, falls precaution in place, toileting offered frequently, assisted to the bathroom, incontinent at times, slept well during the night, /70 manually this am  Plan of Care Reviewed With: patient   Goal Outcome Evaluation:  Plan of Care Reviewed With: patient           Outcome Evaluation: pleasantlyly confused and disoriented x3, reoriented but is very forgetful, falls precaution in place, toileting offered frequently, assisted to the bathroom, incontinent at times, slept well during the night, /70 manually this am

## 2025-01-09 NOTE — PROGRESS NOTES
Continued Stay Note  [facility]     Patient Name: Lucia Ellis  MRN: [id number]  Today's Date: 1/9/2025    Admit Date: 1/4/2025    Plan: SNF (referral in EPIC/Pending)   Discharge Plan       Row Name 01/09/25 1204       Plan    Plan SNF (referral in EPIC/Pending)    Plan Comments Msg sent o Wilfredo with [facility] regarding referral placed and acceptance.                   Discharge Codes    No documentation.                 Expected Discharge Date and Time       Expected Discharge Date Expected Discharge Time    Tobin 10, 2025               Ariella Valdes RN

## 2025-01-10 LAB
ANION GAP SERPL CALCULATED.3IONS-SCNC: 6.8 MMOL/L (ref 5–15)
BUN SERPL-MCNC: 17 MG/DL (ref 8–23)
BUN/CREAT SERPL: 25.4 (ref 7–25)
CALCIUM SPEC-SCNC: 8.7 MG/DL (ref 8.6–10.5)
CHLORIDE SERPL-SCNC: 106 MMOL/L (ref 98–107)
CO2 SERPL-SCNC: 28.2 MMOL/L (ref 22–29)
CREAT SERPL-MCNC: 0.67 MG/DL (ref 0.57–1)
EGFRCR SERPLBLD CKD-EPI 2021: 90.7 ML/MIN/1.73
GLUCOSE BLDC GLUCOMTR-MCNC: 102 MG/DL (ref 70–130)
GLUCOSE BLDC GLUCOMTR-MCNC: 189 MG/DL (ref 70–130)
GLUCOSE BLDC GLUCOMTR-MCNC: 193 MG/DL (ref 70–130)
GLUCOSE BLDC GLUCOMTR-MCNC: 93 MG/DL (ref 70–130)
GLUCOSE SERPL-MCNC: 132 MG/DL (ref 65–99)
POTASSIUM SERPL-SCNC: 4.1 MMOL/L (ref 3.5–5.2)
SODIUM SERPL-SCNC: 141 MMOL/L (ref 136–145)

## 2025-01-10 PROCEDURE — 80048 BASIC METABOLIC PNL TOTAL CA: CPT | Performed by: STUDENT IN AN ORGANIZED HEALTH CARE EDUCATION/TRAINING PROGRAM

## 2025-01-10 PROCEDURE — 63710000001 INSULIN GLARGINE PER 5 UNITS: Performed by: STUDENT IN AN ORGANIZED HEALTH CARE EDUCATION/TRAINING PROGRAM

## 2025-01-10 PROCEDURE — 82948 REAGENT STRIP/BLOOD GLUCOSE: CPT

## 2025-01-10 PROCEDURE — 63710000001 INSULIN LISPRO (HUMAN) PER 5 UNITS: Performed by: NURSE PRACTITIONER

## 2025-01-10 PROCEDURE — 97530 THERAPEUTIC ACTIVITIES: CPT

## 2025-01-10 RX ADMIN — INSULIN LISPRO 2 UNITS: 100 INJECTION, SOLUTION INTRAVENOUS; SUBCUTANEOUS at 18:27

## 2025-01-10 RX ADMIN — CETIRIZINE HYDROCHLORIDE 10 MG: 10 TABLET, FILM COATED ORAL at 08:38

## 2025-01-10 RX ADMIN — OLANZAPINE 5 MG: 2.5 TABLET, FILM COATED ORAL at 20:12

## 2025-01-10 RX ADMIN — ASPIRIN 81 MG: 81 TABLET, COATED ORAL at 08:38

## 2025-01-10 RX ADMIN — LEVETIRACETAM 1000 MG: 500 TABLET, FILM COATED ORAL at 05:34

## 2025-01-10 RX ADMIN — LEVETIRACETAM 1000 MG: 500 TABLET, FILM COATED ORAL at 18:27

## 2025-01-10 RX ADMIN — AMLODIPINE BESYLATE 10 MG: 10 TABLET ORAL at 08:38

## 2025-01-10 RX ADMIN — Medication 10 ML: at 08:37

## 2025-01-10 RX ADMIN — INSULIN GLARGINE 6 UNITS: 100 INJECTION, SOLUTION SUBCUTANEOUS at 08:37

## 2025-01-10 RX ADMIN — ESCITALOPRAM OXALATE 10 MG: 10 TABLET, FILM COATED ORAL at 08:38

## 2025-01-10 RX ADMIN — DONEPEZIL HYDROCHLORIDE 10 MG: 10 TABLET, FILM COATED ORAL at 08:38

## 2025-01-10 RX ADMIN — MEMANTINE HYDROCHLORIDE 5 MG: 5 TABLET, FILM COATED ORAL at 05:35

## 2025-01-10 RX ADMIN — PANTOPRAZOLE SODIUM 40 MG: 40 TABLET, DELAYED RELEASE ORAL at 05:34

## 2025-01-10 RX ADMIN — MEMANTINE HYDROCHLORIDE 5 MG: 5 TABLET, FILM COATED ORAL at 18:27

## 2025-01-10 RX ADMIN — ATORVASTATIN CALCIUM 80 MG: 20 TABLET, FILM COATED ORAL at 08:38

## 2025-01-10 RX ADMIN — FOLIC ACID 1 MG: 1 TABLET ORAL at 08:38

## 2025-01-10 NOTE — PROGRESS NOTES
Name: Lucia Ellis ADMIT: 2025   : 1950  PCP: Everardo Mazariegos MD    MRN: [id number] LOS: 5 days   AGE/SEX: 74 y.o. female  ROOM: [facility]     Subjective   Subjective   No new events overnight.  No complaints.  Confused, pleasant.    ROS   As above  Objective   Objective   Vital Signs  Temp:  [97 °F (36.1 °C)-98.1 °F (36.7 °C)] 97 °F (36.1 °C)  Heart Rate:  [73-84] 81  Resp:  [16] 16  BP: (102-148)/(52-69) 148/69  SpO2:  [96 %-99 %] 99 %  on   ;   Device (Oxygen Therapy): room air  Body mass index is 18.79 kg/m².    Physical Exam    General: Alert, laying in bed, not in distress  HEENT: Normocephalic, atraumatic  CV: Regular rate and rhythm, no murmurs rubs or gallops  Lungs: Clear to auscultation bilaterally, no crackles or wheezes  Abdomen: Soft, nontender, nondistended  Extremities: No significant peripheral edema , no cyanosis       Results Review:       I reviewed the patient's new clinical results.  Results from last 7 days   Lab Units 25  0512 25  0652 25  0455 25  0538   WBC 10*3/mm3 4.26 3.69 4.08 3.47   HEMOGLOBIN g/dL 11.5* 11.4* 9.9* 10.4*   PLATELETS 10*3/mm3 175 168 163 172     Results from last 7 days   Lab Units 01/10/25  0538 25  0512 25  1713 2552 25  0455   SODIUM mmol/L 141 140  --  139 143   POTASSIUM mmol/L 4.1 4.2 4.2 3.6 4.2   CHLORIDE mmol/L 106 107  --  105 110*   CO2 mmol/L 28.2 25.5  --  26.8 26.0   BUN mg/dL 17 17  --  17 16   CREATININE mg/dL 0.67 0.68  --  0.67 0.72   GLUCOSE mg/dL 132* 122*  --  190* 113*   Estimated Creatinine Clearance: 50.9 mL/min (by C-G formula based on SCr of 0.67 mg/dL).  Results from last 7 days   Lab Units 25  2115   ALBUMIN g/dL 4.5   BILIRUBIN mg/dL 0.6   ALK PHOS U/L 91   AST (SGOT) U/L 18   ALT (SGPT) U/L 10     Results from last 7 days   Lab Units 01/10/25  0538 25  0512 25  0652 25  0455 25  0538 25  0655 25  2115   CALCIUM mg/dL 8.7 8.6 8.5*  8.3* 8.3*   < > 9.5   ALBUMIN g/dL  --   --   --   --   --   --  4.5   MAGNESIUM mg/dL  --  2.0 1.9 2.0 2.1  --  2.1   PHOSPHORUS mg/dL  --  3.0 2.9 3.2 2.4*  --   --     < > = values in this interval not displayed.       Glucose   Date/Time Value Ref Range Status   01/10/2025 1223 93 70 - 130 mg/dL Final   01/10/2025 0823 102 70 - 130 mg/dL Final   01/09/2025 2116 111 70 - 130 mg/dL Final   01/09/2025 1658 173 (H) 70 - 130 mg/dL Final   01/09/2025 1144 188 (H) 70 - 130 mg/dL Final   01/09/2025 0819 122 70 - 130 mg/dL Final   01/08/2025 2249 178 (H) 70 - 130 mg/dL Final       amLODIPine, 10 mg, Oral, Daily  aspirin, 81 mg, Oral, Daily  atorvastatin, 80 mg, Oral, Daily  cetirizine, 10 mg, Oral, Daily  donepezil, 10 mg, Oral, Daily  escitalopram, 10 mg, Oral, Daily  folic acid, 1 mg, Oral, Daily  insulin glargine, 6 Units, Subcutaneous, Daily  insulin lispro, 2-7 Units, Subcutaneous, 4x Daily AC & at Bedtime  levETIRAcetam, 1,000 mg, Oral, BID  [Held by provider] lisinopril, 20 mg, Oral, Daily  memantine, 5 mg, Oral, BID  OLANZapine, 5 mg, Oral, Nightly  pantoprazole, 40 mg, Oral, QAM  sodium chloride, 10 mL, Intravenous, Q12H       Diet: Regular/House; Fluid Consistency: Thin (IDDSI 0)       Assessment/Plan     Active Hospital Problems    Diagnosis  POA    **AMS (altered mental status) [R41.82]  Yes    Moderate protein-calorie malnutrition [E44.0]  Yes    History of stroke [Z86.73]  Not Applicable    Vascular dementia, moderate, without behavioral disturbance, psychotic disturbance, mood disturbance, and anxiety [F01.B0]  Yes    Type 2 diabetes mellitus with hyperglycemia [E11.65]  Yes    Hypokalemia [E87.6]  Yes    Essential hypertension [I10]  Yes    Hyperlipidemia [E78.5]  Yes      Resolved Hospital Problems   No resolved problems to display.     Ms. Ellis is a 74 y.o. female with a history of dementia, DM 2, hyperlipidemia and hypertension that presented to the hospital with altered mental status and failure to  thrive.  She was apparently living with her adult son but was noted to be without any electricity or running water.  Her sister and nephew provided history on admission and emergency personnel was called to the patient's home for a wellness check.     Vascular dementia  Failure to thrive  -AMS is most likely secondary to worsening dementia  -CT head is negative for an acute intracranial process  -Infectious workup unremarkable  -TSH normal, B12 normal  -Continue outpatient donepezil, memantine  -Continue olanzapine  -S/P IV thiamine x 3 days  -Neurology following, symptoms likely due to progression of dementia  -MRI brain ordered but nursing having trouble getting MRI screening sheet completed.  -Fall overnight 1/7 and CT head checked and negative.      History of CVA  -Continue aspirin, statin  -MRI planned for above with neurology following peripherally now     Epilepsy  -Continue Keppra  -EEG unremarkable.     Hypokalemia  Elevated creatinine  Hypophosphatemia  -Replacement per protocol.  -Potassium normal, creatinine improved after fluids.      Hypertension  -BP on softer side so Norvasc and lisinopril held. BP steadily increased and up to 180 systolic today.   -Continue Norvasc.   -Lisinopril elevated at lower dose 20 mg daily, patient's BP dropped down to 96/47.  Holding lisinopril.  Continue Norvasc for  now.  If BP trends back up we will resume lisinopril at lower dose.      Type 2 Diabetes Mellitus  -Hemoglobin A1c 7.3  -On SSI  -Discontinue Lantus 0 1/10  -Outpatient on Janumet XR (sitagliptin -metformin 50/1000), and Jardiance 10 mg daily  --Resume Jardiance in am      Hyperlipidemia  -Statin     GERD  -Continue Protonix     Anemia  Hemoglobin stable  -Folate 4.51 on 1/7/25, on supplement     SCDs for DVT prophylaxis.  Full code.  Discussed with patient and nurse.  Disposition: SNF/LTC,  medically stable once arranged      Cecile Villegas   [facility]  01/10/25  17:21 EST

## 2025-01-10 NOTE — PLAN OF CARE
Goal Outcome Evaluation:  Plan of Care Reviewed With: patient         VSS, up in chair, Bm this shift, falls precautions maintained, worked with PT, confused, up with assist x1 and walker, monitoring blood sugars

## 2025-01-10 NOTE — PLAN OF CARE
Goal Outcome Evaluation:  Plan of Care Reviewed With: patient           Outcome Evaluation: Pt agreed to PT session, nahun STS w/CGA 1 x3 including BR assist, pt nahun amb ~12ft, seated BR break for BM and clean-up, then another 25ft, rwx some assist to guide rwx, pt unaware of where she is or how she got here, asking every 5 min, min assist into bed after fatigued from amb for LEs, lives w/son per pt    Anticipated Discharge Disposition (PT): skilled nursing facility

## 2025-01-10 NOTE — THERAPY TREATMENT NOTE
Patient Name: Lucia Ellis  : 1950    MRN: [id number]                              Today's Date: 1/10/2025       Admit Date: 2025    Visit Dx:     ICD-10-CM ICD-9-CM   1. Altered mental status, unspecified altered mental status type  R41.82 780.97     Patient Active Problem List   Diagnosis    Gastroesophageal reflux disease    Malignant neoplasm of breast    Depression    Folliculitis    Generalized anxiety disorder    Hyperlipidemia    Essential hypertension    Status post total right knee replacement    Rectal hemorrhage    Vitamin D deficiency    Drug therapy    Acute cholecystitis    Uncontrolled type 2 diabetes mellitus with hypoglycemia without coma    Myoclonus    Type 2 diabetes mellitus with hyperglycemia    Hypokalemia    New onset seizure    Focal motor seizure    Vascular dementia, moderate, without behavioral disturbance, psychotic disturbance, mood disturbance, and anxiety    Stroke-like symptoms    CVA (cerebral vascular accident)    Lung nodules    Hypokalemia    History of stroke    Generalized weakness    Severe malnutrition    Gastritis without bleeding    Abnormal CT scan, stomach    AMS (altered mental status)    Moderate protein-calorie malnutrition     Past Medical History:   Diagnosis Date    Breast cancer     Dementia     Diabetes mellitus     Hypertension     Memory loss      Past Surgical History:   Procedure Laterality Date    CHOLECYSTECTOMY      ENDOSCOPY N/A 2024    Procedure: ESOPHAGOGASTRODUODENOSCOPY;  Surgeon: Clive More MD;  Location: [facility] ENDOSCOPY;  Service: Gastroenterology;  Laterality: N/A;  PREOP/ ABNORMAL CT OF STOMACH- POSTOP/ GASTRITIS    HYSTERECTOMY      MASTECTOMY Right 1995    TOTAL KNEE ARTHROPLASTY Right       General Information       Row Name 01/10/25 1524          Physical Therapy Time and Intention    Document Type therapy note (daily note)  -SRIDHAR     Mode of Treatment individual therapy;physical therapy  -       Row Name 01/10/25  1524          General Information    Patient Profile Reviewed yes  -     Existing Precautions/Restrictions fall  -     Barriers to Rehab cognitive status  pleasantly confused  -       Row Name 01/10/25 1524          Cognition    Orientation Status (Cognition) oriented to;person  -       Row Name 01/10/25 1524          Safety Issues/Impairments Affecting Functional Mobility    Safety Issues Affecting Function (Mobility) insight into deficits/self-awareness;judgment;positioning of assistive device;problem-solving;safety precaution awareness  -     Impairments Affecting Function (Mobility) cognition;coordination;endurance/activity tolerance;strength  -     Cognitive Impairments, Mobility Safety/Performance attention;awareness, need for assistance;insight into deficits/self-awareness;judgment;problem-solving/reasoning;safety precaution awareness;safety precaution follow-through;sequencing abilities  -               User Key  (r) = Recorded By, (t) = Taken By, (c) = Cosigned By      Initials Name Provider Type    Jesica Lange PTA Physical Therapist Assistant                   Mobility       Row Name 01/10/25 1525          Bed Mobility    Sit-Supine [place] (Bed Mobility) minimum assist (75% patient effort)  for LEs  -     Assistive Device (Bed Mobility) bed rails;head of bed elevated  -       Row Name 01/10/25 1525          Sit-Stand Transfer    Sit-Stand [place] (Transfers) contact guard;verbal cues  -     Assistive Device (Sit-Stand Transfers) walker, front-wheeled  -SRIDHAR     Comment, (Sit-Stand Transfer) cues to make sure legs are touching chair during stand to sit tfers  -       Row Name 01/10/25 1525          Gait/Stairs (Locomotion)    [place] Level (Gait) contact guard  -     Assistive Device (Gait) walker, front-wheeled  -SRIDHAR     Distance in Feet (Gait) 12  then another 25ft after incr sit time for BM and clean-up in BR  -     Deviations/Abnormal Patterns (Gait)  bina decreased;base of support, narrow;stride length decreased  -     Bilateral Gait Deviations forward flexed posture  -               User Key  (r) = Recorded By, (t) = Taken By, (c) = Cosigned By      Initials Name Provider Type    Jesica Lange PTA Physical Therapist Assistant                   Obj/Interventions    No documentation.                  Goals/Plan    No documentation.                  Clinical Impression       Row Name 01/10/25 1527          Pain    Pretreatment Pain Rating 0/10 - no pain  -     Posttreatment Pain Rating 0/10 - no pain  -       Row Name 01/10/25 1527          Plan of Care Review    Plan of Care Reviewed With patient  -     Outcome Evaluation Pt agreed to PT session, nahun STS w/CGA 1 x3 including BR assist, pt nahun amb ~12ft, seated BR break for BM and clean-up, then another 25ft, rwx some assist to guide rwx, pt unaware of where she is or how she got here, asking every 5 min, min assist into bed after fatigued from amb for LEs, lives w/son per pt  -       Row Name 01/10/25 1527          Therapy Assessment/Plan (PT)    Rehab Potential (PT) good  -       Row Name 01/10/25 1527          Positioning and Restraints    Pre-Treatment Position sitting in chair/recliner  -     Post Treatment Position bed  -JM     In Bed call light within reach;encouraged to call for assist;exit alarm on;notified nsg;side rails up x3;fowlers  -               User Key  (r) = Recorded By, (t) = Taken By, (c) = Cosigned By      Initials Name Provider Type    Jesica Lange PTA Physical Therapist Assistant                   Outcome Measures       Row Name 01/10/25 1535 01/10/25 0839       How much help from another person do you currently need...    Turning from your back to your side while in flat bed without using bedrails? 4  -JM 4  -NE    Moving from lying on back to sitting on the side of a flat bed without bedrails? 3  - 4  -NE    Moving to and from a bed to a chair  (including a wheelchair)? 3  -JM 3  -NE    Standing up from a chair using your arms (e.g., wheelchair, bedside chair)? 3  -SRIDHAR 3  -NE    Climbing 3-5 steps with a railing? 2  -JM 3  -NE    To walk in hospital room? 3  -JM 3  -NE    AM-PAC 6 Clicks Score (PT) 18  -JM 20  -NE    Highest Level of Mobility Goal 6 --> Walk 10 steps or more  -JM 6 --> Walk 10 steps or more  -NE              User Key  (r) = Recorded By, (t) = Taken By, (c) = Cosigned By      Initials Name Provider Type    Jesica Lange, MARIZA Physical Therapist Assistant    Leana Smith, RN Registered Nurse                                 Physical Therapy Education       Title: PT OT SLP Therapies (In Progress)       Topic: Physical Therapy (In Progress)       Point: Mobility training (In Progress)       Learning Progress Summary            Patient Acceptance, E,D, NR by SRIDHAR at 1/10/2025 1536    Acceptance, E, VU by DIRK at 1/10/2025 0505    Acceptance, E,D, DU by PC at 1/8/2025 1348    Acceptance, E,D, NR by PC at 1/7/2025 1532    Acceptance, E, NR,NL by RS at 1/5/2025 1151                      Point: Home exercise program (In Progress)       Learning Progress Summary            Patient Acceptance, E,D, NR by SRIDHAR at 1/10/2025 1536    Acceptance, E, NR,NL by RS at 1/5/2025 1151                      Point: Body mechanics (In Progress)       Learning Progress Summary            Patient Acceptance, E,D, NR by SRIDHAR at 1/10/2025 1536    Acceptance, E, VU by DIRK at 1/10/2025 0505    Acceptance, E,D, DU by PC at 1/8/2025 1348    Acceptance, E,D, NR by PC at 1/7/2025 1532    Acceptance, E, NR,NL by RS at 1/5/2025 1151                      Point: Precautions (In Progress)       Learning Progress Summary            Patient Acceptance, E,D, NR by SRIDHAR at 1/10/2025 1536    Acceptance, E, VU by DIRK at 1/10/2025 0505    Acceptance, E,D, DU by PC at 1/8/2025 1348    Acceptance, E,D, NR by PC at 1/7/2025 1532    Acceptance, E, NR,NL by RS at 1/5/2025 1151                                       User Key       Initials Effective Dates Name Provider Type Discipline    PC 06/16/21 -  Lolis Oro, PT Physical Therapist PT    SRIDHAR 03/07/18 -  Jesica Ellis PTA Physical Therapist Assistant PT    JS 10/01/20 -  Payton Rodriguez, RN Registered Nurse Nurse    RS 06/16/21 -  Lizzie Vo, GILDARDO Physical Therapist PT                  PT Recommendation and Plan     Outcome Evaluation: Pt agreed to PT session, nahun STS w/CGA 1 x3 including BR assist, pt nahun amb ~12ft, seated BR break for BM and clean-up, then another 25ft, rwx some assist to guide rwx, pt unaware of where she is or how she got here, asking every 5 min, min assist into bed after fatigued from amb for LEs, lives w/son per pt     Time Calculation:         PT Charges       Row Name 01/10/25 1536             Time Calculation    Start Time 1400  -      Stop Time 1433  -      Time Calculation (min) 33 min  -      PT Received On 01/10/25  -      PT - Next Appointment 01/13/25  -                User Key  (r) = Recorded By, (t) = Taken By, (c) = Cosigned By      Initials Name Provider Type     Jesica Ellis PTA Physical Therapist Assistant                  Therapy Charges for Today       Code Description Service Date Service Provider Modifiers Qty    74191120988 HC PT THERAPEUTIC ACT EA 15 MIN 1/10/2025 Jesica Ellis PTA GP 2            PT G-Codes  Outcome Measure Options: AM-PAC 6 Clicks Basic Mobility (PT)  AM-PAC 6 Clicks Score (PT): 18  PT Discharge Summary  Anticipated Discharge Disposition (PT): skilled nursing facility    Jesica Ellis PTA  1/10/2025

## 2025-01-10 NOTE — PROGRESS NOTES
Continued Stay Note  [facility]     Patient Name: Lucia Ellis  MRN: [id number]  Today's Date: 1/10/2025    Admit Date: 1/4/2025    Plan: SNF (Referrals in EPIC/Pending)   Discharge Plan       Row Name 01/10/25 1416       Plan    Plan SNF (Referrals in EPIC/Pending)    Plan Comments Per Wilfredo with [facility], they have been trying to reach Jessica or other family members to confirm assets. As of today they have not heard back.                   Discharge Codes    No documentation.                 Expected Discharge Date and Time       Expected Discharge Date Expected Discharge Time    Jan 14, 2025               Ariella Valdes RN

## 2025-01-10 NOTE — PROGRESS NOTES
Nutrition Services    Patient Name:  Lucia Ellis  YOB: 1950  MRN: [id number]  Admit Date:  1/4/2025    Assessment Date:  01/10/25    CLINICAL NUTRITION - PROGRESS NOTE    NFPE completed to add to MSA - see additional notations below for physical exam.    Patient meets ASPEN/AND criteria for nutrition diagnosis of moderate malnutrition of chronic illness based on: BMI, moderate weight loss, moderate loss of muscle and subcutaneous fat.           Reason for Encounter Follow-up         Nutrition Order Diet: Regular/House; Fluid Consistency: Thin (IDDSI 0)    Supplements/Snacks Magic Cups TID         Labs     Meds     GI     Edema      NFPE Hgb 10.3    Include Cymbalta    + BM 1/8    None noted    See below    Malnutrition Severity Assessment      Patient meets criteria for : Moderate (non-severe) Malnutrition  Malnutrition Type (Last 8 Hours)       Malnutrition Severity Assessment       Row Name 01/10/25 1524       Muscle Loss    Loss of Muscle Mass Findings Moderate    [place] Moderate - slight depression    Clavicle Bone Region Moderate - some protrusion in females, visible in males    Acromion Bone Region Moderate - acromion may slightly protrude    Dorsal Hand Region Moderate - slight depression    Patellar Region --  appeared possibly to have LE edema, however not noted in flowsheets      Row Name 01/10/25 1524       Fat Loss    Subcutaneous Fat Loss Findings Moderate    Orbital Region  Moderate -  somewhat hollowness, slightly dark circles    Upper Arm Region Moderate - some fat tissue, not ample      Row Name 01/10/25 1524       Criteria Met (Must meet criteria for severity in at least 2 of these categories: M Wasting, Fat Loss, Fluid, Secondary Signs, Wt. Status, Intake)    Patient meets criteria for  Moderate (non-severe) Malnutrition                         Pertinent Information Patient eating 100% of meals.        Intervention/Plan No additional intervention indicated at this time.      RD to follow up per protocol.    Electronically signed by:  Jesus Birch RD  01/10/25 15:26 EST

## 2025-01-10 NOTE — PLAN OF CARE
Goal Outcome Evaluation:  Plan of Care Reviewed With: patient           Outcome Evaluation: VSS, monitoring BP, confused, on falls with bed alarm, up with asst and walker, blood sugars monitored and replaced as needed per mar, will continue to monitor.

## 2025-01-11 LAB
GLUCOSE BLDC GLUCOMTR-MCNC: 124 MG/DL (ref 70–130)
GLUCOSE BLDC GLUCOMTR-MCNC: 150 MG/DL (ref 70–130)
GLUCOSE BLDC GLUCOMTR-MCNC: 159 MG/DL (ref 70–130)
GLUCOSE BLDC GLUCOMTR-MCNC: 213 MG/DL (ref 70–130)

## 2025-01-11 PROCEDURE — 63710000001 INSULIN LISPRO (HUMAN) PER 5 UNITS: Performed by: NURSE PRACTITIONER

## 2025-01-11 PROCEDURE — 82948 REAGENT STRIP/BLOOD GLUCOSE: CPT

## 2025-01-11 RX ADMIN — PANTOPRAZOLE SODIUM 40 MG: 40 TABLET, DELAYED RELEASE ORAL at 10:03

## 2025-01-11 RX ADMIN — CETIRIZINE HYDROCHLORIDE 10 MG: 10 TABLET, FILM COATED ORAL at 10:02

## 2025-01-11 RX ADMIN — Medication 10 ML: at 10:03

## 2025-01-11 RX ADMIN — MEMANTINE HYDROCHLORIDE 5 MG: 5 TABLET, FILM COATED ORAL at 10:03

## 2025-01-11 RX ADMIN — ESCITALOPRAM OXALATE 10 MG: 10 TABLET, FILM COATED ORAL at 10:02

## 2025-01-11 RX ADMIN — LEVETIRACETAM 1000 MG: 500 TABLET, FILM COATED ORAL at 10:02

## 2025-01-11 RX ADMIN — AMLODIPINE BESYLATE 10 MG: 10 TABLET ORAL at 10:02

## 2025-01-11 RX ADMIN — MEMANTINE HYDROCHLORIDE 5 MG: 5 TABLET, FILM COATED ORAL at 18:03

## 2025-01-11 RX ADMIN — FOLIC ACID 1 MG: 1 TABLET ORAL at 10:15

## 2025-01-11 RX ADMIN — ASPIRIN 81 MG: 81 TABLET, COATED ORAL at 10:02

## 2025-01-11 RX ADMIN — LEVETIRACETAM 1000 MG: 500 TABLET, FILM COATED ORAL at 18:03

## 2025-01-11 RX ADMIN — OLANZAPINE 5 MG: 2.5 TABLET, FILM COATED ORAL at 20:22

## 2025-01-11 RX ADMIN — EMPAGLIFLOZIN 10 MG: 10 TABLET, FILM COATED ORAL at 10:02

## 2025-01-11 RX ADMIN — INSULIN LISPRO 2 UNITS: 100 INJECTION, SOLUTION INTRAVENOUS; SUBCUTANEOUS at 18:04

## 2025-01-11 RX ADMIN — INSULIN LISPRO 3 UNITS: 100 INJECTION, SOLUTION INTRAVENOUS; SUBCUTANEOUS at 12:19

## 2025-01-11 RX ADMIN — ATORVASTATIN CALCIUM 80 MG: 20 TABLET, FILM COATED ORAL at 10:01

## 2025-01-11 RX ADMIN — DONEPEZIL HYDROCHLORIDE 10 MG: 10 TABLET, FILM COATED ORAL at 10:02

## 2025-01-11 NOTE — PLAN OF CARE
Goal Outcome Evaluation:  Plan of Care Reviewed With: patient        Progress: improving  Outcome Evaluation: VSS, monitoring blood sugar, no c/o pain, voiding freely, takes meds whole, falls, seizure, and limb precautions maintained, saline locked

## 2025-01-11 NOTE — PROGRESS NOTES
Name: Lucia Ellis ADMIT: 2025   : 1950  PCP: Everardo Mazariegos MD    MRN: [id number] LOS: 6 days   AGE/SEX: 74 y.o. female  ROOM: [facility]     Subjective   Subjective   Laying in bed.  Not acute events overnight..  Asking to go to the bathroom.    ROS   As above  Objective   Objective   Vital Signs  Temp:  [97.5 °F (36.4 °C)-99 °F (37.2 °C)] 97.7 °F (36.5 °C)  Heart Rate:  [81-92] 88  Resp:  [16] 16  BP: (115-146)/(55-75) 130/73  SpO2:  [94 %-96 %] 96 %  on   ;   Device (Oxygen Therapy): room air  Body mass index is 18.79 kg/m².    Physical Exam    General: Alert, laying in bed, not in distress  HEENT: Normocephalic, atraumatic  CV: Regular rate and rhythm, no murmurs rubs or gallops  Lungs: Clear to auscultation bilaterally, no crackles or wheezes  Abdomen: Soft, nontender, nondistended  Extremities: No significant peripheral edema , no cyanosis       Results Review:       I reviewed the patient's new clinical results.  Results from last 7 days   Lab Units 25  0512 25  0652 2538   WBC 10*3/mm3 4.26 3.69 4.08 3.47   HEMOGLOBIN g/dL 11.5* 11.4* 9.9* 10.4*   PLATELETS 10*3/mm3 175 168 163 172     Results from last 7 days   Lab Units 01/10/25  0538 25  0512 25  1713 2552 25  0455   SODIUM mmol/L 141 140  --  139 143   POTASSIUM mmol/L 4.1 4.2 4.2 3.6 4.2   CHLORIDE mmol/L 106 107  --  105 110*   CO2 mmol/L 28.2 25.5  --  26.8 26.0   BUN mg/dL 17 17  --  17 16   CREATININE mg/dL 0.67 0.68  --  0.67 0.72   GLUCOSE mg/dL 132* 122*  --  190* 113*   Estimated Creatinine Clearance: 50.9 mL/min (by C-G formula based on SCr of 0.67 mg/dL).  Results from last 7 days   Lab Units 25  2115   ALBUMIN g/dL 4.5   BILIRUBIN mg/dL 0.6   ALK PHOS U/L 91   AST (SGOT) U/L 18   ALT (SGPT) U/L 10     Results from last 7 days   Lab Units 01/10/25  0538 25  0512 25  0652 25  0455 25  0538 25  0655 25  2115   CALCIUM mg/dL  8.7 8.6 8.5* 8.3* 8.3*   < > 9.5   ALBUMIN g/dL  --   --   --   --   --   --  4.5   MAGNESIUM mg/dL  --  2.0 1.9 2.0 2.1  --  2.1   PHOSPHORUS mg/dL  --  3.0 2.9 3.2 2.4*  --   --     < > = values in this interval not displayed.       Glucose   Date/Time Value Ref Range Status   01/11/2025 1154 213 (H) 70 - 130 mg/dL Final   01/11/2025 0825 124 70 - 130 mg/dL Final   01/10/2025 2015 189 (H) 70 - 130 mg/dL Final   01/10/2025 1750 193 (H) 70 - 130 mg/dL Final   01/10/2025 1223 93 70 - 130 mg/dL Final   01/10/2025 0823 102 70 - 130 mg/dL Final   01/09/2025 2116 111 70 - 130 mg/dL Final       amLODIPine, 10 mg, Oral, Daily  aspirin, 81 mg, Oral, Daily  atorvastatin, 80 mg, Oral, Daily  cetirizine, 10 mg, Oral, Daily  donepezil, 10 mg, Oral, Daily  empagliflozin, 10 mg, Oral, Daily  escitalopram, 10 mg, Oral, Daily  folic acid, 1 mg, Oral, Daily  insulin lispro, 2-7 Units, Subcutaneous, 4x Daily AC & at Bedtime  levETIRAcetam, 1,000 mg, Oral, BID  [Held by provider] lisinopril, 20 mg, Oral, Daily  memantine, 5 mg, Oral, BID  OLANZapine, 5 mg, Oral, Nightly  pantoprazole, 40 mg, Oral, QAM  sodium chloride, 10 mL, Intravenous, Q12H       Diet: Regular/House; Fluid Consistency: Thin (IDDSI 0)       Assessment/Plan     Active Hospital Problems    Diagnosis  POA    **AMS (altered mental status) [R41.82]  Yes    Moderate protein-calorie malnutrition [E44.0]  Yes    History of stroke [Z86.73]  Not Applicable    Vascular dementia, moderate, without behavioral disturbance, psychotic disturbance, mood disturbance, and anxiety [F01.B0]  Yes    Type 2 diabetes mellitus with hyperglycemia [E11.65]  Yes    Hypokalemia [E87.6]  Yes    Essential hypertension [I10]  Yes    Hyperlipidemia [E78.5]  Yes      Resolved Hospital Problems   No resolved problems to display.     Ms. Ellis is a 74 y.o. female with a history of dementia, DM 2, hyperlipidemia and hypertension that presented to the hospital with altered mental status and failure to  thrive.  She was apparently living with her adult son but was noted to be without any electricity or running water.  Her sister and nephew provided history on admission and emergency personnel was called to the patient's home for a wellness check.     Vascular dementia  Failure to thrive  -AMS is most likely secondary to worsening dementia  -CT head is negative for an acute intracranial process  -Infectious workup unremarkable  -TSH normal, B12 normal  -Continue outpatient donepezil, memantine  -Continue olanzapine  -S/P IV thiamine x 3 days  -Neurology evaluated, symptoms likely due to progression of dementia  -MRI brain ordered but nursing having trouble getting MRI screening sheet completed.  -Fall overnight 1/7 and CT head checked and negative.  -Delirium precautions       History of CVA  -Continue aspirin, statin  -MRI planned for above but could not be completed      Epilepsy  -Continue Keppra  -EEG unremarkable.     Hypokalemia  Elevated creatinine  Hypophosphatemia  -Replacement per protocol.  -Potassium normal, creatinine improved after fluids.  -      Hypertension  -BP on softer side so Norvasc and lisinopril held. BP steadily increased and up to 180 systolic today.   -Continue Norvasc.   -Lisinopril elevated at lower dose 20 mg daily, patient's BP dropped down to 96/47.  Holding lisinopril.  Continue Norvasc for now now.  If BP trends back up we will resume lisinopril at lower dose.      Type 2 Diabetes Mellitus  -Hemoglobin A1c 7.3  -On SSI  -Discontinue Lantus 0 1/10  -Outpatient on Janumet XR (sitagliptin -metformin 50/1000), and Jardiance 10 mg daily  -Continue Jardiance     Hyperlipidemia  -Statin     GERD  -Continue Protonix     Anemia  Hemoglobin stable  -Folate 4.51 on 1/7/25, on supplement     SCDs for DVT prophylaxis.  Full code.  Discussed with patient and nurse.  Disposition: SNF/LTC once arranged, medically stable to be discharged      Cecile Villegas   [facility]  01/11/25  16:52 EST

## 2025-01-11 NOTE — PLAN OF CARE
Goal Outcome Evaluation:  Plan of Care Reviewed With: patient           Outcome Evaluation: VSS, pt remains confused, continues to need redirection and reminding, up with walker qand asst, at times incont of bladder, on seizure precautions, blood sugar being monitored with insulin as ordered per MAR, on falls with bed alarm, will continue monitor.

## 2025-01-12 LAB
ANION GAP SERPL CALCULATED.3IONS-SCNC: 7.8 MMOL/L (ref 5–15)
BUN SERPL-MCNC: 23 MG/DL (ref 8–23)
BUN/CREAT SERPL: 35.4 (ref 7–25)
CALCIUM SPEC-SCNC: 8.6 MG/DL (ref 8.6–10.5)
CHLORIDE SERPL-SCNC: 107 MMOL/L (ref 98–107)
CO2 SERPL-SCNC: 25.2 MMOL/L (ref 22–29)
CREAT SERPL-MCNC: 0.65 MG/DL (ref 0.57–1)
DEPRECATED RDW RBC AUTO: 40.1 FL (ref 37–54)
EGFRCR SERPLBLD CKD-EPI 2021: 91.4 ML/MIN/1.73
ERYTHROCYTE [DISTWIDTH] IN BLOOD BY AUTOMATED COUNT: 12.3 % (ref 12.3–15.4)
GLUCOSE BLDC GLUCOMTR-MCNC: 155 MG/DL (ref 70–130)
GLUCOSE BLDC GLUCOMTR-MCNC: 157 MG/DL (ref 70–130)
GLUCOSE BLDC GLUCOMTR-MCNC: 169 MG/DL (ref 70–130)
GLUCOSE BLDC GLUCOMTR-MCNC: 217 MG/DL (ref 70–130)
GLUCOSE SERPL-MCNC: 138 MG/DL (ref 65–99)
HCT VFR BLD AUTO: 31.7 % (ref 34–46.6)
HGB BLD-MCNC: 10.6 G/DL (ref 12–15.9)
MCH RBC QN AUTO: 29.9 PG (ref 26.6–33)
MCHC RBC AUTO-ENTMCNC: 33.4 G/DL (ref 31.5–35.7)
MCV RBC AUTO: 89.5 FL (ref 79–97)
PLATELET # BLD AUTO: 201 10*3/MM3 (ref 140–450)
PMV BLD AUTO: 11 FL (ref 6–12)
POTASSIUM SERPL-SCNC: 4 MMOL/L (ref 3.5–5.2)
RBC # BLD AUTO: 3.54 10*6/MM3 (ref 3.77–5.28)
SODIUM SERPL-SCNC: 140 MMOL/L (ref 136–145)
WBC NRBC COR # BLD AUTO: 4.82 10*3/MM3 (ref 3.4–10.8)

## 2025-01-12 PROCEDURE — 82948 REAGENT STRIP/BLOOD GLUCOSE: CPT

## 2025-01-12 PROCEDURE — 63710000001 INSULIN LISPRO (HUMAN) PER 5 UNITS: Performed by: NURSE PRACTITIONER

## 2025-01-12 PROCEDURE — 85027 COMPLETE CBC AUTOMATED: CPT | Performed by: STUDENT IN AN ORGANIZED HEALTH CARE EDUCATION/TRAINING PROGRAM

## 2025-01-12 PROCEDURE — 80048 BASIC METABOLIC PNL TOTAL CA: CPT | Performed by: STUDENT IN AN ORGANIZED HEALTH CARE EDUCATION/TRAINING PROGRAM

## 2025-01-12 PROCEDURE — 25010000002 ENOXAPARIN PER 10 MG: Performed by: HOSPITALIST

## 2025-01-12 RX ORDER — ENOXAPARIN SODIUM 100 MG/ML
40 INJECTION SUBCUTANEOUS NIGHTLY
Status: DISCONTINUED | OUTPATIENT
Start: 2025-01-12 | End: 2025-01-23 | Stop reason: HOSPADM

## 2025-01-12 RX ADMIN — Medication 10 ML: at 22:03

## 2025-01-12 RX ADMIN — EMPAGLIFLOZIN 10 MG: 10 TABLET, FILM COATED ORAL at 08:55

## 2025-01-12 RX ADMIN — DONEPEZIL HYDROCHLORIDE 10 MG: 10 TABLET, FILM COATED ORAL at 08:55

## 2025-01-12 RX ADMIN — LEVETIRACETAM 1000 MG: 500 TABLET, FILM COATED ORAL at 06:43

## 2025-01-12 RX ADMIN — INSULIN LISPRO 2 UNITS: 100 INJECTION, SOLUTION INTRAVENOUS; SUBCUTANEOUS at 17:05

## 2025-01-12 RX ADMIN — MEMANTINE HYDROCHLORIDE 5 MG: 5 TABLET, FILM COATED ORAL at 17:05

## 2025-01-12 RX ADMIN — Medication 10 ML: at 08:56

## 2025-01-12 RX ADMIN — AMLODIPINE BESYLATE 10 MG: 10 TABLET ORAL at 08:56

## 2025-01-12 RX ADMIN — OLANZAPINE 5 MG: 2.5 TABLET, FILM COATED ORAL at 20:45

## 2025-01-12 RX ADMIN — PANTOPRAZOLE SODIUM 40 MG: 40 TABLET, DELAYED RELEASE ORAL at 06:43

## 2025-01-12 RX ADMIN — FOLIC ACID 1 MG: 1 TABLET ORAL at 08:55

## 2025-01-12 RX ADMIN — INSULIN LISPRO 3 UNITS: 100 INJECTION, SOLUTION INTRAVENOUS; SUBCUTANEOUS at 22:02

## 2025-01-12 RX ADMIN — ENOXAPARIN SODIUM 40 MG: 100 INJECTION SUBCUTANEOUS at 20:45

## 2025-01-12 RX ADMIN — INSULIN LISPRO 2 UNITS: 100 INJECTION, SOLUTION INTRAVENOUS; SUBCUTANEOUS at 12:37

## 2025-01-12 RX ADMIN — ASPIRIN 81 MG: 81 TABLET, COATED ORAL at 08:55

## 2025-01-12 RX ADMIN — ESCITALOPRAM OXALATE 10 MG: 10 TABLET, FILM COATED ORAL at 08:56

## 2025-01-12 RX ADMIN — CETIRIZINE HYDROCHLORIDE 10 MG: 10 TABLET, FILM COATED ORAL at 08:56

## 2025-01-12 RX ADMIN — ATORVASTATIN CALCIUM 80 MG: 20 TABLET, FILM COATED ORAL at 08:55

## 2025-01-12 RX ADMIN — LEVETIRACETAM 1000 MG: 500 TABLET, FILM COATED ORAL at 17:05

## 2025-01-12 RX ADMIN — INSULIN LISPRO 2 UNITS: 100 INJECTION, SOLUTION INTRAVENOUS; SUBCUTANEOUS at 08:56

## 2025-01-12 RX ADMIN — MEMANTINE HYDROCHLORIDE 5 MG: 5 TABLET, FILM COATED ORAL at 06:43

## 2025-01-12 NOTE — PLAN OF CARE
Problem: Adult Inpatient Plan of Care  Goal: Absence of Hospital-Acquired Illness or Injury  Intervention: Identify and Manage Fall Risk  Recent Flowsheet Documentation  Taken 1/12/2025 0455 by Soy Rowe RN  Safety Promotion/Fall Prevention:   activity supervised   assistive device/personal items within reach   clutter free environment maintained   safety round/check completed   nonskid shoes/slippers when out of bed  Taken 1/12/2025 0225 by Soy Rowe RN  Safety Promotion/Fall Prevention:   activity supervised   assistive device/personal items within reach   clutter free environment maintained   safety round/check completed   nonskid shoes/slippers when out of bed  Taken 1/12/2025 0045 by Soy Rowe RN  Safety Promotion/Fall Prevention:   activity supervised   assistive device/personal items within reach   clutter free environment maintained   safety round/check completed   nonskid shoes/slippers when out of bed  Taken 1/11/2025 2200 by Soy Rowe RN  Safety Promotion/Fall Prevention:   activity supervised   assistive device/personal items within reach   clutter free environment maintained   safety round/check completed   nonskid shoes/slippers when out of bed  Taken 1/11/2025 2022 by Soy Rowe RN  Safety Promotion/Fall Prevention:   activity supervised   assistive device/personal items within reach   clutter free environment maintained   safety round/check completed   nonskid shoes/slippers when out of bed  Intervention: Prevent Skin Injury  Recent Flowsheet Documentation  Taken 1/12/2025 0455 by Soy Rowe RN  Body Position: position changed independently  Taken 1/12/2025 0225 by Soy Rowe RN  Body Position: position changed independently  Taken 1/12/2025 0045 by Soy Rowe RN  Body Position: position changed independently  Taken 1/11/2025 2200 by Soy Rowe RN  Body Position: position changed independently  Taken 1/11/2025 2022  by Soy Rowe RN  Body Position: position changed independently   Goal Outcome Evaluation:

## 2025-01-12 NOTE — PLAN OF CARE
Goal Outcome Evaluation:  Plan of Care Reviewed With: patient        Progress: no change  Outcome Evaluation: VSS, monitoring blood sugar, up w/ assist and walker, takes meds whole, voiding freely, remains confused, limb, seizure, and falls precautions maintained, saline locked

## 2025-01-12 NOTE — PROGRESS NOTES
Name: Lucia Ellis ADMIT: 2025   : 1950  PCP: Everardo Mazariegos MD    MRN: [id number] LOS: 7 days   AGE/SEX: 74 y.o. female  ROOM: [facility]     Subjective   Subjective   No events.  Does not seem to know why she is in the hospital       Objective   Objective   Vital Signs  Temp:  [97.7 °F (36.5 °C)-98.3 °F (36.8 °C)] 97.7 °F (36.5 °C)  Heart Rate:  [84-95] 85  Resp:  [16] 16  BP: (119-138)/(62-67) 131/67  SpO2:  [95 %-96 %] 95 %  on   ;   Device (Oxygen Therapy): room air  Body mass index is 18.79 kg/m².  Physical Exam  Vitals reviewed.   Constitutional:       General: She is not in acute distress.     Appearance: She is not ill-appearing.   Cardiovascular:      Rate and Rhythm: Normal rate and regular rhythm.   Pulmonary:      Effort: No respiratory distress.      Breath sounds: Normal breath sounds. No wheezing.   Abdominal:      General: There is no distension.      Palpations: Abdomen is soft.      Tenderness: There is no abdominal tenderness.   Musculoskeletal:      Right lower leg: No edema.      Left lower leg: No edema.   Skin:     General: Skin is warm and dry.   Neurological:      Mental Status: She is alert. She is disoriented.   Psychiatric:         Mood and Affect: Mood normal.       Results Review     I reviewed the patient's new clinical results.  Results from last 7 days   Lab Units 25  0442 25  0512 25  0652 25  0455   WBC 10*3/mm3 4.82 4.26 3.69 4.08   HEMOGLOBIN g/dL 10.6* 11.5* 11.4* 9.9*   PLATELETS 10*3/mm3 201 175 168 163     Results from last 7 days   Lab Units 25  0442 01/10/25  0538 25  0512 25  1713 25  0652   SODIUM mmol/L 140 141 140  --  139   POTASSIUM mmol/L 4.0 4.1 4.2 4.2 3.6   CHLORIDE mmol/L 107 106 107  --  105   CO2 mmol/L 25.2 28.2 25.5  --  26.8   BUN mg/dL 23 17 17  --  17   CREATININE mg/dL 0.65 0.67 0.68  --  0.67   GLUCOSE mg/dL 138* 132* 122*  --  190*   EGFR mL/min/1.73 91.4 90.7 90.4  --  90.7       Results  from last 7 days   Lab Units 01/12/25  0442 01/10/25  0538 01/09/25  0512 01/08/25  0652 01/07/25  0455 01/06/25  0538   CALCIUM mg/dL 8.6 8.7 8.6 8.5* 8.3* 8.3*   MAGNESIUM mg/dL  --   --  2.0 1.9 2.0 2.1   PHOSPHORUS mg/dL  --   --  3.0 2.9 3.2 2.4*       Glucose   Date/Time Value Ref Range Status   01/12/2025 1215 169 (H) 70 - 130 mg/dL Final   01/12/2025 0734 157 (H) 70 - 130 mg/dL Final   01/11/2025 2133 159 (H) 70 - 130 mg/dL Final   01/11/2025 1754 150 (H) 70 - 130 mg/dL Final   01/11/2025 1154 213 (H) 70 - 130 mg/dL Final   01/11/2025 0825 124 70 - 130 mg/dL Final   01/10/2025 2015 189 (H) 70 - 130 mg/dL Final       No radiology results for the last day    I have personally reviewed all medications:  Scheduled Medications  amLODIPine, 10 mg, Oral, Daily  aspirin, 81 mg, Oral, Daily  atorvastatin, 80 mg, Oral, Daily  cetirizine, 10 mg, Oral, Daily  donepezil, 10 mg, Oral, Daily  empagliflozin, 10 mg, Oral, Daily  escitalopram, 10 mg, Oral, Daily  folic acid, 1 mg, Oral, Daily  insulin lispro, 2-7 Units, Subcutaneous, 4x Daily AC & at Bedtime  levETIRAcetam, 1,000 mg, Oral, BID  [Held by provider] lisinopril, 20 mg, Oral, Daily  memantine, 5 mg, Oral, BID  OLANZapine, 5 mg, Oral, Nightly  pantoprazole, 40 mg, Oral, QAM  sodium chloride, 10 mL, Intravenous, Q12H    Infusions   Diet  Diet: Regular/House; Fluid Consistency: Thin (IDDSI 0)    I have personally reviewed:  [x]  Laboratory   [x]  Microbiology   [x]  Radiology   [x]  EKG/Telemetry  [x]  Cardiology/Vascular   []  Pathology    []  Records       Assessment/Plan     Active Hospital Problems    Diagnosis  POA    **AMS (altered mental status) [R41.82]  Yes    Moderate protein-calorie malnutrition [E44.0]  Yes    History of stroke [Z86.73]  Not Applicable    Vascular dementia, moderate, without behavioral disturbance, psychotic disturbance, mood disturbance, and anxiety [F01.B0]  Yes    Type 2 diabetes mellitus with hyperglycemia [E11.65]  Yes    Hypokalemia  [E87.6]  Yes    Essential hypertension [I10]  Yes    Hyperlipidemia [E78.5]  Yes      Resolved Hospital Problems   No resolved problems to display.     Ms. Ellis is a 74 y.o. female with a history of dementia, DM 2, hyperlipidemia and hypertension that presented to the hospital with altered mental status and failure to thrive.  She was apparently living with her adult son but was noted to be without any electricity or running water.  Her sister and nephew provided history on admission and emergency personnel was called to the patient's home for a wellness check.       Altered mental status felt to be progression of vascular dementia.  No obvious acute medical issue otherwise.  - Continue donepezil, amantadine.  Zyprexa nightly  -MRI ordered but never able to be performed because screening sheet could not be completed  - Neurology signed off  - Continue Keppra for history of seizures    HTN: Stable on current regimen.  Will not resume lisinopril at this point    DM2: Correctional insulin available as needed.  On Jardiance    Labs are stable.  No indication for daily lab work, will monitor periodically      DVT prophylaxis: Add Lovenox  Disposition: Okay to discharge when appropriate arrangements made      John Nieves MD  [facility]  01/12/25  14:01 EST

## 2025-01-13 LAB
GLUCOSE BLDC GLUCOMTR-MCNC: 115 MG/DL (ref 70–130)
GLUCOSE BLDC GLUCOMTR-MCNC: 146 MG/DL (ref 70–130)
GLUCOSE BLDC GLUCOMTR-MCNC: 162 MG/DL (ref 70–130)
GLUCOSE BLDC GLUCOMTR-MCNC: 163 MG/DL (ref 70–130)

## 2025-01-13 PROCEDURE — 63710000001 INSULIN LISPRO (HUMAN) PER 5 UNITS: Performed by: NURSE PRACTITIONER

## 2025-01-13 PROCEDURE — 25010000002 ENOXAPARIN PER 10 MG: Performed by: HOSPITALIST

## 2025-01-13 PROCEDURE — 97110 THERAPEUTIC EXERCISES: CPT

## 2025-01-13 PROCEDURE — 82948 REAGENT STRIP/BLOOD GLUCOSE: CPT

## 2025-01-13 RX ADMIN — AMLODIPINE BESYLATE 10 MG: 10 TABLET ORAL at 09:35

## 2025-01-13 RX ADMIN — EMPAGLIFLOZIN 10 MG: 10 TABLET, FILM COATED ORAL at 09:35

## 2025-01-13 RX ADMIN — PANTOPRAZOLE SODIUM 40 MG: 40 TABLET, DELAYED RELEASE ORAL at 07:37

## 2025-01-13 RX ADMIN — ATORVASTATIN CALCIUM 80 MG: 20 TABLET, FILM COATED ORAL at 09:35

## 2025-01-13 RX ADMIN — Medication 10 ML: at 09:36

## 2025-01-13 RX ADMIN — FOLIC ACID 1 MG: 1 TABLET ORAL at 09:35

## 2025-01-13 RX ADMIN — DONEPEZIL HYDROCHLORIDE 10 MG: 10 TABLET, FILM COATED ORAL at 09:35

## 2025-01-13 RX ADMIN — MEMANTINE HYDROCHLORIDE 5 MG: 5 TABLET, FILM COATED ORAL at 07:37

## 2025-01-13 RX ADMIN — CETIRIZINE HYDROCHLORIDE 10 MG: 10 TABLET, FILM COATED ORAL at 09:35

## 2025-01-13 RX ADMIN — INSULIN LISPRO 2 UNITS: 100 INJECTION, SOLUTION INTRAVENOUS; SUBCUTANEOUS at 22:08

## 2025-01-13 RX ADMIN — ESCITALOPRAM OXALATE 10 MG: 10 TABLET, FILM COATED ORAL at 09:35

## 2025-01-13 RX ADMIN — LEVETIRACETAM 1000 MG: 500 TABLET, FILM COATED ORAL at 07:37

## 2025-01-13 RX ADMIN — ASPIRIN 81 MG: 81 TABLET, COATED ORAL at 09:35

## 2025-01-13 RX ADMIN — LEVETIRACETAM 1000 MG: 500 TABLET, FILM COATED ORAL at 18:10

## 2025-01-13 RX ADMIN — OLANZAPINE 5 MG: 2.5 TABLET, FILM COATED ORAL at 22:08

## 2025-01-13 RX ADMIN — MEMANTINE HYDROCHLORIDE 5 MG: 5 TABLET, FILM COATED ORAL at 18:10

## 2025-01-13 RX ADMIN — Medication 10 ML: at 22:12

## 2025-01-13 RX ADMIN — ENOXAPARIN SODIUM 40 MG: 100 INJECTION SUBCUTANEOUS at 22:09

## 2025-01-13 NOTE — PLAN OF CARE
Goal Outcome Evaluation:  Plan of Care Reviewed With: patient      Outcome Evaluation: pt A&O2 or 3, up in chair most of shift, PT worked with pt today, cont to need redirection, incont of bladder, repetitive questions, new order to d/c  lisinopril, cont seizure precautions, cont to monitor blood sugar.

## 2025-01-13 NOTE — THERAPY TREATMENT NOTE
Patient Name: Lucia Ellis  : 1950    MRN: [id number]                              Today's Date: 2025       Admit Date: 2025    Visit Dx:     ICD-10-CM ICD-9-CM   1. Altered mental status, unspecified altered mental status type  R41.82 780.97     Patient Active Problem List   Diagnosis    Gastroesophageal reflux disease    Malignant neoplasm of breast    Depression    Folliculitis    Generalized anxiety disorder    Hyperlipidemia    Essential hypertension    Status post total right knee replacement    Rectal hemorrhage    Vitamin D deficiency    Drug therapy    Acute cholecystitis    Uncontrolled type 2 diabetes mellitus with hypoglycemia without coma    Myoclonus    Type 2 diabetes mellitus with hyperglycemia    Hypokalemia    New onset seizure    Focal motor seizure    Vascular dementia, moderate, without behavioral disturbance, psychotic disturbance, mood disturbance, and anxiety    Stroke-like symptoms    CVA (cerebral vascular accident)    Lung nodules    Hypokalemia    History of stroke    Generalized weakness    Severe malnutrition    Gastritis without bleeding    Abnormal CT scan, stomach    AMS (altered mental status)    Moderate protein-calorie malnutrition     Past Medical History:   Diagnosis Date    Breast cancer     Dementia     Diabetes mellitus     Hypertension     Memory loss      Past Surgical History:   Procedure Laterality Date    CHOLECYSTECTOMY      ENDOSCOPY N/A 2024    Procedure: ESOPHAGOGASTRODUODENOSCOPY;  Surgeon: Clive More MD;  Location: [facility] ENDOSCOPY;  Service: Gastroenterology;  Laterality: N/A;  PREOP/ ABNORMAL CT OF STOMACH- POSTOP/ GASTRITIS    HYSTERECTOMY      MASTECTOMY Right 1995    TOTAL KNEE ARTHROPLASTY Right       General Information       Row Name 25 1126          Physical Therapy Time and Intention    Document Type therapy note (daily note)  -PC     Mode of Treatment physical therapy  -PC       Row Name 25 1126           General Information    Existing Precautions/Restrictions fall  -PC               User Key  (r) = Recorded By, (t) = Taken By, (c) = Cosigned By      Initials Name Provider Type    PC Lolis Oro, PT Physical Therapist                   Mobility       Row Name 01/13/25 1126          Bed Mobility    Comment, (Bed Mobility) in chair  -PC       Row Name 01/13/25 1126          Sit-Stand Transfer    Sit-Stand Stanly (Transfers) contact guard  -PC     Assistive Device (Sit-Stand Transfers) walker, front-wheeled  -PC       Row Name 01/13/25 1126          Gait/Stairs (Locomotion)    Stanly Level (Gait) contact guard  -PC     Assistive Device (Gait) walker, front-wheeled  -PC     Distance in Feet (Gait) 35  -PC     Deviations/Abnormal Patterns (Gait) bina decreased;gait speed decreased;stride length decreased  -PC     Bilateral Gait Deviations forward flexed posture  -PC     Comment, (Gait/Stairs) occasional assist to steer walker  -PC               User Key  (r) = Recorded By, (t) = Taken By, (c) = Cosigned By      Initials Name Provider Type    PC Lolis Oro PT Physical Therapist                   Obj/Interventions       Row Name 01/13/25 1127          Balance    Static Standing Balance contact guard  -PC     Dynamic Standing Balance minimal assist  -PC               User Key  (r) = Recorded By, (t) = Taken By, (c) = Cosigned By      Initials Name Provider Type    PC Lolis Oro, PT Physical Therapist                   Goals/Plan    No documentation.                  Clinical Impression       Row Name 01/13/25 1127          Pain    Pretreatment Pain Rating 0/10 - no pain  -PC     Posttreatment Pain Rating 0/10 - no pain  -PC       Row Name 01/13/25 1127          Plan of Care Review    Plan of Care Reviewed With patient  -PC     Outcome Evaluation Pt cont to have cognitive deficits and unsteady gait, presents as a fall risk and may benefit from SNF at discharge  -PC       Row Name 01/13/25 1127           Positioning and Restraints    Pre-Treatment Position sitting in chair/recliner  -PC     Post Treatment Position chair  -PC     In Chair reclined;call light within reach;encouraged to call for assist;exit alarm on  -PC               User Key  (r) = Recorded By, (t) = Taken By, (c) = Cosigned By      Initials Name Provider Type    PC Lolis Oro, PT Physical Therapist                   Outcome Measures       Row Name 01/13/25 1128          How much help from another person do you currently need...    Turning from your back to your side while in flat bed without using bedrails? 4  -PC     Moving from lying on back to sitting on the side of a flat bed without bedrails? 4  -PC     Moving to and from a bed to a chair (including a wheelchair)? 3  -PC     Standing up from a chair using your arms (e.g., wheelchair, bedside chair)? 3  -PC     Climbing 3-5 steps with a railing? 3  -PC     To walk in hospital room? 3  -PC     AM-PAC 6 Clicks Score (PT) 20  -PC     Highest Level of Mobility Goal 6 --> Walk 10 steps or more  -PC               User Key  (r) = Recorded By, (t) = Taken By, (c) = Cosigned By      Initials Name Provider Type    PC Lolis Oro PT Physical Therapist                                 Physical Therapy Education       Title: PT OT SLP Therapies (In Progress)       Topic: Physical Therapy (In Progress)       Point: Mobility training (In Progress)       Learning Progress Summary            Patient Acceptance, E,D, NR by PC at 1/13/2025 1129    Acceptance, E,D, NR by SRIDHAR at 1/10/2025 1536    Acceptance, E, VU by DIRK at 1/10/2025 0505    Acceptance, E,D, DU by PC at 1/8/2025 1348    Acceptance, E,D, NR by PC at 1/7/2025 1532    Acceptance, E, NR,NL by RS at 1/5/2025 1151                      Point: Home exercise program (In Progress)       Learning Progress Summary            Patient Acceptance, E,D, NR by PC at 1/13/2025 1129    Acceptance, E,D, NR by SRIDHAR at 1/10/2025 1536    Acceptance, E, NR,NL  by RS at 1/5/2025 1151                      Point: Body mechanics (In Progress)       Learning Progress Summary            Patient Acceptance, E,D, NR by PC at 1/13/2025 1129    Acceptance, E,D, NR by  at 1/10/2025 1536    Acceptance, E, VU by JS at 1/10/2025 0505    Acceptance, E,D, DU by PC at 1/8/2025 1348    Acceptance, E,D, NR by PC at 1/7/2025 1532    Acceptance, E, NR,NL by RS at 1/5/2025 1151                      Point: Precautions (In Progress)       Learning Progress Summary            Patient Acceptance, E,D, NR by PC at 1/13/2025 1129    Acceptance, E,D, NR by  at 1/10/2025 1536    Acceptance, E, VU by JS at 1/10/2025 0505    Acceptance, E,D, DU by PC at 1/8/2025 1348    Acceptance, E,D, NR by PC at 1/7/2025 1532    Acceptance, E, NR,NL by RS at 1/5/2025 1151                                      User Key       Initials Effective Dates Name Provider Type Discipline     06/16/21 -  Lolis Oro, PT Physical Therapist PT     03/07/18 -  Jesica Ellis PTA Physical Therapist Assistant PT     10/01/20 -  Payton Rodriguez, RN Registered Nurse Nurse     06/16/21 -  Lizzie Vo PT Physical Therapist PT                  PT Recommendation and Plan     Progress: improving  Outcome Evaluation: Pt cont to have cognitive deficits and unsteady gait, presents as a fall risk and may benefit from SNF at discharge     Time Calculation:         PT Charges       Row Name 01/13/25 1129             Time Calculation    Start Time 1100  -PC      Stop Time 1112  -PC      Time Calculation (min) 12 min  -PC      PT Received On 01/13/25  -PC      PT - Next Appointment 01/15/25  -PC                User Key  (r) = Recorded By, (t) = Taken By, (c) = Cosigned By      Initials Name Provider Type    PC Lolis Oro, PT Physical Therapist                  Therapy Charges for Today       Code Description Service Date Service Provider Modifiers Qty    [id number] HC PT THER PROC EA 15 MIN 1/13/2025 Lolis Oro, PT  GP 1            PT G-Codes  Outcome Measure Options: AM-PAC 6 Clicks Basic Mobility (PT)  AM-PAC 6 Clicks Score (PT): 20       Lolis Oro, PT  1/13/2025

## 2025-01-13 NOTE — PLAN OF CARE
Goal Outcome Evaluation:  Plan of Care Reviewed With: patient           Outcome Evaluation: VSS, confuse and pleasant, bood sugar monitoring with insulin as needed per MAR, given box lunch, up with asst and walker to bathroom, voiding, 1 smalll bm,  remains on bedalarm / falls for her safety, saline ange, takes pills whole with water, will continue to monitor.

## 2025-01-13 NOTE — PROGRESS NOTES
Continued Stay Note  [facility]     Patient Name: Lucia Ellis  MRN: [id number]  Today's Date: 1/13/2025    Admit Date: 1/4/2025    Plan: SNF (referrals in EPIC/Pending)   Discharge Plan       Row Name 01/13/25 0847       Plan    Plan SNF (referrals in EPIC/Pending)    Plan Comments Msg sent to Wilfredo with [facility] regarding referral and update                   Discharge Codes    No documentation.                 Expected Discharge Date and Time       Expected Discharge Date Expected Discharge Time    Jan 14, 2025               Ariella Valdes RN

## 2025-01-13 NOTE — PROGRESS NOTES
Name: Lucia Ellis ADMIT: 2025   : 1950  PCP: Everardo Mazariegos MD    MRN: [id number] LOS: 8 days   AGE/SEX: 74 y.o. female  ROOM: [facility]     Subjective   Subjective   Sitting up in chair.  No family at bedside.  She denies any chest pain or trouble breathing.  Denies any nausea or vomiting.  Requesting a fruit cup.  She still is unsure why she is in the hospital.     Objective   Objective   Vital Signs  Temp:  [97 °F (36.1 °C)-98.4 °F (36.9 °C)] 97 °F (36.1 °C)  Heart Rate:  [79-97] 79  Resp:  [16] 16  BP: (123-160)/(66-73) 160/69  SpO2:  [90 %-99 %] 99 %  on   ;   Device (Oxygen Therapy): room air  Body mass index is 18.79 kg/m².    Physical Exam  Vitals and nursing note reviewed.   Constitutional:       Appearance: She is not toxic-appearing.   Cardiovascular:      Rate and Rhythm: Normal rate and regular rhythm.      Pulses: Normal pulses.   Pulmonary:      Effort: Pulmonary effort is normal. No respiratory distress.      Breath sounds: Normal breath sounds.   Abdominal:      General: Bowel sounds are normal. There is no distension.      Palpations: Abdomen is soft.      Tenderness: There is no abdominal tenderness.   Musculoskeletal:         General: No swelling. Normal range of motion.   Skin:     General: Skin is warm and dry.      Findings: No bruising.   Neurological:      Mental Status: She is alert and oriented to person, place, and time. Mental status is at baseline.      Sensory: No sensory deficit.      Motor: Weakness present.      Coordination: Coordination normal.   Psychiatric:         Mood and Affect: Mood normal.         Behavior: Behavior normal.       Results Review:       I reviewed the patient's new clinical results.  Results from last 7 days   Lab Units 25  0442 25  0512 25  0652 25  0455   WBC 10*3/mm3 4.82 4.26 3.69 4.08   HEMOGLOBIN g/dL 10.6* 11.5* 11.4* 9.9*   PLATELETS 10*3/mm3 201 175 168 163     Results from last 7 days   Lab Units  01/12/25  0442 01/10/25  0538 01/09/25  0512 01/08/25  1713 01/08/25  0652   SODIUM mmol/L 140 141 140  --  139   POTASSIUM mmol/L 4.0 4.1 4.2 4.2 3.6   CHLORIDE mmol/L 107 106 107  --  105   CO2 mmol/L 25.2 28.2 25.5  --  26.8   BUN mg/dL 23 17 17  --  17   CREATININE mg/dL 0.65 0.67 0.68  --  0.67   GLUCOSE mg/dL 138* 132* 122*  --  190*   Estimated Creatinine Clearance: 52.4 mL/min (by C-G formula based on SCr of 0.65 mg/dL).    Results from last 7 days   Lab Units 01/12/25  0442 01/10/25  0538 01/09/25  0512 01/08/25  0652 01/07/25  0455   CALCIUM mg/dL 8.6 8.7 8.6 8.5* 8.3*   MAGNESIUM mg/dL  --   --  2.0 1.9 2.0   PHOSPHORUS mg/dL  --   --  3.0 2.9 3.2       Glucose   Date/Time Value Ref Range Status   01/13/2025 0808 115 70 - 130 mg/dL Final   01/12/2025 2142 217 (H) 70 - 130 mg/dL Final   01/12/2025 1635 155 (H) 70 - 130 mg/dL Final   01/12/2025 1215 169 (H) 70 - 130 mg/dL Final   01/12/2025 0734 157 (H) 70 - 130 mg/dL Final   01/11/2025 2133 159 (H) 70 - 130 mg/dL Final   01/11/2025 1754 150 (H) 70 - 130 mg/dL Final       amLODIPine, 10 mg, Oral, Daily  aspirin, 81 mg, Oral, Daily  atorvastatin, 80 mg, Oral, Daily  cetirizine, 10 mg, Oral, Daily  donepezil, 10 mg, Oral, Daily  empagliflozin, 10 mg, Oral, Daily  enoxaparin, 40 mg, Subcutaneous, Nightly  escitalopram, 10 mg, Oral, Daily  folic acid, 1 mg, Oral, Daily  insulin lispro, 2-7 Units, Subcutaneous, 4x Daily AC & at Bedtime  levETIRAcetam, 1,000 mg, Oral, BID  [Held by provider] lisinopril, 20 mg, Oral, Daily  memantine, 5 mg, Oral, BID  OLANZapine, 5 mg, Oral, Nightly  pantoprazole, 40 mg, Oral, QAM  sodium chloride, 10 mL, Intravenous, Q12H       Diet: Regular/House, Diabetic; Consistent Carbohydrate; Fluid Consistency: Thin (IDDSI 0)       Assessment/Plan     Active Hospital Problems    Diagnosis  POA    **AMS (altered mental status) [R41.82]  Yes    Moderate protein-calorie malnutrition [E44.0]  Yes    History of stroke [Z86.73]  Not Applicable     Vascular dementia, moderate, without behavioral disturbance, psychotic disturbance, mood disturbance, and anxiety [F01.B0]  Yes    Type 2 diabetes mellitus with hyperglycemia [E11.65]  Yes    Hypokalemia [E87.6]  Yes    Essential hypertension [I10]  Yes    Hyperlipidemia [E78.5]  Yes      Resolved Hospital Problems   No resolved problems to display.     Ms. Ellis is a 74 y.o. female with a history of dementia, DM 2, hyperlipidemia and hypertension that presented to the hospital with altered mental status and failure to thrive.  She was apparently living with her adult son but was noted to be without any electricity or running water.  Her sister and nephew provided history on admission and emergency personnel was called to the patient's home for a wellness check.     Vascular dementia  Failure to thrive  -AMS is most likely secondary to worsening dementia  -CT head is negative for an acute intracranial process.  -MRI ordered but not performed d/t lack of screening sheet completed.  -No metabolic source identified for etiology  -S/P IV thiamine x 3 days  -Neurology evaluated, symptoms likely due to progression of dementia and signed off.      History of CVA  -Continue aspirin, statin  -MRI planned for above but could not be completed       Epilepsy  -Continue Keppra for history of seizures  -EEG unremarkable.     Hypokalemia  Elevated creatinine  Hypophosphatemia  -Resolved.      Hypertension  -BP stable on Norvasc alone.      Type 2 Diabetes Mellitus  -Hemoglobin A1c 7.3  -On SSI  -Continue Jardiance     Hyperlipidemia  -Statin     GERD  -Continue Protonix     Anemia  -Hemoglobin stable  -Folate 4.51 on 1/7/25, on supplement     SCDs for DVT prophylaxis.  Full code.  Discussed with patient and nurse.  Disposition: SNF/LTC once arranged, medically stable to be discharged    HERNANDO Vera  [facility]  01/13/25  09:28 EST

## 2025-01-13 NOTE — PLAN OF CARE
Goal Outcome Evaluation:  Plan of Care Reviewed With: patient        Progress: improving  Outcome Evaluation: Pt cont to have cognitive deficits and unsteady gait, presents as a fall risk and may benefit from SNF at discharge

## 2025-01-14 LAB
GLUCOSE BLDC GLUCOMTR-MCNC: 130 MG/DL (ref 70–130)
GLUCOSE BLDC GLUCOMTR-MCNC: 135 MG/DL (ref 70–130)
GLUCOSE BLDC GLUCOMTR-MCNC: 165 MG/DL (ref 70–130)
GLUCOSE BLDC GLUCOMTR-MCNC: 179 MG/DL (ref 70–130)

## 2025-01-14 PROCEDURE — 25010000002 ENOXAPARIN PER 10 MG: Performed by: HOSPITALIST

## 2025-01-14 PROCEDURE — 63710000001 INSULIN LISPRO (HUMAN) PER 5 UNITS: Performed by: NURSE PRACTITIONER

## 2025-01-14 PROCEDURE — 82948 REAGENT STRIP/BLOOD GLUCOSE: CPT

## 2025-01-14 RX ADMIN — ASPIRIN 81 MG: 81 TABLET, COATED ORAL at 09:12

## 2025-01-14 RX ADMIN — MEMANTINE HYDROCHLORIDE 5 MG: 5 TABLET, FILM COATED ORAL at 17:19

## 2025-01-14 RX ADMIN — DONEPEZIL HYDROCHLORIDE 10 MG: 10 TABLET, FILM COATED ORAL at 09:13

## 2025-01-14 RX ADMIN — FOLIC ACID 1 MG: 1 TABLET ORAL at 09:12

## 2025-01-14 RX ADMIN — AMLODIPINE BESYLATE 10 MG: 10 TABLET ORAL at 09:12

## 2025-01-14 RX ADMIN — LEVETIRACETAM 1000 MG: 500 TABLET, FILM COATED ORAL at 17:19

## 2025-01-14 RX ADMIN — Medication 10 ML: at 09:13

## 2025-01-14 RX ADMIN — INSULIN LISPRO 2 UNITS: 100 INJECTION, SOLUTION INTRAVENOUS; SUBCUTANEOUS at 12:31

## 2025-01-14 RX ADMIN — PANTOPRAZOLE SODIUM 40 MG: 40 TABLET, DELAYED RELEASE ORAL at 07:45

## 2025-01-14 RX ADMIN — EMPAGLIFLOZIN 10 MG: 10 TABLET, FILM COATED ORAL at 09:12

## 2025-01-14 RX ADMIN — ATORVASTATIN CALCIUM 80 MG: 20 TABLET, FILM COATED ORAL at 09:12

## 2025-01-14 RX ADMIN — INSULIN LISPRO 2 UNITS: 100 INJECTION, SOLUTION INTRAVENOUS; SUBCUTANEOUS at 17:21

## 2025-01-14 RX ADMIN — MEMANTINE HYDROCHLORIDE 5 MG: 5 TABLET, FILM COATED ORAL at 07:45

## 2025-01-14 RX ADMIN — ESCITALOPRAM OXALATE 10 MG: 10 TABLET, FILM COATED ORAL at 09:13

## 2025-01-14 RX ADMIN — ENOXAPARIN SODIUM 40 MG: 100 INJECTION SUBCUTANEOUS at 21:15

## 2025-01-14 RX ADMIN — LEVETIRACETAM 1000 MG: 500 TABLET, FILM COATED ORAL at 07:45

## 2025-01-14 RX ADMIN — OLANZAPINE 5 MG: 2.5 TABLET, FILM COATED ORAL at 21:15

## 2025-01-14 RX ADMIN — CETIRIZINE HYDROCHLORIDE 10 MG: 10 TABLET, FILM COATED ORAL at 09:12

## 2025-01-14 RX ADMIN — Medication 10 ML: at 21:16

## 2025-01-14 NOTE — PROGRESS NOTES
Continued Stay Note  [facility]     Patient Name: Lucia Ellis  MRN: [id number]  Today's Date: 1/14/2025    Admit Date: 1/4/2025    Plan: Snf (referrals in EPIC/Pending)   Discharge Plan       Row Name 01/14/25 1402       Plan    Plan Snf (referrals in EPIC/Pending)    Plan Comments VM left for sonVern at [phone] to discuss DC plans                   Discharge Codes    No documentation.                 Expected Discharge Date and Time       Expected Discharge Date Expected Discharge Time    Jan 16, 2025               Ariella Valdes RN

## 2025-01-14 NOTE — PLAN OF CARE
Goal Outcome Evaluation:  Plan of Care Reviewed With: patient        Progress: no change  Outcome Evaluation: alert, cooperative, confused to place, time, situation.  up to BR with walker and assist 1.  up to chair for dinner.  no c/o pain.  monitoring blood glucose and SSI provided.  appetite good.  sister and nephew visited this afternoon and spoke with discharge planner.  bed/chair alarm in use.  frequent toileting encouraged.

## 2025-01-14 NOTE — PROGRESS NOTES
Continued Stay Note  [facility]     Patient Name: Lucia Ellis  MRN: [id number]  Today's Date: 1/14/2025    Admit Date: 1/4/2025    Plan: SNF to poss LTC   Discharge Plan       Row Name 01/14/25 1508       Plan    Plan SNF to poss LTC    Plan Comments Spoke to sister Jessica at bedside. Discussed with her that [facility] is unable to reach son. Jessica stated she has not talked with him and stated she did not know where is was. Discussed DC plan and Sherice mckeon stated she would like patient to go somewhere long term and would like it to be [facility]. Updated Wilfredo with [facility].      Row Name 01/14/25 1402       Plan    Plan Snf (referrals in EPIC/Pending)    Plan Comments VM left for sonVern at [phone] to discuss DC plans                   Discharge Codes    No documentation.                 Expected Discharge Date and Time       Expected Discharge Date Expected Discharge Time    Jan 16, 2025               Ariella Valdes RN

## 2025-01-14 NOTE — PLAN OF CARE
Goal Outcome Evaluation:  Plan of Care Reviewed With: patient           Outcome Evaluation: VSS, pt remains confused/forgetful, up with asst and walker, voiding and had a small BM, tolerating diet, blood sugar monitoring with insulin as needed per MAR, meds taken whole with water,on bed alarm for safety, tends to try and get up with out asst at time, saline locked, will continue ro monitor.

## 2025-01-14 NOTE — PROGRESS NOTES
Continued Stay Note  [facility]     Patient Name: Lucia Ellis  MRN: [id number]  Today's Date: 1/14/2025    Admit Date: 1/4/2025    Plan: SNF (referrals in EPIC/Pending)   Discharge Plan       Row Name 01/14/25 1028       Plan    Plan SNF (referrals in EPIC/Pending)    Plan Comments   Per Wilfredo with [facility], still unable to reach family.  Additional referrals placed in EPIC.                   Discharge Codes    No documentation.                 Expected Discharge Date and Time       Expected Discharge Date Expected Discharge Time    Jan 16, 2025               Ariella Valdes RN

## 2025-01-14 NOTE — PROGRESS NOTES
Continued Stay Note  [facility]     Patient Name: Lucia Ellis  MRN: [id number]  Today's Date: 1/14/2025    Admit Date: 1/4/2025    Plan: SNF to poss LTC   Discharge Plan       Row Name 01/14/25 1545       Plan    Plan Comments Wilfredo with [facility] is going to reach out to Jj (nephew) at [phone] to discuss LTC and finances.      Row Name 01/14/25 5698       Plan    Plan SNF to poss LTC    Plan Comments Spoke to sister Jessica at bedside. Discussed with her that [facility] is unable to reach son. Jessica stated she has not talked with him and stated she did not know where is was. Discussed DC plan and Sherice mckeon stated she would like patient to go somewhere long term and would like it to be [facility]. Updated Wilfredo with [facility].      Row Name 01/14/25 1409       Plan    Plan Snf (referrals in EPIC/Pending)    Plan Comments VM left for Vern murillo at [phone] to discuss DC plans                   Discharge Codes    No documentation.                 Expected Discharge Date and Time       Expected Discharge Date Expected Discharge Time    Jan 16, 2025               Ariella Valdes, RN

## 2025-01-15 LAB
GLUCOSE BLDC GLUCOMTR-MCNC: 115 MG/DL (ref 70–130)
GLUCOSE BLDC GLUCOMTR-MCNC: 158 MG/DL (ref 70–130)
GLUCOSE BLDC GLUCOMTR-MCNC: 181 MG/DL (ref 70–130)
GLUCOSE BLDC GLUCOMTR-MCNC: 195 MG/DL (ref 70–130)

## 2025-01-15 PROCEDURE — 25010000002 ENOXAPARIN PER 10 MG: Performed by: HOSPITALIST

## 2025-01-15 PROCEDURE — 82948 REAGENT STRIP/BLOOD GLUCOSE: CPT

## 2025-01-15 PROCEDURE — 63710000001 INSULIN LISPRO (HUMAN) PER 5 UNITS: Performed by: NURSE PRACTITIONER

## 2025-01-15 RX ADMIN — DONEPEZIL HYDROCHLORIDE 10 MG: 10 TABLET, FILM COATED ORAL at 08:31

## 2025-01-15 RX ADMIN — ACETAMINOPHEN 650 MG: 325 TABLET, FILM COATED ORAL at 19:24

## 2025-01-15 RX ADMIN — LEVETIRACETAM 1000 MG: 500 TABLET, FILM COATED ORAL at 17:34

## 2025-01-15 RX ADMIN — ASPIRIN 81 MG: 81 TABLET, COATED ORAL at 08:30

## 2025-01-15 RX ADMIN — CETIRIZINE HYDROCHLORIDE 10 MG: 10 TABLET, FILM COATED ORAL at 08:30

## 2025-01-15 RX ADMIN — LEVETIRACETAM 1000 MG: 500 TABLET, FILM COATED ORAL at 05:48

## 2025-01-15 RX ADMIN — ESCITALOPRAM OXALATE 10 MG: 10 TABLET, FILM COATED ORAL at 08:31

## 2025-01-15 RX ADMIN — Medication 10 ML: at 20:11

## 2025-01-15 RX ADMIN — Medication 10 ML: at 08:31

## 2025-01-15 RX ADMIN — EMPAGLIFLOZIN 10 MG: 10 TABLET, FILM COATED ORAL at 08:31

## 2025-01-15 RX ADMIN — FOLIC ACID 1 MG: 1 TABLET ORAL at 08:31

## 2025-01-15 RX ADMIN — OLANZAPINE 5 MG: 2.5 TABLET, FILM COATED ORAL at 20:11

## 2025-01-15 RX ADMIN — INSULIN LISPRO 2 UNITS: 100 INJECTION, SOLUTION INTRAVENOUS; SUBCUTANEOUS at 12:23

## 2025-01-15 RX ADMIN — ATORVASTATIN CALCIUM 80 MG: 20 TABLET, FILM COATED ORAL at 08:31

## 2025-01-15 RX ADMIN — AMLODIPINE BESYLATE 10 MG: 10 TABLET ORAL at 08:30

## 2025-01-15 RX ADMIN — PANTOPRAZOLE SODIUM 40 MG: 40 TABLET, DELAYED RELEASE ORAL at 05:48

## 2025-01-15 RX ADMIN — INSULIN LISPRO 2 UNITS: 100 INJECTION, SOLUTION INTRAVENOUS; SUBCUTANEOUS at 23:02

## 2025-01-15 RX ADMIN — MEMANTINE HYDROCHLORIDE 5 MG: 5 TABLET, FILM COATED ORAL at 05:48

## 2025-01-15 RX ADMIN — INSULIN LISPRO 2 UNITS: 100 INJECTION, SOLUTION INTRAVENOUS; SUBCUTANEOUS at 17:34

## 2025-01-15 RX ADMIN — MEMANTINE HYDROCHLORIDE 5 MG: 5 TABLET, FILM COATED ORAL at 17:34

## 2025-01-15 RX ADMIN — ENOXAPARIN SODIUM 40 MG: 100 INJECTION SUBCUTANEOUS at 20:11

## 2025-01-15 NOTE — PROGRESS NOTES
Name: Lucia Ellis ADMIT: 2025   : 1950  PCP: Everardo Mazariegos MD    MRN: [id number] LOS: 9 days   AGE/SEX: 74 y.o. female  ROOM: [facility]     Subjective   Subjective   No events, remains pleasantly confused       Objective   Objective   Vital Signs  Temp:  [97.8 °F (36.6 °C)-98.5 °F (36.9 °C)] 97.9 °F (36.6 °C)  Heart Rate:  [75-87] 76  Resp:  [16] 16  BP: (110-142)/(59-72) 117/62  SpO2:  [95 %-97 %] 97 %  on   ;   Device (Oxygen Therapy): room air  Body mass index is 18.79 kg/m².  Physical Exam  Vitals reviewed.   Constitutional:       General: She is not in acute distress.     Appearance: She is not ill-appearing.   Cardiovascular:      Rate and Rhythm: Normal rate and regular rhythm.   Pulmonary:      Effort: No respiratory distress.      Breath sounds: Normal breath sounds. No wheezing.   Musculoskeletal:      Right lower leg: No edema.      Left lower leg: No edema.   Skin:     General: Skin is warm and dry.   Neurological:      Mental Status: She is alert. She is disoriented.   Psychiatric:         Mood and Affect: Mood normal.       Results Review     I reviewed the patient's new clinical results.  Results from last 7 days   Lab Units 25  0442 25  0512 25  0652   WBC 10*3/mm3 4.82 4.26 3.69   HEMOGLOBIN g/dL 10.6* 11.5* 11.4*   PLATELETS 10*3/mm3 201 175 168     Results from last 7 days   Lab Units 25  0442 01/10/25  0538 25  0512 25  1713 25  0652   SODIUM mmol/L 140 141 140  --  139   POTASSIUM mmol/L 4.0 4.1 4.2 4.2 3.6   CHLORIDE mmol/L 107 106 107  --  105   CO2 mmol/L 25.2 28.2 25.5  --  26.8   BUN mg/dL 23 17 17  --  17   CREATININE mg/dL 0.65 0.67 0.68  --  0.67   GLUCOSE mg/dL 138* 132* 122*  --  190*   EGFR mL/min/1.73 91.4 90.7 90.4  --  90.7       Results from last 7 days   Lab Units 25  0442 01/10/25  0538 25  0512 25  0652   CALCIUM mg/dL 8.6 8.7 8.6 8.5*   MAGNESIUM mg/dL  --   --  2.0 1.9   PHOSPHORUS mg/dL  --   --   3.0 2.9       Glucose   Date/Time Value Ref Range Status   01/14/2025 2103 135 (H) 70 - 130 mg/dL Final   01/14/2025 1719 165 (H) 70 - 130 mg/dL Final   01/14/2025 1218 179 (H) 70 - 130 mg/dL Final   01/14/2025 0753 130 70 - 130 mg/dL Final   01/13/2025 2048 162 (H) 70 - 130 mg/dL Final   01/13/2025 1658 146 (H) 70 - 130 mg/dL Final   01/13/2025 1214 163 (H) 70 - 130 mg/dL Final       No radiology results for the last day    I have personally reviewed all medications:  Scheduled Medications  amLODIPine, 10 mg, Oral, Daily  aspirin, 81 mg, Oral, Daily  atorvastatin, 80 mg, Oral, Daily  cetirizine, 10 mg, Oral, Daily  donepezil, 10 mg, Oral, Daily  empagliflozin, 10 mg, Oral, Daily  enoxaparin, 40 mg, Subcutaneous, Nightly  escitalopram, 10 mg, Oral, Daily  folic acid, 1 mg, Oral, Daily  insulin lispro, 2-7 Units, Subcutaneous, 4x Daily AC & at Bedtime  levETIRAcetam, 1,000 mg, Oral, BID  memantine, 5 mg, Oral, BID  OLANZapine, 5 mg, Oral, Nightly  pantoprazole, 40 mg, Oral, QAM  sodium chloride, 10 mL, Intravenous, Q12H    Infusions   Diet  Diet: Regular/House, Diabetic; Consistent Carbohydrate; Fluid Consistency: Thin (IDDSI 0)    I have personally reviewed:  [x]  Laboratory   []  Microbiology   []  Radiology   []  EKG/Telemetry  []  Cardiology/Vascular   []  Pathology    []  Records       Assessment/Plan     Active Hospital Problems    Diagnosis  POA    **AMS (altered mental status) [R41.82]  Yes    Moderate protein-calorie malnutrition [E44.0]  Yes    History of stroke [Z86.73]  Not Applicable    Vascular dementia, moderate, without behavioral disturbance, psychotic disturbance, mood disturbance, and anxiety [F01.B0]  Yes    Type 2 diabetes mellitus with hyperglycemia [E11.65]  Yes    Hypokalemia [E87.6]  Yes    Essential hypertension [I10]  Yes    Hyperlipidemia [E78.5]  Yes      Resolved Hospital Problems   No resolved problems to display.     Ms. Ellis is a 74 y.o. female with a history of dementia, DM 2,  hyperlipidemia and hypertension that presented to the hospital with altered mental status and failure to thrive.  She was apparently living with her adult son but was noted to be without any electricity or running water.  Her sister and nephew provided history on admission and emergency personnel was called to the patient's home for a wellness check.       Altered mental status felt to be progression of vascular dementia.  No obvious acute medical issue otherwise.  - Continue donepezil, amantadine.  Zyprexa nightly  -MRI ordered but never able to be performed because screening sheet could not be completed  - Neurology signed off  - Continue Keppra for history of seizures    HTN: Stable on current regimen.  Will not resume lisinopril at this point    DM2: Correctional insulin available as needed.  On Jardiance      DVT prophylaxis: Lovenox  Disposition: Okay to discharge when appropriate arrangements made. D/w CCP      John Nieves MD  [facility]  01/14/25  23:38 EST

## 2025-01-15 NOTE — PROGRESS NOTES
Name: Lucia Ellis ADMIT: 2025   : 1950  PCP: Everardo Mazariegos MD    MRN: [id number] LOS: 10 days   AGE/SEX: 74 y.o. female  ROOM: [facility]     Subjective   Subjective   No events, talking with her sister who is visiting       Objective   Objective   Vital Signs  Temp:  [97.2 °F (36.2 °C)-98.8 °F (37.1 °C)] 98.8 °F (37.1 °C)  Heart Rate:  [76-88] 88  Resp:  [16] 16  BP: (114-129)/(62-73) 128/69  SpO2:  [96 %-97 %] 96 %  on   ;   Device (Oxygen Therapy): room air  Body mass index is 18.79 kg/m².  Physical Exam  Vitals reviewed.   Constitutional:       General: She is not in acute distress.     Appearance: She is not ill-appearing.   Cardiovascular:      Rate and Rhythm: Normal rate.   Pulmonary:      Breath sounds: Normal breath sounds.   Musculoskeletal:      Right lower leg: No edema.      Left lower leg: No edema.   Skin:     General: Skin is warm and dry.   Neurological:      Mental Status: She is alert. She is disoriented.   Psychiatric:         Mood and Affect: Mood normal.       Results Review     I reviewed the patient's new clinical results.  Results from last 7 days   Lab Units 25  0512   WBC 10*3/mm3 4.82 4.26   HEMOGLOBIN g/dL 10.6* 11.5*   PLATELETS 10*3/mm3 201 175     Results from last 7 days   Lab Units 01/12/25  0442 01/10/25  0538 25  0512   SODIUM mmol/L 140 141 140   POTASSIUM mmol/L 4.0 4.1 4.2   CHLORIDE mmol/L 107 106 107   CO2 mmol/L 25.2 28.2 25.5   BUN mg/dL 23 17 17   CREATININE mg/dL 0.65 0.67 0.68   GLUCOSE mg/dL 138* 132* 122*   EGFR mL/min/1.73 91.4 90.7 90.4       Results from last 7 days   Lab Units 01/12/25  0442 01/10/25  0538 25  0512   CALCIUM mg/dL 8.6 8.7 8.6   MAGNESIUM mg/dL  --   --  2.0   PHOSPHORUS mg/dL  --   --  3.0       Glucose   Date/Time Value Ref Range Status   01/15/2025 1624 181 (H) 70 - 130 mg/dL Final   01/15/2025 1057 195 (H) 70 - 130 mg/dL Final   01/15/2025 0757 115 70 - 130 mg/dL Final   2025 2103 135 (H)  70 - 130 mg/dL Final   01/14/2025 1719 165 (H) 70 - 130 mg/dL Final   01/14/2025 1218 179 (H) 70 - 130 mg/dL Final   01/14/2025 0753 130 70 - 130 mg/dL Final       No radiology results for the last day    I have personally reviewed all medications:  Scheduled Medications  amLODIPine, 10 mg, Oral, Daily  aspirin, 81 mg, Oral, Daily  atorvastatin, 80 mg, Oral, Daily  cetirizine, 10 mg, Oral, Daily  donepezil, 10 mg, Oral, Daily  empagliflozin, 10 mg, Oral, Daily  enoxaparin, 40 mg, Subcutaneous, Nightly  escitalopram, 10 mg, Oral, Daily  folic acid, 1 mg, Oral, Daily  insulin lispro, 2-7 Units, Subcutaneous, 4x Daily AC & at Bedtime  levETIRAcetam, 1,000 mg, Oral, BID  memantine, 5 mg, Oral, BID  OLANZapine, 5 mg, Oral, Nightly  pantoprazole, 40 mg, Oral, QAM  sodium chloride, 10 mL, Intravenous, Q12H    Infusions   Diet  Diet: Regular/House, Diabetic; Consistent Carbohydrate; Fluid Consistency: Thin (IDDSI 0)    I have personally reviewed:  [x]  Laboratory   []  Microbiology   []  Radiology   []  EKG/Telemetry  []  Cardiology/Vascular   []  Pathology    []  Records       Assessment/Plan     Active Hospital Problems    Diagnosis  POA    **AMS (altered mental status) [R41.82]  Yes    Moderate protein-calorie malnutrition [E44.0]  Yes    History of stroke [Z86.73]  Not Applicable    Vascular dementia, moderate, without behavioral disturbance, psychotic disturbance, mood disturbance, and anxiety [F01.B0]  Yes    Type 2 diabetes mellitus with hyperglycemia [E11.65]  Yes    Hypokalemia [E87.6]  Yes    Essential hypertension [I10]  Yes    Hyperlipidemia [E78.5]  Yes      Resolved Hospital Problems   No resolved problems to display.     Ms. Ellis is a 74 y.o. female with a history of dementia, DM 2, hyperlipidemia and hypertension that presented to the hospital with altered mental status and failure to thrive.  She was apparently living with her adult son but was noted to be without any electricity or running water.  Her  sister and nephew provided history on admission and emergency personnel was called to the patient's home for a wellness check.       Altered mental status felt to be progression of vascular dementia.    - Continue donepezil, amantadine.  Zyprexa nightly  -MRI ordered but never able to be performed because screening sheet could not be completed  - Neurology signed off  - Continue Keppra for history of seizures    HTN: Stable on current regimen.  Will not resume lisinopril at this point    DM2: Correctional insulin available as needed.  On Jardiance      DVT prophylaxis: Lovenox  Disposition: Okay to discharge when appropriate arrangements made. D/w sister at bedside      John Nieves MD  [facility]  01/15/25  17:34 EST

## 2025-01-15 NOTE — CASE MANAGEMENT/SOCIAL WORK
Continued Stay Note  [facility]     Patient Name: Lucia Ellis  MRN: [id number]  Today's Date: 1/15/2025    Admit Date: 1/4/2025    Plan: SNF to possible LTC   Discharge Plan       Row Name 01/15/25 1524       Plan    Plan SNF to possible LTC    Patient/Family in Agreement with Plan yes    Plan Comments CCP spoke with Wilfredo who states he spoke with the patient's nephew Jj who has agreed to help with the patient's finances as much as he can. CCP to follow. CD, CSW.                   Discharge Codes    No documentation.                 Expected Discharge Date and Time       Expected Discharge Date Expected Discharge Time    Jan 17, 2025

## 2025-01-15 NOTE — PROGRESS NOTES
Nutrition Services    Patient Name:  Lucia Ellis  YOB: 1950  MRN: [id number]  Admit Date:  1/4/2025    Assessment Date:  01/15/25    CLINICAL NUTRITION - PROGRESS NOTE    Patient meets ASPEN/AND criteria for nutrition diagnosis of moderate malnutrition of chronic illness based on: BMI, moderate weight loss, moderate loss of muscle and subcutaneous fat.           Reason for Encounter Follow-up .          Nutrition Order Diet: Regular/House, Diabetic; Consistent Carbohydrate; Fluid Consistency: Thin (IDDSI 0)    Supplements/Snacks Magic Cups TID         Labs     Meds     GI     Edema     Weight Status     NFPE     Hgb 10.6, glu 115-195    Include Folic Acid, Protonix    + BM 1/13    None noted    45.1 kg (admission)    See MSA 1/10/25    Malnutrition Severity Assessment      Patient meets criteria for : Moderate (non-severe) Malnutrition             Pertinent Information Patient eating 100% of meals.  Patient states she is eating magic cups TID (provide 870 kcal, 27 grams protein) and likes them        Intervention/Plan Continue current regimen    Recommend weekly weights     RD to follow up per protocol.    Electronically signed by:  Jesus Birch RD  01/15/25 16:38 EST

## 2025-01-15 NOTE — PLAN OF CARE
Goal Outcome Evaluation:  Plan of Care Reviewed With: patient           Outcome Evaluation: VSS, confused to situation, time and place, on falls with bed alarm for safety, up with asst and walker, saline locked, tolerating diet, blood sugars monitored with insulin as needed per MAR, voiding freely, will continue to monitor.

## 2025-01-16 LAB
GLUCOSE BLDC GLUCOMTR-MCNC: 154 MG/DL (ref 70–130)
GLUCOSE BLDC GLUCOMTR-MCNC: 163 MG/DL (ref 70–130)
GLUCOSE BLDC GLUCOMTR-MCNC: 173 MG/DL (ref 70–130)
GLUCOSE BLDC GLUCOMTR-MCNC: 202 MG/DL (ref 70–130)

## 2025-01-16 PROCEDURE — 25010000002 ENOXAPARIN PER 10 MG: Performed by: HOSPITALIST

## 2025-01-16 PROCEDURE — 97110 THERAPEUTIC EXERCISES: CPT

## 2025-01-16 PROCEDURE — 63710000001 INSULIN LISPRO (HUMAN) PER 5 UNITS: Performed by: NURSE PRACTITIONER

## 2025-01-16 PROCEDURE — 82948 REAGENT STRIP/BLOOD GLUCOSE: CPT

## 2025-01-16 RX ADMIN — LEVETIRACETAM 1000 MG: 500 TABLET, FILM COATED ORAL at 18:25

## 2025-01-16 RX ADMIN — PANTOPRAZOLE SODIUM 40 MG: 40 TABLET, DELAYED RELEASE ORAL at 06:34

## 2025-01-16 RX ADMIN — Medication 10 ML: at 08:35

## 2025-01-16 RX ADMIN — EMPAGLIFLOZIN 10 MG: 10 TABLET, FILM COATED ORAL at 08:32

## 2025-01-16 RX ADMIN — LEVETIRACETAM 1000 MG: 500 TABLET, FILM COATED ORAL at 06:33

## 2025-01-16 RX ADMIN — ESCITALOPRAM OXALATE 10 MG: 10 TABLET, FILM COATED ORAL at 08:32

## 2025-01-16 RX ADMIN — MEMANTINE HYDROCHLORIDE 5 MG: 5 TABLET, FILM COATED ORAL at 06:34

## 2025-01-16 RX ADMIN — MEMANTINE HYDROCHLORIDE 5 MG: 5 TABLET, FILM COATED ORAL at 18:25

## 2025-01-16 RX ADMIN — ATORVASTATIN CALCIUM 80 MG: 20 TABLET, FILM COATED ORAL at 08:33

## 2025-01-16 RX ADMIN — INSULIN LISPRO 2 UNITS: 100 INJECTION, SOLUTION INTRAVENOUS; SUBCUTANEOUS at 08:28

## 2025-01-16 RX ADMIN — OLANZAPINE 5 MG: 2.5 TABLET, FILM COATED ORAL at 21:04

## 2025-01-16 RX ADMIN — INSULIN LISPRO 2 UNITS: 100 INJECTION, SOLUTION INTRAVENOUS; SUBCUTANEOUS at 18:24

## 2025-01-16 RX ADMIN — Medication 10 ML: at 21:05

## 2025-01-16 RX ADMIN — INSULIN LISPRO 2 UNITS: 100 INJECTION, SOLUTION INTRAVENOUS; SUBCUTANEOUS at 12:42

## 2025-01-16 RX ADMIN — ASPIRIN 81 MG: 81 TABLET, COATED ORAL at 08:34

## 2025-01-16 RX ADMIN — FOLIC ACID 1 MG: 1 TABLET ORAL at 08:32

## 2025-01-16 RX ADMIN — AMLODIPINE BESYLATE 10 MG: 10 TABLET ORAL at 08:34

## 2025-01-16 RX ADMIN — INSULIN LISPRO 3 UNITS: 100 INJECTION, SOLUTION INTRAVENOUS; SUBCUTANEOUS at 22:45

## 2025-01-16 RX ADMIN — ENOXAPARIN SODIUM 40 MG: 100 INJECTION SUBCUTANEOUS at 21:04

## 2025-01-16 RX ADMIN — CETIRIZINE HYDROCHLORIDE 10 MG: 10 TABLET, FILM COATED ORAL at 08:33

## 2025-01-16 RX ADMIN — DONEPEZIL HYDROCHLORIDE 10 MG: 10 TABLET, FILM COATED ORAL at 08:32

## 2025-01-16 NOTE — PROGRESS NOTES
Name: Lucia Ellis ADMIT: 2025   : 1950  PCP: Everardo Mazariegos MD    MRN: [id number] LOS: 11 days   AGE/SEX: 74 y.o. female  ROOM: [facility]     Subjective   Subjective   Patient is lying in bed and is awake alert and oriented to person and place.  Otherwise confused and denies any nausea, vomiting abdominal pain, chest pain.       Objective   Objective   Vital Signs  Temp:  [97.2 °F (36.2 °C)-98.8 °F (37.1 °C)] 98.6 °F (37 °C)  Heart Rate:  [74-88] 87  Resp:  [16] 16  BP: (124-140)/(63-70) 133/70  SpO2:  [94 %-96 %] 96 %  on   ;   Device (Oxygen Therapy): room air  Body mass index is 18.79 kg/m².  Physical Exam  Vitals reviewed.   Constitutional:       General: She is not in acute distress.     Appearance: She is not ill-appearing.   HENT:      Head: Normocephalic and atraumatic.      Mouth/Throat:      Mouth: Mucous membranes are moist.   Eyes:      Extraocular Movements: Extraocular movements intact.      Pupils: Pupils are equal, round, and reactive to light.   Cardiovascular:      Rate and Rhythm: Normal rate and regular rhythm.      Pulses: Normal pulses.      Heart sounds: Normal heart sounds.   Pulmonary:      Effort: Pulmonary effort is normal. No respiratory distress.      Breath sounds: Normal breath sounds.   Abdominal:      General: There is no distension.      Palpations: Abdomen is soft.      Tenderness: There is no abdominal tenderness.   Musculoskeletal:      Cervical back: Neck supple.      Right lower leg: No edema.      Left lower leg: No edema.   Skin:     General: Skin is warm and dry.   Neurological:      Mental Status: She is alert. She is disoriented.   Psychiatric:         Mood and Affect: Mood normal.       Results Review     I reviewed the patient's new clinical results.  Results from last 7 days   Lab Units 25  0442   WBC 10*3/mm3 4.82   HEMOGLOBIN g/dL 10.6*   PLATELETS 10*3/mm3 201     Results from last 7 days   Lab Units 25  0442 01/10/25  0538   SODIUM  mmol/L 140 141   POTASSIUM mmol/L 4.0 4.1   CHLORIDE mmol/L 107 106   CO2 mmol/L 25.2 28.2   BUN mg/dL 23 17   CREATININE mg/dL 0.65 0.67   GLUCOSE mg/dL 138* 132*   EGFR mL/min/1.73 91.4 90.7       Results from last 7 days   Lab Units 01/12/25  0442 01/10/25  0538   CALCIUM mg/dL 8.6 8.7       Glucose   Date/Time Value Ref Range Status   01/16/2025 1147 173 (H) 70 - 130 mg/dL Final   01/16/2025 0753 154 (H) 70 - 130 mg/dL Final   01/15/2025 2206 158 (H) 70 - 130 mg/dL Final   01/15/2025 1624 181 (H) 70 - 130 mg/dL Final   01/15/2025 1057 195 (H) 70 - 130 mg/dL Final   01/15/2025 0757 115 70 - 130 mg/dL Final   01/14/2025 2103 135 (H) 70 - 130 mg/dL Final       No radiology results for the last day    I have personally reviewed all medications:  Scheduled Medications  amLODIPine, 10 mg, Oral, Daily  aspirin, 81 mg, Oral, Daily  atorvastatin, 80 mg, Oral, Daily  cetirizine, 10 mg, Oral, Daily  donepezil, 10 mg, Oral, Daily  empagliflozin, 10 mg, Oral, Daily  enoxaparin, 40 mg, Subcutaneous, Nightly  escitalopram, 10 mg, Oral, Daily  folic acid, 1 mg, Oral, Daily  insulin lispro, 2-7 Units, Subcutaneous, 4x Daily AC & at Bedtime  levETIRAcetam, 1,000 mg, Oral, BID  memantine, 5 mg, Oral, BID  OLANZapine, 5 mg, Oral, Nightly  pantoprazole, 40 mg, Oral, QAM  sodium chloride, 10 mL, Intravenous, Q12H    Infusions   Diet  Diet: Regular/House, Diabetic; Consistent Carbohydrate; Fluid Consistency: Thin (IDDSI 0)    I have personally reviewed:  [x]  Laboratory   []  Microbiology   []  Radiology   []  EKG/Telemetry  []  Cardiology/Vascular   []  Pathology    []  Records       Assessment/Plan     Active Hospital Problems    Diagnosis  POA    **AMS (altered mental status) [R41.82]  Yes    Moderate protein-calorie malnutrition [E44.0]  Yes    History of stroke [Z86.73]  Not Applicable    Vascular dementia, moderate, without behavioral disturbance, psychotic disturbance, mood disturbance, and anxiety [F01.B0]  Yes    Type 2  diabetes mellitus with hyperglycemia [E11.65]  Yes    Hypokalemia [E87.6]  Yes    Essential hypertension [I10]  Yes    Hyperlipidemia [E78.5]  Yes      Resolved Hospital Problems   No resolved problems to display.     Ms. Ellis is a 74 y.o. female with a history of dementia, DM 2, hyperlipidemia and hypertension that presented to the hospital with altered mental status and failure to thrive.  She was apparently living with her adult son but was noted to be without any electricity or running water.  Her sister and nephew provided history on admission and emergency personnel was called to the patient's home for a wellness check.       1.Altered mental status/vascular dementia with progression over time.  Continue with donepezil and amantadine.  On Zyprexa at nighttime for agitation.  MRI could not be done secondary to unable to completion of screening sheet.  Neurology did evaluate and signed off.  2.  History of seizures on Keppra.  3.  Diabetes mellitus, on corrective dose insulin and Jardiance which will be continued.  4.  Hypertension, on amlodipine at will be continued.  5.  Hyperlipidemia, on statins.  6.  CODE STATUS is full code.  7.  Disposition, to rehab once a bed is available.      Leonel Lopez MD  [facility]  01/16/25  17:08 EST

## 2025-01-16 NOTE — PLAN OF CARE
Goal Outcome Evaluation:  Plan of Care Reviewed With: patient        Progress: improving  Outcome Evaluation: Pt participated with PT today, she is pleasant and cooperative and able to work on gait with and without rwx to challenge balance and sequencing, also performed seated and standing B LE strengthening exercises. Complex social situation and CCP/social work working on discharge plans. PT will cont to follow    Anticipated Discharge Disposition (PT): skilled nursing facility

## 2025-01-16 NOTE — THERAPY TREATMENT NOTE
Patient Name: Lucia Ellis  : 1950    MRN: [id number]                              Today's Date: 2025       Admit Date: 2025    Visit Dx:     ICD-10-CM ICD-9-CM   1. Altered mental status, unspecified altered mental status type  R41.82 780.97     Patient Active Problem List   Diagnosis    Gastroesophageal reflux disease    Malignant neoplasm of breast    Depression    Folliculitis    Generalized anxiety disorder    Hyperlipidemia    Essential hypertension    Status post total right knee replacement    Rectal hemorrhage    Vitamin D deficiency    Drug therapy    Acute cholecystitis    Uncontrolled type 2 diabetes mellitus with hypoglycemia without coma    Myoclonus    Type 2 diabetes mellitus with hyperglycemia    Hypokalemia    New onset seizure    Focal motor seizure    Vascular dementia, moderate, without behavioral disturbance, psychotic disturbance, mood disturbance, and anxiety    Stroke-like symptoms    CVA (cerebral vascular accident)    Lung nodules    Hypokalemia    History of stroke    Generalized weakness    Severe malnutrition    Gastritis without bleeding    Abnormal CT scan, stomach    AMS (altered mental status)    Moderate protein-calorie malnutrition     Past Medical History:   Diagnosis Date    Breast cancer     Dementia     Diabetes mellitus     Hypertension     Memory loss      Past Surgical History:   Procedure Laterality Date    CHOLECYSTECTOMY      ENDOSCOPY N/A 2024    Procedure: ESOPHAGOGASTRODUODENOSCOPY;  Surgeon: Clive More MD;  Location: [facility] ENDOSCOPY;  Service: Gastroenterology;  Laterality: N/A;  PREOP/ ABNORMAL CT OF STOMACH- POSTOP/ GASTRITIS    HYSTERECTOMY      MASTECTOMY Right 1995    TOTAL KNEE ARTHROPLASTY Right       General Information       Row Name 25 7468          Physical Therapy Time and Intention    Document Type therapy note (daily note)  -PC     Mode of Treatment physical therapy  -PC       Row Name 25 1912           General Information    Existing Precautions/Restrictions fall  -PC     Barriers to Rehab cognitive status  -PC       Row Name 01/16/25 1347          Cognition    Orientation Status (Cognition) oriented to;person  -PC               User Key  (r) = Recorded By, (t) = Taken By, (c) = Cosigned By      Initials Name Provider Type    PC Lolis Oro, PT Physical Therapist                   Mobility       Row Name 01/16/25 1348          Bed Mobility    Supine-Sit [place] (Bed Mobility) standby assist  -PC       Row Name 01/16/25 1348          Sit-Stand Transfer    Sit-Stand [place] (Transfers) contact guard;standby assist  -PC     Assistive Device (Sit-Stand Transfers) walker, front-wheeled  -PC       Row Name 01/16/25 1348          Gait/Stairs (Locomotion)    [place] Level (Gait) contact guard;standby assist  -PC     Assistive Device (Gait) walker, front-wheeled  -PC     Distance in Feet (Gait) 40  40 ft with walker then 40 ft with HHA to work on balance and sequencing  -PC     Deviations/Abnormal Patterns (Gait) gait speed decreased;bina decreased;stride length decreased  -PC     Bilateral Gait Deviations forward flexed posture  -PC               User Key  (r) = Recorded By, (t) = Taken By, (c) = Cosigned By      Initials Name Provider Type    PC Lolis Oro, PT Physical Therapist                   Obj/Interventions       Row Name 01/16/25 1350          Motor Skills    Therapeutic Exercise --  seated AP, LAQ, standing : up on toes, shallow knee bends, high knees  -PC               User Key  (r) = Recorded By, (t) = Taken By, (c) = Cosigned By      Initials Name Provider Type    PC Lolis Oro, PT Physical Therapist                   Goals/Plan       Row Name 01/16/25 1355          Bed Mobility Goal 1 (PT)    Activity/Assistive Device (Bed Mobility Goal 1, PT) bed mobility activities, all  -PC     [place] Level/Cues Needed (Bed Mobility Goal 1, PT) modified independence  -PC     Time  Frame (Bed Mobility Goal 1, PT) short term goal (STG);2 days  -PC       Row Name 01/16/25 1355          Gait Training Goal 1 (PT)    Activity/Assistive Device (Gait Training Goal 1, PT) gait (walking locomotion);assistive device use  -PC     [place] Level (Gait Training Goal 1, PT) standby assist  -PC     Distance (Gait Training Goal 1, PT) 50  -PC     Time Frame (Gait Training Goal 1, PT) long term goal (LTG);by discharge  -PC               User Key  (r) = Recorded By, (t) = Taken By, (c) = Cosigned By      Initials Name Provider Type    PC Lolis Oro, PT Physical Therapist                   Clinical Impression       Row Name 01/16/25 1351          Pain    Pretreatment Pain Rating 0/10 - no pain  -PC     Posttreatment Pain Rating 0/10 - no pain  -PC       Row Name 01/16/25 1351          Plan of Care Review    Plan of Care Reviewed With patient  -PC     Outcome Evaluation Pt participated with PT today, she is pleasant and cooperative and able to work on gait with and without rwx to challenge balance and sequencing, also performed seated and standing B LE strengthening exercises. Complex social situation and CCP/social work working on discharge plans. PT will cont to follow  -PC       Row Name 01/16/25 1351          Positioning and Restraints    Pre-Treatment Position in bed  -PC     Post Treatment Position chair  -PC     In Chair reclined;call light within reach;encouraged to call for assist;exit alarm on;with family/caregiver  -PC               User Key  (r) = Recorded By, (t) = Taken By, (c) = Cosigned By      Initials Name Provider Type    PC Lolis Oro, PT Physical Therapist                   Outcome Measures       Row Name 01/16/25 1354 01/16/25 0820       How much help from another person do you currently need...    Turning from your back to your side while in flat bed without using bedrails? 4  -PC 4  -MG    Moving from lying on back to sitting on the side of a flat bed without bedrails? 4  -PC  4  -MG    Moving to and from a bed to a chair (including a wheelchair)? 3  -PC 3  -MG    Standing up from a chair using your arms (e.g., wheelchair, bedside chair)? 3  -PC 3  -MG    Climbing 3-5 steps with a railing? 3  -PC 3  -MG    To walk in hospital room? 3  -PC 3  -MG    AM-PAC 6 Clicks Score (PT) 20  -PC 20  -MG    Highest Level of Mobility Goal 6 --> Walk 10 steps or more  -PC 6 --> Walk 10 steps or more  -MG              User Key  (r) = Recorded By, (t) = Taken By, (c) = Cosigned By      Initials Name Provider Type    PC Lolis Oro PT Physical Therapist    Lucia Tillman RN Registered Nurse                                 Physical Therapy Education       Title: PT OT SLP Therapies (In Progress)       Topic: Physical Therapy (In Progress)       Point: Mobility training (In Progress)       Learning Progress Summary            Patient Acceptance, E,D, NR by  at 1/16/2025 1354    Acceptance, E,D, NR by  at 1/13/2025 1129    Acceptance, E,D, NR by  at 1/10/2025 1536    Acceptance, E, VU by  at 1/10/2025 0505    Acceptance, E,D, DU by  at 1/8/2025 1348    Acceptance, E,D, NR by  at 1/7/2025 1532    Acceptance, E, NR,NL by RS at 1/5/2025 1151                      Point: Home exercise program (In Progress)       Learning Progress Summary            Patient Acceptance, E,D, NR by PC at 1/16/2025 1354    Acceptance, E,D, NR by  at 1/13/2025 1129    Acceptance, E,D, NR by SRIDHAR at 1/10/2025 1536    Acceptance, E, NR,NL by RS at 1/5/2025 1151                      Point: Body mechanics (In Progress)       Learning Progress Summary            Patient Acceptance, E,D, NR by  at 1/16/2025 1354    Acceptance, E,D, NR by  at 1/13/2025 1129    Acceptance, E,D, NR by SRIDHAR at 1/10/2025 1536    Acceptance, E, VU by DIRK at 1/10/2025 0505    Acceptance, E,D, DU by  at 1/8/2025 1348    Acceptance, E,D, NR by PC at 1/7/2025 1532    Acceptance, E, NR,NL by RS at 1/5/2025 1151                      Point:  Precautions (In Progress)       Learning Progress Summary            Patient Acceptance, E,D, NR by  at 1/16/2025 1354    Acceptance, E,D, NR by  at 1/13/2025 1129    Acceptance, E,D, NR by  at 1/10/2025 1536    Acceptance, E, VU by  at 1/10/2025 0505    Acceptance, E,D, DU by  at 1/8/2025 1348    Acceptance, E,D, NR by  at 1/7/2025 1532    Acceptance, E, NR,NL by  at 1/5/2025 1151                                      User Key       Initials Effective Dates Name Provider Type Discipline     06/16/21 -  Lolis Oro PT Physical Therapist PT    SRIDHAR 03/07/18 -  Jesica Ellis PTA Physical Therapist Assistant PT     10/01/20 -  Payton Rodriguez, RN Registered Nurse Nurse    RS 06/16/21 -  Lizzie Vo, PT Physical Therapist PT                  PT Recommendation and Plan     Progress: improving  Outcome Evaluation: Pt participated with PT today, she is pleasant and cooperative and able to work on gait with and without rwx to challenge balance and sequencing, also performed seated and standing B LE strengthening exercises. Complex social situation and CCP/social work working on discharge plans. PT will cont to follow     Time Calculation:         PT Charges       Row Name 01/16/25 1355             Time Calculation    Start Time 1305  -PC      Stop Time 1318  -PC      Time Calculation (min) 13 min  -PC      PT Received On 01/16/25  -PC      PT - Next Appointment 01/17/25  -      PT Goal Re-Cert Due Date 01/23/25  -                User Key  (r) = Recorded By, (t) = Taken By, (c) = Cosigned By      Initials Name Provider Type    PC Lolis Oro, PT Physical Therapist                  Therapy Charges for Today       Code Description Service Date Service Provider Modifiers Qty    [id number] HC PT THER PROC EA 15 MIN 1/16/2025 Lolis Oro, PT GP 1            PT G-Codes  Outcome Measure Options: AM-PAC 6 Clicks Basic Mobility (PT)  AM-PAC 6 Clicks Score (PT): 20  PT Discharge  Summary  Anticipated Discharge Disposition (PT): skilled nursing facility    Lolis Oro, PT  1/16/2025

## 2025-01-16 NOTE — PLAN OF CARE
Goal Outcome Evaluation:  Plan of Care Reviewed With: patient        Progress: no change  Outcome Evaluation: Pateint is A&Ox4, VSS, blood sugars monitored- required 2 units of SSI. Up with x1 assist and walker to restroom. Briefs on for incontinence. SL. Fall precautions maintained. Side rails padded. POC ongoing.

## 2025-01-17 LAB
GLUCOSE BLDC GLUCOMTR-MCNC: 112 MG/DL (ref 70–130)
GLUCOSE BLDC GLUCOMTR-MCNC: 158 MG/DL (ref 70–130)
GLUCOSE BLDC GLUCOMTR-MCNC: 185 MG/DL (ref 70–130)
GLUCOSE BLDC GLUCOMTR-MCNC: 198 MG/DL (ref 70–130)

## 2025-01-17 PROCEDURE — 25010000002 ENOXAPARIN PER 10 MG: Performed by: HOSPITALIST

## 2025-01-17 PROCEDURE — 82948 REAGENT STRIP/BLOOD GLUCOSE: CPT

## 2025-01-17 PROCEDURE — 63710000001 INSULIN LISPRO (HUMAN) PER 5 UNITS: Performed by: NURSE PRACTITIONER

## 2025-01-17 RX ADMIN — ENOXAPARIN SODIUM 40 MG: 100 INJECTION SUBCUTANEOUS at 21:46

## 2025-01-17 RX ADMIN — LEVETIRACETAM 1000 MG: 500 TABLET, FILM COATED ORAL at 18:19

## 2025-01-17 RX ADMIN — CETIRIZINE HYDROCHLORIDE 10 MG: 10 TABLET, FILM COATED ORAL at 08:43

## 2025-01-17 RX ADMIN — MEMANTINE HYDROCHLORIDE 5 MG: 5 TABLET, FILM COATED ORAL at 18:19

## 2025-01-17 RX ADMIN — AMLODIPINE BESYLATE 10 MG: 10 TABLET ORAL at 08:43

## 2025-01-17 RX ADMIN — INSULIN LISPRO 2 UNITS: 100 INJECTION, SOLUTION INTRAVENOUS; SUBCUTANEOUS at 21:46

## 2025-01-17 RX ADMIN — ESCITALOPRAM OXALATE 10 MG: 10 TABLET, FILM COATED ORAL at 08:43

## 2025-01-17 RX ADMIN — MEMANTINE HYDROCHLORIDE 5 MG: 5 TABLET, FILM COATED ORAL at 06:42

## 2025-01-17 RX ADMIN — DONEPEZIL HYDROCHLORIDE 10 MG: 10 TABLET, FILM COATED ORAL at 08:43

## 2025-01-17 RX ADMIN — INSULIN LISPRO 2 UNITS: 100 INJECTION, SOLUTION INTRAVENOUS; SUBCUTANEOUS at 11:46

## 2025-01-17 RX ADMIN — ASPIRIN 81 MG: 81 TABLET, COATED ORAL at 08:44

## 2025-01-17 RX ADMIN — LEVETIRACETAM 1000 MG: 500 TABLET, FILM COATED ORAL at 06:42

## 2025-01-17 RX ADMIN — EMPAGLIFLOZIN 10 MG: 10 TABLET, FILM COATED ORAL at 08:43

## 2025-01-17 RX ADMIN — Medication 10 ML: at 21:46

## 2025-01-17 RX ADMIN — FOLIC ACID 1 MG: 1 TABLET ORAL at 08:43

## 2025-01-17 RX ADMIN — INSULIN LISPRO 2 UNITS: 100 INJECTION, SOLUTION INTRAVENOUS; SUBCUTANEOUS at 18:19

## 2025-01-17 RX ADMIN — Medication 10 ML: at 08:44

## 2025-01-17 RX ADMIN — PANTOPRAZOLE SODIUM 40 MG: 40 TABLET, DELAYED RELEASE ORAL at 06:42

## 2025-01-17 RX ADMIN — ATORVASTATIN CALCIUM 80 MG: 20 TABLET, FILM COATED ORAL at 08:44

## 2025-01-17 RX ADMIN — OLANZAPINE 5 MG: 2.5 TABLET, FILM COATED ORAL at 21:46

## 2025-01-17 NOTE — PLAN OF CARE
Goal Outcome Evaluation:  Plan of Care Reviewed With: patient        Progress: improving  Outcome Evaluation: VSS. Pt is on falls protocol. Up with assist of one. Accuchecks ac and hs with ssi. Oriented x2. Verbal safety cues frequently.Awaiting long term placement.

## 2025-01-17 NOTE — PROGRESS NOTES
Continued Stay Note  [facility]     Patient Name: Lucia Ellis  MRN: [id number]  Today's Date: 1/17/2025    Admit Date: 1/4/2025    Plan: SNF to poss LTC   Discharge Plan       Row Name 01/17/25 1605       Plan    Plan SNF to poss LTC    Plan Comments Spoke to Wilfredo Mosqueda who stated they are trying to get ahold of [facility]bla[facility] for financals.                   Discharge Codes    No documentation.                 Expected Discharge Date and Time       Expected Discharge Date Expected Discharge Time    Jan 20, 2025               Ariella Valdes RN

## 2025-01-17 NOTE — PROGRESS NOTES
Name: Lucia Ellis ADMIT: 2025   : 1950  PCP: Everardo Mazariegos MD    MRN: [id number] LOS: 12 days   AGE/SEX: 74 y.o. female  ROOM: [facility]     Subjective   Subjective   Patient is lying in bed and is awake alert and oriented to person and place.  Otherwise confused and denies any nausea, vomiting abdominal pain, chest pain.       Objective   Objective   Vital Signs  Temp:  [97 °F (36.1 °C)-98 °F (36.7 °C)] 98 °F (36.7 °C)  Heart Rate:  [73-93] 88  Resp:  [16-18] 16  BP: (136-145)/(63-76) 145/76  SpO2:  [95 %-97 %] 95 %  on   ;   Device (Oxygen Therapy): room air  Body mass index is 18.79 kg/m².  Physical Exam  Vitals reviewed.   Constitutional:       General: She is not in acute distress.     Appearance: She is not ill-appearing.   HENT:      Head: Normocephalic and atraumatic.      Mouth/Throat:      Mouth: Mucous membranes are moist.   Eyes:      Extraocular Movements: Extraocular movements intact.      Pupils: Pupils are equal, round, and reactive to light.   Cardiovascular:      Rate and Rhythm: Normal rate and regular rhythm.      Pulses: Normal pulses.      Heart sounds: Normal heart sounds.   Pulmonary:      Effort: Pulmonary effort is normal. No respiratory distress.      Breath sounds: Normal breath sounds.   Abdominal:      General: There is no distension.      Palpations: Abdomen is soft.      Tenderness: There is no abdominal tenderness.   Musculoskeletal:      Cervical back: Neck supple.      Right lower leg: No edema.      Left lower leg: No edema.   Skin:     General: Skin is warm and dry.   Neurological:      Mental Status: She is alert. She is disoriented.   Psychiatric:         Mood and Affect: Mood normal.       Results Review     I reviewed the patient's new clinical results.  Results from last 7 days   Lab Units 25  0442   WBC 10*3/mm3 4.82   HEMOGLOBIN g/dL 10.6*   PLATELETS 10*3/mm3 201     Results from last 7 days   Lab Units 25  0442   SODIUM mmol/L 140    POTASSIUM mmol/L 4.0   CHLORIDE mmol/L 107   CO2 mmol/L 25.2   BUN mg/dL 23   CREATININE mg/dL 0.65   GLUCOSE mg/dL 138*   EGFR mL/min/1.73 91.4       Results from last 7 days   Lab Units 01/12/25  0442   CALCIUM mg/dL 8.6       Glucose   Date/Time Value Ref Range Status   01/17/2025 1616 158 (H) 70 - 130 mg/dL Final   01/17/2025 1045 198 (H) 70 - 130 mg/dL Final   01/17/2025 0745 112 70 - 130 mg/dL Final   01/16/2025 2207 202 (H) 70 - 130 mg/dL Final   01/16/2025 1720 163 (H) 70 - 130 mg/dL Final   01/16/2025 1147 173 (H) 70 - 130 mg/dL Final   01/16/2025 0753 154 (H) 70 - 130 mg/dL Final       No radiology results for the last day    I have personally reviewed all medications:  Scheduled Medications  amLODIPine, 10 mg, Oral, Daily  aspirin, 81 mg, Oral, Daily  atorvastatin, 80 mg, Oral, Daily  cetirizine, 10 mg, Oral, Daily  donepezil, 10 mg, Oral, Daily  empagliflozin, 10 mg, Oral, Daily  enoxaparin, 40 mg, Subcutaneous, Nightly  escitalopram, 10 mg, Oral, Daily  folic acid, 1 mg, Oral, Daily  insulin lispro, 2-7 Units, Subcutaneous, 4x Daily AC & at Bedtime  levETIRAcetam, 1,000 mg, Oral, BID  memantine, 5 mg, Oral, BID  OLANZapine, 5 mg, Oral, Nightly  pantoprazole, 40 mg, Oral, QAM  sodium chloride, 10 mL, Intravenous, Q12H    Infusions   Diet  Diet: Regular/House, Diabetic; Consistent Carbohydrate; Fluid Consistency: Thin (IDDSI 0)    I have personally reviewed:  [x]  Laboratory   []  Microbiology   []  Radiology   []  EKG/Telemetry  []  Cardiology/Vascular   []  Pathology    []  Records       Assessment/Plan     Active Hospital Problems    Diagnosis  POA    **AMS (altered mental status) [R41.82]  Yes    Moderate protein-calorie malnutrition [E44.0]  Yes    History of stroke [Z86.73]  Not Applicable    Vascular dementia, moderate, without behavioral disturbance, psychotic disturbance, mood disturbance, and anxiety [F01.B0]  Yes    Type 2 diabetes mellitus with hyperglycemia [E11.65]  Yes    Hypokalemia  [E87.6]  Yes    Essential hypertension [I10]  Yes    Hyperlipidemia [E78.5]  Yes      Resolved Hospital Problems   No resolved problems to display.     Ms. Ellis is a 74 y.o. female with a history of dementia, DM 2, hyperlipidemia and hypertension that presented to the hospital with altered mental status and failure to thrive.  She was apparently living with her adult son but was noted to be without any electricity or running water.  Her sister and nephew provided history on admission and emergency personnel was called to the patient's home for a wellness check.       1.Altered mental status/vascular dementia with progression over time.  Continue with donepezil and amantadine.  On Zyprexa at nighttime for agitation.  MRI could not be done secondary to unable to completion of screening sheet.  Neurology did evaluate and signed off.  2.  History of seizures on Keppra.  3.  Diabetes mellitus, on corrective dose insulin and Jardiance which will be continued.  Blood sugars are in acceptable range.  4.  Hypertension, on amlodipine at will be continued.  Blood pressure is reasonably well-controlled.  5.  Hyperlipidemia, on statins.  6.  CODE STATUS is full code.  7.  Disposition, to rehab once a bed is available.    Copy text on this note has been reviewed by me on 1/17/2025    Leonel Lopez MD  [facility]  01/17/25  17:43 EST

## 2025-01-17 NOTE — PLAN OF CARE
Goal Outcome Evaluation:  Plan of Care Reviewed With: patient        Progress: improving  Outcome Evaluation: Patient continues to be confused and getting OOB without calling staff eventhough she has several reminders, is reoriented regularly, and toileting checks/ scheduling is completed. Pt is expressing wanting to be d/c'd and wants to see sister. 3 units SSI needed. Fall precautions maintained. Tolerating diet. POC ongoing.

## 2025-01-18 LAB
GLUCOSE BLDC GLUCOMTR-MCNC: 106 MG/DL (ref 70–130)
GLUCOSE BLDC GLUCOMTR-MCNC: 131 MG/DL (ref 70–130)
GLUCOSE BLDC GLUCOMTR-MCNC: 189 MG/DL (ref 70–130)
GLUCOSE BLDC GLUCOMTR-MCNC: 190 MG/DL (ref 70–130)

## 2025-01-18 PROCEDURE — 63710000001 INSULIN LISPRO (HUMAN) PER 5 UNITS: Performed by: NURSE PRACTITIONER

## 2025-01-18 PROCEDURE — 82948 REAGENT STRIP/BLOOD GLUCOSE: CPT

## 2025-01-18 PROCEDURE — 25010000002 ENOXAPARIN PER 10 MG: Performed by: HOSPITALIST

## 2025-01-18 RX ADMIN — FOLIC ACID 1 MG: 1 TABLET ORAL at 09:48

## 2025-01-18 RX ADMIN — AMLODIPINE BESYLATE 10 MG: 10 TABLET ORAL at 09:49

## 2025-01-18 RX ADMIN — OLANZAPINE 5 MG: 2.5 TABLET, FILM COATED ORAL at 21:42

## 2025-01-18 RX ADMIN — MEMANTINE HYDROCHLORIDE 5 MG: 5 TABLET, FILM COATED ORAL at 06:06

## 2025-01-18 RX ADMIN — EMPAGLIFLOZIN 10 MG: 10 TABLET, FILM COATED ORAL at 09:49

## 2025-01-18 RX ADMIN — PANTOPRAZOLE SODIUM 40 MG: 40 TABLET, DELAYED RELEASE ORAL at 06:07

## 2025-01-18 RX ADMIN — CETIRIZINE HYDROCHLORIDE 10 MG: 10 TABLET, FILM COATED ORAL at 09:49

## 2025-01-18 RX ADMIN — Medication 10 ML: at 09:49

## 2025-01-18 RX ADMIN — INSULIN LISPRO 2 UNITS: 100 INJECTION, SOLUTION INTRAVENOUS; SUBCUTANEOUS at 22:03

## 2025-01-18 RX ADMIN — ENOXAPARIN SODIUM 40 MG: 100 INJECTION SUBCUTANEOUS at 21:42

## 2025-01-18 RX ADMIN — MEMANTINE HYDROCHLORIDE 5 MG: 5 TABLET, FILM COATED ORAL at 17:38

## 2025-01-18 RX ADMIN — DONEPEZIL HYDROCHLORIDE 10 MG: 10 TABLET, FILM COATED ORAL at 09:49

## 2025-01-18 RX ADMIN — LEVETIRACETAM 1000 MG: 500 TABLET, FILM COATED ORAL at 06:07

## 2025-01-18 RX ADMIN — Medication 10 ML: at 21:42

## 2025-01-18 RX ADMIN — ESCITALOPRAM OXALATE 10 MG: 10 TABLET, FILM COATED ORAL at 09:49

## 2025-01-18 RX ADMIN — INSULIN LISPRO 2 UNITS: 100 INJECTION, SOLUTION INTRAVENOUS; SUBCUTANEOUS at 12:57

## 2025-01-18 RX ADMIN — ASPIRIN 81 MG: 81 TABLET, COATED ORAL at 09:49

## 2025-01-18 RX ADMIN — LEVETIRACETAM 1000 MG: 500 TABLET, FILM COATED ORAL at 17:37

## 2025-01-18 RX ADMIN — ATORVASTATIN CALCIUM 80 MG: 20 TABLET, FILM COATED ORAL at 09:48

## 2025-01-18 NOTE — PROGRESS NOTES
Name: Lucia Ellis ADMIT: 2025   : 1950  PCP: Everardo Mazariegos MD    MRN: [id number] LOS: 13 days   AGE/SEX: 74 y.o. female  ROOM: [facility]     Subjective   Subjective   Patient is lying in bed and is awake alert and oriented to person and place.  Otherwise remains confused and denies any nausea, vomiting abdominal pain, chest pain.       Objective   Objective   Vital Signs  Temp:  [97.6 °F (36.4 °C)-98.6 °F (37 °C)] 97.6 °F (36.4 °C)  Heart Rate:  [78-88] 86  Resp:  [16] 16  BP: (129-149)/(68-76) 132/72  SpO2:  [95 %-96 %] 95 %  on   ;   Device (Oxygen Therapy): room air  Body mass index is 18.79 kg/m².  Physical Exam  Vitals reviewed.   Constitutional:       General: She is not in acute distress.     Appearance: She is not ill-appearing.   HENT:      Head: Normocephalic and atraumatic.      Mouth/Throat:      Mouth: Mucous membranes are moist.   Eyes:      Extraocular Movements: Extraocular movements intact.      Pupils: Pupils are equal, round, and reactive to light.   Cardiovascular:      Rate and Rhythm: Normal rate and regular rhythm.      Pulses: Normal pulses.      Heart sounds: Normal heart sounds.   Pulmonary:      Effort: Pulmonary effort is normal. No respiratory distress.      Breath sounds: Normal breath sounds.   Abdominal:      General: There is no distension.      Palpations: Abdomen is soft.      Tenderness: There is no abdominal tenderness.   Musculoskeletal:      Cervical back: Neck supple.      Right lower leg: No edema.      Left lower leg: No edema.   Skin:     General: Skin is warm and dry.   Neurological:      Mental Status: She is alert. She is disoriented.   Psychiatric:         Mood and Affect: Mood normal.       Results Review     I reviewed the patient's new clinical results.  Results from last 7 days   Lab Units 25  0442   WBC 10*3/mm3 4.82   HEMOGLOBIN g/dL 10.6*   PLATELETS 10*3/mm3 201     Results from last 7 days   Lab Units 25  0442   SODIUM mmol/L 140    POTASSIUM mmol/L 4.0   CHLORIDE mmol/L 107   CO2 mmol/L 25.2   BUN mg/dL 23   CREATININE mg/dL 0.65   GLUCOSE mg/dL 138*   EGFR mL/min/1.73 91.4       Results from last 7 days   Lab Units 01/12/25  0442   CALCIUM mg/dL 8.6       Glucose   Date/Time Value Ref Range Status   01/18/2025 1205 190 (H) 70 - 130 mg/dL Final   01/18/2025 0814 106 70 - 130 mg/dL Final   01/17/2025 2235 185 (H) 70 - 130 mg/dL Final   01/17/2025 1616 158 (H) 70 - 130 mg/dL Final   01/17/2025 1045 198 (H) 70 - 130 mg/dL Final   01/17/2025 0745 112 70 - 130 mg/dL Final   01/16/2025 2207 202 (H) 70 - 130 mg/dL Final       No radiology results for the last day    I have personally reviewed all medications:  Scheduled Medications  amLODIPine, 10 mg, Oral, Daily  aspirin, 81 mg, Oral, Daily  atorvastatin, 80 mg, Oral, Daily  cetirizine, 10 mg, Oral, Daily  donepezil, 10 mg, Oral, Daily  empagliflozin, 10 mg, Oral, Daily  enoxaparin, 40 mg, Subcutaneous, Nightly  escitalopram, 10 mg, Oral, Daily  folic acid, 1 mg, Oral, Daily  insulin lispro, 2-7 Units, Subcutaneous, 4x Daily AC & at Bedtime  levETIRAcetam, 1,000 mg, Oral, BID  memantine, 5 mg, Oral, BID  OLANZapine, 5 mg, Oral, Nightly  pantoprazole, 40 mg, Oral, QAM  sodium chloride, 10 mL, Intravenous, Q12H    Infusions   Diet  Diet: Regular/House, Diabetic; Consistent Carbohydrate; Fluid Consistency: Thin (IDDSI 0)    I have personally reviewed:  [x]  Laboratory   []  Microbiology   []  Radiology   []  EKG/Telemetry  []  Cardiology/Vascular   []  Pathology    []  Records       Assessment/Plan     Active Hospital Problems    Diagnosis  POA   • **AMS (altered mental status) [R41.82]  Yes   • Moderate protein-calorie malnutrition [E44.0]  Yes   • History of stroke [Z86.73]  Not Applicable   • Vascular dementia, moderate, without behavioral disturbance, psychotic disturbance, mood disturbance, and anxiety [F01.B0]  Yes   • Type 2 diabetes mellitus with hyperglycemia [E11.65]  Yes   • Hypokalemia  [E87.6]  Yes   • Essential hypertension [I10]  Yes   • Hyperlipidemia [E78.5]  Yes      Resolved Hospital Problems   No resolved problems to display.     Ms. Ellis is a 74 y.o. female with a history of dementia, DM 2, hyperlipidemia and hypertension that presented to the hospital with altered mental status and failure to thrive.  She was apparently living with her adult son but was noted to be without any electricity or running water.  Her sister and nephew provided history on admission and emergency personnel was called to the patient's home for a wellness check.       1.Altered mental status/vascular dementia with progression over time.  Continue with donepezil and amantadine.  On Zyprexa at nighttime for agitation.  MRI could not be done secondary to unable to completion of screening sheet.  Neurology did evaluate and signed off.    2.  History of seizures on Keppra.    3.  Diabetes mellitus, on corrective dose insulin and Jardiance which will be continued.  Blood sugars are in acceptable range.    4.  Hypertension, on amlodipine at will be continued.  Blood pressure is reasonably well-controlled.    5.  Hyperlipidemia, on statins.    6.  CODE STATUS is full code.    7.  Disposition, to rehab once a bed is available.    Copy text on this note has been reviewed by me on 1/18/2025    Leonel Lopez MD  [facility]  01/18/25  15:58 EST

## 2025-01-18 NOTE — PLAN OF CARE
Goal Outcome Evaluation:  Plan of Care Reviewed With: patient        Progress: no change  Outcome Evaluation: Patient is confused. Oriented x2. Up with one assist to bathroom. [facility] accu checks with SSI. Lovenox given, SCDs refused. Fall precautions maintained. No complaints of pain or N/V/D. Awaiting placement. POC ongoing.

## 2025-01-18 NOTE — PLAN OF CARE
Goal Outcome Evaluation:  Plan of Care Reviewed With: patient        Progress: improving  Outcome Evaluation: VSS, up with assist x1 and walker, frequently getting out of the bed, confused and needs redirection, monitoring blood sugars, falls precautions maintained, family at bedside, waiting on placement

## 2025-01-19 LAB
GLUCOSE BLDC GLUCOMTR-MCNC: 141 MG/DL (ref 70–130)
GLUCOSE BLDC GLUCOMTR-MCNC: 141 MG/DL (ref 70–130)
GLUCOSE BLDC GLUCOMTR-MCNC: 149 MG/DL (ref 70–130)
GLUCOSE BLDC GLUCOMTR-MCNC: 176 MG/DL (ref 70–130)

## 2025-01-19 PROCEDURE — 63710000001 INSULIN LISPRO (HUMAN) PER 5 UNITS: Performed by: NURSE PRACTITIONER

## 2025-01-19 PROCEDURE — 82948 REAGENT STRIP/BLOOD GLUCOSE: CPT

## 2025-01-19 RX ADMIN — OLANZAPINE 5 MG: 2.5 TABLET, FILM COATED ORAL at 20:00

## 2025-01-19 RX ADMIN — LEVETIRACETAM 1000 MG: 500 TABLET, FILM COATED ORAL at 06:11

## 2025-01-19 RX ADMIN — CETIRIZINE HYDROCHLORIDE 10 MG: 10 TABLET, FILM COATED ORAL at 08:07

## 2025-01-19 RX ADMIN — MEMANTINE HYDROCHLORIDE 5 MG: 5 TABLET, FILM COATED ORAL at 18:01

## 2025-01-19 RX ADMIN — MEMANTINE HYDROCHLORIDE 5 MG: 5 TABLET, FILM COATED ORAL at 06:11

## 2025-01-19 RX ADMIN — ASPIRIN 81 MG: 81 TABLET, COATED ORAL at 08:07

## 2025-01-19 RX ADMIN — INSULIN LISPRO 2 UNITS: 100 INJECTION, SOLUTION INTRAVENOUS; SUBCUTANEOUS at 12:35

## 2025-01-19 RX ADMIN — Medication 10 ML: at 08:07

## 2025-01-19 RX ADMIN — FOLIC ACID 1 MG: 1 TABLET ORAL at 08:07

## 2025-01-19 RX ADMIN — ATORVASTATIN CALCIUM 80 MG: 20 TABLET, FILM COATED ORAL at 08:06

## 2025-01-19 RX ADMIN — PANTOPRAZOLE SODIUM 40 MG: 40 TABLET, DELAYED RELEASE ORAL at 06:11

## 2025-01-19 RX ADMIN — Medication 10 ML: at 20:00

## 2025-01-19 RX ADMIN — AMLODIPINE BESYLATE 10 MG: 10 TABLET ORAL at 08:07

## 2025-01-19 RX ADMIN — DONEPEZIL HYDROCHLORIDE 10 MG: 10 TABLET, FILM COATED ORAL at 08:07

## 2025-01-19 RX ADMIN — ESCITALOPRAM OXALATE 10 MG: 10 TABLET, FILM COATED ORAL at 08:07

## 2025-01-19 RX ADMIN — EMPAGLIFLOZIN 10 MG: 10 TABLET, FILM COATED ORAL at 08:07

## 2025-01-19 RX ADMIN — LEVETIRACETAM 1000 MG: 500 TABLET, FILM COATED ORAL at 18:01

## 2025-01-19 NOTE — PLAN OF CARE
Problem: Adult Inpatient Plan of Care  Goal: Plan of Care Review  Outcome: Progressing  Flowsheets (Taken 1/19/2025 0506)  Progress: improving  Outcome Evaluation: Patient alert to self and place most of the time, needs frequent redirection, up with assist and walker, impulsive and does not remember to call for help when getting up, slept most of the night, plan of care continues.           Problem: Adult Inpatient Plan of Care  Goal: Absence of Hospital-Acquired Illness or Injury  Intervention: Identify and Manage Fall Risk  Recent Flowsheet Documentation  Taken 1/19/2025 0400 by Mikaela Ulloa RN  Safety Promotion/Fall Prevention:   fall prevention program maintained   room organization consistent   safety round/check completed  Taken 1/19/2025 0200 by Mikaela Ulloa RN  Safety Promotion/Fall Prevention:   fall prevention program maintained   room organization consistent   safety round/check completed  Taken 1/19/2025 0000 by Mikaela Ulloa, RN  Safety Promotion/Fall Prevention:   fall prevention program maintained   room organization consistent   safety round/check completed  Taken 1/18/2025 2200 by Mikaela Ulloa, RN  Safety Promotion/Fall Prevention:   fall prevention program maintained   room organization consistent   safety round/check completed  Taken 1/18/2025 2000 by Mikaela Ulloa, RN  Safety Promotion/Fall Prevention:   fall prevention program maintained   room organization consistent   safety round/check completed

## 2025-01-19 NOTE — NURSING NOTE
Spoke to pt's two nephews and sister at bedside. Asked if any family can take this pt home, family said no. Family wish for pt to go to [place]. Notified nephew that [facility] will be reaching out with details. Asked family if anyone has been in contact with pt's son. Nephew stated that he spoke to him yesterday.

## 2025-01-19 NOTE — PROGRESS NOTES
Name: Lucia Ellis ADMIT: 2025   : 1950  PCP: Everardo Mazariegos MD    MRN: [id number] LOS: 14 days   AGE/SEX: 74 y.o. female  ROOM: [facility]     Subjective   Subjective   Patient is lying in bed and is awake alert and oriented to person and place.  Otherwise remains confused and denies any nausea, vomiting abdominal pain, chest pain, shortness of breath.       Objective   Objective   Vital Signs  Temp:  [97 °F (36.1 °C)-97.7 °F (36.5 °C)] 97.7 °F (36.5 °C)  Heart Rate:  [71-83] 83  Resp:  [16] 16  BP: (121-140)/(64-71) 134/71  SpO2:  [95 %-97 %] 95 %  on   ;   Device (Oxygen Therapy): room air  Body mass index is 18.79 kg/m².  Physical Exam  Vitals reviewed.   Constitutional:       General: She is not in acute distress.     Appearance: She is not ill-appearing.   HENT:      Head: Normocephalic and atraumatic.      Mouth/Throat:      Mouth: Mucous membranes are moist.   Eyes:      Extraocular Movements: Extraocular movements intact.      Pupils: Pupils are equal, round, and reactive to light.   Cardiovascular:      Rate and Rhythm: Normal rate and regular rhythm.      Pulses: Normal pulses.      Heart sounds: Normal heart sounds.   Pulmonary:      Effort: Pulmonary effort is normal. No respiratory distress.      Breath sounds: Normal breath sounds.   Abdominal:      General: There is no distension.      Palpations: Abdomen is soft.      Tenderness: There is no abdominal tenderness.   Musculoskeletal:      Cervical back: Neck supple.      Right lower leg: No edema.      Left lower leg: No edema.   Skin:     General: Skin is warm and dry.   Neurological:      Mental Status: She is alert. She is disoriented.   Psychiatric:         Mood and Affect: Mood normal.       Results Review     I reviewed the patient's new clinical results.                        Glucose   Date/Time Value Ref Range Status   2025 1645 141 (H) 70 - 130 mg/dL Final   2025 1123 176 (H) 70 - 130 mg/dL Final   2025 0739  149 (H) 70 - 130 mg/dL Final   01/18/2025 2156 189 (H) 70 - 130 mg/dL Final   01/18/2025 1635 131 (H) 70 - 130 mg/dL Final   01/18/2025 1205 190 (H) 70 - 130 mg/dL Final   01/18/2025 0814 106 70 - 130 mg/dL Final       No radiology results for the last day    I have personally reviewed all medications:  Scheduled Medications  amLODIPine, 10 mg, Oral, Daily  aspirin, 81 mg, Oral, Daily  atorvastatin, 80 mg, Oral, Daily  cetirizine, 10 mg, Oral, Daily  donepezil, 10 mg, Oral, Daily  empagliflozin, 10 mg, Oral, Daily  enoxaparin, 40 mg, Subcutaneous, Nightly  escitalopram, 10 mg, Oral, Daily  folic acid, 1 mg, Oral, Daily  insulin lispro, 2-7 Units, Subcutaneous, 4x Daily AC & at Bedtime  levETIRAcetam, 1,000 mg, Oral, BID  memantine, 5 mg, Oral, BID  OLANZapine, 5 mg, Oral, Nightly  pantoprazole, 40 mg, Oral, QAM  sodium chloride, 10 mL, Intravenous, Q12H    Infusions   Diet  Diet: Regular/House, Diabetic; Consistent Carbohydrate; Fluid Consistency: Thin (IDDSI 0)    I have personally reviewed:  [x]  Laboratory   []  Microbiology   []  Radiology   []  EKG/Telemetry  []  Cardiology/Vascular   []  Pathology    []  Records       Assessment/Plan     Active Hospital Problems    Diagnosis  POA   • **AMS (altered mental status) [R41.82]  Yes   • Moderate protein-calorie malnutrition [E44.0]  Yes   • History of stroke [Z86.73]  Not Applicable   • Vascular dementia, moderate, without behavioral disturbance, psychotic disturbance, mood disturbance, and anxiety [F01.B0]  Yes   • Type 2 diabetes mellitus with hyperglycemia [E11.65]  Yes   • Hypokalemia [E87.6]  Yes   • Essential hypertension [I10]  Yes   • Hyperlipidemia [E78.5]  Yes      Resolved Hospital Problems   No resolved problems to display.     Ms. Ellis is a 74 y.o. female with a history of dementia, DM 2, hyperlipidemia and hypertension that presented to the hospital with altered mental status and failure to thrive.  She was apparently living with her adult son but  was noted to be without any electricity or running water.  Her sister and nephew provided history on admission and emergency personnel was called to the patient's home for a wellness check.       1.Altered mental status/vascular dementia with progression over time.  Continue with donepezil and amantadine.  On Zyprexa at nighttime for agitation.  MRI could not be done secondary to unable to completion of screening sheet.  Neurology did evaluate and signed off.    2.  History of seizures on Keppra.    3.  Diabetes mellitus, on corrective dose insulin and Jardiance which will be continued.  Blood sugars are in acceptable range.    4.  Hypertension, on amlodipine at will be continued.  Blood pressure is reasonably well-controlled.    5.  Hyperlipidemia, on statins.    6.  CODE STATUS is full code.    7.  Disposition, to rehab once a bed is available.    Copy text on this note has been reviewed by me on 1/19/2025    Leonel Lopez MD  [facility]  01/19/25  18:29 EST

## 2025-01-19 NOTE — PLAN OF CARE
Goal Outcome Evaluation:  Plan of Care Reviewed With: patient        Progress: improving  Outcome Evaluation: VSS, incontinent at times, falls precautions maintained, up in chair, getting out of the bed at times without calling, up with assist x1 and walker, monitoring blood sugars, family at bedside

## 2025-01-20 LAB
GLUCOSE BLDC GLUCOMTR-MCNC: 106 MG/DL (ref 70–130)
GLUCOSE BLDC GLUCOMTR-MCNC: 115 MG/DL (ref 70–130)
GLUCOSE BLDC GLUCOMTR-MCNC: 193 MG/DL (ref 70–130)
GLUCOSE BLDC GLUCOMTR-MCNC: 229 MG/DL (ref 70–130)
GLUCOSE BLDC GLUCOMTR-MCNC: 237 MG/DL (ref 70–130)

## 2025-01-20 PROCEDURE — 63710000001 INSULIN LISPRO (HUMAN) PER 5 UNITS: Performed by: NURSE PRACTITIONER

## 2025-01-20 PROCEDURE — 82948 REAGENT STRIP/BLOOD GLUCOSE: CPT

## 2025-01-20 PROCEDURE — 25010000002 ENOXAPARIN PER 10 MG: Performed by: HOSPITALIST

## 2025-01-20 PROCEDURE — 97110 THERAPEUTIC EXERCISES: CPT

## 2025-01-20 RX ADMIN — ENOXAPARIN SODIUM 40 MG: 100 INJECTION SUBCUTANEOUS at 20:14

## 2025-01-20 RX ADMIN — ASPIRIN 81 MG: 81 TABLET, COATED ORAL at 08:33

## 2025-01-20 RX ADMIN — AMLODIPINE BESYLATE 10 MG: 10 TABLET ORAL at 08:33

## 2025-01-20 RX ADMIN — ATORVASTATIN CALCIUM 80 MG: 20 TABLET, FILM COATED ORAL at 08:33

## 2025-01-20 RX ADMIN — Medication 10 ML: at 08:34

## 2025-01-20 RX ADMIN — LEVETIRACETAM 1000 MG: 500 TABLET, FILM COATED ORAL at 06:39

## 2025-01-20 RX ADMIN — ESCITALOPRAM OXALATE 10 MG: 10 TABLET, FILM COATED ORAL at 08:34

## 2025-01-20 RX ADMIN — EMPAGLIFLOZIN 10 MG: 10 TABLET, FILM COATED ORAL at 08:33

## 2025-01-20 RX ADMIN — Medication 10 ML: at 20:14

## 2025-01-20 RX ADMIN — LEVETIRACETAM 1000 MG: 500 TABLET, FILM COATED ORAL at 17:50

## 2025-01-20 RX ADMIN — MEMANTINE HYDROCHLORIDE 5 MG: 5 TABLET, FILM COATED ORAL at 06:39

## 2025-01-20 RX ADMIN — INSULIN LISPRO 3 UNITS: 100 INJECTION, SOLUTION INTRAVENOUS; SUBCUTANEOUS at 22:33

## 2025-01-20 RX ADMIN — FOLIC ACID 1 MG: 1 TABLET ORAL at 08:33

## 2025-01-20 RX ADMIN — CETIRIZINE HYDROCHLORIDE 10 MG: 10 TABLET, FILM COATED ORAL at 08:33

## 2025-01-20 RX ADMIN — MEMANTINE HYDROCHLORIDE 5 MG: 5 TABLET, FILM COATED ORAL at 17:50

## 2025-01-20 RX ADMIN — ACETAMINOPHEN 650 MG: 325 TABLET, FILM COATED ORAL at 20:14

## 2025-01-20 RX ADMIN — PANTOPRAZOLE SODIUM 40 MG: 40 TABLET, DELAYED RELEASE ORAL at 06:39

## 2025-01-20 RX ADMIN — INSULIN LISPRO 2 UNITS: 100 INJECTION, SOLUTION INTRAVENOUS; SUBCUTANEOUS at 12:31

## 2025-01-20 RX ADMIN — DONEPEZIL HYDROCHLORIDE 10 MG: 10 TABLET, FILM COATED ORAL at 08:33

## 2025-01-20 RX ADMIN — OLANZAPINE 5 MG: 2.5 TABLET, FILM COATED ORAL at 20:14

## 2025-01-20 NOTE — PLAN OF CARE
Goal Outcome Evaluation:  Plan of Care Reviewed With: patient        Progress: improving  Outcome Evaluation: Pt is pleasantly confused, PT continues to focus on gait, balance, and strengthening, pt is below her baseline and not safe to return home, also lacks judgement and insight into deficits and is a fall risk, CCP assisting pt with discharge plans    Anticipated Discharge Disposition (PT): skilled nursing facility, extended care facility

## 2025-01-20 NOTE — PROGRESS NOTES
Continued Stay Note  [facility]     Patient Name: Lucia Ellis  MRN: [id number]  Today's Date: 1/20/2025    Admit Date: 1/4/2025    Plan: SNF to poss LTC   Discharge Plan       Row Name 01/20/25 1207       Plan    Plan SNF to poss LTC    Plan Comments Msg sent to Keshia Lobato regarding referral                   Discharge Codes    No documentation.                 Expected Discharge Date and Time       Expected Discharge Date Expected Discharge Time    Jan 21, 2025               Ariella Valdes RN

## 2025-01-20 NOTE — CASE MANAGEMENT/SOCIAL WORK
Post-Acute Authorization Submission      Post Acute Pre-Cert Documentation  Request Submitted by Facility - Type:: Hospital  Post-Acute Authorization Type Submitted:: SNF  Date Post Acute Pre-Cert Inititated per Facility: 01/20/25  Accepting Facility: [facility]  Hospital Discharge Date Requested: 01/21/25  All Clinicals Submitted?: Yes  Had Accepting Facility at Time of Submission: Yes  Response Communicated to:: , Accepting Facility Liaison  Authorization Number:: [id number]              KUSH Aparicio

## 2025-01-20 NOTE — THERAPY TREATMENT NOTE
Patient Name: Lucia Ellis  : 1950    MRN: [id number]                              Today's Date: 2025       Admit Date: 2025    Visit Dx:     ICD-10-CM ICD-9-CM   1. Altered mental status, unspecified altered mental status type  R41.82 780.97     Patient Active Problem List   Diagnosis    Gastroesophageal reflux disease    Malignant neoplasm of breast    Depression    Folliculitis    Generalized anxiety disorder    Hyperlipidemia    Essential hypertension    Status post total right knee replacement    Rectal hemorrhage    Vitamin D deficiency    Drug therapy    Acute cholecystitis    Uncontrolled type 2 diabetes mellitus with hypoglycemia without coma    Myoclonus    Type 2 diabetes mellitus with hyperglycemia    Hypokalemia    New onset seizure    Focal motor seizure    Vascular dementia, moderate, without behavioral disturbance, psychotic disturbance, mood disturbance, and anxiety    Stroke-like symptoms    CVA (cerebral vascular accident)    Lung nodules    Hypokalemia    History of stroke    Generalized weakness    Severe malnutrition    Gastritis without bleeding    Abnormal CT scan, stomach    AMS (altered mental status)    Moderate protein-calorie malnutrition     Past Medical History:   Diagnosis Date    Breast cancer     Dementia     Diabetes mellitus     Hypertension     Memory loss      Past Surgical History:   Procedure Laterality Date    CHOLECYSTECTOMY      ENDOSCOPY N/A 2024    Procedure: ESOPHAGOGASTRODUODENOSCOPY;  Surgeon: Clive More MD;  Location: [facility] ENDOSCOPY;  Service: Gastroenterology;  Laterality: N/A;  PREOP/ ABNORMAL CT OF STOMACH- POSTOP/ GASTRITIS    HYSTERECTOMY      MASTECTOMY Right 1995    TOTAL KNEE ARTHROPLASTY Right       General Information       Row Name 25 3743          Physical Therapy Time and Intention    Document Type therapy note (daily note)  -PC     Mode of Treatment physical therapy  -PC       Row Name 25 4359           General Information    Existing Precautions/Restrictions fall  -PC       Row Name 01/20/25 1340          Cognition    Orientation Status (Cognition) oriented to;person;disoriented to;place;situation;time  -PC       Row Name 01/20/25 1340          Safety Issues/Impairments Affecting Functional Mobility    Safety Issues Affecting Function (Mobility) insight into deficits/self-awareness;impulsivity;judgment;positioning of assistive device;safety precautions follow-through/compliance;sequencing abilities  -PC     Impairments Affecting Function (Mobility) cognition  -PC               User Key  (r) = Recorded By, (t) = Taken By, (c) = Cosigned By      Initials Name Provider Type    PC Lolis Oro, PT Physical Therapist                   Mobility       Row Name 01/20/25 1341          Bed Mobility    Supine-Sit [place] (Bed Mobility) standby assist  -PC       Row Name 01/20/25 1341          Sit-Stand Transfer    Sit-Stand [place] (Transfers) contact guard;standby assist  -PC     Comment, (Sit-Stand Transfer) HHA  -PC       Row Name 01/20/25 1341          Gait/Stairs (Locomotion)    [place] Level (Gait) contact guard  -PC     Assistive Device (Gait) --  HHA  -PC     Distance in Feet (Gait) 40  -PC     Deviations/Abnormal Patterns (Gait) gait speed decreased;ataxic;bina decreased;stride length decreased  -PC     Bilateral Gait Deviations forward flexed posture  -PC     Comment, (Gait/Stairs) worked on walking without walker to challenge balance and sequencing, pt occasionally reaching for walls and furniture  -PC               User Key  (r) = Recorded By, (t) = Taken By, (c) = Cosigned By      Initials Name Provider Type    PC Lolis Oro PT Physical Therapist                   Obj/Interventions       Row Name 01/20/25 1342          Motor Skills    Therapeutic Exercise --  perf AP, LAQ, hip flexion in sitting, 10 reps  -PC       Row Name 01/20/25 1342          Balance    Static Sitting Balance  modified independence  -PC     Position, Sitting Balance sitting edge of bed  -PC     Static Standing Balance standby assist;contact guard  -PC     Dynamic Standing Balance contact guard  -PC               User Key  (r) = Recorded By, (t) = Taken By, (c) = Cosigned By      Initials Name Provider Type    PC Lolis Oro PT Physical Therapist                   Goals/Plan       Row Name 01/20/25 7321          Bed Mobility Goal 1 (PT)    Activity/Assistive Device (Bed Mobility Goal 1, PT) bed mobility activities, all  -PC     [place] Level/Cues Needed (Bed Mobility Goal 1, PT) modified independence  -PC     Time Frame (Bed Mobility Goal 1, PT) short term goal (STG);1 week  -PC       Row Name 01/20/25 8482          Gait Training Goal 1 (PT)    Activity/Assistive Device (Gait Training Goal 1, PT) gait (walking locomotion);assistive device use  -PC     [place] Level (Gait Training Goal 1, PT) standby assist  -PC     Distance (Gait Training Goal 1, PT) 50  -PC     Time Frame (Gait Training Goal 1, PT) 1 week  -PC               User Key  (r) = Recorded By, (t) = Taken By, (c) = Cosigned By      Initials Name Provider Type    PC Lolis Oro, PT Physical Therapist                   Clinical Impression       Row Name 01/20/25 0285          Pain    Pretreatment Pain Rating 0/10 - no pain  -PC     Posttreatment Pain Rating 0/10 - no pain  -PC       Row Name 01/20/25 8024          Plan of Care Review    Plan of Care Reviewed With patient  -PC     Outcome Evaluation Pt is pleasantly confused, PT continues to focus on gait, balance, and strengthening, pt is below her baseline and not safe to return home, also lacks judgement and insight into deficits and is a fall risk, CCP assisting pt with discharge plans  -PC       Row Name 01/20/25 7884          Positioning and Restraints    Pre-Treatment Position in bed  -PC     Post Treatment Position chair  -PC     In Chair reclined;call light within reach;encouraged to call  for assist;exit alarm on  -PC               User Key  (r) = Recorded By, (t) = Taken By, (c) = Cosigned By      Initials Name Provider Type    Lolis Medeiros, PT Physical Therapist                   Outcome Measures       Row Name 01/20/25 1347 01/20/25 0831       How much help from another person do you currently need...    Turning from your back to your side while in flat bed without using bedrails? 4  -PC 4  -MD    Moving from lying on back to sitting on the side of a flat bed without bedrails? 4  -PC 4  -MD    Moving to and from a bed to a chair (including a wheelchair)? 3  -PC 3  -MD    Standing up from a chair using your arms (e.g., wheelchair, bedside chair)? 3  -PC 3  -MD    Climbing 3-5 steps with a railing? 3  -PC 3  -MD    To walk in hospital room? 3  -PC 3  -MD    AM-PAC 6 Clicks Score (PT) 20  -PC 20  -MD    Highest Level of Mobility Goal 6 --> Walk 10 steps or more  -PC 6 --> Walk 10 steps or more  -MD              User Key  (r) = Recorded By, (t) = Taken By, (c) = Cosigned By      Initials Name Provider Type    Lolis Medeiros PT Physical Therapist    Payton Trujillo, RN Registered Nurse                                 Physical Therapy Education       Title: PT OT SLP Therapies (Done)       Topic: Physical Therapy (Done)       Point: Mobility training (Done)       Learning Progress Summary            Patient Acceptance, E,D, DU by PC at 1/20/2025 1347    Acceptance, E,D, NR by PC at 1/16/2025 1354    Acceptance, E,D, NR by PC at 1/13/2025 1129    Acceptance, E,D, NR by SRIDHAR at 1/10/2025 1536    Acceptance, E, VU by JS at 1/10/2025 0505    Acceptance, E,D, DU by PC at 1/8/2025 1348    Acceptance, E,D, NR by PC at 1/7/2025 1532    Acceptance, E, NR,NL by RS at 1/5/2025 1151                      Point: Home exercise program (Done)       Learning Progress Summary            Patient Acceptance, E,D, DU by PC at 1/20/2025 1347    Acceptance, E,D, NR by PC at 1/16/2025 1354    Acceptance, E,D, NR  by PC at 1/13/2025 1129    Acceptance, E,D, NR by  at 1/10/2025 1536    Acceptance, E, NR,NL by RS at 1/5/2025 1151                      Point: Body mechanics (Done)       Learning Progress Summary            Patient Acceptance, E,D, DU by PC at 1/20/2025 1347    Acceptance, E,D, NR by PC at 1/16/2025 1354    Acceptance, E,D, NR by PC at 1/13/2025 1129    Acceptance, E,D, NR by  at 1/10/2025 1536    Acceptance, E, VU by JS at 1/10/2025 0505    Acceptance, E,D, DU by PC at 1/8/2025 1348    Acceptance, E,D, NR by PC at 1/7/2025 1532    Acceptance, E, NR,NL by RS at 1/5/2025 1151                      Point: Precautions (Done)       Learning Progress Summary            Patient Acceptance, E,D, DU by  at 1/20/2025 1347    Acceptance, E,D, NR by PC at 1/16/2025 1354    Acceptance, E,D, NR by PC at 1/13/2025 1129    Acceptance, E,D, NR by  at 1/10/2025 1536    Acceptance, E, VU by  at 1/10/2025 0505    Acceptance, E,D, DU by  at 1/8/2025 1348    Acceptance, E,D, NR by PC at 1/7/2025 1532    Acceptance, E, NR,NL by RS at 1/5/2025 1151                                      User Key       Initials Effective Dates Name Provider Type Discipline     06/16/21 -  Lolis Oro PT Physical Therapist PT     03/07/18 -  Jesica Ellis PTA Physical Therapist Assistant PT     10/01/20 -  Payton Rodriguez, RN Registered Nurse Nurse    RS 06/16/21 -  Lizzie Vo PT Physical Therapist PT                  PT Recommendation and Plan     Progress: improving  Outcome Evaluation: Pt is pleasantly confused, PT continues to focus on gait, balance, and strengthening, pt is below her baseline and not safe to return home, also lacks judgement and insight into deficits and is a fall risk, CCP assisting pt with discharge plans     Time Calculation:         PT Charges       Row Name 01/20/25 1348             Time Calculation    Start Time 1132  -PC      Stop Time 1141  -PC      Time Calculation (min) 9 min  -PC      PT  Received On 01/20/25  -PC      PT - Next Appointment 01/22/25  -PC                User Key  (r) = Recorded By, (t) = Taken By, (c) = Cosigned By      Initials Name Provider Type    PC Lolis Oro PT Physical Therapist                  Therapy Charges for Today       Code Description Service Date Service Provider Modifiers Qty    7541948 HC PT THER PROC EA 15 MIN 1/20/2025 Lolis Oro PT GP 1            PT G-Codes  Outcome Measure Options: AM-PAC 6 Clicks Basic Mobility (PT)  AM-PAC 6 Clicks Score (PT): 20  PT Discharge Summary  Anticipated Discharge Disposition (PT): skilled nursing facility, extended care facility    Lolis Oro, PT  1/20/2025

## 2025-01-20 NOTE — PROGRESS NOTES
Name: Lucia Ellis ADMIT: 2025   : 1950  PCP: Everardo Mazariegos MD    MRN: [id number] LOS: 15 days   AGE/SEX: 74 y.o. female  ROOM: [facility]     Subjective   Subjective   Patient is lying in bed and is awake alert and oriented to person and place.  Otherwise remains confused and denies any nausea, vomiting abdominal pain, chest pain, shortness of breath.       Objective   Objective   Vital Signs  Temp:  [96.8 °F (36 °C)-98.4 °F (36.9 °C)] 98.4 °F (36.9 °C)  Heart Rate:  [73-79] 79  Resp:  [16] 16  BP: (118-148)/(62-79) 118/62  SpO2:  [95 %-97 %] 95 %  on   ;   Device (Oxygen Therapy): room air  Body mass index is 18.79 kg/m².  Physical Exam  Vitals reviewed.   Constitutional:       General: She is not in acute distress.     Appearance: She is not ill-appearing.   HENT:      Head: Normocephalic and atraumatic.      Mouth/Throat:      Mouth: Mucous membranes are moist.   Eyes:      Extraocular Movements: Extraocular movements intact.      Pupils: Pupils are equal, round, and reactive to light.   Cardiovascular:      Rate and Rhythm: Normal rate and regular rhythm.      Pulses: Normal pulses.      Heart sounds: Normal heart sounds.   Pulmonary:      Effort: Pulmonary effort is normal. No respiratory distress.      Breath sounds: Normal breath sounds.   Abdominal:      General: There is no distension.      Palpations: Abdomen is soft.      Tenderness: There is no abdominal tenderness.   Musculoskeletal:      Cervical back: Neck supple.      Right lower leg: No edema.      Left lower leg: No edema.   Skin:     General: Skin is warm and dry.   Neurological:      Mental Status: She is alert. She is disoriented.   Psychiatric:         Mood and Affect: Mood normal.       Results Review     I reviewed the patient's new clinical results.                        Glucose   Date/Time Value Ref Range Status   2025 1704 115 70 - 130 mg/dL Final   2025 1117 193 (H) 70 - 130 mg/dL Final   2025 0808 106  70 - 130 mg/dL Final   01/19/2025 2236 141 (H) 70 - 130 mg/dL Final   01/19/2025 1645 141 (H) 70 - 130 mg/dL Final   01/19/2025 1123 176 (H) 70 - 130 mg/dL Final   01/19/2025 0739 149 (H) 70 - 130 mg/dL Final       No radiology results for the last day    I have personally reviewed all medications:  Scheduled Medications  amLODIPine, 10 mg, Oral, Daily  aspirin, 81 mg, Oral, Daily  atorvastatin, 80 mg, Oral, Daily  cetirizine, 10 mg, Oral, Daily  donepezil, 10 mg, Oral, Daily  empagliflozin, 10 mg, Oral, Daily  enoxaparin, 40 mg, Subcutaneous, Nightly  escitalopram, 10 mg, Oral, Daily  folic acid, 1 mg, Oral, Daily  insulin lispro, 2-7 Units, Subcutaneous, 4x Daily AC & at Bedtime  levETIRAcetam, 1,000 mg, Oral, BID  memantine, 5 mg, Oral, BID  OLANZapine, 5 mg, Oral, Nightly  pantoprazole, 40 mg, Oral, QAM  sodium chloride, 10 mL, Intravenous, Q12H    Infusions   Diet  Diet: Regular/House, Diabetic; Consistent Carbohydrate; Fluid Consistency: Thin (IDDSI 0)    I have personally reviewed:  [x]  Laboratory   []  Microbiology   []  Radiology   []  EKG/Telemetry  []  Cardiology/Vascular   []  Pathology    []  Records       Assessment/Plan     Active Hospital Problems    Diagnosis  POA    **AMS (altered mental status) [R41.82]  Yes    Moderate protein-calorie malnutrition [E44.0]  Yes    History of stroke [Z86.73]  Not Applicable    Vascular dementia, moderate, without behavioral disturbance, psychotic disturbance, mood disturbance, and anxiety [F01.B0]  Yes    Type 2 diabetes mellitus with hyperglycemia [E11.65]  Yes    Hypokalemia [E87.6]  Yes    Essential hypertension [I10]  Yes    Hyperlipidemia [E78.5]  Yes      Resolved Hospital Problems   No resolved problems to display.     Ms. Ellis is a 74 y.o. female with a history of dementia, DM 2, hyperlipidemia and hypertension that presented to the hospital with altered mental status and failure to thrive.  She was apparently living with her adult son but was noted to  be without any electricity or running water.  Her sister and nephew provided history on admission and emergency personnel was called to the patient's home for a wellness check.       1.Altered mental status/vascular dementia with progression over time.  Continue with donepezil and amantadine.  On Zyprexa at nighttime for agitation.  MRI could not be done secondary to unable to completion of screening sheet.  Neurology did evaluate and signed off.    2.  History of seizures on Keppra.    3.  Diabetes mellitus, on corrective dose insulin and Jardiance which will be continued.  Blood sugars are in acceptable range.    4.  Hypertension, on amlodipine at will be continued.  Blood pressure is reasonably well-controlled.    5.  Hyperlipidemia, on statins.    6.  CODE STATUS is full code.    7.  Disposition, to rehab once a bed is available.    Copy text on this note has been reviewed by me on 1/20/2025    Leonel Lopez MD  [facility]  01/20/25  17:29 EST

## 2025-01-20 NOTE — PROGRESS NOTES
Continued Stay Note  [facility]     Patient Name: Lucia Ellis  MRN: [id number]  Today's Date: 1/20/2025    Admit Date: 1/4/2025    Plan: [place] PENDING Pre-cert   Discharge Plan       Row Name 01/20/25 1359       Plan    Plan [place] PENDING Pre-cert    Plan Comments Per Keshia Lobato, patient accepted PENDING Pre-cert. Bed available and okay to begin Pre-cert. Msg sent to [facility] Authorizations [email or web address]> to begin Pre-cert.      Row Name 01/20/25 1207       Plan    Plan SNF to poss LTC    Plan Comments Msg sent to Keshia Lobato regarding referral                   Discharge Codes    No documentation.                 Expected Discharge Date and Time       Expected Discharge Date Expected Discharge Time    Jan 21, 2025               Ariella Valdes RN

## 2025-01-20 NOTE — PLAN OF CARE
Goal Outcome Evaluation:              Outcome Evaluation: VSS. KENNETH. Fall precautions maintained. Sat up in chair. Worked with CONNOR Lobato pending pre-cert. BM today. Up with assist and walker. Confused.

## 2025-01-21 LAB
GLUCOSE BLDC GLUCOMTR-MCNC: 122 MG/DL (ref 70–130)
GLUCOSE BLDC GLUCOMTR-MCNC: 130 MG/DL (ref 70–130)
GLUCOSE BLDC GLUCOMTR-MCNC: 158 MG/DL (ref 70–130)
GLUCOSE BLDC GLUCOMTR-MCNC: 225 MG/DL (ref 70–130)

## 2025-01-21 PROCEDURE — 63710000001 INSULIN LISPRO (HUMAN) PER 5 UNITS: Performed by: NURSE PRACTITIONER

## 2025-01-21 PROCEDURE — 82948 REAGENT STRIP/BLOOD GLUCOSE: CPT

## 2025-01-21 PROCEDURE — 25010000002 ENOXAPARIN PER 10 MG: Performed by: HOSPITALIST

## 2025-01-21 RX ADMIN — MEMANTINE HYDROCHLORIDE 5 MG: 5 TABLET, FILM COATED ORAL at 06:44

## 2025-01-21 RX ADMIN — AMLODIPINE BESYLATE 10 MG: 10 TABLET ORAL at 09:37

## 2025-01-21 RX ADMIN — EMPAGLIFLOZIN 10 MG: 10 TABLET, FILM COATED ORAL at 09:37

## 2025-01-21 RX ADMIN — PANTOPRAZOLE SODIUM 40 MG: 40 TABLET, DELAYED RELEASE ORAL at 06:44

## 2025-01-21 RX ADMIN — ATORVASTATIN CALCIUM 80 MG: 20 TABLET, FILM COATED ORAL at 09:37

## 2025-01-21 RX ADMIN — INSULIN LISPRO 3 UNITS: 100 INJECTION, SOLUTION INTRAVENOUS; SUBCUTANEOUS at 21:56

## 2025-01-21 RX ADMIN — FOLIC ACID 1 MG: 1 TABLET ORAL at 09:37

## 2025-01-21 RX ADMIN — ASPIRIN 81 MG: 81 TABLET, COATED ORAL at 09:37

## 2025-01-21 RX ADMIN — ESCITALOPRAM OXALATE 10 MG: 10 TABLET, FILM COATED ORAL at 09:37

## 2025-01-21 RX ADMIN — Medication 10 ML: at 09:38

## 2025-01-21 RX ADMIN — MEMANTINE HYDROCHLORIDE 5 MG: 5 TABLET, FILM COATED ORAL at 18:01

## 2025-01-21 RX ADMIN — ACETAMINOPHEN 650 MG: 325 TABLET, FILM COATED ORAL at 21:20

## 2025-01-21 RX ADMIN — LEVETIRACETAM 1000 MG: 500 TABLET, FILM COATED ORAL at 06:44

## 2025-01-21 RX ADMIN — DONEPEZIL HYDROCHLORIDE 10 MG: 10 TABLET, FILM COATED ORAL at 09:37

## 2025-01-21 RX ADMIN — Medication 10 ML: at 21:22

## 2025-01-21 RX ADMIN — LEVETIRACETAM 1000 MG: 500 TABLET, FILM COATED ORAL at 18:01

## 2025-01-21 RX ADMIN — INSULIN LISPRO 2 UNITS: 100 INJECTION, SOLUTION INTRAVENOUS; SUBCUTANEOUS at 14:36

## 2025-01-21 RX ADMIN — CETIRIZINE HYDROCHLORIDE 10 MG: 10 TABLET, FILM COATED ORAL at 09:37

## 2025-01-21 RX ADMIN — ENOXAPARIN SODIUM 40 MG: 100 INJECTION SUBCUTANEOUS at 21:20

## 2025-01-21 RX ADMIN — OLANZAPINE 5 MG: 2.5 TABLET, FILM COATED ORAL at 21:20

## 2025-01-21 NOTE — PROGRESS NOTES
Continued Stay Note  [facility]     Patient Name: Lucia Ellis  MRN: [id number]  Today's Date: 1/21/2025    Admit Date: 1/4/2025    Plan: [place] PENDING Pre-cert   Discharge Plan       Row Name 01/21/25 1036       Plan    Plan [place] PENDING Pre-cert    Plan Comments Per Anselmo, Pre-cert is still PENDING.                   Discharge Codes    No documentation.                 Expected Discharge Date and Time       Expected Discharge Date Expected Discharge Time    Jan 21, 2025               Ariella Valdes RN

## 2025-01-21 NOTE — PLAN OF CARE
Goal Outcome Evaluation:  Plan of Care Reviewed With: patient        Progress: improving  Outcome Evaluation: vss, pt AOx3 and very forgetful, up with assist, bed alarm for safety, awaiting for pre-cert to [place], continue to monitor the pt.

## 2025-01-21 NOTE — PLAN OF CARE
Goal Outcome Evaluation:  Plan of Care Reviewed With: patient        Progress: improving  Outcome Evaluation: VSS, up with assist x1 and walker, falls precautions maintained, precert pending, no c/o of pain, monitoring blood sugars

## 2025-01-22 LAB
GLUCOSE BLDC GLUCOMTR-MCNC: 127 MG/DL (ref 70–130)
GLUCOSE BLDC GLUCOMTR-MCNC: 149 MG/DL (ref 70–130)
GLUCOSE BLDC GLUCOMTR-MCNC: 158 MG/DL (ref 70–130)
GLUCOSE BLDC GLUCOMTR-MCNC: 171 MG/DL (ref 70–130)

## 2025-01-22 PROCEDURE — 82948 REAGENT STRIP/BLOOD GLUCOSE: CPT

## 2025-01-22 PROCEDURE — 63710000001 INSULIN LISPRO (HUMAN) PER 5 UNITS: Performed by: NURSE PRACTITIONER

## 2025-01-22 PROCEDURE — 25010000002 ENOXAPARIN PER 10 MG: Performed by: HOSPITALIST

## 2025-01-22 PROCEDURE — 97110 THERAPEUTIC EXERCISES: CPT

## 2025-01-22 RX ADMIN — MEMANTINE HYDROCHLORIDE 5 MG: 5 TABLET, FILM COATED ORAL at 17:22

## 2025-01-22 RX ADMIN — AMLODIPINE BESYLATE 10 MG: 10 TABLET ORAL at 09:19

## 2025-01-22 RX ADMIN — ESCITALOPRAM OXALATE 10 MG: 10 TABLET, FILM COATED ORAL at 09:19

## 2025-01-22 RX ADMIN — OLANZAPINE 5 MG: 2.5 TABLET, FILM COATED ORAL at 20:33

## 2025-01-22 RX ADMIN — CETIRIZINE HYDROCHLORIDE 10 MG: 10 TABLET, FILM COATED ORAL at 09:19

## 2025-01-22 RX ADMIN — INSULIN LISPRO 2 UNITS: 100 INJECTION, SOLUTION INTRAVENOUS; SUBCUTANEOUS at 22:25

## 2025-01-22 RX ADMIN — ATORVASTATIN CALCIUM 80 MG: 20 TABLET, FILM COATED ORAL at 09:19

## 2025-01-22 RX ADMIN — SENNOSIDES AND DOCUSATE SODIUM 2 TABLET: 50; 8.6 TABLET ORAL at 12:24

## 2025-01-22 RX ADMIN — ASPIRIN 81 MG: 81 TABLET, COATED ORAL at 09:19

## 2025-01-22 RX ADMIN — PANTOPRAZOLE SODIUM 40 MG: 40 TABLET, DELAYED RELEASE ORAL at 05:36

## 2025-01-22 RX ADMIN — LEVETIRACETAM 1000 MG: 500 TABLET, FILM COATED ORAL at 05:36

## 2025-01-22 RX ADMIN — FOLIC ACID 1 MG: 1 TABLET ORAL at 09:19

## 2025-01-22 RX ADMIN — Medication 10 ML: at 09:20

## 2025-01-22 RX ADMIN — DONEPEZIL HYDROCHLORIDE 10 MG: 10 TABLET, FILM COATED ORAL at 09:19

## 2025-01-22 RX ADMIN — INSULIN LISPRO 2 UNITS: 100 INJECTION, SOLUTION INTRAVENOUS; SUBCUTANEOUS at 12:22

## 2025-01-22 RX ADMIN — Medication 10 ML: at 20:36

## 2025-01-22 RX ADMIN — MEMANTINE HYDROCHLORIDE 5 MG: 5 TABLET, FILM COATED ORAL at 05:36

## 2025-01-22 RX ADMIN — EMPAGLIFLOZIN 10 MG: 10 TABLET, FILM COATED ORAL at 09:19

## 2025-01-22 RX ADMIN — LEVETIRACETAM 1000 MG: 500 TABLET, FILM COATED ORAL at 17:22

## 2025-01-22 RX ADMIN — ENOXAPARIN SODIUM 40 MG: 100 INJECTION SUBCUTANEOUS at 20:33

## 2025-01-22 NOTE — PLAN OF CARE
Goal Outcome Evaluation:  Plan of Care Reviewed With: patient        Progress: improving  Outcome Evaluation: vss, pt is alert and oriented but forgetful, up with assist and using a walker, bed alarm for safety, pre-cert pending, continue to monitor the pt.

## 2025-01-22 NOTE — CASE MANAGEMENT/SOCIAL WORK
Post-Acute Authorization Submission      Post Acute Pre-Cert Documentation  Request Submitted by Facility - Type:: Hospital  Post-Acute Authorization Type Submitted:: SNF  Date Post Acute Pre-Cert Inititated per Facility: 01/20/25  Accepting Facility: [facility]  Hospital Discharge Date Requested: 01/21/25  All Clinicals Submitted?: Yes  Had Accepting Facility at Time of Submission: Yes  Response Communicated to::   Authorization Number:: [id number]              KUSH Aparicio

## 2025-01-22 NOTE — CASE MANAGEMENT/SOCIAL WORK
Post-Acute Authorization Submission      Post Acute Pre-Cert Documentation  Request Submitted by Facility - Type:: Hospital  Post-Acute Authorization Type Submitted:: SNF  Date Post Acute Pre-Cert Inititated per Facility: 01/20/25  Accepting Facility: [facility]  Hospital Discharge Date Requested: 01/21/25  All Clinicals Submitted?: Yes  Had Accepting Facility at Time of Submission: Yes  Response Received from Insurance?: Denial  Action Taken by Requesting Facility:: P2P Completed  Response Communicated to::   Authorization Number:: [id number]  Post Acute Pre-Cert Initiated Comment: [facility] FAST APPEALS INFO:  # [phone]  FAX# [phone]              KUSH Aparicio

## 2025-01-22 NOTE — CASE MANAGEMENT/SOCIAL WORK
Post-Acute Authorization Submission      Post Acute Pre-Cert Documentation  Request Submitted by Facility - Type:: Hospital  Post-Acute Authorization Type Submitted:: SNF  Date Post Acute Pre-Cert Inititated per Facility: 01/20/25  Date Post Acute Pre-Cert Completed: 01/22/25  Accepting Facility: [facility]  Hospital Discharge Date Requested: 01/21/25  All Clinicals Submitted?: Yes  Had Accepting Facility at Time of Submission: Yes  Response Received from Insurance?: Denial  Action Taken by Requesting Facility:: P2P Completed  Response Communicated to::   Authorization Number:: [id number]  Post Acute Pre-Cert Initiated Comment: [facility] FAST APPEALS INFO:  # [phone]  FAX# [phone]              KUSH Aparicio

## 2025-01-22 NOTE — PROGRESS NOTES
Name: Lucia Ellis ADMIT: 2025   : 1950  PCP: Everardo Mazariegos MD    MRN: [id number] LOS: 17 days   AGE/SEX: 74 y.o. female  ROOM: [facility]     Subjective   Subjective   Patient is sitting up on the chair and is awake alert and oriented to person and place.  Mental status is close to baseline and remains confused.  Denies any nausea, vomiting abdominal pain, chest pain, shortness of breath.       Objective   Objective   Vital Signs  Temp:  [96.9 °F (36.1 °C)-98.5 °F (36.9 °C)] 97 °F (36.1 °C)  Heart Rate:  [77-90] 82  Resp:  [18] 18  BP: (131-141)/(61-72) 134/69  SpO2:  [94 %-97 %] 97 %  on   ;   Device (Oxygen Therapy): room air  Body mass index is 18.79 kg/m².  Physical Exam  Vitals reviewed.   Constitutional:       General: She is not in acute distress.     Appearance: She is not ill-appearing.   HENT:      Head: Normocephalic and atraumatic.      Mouth/Throat:      Mouth: Mucous membranes are moist.   Eyes:      Extraocular Movements: Extraocular movements intact.      Pupils: Pupils are equal, round, and reactive to light.   Cardiovascular:      Rate and Rhythm: Normal rate and regular rhythm.      Pulses: Normal pulses.      Heart sounds: Normal heart sounds.   Pulmonary:      Effort: Pulmonary effort is normal. No respiratory distress.      Breath sounds: Normal breath sounds.   Abdominal:      General: There is no distension.      Palpations: Abdomen is soft.      Tenderness: There is no abdominal tenderness.   Musculoskeletal:      Cervical back: Neck supple.      Right lower leg: No edema.      Left lower leg: No edema.   Skin:     General: Skin is warm and dry.   Neurological:      Mental Status: She is alert. She is disoriented.   Psychiatric:         Mood and Affect: Mood normal.       Results Review     I reviewed the patient's new clinical results.                        Glucose   Date/Time Value Ref Range Status   2025 1202 158 (H) 70 - 130 mg/dL Final   2025 0801 127 70 -  130 mg/dL Final   01/21/2025 2143 225 (H) 70 - 130 mg/dL Final   01/21/2025 1702 130 70 - 130 mg/dL Final   01/21/2025 1232 158 (H) 70 - 130 mg/dL Final   01/21/2025 0819 122 70 - 130 mg/dL Final   01/20/2025 2244 229 (H) 70 - 130 mg/dL Final       No radiology results for the last day    I have personally reviewed all medications:  Scheduled Medications  amLODIPine, 10 mg, Oral, Daily  aspirin, 81 mg, Oral, Daily  atorvastatin, 80 mg, Oral, Daily  cetirizine, 10 mg, Oral, Daily  donepezil, 10 mg, Oral, Daily  empagliflozin, 10 mg, Oral, Daily  enoxaparin, 40 mg, Subcutaneous, Nightly  escitalopram, 10 mg, Oral, Daily  folic acid, 1 mg, Oral, Daily  insulin lispro, 2-7 Units, Subcutaneous, 4x Daily AC & at Bedtime  levETIRAcetam, 1,000 mg, Oral, BID  memantine, 5 mg, Oral, BID  OLANZapine, 5 mg, Oral, Nightly  pantoprazole, 40 mg, Oral, QAM  sodium chloride, 10 mL, Intravenous, Q12H    Infusions   Diet  Diet: Regular/House, Diabetic; Consistent Carbohydrate; Fluid Consistency: Thin (IDDSI 0)    I have personally reviewed:  [x]  Laboratory   []  Microbiology   []  Radiology   []  EKG/Telemetry  []  Cardiology/Vascular   []  Pathology    []  Records       Assessment/Plan     Active Hospital Problems    Diagnosis  POA    **AMS (altered mental status) [R41.82]  Yes    Moderate protein-calorie malnutrition [E44.0]  Yes    History of stroke [Z86.73]  Not Applicable    Vascular dementia, moderate, without behavioral disturbance, psychotic disturbance, mood disturbance, and anxiety [F01.B0]  Yes    Type 2 diabetes mellitus with hyperglycemia [E11.65]  Yes    Hypokalemia [E87.6]  Yes    Essential hypertension [I10]  Yes    Hyperlipidemia [E78.5]  Yes      Resolved Hospital Problems   No resolved problems to display.     Ms. Ellis is a 74 y.o. female with a history of dementia, DM 2, hyperlipidemia and hypertension that presented to the hospital with altered mental status and failure to thrive.  She was apparently living  with her adult son but was noted to be without any electricity or running water.  Her sister and nephew provided history on admission and emergency personnel was called to the patient's home for a wellness check.       1.Altered mental status/vascular dementia with progression over time.  Continue with donepezil and amantadine.  On Zyprexa at nighttime for agitation and currently no behavioral issues.  MRI could not be done secondary to unable to completion of screening sheet.  Neurology did evaluate and signed off.    2.  History of seizures on Keppra.    3.  Diabetes mellitus, on corrective dose insulin and Jardiance which will be continued.  Blood sugars are in acceptable range.    4.  Hypertension, on amlodipine at will be continued.  Blood pressure is reasonably well-controlled.    5.  Hyperlipidemia, on statins.    6.  CODE STATUS is full code.    7.  Disposition, to rehab versus long-term care once a bed is available.    Copy text on this note has been reviewed by me on 1/22/2025    Leonel Lopez MD  [facility]  01/22/25  17:17 EST

## 2025-01-22 NOTE — CASE MANAGEMENT/SOCIAL WORK
Continued Stay Note  [facility]     Patient Name: Lucia Ellis  MRN: [id number]  Today's Date: 1/22/2025    Admit Date: 1/4/2025    Plan: [facility] pending financial documents   Discharge Plan       Row Name 01/22/25 1423       Plan    Plan [facility] pending financial documents    Plan Comments Per-cert denied. Doctor completed Peer to Peer and denial upheld. CCP spoke with Keshia/[facility] who states the business office is going to reach out to the family for financial documents before the patient can be admitted Medicaid pending. CCP to follow. CD, CSW.                   Discharge Codes    No documentation.                 Expected Discharge Date and Time       Expected Discharge Date Expected Discharge Time    Jan 21, 2025

## 2025-01-22 NOTE — PROGRESS NOTES
"Nutrition Services    Patient Name:  Lucia Ellis  YOB: 1950  MRN: [id number]  Admit Date:  1/4/2025    Assessment Date:  01/22/25    Summary: 73 yo female adm with AMS, vascular dementia. PMH: DM2, dementia, HLD, HTN.   Labs: Na 140, K 4.0, BUN 23, Cr 0.65, gluc 158-225    Pt is awake and alert, but confused. Eating well. Likes magic cups. Denies N/V, abdominal pain.     PLAN  - continue magic cup TID  - continue PO >75%    RD to follow     CLINICAL NUTRITION ASSESSMENT      Reason for Assessment BMI, MST score 2+     Diagnosis/Problem   AMS, failure to thrive    Medical/Surgical History Past Medical History:   Diagnosis Date    Breast cancer     Dementia     Diabetes mellitus     Hypertension     Memory loss        Past Surgical History:   Procedure Laterality Date    CHOLECYSTECTOMY      ENDOSCOPY N/A 5/27/2024    Procedure: ESOPHAGOGASTRODUODENOSCOPY;  Surgeon: Clive More MD;  Location: [facility] ENDOSCOPY;  Service: Gastroenterology;  Laterality: N/A;  PREOP/ ABNORMAL CT OF STOMACH- POSTOP/ GASTRITIS    HYSTERECTOMY      MASTECTOMY Right 1995    TOTAL KNEE ARTHROPLASTY Right         Anthropometrics        Current Height  Current Weight  BMI kg/m2 Height: 154.9 cm (61\")  Weight: 45.1 kg (99 lb 6.8 oz) (01/04/25 2351)  Body mass index is 18.79 kg/m².   Adjusted BMI (if applicable)    BMI Category Normal/Healthy (18.4 - 24.9)   Ideal Body Weight (IBW) 105#+/-10%   Usual Body Weight (UBW) 110-120#   Weight Trend Loss, Amount/Timeframe: Loss of 10% in  past 6 months   Weight History Wt Readings from Last 30 Encounters:   01/04/25 2351 45.1 kg (99 lb 6.8 oz)   10/24/24 0950 44.5 kg (98 lb)   08/09/24 0929 48.5 kg (107 lb)   06/06/24 1351 49.9 kg (110 lb)   05/28/24 0619 50.8 kg (112 lb 1.6 oz)   05/27/24 0616 51.6 kg (113 lb 12.1 oz)   05/25/24 0500 51.8 kg (114 lb 3.2 oz)   05/24/24 0550 53.6 kg (118 lb 2.7 oz)   05/23/24 0515 50.1 kg (110 lb 7.2 oz)   05/22/24 1500 49.5 kg (109 lb 2 oz) " "  05/22/24 0701 54.1 kg (119 lb 4.3 oz)   05/21/24 1444 59 kg (130 lb)   05/21/24 1313 50.8 kg (112 lb)   04/19/24 1237 51.8 kg (114 lb 4.8 oz)   03/18/24 1111 49.9 kg (110 lb)   03/07/24 1313 51.7 kg (114 lb)   01/10/24 1220 63.5 kg (140 lb)   12/14/23 0600 57.7 kg (127 lb 3.3 oz)   12/13/23 0802 58.6 kg (129 lb 3 oz)   12/12/23 0834 58.9 kg (129 lb 13.6 oz)   12/08/23 1122 57.2 kg (126 lb)   12/08/23 2131 57.2 kg (126 lb)   05/23/23 1132 57.2 kg (126 lb)   03/02/23 1514 58.1 kg (128 lb)   03/01/23 1243 57.9 kg (127 lb 9.6 oz)   01/11/23 1407 60.3 kg (133 lb)   12/29/22 0900 58.1 kg (128 lb)   12/30/22 1622 58.1 kg (128 lb)   12/02/22 0801 59.9 kg (132 lb)   11/03/21 1038 59.2 kg (130 lb 8 oz)   10/19/21 1414 108 kg (237 lb 8 oz)   09/29/21 1133 59.5 kg (131 lb 3.2 oz)   08/12/21 0856 59 kg (130 lb)   06/21/21 1052 61.3 kg (135 lb 1.6 oz)   05/07/21 1621 60.8 kg (134 lb 0.6 oz)   04/08/21 1559 60.8 kg (134 lb)   04/06/21 1013 61.7 kg (136 lb)   03/02/21 1253 61.7 kg (136 lb)   06/16/20 1132 62.1 kg (136 lb 14.4 oz)   06/24/19 1230 64.3 kg (141 lb 12.8 oz)        Estimated/Assessed Needs       Energy Requirements    Weight for Calculation 45.1kg   Method for Estimation  30-35 kcal/kg   EST Needs (kcal/day) 9894-5187       Protein Requirements    Weight for Calculation 45.1kg   EST Protein Needs (g/kg) 1.2 - 1.5 gm/kg   EST Daily Needs (g/day) 54-68       Fluid Requirements     Method for Estimation 1 mL/kcal    Estimated Needs (mL/day)       Labs       Pertinent Labs          Invalid input(s): \"LABALBU\", \"PROT\"                COVID19   Date Value Ref Range Status   12/16/2023 Not Detected Not Detected - Ref. Range Final     Lab Results   Component Value Date    HGBA1C 7.30 (H) 01/04/2025          Medications           Scheduled Medications amLODIPine, 10 mg, Oral, Daily  aspirin, 81 mg, Oral, Daily  atorvastatin, 80 mg, Oral, Daily  cetirizine, 10 mg, Oral, Daily  donepezil, 10 mg, Oral, Daily  empagliflozin, 10 mg, " Oral, Daily  enoxaparin, 40 mg, Subcutaneous, Nightly  escitalopram, 10 mg, Oral, Daily  folic acid, 1 mg, Oral, Daily  insulin lispro, 2-7 Units, Subcutaneous, 4x Daily AC & at Bedtime  levETIRAcetam, 1,000 mg, Oral, BID  memantine, 5 mg, Oral, BID  OLANZapine, 5 mg, Oral, Nightly  pantoprazole, 40 mg, Oral, QAM  sodium chloride, 10 mL, Intravenous, Q12H       Infusions     PRN Medications   acetaminophen **OR** acetaminophen **OR** acetaminophen    senna-docusate sodium **AND** polyethylene glycol **AND** bisacodyl **AND** bisacodyl    calcium carbonate    Calcium Replacement - Follow Nurse / BPA Driven Protocol    dextrose    dextrose    glucagon (human recombinant)    hydrALAZINE    Magnesium Standard Dose Replacement - Follow Nurse / BPA Driven Protocol    ondansetron ODT **OR** ondansetron    Potassium Replacement - Follow Nurse / BPA Driven Protocol    sodium chloride    sodium chloride     Physical Findings          General Findings alert, disoriented   Oral/Mouth Cavity WDL   Edema  no edema   Gastrointestinal WDL, last bowel movement: 1/18     Skin  skin intact   Tubes/Drains/Lines none   NFPE See Malnutrition Severity Assessment   --  Malnutrition Severity Assessment      Patient meets criteria for : Moderate (non-severe) Malnutrition           Current Nutrition Orders & Evaluation of Intake       Oral Nutrition     Food Allergies NKFA   Current PO Diet Diet: Regular/House, Diabetic; Consistent Carbohydrate; Fluid Consistency: Thin (IDDSI 0)   Supplement n/a   PO Evaluation     % PO Intake %     Factors Affecting Intake: altered mental status   --  PES STATEMENT / NUTRITION DIAGNOSIS      Nutrition Dx Problem  Problem: Malnutrition (moderate)  Etiology: Medical Diagnosis - AMS, Dementia    Signs/Symptoms: BMI and Unintended Weight Change     NUTRITION INTERVENTION / PLAN OF CARE      Intervention Goal(s) Maintain intake, Accepts oral nutrition supplement, Continue positive trend, Maintain weight,  and Appropriate weight gain         RD Intervention/Action Supplement provided, Encourage intake, Continue to monitor, and Care plan reviewed   --      Prescription/Orders:       PO Diet       Supplements Add Magic Cups TID      Enteral Nutrition       Parenteral Nutrition    New Prescription Ordered? Continue same per protocol, No changes at this time   --      Monitor/Evaluation PO intake, Supplement intake, Weight, Skin status, GI status, Symptoms, Swallow function   Discharge Plan/Needs Pending clinical course   --    RD to follow per protocol.      Electronically signed by:  Mariana Yeager RD  01/22/25 12:23 EST

## 2025-01-22 NOTE — THERAPY TREATMENT NOTE
Patient Name: Lucia Ellis  : 1950    MRN: [id number]                              Today's Date: 2025       Admit Date: 2025    Visit Dx:     ICD-10-CM ICD-9-CM   1. Altered mental status, unspecified altered mental status type  R41.82 780.97     Patient Active Problem List   Diagnosis    Gastroesophageal reflux disease    Malignant neoplasm of breast    Depression    Folliculitis    Generalized anxiety disorder    Hyperlipidemia    Essential hypertension    Status post total right knee replacement    Rectal hemorrhage    Vitamin D deficiency    Drug therapy    Acute cholecystitis    Uncontrolled type 2 diabetes mellitus with hypoglycemia without coma    Myoclonus    Type 2 diabetes mellitus with hyperglycemia    Hypokalemia    New onset seizure    Focal motor seizure    Vascular dementia, moderate, without behavioral disturbance, psychotic disturbance, mood disturbance, and anxiety    Stroke-like symptoms    CVA (cerebral vascular accident)    Lung nodules    Hypokalemia    History of stroke    Generalized weakness    Severe malnutrition    Gastritis without bleeding    Abnormal CT scan, stomach    AMS (altered mental status)    Moderate protein-calorie malnutrition     Past Medical History:   Diagnosis Date    Breast cancer     Dementia     Diabetes mellitus     Hypertension     Memory loss      Past Surgical History:   Procedure Laterality Date    CHOLECYSTECTOMY      ENDOSCOPY N/A 2024    Procedure: ESOPHAGOGASTRODUODENOSCOPY;  Surgeon: Clive More MD;  Location: [facility] ENDOSCOPY;  Service: Gastroenterology;  Laterality: N/A;  PREOP/ ABNORMAL CT OF STOMACH- POSTOP/ GASTRITIS    HYSTERECTOMY      MASTECTOMY Right 1995    TOTAL KNEE ARTHROPLASTY Right       General Information       Row Name 25 1019          Physical Therapy Time and Intention    Document Type therapy note (daily note)  -PC     Mode of Treatment physical therapy  -PC       Row Name 25 1019           General Information    Existing Precautions/Restrictions fall  -PC               User Key  (r) = Recorded By, (t) = Taken By, (c) = Cosigned By      Initials Name Provider Type    PC Lolis Oro PT Physical Therapist                   Mobility       Row Name 01/22/25 1020          Bed Mobility    Supine-Sit [place] (Bed Mobility) supervision  -PC       Row Name 01/22/25 1020          Sit-Stand Transfer    Sit-Stand [place] (Transfers) contact guard;standby assist  -PC       Row Name 01/22/25 1020          Gait/Stairs (Locomotion)    [place] Level (Gait) contact guard  -PC     Distance in Feet (Gait) 35  -PC     Deviations/Abnormal Patterns (Gait) gait speed decreased;stride length decreased;ataxic  -PC     Bilateral Gait Deviations forward flexed posture  -PC     Comment, (Gait/Stairs) worked today on walking without walker to challenge balance, work on erect posture, and endurance  -PC               User Key  (r) = Recorded By, (t) = Taken By, (c) = Cosigned By      Initials Name Provider Type    PC Lolis Oro PT Physical Therapist                   Obj/Interventions       Row Name 01/22/25 1021          Motor Skills    Therapeutic Exercise --  AP, LAQ, hip flexion 10 reps  -PC       Row Name 01/22/25 1021          Balance    Dynamic Standing Balance contact guard  -PC               User Key  (r) = Recorded By, (t) = Taken By, (c) = Cosigned By      Initials Name Provider Type    PC Lolis Oro PT Physical Therapist                   Goals/Plan    No documentation.                  Clinical Impression       Row Name 01/22/25 1022          Pain    Pretreatment Pain Rating 0/10 - no pain  -PC     Posttreatment Pain Rating 0/10 - no pain  -PC       Row Name 01/22/25 1022          Plan of Care Review    Plan of Care Reviewed With patient  -PC     Outcome Evaluation Pt is pleasantly confused, PT continues to focus on gait, balance, and strengthening, pt is below her baseline and not safe  to return home, also lacks judgement and insight into deficits and is a fall risk, CCP assisting pt with discharge plans  -PC       Row Name 01/22/25 1022          Positioning and Restraints    Pre-Treatment Position in bed  -PC     Post Treatment Position chair  -PC     In Chair reclined;call light within reach;encouraged to call for assist;exit alarm on  -PC               User Key  (r) = Recorded By, (t) = Taken By, (c) = Cosigned By      Initials Name Provider Type    PC Lolis Oro, PT Physical Therapist                   Outcome Measures       Row Name 01/22/25 1023          How much help from another person do you currently need...    Turning from your back to your side while in flat bed without using bedrails? 4  -PC     Moving from lying on back to sitting on the side of a flat bed without bedrails? 4  -PC     Moving to and from a bed to a chair (including a wheelchair)? 3  -PC     Standing up from a chair using your arms (e.g., wheelchair, bedside chair)? 3  -PC     Climbing 3-5 steps with a railing? 3  -PC     To walk in hospital room? 3  -PC     AM-PAC 6 Clicks Score (PT) 20  -PC     Highest Level of Mobility Goal 6 --> Walk 10 steps or more  -PC               User Key  (r) = Recorded By, (t) = Taken By, (c) = Cosigned By      Initials Name Provider Type    PC Lolis Oro, PT Physical Therapist                                 Physical Therapy Education       Title: PT OT SLP Therapies (Done)       Topic: Physical Therapy (Done)       Point: Mobility training (Done)       Learning Progress Summary            Patient Acceptance, E,D, DU by PC at 1/22/2025 1023    Acceptance, E,TB, VU,NR by MT at 1/22/2025 0459    Acceptance, E,TB, VU,NR by MT at 1/21/2025 0556    Acceptance, E,D, DU by PC at 1/20/2025 1347    Acceptance, E,D, NR by PC at 1/16/2025 1354    Acceptance, E,D, NR by PC at 1/13/2025 1129    Acceptance, E,D, NR by  at 1/10/2025 1536    Acceptance, E, VU by  at 1/10/2025 0505     Acceptance, E,D, DU by PC at 1/8/2025 1348    Acceptance, E,D, NR by PC at 1/7/2025 1532    Acceptance, E, NR,NL by RS at 1/5/2025 1151                      Point: Home exercise program (Done)       Learning Progress Summary            Patient Acceptance, E,D, DU by PC at 1/22/2025 1023    Acceptance, E,TB, VU,NR by MT at 1/22/2025 0459    Acceptance, E,TB, VU,NR by MT at 1/21/2025 0556    Acceptance, E,D, DU by PC at 1/20/2025 1347    Acceptance, E,D, NR by PC at 1/16/2025 1354    Acceptance, E,D, NR by PC at 1/13/2025 1129    Acceptance, E,D, NR by  at 1/10/2025 1536    Acceptance, E, NR,NL by RS at 1/5/2025 1151                      Point: Body mechanics (Done)       Learning Progress Summary            Patient Acceptance, E,D, DU by PC at 1/22/2025 1023    Acceptance, E,TB, VU,NR by MT at 1/22/2025 0459    Acceptance, E,TB, VU,NR by MT at 1/21/2025 0556    Acceptance, E,D, DU by PC at 1/20/2025 1347    Acceptance, E,D, NR by PC at 1/16/2025 1354    Acceptance, E,D, NR by PC at 1/13/2025 1129    Acceptance, E,D, NR by SRIDHAR at 1/10/2025 1536    Acceptance, E, VU by DIRK at 1/10/2025 0505    Acceptance, E,D, DU by PC at 1/8/2025 1348    Acceptance, E,D, NR by PC at 1/7/2025 1532    Acceptance, E, NR,NL by RS at 1/5/2025 1151                      Point: Precautions (Done)       Learning Progress Summary            Patient Acceptance, E,D, DU by PC at 1/22/2025 1023    Acceptance, E,TB, VU,NR by MT at 1/22/2025 0459    Acceptance, E,TB, VU,NR by MT at 1/21/2025 0556    Acceptance, E,D, DU by PC at 1/20/2025 1347    Acceptance, E,D, NR by PC at 1/16/2025 1354    Acceptance, E,D, NR by PC at 1/13/2025 1129    Acceptance, E,D, NR by JM at 1/10/2025 1536    Acceptance, E, VU by JS at 1/10/2025 0505    Acceptance, E,D, DU by PC at 1/8/2025 1348    Acceptance, E,D, NR by PC at 1/7/2025 1532    Acceptance, E, NR,NL by RS at 1/5/2025 1151                                      User Key       Initials Effective Dates Name  Provider Type Discipline    PC 06/16/21 -  Lolis Oro PT Physical Therapist PT    JM 03/07/18 -  Jesica Ellis PTA Physical Therapist Assistant PT    MT 06/16/21 -  Renu Santos, RN Registered Nurse Nurse    JS 10/01/20 -  Payton Rodriguez RN Registered Nurse Nurse    RS 06/16/21 -  Lizzie Vo PT Physical Therapist PT                  PT Recommendation and Plan     Progress: improving  Outcome Evaluation: Pt is pleasantly confused, PT continues to focus on gait, balance, and strengthening, pt is below her baseline and not safe to return home, also lacks judgement and insight into deficits and is a fall risk, CCP assisting pt with discharge plans     Time Calculation:         PT Charges       Row Name 01/22/25 1023             Time Calculation    Start Time 0931  -PC      Stop Time 0945  -PC      Time Calculation (min) 14 min  -PC      PT Received On 01/22/25  -PC      PT - Next Appointment 01/24/25  -PC                User Key  (r) = Recorded By, (t) = Taken By, (c) = Cosigned By      Initials Name Provider Type    PC Lolis Oro, PT Physical Therapist                  Therapy Charges for Today       Code Description Service Date Service Provider Modifiers Qty    45208799811 HC PT THER PROC EA 15 MIN 1/22/2025 Lolis Oro, PT GP 1            PT G-Codes  Outcome Measure Options: AM-PAC 6 Clicks Basic Mobility (PT)  AM-PAC 6 Clicks Score (PT): 20  PT Discharge Summary  Anticipated Discharge Disposition (PT): skilled nursing facility, extended care facility    Lolis Oro, PT  1/22/2025

## 2025-01-22 NOTE — PLAN OF CARE
Goal Outcome Evaluation:  Plan of Care Reviewed With: patient        Progress: improving  Outcome Evaluation: alert, forgetful, cooperative.  up to BR with assist of 1.  appetite good.  no c/o pain.  monitoring blood sugars.  waiting on [place] to obtain financial documents from family. bed alarm activated.

## 2025-01-23 VITALS
TEMPERATURE: 98 F | SYSTOLIC BLOOD PRESSURE: 151 MMHG | HEART RATE: 78 BPM | BODY MASS INDEX: 18.77 KG/M2 | DIASTOLIC BLOOD PRESSURE: 79 MMHG | WEIGHT: 99.43 LBS | HEIGHT: 61 IN | RESPIRATION RATE: 16 BRPM | OXYGEN SATURATION: 97 %

## 2025-01-23 LAB
GLUCOSE BLDC GLUCOMTR-MCNC: 148 MG/DL (ref 70–130)
GLUCOSE BLDC GLUCOMTR-MCNC: 185 MG/DL (ref 70–130)

## 2025-01-23 PROCEDURE — 82948 REAGENT STRIP/BLOOD GLUCOSE: CPT

## 2025-01-23 PROCEDURE — 63710000001 INSULIN LISPRO (HUMAN) PER 5 UNITS: Performed by: NURSE PRACTITIONER

## 2025-01-23 RX ADMIN — ATORVASTATIN CALCIUM 80 MG: 20 TABLET, FILM COATED ORAL at 09:48

## 2025-01-23 RX ADMIN — CETIRIZINE HYDROCHLORIDE 10 MG: 10 TABLET, FILM COATED ORAL at 09:48

## 2025-01-23 RX ADMIN — Medication 10 ML: at 09:48

## 2025-01-23 RX ADMIN — DONEPEZIL HYDROCHLORIDE 10 MG: 10 TABLET, FILM COATED ORAL at 09:48

## 2025-01-23 RX ADMIN — MEMANTINE HYDROCHLORIDE 5 MG: 5 TABLET, FILM COATED ORAL at 05:44

## 2025-01-23 RX ADMIN — ESCITALOPRAM OXALATE 10 MG: 10 TABLET, FILM COATED ORAL at 09:48

## 2025-01-23 RX ADMIN — PANTOPRAZOLE SODIUM 40 MG: 40 TABLET, DELAYED RELEASE ORAL at 05:44

## 2025-01-23 RX ADMIN — INSULIN LISPRO 2 UNITS: 100 INJECTION, SOLUTION INTRAVENOUS; SUBCUTANEOUS at 12:47

## 2025-01-23 RX ADMIN — AMLODIPINE BESYLATE 10 MG: 10 TABLET ORAL at 09:48

## 2025-01-23 RX ADMIN — LEVETIRACETAM 1000 MG: 500 TABLET, FILM COATED ORAL at 05:43

## 2025-01-23 RX ADMIN — ASPIRIN 81 MG: 81 TABLET, COATED ORAL at 09:48

## 2025-01-23 RX ADMIN — EMPAGLIFLOZIN 10 MG: 10 TABLET, FILM COATED ORAL at 09:48

## 2025-01-23 RX ADMIN — FOLIC ACID 1 MG: 1 TABLET ORAL at 09:48

## 2025-01-23 NOTE — PROGRESS NOTES
Continued Stay Note  [facility]     Patient Name: Lucia Ellis  MRN: [id number]  Today's Date: 1/23/2025    Admit Date: 1/4/2025    Plan: [facility] medicaid pending bed   Discharge Plan       Row Name 01/23/25 1349       Plan    Plan [facility] medicaid pending bed    Plan Comments Discharge order noted. [facility]. Confirmation #[id number]. Updated RN , MD and Keshia with [facility]      Row Name 01/23/25 1327       Plan    Plan [facility]    Plan Comments Per Keshia with [facility], all good to admit today. Patient will be coming LTC. Informed MD      Row Name 01/23/25 1312       Plan    Plan [place] PENDING Financial documents    Plan Comments Msg sent to Keshia with [facility] sanders updates on financials.                   Discharge Codes    No documentation.                 Expected Discharge Date and Time       Expected Discharge Date Expected Discharge Time    Jan 23, 2025               Ariella Valdes RN

## 2025-01-23 NOTE — PROGRESS NOTES
Continued Stay Note  [facility]     Patient Name: Lucia Ellis  MRN: [id number]  Today's Date: 1/23/2025    Admit Date: 1/4/2025    Plan: [place] [facility] medicaid pending bed   Discharge Plan       Row Name 01/23/25 1405       Plan    Plan Comments DC summary faxed. Packet given to RN      Row Name 01/23/25 1349       [facility]    [facility]    Plan Comments Discharge order noted. [facility] scheduled [facility] for 3pm today. Confirmation #[id number]. Updated RN , MD and Keshia with [facility]      Row Name 01/23/25 1327       Plan    [facility]    Plan Comments Per Keshia with [facility], all good to admit today. Patient will be coming LTC. Informed MD      Row Name 01/23/25 1312       Plan    Plan [place] PENDING Financial documents    Plan Comments Msg sent to Keshia with [facility] updates on financials.                   Discharge Codes    No documentation.                 Expected Discharge Date and Time       Expected Discharge Date Expected Discharge Time    Jan 23, 2025               Ariella Valdes, RN

## 2025-01-23 NOTE — PLAN OF CARE
Goal Outcome Evaluation:  Plan of Care Reviewed With: patient        Progress: improving  Outcome Evaluation: vss, afebrile, all meds given whole w/ water, no c/o of pain or nausea, cooperative w/ care, confused, bed alarm on, up w/ assist x1, waiting on [place] to obtain financial docs from pt's family

## 2025-01-23 NOTE — PROGRESS NOTES
Continued Stay Note  [facility]     Patient Name: Lucia Ellis  MRN: [id number]  Today's Date: 1/23/2025    Admit Date: 1/4/2025    Plan: [facility]   Discharge Plan       Row Name 01/23/25 1327       Plan    Plan [facility]    Plan Comments Per Keshia with [facility], all good to admit today. Patient will be coming LTC. Informed MD      Row Name 01/23/25 1312       Plan    Plan [facility] PENDING Financial documents    Plan Comments Msg sent to Keshia sanders updates on financials.                   Discharge Codes    No documentation.                 Expected Discharge Date and Time       Expected Discharge Date Expected Discharge Time    Jan 24, 2025               Ariella Valdes RN

## 2025-01-23 NOTE — PLAN OF CARE
Goal Outcome Evaluation:              Outcome Evaluation: VSS. BM today. Up woith assist and walker.

## 2025-01-23 NOTE — PROGRESS NOTES
Continued Stay Note  [facility]     Patient Name: Lucia Ellis  MRN: [id number]  Today's Date: 1/23/2025    Admit Date: 1/4/2025    Plan: [place] PENDING Financial documents   Discharge Plan       Row Name 01/23/25 1312       Plan    Plan [place] PENDING Financial documents    Plan Comments Msg sent to Keshia with [facility] updates on financials.                   Discharge Codes    No documentation.                 Expected Discharge Date and Time       Expected Discharge Date Expected Discharge Time    Jan 24, 2025               Ariella Valdes RN

## 2025-01-23 NOTE — DISCHARGE SUMMARY
Patient Name: Lucia Ellis  : 1950  MRN: [id number]    Date of Admission: 2025  Date of Discharge:  2025  Primary Care Physician: Everardo Mazariegos MD      Chief Complaint:   Wellness Check      Discharge Diagnoses     Active Hospital Problems    Diagnosis  POA    **AMS (altered mental status) [R41.82]  Yes    Moderate protein-calorie malnutrition [E44.0]  Yes    History of stroke [Z86.73]  Not Applicable    Vascular dementia, moderate, without behavioral disturbance, psychotic disturbance, mood disturbance, and anxiety [F01.B0]  Yes    Type 2 diabetes mellitus with hyperglycemia [E11.65]  Yes    Hypokalemia [E87.6]  Yes    Essential hypertension [I10]  Yes    Hyperlipidemia [E78.5]  Yes      Resolved Hospital Problems   No resolved problems to display.        Hospital Course     Lucia is a 74-year-old female with past medical history of hypertension, hyperlipidemia, GERD, anxiety, history of seizures, history of CVA, DM, protein calorie malnutrition, vascular dementia who presented to [facility] due to altered mental status.  Please were called for a wellness check and found patient living at place without electricity or running water and malnourished.  She had reportedly been with declining memory over the past year and her son had been living in the basement but family does not believe he has best interest in mind.  She was admitted to [facility] for altered mental status.  Neurology evaluated patient and felt that mental status was due to worsening underlying vascular dementia as EEG was unremarkable and imaging was without acute abnormality.  MRI cannot be done she was unable to complete screening sheet.  Case management was unable to reach her son.  Discharge was delayed due to difficulty placing patient, however she was accepted at [facility].  Patient discharged on 2025 in agreement with plan below     #Discharge plan    -Stop taking lisinopril    -Patient  to discharge to long-term care    Day of Discharge     Subjective:  No overnight events, she has been accepted to long-term care, patient will discharge to long-term care    Physical Exam:  Temp:  [96.9 °F (36.1 °C)-98.3 °F (36.8 °C)] 98 °F (36.7 °C)  Heart Rate:  [58-84] 78  Resp:  [16] 16  BP: (124-151)/(61-79) 151/79  Body mass index is 18.79 kg/m².  Physical Exam  Constitutional:       General: She is not in acute distress.     Comments: Pleasantly confused   HENT:      Head: Normocephalic and atraumatic.      Nose: Nose normal. No congestion.      Mouth/Throat:      Pharynx: Oropharynx is clear. No oropharyngeal exudate.   Eyes:      General: No scleral icterus.  Cardiovascular:      Rate and Rhythm: Normal rate and regular rhythm.      Heart sounds: No murmur heard.     No friction rub. No gallop.   Pulmonary:      Effort: No respiratory distress.      Breath sounds: No wheezing or rales.   Abdominal:      General: There is no distension.      Tenderness: There is no abdominal tenderness. There is no guarding.   Musculoskeletal:         General: No swelling, deformity or signs of injury.      Cervical back: Normal range of motion. No rigidity.   Skin:     Coloration: Skin is not jaundiced.      Findings: No bruising or lesion.   Neurological:      General: No focal deficit present.      Mental Status: She is alert. Mental status is at baseline.      Motor: Weakness present.      Consultants     Consult Orders (all) (From admission, onward)       Start     Ordered    01/05/25 0031  Inpatient Neurology Consult General  Once        Specialty:  Neurology  Provider:  Wilner Hendricks MD    01/05/25 0032    01/05/25 0002  Inpatient Consult to Advance Care Planning  Once        Provider:  (Not yet assigned)    01/05/25 0001    01/04/25 2251  Inpatient Case Management  Consult  Once        Provider:  (Not yet assigned)    01/04/25 2253    01/04/25 2223  [facility] (on-call MD unless specified)  Details  Once        Specialty:  Hospitalist  Provider:  (Not yet assigned)    01/04/25 2222                  Procedures     * Surgery not found *    Imaging Results (All)       Procedure Component Value Units Date/Time    CT Head Without Contrast [[id number]] Collected: 01/06/25 2143     Updated: 01/06/25 2148    Narrative:      CT OF THE HEAD WITHOUT CONTRAST     HISTORY: Fall     COMPARISON: January 4, 2025     TECHNIQUE: Axial CT imaging was obtained through the brain. No IV  contrast was administered.     FINDINGS:  No acute intracranial hemorrhage is seen. There is diffuse atrophy.  There is periventricular and deep white matter microangiopathic change.  There is no midline shift or mass effect. Old lacunar infarct is noted  within the posterior limb of the right internal capsule. Additional old  infarct is noted within the anterior limb of the right internal capsule.  No calvarial fracture is seen. Minimal mucosal thickening is noted  within the ethmoid sinuses.       Impression:      No acute intracranial findings.     Radiation dose reduction techniques were utilized, including automated  exposure control and exposure modulation based on body size.        This report was finalized on 1/6/2025 9:45 PM by Dr. Elvira Plasencia M.D on Workstation: [id number]       CT Head Without Contrast [[id number]] Collected: 01/04/25 2356     Updated: 01/05/25 0002    Narrative:      CT OF THE HEAD WITHOUT CONTRAST     HISTORY: Altered mental status     COMPARISON: January 10, 2024     TECHNIQUE: Axial CT imaging was obtained through the brain. No IV  contrast was administered.     FINDINGS:  No acute intracranial hemorrhage is seen. There is diffuse atrophy.  There is periventricular and deep white matter microangiopathic change.  There is no midline shift or mass effect. Old infarct is noted within  the anterior limb of the right internal capsule. Additional old infarct  is noted within the posterior limb of the right  "internal capsule.  Paranasal sinuses and mastoid air cells appear clear.       Impression:      No acute intracranial findings.     Radiation dose reduction techniques were utilized, including automated  exposure control and exposure modulation based on body size.        This report was finalized on 1/4/2025 11:59 PM by Dr. Elvira Plasencia M.D on Workstation: [id number]       XR Chest 1 View [[id number]] Collected: 01/04/25 2144     Updated: 01/04/25 2148    Narrative:      SINGLE VIEW OF THE CHEST     HISTORY: Altered mental status     COMPARISON: May 21, 2024     FINDINGS:  Heart size is within normal limits. No pneumothorax, pleural effusion,  or acute infiltrate is seen. There is calcification of the aorta.       Impression:      No acute findings.     This report was finalized on 1/4/2025 9:45 PM by Dr. Elvira Plasencia M.D on Workstation: [id number]               Results for orders placed during the hospital encounter of 12/08/23    Adult transthoracic echo complete    Interpretation Summary    Left ventricular systolic function is hyperdynamic (EF > 70%). Calculated left ventricular EF = 70.8%    Left ventricular diastolic function was normal.    Normal echo    Saline test results are negative.    Pertinent Labs                         Invalid input(s): \"LDLCALC\"          Test Results Pending at Discharge     Pending Results       None              Discharge Details        Discharge Medications        Continue These Medications        Instructions Start Date   amLODIPine 10 MG tablet  Commonly known as: NORVASC   10 mg, Oral, Daily      Aspirin Low Dose 81 MG EC tablet  Generic drug: aspirin   81 mg, Oral, Daily      atorvastatin 80 MG tablet  Commonly known as: LIPITOR   80 mg, Oral, Every Night at Bedtime      bismuth subsalicylate 262 MG chewable tablet  Commonly known as: PEPTO BISMOL   524 mg, 4 Times Daily Before Meals & Nightly      cetirizine 10 MG tablet  Commonly known as: zyrTEC   10 mg, " Oral, Daily      donepezil 10 MG tablet  Commonly known as: Aricept   10 mg, Oral, Daily      empagliflozin 10 MG tablet tablet  Commonly known as: Jardiance   10 mg, Oral, Daily      escitalopram 10 MG tablet  Commonly known as: Lexapro   10 mg, Oral, Daily      FreeStyle Aleyda 2 [place] device   1 each, Not Applicable, Continuous      FreeStyle Aleyda 2 Sensor misc   1 each, Not Applicable, Weekly      Janumet XR  MG tablet  Generic drug: SITagliptin-metFORMIN HCl ER   1 tablet, Oral, Daily      levETIRAcetam 1000 MG tablet  Commonly known as: KEPPRA   1,000 mg, Oral, 2 Times Daily      memantine 5 MG tablet  Commonly known as: NAMENDA   5 mg, Oral, 2 Times Daily      OLANZapine 5 MG tablet  Commonly known as: zyPREXA   5 mg, Oral, Every Night at Bedtime      omeprazole 40 MG capsule  Commonly known as: priLOSEC   40 mg, Daily      pantoprazole 40 MG EC tablet  Commonly known as: PROTONIX   40 mg, Oral, Every Morning             Stop These Medications      lisinopril 40 MG tablet  Commonly known as: PRINIVIL,ZESTRIL              Allergies   Allergen Reactions    Ppd [Tuberculin Purified Protein Derivative] Rash       Discharge Disposition:  Long Term Care (DC - External)      Discharge Diet:  Diet Order   Procedures    Diet: Regular/House, Diabetic; Consistent Carbohydrate; Fluid Consistency: Thin (IDDSI 0)       Discharge Activity:       CODE STATUS:    Code Status and Medical Interventions: CPR (Attempt to Resuscitate); Full Support   Ordered at: 01/04/25 8321     Code Status (Patient has no pulse and is not breathing):    CPR (Attempt to Resuscitate)     Medical Interventions (Patient has pulse or is breathing):    Full Support       No future appointments.   Contact information for follow-up providers       Everardo Mazariegos MD .    Specialties: Family Medicine, Urgent Care, Emergency Medicine  Contact information:  [address] [id number] [phone]                        Contact information for after-discharge care       Destination       [facility] .    Service: Intermediate Care  Contact information:  [address] [place] [address] [phone]                                   Time Spent on Discharge: 35 minutes      DO [facility]  01/23/25  13:36 EST

## 2025-01-23 NOTE — PROGRESS NOTES
Name: Lucia Ellis ADMIT: 2025   : 1950  PCP: Everardo Mazariegos MD    MRN: [id number] LOS: 18 days   AGE/SEX: 74 y.o. female  ROOM: [facility]     Subjective   Subjective   2025  Patient seen and examined at bedside, she does not appear to be in distress.  Pleasantly confused.  Appears to be baseline.  Working on placement to [facility]       Objective   Objective   Vital Signs  Temp:  [96.9 °F (36.1 °C)-98.3 °F (36.8 °C)] 98 °F (36.7 °C)  Heart Rate:  [58-84] 78  Resp:  [16] 16  BP: (124-151)/(61-79) 151/79  SpO2:  [93 %-97 %] 97 %  on   ;   Device (Oxygen Therapy): room air  Body mass index is 18.79 kg/m².  Physical Exam  Constitutional:       General: She is not in acute distress.     Comments: Pleasantly confused   HENT:      Head: Normocephalic and atraumatic.      Nose: Nose normal. No congestion.      Mouth/Throat:      Pharynx: Oropharynx is clear. No oropharyngeal exudate.   Eyes:      General: No scleral icterus.  Cardiovascular:      Rate and Rhythm: Normal rate and regular rhythm.      Heart sounds: No murmur heard.     No friction rub. No gallop.   Pulmonary:      Effort: No respiratory distress.      Breath sounds: No wheezing or rales.   Abdominal:      General: There is no distension.      Tenderness: There is no abdominal tenderness. There is no guarding.   Musculoskeletal:         General: No swelling, deformity or signs of injury.      Cervical back: Normal range of motion. No rigidity.   Skin:     Coloration: Skin is not jaundiced.      Findings: No bruising or lesion.   Neurological:      General: No focal deficit present.      Mental Status: She is alert. Mental status is at baseline.      Motor: Weakness present.       Results Review     I reviewed the patient's new clinical results.              Glucose   Date/Time Value Ref Range Status   2025 0832 148 (H) 70 - 130 mg/dL Final   2025 2215 171 (H) 70 - 130 mg/dL Final   2025 1728 149 (H) 70 - 130 mg/dL Final    01/22/2025 1202 158 (H) 70 - 130 mg/dL Final   01/22/2025 0801 127 70 - 130 mg/dL Final   01/21/2025 2143 225 (H) 70 - 130 mg/dL Final   01/21/2025 1702 130 70 - 130 mg/dL Final       No radiology results for the last day    I have personally reviewed all medications:  Scheduled Medications  amLODIPine, 10 mg, Oral, Daily  aspirin, 81 mg, Oral, Daily  atorvastatin, 80 mg, Oral, Daily  cetirizine, 10 mg, Oral, Daily  donepezil, 10 mg, Oral, Daily  empagliflozin, 10 mg, Oral, Daily  enoxaparin, 40 mg, Subcutaneous, Nightly  escitalopram, 10 mg, Oral, Daily  folic acid, 1 mg, Oral, Daily  insulin lispro, 2-7 Units, Subcutaneous, 4x Daily AC & at Bedtime  levETIRAcetam, 1,000 mg, Oral, BID  memantine, 5 mg, Oral, BID  OLANZapine, 5 mg, Oral, Nightly  pantoprazole, 40 mg, Oral, QAM  sodium chloride, 10 mL, Intravenous, Q12H    Infusions   Diet  Diet: Regular/House, Diabetic; Consistent Carbohydrate; Fluid Consistency: Thin (IDDSI 0)    I have personally reviewed:  [x]  Laboratory   [x]  Microbiology   [x]  Radiology   [x]  EKG/Telemetry  [x]  Cardiology/Vascular   []  Pathology    []  Records       Assessment/Plan     Active Hospital Problems    Diagnosis  POA    **AMS (altered mental status) [R41.82]  Yes    Moderate protein-calorie malnutrition [E44.0]  Yes    History of stroke [Z86.73]  Not Applicable    Vascular dementia, moderate, without behavioral disturbance, psychotic disturbance, mood disturbance, and anxiety [F01.B0]  Yes    Type 2 diabetes mellitus with hyperglycemia [E11.65]  Yes    Hypokalemia [E87.6]  Yes    Essential hypertension [I10]  Yes    Hyperlipidemia [E78.5]  Yes      Resolved Hospital Problems   No resolved problems to display.       74 y.o. female admitted with AMS (altered mental status).    #Altered mental status  #Vascular dementia  #Failure to thrive    -Neurology consulted, believes worsening vascular dementia    -MRI unable to be done if she is unable to complete questionnaire    -EEG  unremarkable    -Status post thiamine    -Continue donepezil and memantine    -Zyprexa 5 mg p.o. nightly    -PT    -Placement to SNF pending    #Protein calorie malnutrition    -BMI 18.79    -Nutrition consulted    #DM    -ISS    #History of CVA    -Resume home aspirin and statin    #History of seizures    -Continue Keppra 1000 mg p.o. twice daily    -Seizure precautions    #Anxiety    -Zyprexa 5 mg p.o. nightly    -Lexapro 10 mg p.o. daily    #GERD    -PPI    #Hypertension    -Norvasc, stable    #Hyperlipidemia    -Statin    Lovenox 40 mg SC daily for DVT prophylaxis.  Full code.  Discussed with patient and nursing staff.  Anticipate discharge to SNU facility later this week.  Expected Discharge Date: 1/24/2025; Expected Discharge Time:       Oniel Tipton DO  [facility]  01/23/25  09:50 EST

## 2025-01-24 NOTE — PROGRESS NOTES
Case Management Discharge Note      Final Note: Discharged [facility]. Ariella Valdes RN         Selected Continued Care - Discharged on 1/23/2025 Admission date: 1/4/2025 - Discharge disposition: Long Term Care (DC - External)      Destination Coordination complete.      Service Provider Services Address Phone Fax Patient Preferred    [facility] [address] , [place] [address] [phone] --                 Transportation Services  W/C Van: [facility]    Final Discharge Disposition Code: 04 - intermediate care facility

## 2025-06-06 NOTE — PROGRESS NOTES
Name: Lucia Ellis ADMIT: 2025   : 1950  PCP: Everardo Mazariegos MD    MRN: [id number] LOS: 16 days   AGE/SEX: 74 y.o. female  ROOM: [facility]     Subjective   Subjective   Patient is sitting up on the chair and is awake alert and oriented to person and place.  Otherwise remains confused and mental status is close to baseline. Denies any nausea, vomiting abdominal pain, chest pain, shortness of breath.       Objective   Objective   Vital Signs  Temp:  [96.8 °F (36 °C)-98 °F (36.7 °C)] 97.6 °F (36.4 °C)  Heart Rate:  [76-81] 78  Resp:  [16] 16  BP: (127-175)/(71-82) 127/79  SpO2:  [96 %-98 %] 96 %  on   ;   Device (Oxygen Therapy): room air  Body mass index is 18.79 kg/m².  Physical Exam  Vitals reviewed.   Constitutional:       General: She is not in acute distress.     Appearance: She is not ill-appearing.   HENT:      Head: Normocephalic and atraumatic.      Mouth/Throat:      Mouth: Mucous membranes are moist.   Eyes:      Extraocular Movements: Extraocular movements intact.      Pupils: Pupils are equal, round, and reactive to light.   Cardiovascular:      Rate and Rhythm: Normal rate and regular rhythm.      Pulses: Normal pulses.      Heart sounds: Normal heart sounds.   Pulmonary:      Effort: Pulmonary effort is normal. No respiratory distress.      Breath sounds: Normal breath sounds.   Abdominal:      General: There is no distension.      Palpations: Abdomen is soft.      Tenderness: There is no abdominal tenderness.   Musculoskeletal:      Cervical back: Neck supple.      Right lower leg: No edema.      Left lower leg: No edema.   Skin:     General: Skin is warm and dry.   Neurological:      Mental Status: She is alert. She is disoriented.   Psychiatric:         Mood and Affect: Mood normal.       Results Review     I reviewed the patient's new clinical results.                        Glucose   Date/Time Value Ref Range Status   2025 1232 158 (H) 70 - 130 mg/dL Final   2025 0819  122 70 - 130 mg/dL Final   01/20/2025 2244 229 (H) 70 - 130 mg/dL Final   01/20/2025 2226 237 (H) 70 - 130 mg/dL Final   01/20/2025 1704 115 70 - 130 mg/dL Final   01/20/2025 1117 193 (H) 70 - 130 mg/dL Final   01/20/2025 0808 106 70 - 130 mg/dL Final       No radiology results for the last day    I have personally reviewed all medications:  Scheduled Medications  amLODIPine, 10 mg, Oral, Daily  aspirin, 81 mg, Oral, Daily  atorvastatin, 80 mg, Oral, Daily  cetirizine, 10 mg, Oral, Daily  donepezil, 10 mg, Oral, Daily  empagliflozin, 10 mg, Oral, Daily  enoxaparin, 40 mg, Subcutaneous, Nightly  escitalopram, 10 mg, Oral, Daily  folic acid, 1 mg, Oral, Daily  insulin lispro, 2-7 Units, Subcutaneous, 4x Daily AC & at Bedtime  levETIRAcetam, 1,000 mg, Oral, BID  memantine, 5 mg, Oral, BID  OLANZapine, 5 mg, Oral, Nightly  pantoprazole, 40 mg, Oral, QAM  sodium chloride, 10 mL, Intravenous, Q12H    Infusions   Diet  Diet: Regular/House, Diabetic; Consistent Carbohydrate; Fluid Consistency: Thin (IDDSI 0)    I have personally reviewed:  [x]  Laboratory   []  Microbiology   []  Radiology   []  EKG/Telemetry  []  Cardiology/Vascular   []  Pathology    []  Records       Assessment/Plan     Active Hospital Problems    Diagnosis  POA    **AMS (altered mental status) [R41.82]  Yes    Moderate protein-calorie malnutrition [E44.0]  Yes    History of stroke [Z86.73]  Not Applicable    Vascular dementia, moderate, without behavioral disturbance, psychotic disturbance, mood disturbance, and anxiety [F01.B0]  Yes    Type 2 diabetes mellitus with hyperglycemia [E11.65]  Yes    Hypokalemia [E87.6]  Yes    Essential hypertension [I10]  Yes    Hyperlipidemia [E78.5]  Yes      Resolved Hospital Problems   No resolved problems to display.     Ms. Ellis is a 74 y.o. female with a history of dementia, DM 2, hyperlipidemia and hypertension that presented to the hospital with altered mental status and failure to thrive.  She was apparently  living with her adult son but was noted to be without any electricity or running water.  Her sister and nephew provided history on admission and emergency personnel was called to the patient's home for a wellness check.       1.Altered mental status/vascular dementia with progression over time.  Continue with donepezil and amantadine.  On Zyprexa at nighttime for agitation.  MRI could not be done secondary to unable to completion of screening sheet.  Neurology did evaluate and signed off.    2.  History of seizures on Keppra.    3.  Diabetes mellitus, on corrective dose insulin and Jardiance which will be continued.  Blood sugars are in acceptable range.    4.  Hypertension, on amlodipine at will be continued.  Blood pressure is reasonably well-controlled.    5.  Hyperlipidemia, on statins.    6.  CODE STATUS is full code.    7.  Disposition, to rehab once a bed is available.    Copy text on this note has been reviewed by me on 1/21/2025    Leonel Lopez MD  [facility]  01/21/25  16:31 EST   No

## (undated) DEVICE — BLCK/BITE BLOX W/DENTL/RIM W/STRAP 54F

## (undated) DEVICE — CANN O2 ETCO2 FITS ALL CONN CO2 SMPL A/ 7IN DISP LF

## (undated) DEVICE — TUBING, SUCTION, 1/4" X 10', STRAIGHT: Brand: MEDLINE

## (undated) DEVICE — KT ORCA ORCAPOD DISP STRL

## (undated) DEVICE — ADAPT CLN BIOGUARD AIR/H2O DISP

## (undated) DEVICE — SENSR O2 OXIMAX FNGR A/ 18IN NONSTR

## (undated) DEVICE — LN SMPL CO2 SHTRM SD STREAM W/M LUER